# Patient Record
Sex: FEMALE | Race: WHITE | NOT HISPANIC OR LATINO | URBAN - METROPOLITAN AREA
[De-identification: names, ages, dates, MRNs, and addresses within clinical notes are randomized per-mention and may not be internally consistent; named-entity substitution may affect disease eponyms.]

---

## 2017-01-20 ENCOUNTER — OUTPATIENT (OUTPATIENT)
Dept: OUTPATIENT SERVICES | Facility: HOSPITAL | Age: 82
LOS: 1 days | End: 2017-01-20
Payer: MEDICARE

## 2017-01-20 VITALS
SYSTOLIC BLOOD PRESSURE: 138 MMHG | HEART RATE: 99 BPM | HEIGHT: 62 IN | WEIGHT: 205.91 LBS | TEMPERATURE: 98 F | DIASTOLIC BLOOD PRESSURE: 72 MMHG | RESPIRATION RATE: 14 BRPM

## 2017-01-20 DIAGNOSIS — Z01.818 ENCOUNTER FOR OTHER PREPROCEDURAL EXAMINATION: ICD-10-CM

## 2017-01-20 DIAGNOSIS — Z90.710 ACQUIRED ABSENCE OF BOTH CERVIX AND UTERUS: Chronic | ICD-10-CM

## 2017-01-20 DIAGNOSIS — Z98.1 ARTHRODESIS STATUS: Chronic | ICD-10-CM

## 2017-01-20 DIAGNOSIS — Z96.643 PRESENCE OF ARTIFICIAL HIP JOINT, BILATERAL: Chronic | ICD-10-CM

## 2017-01-20 DIAGNOSIS — Z87.81 PERSONAL HISTORY OF (HEALED) TRAUMATIC FRACTURE: Chronic | ICD-10-CM

## 2017-01-20 DIAGNOSIS — Z96.641 PRESENCE OF RIGHT ARTIFICIAL HIP JOINT: Chronic | ICD-10-CM

## 2017-01-20 DIAGNOSIS — M17.0 BILATERAL PRIMARY OSTEOARTHRITIS OF KNEE: ICD-10-CM

## 2017-01-20 DIAGNOSIS — M17.12 UNILATERAL PRIMARY OSTEOARTHRITIS, LEFT KNEE: ICD-10-CM

## 2017-01-20 LAB
ALBUMIN SERPL ELPH-MCNC: 3.7 G/DL — SIGNIFICANT CHANGE UP (ref 3.3–5)
ALP SERPL-CCNC: 82 U/L — SIGNIFICANT CHANGE UP (ref 30–120)
ALT FLD-CCNC: 19 U/L DA — SIGNIFICANT CHANGE UP (ref 10–60)
ANION GAP SERPL CALC-SCNC: 9 MMOL/L — SIGNIFICANT CHANGE UP (ref 5–17)
APTT BLD: 29.4 SEC — SIGNIFICANT CHANGE UP (ref 27.5–37.4)
AST SERPL-CCNC: 10 U/L — SIGNIFICANT CHANGE UP (ref 10–40)
BILIRUB SERPL-MCNC: 0.5 MG/DL — SIGNIFICANT CHANGE UP (ref 0.2–1.2)
BLD GP AB SCN SERPL QL: SIGNIFICANT CHANGE UP
BUN SERPL-MCNC: 26 MG/DL — HIGH (ref 7–23)
CALCIUM SERPL-MCNC: 9.9 MG/DL — SIGNIFICANT CHANGE UP (ref 8.4–10.5)
CHLORIDE SERPL-SCNC: 104 MMOL/L — SIGNIFICANT CHANGE UP (ref 96–108)
CO2 SERPL-SCNC: 27 MMOL/L — SIGNIFICANT CHANGE UP (ref 22–31)
CREAT SERPL-MCNC: 0.95 MG/DL — SIGNIFICANT CHANGE UP (ref 0.5–1.3)
GLUCOSE SERPL-MCNC: 154 MG/DL — HIGH (ref 70–99)
HCT VFR BLD CALC: 41.8 % — SIGNIFICANT CHANGE UP (ref 34.5–45)
HGB BLD-MCNC: 13.1 G/DL — SIGNIFICANT CHANGE UP (ref 11.5–15.5)
INR BLD: 1.14 RATIO — SIGNIFICANT CHANGE UP (ref 0.88–1.16)
MCHC RBC-ENTMCNC: 28.7 PG — SIGNIFICANT CHANGE UP (ref 27–34)
MCHC RBC-ENTMCNC: 31.4 GM/DL — LOW (ref 32–36)
MCV RBC AUTO: 91.5 FL — SIGNIFICANT CHANGE UP (ref 80–100)
PLATELET # BLD AUTO: 246 K/UL — SIGNIFICANT CHANGE UP (ref 150–400)
POTASSIUM SERPL-MCNC: 4 MMOL/L — SIGNIFICANT CHANGE UP (ref 3.5–5.3)
POTASSIUM SERPL-SCNC: 4 MMOL/L — SIGNIFICANT CHANGE UP (ref 3.5–5.3)
PROT SERPL-MCNC: 6.7 G/DL — SIGNIFICANT CHANGE UP (ref 6–8.3)
PROTHROM AB SERPL-ACNC: 12.8 SEC — SIGNIFICANT CHANGE UP (ref 10–13.1)
RBC # BLD: 4.57 M/UL — SIGNIFICANT CHANGE UP (ref 3.8–5.2)
RBC # FLD: 13.3 % — SIGNIFICANT CHANGE UP (ref 10.3–14.5)
SODIUM SERPL-SCNC: 140 MMOL/L — SIGNIFICANT CHANGE UP (ref 135–145)
WBC # BLD: 11.3 K/UL — HIGH (ref 3.8–10.5)
WBC # FLD AUTO: 11.3 K/UL — HIGH (ref 3.8–10.5)

## 2017-01-20 PROCEDURE — 86850 RBC ANTIBODY SCREEN: CPT

## 2017-01-20 PROCEDURE — 93010 ELECTROCARDIOGRAM REPORT: CPT

## 2017-01-20 PROCEDURE — G0463: CPT

## 2017-01-20 PROCEDURE — 85027 COMPLETE CBC AUTOMATED: CPT

## 2017-01-20 PROCEDURE — 36415 COLL VENOUS BLD VENIPUNCTURE: CPT

## 2017-01-20 PROCEDURE — 85730 THROMBOPLASTIN TIME PARTIAL: CPT

## 2017-01-20 PROCEDURE — 86900 BLOOD TYPING SEROLOGIC ABO: CPT

## 2017-01-20 PROCEDURE — 87641 MR-STAPH DNA AMP PROBE: CPT

## 2017-01-20 PROCEDURE — 93005 ELECTROCARDIOGRAM TRACING: CPT

## 2017-01-20 PROCEDURE — 80053 COMPREHEN METABOLIC PANEL: CPT

## 2017-01-20 PROCEDURE — 85610 PROTHROMBIN TIME: CPT

## 2017-01-20 PROCEDURE — 87640 STAPH A DNA AMP PROBE: CPT

## 2017-01-20 PROCEDURE — 86901 BLOOD TYPING SEROLOGIC RH(D): CPT

## 2017-01-20 NOTE — H&P PST ADULT - NEGATIVE NEUROLOGICAL SYMPTOMS
no vertigo/no syncope/no weakness/no difficulty walking/no confusion/no loss of sensation/no tremors/no headache

## 2017-01-20 NOTE — H&P PST ADULT - ABILITY TO HEAR (WITH HEARING AID OR HEARING APPLIANCE IF NORMALLY USED):
bilateral hearing aids/Mildly to Moderately Impaired: difficulty hearing in some environments or speaker may need to increase volume or speak distinctly

## 2017-01-20 NOTE — H&P PST ADULT - NEGATIVE OPHTHALMOLOGIC SYMPTOMS
no discharge R/no pain R/no irritation R/no discharge L/no scleral injection L/no lacrimation R/no blurred vision L/no irritation L/no scleral injection R/no photophobia/no diplopia/no loss of vision R/no loss of vision L/no blurred vision R/no pain L/no lacrimation L

## 2017-01-20 NOTE — H&P PST ADULT - PROBLEM SELECTOR PLAN 1
"left total knee replacement" on 2/8/17.  Diagnostic testing will be forwarded for medical and cardiology clearance.  Pre op instructions were reviewed with patient and daughter in law and signed.  Anesthesia and pharmacy consult today.

## 2017-01-20 NOTE — H&P PST ADULT - NEGATIVE ENMT SYMPTOMS
no nasal discharge/no dry mouth/no post-nasal discharge/no abnormal taste sensation/no nose bleeds/no tinnitus/no vertigo/no dysphagia/no ear pain/no gum bleeding/no recurrent cold sores/no nasal congestion/no throat pain/no nasal obstruction/no sinus symptoms

## 2017-01-20 NOTE — H&P PST ADULT - PSH
History of ankle fusion  left, 2001  History of hip replacement, total, right  2013  History of hysterectomy  1981  History of wrist fracture  right 2006, left 2007

## 2017-01-20 NOTE — H&P PST ADULT - HISTORY OF PRESENT ILLNESS
83 yo female is scheduled for "LEFT total knee replacement stage 1" on 2/8/17.  Patient states that she does not want to do the right knee at the same hospitalization.  Spoke to Amanda in Dr Robles office and [facility] OR booking to clarify that patient will only have LEFT knee total replacement.  Patient accompanied by daughter in law today, who helps with history.   Reports pain in both knees, worst in left, for which she has tried HA injections.   Pain is worst with weight bearing and rates 10/10.  Uses a cane and walker for stability.

## 2017-01-20 NOTE — H&P PST ADULT - FAMILY HISTORY
Child  Still living? Yes, Estimated age: 51-60  Family history of breast cancer, Age at diagnosis: Age Unknown     Sibling  Still living? No  Family history of colon cancer, Age at diagnosis: Age Unknown

## 2017-01-20 NOTE — H&P PST ADULT - PMH
Depression    Glaucoma    Hearing loss  bilateral  History of GI bleed  related to aspirin use 2010  Hypertension    Macular degeneration    Obesity (BMI 30-39.9)    Osteoarthritis of both knees    Urinary incontinence Depression    Glaucoma    Hearing loss  bilateral  Hypertension    Macular degeneration    Obesity (BMI 30-39.9)    Osteoarthritis of both knees    Urinary incontinence

## 2017-01-20 NOTE — H&P PST ADULT - ASSESSMENT
85 yo female is scheduled for LEFT total knee replacement on 2/8/17 and RIGHT total knee replacement on

## 2017-01-20 NOTE — H&P PST ADULT - NSANTHOSAYNRD_GEN_A_CORE
No. ROEL screening performed.  STOP BANG Legend: 0-2 = LOW Risk; 3-4 = INTERMEDIATE Risk; 5-8 = HIGH Risk

## 2017-01-21 LAB
MRSA PCR RESULT.: SIGNIFICANT CHANGE UP
S AUREUS DNA NOSE QL NAA+PROBE: SIGNIFICANT CHANGE UP

## 2017-02-02 RX ORDER — SODIUM CHLORIDE 9 MG/ML
1000 INJECTION, SOLUTION INTRAVENOUS
Qty: 0 | Refills: 0 | Status: DISCONTINUED | OUTPATIENT
Start: 2017-02-08 | End: 2017-02-11

## 2017-02-03 NOTE — PROVIDER CONTACT NOTE (OTHER) - ASSESSMENT
Allergy: “something for her stomach”. Intolerance: ASA (81mg ? abdominal pain, not GI bleed as indicated)  PMH:  Depression (sertraline 50mg Qday); Glaucoma (Latanoprost 0.005% 1 drop in affected eye(s) HS); Hearing loss, bilateral; HTN (valsartan 40mg Qday, verapamil 120mg Qday); Macular degeneration; Urinary incontinence (oxybutynin 5mg Qday) . PST value of interest: WBC 11.3 (elevated).

## 2017-02-03 NOTE — PROVIDER CONTACT NOTE (OTHER) - RECOMMENDATIONS
1.Repeat WBC preop unless repeated by PMD. 2.No OxyContin® preop. 3. Eliquis® 2.5mg BID x 12 days – consult with Dr. Robles as to prophylaxis beyond 12 days due to ASA intolerance.

## 2017-02-07 ENCOUNTER — RESULT REVIEW (OUTPATIENT)
Age: 82
End: 2017-02-07

## 2017-02-07 RX ORDER — APREPITANT 80 MG/1
40 CAPSULE ORAL ONCE
Qty: 0 | Refills: 0 | Status: COMPLETED | OUTPATIENT
Start: 2017-02-08 | End: 2017-02-08

## 2017-02-07 RX ORDER — FAMOTIDINE 10 MG/ML
1 INJECTION INTRAVENOUS
Qty: 0 | Refills: 0 | COMMUNITY
Start: 2017-02-07 | End: 2017-02-08

## 2017-02-07 NOTE — PATIENT PROFILE ADULT. - PMH
Depression    Glaucoma    Hearing loss  bilateral  History of GI bleed  related to aspirin use 2010  Hypertension    Macular degeneration    Obesity (BMI 30-39.9)    Osteoarthritis of both knees    Urinary incontinence

## 2017-02-08 ENCOUNTER — INPATIENT (INPATIENT)
Facility: HOSPITAL | Age: 82
LOS: 2 days | Discharge: HOME | DRG: 470 | End: 2017-02-11
Attending: ORTHOPAEDIC SURGERY | Admitting: ORTHOPAEDIC SURGERY
Payer: MEDICARE

## 2017-02-08 ENCOUNTER — APPOINTMENT (OUTPATIENT)
Dept: ORTHOPEDIC SURGERY | Facility: HOSPITAL | Age: 82
End: 2017-02-08

## 2017-02-08 VITALS
OXYGEN SATURATION: 98 % | TEMPERATURE: 98 F | WEIGHT: 207.9 LBS | SYSTOLIC BLOOD PRESSURE: 134 MMHG | RESPIRATION RATE: 18 BRPM | HEIGHT: 63 IN | HEART RATE: 98 BPM | DIASTOLIC BLOOD PRESSURE: 99 MMHG

## 2017-02-08 DIAGNOSIS — Z90.710 ACQUIRED ABSENCE OF BOTH CERVIX AND UTERUS: Chronic | ICD-10-CM

## 2017-02-08 DIAGNOSIS — M17.12 UNILATERAL PRIMARY OSTEOARTHRITIS, LEFT KNEE: ICD-10-CM

## 2017-02-08 DIAGNOSIS — Z87.81 PERSONAL HISTORY OF (HEALED) TRAUMATIC FRACTURE: Chronic | ICD-10-CM

## 2017-02-08 DIAGNOSIS — Z01.818 ENCOUNTER FOR OTHER PREPROCEDURAL EXAMINATION: ICD-10-CM

## 2017-02-08 DIAGNOSIS — Z96.641 PRESENCE OF RIGHT ARTIFICIAL HIP JOINT: Chronic | ICD-10-CM

## 2017-02-08 DIAGNOSIS — Z98.1 ARTHRODESIS STATUS: Chronic | ICD-10-CM

## 2017-02-08 LAB
ANION GAP SERPL CALC-SCNC: 7 MMOL/L — SIGNIFICANT CHANGE UP (ref 5–17)
BUN SERPL-MCNC: 16 MG/DL — SIGNIFICANT CHANGE UP (ref 7–23)
CALCIUM SERPL-MCNC: 9.2 MG/DL — SIGNIFICANT CHANGE UP (ref 8.4–10.5)
CHLORIDE SERPL-SCNC: 106 MMOL/L — SIGNIFICANT CHANGE UP (ref 96–108)
CO2 SERPL-SCNC: 30 MMOL/L — SIGNIFICANT CHANGE UP (ref 22–31)
CREAT SERPL-MCNC: 0.85 MG/DL — SIGNIFICANT CHANGE UP (ref 0.5–1.3)
GLUCOSE SERPL-MCNC: 136 MG/DL — HIGH (ref 70–99)
HCT VFR BLD CALC: 38 % — SIGNIFICANT CHANGE UP (ref 34.5–45)
HGB BLD-MCNC: 12.4 G/DL — SIGNIFICANT CHANGE UP (ref 11.5–15.5)
POTASSIUM SERPL-MCNC: 5 MMOL/L — SIGNIFICANT CHANGE UP (ref 3.5–5.3)
POTASSIUM SERPL-SCNC: 5 MMOL/L — SIGNIFICANT CHANGE UP (ref 3.5–5.3)
SODIUM SERPL-SCNC: 143 MMOL/L — SIGNIFICANT CHANGE UP (ref 135–145)

## 2017-02-08 PROCEDURE — 88311 DECALCIFY TISSUE: CPT | Mod: 26

## 2017-02-08 PROCEDURE — 88305 TISSUE EXAM BY PATHOLOGIST: CPT | Mod: 26

## 2017-02-08 PROCEDURE — 99223 1ST HOSP IP/OBS HIGH 75: CPT

## 2017-02-08 PROCEDURE — 27447 TOTAL KNEE ARTHROPLASTY: CPT | Mod: LT

## 2017-02-08 PROCEDURE — 73562 X-RAY EXAM OF KNEE 3: CPT | Mod: 26,LT

## 2017-02-08 RX ORDER — CEFAZOLIN SODIUM 1 G
2000 VIAL (EA) INJECTION EVERY 8 HOURS
Qty: 0 | Refills: 0 | Status: COMPLETED | OUTPATIENT
Start: 2017-02-08 | End: 2017-02-09

## 2017-02-08 RX ORDER — TRANEXAMIC ACID 100 MG/ML
1000 INJECTION, SOLUTION INTRAVENOUS ONCE
Qty: 0 | Refills: 0 | Status: COMPLETED | OUTPATIENT
Start: 2017-02-08 | End: 2017-02-08

## 2017-02-08 RX ORDER — VALSARTAN 80 MG/1
40 TABLET ORAL DAILY
Qty: 0 | Refills: 0 | Status: DISCONTINUED | OUTPATIENT
Start: 2017-02-10 | End: 2017-02-11

## 2017-02-08 RX ORDER — CEFAZOLIN SODIUM 1 G
2000 VIAL (EA) INJECTION ONCE
Qty: 0 | Refills: 0 | Status: COMPLETED | OUTPATIENT
Start: 2017-02-08 | End: 2017-02-08

## 2017-02-08 RX ORDER — INFLUENZA VIRUS VACCINE 15; 15; 15; 15 UG/.5ML; UG/.5ML; UG/.5ML; UG/.5ML
0.5 SUSPENSION INTRAMUSCULAR ONCE
Qty: 0 | Refills: 0 | Status: DISCONTINUED | OUTPATIENT
Start: 2017-02-08 | End: 2017-02-11

## 2017-02-08 RX ORDER — ACETAMINOPHEN 500 MG
1000 TABLET ORAL ONCE
Qty: 0 | Refills: 0 | Status: COMPLETED | OUTPATIENT
Start: 2017-02-08 | End: 2017-02-08

## 2017-02-08 RX ORDER — SODIUM CHLORIDE 9 MG/ML
1000 INJECTION, SOLUTION INTRAVENOUS
Qty: 0 | Refills: 0 | Status: DISCONTINUED | OUTPATIENT
Start: 2017-02-08 | End: 2017-02-08

## 2017-02-08 RX ORDER — OXYBUTYNIN CHLORIDE 5 MG
5 TABLET ORAL DAILY
Qty: 0 | Refills: 0 | Status: DISCONTINUED | OUTPATIENT
Start: 2017-02-08 | End: 2017-02-11

## 2017-02-08 RX ORDER — SERTRALINE 25 MG/1
50 TABLET, FILM COATED ORAL DAILY
Qty: 0 | Refills: 0 | Status: DISCONTINUED | OUTPATIENT
Start: 2017-02-08 | End: 2017-02-11

## 2017-02-08 RX ORDER — HYDROMORPHONE HYDROCHLORIDE 2 MG/ML
0.5 INJECTION INTRAMUSCULAR; INTRAVENOUS; SUBCUTANEOUS
Qty: 0 | Refills: 0 | Status: DISCONTINUED | OUTPATIENT
Start: 2017-02-08 | End: 2017-02-11

## 2017-02-08 RX ORDER — MAGNESIUM HYDROXIDE 400 MG/1
30 TABLET, CHEWABLE ORAL DAILY
Qty: 0 | Refills: 0 | Status: DISCONTINUED | OUTPATIENT
Start: 2017-02-08 | End: 2017-02-11

## 2017-02-08 RX ORDER — ACETAMINOPHEN 500 MG
1000 TABLET ORAL EVERY 8 HOURS
Qty: 0 | Refills: 0 | Status: DISCONTINUED | OUTPATIENT
Start: 2017-02-09 | End: 2017-02-11

## 2017-02-08 RX ORDER — METOPROLOL TARTRATE 50 MG
25 TABLET ORAL DAILY
Qty: 0 | Refills: 0 | Status: DISCONTINUED | OUTPATIENT
Start: 2017-02-08 | End: 2017-02-08

## 2017-02-08 RX ORDER — HYDROMORPHONE HYDROCHLORIDE 2 MG/ML
0.5 INJECTION INTRAMUSCULAR; INTRAVENOUS; SUBCUTANEOUS
Qty: 0 | Refills: 0 | Status: DISCONTINUED | OUTPATIENT
Start: 2017-02-08 | End: 2017-02-08

## 2017-02-08 RX ORDER — APIXABAN 2.5 MG/1
2.5 TABLET, FILM COATED ORAL
Qty: 0 | Refills: 0 | Status: DISCONTINUED | OUTPATIENT
Start: 2017-02-09 | End: 2017-02-11

## 2017-02-08 RX ORDER — OXYCODONE HYDROCHLORIDE 5 MG/1
5 TABLET ORAL
Qty: 0 | Refills: 0 | Status: DISCONTINUED | OUTPATIENT
Start: 2017-02-08 | End: 2017-02-11

## 2017-02-08 RX ORDER — SODIUM CHLORIDE 9 MG/ML
1000 INJECTION, SOLUTION INTRAVENOUS
Qty: 0 | Refills: 0 | Status: DISCONTINUED | OUTPATIENT
Start: 2017-02-08 | End: 2017-02-11

## 2017-02-08 RX ORDER — POLYETHYLENE GLYCOL 3350 17 G/17G
17 POWDER, FOR SOLUTION ORAL DAILY
Qty: 0 | Refills: 0 | Status: DISCONTINUED | OUTPATIENT
Start: 2017-02-08 | End: 2017-02-11

## 2017-02-08 RX ORDER — CELECOXIB 200 MG/1
200 CAPSULE ORAL
Qty: 0 | Refills: 0 | Status: DISCONTINUED | OUTPATIENT
Start: 2017-02-08 | End: 2017-02-11

## 2017-02-08 RX ORDER — OXYCODONE HYDROCHLORIDE 5 MG/1
10 TABLET ORAL
Qty: 0 | Refills: 0 | Status: DISCONTINUED | OUTPATIENT
Start: 2017-02-08 | End: 2017-02-11

## 2017-02-08 RX ORDER — METOPROLOL TARTRATE 50 MG
25 TABLET ORAL DAILY
Qty: 0 | Refills: 0 | Status: DISCONTINUED | OUTPATIENT
Start: 2017-02-08 | End: 2017-02-11

## 2017-02-08 RX ORDER — VERAPAMIL HCL 240 MG
120 CAPSULE, EXTENDED RELEASE PELLETS 24 HR ORAL DAILY
Qty: 0 | Refills: 0 | Status: DISCONTINUED | OUTPATIENT
Start: 2017-02-08 | End: 2017-02-08

## 2017-02-08 RX ORDER — ONDANSETRON 8 MG/1
4 TABLET, FILM COATED ORAL EVERY 6 HOURS
Qty: 0 | Refills: 0 | Status: DISCONTINUED | OUTPATIENT
Start: 2017-02-08 | End: 2017-02-11

## 2017-02-08 RX ORDER — LATANOPROST 0.05 MG/ML
1 SOLUTION/ DROPS OPHTHALMIC; TOPICAL AT BEDTIME
Qty: 0 | Refills: 0 | Status: DISCONTINUED | OUTPATIENT
Start: 2017-02-08 | End: 2017-02-11

## 2017-02-08 RX ORDER — ACETAMINOPHEN 500 MG
1000 TABLET ORAL EVERY 6 HOURS
Qty: 0 | Refills: 0 | Status: COMPLETED | OUTPATIENT
Start: 2017-02-08 | End: 2017-02-09

## 2017-02-08 RX ORDER — PANTOPRAZOLE SODIUM 20 MG/1
40 TABLET, DELAYED RELEASE ORAL
Qty: 0 | Refills: 0 | Status: DISCONTINUED | OUTPATIENT
Start: 2017-02-08 | End: 2017-02-11

## 2017-02-08 RX ORDER — VERAPAMIL HCL 240 MG
120 CAPSULE, EXTENDED RELEASE PELLETS 24 HR ORAL DAILY
Qty: 0 | Refills: 0 | Status: DISCONTINUED | OUTPATIENT
Start: 2017-02-08 | End: 2017-02-11

## 2017-02-08 RX ORDER — SENNA PLUS 8.6 MG/1
2 TABLET ORAL AT BEDTIME
Qty: 0 | Refills: 0 | Status: DISCONTINUED | OUTPATIENT
Start: 2017-02-08 | End: 2017-02-11

## 2017-02-08 RX ORDER — DOCUSATE SODIUM 100 MG
100 CAPSULE ORAL THREE TIMES A DAY
Qty: 0 | Refills: 0 | Status: DISCONTINUED | OUTPATIENT
Start: 2017-02-08 | End: 2017-02-11

## 2017-02-08 RX ADMIN — OXYCODONE HYDROCHLORIDE 5 MILLIGRAM(S): 5 TABLET ORAL at 20:44

## 2017-02-08 RX ADMIN — HYDROMORPHONE HYDROCHLORIDE 0.5 MILLIGRAM(S): 2 INJECTION INTRAMUSCULAR; INTRAVENOUS; SUBCUTANEOUS at 12:23

## 2017-02-08 RX ADMIN — Medication 5 MILLIGRAM(S): at 21:24

## 2017-02-08 RX ADMIN — HYDROMORPHONE HYDROCHLORIDE 0.5 MILLIGRAM(S): 2 INJECTION INTRAMUSCULAR; INTRAVENOUS; SUBCUTANEOUS at 13:12

## 2017-02-08 RX ADMIN — Medication 100 MILLIGRAM(S): at 21:24

## 2017-02-08 RX ADMIN — LATANOPROST 1 DROP(S): 0.05 SOLUTION/ DROPS OPHTHALMIC; TOPICAL at 21:25

## 2017-02-08 RX ADMIN — SODIUM CHLORIDE 75 MILLILITER(S): 9 INJECTION, SOLUTION INTRAVENOUS at 08:39

## 2017-02-08 RX ADMIN — CELECOXIB 200 MILLIGRAM(S): 200 CAPSULE ORAL at 18:30

## 2017-02-08 RX ADMIN — Medication 100 MILLIGRAM(S): at 18:17

## 2017-02-08 RX ADMIN — OXYCODONE HYDROCHLORIDE 5 MILLIGRAM(S): 5 TABLET ORAL at 02:09

## 2017-02-08 RX ADMIN — Medication 400 MILLIGRAM(S): at 15:37

## 2017-02-08 RX ADMIN — Medication 1000 MILLIGRAM(S): at 16:00

## 2017-02-08 RX ADMIN — SODIUM CHLORIDE 75 MILLILITER(S): 9 INJECTION, SOLUTION INTRAVENOUS at 11:34

## 2017-02-08 RX ADMIN — HYDROMORPHONE HYDROCHLORIDE 0.5 MILLIGRAM(S): 2 INJECTION INTRAMUSCULAR; INTRAVENOUS; SUBCUTANEOUS at 13:30

## 2017-02-08 RX ADMIN — HYDROMORPHONE HYDROCHLORIDE 0.5 MILLIGRAM(S): 2 INJECTION INTRAMUSCULAR; INTRAVENOUS; SUBCUTANEOUS at 12:40

## 2017-02-08 RX ADMIN — OXYCODONE HYDROCHLORIDE 5 MILLIGRAM(S): 5 TABLET ORAL at 21:14

## 2017-02-08 RX ADMIN — APREPITANT 40 MILLIGRAM(S): 80 CAPSULE ORAL at 08:02

## 2017-02-08 RX ADMIN — Medication 400 MILLIGRAM(S): at 21:24

## 2017-02-08 NOTE — PHYSICAL THERAPY INITIAL EVALUATION ADULT - RANGE OF MOTION EXAMINATION, REHAB EVAL
L knee 0-60 deg/bilateral upper extremity ROM was WFL (within functional limits)/deficits as listed below/Right LE ROM was WFL (within functional limits)

## 2017-02-08 NOTE — PHYSICAL THERAPY INITIAL EVALUATION ADULT - ADDITIONAL COMMENTS
Pt lives alone in a house w/ 5 steps to enter with rail, 1 flight of steps with rail to bedroom.  Pt has a straight cane

## 2017-02-08 NOTE — PHYSICAL THERAPY INITIAL EVALUATION ADULT - GAIT DEVIATIONS NOTED, PT EVAL
L knee buckling/decreased meme/decreased stride length/decreased step length/decreased weight-shifting ability

## 2017-02-08 NOTE — ASU PREOP CHECKLIST - 2.
pt did not take metropolol as directed by PMD pt did not take metropolol as directed by PMD- Dr Coley made aware pt did not take metoprolol as directed by PMD- Dr Coley made aware

## 2017-02-08 NOTE — OCCUPATIONAL THERAPY INITIAL EVALUATION ADULT - IADL RETRAINING, OT EVAL
Patient will increase standing tolerance to 3-5 minutes for grooming at the sink with in 3-5 sessions.

## 2017-02-09 ENCOUNTER — TRANSCRIPTION ENCOUNTER (OUTPATIENT)
Age: 82
End: 2017-02-09

## 2017-02-09 LAB
ANION GAP SERPL CALC-SCNC: 7 MMOL/L — SIGNIFICANT CHANGE UP (ref 5–17)
BUN SERPL-MCNC: 14 MG/DL — SIGNIFICANT CHANGE UP (ref 7–23)
CALCIUM SERPL-MCNC: 9.1 MG/DL — SIGNIFICANT CHANGE UP (ref 8.4–10.5)
CHLORIDE SERPL-SCNC: 104 MMOL/L — SIGNIFICANT CHANGE UP (ref 96–108)
CO2 SERPL-SCNC: 26 MMOL/L — SIGNIFICANT CHANGE UP (ref 22–31)
CREAT SERPL-MCNC: 0.75 MG/DL — SIGNIFICANT CHANGE UP (ref 0.5–1.3)
GLUCOSE SERPL-MCNC: 117 MG/DL — HIGH (ref 70–99)
HCT VFR BLD CALC: 34.2 % — LOW (ref 34.5–45)
HGB BLD-MCNC: 10.4 G/DL — LOW (ref 11.5–15.5)
MCHC RBC-ENTMCNC: 28.4 PG — SIGNIFICANT CHANGE UP (ref 27–34)
MCHC RBC-ENTMCNC: 30.5 GM/DL — LOW (ref 32–36)
MCV RBC AUTO: 93.3 FL — SIGNIFICANT CHANGE UP (ref 80–100)
PLATELET # BLD AUTO: 191 K/UL — SIGNIFICANT CHANGE UP (ref 150–400)
POTASSIUM SERPL-MCNC: 4.2 MMOL/L — SIGNIFICANT CHANGE UP (ref 3.5–5.3)
POTASSIUM SERPL-SCNC: 4.2 MMOL/L — SIGNIFICANT CHANGE UP (ref 3.5–5.3)
RBC # BLD: 3.66 M/UL — LOW (ref 3.8–5.2)
RBC # FLD: 12.8 % — SIGNIFICANT CHANGE UP (ref 10.3–14.5)
SODIUM SERPL-SCNC: 137 MMOL/L — SIGNIFICANT CHANGE UP (ref 135–145)
WBC # BLD: 10.1 K/UL — SIGNIFICANT CHANGE UP (ref 3.8–10.5)
WBC # FLD AUTO: 10.1 K/UL — SIGNIFICANT CHANGE UP (ref 3.8–10.5)

## 2017-02-09 PROCEDURE — 99232 SBSQ HOSP IP/OBS MODERATE 35: CPT

## 2017-02-09 RX ADMIN — OXYCODONE HYDROCHLORIDE 5 MILLIGRAM(S): 5 TABLET ORAL at 13:44

## 2017-02-09 RX ADMIN — Medication 5 MILLIGRAM(S): at 12:37

## 2017-02-09 RX ADMIN — APIXABAN 2.5 MILLIGRAM(S): 2.5 TABLET, FILM COATED ORAL at 22:33

## 2017-02-09 RX ADMIN — Medication 25 MILLIGRAM(S): at 06:11

## 2017-02-09 RX ADMIN — Medication 1000 MILLIGRAM(S): at 02:36

## 2017-02-09 RX ADMIN — Medication 400 MILLIGRAM(S): at 03:56

## 2017-02-09 RX ADMIN — Medication 1000 MILLIGRAM(S): at 09:20

## 2017-02-09 RX ADMIN — PANTOPRAZOLE SODIUM 40 MILLIGRAM(S): 20 TABLET, DELAYED RELEASE ORAL at 06:11

## 2017-02-09 RX ADMIN — OXYCODONE HYDROCHLORIDE 5 MILLIGRAM(S): 5 TABLET ORAL at 14:30

## 2017-02-09 RX ADMIN — LATANOPROST 1 DROP(S): 0.05 SOLUTION/ DROPS OPHTHALMIC; TOPICAL at 22:33

## 2017-02-09 RX ADMIN — Medication 1000 MILLIGRAM(S): at 09:23

## 2017-02-09 RX ADMIN — OXYCODONE HYDROCHLORIDE 5 MILLIGRAM(S): 5 TABLET ORAL at 01:39

## 2017-02-09 RX ADMIN — CELECOXIB 200 MILLIGRAM(S): 200 CAPSULE ORAL at 18:08

## 2017-02-09 RX ADMIN — CELECOXIB 200 MILLIGRAM(S): 200 CAPSULE ORAL at 09:23

## 2017-02-09 RX ADMIN — OXYCODONE HYDROCHLORIDE 5 MILLIGRAM(S): 5 TABLET ORAL at 09:21

## 2017-02-09 RX ADMIN — Medication 1000 MILLIGRAM(S): at 18:08

## 2017-02-09 RX ADMIN — APIXABAN 2.5 MILLIGRAM(S): 2.5 TABLET, FILM COATED ORAL at 09:20

## 2017-02-09 RX ADMIN — Medication 1000 MILLIGRAM(S): at 18:16

## 2017-02-09 RX ADMIN — OXYCODONE HYDROCHLORIDE 5 MILLIGRAM(S): 5 TABLET ORAL at 06:41

## 2017-02-09 RX ADMIN — Medication 100 MILLIGRAM(S): at 22:33

## 2017-02-09 RX ADMIN — Medication 100 MILLIGRAM(S): at 00:46

## 2017-02-09 RX ADMIN — SERTRALINE 50 MILLIGRAM(S): 25 TABLET, FILM COATED ORAL at 12:37

## 2017-02-09 RX ADMIN — OXYCODONE HYDROCHLORIDE 5 MILLIGRAM(S): 5 TABLET ORAL at 18:12

## 2017-02-09 RX ADMIN — OXYCODONE HYDROCHLORIDE 5 MILLIGRAM(S): 5 TABLET ORAL at 19:00

## 2017-02-09 RX ADMIN — Medication 120 MILLIGRAM(S): at 06:10

## 2017-02-09 RX ADMIN — OXYCODONE HYDROCHLORIDE 5 MILLIGRAM(S): 5 TABLET ORAL at 06:11

## 2017-02-09 RX ADMIN — CELECOXIB 200 MILLIGRAM(S): 200 CAPSULE ORAL at 18:17

## 2017-02-09 RX ADMIN — OXYCODONE HYDROCHLORIDE 5 MILLIGRAM(S): 5 TABLET ORAL at 10:15

## 2017-02-09 RX ADMIN — CELECOXIB 200 MILLIGRAM(S): 200 CAPSULE ORAL at 09:20

## 2017-02-09 RX ADMIN — Medication 100 MILLIGRAM(S): at 13:44

## 2017-02-09 RX ADMIN — Medication 100 MILLIGRAM(S): at 06:11

## 2017-02-09 RX ADMIN — Medication 1000 MILLIGRAM(S): at 05:56

## 2017-02-09 NOTE — DIETITIAN INITIAL EVALUATION ADULT. - OTHER INFO
84F s/p L TKR. Pt reports moderate pain and states appetite is slightly decreased at present time. On regular diet, tolerating diet consistency. BMI 36.8, morbidly obese appearance- states she doesn't follow any modified diet at home but is eating more fruits and vegetables, encouraged adequate protein as well. States she lives alone but has a dtr that lives nearby who is available to help if needed. NKFA. Skin intact c surgical incision.

## 2017-02-09 NOTE — DISCHARGE NOTE ADULT - PATIENT PORTAL LINK FT
“You can access the Follow[facility] Patient Portal, offered by [facility], by registering with the following website: [email or web address]”

## 2017-02-09 NOTE — DISCHARGE NOTE ADULT - CARE PLAN
Principal Discharge DX:	Primary osteoarthritis of both knees  Goal:	Improve ambulation, ADLs and quality of life  Instructions for follow-up, activity and diet:	Physical Therapy/Occupational Therapy for ambulation, transfers, stairs, ADL's, Range of Motion Exercises, Isometrics.  Full weight bearing as tolerated with rolling walker  Range of Motion Goals: Flexion 120 degrees; Extension 0 degrees  Keep incision clean and dry.  Suture/prineo dressing removal 14 days after surgery at rehab facility or Surgeon's office  May shower post-op day #5 if no drainage from incision

## 2017-02-09 NOTE — DISCHARGE NOTE ADULT - CARE PROVIDER_API CALL
Sebastian Robles MD,   Sebastian Robles MD  [address],   [address]  [place] [address]  Tel: [phone]  Fax: [phone]  Phone: (   )    -  Fax: (   )    -

## 2017-02-09 NOTE — DISCHARGE NOTE ADULT - MEDICATION SUMMARY - MEDICATIONS TO STOP TAKING
I will STOP taking the medications listed below when I get home from the hospital:    Tylenol 8 Hour Caplet 650 mg oral tablet, extended release  -- 2 tab(s) by mouth every 8 hours, As Needed    Pepcid 20 mg oral tablet  -- 1 tab(s) by mouth twice

## 2017-02-09 NOTE — DISCHARGE NOTE ADULT - OTHER SIGNIFICANT FINDINGS
Physical Therapy/Occupational Therapy for ambulation, transfers, stairs, ADL's, Range of Motion Exercises, Isometrics.  Full weight bearing as tolerated with rolling walker  Range of Motion Goals: Flexion 120 degrees; Extension 0 degrees  Keep incision clean and dry.  Suture/prineo dressing removal 14 days after surgery at rehab facility or Surgeon's office  May shower post-op day #5 if no drainage from incision  Follow up with your primary care physician within 2-3 weeks of discharge home

## 2017-02-09 NOTE — DISCHARGE NOTE ADULT - CARE PROVIDERS DIRECT ADDRESSES
,DirectAddress_Unknown,[email or web address]

## 2017-02-09 NOTE — DISCHARGE NOTE ADULT - MEDICATION SUMMARY - MEDICATIONS TO TAKE
I will START or STAY ON the medications listed below when I get home from the hospital:    eye caps vitamin  --   once a day  -- Indication: For vit    acetaminophen 500 mg oral tablet  -- 2 tab(s) by mouth every 12 hours  -- Indication: For Pain    oxyCODONE 5 mg oral tablet  -- 1 tab(s) by mouth every 4 hours (5 times/day), As Needed -Mild Pain  -- Indication: For Pain    oxyCODONE 10 mg oral tablet  -- 1 tab(s) by mouth every 4 hours (5 times/day), As Needed -Moderate Pain  -- Indication: For Pain    valsartan 40 mg oral tablet  --  by mouth once a day  -- Indication: For HTN    verapamil 120 mg/24 hours oral capsule, extended release  -- 1 cap(s) by mouth once a day  -- Indication: For HTN    apixaban 2.5 mg oral tablet  -- 1 tab(s) by mouth 2 times a day x 33 days   -- Indication: For DVT prophylaxis    sertraline 50 mg oral tablet  -- 1 tab(s) by mouth once a day  -- Indication: For depression    metoprolol succinate 25 mg oral tablet, extended release  -- 1 tab(s) by mouth once a day hold for SBP<120  -- Indication: For HTN    docusate sodium 100 mg oral capsule  -- 1 cap(s) by mouth 3 times a day  -- Indication: For constipation    senna oral tablet  -- 2 tab(s) by mouth once a day (at bedtime), As needed, Constipation  -- Indication: For constipation    latanoprost 0.005% ophthalmic solution  -- 1 drop(s) to each affected eye once a day (in the evening)  -- Indication: For glaucoma    pantoprazole 40 mg oral delayed release tablet  -- 1 tab(s) by mouth once a day (before a meal)  -- Indication: For GERD    oxybutynin 5 mg oral tablet  --  by mouth once a day  -- Indication: For overactive bladder

## 2017-02-09 NOTE — DISCHARGE NOTE ADULT - NS AS ACTIVITY OBS
No Heavy lifting/straining/Walking-Outdoors allowed/Walking-Indoors allowed/Do not drive or operate machinery

## 2017-02-09 NOTE — DISCHARGE NOTE ADULT - PLAN OF CARE
Improve ambulation, ADLs and quality of life Physical Therapy/Occupational Therapy for ambulation, transfers, stairs, ADL's, Range of Motion Exercises, Isometrics.  Full weight bearing as tolerated with rolling walker  Range of Motion Goals: Flexion 120 degrees; Extension 0 degrees  Keep incision clean and dry.  Suture/prineo dressing removal 14 days after surgery at rehab facility or Surgeon's office  May shower post-op day #5 if no drainage from incision

## 2017-02-09 NOTE — DISCHARGE NOTE ADULT - PROVIDER TOKENS
FREE:[LAST:[Sebastian Robles MD],PHONE:[(   )    -],FAX:[(   )    -],ADDRESS:[Sebastian Robles MD  [address] [place] [address],   [facility] [address]  [place] [address]  Tel: [phone]  Fax: [phone]]]

## 2017-02-09 NOTE — DISCHARGE NOTE ADULT - HOSPITAL COURSE
This patient was admitted to [facility] with a history of severe degenerative joint disease of the Left knee.  Patient went to Pre-Surgical Testing at [facility] and was medically cleared to undergo elective procedure.  No operative or james-operative complications arose during patients hospital course.  Patient received antibiotic according to SCIP guidelines for infection prevention.  Eliquis was given for DVT prophylaxis.  Anesthesia, Medical Hospitalist, Physical Therapy and Occupational Therapy were consulted. Patient is stable for discharge with a good prognosis.  Appropriate discharge instructions and medications are provided in this document. This patient was admitted to [facility] with a history of severe degenerative joint disease of the Left knee.  Patient went to Pre-Surgical Testing at [facility] and was medically cleared to undergo elective procedure. HV drain was placed intraop and removed and output became appropriate. No operative or james-operative complications arose during patients hospital course.  Patient received antibiotic according to SCIP guidelines for infection prevention.  Eliquis was given for DVT prophylaxis.  Anesthesia, Medical Hospitalist, Physical Therapy and Occupational Therapy were consulted. Patient is stable for discharge with a good prognosis.  Appropriate discharge instructions and medications are provided in this document.

## 2017-02-10 PROBLEM — F32.9 MAJOR DEPRESSIVE DISORDER, SINGLE EPISODE, UNSPECIFIED: Chronic | Status: ACTIVE | Noted: 2017-01-20

## 2017-02-10 PROBLEM — H91.90 UNSPECIFIED HEARING LOSS, UNSPECIFIED EAR: Chronic | Status: ACTIVE | Noted: 2017-01-20

## 2017-02-10 PROBLEM — H35.30 UNSPECIFIED MACULAR DEGENERATION: Chronic | Status: ACTIVE | Noted: 2017-01-20

## 2017-02-10 PROBLEM — I10 ESSENTIAL (PRIMARY) HYPERTENSION: Chronic | Status: ACTIVE | Noted: 2017-01-20

## 2017-02-10 PROBLEM — M17.0 BILATERAL PRIMARY OSTEOARTHRITIS OF KNEE: Chronic | Status: ACTIVE | Noted: 2017-01-20

## 2017-02-10 PROBLEM — E66.9 OBESITY, UNSPECIFIED: Chronic | Status: ACTIVE | Noted: 2017-01-20

## 2017-02-10 PROBLEM — R32 UNSPECIFIED URINARY INCONTINENCE: Chronic | Status: ACTIVE | Noted: 2017-01-20

## 2017-02-10 PROBLEM — H40.9 UNSPECIFIED GLAUCOMA: Chronic | Status: ACTIVE | Noted: 2017-01-20

## 2017-02-10 LAB
ANION GAP SERPL CALC-SCNC: 8 MMOL/L — SIGNIFICANT CHANGE UP (ref 5–17)
BUN SERPL-MCNC: 16 MG/DL — SIGNIFICANT CHANGE UP (ref 7–23)
CALCIUM SERPL-MCNC: 9.4 MG/DL — SIGNIFICANT CHANGE UP (ref 8.4–10.5)
CHLORIDE SERPL-SCNC: 105 MMOL/L — SIGNIFICANT CHANGE UP (ref 96–108)
CO2 SERPL-SCNC: 25 MMOL/L — SIGNIFICANT CHANGE UP (ref 22–31)
CREAT SERPL-MCNC: 0.77 MG/DL — SIGNIFICANT CHANGE UP (ref 0.5–1.3)
GLUCOSE SERPL-MCNC: 121 MG/DL — HIGH (ref 70–99)
HCT VFR BLD CALC: 31.7 % — LOW (ref 34.5–45)
HGB BLD-MCNC: 10.5 G/DL — LOW (ref 11.5–15.5)
MCHC RBC-ENTMCNC: 30.8 PG — SIGNIFICANT CHANGE UP (ref 27–34)
MCHC RBC-ENTMCNC: 33.2 GM/DL — SIGNIFICANT CHANGE UP (ref 32–36)
MCV RBC AUTO: 92.6 FL — SIGNIFICANT CHANGE UP (ref 80–100)
PLATELET # BLD AUTO: 183 K/UL — SIGNIFICANT CHANGE UP (ref 150–400)
POTASSIUM SERPL-MCNC: 4 MMOL/L — SIGNIFICANT CHANGE UP (ref 3.5–5.3)
POTASSIUM SERPL-SCNC: 4 MMOL/L — SIGNIFICANT CHANGE UP (ref 3.5–5.3)
RBC # BLD: 3.42 M/UL — LOW (ref 3.8–5.2)
RBC # FLD: 12.9 % — SIGNIFICANT CHANGE UP (ref 10.3–14.5)
SODIUM SERPL-SCNC: 138 MMOL/L — SIGNIFICANT CHANGE UP (ref 135–145)
WBC # BLD: 11.2 K/UL — HIGH (ref 3.8–10.5)
WBC # FLD AUTO: 11.2 K/UL — HIGH (ref 3.8–10.5)

## 2017-02-10 PROCEDURE — 99233 SBSQ HOSP IP/OBS HIGH 50: CPT

## 2017-02-10 RX ORDER — METOPROLOL TARTRATE 50 MG
1 TABLET ORAL
Qty: 0 | Refills: 0 | DISCHARGE
Start: 2017-02-10

## 2017-02-10 RX ORDER — ACETAMINOPHEN 500 MG
2 TABLET ORAL
Qty: 0 | Refills: 0 | COMMUNITY

## 2017-02-10 RX ORDER — OXYCODONE HYDROCHLORIDE 5 MG/1
1 TABLET ORAL
Qty: 0 | Refills: 0 | DISCHARGE
Start: 2017-02-10

## 2017-02-10 RX ORDER — DOCUSATE SODIUM 100 MG
1 CAPSULE ORAL
Qty: 0 | Refills: 0 | DISCHARGE
Start: 2017-02-10

## 2017-02-10 RX ORDER — ACETAMINOPHEN 500 MG
2 TABLET ORAL
Qty: 0 | Refills: 0 | DISCHARGE
Start: 2017-02-10

## 2017-02-10 RX ORDER — APIXABAN 2.5 MG/1
1 TABLET, FILM COATED ORAL
Qty: 0 | Refills: 0 | DISCHARGE
Start: 2017-02-10

## 2017-02-10 RX ORDER — SENNA PLUS 8.6 MG/1
2 TABLET ORAL
Qty: 0 | Refills: 0 | DISCHARGE
Start: 2017-02-10

## 2017-02-10 RX ORDER — PANTOPRAZOLE SODIUM 20 MG/1
1 TABLET, DELAYED RELEASE ORAL
Qty: 0 | Refills: 0 | DISCHARGE
Start: 2017-02-10

## 2017-02-10 RX ADMIN — OXYCODONE HYDROCHLORIDE 10 MILLIGRAM(S): 5 TABLET ORAL at 18:04

## 2017-02-10 RX ADMIN — OXYCODONE HYDROCHLORIDE 10 MILLIGRAM(S): 5 TABLET ORAL at 10:24

## 2017-02-10 RX ADMIN — Medication 1000 MILLIGRAM(S): at 09:24

## 2017-02-10 RX ADMIN — Medication 1000 MILLIGRAM(S): at 18:00

## 2017-02-10 RX ADMIN — CELECOXIB 200 MILLIGRAM(S): 200 CAPSULE ORAL at 18:00

## 2017-02-10 RX ADMIN — Medication 120 MILLIGRAM(S): at 05:55

## 2017-02-10 RX ADMIN — APIXABAN 2.5 MILLIGRAM(S): 2.5 TABLET, FILM COATED ORAL at 21:23

## 2017-02-10 RX ADMIN — OXYCODONE HYDROCHLORIDE 10 MILLIGRAM(S): 5 TABLET ORAL at 09:54

## 2017-02-10 RX ADMIN — SERTRALINE 50 MILLIGRAM(S): 25 TABLET, FILM COATED ORAL at 13:23

## 2017-02-10 RX ADMIN — CELECOXIB 200 MILLIGRAM(S): 200 CAPSULE ORAL at 09:25

## 2017-02-10 RX ADMIN — Medication 100 MILLIGRAM(S): at 05:54

## 2017-02-10 RX ADMIN — Medication 100 MILLIGRAM(S): at 13:24

## 2017-02-10 RX ADMIN — APIXABAN 2.5 MILLIGRAM(S): 2.5 TABLET, FILM COATED ORAL at 09:24

## 2017-02-10 RX ADMIN — LATANOPROST 1 DROP(S): 0.05 SOLUTION/ DROPS OPHTHALMIC; TOPICAL at 21:23

## 2017-02-10 RX ADMIN — Medication 100 MILLIGRAM(S): at 21:23

## 2017-02-10 RX ADMIN — Medication 5 MILLIGRAM(S): at 13:23

## 2017-02-10 RX ADMIN — OXYCODONE HYDROCHLORIDE 5 MILLIGRAM(S): 5 TABLET ORAL at 06:25

## 2017-02-10 RX ADMIN — OXYCODONE HYDROCHLORIDE 5 MILLIGRAM(S): 5 TABLET ORAL at 05:55

## 2017-02-10 RX ADMIN — Medication 25 MILLIGRAM(S): at 05:54

## 2017-02-10 RX ADMIN — CELECOXIB 200 MILLIGRAM(S): 200 CAPSULE ORAL at 18:01

## 2017-02-10 RX ADMIN — OXYCODONE HYDROCHLORIDE 10 MILLIGRAM(S): 5 TABLET ORAL at 18:34

## 2017-02-10 RX ADMIN — PANTOPRAZOLE SODIUM 40 MILLIGRAM(S): 20 TABLET, DELAYED RELEASE ORAL at 05:55

## 2017-02-10 RX ADMIN — CELECOXIB 200 MILLIGRAM(S): 200 CAPSULE ORAL at 09:24

## 2017-02-10 RX ADMIN — Medication 1000 MILLIGRAM(S): at 18:01

## 2017-02-11 VITALS
RESPIRATION RATE: 16 BRPM | DIASTOLIC BLOOD PRESSURE: 75 MMHG | SYSTOLIC BLOOD PRESSURE: 114 MMHG | OXYGEN SATURATION: 98 % | HEART RATE: 74 BPM | TEMPERATURE: 98 F

## 2017-02-11 LAB
ANION GAP SERPL CALC-SCNC: 3 MMOL/L — LOW (ref 5–17)
BUN SERPL-MCNC: 18 MG/DL — SIGNIFICANT CHANGE UP (ref 7–23)
CALCIUM SERPL-MCNC: 9.3 MG/DL — SIGNIFICANT CHANGE UP (ref 8.4–10.5)
CHLORIDE SERPL-SCNC: 106 MMOL/L — SIGNIFICANT CHANGE UP (ref 96–108)
CO2 SERPL-SCNC: 29 MMOL/L — SIGNIFICANT CHANGE UP (ref 22–31)
CREAT SERPL-MCNC: 0.74 MG/DL — SIGNIFICANT CHANGE UP (ref 0.5–1.3)
GLUCOSE SERPL-MCNC: 130 MG/DL — HIGH (ref 70–99)
HCT VFR BLD CALC: 30.7 % — LOW (ref 34.5–45)
HGB BLD-MCNC: 9.5 G/DL — LOW (ref 11.5–15.5)
MCHC RBC-ENTMCNC: 29 PG — SIGNIFICANT CHANGE UP (ref 27–34)
MCHC RBC-ENTMCNC: 31.1 GM/DL — LOW (ref 32–36)
MCV RBC AUTO: 93.3 FL — SIGNIFICANT CHANGE UP (ref 80–100)
PLATELET # BLD AUTO: 176 K/UL — SIGNIFICANT CHANGE UP (ref 150–400)
POTASSIUM SERPL-MCNC: 4 MMOL/L — SIGNIFICANT CHANGE UP (ref 3.5–5.3)
POTASSIUM SERPL-SCNC: 4 MMOL/L — SIGNIFICANT CHANGE UP (ref 3.5–5.3)
RBC # BLD: 3.29 M/UL — LOW (ref 3.8–5.2)
RBC # FLD: 12.8 % — SIGNIFICANT CHANGE UP (ref 10.3–14.5)
SODIUM SERPL-SCNC: 138 MMOL/L — SIGNIFICANT CHANGE UP (ref 135–145)
WBC # BLD: 10.4 K/UL — SIGNIFICANT CHANGE UP (ref 3.8–10.5)
WBC # FLD AUTO: 10.4 K/UL — SIGNIFICANT CHANGE UP (ref 3.8–10.5)

## 2017-02-11 PROCEDURE — 73562 X-RAY EXAM OF KNEE 3: CPT

## 2017-02-11 PROCEDURE — 97161 PT EVAL LOW COMPLEX 20 MIN: CPT

## 2017-02-11 PROCEDURE — 85018 HEMOGLOBIN: CPT

## 2017-02-11 PROCEDURE — 88311 DECALCIFY TISSUE: CPT

## 2017-02-11 PROCEDURE — 94664 DEMO&/EVAL PT USE INHALER: CPT

## 2017-02-11 PROCEDURE — 80048 BASIC METABOLIC PNL TOTAL CA: CPT

## 2017-02-11 PROCEDURE — 99233 SBSQ HOSP IP/OBS HIGH 50: CPT

## 2017-02-11 PROCEDURE — 97165 OT EVAL LOW COMPLEX 30 MIN: CPT

## 2017-02-11 PROCEDURE — C1776: CPT

## 2017-02-11 PROCEDURE — 88305 TISSUE EXAM BY PATHOLOGIST: CPT

## 2017-02-11 PROCEDURE — C1713: CPT

## 2017-02-11 PROCEDURE — 85027 COMPLETE CBC AUTOMATED: CPT

## 2017-02-11 RX ADMIN — OXYCODONE HYDROCHLORIDE 10 MILLIGRAM(S): 5 TABLET ORAL at 06:00

## 2017-02-11 RX ADMIN — CELECOXIB 200 MILLIGRAM(S): 200 CAPSULE ORAL at 08:50

## 2017-02-11 RX ADMIN — Medication 25 MILLIGRAM(S): at 05:26

## 2017-02-11 RX ADMIN — OXYCODONE HYDROCHLORIDE 10 MILLIGRAM(S): 5 TABLET ORAL at 05:26

## 2017-02-11 RX ADMIN — Medication 120 MILLIGRAM(S): at 05:26

## 2017-02-11 RX ADMIN — Medication 100 MILLIGRAM(S): at 05:26

## 2017-02-11 RX ADMIN — VALSARTAN 40 MILLIGRAM(S): 80 TABLET ORAL at 05:26

## 2017-02-11 RX ADMIN — PANTOPRAZOLE SODIUM 40 MILLIGRAM(S): 20 TABLET, DELAYED RELEASE ORAL at 05:26

## 2017-02-11 RX ADMIN — Medication 1000 MILLIGRAM(S): at 08:50

## 2017-02-11 RX ADMIN — APIXABAN 2.5 MILLIGRAM(S): 2.5 TABLET, FILM COATED ORAL at 08:49

## 2017-02-13 ENCOUNTER — APPOINTMENT (OUTPATIENT)
Dept: ORTHOPEDIC SURGERY | Facility: HOSPITAL | Age: 82
End: 2017-02-13

## 2017-02-23 ENCOUNTER — APPOINTMENT (OUTPATIENT)
Dept: ORTHOPEDIC SURGERY | Facility: CLINIC | Age: 82
End: 2017-02-23

## 2017-03-03 ENCOUNTER — APPOINTMENT (OUTPATIENT)
Dept: ORTHOPEDIC SURGERY | Facility: CLINIC | Age: 82
End: 2017-03-03

## 2017-03-03 VITALS
HEART RATE: 69 BPM | DIASTOLIC BLOOD PRESSURE: 69 MMHG | SYSTOLIC BLOOD PRESSURE: 124 MMHG | HEIGHT: 64 IN | BODY MASS INDEX: 36.7 KG/M2 | WEIGHT: 215 LBS

## 2017-03-03 RX ORDER — AMOXICILLIN 500 MG/1
500 CAPSULE ORAL
Refills: 0 | Status: ACTIVE | COMMUNITY

## 2017-04-25 ENCOUNTER — APPOINTMENT (OUTPATIENT)
Dept: ORTHOPEDIC SURGERY | Facility: CLINIC | Age: 82
End: 2017-04-25

## 2017-04-25 VITALS
BODY MASS INDEX: 36.7 KG/M2 | HEIGHT: 64 IN | HEART RATE: 81 BPM | DIASTOLIC BLOOD PRESSURE: 75 MMHG | SYSTOLIC BLOOD PRESSURE: 121 MMHG | WEIGHT: 215 LBS

## 2017-04-25 DIAGNOSIS — Z96.652 PRESENCE OF LEFT ARTIFICIAL KNEE JOINT: ICD-10-CM

## 2017-04-25 RX ORDER — OXYBUTYNIN CHLORIDE 5 MG/1
5 TABLET, EXTENDED RELEASE ORAL
Qty: 30 | Refills: 0 | Status: DISCONTINUED | COMMUNITY
Start: 2017-02-23

## 2019-07-09 NOTE — PATIENT PROFILE ADULT. - NS PRO PT RIGHT SUPPORT PERSON
Normal vision: sees adequately in most situations; can see medication labels, newsprint
same name as above

## 2019-10-11 ENCOUNTER — INPATIENT (INPATIENT)
Facility: HOSPITAL | Age: 84
LOS: 9 days | Discharge: HOME | DRG: 91 | End: 2019-10-21
Attending: INTERNAL MEDICINE | Admitting: INTERNAL MEDICINE
Payer: MEDICARE

## 2019-10-11 VITALS
HEIGHT: 64 IN | TEMPERATURE: 98 F | WEIGHT: 199.96 LBS | HEART RATE: 85 BPM | SYSTOLIC BLOOD PRESSURE: 136 MMHG | RESPIRATION RATE: 18 BRPM | DIASTOLIC BLOOD PRESSURE: 81 MMHG | OXYGEN SATURATION: 95 %

## 2019-10-11 DIAGNOSIS — Z98.1 ARTHRODESIS STATUS: Chronic | ICD-10-CM

## 2019-10-11 DIAGNOSIS — Z90.710 ACQUIRED ABSENCE OF BOTH CERVIX AND UTERUS: Chronic | ICD-10-CM

## 2019-10-11 DIAGNOSIS — Z96.641 PRESENCE OF RIGHT ARTIFICIAL HIP JOINT: Chronic | ICD-10-CM

## 2019-10-11 DIAGNOSIS — Z87.81 PERSONAL HISTORY OF (HEALED) TRAUMATIC FRACTURE: Chronic | ICD-10-CM

## 2019-10-11 PROCEDURE — 93010 ELECTROCARDIOGRAM REPORT: CPT

## 2019-10-11 PROCEDURE — 99285 EMERGENCY DEPT VISIT HI MDM: CPT | Mod: 25

## 2019-10-11 PROCEDURE — 70450 CT HEAD/BRAIN W/O DYE: CPT | Mod: 26

## 2019-10-11 NOTE — ED PROVIDER NOTE - OBJECTIVE STATEMENT
86 yo female on xarelto p/w mechanical fall @ home, denies LOC, hit the top of her head; denies other injury.  states history of recent L knee replacement and walks with a walker with difficulty; could not get herself up and activated EMS via medic alert necklace.  did not want to come to hospital after being helped up.  denies recent illness.  no current pain complaint.

## 2019-10-11 NOTE — ED PROVIDER NOTE - INTERPRETATION
EKG reviewed for rate, rhythm, axis, intervals and segments, including QRS morphology, P wave appearance T wave appearance, VT interval, and QT interval.  I find the EKG to be unremarkable in all of these regards except as follows: a.fib, R axis

## 2019-10-11 NOTE — ED ADULT NURSE NOTE - NSIMPLEMENTINTERV_GEN_ALL_ED
Implemented All Fall with Harm Risk Interventions:  [place] to call system. Call bell, personal items and telephone within reach. Instruct patient to call for assistance. Room bathroom lighting operational. Non-slip footwear when patient is off stretcher. Physically safe environment: no spills, clutter or unnecessary equipment. Stretcher in lowest position, wheels locked, appropriate side rails in place. Provide visual cue, wrist band, yellow gown, etc. Monitor gait and stability. Monitor for mental status changes and reorient to person, place, and time. Review medications for side effects contributing to fall risk. Reinforce activity limits and safety measures with patient and family. Provide visual clues: red socks.

## 2019-10-11 NOTE — ED PROVIDER NOTE - PHYSICAL EXAMINATION
GEN: calm, cooperative  ENT: mucous membranes moist  HEAD: NCAT no external bruising, hematoma, bleeding, abrasions  CV: S1 S2   RESP: no respiratory distress  ABD: no abdominal distension  MSK: moves all extremities, pelvis stable/no TTP  NEURO: awake, alert  PSYCH: affect normal

## 2019-10-11 NOTE — ED ADULT NURSE NOTE - OBJECTIVE STATEMENT
87y Female A&OX3, came to ED c/o fall and inj to head.  PMH of depression, glaucoma, macular degeneration, HTN, PSH of L knee replacement.  Pt states she is on Xarelto, Pt was going to the bathroom when she fell on her bottom and hit her head on the floor, no LOC, dizziness, nausea.  Pt states she had a similar incident last week as well.  Pt presents with hematoma on R occipital region, full facial symmetry, lung sounds clear with adequate chest rise, S1 and S2 heart sounds present, +2 pulses in all extremities with full ROM with equal strength.  Denies chest pain, sob, ha, n/v/d, abdominal pain, f/c, urinary symptoms, hematuria. Upon examination, no JVD or trach deviation, cap refill <2 seconds, ABD soft, non distended and non tender, ABD sounds present, pelvis intact, Pt reports normal bowel movements.  Pt normally walks with a cane.

## 2019-10-11 NOTE — ED PROVIDER NOTE - PMH
Depression    Glaucoma    Hearing loss  bilateral  Hypertension    Macular degeneration    Obesity (BMI 30-39.9)    Osteoarthritis of both knees    Urinary incontinence

## 2019-10-11 NOTE — ED PROVIDER NOTE - PROGRESS NOTE DETAILS
d/w daughter in law -- multiple falls recently and does not feel patient is safe at home.  will check basic labs, EKG, admit for PT eval and further management.

## 2019-10-12 DIAGNOSIS — I48.91 UNSPECIFIED ATRIAL FIBRILLATION: ICD-10-CM

## 2019-10-12 DIAGNOSIS — M17.0 BILATERAL PRIMARY OSTEOARTHRITIS OF KNEE: ICD-10-CM

## 2019-10-12 DIAGNOSIS — R29.6 REPEATED FALLS: ICD-10-CM

## 2019-10-12 DIAGNOSIS — Z29.9 ENCOUNTER FOR PROPHYLACTIC MEASURES, UNSPECIFIED: ICD-10-CM

## 2019-10-12 DIAGNOSIS — R32 UNSPECIFIED URINARY INCONTINENCE: ICD-10-CM

## 2019-10-12 DIAGNOSIS — F32.9 MAJOR DEPRESSIVE DISORDER, SINGLE EPISODE, UNSPECIFIED: ICD-10-CM

## 2019-10-12 DIAGNOSIS — I50.9 HEART FAILURE, UNSPECIFIED: ICD-10-CM

## 2019-10-12 DIAGNOSIS — I10 ESSENTIAL (PRIMARY) HYPERTENSION: ICD-10-CM

## 2019-10-12 DIAGNOSIS — H40.9 UNSPECIFIED GLAUCOMA: ICD-10-CM

## 2019-10-12 LAB
ALBUMIN SERPL ELPH-MCNC: 4 G/DL — SIGNIFICANT CHANGE UP (ref 3.3–5)
ALP SERPL-CCNC: 96 U/L — SIGNIFICANT CHANGE UP (ref 40–120)
ALT FLD-CCNC: 15 U/L — SIGNIFICANT CHANGE UP (ref 10–45)
ANION GAP SERPL CALC-SCNC: 13 MMOL/L — SIGNIFICANT CHANGE UP (ref 5–17)
APPEARANCE UR: CLEAR — SIGNIFICANT CHANGE UP
AST SERPL-CCNC: 18 U/L — SIGNIFICANT CHANGE UP (ref 10–40)
BACTERIA # UR AUTO: NEGATIVE — SIGNIFICANT CHANGE UP
BASOPHILS # BLD AUTO: 0.06 K/UL — SIGNIFICANT CHANGE UP (ref 0–0.2)
BASOPHILS NFR BLD AUTO: 0.4 % — SIGNIFICANT CHANGE UP (ref 0–2)
BILIRUB SERPL-MCNC: 0.6 MG/DL — SIGNIFICANT CHANGE UP (ref 0.2–1.2)
BILIRUB UR-MCNC: NEGATIVE — SIGNIFICANT CHANGE UP
BUN SERPL-MCNC: 19 MG/DL — SIGNIFICANT CHANGE UP (ref 7–23)
CALCIUM SERPL-MCNC: 9.5 MG/DL — SIGNIFICANT CHANGE UP (ref 8.4–10.5)
CHLORIDE SERPL-SCNC: 105 MMOL/L — SIGNIFICANT CHANGE UP (ref 96–108)
CO2 SERPL-SCNC: 24 MMOL/L — SIGNIFICANT CHANGE UP (ref 22–31)
COLOR SPEC: SIGNIFICANT CHANGE UP
CREAT SERPL-MCNC: 0.81 MG/DL — SIGNIFICANT CHANGE UP (ref 0.5–1.3)
DIFF PNL FLD: NEGATIVE — SIGNIFICANT CHANGE UP
EOSINOPHIL # BLD AUTO: 0.2 K/UL — SIGNIFICANT CHANGE UP (ref 0–0.5)
EOSINOPHIL NFR BLD AUTO: 1.3 % — SIGNIFICANT CHANGE UP (ref 0–6)
EPI CELLS # UR: 1 /HPF — SIGNIFICANT CHANGE UP
GLUCOSE SERPL-MCNC: 154 MG/DL — HIGH (ref 70–99)
GLUCOSE UR QL: NEGATIVE — SIGNIFICANT CHANGE UP
HCT VFR BLD CALC: 45.1 % — HIGH (ref 34.5–45)
HGB BLD-MCNC: 14.2 G/DL — SIGNIFICANT CHANGE UP (ref 11.5–15.5)
HYALINE CASTS # UR AUTO: 2 /LPF — SIGNIFICANT CHANGE UP (ref 0–2)
IMM GRANULOCYTES NFR BLD AUTO: 1.2 % — SIGNIFICANT CHANGE UP (ref 0–1.5)
KETONES UR-MCNC: NEGATIVE — SIGNIFICANT CHANGE UP
LEUKOCYTE ESTERASE UR-ACNC: NEGATIVE — SIGNIFICANT CHANGE UP
LYMPHOCYTES # BLD AUTO: 1.67 K/UL — SIGNIFICANT CHANGE UP (ref 1–3.3)
LYMPHOCYTES # BLD AUTO: 10.7 % — LOW (ref 13–44)
MCHC RBC-ENTMCNC: 28.6 PG — SIGNIFICANT CHANGE UP (ref 27–34)
MCHC RBC-ENTMCNC: 31.5 GM/DL — LOW (ref 32–36)
MCV RBC AUTO: 90.9 FL — SIGNIFICANT CHANGE UP (ref 80–100)
MONOCYTES # BLD AUTO: 0.81 K/UL — SIGNIFICANT CHANGE UP (ref 0–0.9)
MONOCYTES NFR BLD AUTO: 5.2 % — SIGNIFICANT CHANGE UP (ref 2–14)
NEUTROPHILS # BLD AUTO: 12.73 K/UL — HIGH (ref 1.8–7.4)
NEUTROPHILS NFR BLD AUTO: 81.2 % — HIGH (ref 43–77)
NITRITE UR-MCNC: NEGATIVE — SIGNIFICANT CHANGE UP
NRBC # BLD: 0 /100 WBCS — SIGNIFICANT CHANGE UP (ref 0–0)
PH UR: 6 — SIGNIFICANT CHANGE UP (ref 5–8)
PLATELET # BLD AUTO: 243 K/UL — SIGNIFICANT CHANGE UP (ref 150–400)
POTASSIUM SERPL-MCNC: 3.8 MMOL/L — SIGNIFICANT CHANGE UP (ref 3.5–5.3)
POTASSIUM SERPL-SCNC: 3.8 MMOL/L — SIGNIFICANT CHANGE UP (ref 3.5–5.3)
PROT SERPL-MCNC: 6.5 G/DL — SIGNIFICANT CHANGE UP (ref 6–8.3)
PROT UR-MCNC: NEGATIVE — SIGNIFICANT CHANGE UP
RBC # BLD: 4.96 M/UL — SIGNIFICANT CHANGE UP (ref 3.8–5.2)
RBC # FLD: 14.3 % — SIGNIFICANT CHANGE UP (ref 10.3–14.5)
RBC CASTS # UR COMP ASSIST: 8 /HPF — HIGH (ref 0–4)
SODIUM SERPL-SCNC: 142 MMOL/L — SIGNIFICANT CHANGE UP (ref 135–145)
SP GR SPEC: 1.01 — SIGNIFICANT CHANGE UP (ref 1.01–1.02)
UROBILINOGEN FLD QL: NEGATIVE — SIGNIFICANT CHANGE UP
WBC # BLD: 15.65 K/UL — HIGH (ref 3.8–10.5)
WBC # FLD AUTO: 15.65 K/UL — HIGH (ref 3.8–10.5)
WBC UR QL: 0 /HPF — SIGNIFICANT CHANGE UP (ref 0–5)

## 2019-10-12 PROCEDURE — 99223 1ST HOSP IP/OBS HIGH 75: CPT | Mod: AI

## 2019-10-12 RX ORDER — SERTRALINE 25 MG/1
50 TABLET, FILM COATED ORAL DAILY
Refills: 0 | Status: DISCONTINUED | OUTPATIENT
Start: 2019-10-12 | End: 2019-10-21

## 2019-10-12 RX ORDER — VERAPAMIL HCL 240 MG
120 CAPSULE, EXTENDED RELEASE PELLETS 24 HR ORAL DAILY
Refills: 0 | Status: DISCONTINUED | OUTPATIENT
Start: 2019-10-12 | End: 2019-10-12

## 2019-10-12 RX ORDER — SENNA PLUS 8.6 MG/1
2 TABLET ORAL AT BEDTIME
Refills: 0 | Status: DISCONTINUED | OUTPATIENT
Start: 2019-10-12 | End: 2019-10-21

## 2019-10-12 RX ORDER — DOCUSATE SODIUM 100 MG
100 CAPSULE ORAL THREE TIMES A DAY
Refills: 0 | Status: DISCONTINUED | OUTPATIENT
Start: 2019-10-12 | End: 2019-10-21

## 2019-10-12 RX ORDER — RIVAROXABAN 15 MG-20MG
20 KIT ORAL
Refills: 0 | Status: DISCONTINUED | OUTPATIENT
Start: 2019-10-12 | End: 2019-10-21

## 2019-10-12 RX ORDER — LATANOPROST 0.05 MG/ML
1 SOLUTION/ DROPS OPHTHALMIC; TOPICAL AT BEDTIME
Refills: 0 | Status: DISCONTINUED | OUTPATIENT
Start: 2019-10-12 | End: 2019-10-21

## 2019-10-12 RX ORDER — METOPROLOL TARTRATE 50 MG
100 TABLET ORAL DAILY
Refills: 0 | Status: DISCONTINUED | OUTPATIENT
Start: 2019-10-12 | End: 2019-10-21

## 2019-10-12 RX ORDER — INFLUENZA VIRUS VACCINE 15; 15; 15; 15 UG/.5ML; UG/.5ML; UG/.5ML; UG/.5ML
0.5 SUSPENSION INTRAMUSCULAR ONCE
Refills: 0 | Status: DISCONTINUED | OUTPATIENT
Start: 2019-10-12 | End: 2019-10-21

## 2019-10-12 RX ORDER — FUROSEMIDE 40 MG
20 TABLET ORAL DAILY
Refills: 0 | Status: DISCONTINUED | OUTPATIENT
Start: 2019-10-12 | End: 2019-10-12

## 2019-10-12 RX ORDER — DIGOXIN 250 MCG
0.12 TABLET ORAL DAILY
Refills: 0 | Status: DISCONTINUED | OUTPATIENT
Start: 2019-10-12 | End: 2019-10-21

## 2019-10-12 RX ORDER — ACETAMINOPHEN 500 MG
650 TABLET ORAL EVERY 6 HOURS
Refills: 0 | Status: DISCONTINUED | OUTPATIENT
Start: 2019-10-12 | End: 2019-10-21

## 2019-10-12 RX ORDER — FUROSEMIDE 40 MG
40 TABLET ORAL
Refills: 0 | Status: DISCONTINUED | OUTPATIENT
Start: 2019-10-12 | End: 2019-10-18

## 2019-10-12 RX ADMIN — Medication 650 MILLIGRAM(S): at 17:36

## 2019-10-12 RX ADMIN — Medication 1 TABLET(S): at 11:08

## 2019-10-12 RX ADMIN — LATANOPROST 1 DROP(S): 0.05 SOLUTION/ DROPS OPHTHALMIC; TOPICAL at 23:00

## 2019-10-12 RX ADMIN — SERTRALINE 50 MILLIGRAM(S): 25 TABLET, FILM COATED ORAL at 11:08

## 2019-10-12 RX ADMIN — RIVAROXABAN 20 MILLIGRAM(S): KIT at 17:06

## 2019-10-12 RX ADMIN — Medication 20 MILLIGRAM(S): at 10:45

## 2019-10-12 RX ADMIN — Medication 100 MILLIGRAM(S): at 10:45

## 2019-10-12 RX ADMIN — Medication 120 MILLIGRAM(S): at 14:57

## 2019-10-12 RX ADMIN — Medication 40 MILLIGRAM(S): at 22:12

## 2019-10-12 RX ADMIN — Medication 0.12 MILLIGRAM(S): at 22:12

## 2019-10-12 RX ADMIN — Medication 650 MILLIGRAM(S): at 17:06

## 2019-10-12 RX ADMIN — Medication 100 MILLIGRAM(S): at 13:38

## 2019-10-12 NOTE — H&P ADULT - ASSESSMENT
86 y/o f w/ PMH of A fib on xarelto, OA, HTN, glaucoma, macular degeneration, depression p/w frequent falls

## 2019-10-12 NOTE — H&P ADULT - NSHPREVIEWOFSYSTEMS_GEN_ALL_CORE
MUSCULOSKELETAL: + unsteady, + frequent falls    CONSTITUTIONAL: No weakness, fevers or chills  EYES/ENT: No visual changes, no throat pain   RESPIRATORY: No cough, wheezing, hemoptysis; No shortness of breath  CARDIOVASCULAR: No chest pain or palpitations  GASTROINTESTINAL: No abdominal, nausea, vomiting, or hematemesis; No diarrhea or constipation. No melena or hematochezia.  GENITOURINARY: No dysuria, frequency or hematuria  NEUROLOGICAL: No dizziness, numbness, or weakness  SKIN: No itching, burning, rashes, or lesions   ALLERGY: No seasonal allergies, no rhinorrhea

## 2019-10-12 NOTE — H&P ADULT - PROBLEM SELECTOR PLAN 2
Care discussed with Dr. Lawson, he advised that pt should remain on xarelto for now; but he will evaluate patient in the hospital and decide whether to continue AC or not in the setting of risks fo bleeding associated with frequent falls  - cont verapamil as well

## 2019-10-12 NOTE — H&P ADULT - HISTORY OF PRESENT ILLNESS
86 y/o f w/ PMH of 86 y/o f w/ PMH of A fib on xarelto, OA, HTN, glaucoma, macular degeneration, depression p/w frequent falls.  Pt had a fall yesterday from unsteadiness on her feet, and difficulty with ambulation even with a walker.  She fell due to mechanical instability, and couldn't get up.  She pressed her medic alert button, and was found by EMS, and brought to the hospital for further evaluation.  Pt has had two falls int he last last week, and two other falls in the past month, for a total of four falls in the last few weeks.  All the falls per family appear ot be mechanical.  Pt's daughter is concerned that pt is unsafe ot stay at home, and wants arrangements made either for transition to rehab, or for home health aide and home care.  Pt doesn't believe she had any LOC.  She did hit her head with the fall.  She didn't experience lightheadedness or dizziness, and also did not have any chest pain or shortness of breath,      In the ED, a CT head was negative for any ICH, or acute infarct.

## 2019-10-12 NOTE — H&P ADULT - NSICDXFAMILYHX_GEN_ALL_CORE_FT
FAMILY HISTORY:  Sibling  Still living? No  Family history of colon cancer, Age at diagnosis: Age Unknown    Child  Still living? Yes, Estimated age: 51-60  Family history of breast cancer, Age at diagnosis: Age Unknown

## 2019-10-12 NOTE — H&P ADULT - PROBLEM SELECTOR PLAN 1
Pt has had 4 falls in the last month; most recently yesterday; all falls appear to be mechanical as per family  - PT eval for posisble role of rehab  - social work eval as family wants to have transition home coordinate with possible arrangements for home acre, home health aide, visiting RN, and want ot discuss with social work all possible options  - Dr. Lawson will evaluate risks versus benefits of AC with xarelto in the setting of frequent falls Pt has had 4 falls in the last month; most recently yesterday; all falls appear to be mechanical as per family  - PT eval for posisble role of rehab  - social work eval as family wants to have transition home coordinate with possible arrangements for home acre, home health aide, visiting RN, and want ot discuss with social work all possible options  - Dr. Lawson will evaluate risks versus benefits of AC with xarelto in the setting of frequent falls  - leukocytosis more likely acute phase reactant in setting of recent fall; but will monitor closely and trend if rising, would pursue infectious workup, monitor for fevers

## 2019-10-12 NOTE — CONSULT NOTE ADULT - PROBLEM SELECTOR RECOMMENDATION 9
Rule out syncope. She states to feel dizzy prior to "fall" episodes"  Transfer to Tele   Check orthostatics   DC Verapamil for now   Check thyroid panel , Vit b12 and folate

## 2019-10-12 NOTE — PATIENT PROFILE ADULT - LAST BOWEL MOVEMENT DATE
He has had a total of 2 tablets. He is refusing to take them.    Yoselin Stanford LPN on 9/21/2018 at 9:36 AM     11-Oct-2019

## 2019-10-12 NOTE — H&P ADULT - NSICDXPASTMEDICALHX_GEN_ALL_CORE_FT
PAST MEDICAL HISTORY:  Depression     Glaucoma     Hearing loss bilateral    Hypertension     Macular degeneration     Obesity (BMI 30-39.9)     Osteoarthritis of both knees     Urinary incontinence

## 2019-10-12 NOTE — ED ADULT NURSE REASSESSMENT NOTE - NS ED NURSE REASSESS COMMENT FT1
Pt admitted to medicine, Pt aware of disposition, Pt calm and cooperative, awaiting bed placement upstairs.  VSS

## 2019-10-12 NOTE — CONSULT NOTE ADULT - SUBJECTIVE AND OBJECTIVE BOX
CHIEF COMPLAINT:  Syncope, Afib     HISTORY OF PRESENT ILLNESS:  88 y/o f w/ PMH of A fib on xarelto, OA, HTN, glaucoma, macular degeneration, depression p/w frequent falls.  Pt had a fall yesterday from unsteadiness on her feet, and difficulty with ambulation even with a walker.  She fell due to mechanical instability, and couldn't get up.  She pressed her medic alert button, and was found by EMS, and brought to the hospital for further evaluation.  Pt has had two falls int he last last week, and two other falls in the past month, for a total of four falls in the last few weeks.  All the falls per family appear ot be mechanical.  Pt's daughter is concerned that pt is unsafe ot stay at home, and wants arrangements made either for transition to rehab, or for home health aide and home care.  Pt doesn't believe she had any LOC.  She did hit her head with the fall.  She didn't experience lightheadedness or dizziness, and also did not have any chest pain or shortness of breath,      In the ED, a CT head was negative for any ICH, or acute infarct.        PAST MEDICAL & SURGICAL HISTORY:  Depression  Obesity (BMI 30-39.9)  Hearing loss: bilateral  Macular degeneration  Glaucoma  Urinary incontinence  Hypertension  Osteoarthritis of both knees  History of hip replacement, total, right: 2013  History of wrist fracture: right 2006, left 2007  History of ankle fusion: left, 2001  History of hysterectomy: 1981          MEDICATIONS:  digoxin     Tablet 0.125 milliGRAM(s) Oral daily  furosemide    Tablet 20 milliGRAM(s) Oral daily  metoprolol succinate  milliGRAM(s) Oral daily  rivaroxaban 20 milliGRAM(s) Oral with dinner  verapamil 120 milliGRAM(s) Oral daily        acetaminophen   Tablet .. 650 milliGRAM(s) Oral every 6 hours PRN  sertraline 50 milliGRAM(s) Oral daily    docusate sodium 100 milliGRAM(s) Oral three times a day  senna 2 Tablet(s) Oral at bedtime PRN      influenza   Vaccine 0.5 milliLiter(s) IntraMuscular once  latanoprost 0.005% Ophthalmic Solution 1 Drop(s) Both EYES at bedtime  multivitamin 1 Tablet(s) Oral daily      FAMILY HISTORY:  Family history of colon cancer (Sibling)  Family history of breast cancer (Child)      SOCIAL HISTORY:    [ ] Non-smoker  [ ] Smoker  [ ] Alcohol    Allergies    No Known Allergies    Intolerances    aspirin (Other)  	    REVIEW OF SYSTEMS:  CONSTITUTIONAL: No fever, weight loss, +fatigue  EYES: No eye pain, visual disturbances, or discharge  ENMT:  No difficulty hearing, tinnitus, vertigo; No sinus or throat pain  NECK: No pain or stiffness  RESPIRATORY: No cough, wheezing, chills or hemoptysis; No Shortness of Breath  CARDIOVASCULAR: No chest pain, palpitations, passing out, dizziness, or leg swelling  GASTROINTESTINAL: No abdominal or epigastric pain. No nausea, vomiting, or hematemesis; No diarrhea or constipation. No melena or hematochezia.  GENITOURINARY: No dysuria, frequency, hematuria, or incontinence  NEUROLOGICAL: No headaches, memory loss, loss of strength, numbness, or tremors  SKIN: No itching, burning, rashes, or lesions   LYMPH Nodes: No enlarged glands  ENDOCRINE: No heat or cold intolerance; No hair loss  MUSCULOSKELETAL: + joint pain or swelling; No muscle, back, + extremity pain  PSYCHIATRIC: No depression, anxiety, mood swings, or difficulty sleeping  HEME/LYMPH: No easy bruising, or bleeding gums  ALLERY AND IMMUNOLOGIC: No hives or eczema	    [ ] All others negative	  [ ] Unable to obtain    PHYSICAL EXAM:  T(C): 36.4 (10-12-19 @ 19:42), Max: 37.2 (10-12-19 @ 03:20)  HR: 78 (10-12-19 @ 19:42) (78 - 85)  BP: 107/69 (10-12-19 @ 19:42) (107/69 - 147/89)  RR: 18 (10-12-19 @ 19:42) (18 - 18)  SpO2: 95% (10-12-19 @ 19:42) (95% - 99%)  Wt(kg): --  I&O's Summary    12 Oct 2019 07:01  -  12 Oct 2019 20:26  --------------------------------------------------------  IN: 250 mL / OUT: 201 mL / NET: 49 mL        Appearance: NAD	  HEENT:   Normal oral mucosa, PERRL, EOMI	  Lymphatic: No lymphadenopathy  Cardiovascular: Irregular S1 S2, No JVD, No murmurs, No edema  Respiratory: Lungs clear to auscultation	  Psychiatry: A & O x 3, Mood & affect appropriate  Gastrointestinal:  Soft, Non-tender, + BS	  Skin: No rashes, No ecchymoses, No cyanosis	  Neurologic: Non-focal  Extremities: massive bilateral edema, chronic venous stasis skin erythema   Vascular: Peripheral pulses palpable 2+ bilaterally    TELEMETRY: 	    ECG:  	Afib, non specific stt changes   RADIOLOGY:        EXAM:  CT BRAIN                            PROCEDURE DATE:  10/11/2019            INTERPRETATION:  HISTORY: Head injury status post fall.  headache    COMPARISON: None available.    TECHNIQUE: Axial noncontrast CT images from the skull base to the vertex   were obtained and submitted for interpretation. Coronal and sagittal   reformats were created.    FINDINGS:  Soft tissue hematoma noted along the right parietal bone.    There is no acute intracranial mass-effect, hemorrhage, midline shift, or   abnormal extra-axial fluid collection.     There is a partially calcified extra-axial lesion measuring 2.4 x 1.7 cm   adjacent to the right cerebellar hemisphere is likely represents an   infratentorial calcified meningioma.    Cerebral volume loss is present with secondary proportional prominence of   the sulci and ventricles.    Moderate periventricular white matter hypodensities likely representing   microvascular ischemic changes. Basal cisterns are patent.     Paranasal sinuses and mastoid air cells are clear. No calvarial fracture.  Mild right posterior parietal scalp hematoma.    IMPRESSION:     No acute intracranial bleeding, mass effect, or shift.  Mild right posterior parietal scalp hematoma.  Likely calcified meningioma in the right posterior fossa may be further   characterized with contrast-enhanced MRI.                SJ AMAYA M.D., RADIOLOGY RESIDENT  This document has been electronically signed.  CLIFFORD JAVIER M.D, ATTENDING RADIOLOGIST  This document has been electronically signed. Oct 12 2019 12:36AM                < end of copied text >  OTHER: 	  	  LABS:	 	    CARDIAC MARKERS:                                  14.2   15.65 )-----------( 243      ( 12 Oct 2019 00:56 )             45.1     10-12    142  |  105  |  19  ----------------------------<  154<H>  3.8   |  24  |  0.81    Ca    9.5      12 Oct 2019 00:56    TPro  6.5  /  Alb  4.0  /  TBili  0.6  /  DBili  x   /  AST  18  /  ALT  15  /  AlkPhos  96  10-12    proBNP:   Lipid Profile:   HgA1c:   TSH:

## 2019-10-12 NOTE — H&P ADULT - PROBLEM SELECTOR PLAN 7
pt had been on oxybutynin, but per daughter is now taking another rmedication, which she will bring in later today once she find the name of the medication and the bottle at home  - pt advised to bring to nurse, and NP will place medication order in pt had been on oxybutynin, but per daughter is now taking another medication, which she will bring in later today once she find the name of the medication and the bottle at home  - pt advised to bring to nurse, and NP will place medication order in

## 2019-10-12 NOTE — H&P ADULT - NSHPPHYSICALEXAM_GEN_ALL_CORE
VITAL SIGNS:    Vital Signs Last 24 Hrs  T(C): 36.7 (12 Oct 2019 05:46), Max: 37.2 (12 Oct 2019 03:20)  T(F): 98 (12 Oct 2019 05:46), Max: 99 (12 Oct 2019 03:20)  HR: 85 (12 Oct 2019 05:46) (80 - 85)  BP: 134/85 (12 Oct 2019 05:46) (134/85 - 147/89)  BP(mean): --  RR: 18 (12 Oct 2019 05:46) (18 - 18)  SpO2: 96% (12 Oct 2019 05:46) (95% - 97%)    PHYSICAL EXAM:     GENERAL: no acute distress  HEENT: NC/AT, EOMI, neck supple, MMM  RESPIRATORY: LCTAB/L, no rhonchi, rales, or wheezing  CARDIOVASCULAR: RRR, no murmurs, gallops, rubs  ABDOMINAL: soft, non-tender, non-distended, positive bowel sounds   EXTREMITIES: no clubbing, cyanosis, or edema  NEUROLOGICAL: alert and oriented x 3, non-focal  SKIN: no rashes or lesions   MUSCULOSKELETAL: no gross joint deformity

## 2019-10-12 NOTE — H&P ADULT - NSICDXPASTSURGICALHX_GEN_ALL_CORE_FT
PAST SURGICAL HISTORY:  History of ankle fusion left, 2001    History of hip replacement, total, right 2013    History of hysterectomy 1981    History of wrist fracture right 2006, left 2007

## 2019-10-13 LAB
ANION GAP SERPL CALC-SCNC: 14 MMOL/L — SIGNIFICANT CHANGE UP (ref 5–17)
BASOPHILS # BLD AUTO: 0.04 K/UL — SIGNIFICANT CHANGE UP (ref 0–0.2)
BASOPHILS NFR BLD AUTO: 0.3 % — SIGNIFICANT CHANGE UP (ref 0–2)
BUN SERPL-MCNC: 13 MG/DL — SIGNIFICANT CHANGE UP (ref 7–23)
CALCIUM SERPL-MCNC: 9.5 MG/DL — SIGNIFICANT CHANGE UP (ref 8.4–10.5)
CHLORIDE SERPL-SCNC: 101 MMOL/L — SIGNIFICANT CHANGE UP (ref 96–108)
CO2 SERPL-SCNC: 27 MMOL/L — SIGNIFICANT CHANGE UP (ref 22–31)
CREAT SERPL-MCNC: 0.77 MG/DL — SIGNIFICANT CHANGE UP (ref 0.5–1.3)
EOSINOPHIL # BLD AUTO: 0.24 K/UL — SIGNIFICANT CHANGE UP (ref 0–0.5)
EOSINOPHIL NFR BLD AUTO: 1.9 % — SIGNIFICANT CHANGE UP (ref 0–6)
FOLATE SERPL-MCNC: 10.1 NG/ML — SIGNIFICANT CHANGE UP
GLUCOSE SERPL-MCNC: 174 MG/DL — HIGH (ref 70–99)
HCT VFR BLD CALC: 41.8 % — SIGNIFICANT CHANGE UP (ref 34.5–45)
HGB BLD-MCNC: 13.1 G/DL — SIGNIFICANT CHANGE UP (ref 11.5–15.5)
IMM GRANULOCYTES NFR BLD AUTO: 0.8 % — SIGNIFICANT CHANGE UP (ref 0–1.5)
LYMPHOCYTES # BLD AUTO: 1.99 K/UL — SIGNIFICANT CHANGE UP (ref 1–3.3)
LYMPHOCYTES # BLD AUTO: 16 % — SIGNIFICANT CHANGE UP (ref 13–44)
MAGNESIUM SERPL-MCNC: 1.8 MG/DL — SIGNIFICANT CHANGE UP (ref 1.6–2.6)
MCHC RBC-ENTMCNC: 28.3 PG — SIGNIFICANT CHANGE UP (ref 27–34)
MCHC RBC-ENTMCNC: 31.3 GM/DL — LOW (ref 32–36)
MCV RBC AUTO: 90.3 FL — SIGNIFICANT CHANGE UP (ref 80–100)
MONOCYTES # BLD AUTO: 0.86 K/UL — SIGNIFICANT CHANGE UP (ref 0–0.9)
MONOCYTES NFR BLD AUTO: 6.9 % — SIGNIFICANT CHANGE UP (ref 2–14)
NEUTROPHILS # BLD AUTO: 9.22 K/UL — HIGH (ref 1.8–7.4)
NEUTROPHILS NFR BLD AUTO: 74.1 % — SIGNIFICANT CHANGE UP (ref 43–77)
PHOSPHATE SERPL-MCNC: 2.5 MG/DL — SIGNIFICANT CHANGE UP (ref 2.5–4.5)
PLATELET # BLD AUTO: 218 K/UL — SIGNIFICANT CHANGE UP (ref 150–400)
POTASSIUM SERPL-MCNC: 3.4 MMOL/L — LOW (ref 3.5–5.3)
POTASSIUM SERPL-SCNC: 3.4 MMOL/L — LOW (ref 3.5–5.3)
RBC # BLD: 4.63 M/UL — SIGNIFICANT CHANGE UP (ref 3.8–5.2)
RBC # FLD: 14.4 % — SIGNIFICANT CHANGE UP (ref 10.3–14.5)
SODIUM SERPL-SCNC: 142 MMOL/L — SIGNIFICANT CHANGE UP (ref 135–145)
TSH SERPL-MCNC: 1.17 UIU/ML — SIGNIFICANT CHANGE UP (ref 0.27–4.2)
VIT B12 SERPL-MCNC: 323 PG/ML — SIGNIFICANT CHANGE UP (ref 232–1245)
WBC # BLD: 12.45 K/UL — HIGH (ref 3.8–10.5)
WBC # FLD AUTO: 12.45 K/UL — HIGH (ref 3.8–10.5)

## 2019-10-13 RX ORDER — MAGNESIUM SULFATE 500 MG/ML
1 VIAL (ML) INJECTION ONCE
Refills: 0 | Status: COMPLETED | OUTPATIENT
Start: 2019-10-13 | End: 2019-10-13

## 2019-10-13 RX ORDER — POTASSIUM CHLORIDE 20 MEQ
20 PACKET (EA) ORAL ONCE
Refills: 0 | Status: COMPLETED | OUTPATIENT
Start: 2019-10-13 | End: 2019-10-13

## 2019-10-13 RX ORDER — POTASSIUM CHLORIDE 20 MEQ
40 PACKET (EA) ORAL ONCE
Refills: 0 | Status: COMPLETED | OUTPATIENT
Start: 2019-10-13 | End: 2019-10-13

## 2019-10-13 RX ADMIN — Medication 40 MILLIEQUIVALENT(S): at 15:00

## 2019-10-13 RX ADMIN — Medication 20 MILLIEQUIVALENT(S): at 15:54

## 2019-10-13 RX ADMIN — Medication 0.12 MILLIGRAM(S): at 06:32

## 2019-10-13 RX ADMIN — RIVAROXABAN 20 MILLIGRAM(S): KIT at 18:35

## 2019-10-13 RX ADMIN — Medication 100 MILLIGRAM(S): at 21:49

## 2019-10-13 RX ADMIN — Medication 1 TABLET(S): at 08:07

## 2019-10-13 RX ADMIN — Medication 40 MILLIGRAM(S): at 18:35

## 2019-10-13 RX ADMIN — Medication 100 MILLIGRAM(S): at 06:32

## 2019-10-13 RX ADMIN — LATANOPROST 1 DROP(S): 0.05 SOLUTION/ DROPS OPHTHALMIC; TOPICAL at 21:50

## 2019-10-13 RX ADMIN — Medication 40 MILLIGRAM(S): at 06:33

## 2019-10-13 RX ADMIN — SERTRALINE 50 MILLIGRAM(S): 25 TABLET, FILM COATED ORAL at 08:07

## 2019-10-13 RX ADMIN — Medication 100 MILLIGRAM(S): at 00:14

## 2019-10-13 RX ADMIN — Medication 100 GRAM(S): at 14:59

## 2019-10-13 NOTE — PHYSICAL THERAPY INITIAL EVALUATION ADULT - ADDITIONAL COMMENTS
As per pt, pt lives in a private house alone and 5 steps to enter w/ U[facility]. PTA pt was independent w/ all mobility and RW and ADLs.

## 2019-10-13 NOTE — PHYSICAL THERAPY INITIAL EVALUATION ADULT - PRECAUTIONS/LIMITATIONS, REHAB EVAL
continued from above: CTH (- for intracranial bleed)Mild right posterior parietal scalp hematoma./fall precautions/cardiac precautions

## 2019-10-13 NOTE — PHYSICAL THERAPY INITIAL EVALUATION ADULT - PERTINENT HX OF CURRENT PROBLEM, REHAB EVAL
Pt is a 88 y/o f w/ PMH of A fib on xarelto, OA, HTN, glaucoma, macular degeneration, depression p/w frequent falls.  Pt had a fall yesterday from unsteadiness on her feet, and difficulty with ambulation even with a walker.  Pt has had a total of four falls in the last few weeks.  All the falls per family appear ot be mechanical. Pt doesn't believe she had any LOC.  She did hit her head with the fall.  As per Cardiology note: Pt states to feel dizzy prior to "fall" episodes".

## 2019-10-14 LAB
ANION GAP SERPL CALC-SCNC: 12 MMOL/L — SIGNIFICANT CHANGE UP (ref 5–17)
BUN SERPL-MCNC: 16 MG/DL — SIGNIFICANT CHANGE UP (ref 7–23)
CALCIUM SERPL-MCNC: 10.1 MG/DL — SIGNIFICANT CHANGE UP (ref 8.4–10.5)
CHLORIDE SERPL-SCNC: 97 MMOL/L — SIGNIFICANT CHANGE UP (ref 96–108)
CO2 SERPL-SCNC: 30 MMOL/L — SIGNIFICANT CHANGE UP (ref 22–31)
CREAT SERPL-MCNC: 0.86 MG/DL — SIGNIFICANT CHANGE UP (ref 0.5–1.3)
GLUCOSE SERPL-MCNC: 165 MG/DL — HIGH (ref 70–99)
MAGNESIUM SERPL-MCNC: 2 MG/DL — SIGNIFICANT CHANGE UP (ref 1.6–2.6)
POTASSIUM SERPL-MCNC: 4 MMOL/L — SIGNIFICANT CHANGE UP (ref 3.5–5.3)
POTASSIUM SERPL-SCNC: 4 MMOL/L — SIGNIFICANT CHANGE UP (ref 3.5–5.3)
SODIUM SERPL-SCNC: 139 MMOL/L — SIGNIFICANT CHANGE UP (ref 135–145)

## 2019-10-14 RX ORDER — AMIODARONE HYDROCHLORIDE 400 MG/1
400 TABLET ORAL THREE TIMES A DAY
Refills: 0 | Status: COMPLETED | OUTPATIENT
Start: 2019-10-14 | End: 2019-10-18

## 2019-10-14 RX ORDER — AMIODARONE HYDROCHLORIDE 400 MG/1
150 TABLET ORAL ONCE
Refills: 0 | Status: COMPLETED | OUTPATIENT
Start: 2019-10-14 | End: 2019-10-14

## 2019-10-14 RX ADMIN — Medication 100 MILLIGRAM(S): at 05:58

## 2019-10-14 RX ADMIN — LATANOPROST 1 DROP(S): 0.05 SOLUTION/ DROPS OPHTHALMIC; TOPICAL at 21:22

## 2019-10-14 RX ADMIN — Medication 100 MILLIGRAM(S): at 05:59

## 2019-10-14 RX ADMIN — AMIODARONE HYDROCHLORIDE 600 MILLIGRAM(S): 400 TABLET ORAL at 21:21

## 2019-10-14 RX ADMIN — AMIODARONE HYDROCHLORIDE 400 MILLIGRAM(S): 400 TABLET ORAL at 19:37

## 2019-10-14 RX ADMIN — SENNA PLUS 2 TABLET(S): 8.6 TABLET ORAL at 05:58

## 2019-10-14 RX ADMIN — Medication 0.12 MILLIGRAM(S): at 05:58

## 2019-10-14 RX ADMIN — SERTRALINE 50 MILLIGRAM(S): 25 TABLET, FILM COATED ORAL at 12:01

## 2019-10-14 RX ADMIN — Medication 40 MILLIGRAM(S): at 05:59

## 2019-10-14 RX ADMIN — Medication 1 TABLET(S): at 12:01

## 2019-10-14 RX ADMIN — Medication 100 MILLIGRAM(S): at 21:22

## 2019-10-14 RX ADMIN — RIVAROXABAN 20 MILLIGRAM(S): KIT at 17:16

## 2019-10-14 RX ADMIN — Medication 40 MILLIGRAM(S): at 17:16

## 2019-10-14 RX ADMIN — Medication 100 MILLIGRAM(S): at 13:10

## 2019-10-14 NOTE — PROVIDER CONTACT NOTE (OTHER) - ACTION/TREATMENT ORDERED:
EKG done.  Amiodarone 150mg ivpb stat ordered.   Amiodarone 400mg PO TID [id number] also ordered.  Continue to monitor.

## 2019-10-14 NOTE — PROVIDER CONTACT NOTE (OTHER) - ASSESSMENT
Pt aox3-4, responsive. No signs/sx of distress observed/reported. BP stable, afebrile, asymptomatic.

## 2019-10-14 NOTE — CHART NOTE - NSCHARTNOTEFT_GEN_A_CORE
Notified by RN, pt with HR sustaining above 130. Pt seen at bedside, denies CP, palpitations, SOB, dizziness, or lightheadedness. Pt admitted for frequent falls, has a history of afib on Xarelto. EKG with afib w/rvr. D/w Dr. Lawson, give amio 150 mg IV and 400 mg PO TID. Continue telemetry monitoring. Will endorse to night team    Vital Signs Last 24 Hrs  T(C): 36.6 (14 Oct 2019 18:38), Max: 36.9 (13 Oct 2019 20:21)  T(F): 97.9 (14 Oct 2019 18:38), Max: 98.4 (13 Oct 2019 20:21)  HR: 108 (14 Oct 2019 19:42) (83 - 120)  BP: 135/79 (14 Oct 2019 19:42) (113/74 - 135/79)  RR: 18 (14 Oct 2019 18:38) (18 - 18)  SpO2: 93% (14 Oct 2019 18:38) (92% - 93%)    Becki Johnson NP  [id number]

## 2019-10-15 LAB
ANION GAP SERPL CALC-SCNC: 13 MMOL/L — SIGNIFICANT CHANGE UP (ref 5–17)
BUN SERPL-MCNC: 20 MG/DL — SIGNIFICANT CHANGE UP (ref 7–23)
CALCIUM SERPL-MCNC: 9.6 MG/DL — SIGNIFICANT CHANGE UP (ref 8.4–10.5)
CHLORIDE SERPL-SCNC: 98 MMOL/L — SIGNIFICANT CHANGE UP (ref 96–108)
CO2 SERPL-SCNC: 28 MMOL/L — SIGNIFICANT CHANGE UP (ref 22–31)
CREAT SERPL-MCNC: 0.86 MG/DL — SIGNIFICANT CHANGE UP (ref 0.5–1.3)
GLUCOSE SERPL-MCNC: 167 MG/DL — HIGH (ref 70–99)
HCT VFR BLD CALC: 43.5 % — SIGNIFICANT CHANGE UP (ref 34.5–45)
HGB BLD-MCNC: 13.6 G/DL — SIGNIFICANT CHANGE UP (ref 11.5–15.5)
MCHC RBC-ENTMCNC: 28.1 PG — SIGNIFICANT CHANGE UP (ref 27–34)
MCHC RBC-ENTMCNC: 31.3 GM/DL — LOW (ref 32–36)
MCV RBC AUTO: 89.9 FL — SIGNIFICANT CHANGE UP (ref 80–100)
PLATELET # BLD AUTO: 252 K/UL — SIGNIFICANT CHANGE UP (ref 150–400)
POTASSIUM SERPL-MCNC: 3.5 MMOL/L — SIGNIFICANT CHANGE UP (ref 3.5–5.3)
POTASSIUM SERPL-SCNC: 3.5 MMOL/L — SIGNIFICANT CHANGE UP (ref 3.5–5.3)
RBC # BLD: 4.84 M/UL — SIGNIFICANT CHANGE UP (ref 3.8–5.2)
RBC # FLD: 14.4 % — SIGNIFICANT CHANGE UP (ref 10.3–14.5)
SODIUM SERPL-SCNC: 139 MMOL/L — SIGNIFICANT CHANGE UP (ref 135–145)
WBC # BLD: 14.42 K/UL — HIGH (ref 3.8–10.5)
WBC # FLD AUTO: 14.42 K/UL — HIGH (ref 3.8–10.5)

## 2019-10-15 PROCEDURE — 93306 TTE W/DOPPLER COMPLETE: CPT | Mod: 26

## 2019-10-15 PROCEDURE — 93010 ELECTROCARDIOGRAM REPORT: CPT

## 2019-10-15 RX ADMIN — Medication 40 MILLIGRAM(S): at 05:13

## 2019-10-15 RX ADMIN — SERTRALINE 50 MILLIGRAM(S): 25 TABLET, FILM COATED ORAL at 11:36

## 2019-10-15 RX ADMIN — AMIODARONE HYDROCHLORIDE 400 MILLIGRAM(S): 400 TABLET ORAL at 05:13

## 2019-10-15 RX ADMIN — Medication 100 MILLIGRAM(S): at 06:34

## 2019-10-15 RX ADMIN — AMIODARONE HYDROCHLORIDE 400 MILLIGRAM(S): 400 TABLET ORAL at 21:25

## 2019-10-15 RX ADMIN — AMIODARONE HYDROCHLORIDE 400 MILLIGRAM(S): 400 TABLET ORAL at 13:18

## 2019-10-15 RX ADMIN — RIVAROXABAN 20 MILLIGRAM(S): KIT at 17:09

## 2019-10-15 RX ADMIN — Medication 40 MILLIGRAM(S): at 17:48

## 2019-10-15 RX ADMIN — LATANOPROST 1 DROP(S): 0.05 SOLUTION/ DROPS OPHTHALMIC; TOPICAL at 21:25

## 2019-10-15 RX ADMIN — Medication 1 TABLET(S): at 11:36

## 2019-10-15 RX ADMIN — Medication 0.12 MILLIGRAM(S): at 05:14

## 2019-10-15 RX ADMIN — Medication 100 MILLIGRAM(S): at 21:25

## 2019-10-15 RX ADMIN — Medication 100 MILLIGRAM(S): at 05:14

## 2019-10-16 LAB
ANION GAP SERPL CALC-SCNC: 14 MMOL/L — SIGNIFICANT CHANGE UP (ref 5–17)
BUN SERPL-MCNC: 25 MG/DL — HIGH (ref 7–23)
CALCIUM SERPL-MCNC: 9.3 MG/DL — SIGNIFICANT CHANGE UP (ref 8.4–10.5)
CHLORIDE SERPL-SCNC: 97 MMOL/L — SIGNIFICANT CHANGE UP (ref 96–108)
CO2 SERPL-SCNC: 29 MMOL/L — SIGNIFICANT CHANGE UP (ref 22–31)
CREAT SERPL-MCNC: 1 MG/DL — SIGNIFICANT CHANGE UP (ref 0.5–1.3)
GLUCOSE SERPL-MCNC: 169 MG/DL — HIGH (ref 70–99)
MAGNESIUM SERPL-MCNC: 1.8 MG/DL — SIGNIFICANT CHANGE UP (ref 1.6–2.6)
PHOSPHATE SERPL-MCNC: 2.8 MG/DL — SIGNIFICANT CHANGE UP (ref 2.5–4.5)
POTASSIUM SERPL-MCNC: 3.6 MMOL/L — SIGNIFICANT CHANGE UP (ref 3.5–5.3)
POTASSIUM SERPL-SCNC: 3.6 MMOL/L — SIGNIFICANT CHANGE UP (ref 3.5–5.3)
SODIUM SERPL-SCNC: 140 MMOL/L — SIGNIFICANT CHANGE UP (ref 135–145)

## 2019-10-16 RX ADMIN — Medication 100 MILLIGRAM(S): at 05:16

## 2019-10-16 RX ADMIN — Medication 0.12 MILLIGRAM(S): at 05:16

## 2019-10-16 RX ADMIN — AMIODARONE HYDROCHLORIDE 400 MILLIGRAM(S): 400 TABLET ORAL at 05:15

## 2019-10-16 RX ADMIN — LATANOPROST 1 DROP(S): 0.05 SOLUTION/ DROPS OPHTHALMIC; TOPICAL at 22:35

## 2019-10-16 RX ADMIN — AMIODARONE HYDROCHLORIDE 400 MILLIGRAM(S): 400 TABLET ORAL at 13:14

## 2019-10-16 RX ADMIN — RIVAROXABAN 20 MILLIGRAM(S): KIT at 17:58

## 2019-10-16 RX ADMIN — Medication 1 TABLET(S): at 12:15

## 2019-10-16 RX ADMIN — SERTRALINE 50 MILLIGRAM(S): 25 TABLET, FILM COATED ORAL at 12:15

## 2019-10-16 RX ADMIN — Medication 100 MILLIGRAM(S): at 22:35

## 2019-10-16 RX ADMIN — Medication 40 MILLIGRAM(S): at 05:16

## 2019-10-16 RX ADMIN — Medication 40 MILLIGRAM(S): at 17:59

## 2019-10-16 RX ADMIN — AMIODARONE HYDROCHLORIDE 400 MILLIGRAM(S): 400 TABLET ORAL at 22:35

## 2019-10-17 LAB
ANION GAP SERPL CALC-SCNC: 14 MMOL/L — SIGNIFICANT CHANGE UP (ref 5–17)
BUN SERPL-MCNC: 29 MG/DL — HIGH (ref 7–23)
CALCIUM SERPL-MCNC: 9.3 MG/DL — SIGNIFICANT CHANGE UP (ref 8.4–10.5)
CHLORIDE SERPL-SCNC: 93 MMOL/L — LOW (ref 96–108)
CO2 SERPL-SCNC: 27 MMOL/L — SIGNIFICANT CHANGE UP (ref 22–31)
CREAT SERPL-MCNC: 1.04 MG/DL — SIGNIFICANT CHANGE UP (ref 0.5–1.3)
GLUCOSE SERPL-MCNC: 179 MG/DL — HIGH (ref 70–99)
HCT VFR BLD CALC: 43.2 % — SIGNIFICANT CHANGE UP (ref 34.5–45)
HGB BLD-MCNC: 13.7 G/DL — SIGNIFICANT CHANGE UP (ref 11.5–15.5)
MAGNESIUM SERPL-MCNC: 1.8 MG/DL — SIGNIFICANT CHANGE UP (ref 1.6–2.6)
MCHC RBC-ENTMCNC: 28.6 PG — SIGNIFICANT CHANGE UP (ref 27–34)
MCHC RBC-ENTMCNC: 31.7 GM/DL — LOW (ref 32–36)
MCV RBC AUTO: 90.2 FL — SIGNIFICANT CHANGE UP (ref 80–100)
PLATELET # BLD AUTO: 255 K/UL — SIGNIFICANT CHANGE UP (ref 150–400)
POTASSIUM SERPL-MCNC: 3.4 MMOL/L — LOW (ref 3.5–5.3)
POTASSIUM SERPL-SCNC: 3.4 MMOL/L — LOW (ref 3.5–5.3)
RBC # BLD: 4.79 M/UL — SIGNIFICANT CHANGE UP (ref 3.8–5.2)
RBC # FLD: 14.1 % — SIGNIFICANT CHANGE UP (ref 10.3–14.5)
SODIUM SERPL-SCNC: 134 MMOL/L — LOW (ref 135–145)
WBC # BLD: 15.8 K/UL — HIGH (ref 3.8–10.5)
WBC # FLD AUTO: 15.8 K/UL — HIGH (ref 3.8–10.5)

## 2019-10-17 PROCEDURE — 93010 ELECTROCARDIOGRAM REPORT: CPT

## 2019-10-17 RX ORDER — POTASSIUM CHLORIDE 20 MEQ
40 PACKET (EA) ORAL ONCE
Refills: 0 | Status: COMPLETED | OUTPATIENT
Start: 2019-10-17 | End: 2019-10-17

## 2019-10-17 RX ADMIN — Medication 100 MILLIGRAM(S): at 05:36

## 2019-10-17 RX ADMIN — Medication 40 MILLIGRAM(S): at 18:19

## 2019-10-17 RX ADMIN — AMIODARONE HYDROCHLORIDE 400 MILLIGRAM(S): 400 TABLET ORAL at 13:10

## 2019-10-17 RX ADMIN — Medication 40 MILLIEQUIVALENT(S): at 18:18

## 2019-10-17 RX ADMIN — AMIODARONE HYDROCHLORIDE 400 MILLIGRAM(S): 400 TABLET ORAL at 20:40

## 2019-10-17 RX ADMIN — SERTRALINE 50 MILLIGRAM(S): 25 TABLET, FILM COATED ORAL at 11:16

## 2019-10-17 RX ADMIN — Medication 40 MILLIGRAM(S): at 05:36

## 2019-10-17 RX ADMIN — AMIODARONE HYDROCHLORIDE 400 MILLIGRAM(S): 400 TABLET ORAL at 05:36

## 2019-10-17 RX ADMIN — Medication 0.12 MILLIGRAM(S): at 05:36

## 2019-10-17 RX ADMIN — Medication 1 TABLET(S): at 11:16

## 2019-10-17 RX ADMIN — LATANOPROST 1 DROP(S): 0.05 SOLUTION/ DROPS OPHTHALMIC; TOPICAL at 19:50

## 2019-10-17 RX ADMIN — RIVAROXABAN 20 MILLIGRAM(S): KIT at 18:18

## 2019-10-18 LAB
ANION GAP SERPL CALC-SCNC: 14 MMOL/L — SIGNIFICANT CHANGE UP (ref 5–17)
BUN SERPL-MCNC: 32 MG/DL — HIGH (ref 7–23)
CALCIUM SERPL-MCNC: 9.4 MG/DL — SIGNIFICANT CHANGE UP (ref 8.4–10.5)
CHLORIDE SERPL-SCNC: 96 MMOL/L — SIGNIFICANT CHANGE UP (ref 96–108)
CO2 SERPL-SCNC: 29 MMOL/L — SIGNIFICANT CHANGE UP (ref 22–31)
CREAT SERPL-MCNC: 1.09 MG/DL — SIGNIFICANT CHANGE UP (ref 0.5–1.3)
GLUCOSE SERPL-MCNC: 184 MG/DL — HIGH (ref 70–99)
HCT VFR BLD CALC: 44.7 % — SIGNIFICANT CHANGE UP (ref 34.5–45)
HGB BLD-MCNC: 14 G/DL — SIGNIFICANT CHANGE UP (ref 11.5–15.5)
MCHC RBC-ENTMCNC: 28.4 PG — SIGNIFICANT CHANGE UP (ref 27–34)
MCHC RBC-ENTMCNC: 31.3 GM/DL — LOW (ref 32–36)
MCV RBC AUTO: 90.7 FL — SIGNIFICANT CHANGE UP (ref 80–100)
PLATELET # BLD AUTO: 267 K/UL — SIGNIFICANT CHANGE UP (ref 150–400)
POTASSIUM SERPL-MCNC: 3.8 MMOL/L — SIGNIFICANT CHANGE UP (ref 3.5–5.3)
POTASSIUM SERPL-SCNC: 3.8 MMOL/L — SIGNIFICANT CHANGE UP (ref 3.5–5.3)
RBC # BLD: 4.93 M/UL — SIGNIFICANT CHANGE UP (ref 3.8–5.2)
RBC # FLD: 14 % — SIGNIFICANT CHANGE UP (ref 10.3–14.5)
SODIUM SERPL-SCNC: 139 MMOL/L — SIGNIFICANT CHANGE UP (ref 135–145)
WBC # BLD: 15.47 K/UL — HIGH (ref 3.8–10.5)
WBC # FLD AUTO: 15.47 K/UL — HIGH (ref 3.8–10.5)

## 2019-10-18 PROCEDURE — 93010 ELECTROCARDIOGRAM REPORT: CPT

## 2019-10-18 RX ORDER — FUROSEMIDE 40 MG
80 TABLET ORAL DAILY
Refills: 0 | Status: DISCONTINUED | OUTPATIENT
Start: 2019-10-18 | End: 2019-10-21

## 2019-10-18 RX ADMIN — RIVAROXABAN 20 MILLIGRAM(S): KIT at 18:09

## 2019-10-18 RX ADMIN — Medication 40 MILLIGRAM(S): at 06:34

## 2019-10-18 RX ADMIN — Medication 100 MILLIGRAM(S): at 20:51

## 2019-10-18 RX ADMIN — Medication 0.12 MILLIGRAM(S): at 06:34

## 2019-10-18 RX ADMIN — SERTRALINE 50 MILLIGRAM(S): 25 TABLET, FILM COATED ORAL at 11:14

## 2019-10-18 RX ADMIN — AMIODARONE HYDROCHLORIDE 400 MILLIGRAM(S): 400 TABLET ORAL at 14:22

## 2019-10-18 RX ADMIN — AMIODARONE HYDROCHLORIDE 400 MILLIGRAM(S): 400 TABLET ORAL at 06:34

## 2019-10-18 RX ADMIN — Medication 1 TABLET(S): at 11:14

## 2019-10-18 RX ADMIN — Medication 100 MILLIGRAM(S): at 06:34

## 2019-10-18 RX ADMIN — LATANOPROST 1 DROP(S): 0.05 SOLUTION/ DROPS OPHTHALMIC; TOPICAL at 20:52

## 2019-10-18 NOTE — OCCUPATIONAL THERAPY INITIAL EVALUATION ADULT - LIVES WITH, PROFILE
pt lives alone in a house with 5STE, 1st floor setup, +shower stall with seat, +comfort height toilet, Assist for bathing and IADLs prior to admission, ambulated with RW./alone

## 2019-10-18 NOTE — OCCUPATIONAL THERAPY INITIAL EVALUATION ADULT - DIAGNOSIS, OT EVAL
Pt presents with decreased balance, generalized weakness, decreased endurance, low vision, and hx of mulitple falls impacting her ability to safely complete functional activities.

## 2019-10-18 NOTE — OCCUPATIONAL THERAPY INITIAL EVALUATION ADULT - PERTINENT HX OF CURRENT PROBLEM, REHAB EVAL
86yo F s/p fall,pressed her medic alert button, and was found by EMS. Pt has had two falls int he last last week, and two other falls in the past month, for a total of four falls in the last few weeks. Pt doesn't believe she had any LOC.  She did hit her head with the fall. CT head negative for any ICH, or acute infarct. 86yo F s/p fall, pressed her medic alert button, and was found by EMS. Pt has had two falls int he last last week, and two other falls in the past month, for a total of four falls in the last few weeks. Pt doesn't believe she had any LOC.  She did hit her head with the fall. CT head negative for any ICH, or acute infarct.

## 2019-10-19 LAB
ANION GAP SERPL CALC-SCNC: 16 MMOL/L — SIGNIFICANT CHANGE UP (ref 5–17)
BUN SERPL-MCNC: 36 MG/DL — HIGH (ref 7–23)
CALCIUM SERPL-MCNC: 9.4 MG/DL — SIGNIFICANT CHANGE UP (ref 8.4–10.5)
CHLORIDE SERPL-SCNC: 97 MMOL/L — SIGNIFICANT CHANGE UP (ref 96–108)
CO2 SERPL-SCNC: 29 MMOL/L — SIGNIFICANT CHANGE UP (ref 22–31)
CREAT SERPL-MCNC: 1.1 MG/DL — SIGNIFICANT CHANGE UP (ref 0.5–1.3)
GLUCOSE SERPL-MCNC: 166 MG/DL — HIGH (ref 70–99)
HCT VFR BLD CALC: 45 % — SIGNIFICANT CHANGE UP (ref 34.5–45)
HGB BLD-MCNC: 14.1 G/DL — SIGNIFICANT CHANGE UP (ref 11.5–15.5)
MCHC RBC-ENTMCNC: 28.9 PG — SIGNIFICANT CHANGE UP (ref 27–34)
MCHC RBC-ENTMCNC: 31.3 GM/DL — LOW (ref 32–36)
MCV RBC AUTO: 92.2 FL — SIGNIFICANT CHANGE UP (ref 80–100)
PLATELET # BLD AUTO: 259 K/UL — SIGNIFICANT CHANGE UP (ref 150–400)
POTASSIUM SERPL-MCNC: 3.8 MMOL/L — SIGNIFICANT CHANGE UP (ref 3.5–5.3)
POTASSIUM SERPL-SCNC: 3.8 MMOL/L — SIGNIFICANT CHANGE UP (ref 3.5–5.3)
RBC # BLD: 4.88 M/UL — SIGNIFICANT CHANGE UP (ref 3.8–5.2)
RBC # FLD: 14.1 % — SIGNIFICANT CHANGE UP (ref 10.3–14.5)
SODIUM SERPL-SCNC: 142 MMOL/L — SIGNIFICANT CHANGE UP (ref 135–145)
WBC # BLD: 14.44 K/UL — HIGH (ref 3.8–10.5)
WBC # FLD AUTO: 14.44 K/UL — HIGH (ref 3.8–10.5)

## 2019-10-19 RX ADMIN — SERTRALINE 50 MILLIGRAM(S): 25 TABLET, FILM COATED ORAL at 12:07

## 2019-10-19 RX ADMIN — Medication 1 TABLET(S): at 12:07

## 2019-10-19 RX ADMIN — Medication 0.12 MILLIGRAM(S): at 05:50

## 2019-10-19 RX ADMIN — LATANOPROST 1 DROP(S): 0.05 SOLUTION/ DROPS OPHTHALMIC; TOPICAL at 21:34

## 2019-10-19 RX ADMIN — Medication 80 MILLIGRAM(S): at 05:50

## 2019-10-19 RX ADMIN — Medication 100 MILLIGRAM(S): at 05:50

## 2019-10-19 RX ADMIN — RIVAROXABAN 20 MILLIGRAM(S): KIT at 18:05

## 2019-10-19 RX ADMIN — Medication 100 MILLIGRAM(S): at 21:34

## 2019-10-20 RX ADMIN — Medication 80 MILLIGRAM(S): at 05:15

## 2019-10-20 RX ADMIN — Medication 100 MILLIGRAM(S): at 05:16

## 2019-10-20 RX ADMIN — SERTRALINE 50 MILLIGRAM(S): 25 TABLET, FILM COATED ORAL at 11:26

## 2019-10-20 RX ADMIN — Medication 0.12 MILLIGRAM(S): at 05:15

## 2019-10-20 RX ADMIN — LATANOPROST 1 DROP(S): 0.05 SOLUTION/ DROPS OPHTHALMIC; TOPICAL at 21:51

## 2019-10-20 RX ADMIN — Medication 1 TABLET(S): at 11:26

## 2019-10-20 RX ADMIN — Medication 100 MILLIGRAM(S): at 21:51

## 2019-10-20 RX ADMIN — RIVAROXABAN 20 MILLIGRAM(S): KIT at 17:13

## 2019-10-20 NOTE — PROGRESS NOTE ADULT - PROBLEM SELECTOR PLAN 2
Change to  PO lasix 80 daily   Acute diastolic heart failure   replete K > 4 and Mg > 2
Cont with IV lasix   TTE   replete K > 4 and Mg > 2
PO lasix 80 daily   Acute diastolic heart failure   replete K > 4 and Mg > 2
PO lasix 80 daily   Acute diastolic heart failure   replete K > 4 and Mg > 2

## 2019-10-20 NOTE — PROGRESS NOTE ADULT - PROBLEM SELECTOR PROBLEM 1
Frequent falls

## 2019-10-20 NOTE — PROGRESS NOTE ADULT - ASSESSMENT
86 yo female with Afib admitted with increased frequency of falls vs. syncope
88 yo female with Afib admitted with increased frequency of falls vs. syncope
86 yo female with Afib admitted with increased frequency of falls vs. syncope

## 2019-10-20 NOTE — PROGRESS NOTE ADULT - PROBLEM SELECTOR PLAN 3
Cont with Amiodarone for now. check EKG Qtc!!  replete K > 4 and Mg > 2   Cont with meds   Fall precautions
Cont with Amiodarone for now. check EKG Qtc  replete K > 4 and Mg > 2   Cont with meds   Fall precautions
Cont with Amiodarone for now. check EKG Qtc  replete K > 4 and Mg > 2   Cont with meds   Fall precautions
Cont with Amiodarone for now. check EKG Qtc!!  replete K > 4 and Mg > 2   Cont with meds   Fall precautions
Stable Qtc  Change to amiodarone 200 mg daily   replete K > 4 and Mg > 2   Cont with meds   Fall precautions
Stable Qtc  Cont with Lopressor   completed Amio loading   replete K > 4 and Mg > 2   Cont with meds   Fall precautions
rate controlled   Cont with meds   Fall precautions

## 2019-10-20 NOTE — PROGRESS NOTE ADULT - PROBLEM SELECTOR PLAN 1
Rule out syncope. She states to feel dizzy prior to "fall" episodes"  No sterling events on Tele thus far  Stable thyroid panel , Vit b12 and folate.
Rule out syncope. She states to feel dizzy prior to "fall" episodes"  Monitor Tele   Check orthostatics   Stable thyroid panel , Vit b12 and folate.
Rule out syncope. She states to feel dizzy prior to "fall" episodes"  No sterling events on Tele   Stable thyroid panel , Vit b12 and folate.
Rule out syncope. She states to feel dizzy prior to "fall" episodes"  No sterling events on Tele   Stable thyroid panel , Vit b12 and folate.  BRETT LANG in Am
Rule out syncope. She states to feel dizzy prior to "fall" episodes"  No sterling events on Tele thus far  Check orthostatics   Stable thyroid panel , Vit b12 and folate.

## 2019-10-20 NOTE — PROGRESS NOTE ADULT - REASON FOR ADMISSION
frequent falls

## 2019-10-20 NOTE — PROGRESS NOTE ADULT - SUBJECTIVE AND OBJECTIVE BOX
Subjective: Patient seen and examined. No new events except as noted.     REVIEW OF SYSTEMS:    CONSTITUTIONAL: + weakness, fevers or chills  EYES/ENT: No visual changes;  No vertigo or throat pain   NECK: No pain or stiffness  RESPIRATORY: No cough, wheezing, hemoptysis; No shortness of breath  CARDIOVASCULAR: No chest pain or palpitations  GASTROINTESTINAL: No abdominal or epigastric pain. No nausea, vomiting, or hematemesis; No diarrhea or constipation. No melena or hematochezia.  GENITOURINARY: No dysuria, frequency or hematuria  NEUROLOGICAL: No numbness or weakness  SKIN: No itching, burning, rashes, or lesions   All other review of systems is negative unless indicated above.    MEDICATIONS:  MEDICATIONS  (STANDING):  amiodarone    Tablet 400 milliGRAM(s) Oral three times a day  digoxin     Tablet 0.125 milliGRAM(s) Oral daily  docusate sodium 100 milliGRAM(s) Oral three times a day  furosemide   Injectable 40 milliGRAM(s) IV Push two times a day  influenza   Vaccine 0.5 milliLiter(s) IntraMuscular once  latanoprost 0.005% Ophthalmic Solution 1 Drop(s) Both EYES at bedtime  metoprolol succinate  milliGRAM(s) Oral daily  multivitamin 1 Tablet(s) Oral daily  rivaroxaban 20 milliGRAM(s) Oral with dinner  sertraline 50 milliGRAM(s) Oral daily      PHYSICAL EXAM:  T(C): 36.9 (10-17-19 @ 11:14), Max: 37.3 (10-16-19 @ 20:02)  HR: 77 (10-17-19 @ 11:14) (73 - 91)  BP: 121/80 (10-17-19 @ 11:14) (104/64 - 121/80)  RR: 18 (10-17-19 @ 11:14) (18 - 18)  SpO2: 100% (10-17-19 @ 11:14) (93% - 100%)  Wt(kg): --  I&O's Summary    16 Oct 2019 07:01  -  17 Oct 2019 07:00  --------------------------------------------------------  IN: 480 mL / OUT: 200 mL / NET: 280 mL    17 Oct 2019 07:01  -  17 Oct 2019 12:06  --------------------------------------------------------  IN: 240 mL / OUT: 0 mL / NET: 240 mL          Appearance: Normal	  HEENT:   Normal oral mucosa, PERRL, EOMI	  Lymphatic: No lymphadenopathy , no edema  Cardiovascular: Irregular  S1 S2, No JVD, No murmurs , Peripheral pulses palpable 2+ bilaterally  Respiratory: Lungs clear to auscultation, normal effort 	  Gastrointestinal:  Soft, Non-tender, + BS	  Skin: No rashes, No ecchymoses, No cyanosis, warm to touch  Musculoskeletal: Normal range of motion, normal strength  Psychiatry:  Mood & affect appropriate  Ext: No edema      LABS:    CARDIAC MARKERS:                                13.7   15.80 )-----------( 255      ( 17 Oct 2019 07:43 )             43.2     10-17    134<L>  |  93<L>  |  29<H>  ----------------------------<  179<H>  3.4<L>   |  27  |  1.04    Ca    9.3      17 Oct 2019 06:25  Phos  2.8     10-16  Mg     1.8     10-17      proBNP:   Lipid Profile:   HgA1c:   TSH:             TELEMETRY: AF	    ECG:  	  RADIOLOGY:   DIAGNOSTIC TESTING:  [ ] Echocardiogram:  [ ]  Catheterization:  [ ] Stress Test:    OTHER:
Subjective: Patient seen and examined. No new events except as noted.   feels better     REVIEW OF SYSTEMS:    CONSTITUTIONAL:+ weakness, fevers or chills  EYES/ENT: No visual changes;  No vertigo or throat pain   NECK: No pain or stiffness  RESPIRATORY: No cough, wheezing, hemoptysis; No shortness of breath  CARDIOVASCULAR: No chest pain or palpitations  GASTROINTESTINAL: No abdominal or epigastric pain. No nausea, vomiting, or hematemesis; No diarrhea or constipation. No melena or hematochezia.  GENITOURINARY: No dysuria, frequency or hematuria  NEUROLOGICAL: No numbness or weakness  SKIN: No itching, burning, rashes, or lesions   All other review of systems is negative unless indicated above.    MEDICATIONS:  MEDICATIONS  (STANDING):  amiodarone    Tablet 400 milliGRAM(s) Oral three times a day  digoxin     Tablet 0.125 milliGRAM(s) Oral daily  docusate sodium 100 milliGRAM(s) Oral three times a day  furosemide   Injectable 40 milliGRAM(s) IV Push two times a day  influenza   Vaccine 0.5 milliLiter(s) IntraMuscular once  latanoprost 0.005% Ophthalmic Solution 1 Drop(s) Both EYES at bedtime  metoprolol succinate  milliGRAM(s) Oral daily  multivitamin 1 Tablet(s) Oral daily  rivaroxaban 20 milliGRAM(s) Oral with dinner  sertraline 50 milliGRAM(s) Oral daily      PHYSICAL EXAM:  T(C): 36.8 (10-15-19 @ 20:42), Max: 36.9 (10-15-19 @ 04:22)  HR: 75 (10-15-19 @ 20:42) (75 - 94)  BP: 132/81 (10-15-19 @ 20:42) (119/75 - 132/81)  RR: 18 (10-15-19 @ 20:42) (18 - 18)  SpO2: 96% (10-15-19 @ 20:42) (93% - 96%)  Wt(kg): --  I&O's Summary    14 Oct 2019 07:01  -  15 Oct 2019 07:00  --------------------------------------------------------  IN: 480 mL / OUT: 0 mL / NET: 480 mL    15 Oct 2019 07:01  -  15 Oct 2019 21:52  --------------------------------------------------------  IN: 0 mL / OUT: 500 mL / NET: -500 mL          Appearance: Normal	  HEENT:   Normal oral mucosa, PERRL, EOMI	  Lymphatic: No lymphadenopathy   Cardiovascular: irregular  S1 S2, No JVD, No murmurs , Peripheral pulses palpable 2+ bilaterally  Respiratory: Lungs clear to auscultation, normal effort 	  Gastrointestinal:  Soft, Non-tender, + BS	  Skin: No rashes, No ecchymoses, No cyanosis, warm to touch  Musculoskeletal: Normal range of motion, normal strength  Psychiatry:  Mood & affect appropriate  Ext: Much improved  edema      LABS:    CARDIAC MARKERS:                                13.6   14.42 )-----------( 252      ( 15 Oct 2019 08:45 )             43.5     10-15    139  |  98  |  20  ----------------------------<  167<H>  3.5   |  28  |  0.86    Ca    9.6      15 Oct 2019 07:03  Mg     2.0     10-14      proBNP:   Lipid Profile:   HgA1c:   TSH:             TELEMETRY: 	 AF    ECG:  	  RADIOLOGY:   DIAGNOSTIC TESTING:  [ ] Echocardiogram:  [ ]  Catheterization:  [ ] Stress Test:    OTHER:
Subjective: Patient seen and examined. No new events except as noted.   feels ok     REVIEW OF SYSTEMS:    CONSTITUTIONAL: + weakness, fevers or chills  EYES/ENT: No visual changes;  No vertigo or throat pain   NECK: No pain or stiffness  RESPIRATORY: No cough, wheezing, hemoptysis; No shortness of breath  CARDIOVASCULAR: No chest pain or palpitations  GASTROINTESTINAL: No abdominal or epigastric pain. No nausea, vomiting, or hematemesis; No diarrhea or constipation. No melena or hematochezia.  GENITOURINARY: No dysuria, frequency or hematuria  NEUROLOGICAL: No numbness or weakness  SKIN: No itching, burning, rashes, or lesions   All other review of systems is negative unless indicated above.    MEDICATIONS:  MEDICATIONS  (STANDING):  amiodarone    Tablet 400 milliGRAM(s) Oral three times a day  digoxin     Tablet 0.125 milliGRAM(s) Oral daily  docusate sodium 100 milliGRAM(s) Oral three times a day  furosemide   Injectable 40 milliGRAM(s) IV Push two times a day  influenza   Vaccine 0.5 milliLiter(s) IntraMuscular once  latanoprost 0.005% Ophthalmic Solution 1 Drop(s) Both EYES at bedtime  metoprolol succinate  milliGRAM(s) Oral daily  multivitamin 1 Tablet(s) Oral daily  rivaroxaban 20 milliGRAM(s) Oral with dinner  sertraline 50 milliGRAM(s) Oral daily      PHYSICAL EXAM:  T(C): 36.7 (10-16-19 @ 04:27), Max: 36.8 (10-15-19 @ 11:47)  HR: 78 (10-16-19 @ 04:27) (75 - 79)  BP: 129/77 (10-16-19 @ 04:27) (119/75 - 132/81)  RR: 17 (10-16-19 @ 04:27) (17 - 18)  SpO2: 94% (10-16-19 @ 04:27) (93% - 96%)  Wt(kg): --  I&O's Summary    15 Oct 2019 07:01  -  16 Oct 2019 07:00  --------------------------------------------------------  IN: 0 mL / OUT: 500 mL / NET: -500 mL    16 Oct 2019 07:01  -  16 Oct 2019 11:30  --------------------------------------------------------  IN: 240 mL / OUT: 200 mL / NET: 40 mL          Appearance: NAD  HEENT:   Normal oral mucosa, PERRL, EOMI	  Lymphatic: No lymphadenopathy   Cardiovascular: irregular  S1 S2, No JVD, No murmurs , Peripheral pulses palpable 2+ bilaterally  Respiratory: Lungs clear to auscultation, normal effort 	  Gastrointestinal:  Soft, Non-tender, + BS	  Skin: No rashes, No ecchymoses, No cyanosis, warm to touch  Musculoskeletal: Normal range of motion, normal strength  Psychiatry:  Mood & affect appropriate  Ext: Much improved  edema        LABS:    CARDIAC MARKERS:                                13.6   14.42 )-----------( 252      ( 15 Oct 2019 08:45 )             43.5     10-16    140  |  97  |  25<H>  ----------------------------<  169<H>  3.6   |  29  |  1.00    Ca    9.3      16 Oct 2019 06:09  Phos  2.8     10-16  Mg     1.8     10-16      proBNP:   Lipid Profile:   HgA1c:   TSH:             TELEMETRY: AF	    ECG:  	  RADIOLOGY:   DIAGNOSTIC TESTING:  [ ] Echocardiogram:  [ ]  Catheterization:  [ ] Stress Test:    OTHER:
Subjective: Patient seen and examined. No new events except as noted.   resting comfortably     REVIEW OF SYSTEMS:    CONSTITUTIONAL: + weakness, fevers or chills  EYES/ENT: No visual changes;  No vertigo or throat pain   NECK: No pain or stiffness  RESPIRATORY: No cough, wheezing, hemoptysis; No shortness of breath  CARDIOVASCULAR: No chest pain or palpitations  GASTROINTESTINAL: No abdominal or epigastric pain. No nausea, vomiting, or hematemesis; No diarrhea or constipation. No melena or hematochezia.  GENITOURINARY: No dysuria, frequency or hematuria  NEUROLOGICAL: No numbness or weakness  SKIN: No itching, burning, rashes, or lesions   All other review of systems is negative unless indicated above.    MEDICATIONS:  MEDICATIONS  (STANDING):  digoxin     Tablet 0.125 milliGRAM(s) Oral daily  docusate sodium 100 milliGRAM(s) Oral three times a day  furosemide    Tablet 80 milliGRAM(s) Oral daily  influenza   Vaccine 0.5 milliLiter(s) IntraMuscular once  latanoprost 0.005% Ophthalmic Solution 1 Drop(s) Both EYES at bedtime  metoprolol succinate  milliGRAM(s) Oral daily  multivitamin 1 Tablet(s) Oral daily  rivaroxaban 20 milliGRAM(s) Oral with dinner  sertraline 50 milliGRAM(s) Oral daily      PHYSICAL EXAM:  T(C): 36.9 (10-20-19 @ 04:31), Max: 36.9 (10-20-19 @ 04:31)  HR: 85 (10-20-19 @ 04:31) (85 - 96)  BP: 109/69 (10-20-19 @ 04:31) (109/69 - 115/71)  RR: 20 (10-20-19 @ 04:31) (18 - 20)  SpO2: 92% (10-20-19 @ 04:31) (92% - 95%)  Wt(kg): --  I&O's Summary    19 Oct 2019 07:01  -  20 Oct 2019 07:00  --------------------------------------------------------  IN: 1190 mL / OUT: 0 mL / NET: 1190 mL    20 Oct 2019 07:01  -  20 Oct 2019 12:43  --------------------------------------------------------  IN: 325 mL / OUT: 0 mL / NET: 325 mL            Appearance: NAD	  HEENT:   Dry  oral mucosa, PERRL, EOMI	  Lymphatic: No lymphadenopathy , no edema  Cardiovascular: Irregular  S1 S2, No JVD, No murmurs , Peripheral pulses palpable 2+ bilaterally  Respiratory: Lungs clear to auscultation, normal effort 	  Gastrointestinal:  Soft, Non-tender, + BS	  Skin: No rashes, No ecchymoses, No cyanosis, warm to touch  Musculoskeletal: Normal range of motion, normal strength  Psychiatry:  lethargic   Ext: No edema      LABS:    CARDIAC MARKERS:                                14.1   14.44 )-----------( 259      ( 19 Oct 2019 10:31 )             45.0     10-19    142  |  97  |  36<H>  ----------------------------<  166<H>  3.8   |  29  |  1.10    Ca    9.4      19 Oct 2019 06:29      proBNP:   Lipid Profile:   HgA1c:   TSH:             TELEMETRY: 	    ECG:  	  RADIOLOGY:   DIAGNOSTIC TESTING:  [ ] Echocardiogram:  [ ]  Catheterization:  [ ] Stress Test:    OTHER:
Subjective: Patient seen and examined. No new events except as noted.   resting comfortably   no cp or sob       REVIEW OF SYSTEMS:    CONSTITUTIONAL: + weakness, fevers or chills  EYES/ENT: No visual changes;  No vertigo or throat pain   NECK: No pain or stiffness  RESPIRATORY: No cough, wheezing, hemoptysis; No shortness of breath  CARDIOVASCULAR: No chest pain or palpitations  GASTROINTESTINAL: No abdominal or epigastric pain. No nausea, vomiting, or hematemesis; No diarrhea or constipation. No melena or hematochezia.  GENITOURINARY: No dysuria, frequency or hematuria  NEUROLOGICAL: No numbness or weakness  SKIN: No itching, burning, rashes, or lesions   All other review of systems is negative unless indicated above.    MEDICATIONS:  MEDICATIONS  (STANDING):  digoxin     Tablet 0.125 milliGRAM(s) Oral daily  docusate sodium 100 milliGRAM(s) Oral three times a day  furosemide    Tablet 80 milliGRAM(s) Oral daily  influenza   Vaccine 0.5 milliLiter(s) IntraMuscular once  latanoprost 0.005% Ophthalmic Solution 1 Drop(s) Both EYES at bedtime  metoprolol succinate  milliGRAM(s) Oral daily  multivitamin 1 Tablet(s) Oral daily  rivaroxaban 20 milliGRAM(s) Oral with dinner  sertraline 50 milliGRAM(s) Oral daily      PHYSICAL EXAM:  T(C): 36.4 (10-19-19 @ 19:49), Max: 36.6 (10-19-19 @ 04:17)  HR: 96 (10-19-19 @ 19:49) (74 - 96)  BP: 115/71 (10-19-19 @ 19:49) (108/73 - 126/75)  RR: 18 (10-19-19 @ 19:49) (18 - 18)  SpO2: 95% (10-19-19 @ 19:49) (93% - 95%)  Wt(kg): --  I&O's Summary    18 Oct 2019 07:01  -  19 Oct 2019 07:00  --------------------------------------------------------  IN: 960 mL / OUT: 0 mL / NET: 960 mL    19 Oct 2019 07:01  -  19 Oct 2019 20:58  --------------------------------------------------------  IN: 800 mL / OUT: 0 mL / NET: 800 mL          Appearance: NAD	  HEENT:   Dry  oral mucosa, PERRL, EOMI	  Lymphatic: No lymphadenopathy , no edema  Cardiovascular: Irregular  S1 S2, No JVD, No murmurs , Peripheral pulses palpable 2+ bilaterally  Respiratory: Lungs clear to auscultation, normal effort 	  Gastrointestinal:  Soft, Non-tender, + BS	  Skin: No rashes, No ecchymoses, No cyanosis, warm to touch  Musculoskeletal: Normal range of motion, normal strength  Psychiatry:  lethargic   Ext: No edema      LABS:    CARDIAC MARKERS:                                14.1   14.44 )-----------( 259      ( 19 Oct 2019 10:31 )             45.0     10-19    142  |  97  |  36<H>  ----------------------------<  166<H>  3.8   |  29  |  1.10    Ca    9.4      19 Oct 2019 06:29      proBNP:   Lipid Profile:   HgA1c:   TSH:             TELEMETRY: 	    ECG:  	  RADIOLOGY:   DIAGNOSTIC TESTING:  [ ] Echocardiogram:  [ ]  Catheterization:  [ ] Stress Test:    OTHER:
Subjective: Patient seen and examined. No new events except as noted.   transferred to tele unit   feels ok     REVIEW OF SYSTEMS:    CONSTITUTIONAL: + weakness, fevers or chills  EYES/ENT: No visual changes;  No vertigo or throat pain   NECK: No pain or stiffness  RESPIRATORY: No cough, wheezing, hemoptysis; No shortness of breath  CARDIOVASCULAR: No chest pain or palpitations  GASTROINTESTINAL: No abdominal or epigastric pain. No nausea, vomiting, or hematemesis; No diarrhea or constipation. No melena or hematochezia.  GENITOURINARY: No dysuria, frequency or hematuria  NEUROLOGICAL: No numbness or weakness  SKIN: No itching, burning, rashes, or lesions   All other review of systems is negative unless indicated above.    MEDICATIONS:  MEDICATIONS  (STANDING):  digoxin     Tablet 0.125 milliGRAM(s) Oral daily  docusate sodium 100 milliGRAM(s) Oral three times a day  furosemide   Injectable 40 milliGRAM(s) IV Push two times a day  influenza   Vaccine 0.5 milliLiter(s) IntraMuscular once  latanoprost 0.005% Ophthalmic Solution 1 Drop(s) Both EYES at bedtime  metoprolol succinate  milliGRAM(s) Oral daily  multivitamin 1 Tablet(s) Oral daily  rivaroxaban 20 milliGRAM(s) Oral with dinner  sertraline 50 milliGRAM(s) Oral daily      PHYSICAL EXAM:  T(C): 36.8 (10-13-19 @ 04:53), Max: 36.9 (10-12-19 @ 21:50)  HR: 92 (10-13-19 @ 04:53) (78 - 92)  BP: 153/92 (10-13-19 @ 04:53) (107/69 - 153/92)  RR: 18 (10-13-19 @ 04:53) (18 - 18)  SpO2: 93% (10-13-19 @ 04:53) (93% - 99%)  Wt(kg): --  I&O's Summary    12 Oct 2019 07:01  -  13 Oct 2019 07:00  --------------------------------------------------------  IN: 250 mL / OUT: 1201 mL / NET: -951 mL    13 Oct 2019 07:01  -  13 Oct 2019 10:01  --------------------------------------------------------  IN: 240 mL / OUT: 0 mL / NET: 240 mL          Appearance: NAD	  HEENT:   Normal oral mucosa, PERRL, EOMI	  Lymphatic: No lymphadenopathy  Cardiovascular: Irregular S1 S2, No JVD, No murmurs, No edema  Respiratory: Lungs clear to auscultation	  Psychiatry: A & O x 3, Mood & affect appropriate  Gastrointestinal:  Soft, Non-tender, + BS	  Skin: No rashes, No ecchymoses, No cyanosis	  Neurologic: Non-focal  Extremities: massive bilateral edema, chronic venous stasis skin erythema   Vascular: Peripheral pulses palpable 2+ bilaterally  LABS:    CARDIAC MARKERS:                                14.2   15.65 )-----------( 243      ( 12 Oct 2019 00:56 )             45.1     10-13    142  |  101  |  13  ----------------------------<  174<H>  3.4<L>   |  27  |  0.77    Ca    9.5      13 Oct 2019 06:46  Phos  2.5     10-13  Mg     1.8     10-13    TPro  6.5  /  Alb  4.0  /  TBili  0.6  /  DBili  x   /  AST  18  /  ALT  15  /  AlkPhos  96  10-12    proBNP:   Lipid Profile:   HgA1c:   TSH: Thyroid Stimulating Hormone, Serum: 1.17 uIU/mL (10-12 @ 23:24)                TELEMETRY: 	Afib     ECG:  	  RADIOLOGY:   DIAGNOSTIC TESTING:  [ ] Echocardiogram:  [ ]  Catheterization:  [ ] Stress Test:    OTHER:
Subjective: Patient seen and examined. No new events except as noted.   resting comfortably     REVIEW OF SYSTEMS:    CONSTITUTIONAL: + weakness, fevers or chills  EYES/ENT: No visual changes;  No vertigo or throat pain   NECK: No pain or stiffness  RESPIRATORY: No cough, wheezing, hemoptysis; No shortness of breath  CARDIOVASCULAR: No chest pain or palpitations  GASTROINTESTINAL: No abdominal or epigastric pain. No nausea, vomiting, or hematemesis; No diarrhea or constipation. No melena or hematochezia.  GENITOURINARY: No dysuria, frequency or hematuria  NEUROLOGICAL: No numbness or weakness  SKIN: No itching, burning, rashes, or lesions   All other review of systems is negative unless indicated above.    MEDICATIONS:  MEDICATIONS  (STANDING):  amiodarone    Tablet 400 milliGRAM(s) Oral three times a day  digoxin     Tablet 0.125 milliGRAM(s) Oral daily  docusate sodium 100 milliGRAM(s) Oral three times a day  furosemide    Tablet 80 milliGRAM(s) Oral daily  influenza   Vaccine 0.5 milliLiter(s) IntraMuscular once  latanoprost 0.005% Ophthalmic Solution 1 Drop(s) Both EYES at bedtime  metoprolol succinate  milliGRAM(s) Oral daily  multivitamin 1 Tablet(s) Oral daily  rivaroxaban 20 milliGRAM(s) Oral with dinner  sertraline 50 milliGRAM(s) Oral daily      PHYSICAL EXAM:  T(C): 36.5 (10-18-19 @ 04:27), Max: 36.9 (10-17-19 @ 11:14)  HR: 80 (10-18-19 @ 08:50) (76 - 84)  BP: 111/66 (10-18-19 @ 08:50) (111/66 - 142/84)  RR: 18 (10-18-19 @ 04:27) (18 - 18)  SpO2: 94% (10-18-19 @ 04:27) (94% - 100%)  Wt(kg): --  I&O's Summary    17 Oct 2019 07:01  -  18 Oct 2019 07:00  --------------------------------------------------------  IN: 720 mL / OUT: 0 mL / NET: 720 mL          Appearance: NAD	  HEENT:   Normal oral mucosa, PERRL, EOMI	  Lymphatic: No lymphadenopathy , no edema  Cardiovascular: irregular  S1 S2, No JVD, No murmurs , Peripheral pulses palpable 2+ bilaterally  Respiratory: Lungs clear to auscultation, normal effort 	  Gastrointestinal:  Soft, Non-tender, + BS	  Skin: No rashes, No ecchymoses, No cyanosis, warm to touch  Musculoskeletal: Normal range of motion, normal strength  Psychiatry:  Mood & affect appropriate  Ext: No edema      LABS:    CARDIAC MARKERS:                                14.0   15.47 )-----------( 267      ( 18 Oct 2019 07:55 )             44.7     10-18    139  |  96  |  32<H>  ----------------------------<  184<H>  3.8   |  29  |  1.09    Ca    9.4      18 Oct 2019 05:59  Mg     1.8     10-17      proBNP:   Lipid Profile:   HgA1c:   TSH:             TELEMETRY: 	    ECG:  	  RADIOLOGY:   DIAGNOSTIC TESTING:  [ ] Echocardiogram:  < from: Transthoracic Echocardiogram (10.15.19 @ 19:23) >    Patient name: BENITA AMARO  YOB: 1932   Age: 87 (F)   MR#: [id number]  Study Date: 10/15/2019  Location: [address] [place], [place] [id number]: [facility]  Study quality: Technically good  Referring Physician: Neil Lawson MD  Blood Pressure: 132/81 mmHg  Height: 163 cm  Weight: 91 kg  BSA: 2 m2  Heart Rate: 70 mmHg  ------------------------------------------------------------------------  PROCEDURE: Transthoracic echocardiogram with 2-D, M-Mode  and complete spectral and color flow Doppler.  INDICATION: Heart failure, unspecified (I50.9)  ------------------------------------------------------------------------  Dimensions:    Normal Values:  LA:     4.2    2.0 - 4.0 cm  Ao:     3.2    2.0 - 3.8 cm  SEPTUM: 1.2    0.6 - 1.2 cm  PWT:    1.2    0.6 - 1.1 cm  LVIDd:  3.9    3.0 - 5.6 cm  LVIDs:  2.4    1.8 - 4.0 cm  Derived variables:  LVMI: 83 g/m2  RWT: 0.62  Fractional short: 38 %  EF (Visual Estimate): 55-60 %  Doppler Peak Velocity (m/sec): AoV=1.1  ------------------------------------------------------------------------  Observations:  Mitral Valve: Mitral annular calcification and calcified  mitral leaflets with normal diastolic opening. Mild mitral  regurgitation.  Aortic Valve/Aorta: Aortic valve not well visualized;  appears calcified. Peak transaortic valve gradient equals 5  mm Hg. Mild aortic regurgitation.  Peak left ventricular  outflow tract gradient equals 4 mm Hg.  Aortic Root: 3.2 cm.  Left Atrium: Severely dilated left atrium.  LA volume index  = 58 cc/m2.  Left Ventricle: Endocardium not well visualized; grossly  normal left ventricular systolic function. Increased  relative wall thickness with normal left ventricular mass  index, consistent with concentric left ventricular  remodeling. Moderate diastolic dysfunction (Stage II).  Right Heart: Mild right atrial enlargement. The right  ventricle is not well visualized; grossly normal right  ventricular systolic function. Normal tricuspid valve.  Minimal tricuspid regurgitation. Pulmonicvalve not well  visualized. Minimal pulmonic regurgitation.  Pericardium/Pleura: Normal pericardium with no pericardial  effusion.  Hemodynamic: Estimated right atrial pressure is 8 mm Hg.  Estimated right ventricular systolic pressure equals 33 mm  Hg, assuming right atrial pressure equals 8 mm Hg,  consistent with normal pulmonary pressures.  ------------------------------------------------------------------------  Conclusions:  1. Mitral annular calcification and calcified mitral  leaflets with normal diastolic opening. Mild mitral  regurgitation.  2. Severely dilated left atrium.  LA volume index = 58  cc/m2.  3. Increased relative wall thickness with normal left  ventricular mass index, consistent with concentric left  ventricular remodeling.  4. Endocardium not well visualized; grossly normal left  ventricular systolic function.  5. Moderate diastolic dysfunction (Stage II).  6. The right ventricle is not well visualized; grossly  normal right ventricular systolic function.  7. Estimated right ventricular systolic pressure equals 33  mm Hg, assuming right atrial pressure equals 8 mm Hg,  consistent with normal pulmonary pressures.  *** No previous Echo exam.  ------------------------------------------------------------------------  Confirmed on  10/15/2019 - 23:06:18 by Stepan Roldan M.D.  ------------------------------------------------------------------------    < end of copied text >    [ ]  Catheterization:  [ ] Stress Test:    OTHER:

## 2019-10-21 ENCOUNTER — TRANSCRIPTION ENCOUNTER (OUTPATIENT)
Age: 84
End: 2019-10-21

## 2019-10-21 VITALS
RESPIRATION RATE: 18 BRPM | WEIGHT: 184.09 LBS | DIASTOLIC BLOOD PRESSURE: 67 MMHG | OXYGEN SATURATION: 93 % | SYSTOLIC BLOOD PRESSURE: 102 MMHG | TEMPERATURE: 98 F | HEART RATE: 80 BPM

## 2019-10-21 LAB
HCT VFR BLD CALC: 47 % — HIGH (ref 34.5–45)
HGB BLD-MCNC: 14.7 G/DL — SIGNIFICANT CHANGE UP (ref 11.5–15.5)
MCHC RBC-ENTMCNC: 28.4 PG — SIGNIFICANT CHANGE UP (ref 27–34)
MCHC RBC-ENTMCNC: 31.3 GM/DL — LOW (ref 32–36)
MCV RBC AUTO: 90.9 FL — SIGNIFICANT CHANGE UP (ref 80–100)
PLATELET # BLD AUTO: 271 K/UL — SIGNIFICANT CHANGE UP (ref 150–400)
RBC # BLD: 5.17 M/UL — SIGNIFICANT CHANGE UP (ref 3.8–5.2)
RBC # FLD: 13.8 % — SIGNIFICANT CHANGE UP (ref 10.3–14.5)
WBC # BLD: 13.06 K/UL — HIGH (ref 3.8–10.5)
WBC # FLD AUTO: 13.06 K/UL — HIGH (ref 3.8–10.5)

## 2019-10-21 RX ORDER — FUROSEMIDE 40 MG
1 TABLET ORAL
Qty: 0 | Refills: 0 | DISCHARGE

## 2019-10-21 RX ORDER — METOPROLOL TARTRATE 50 MG
1 TABLET ORAL
Qty: 0 | Refills: 0 | DISCHARGE
Start: 2019-10-21

## 2019-10-21 RX ORDER — METOPROLOL TARTRATE 50 MG
1 TABLET ORAL
Qty: 0 | Refills: 0 | DISCHARGE

## 2019-10-21 RX ORDER — VERAPAMIL HCL 240 MG
1 CAPSULE, EXTENDED RELEASE PELLETS 24 HR ORAL
Qty: 0 | Refills: 0 | DISCHARGE

## 2019-10-21 RX ORDER — OXYBUTYNIN CHLORIDE 5 MG
0 TABLET ORAL
Qty: 0 | Refills: 0 | DISCHARGE

## 2019-10-21 RX ORDER — VALSARTAN 80 MG/1
0 TABLET ORAL
Qty: 0 | Refills: 0 | DISCHARGE

## 2019-10-21 RX ORDER — FUROSEMIDE 40 MG
1 TABLET ORAL
Qty: 0 | Refills: 0 | DISCHARGE
Start: 2019-10-21

## 2019-10-21 RX ADMIN — Medication 80 MILLIGRAM(S): at 05:22

## 2019-10-21 RX ADMIN — Medication 100 MILLIGRAM(S): at 05:22

## 2019-10-21 RX ADMIN — Medication 0.12 MILLIGRAM(S): at 05:22

## 2019-10-21 RX ADMIN — Medication 100 MILLIGRAM(S): at 11:32

## 2019-10-21 RX ADMIN — SERTRALINE 50 MILLIGRAM(S): 25 TABLET, FILM COATED ORAL at 11:32

## 2019-10-21 RX ADMIN — Medication 1 TABLET(S): at 11:32

## 2019-10-21 NOTE — DISCHARGE NOTE NURSING/CASE MANAGEMENT/SOCIAL WORK - PATIENT PORTAL LINK FT
You can access the FollowMyHealth Patient Portal offered by [facility] by registering at the following website: [email or web address]. By joining [employer]’s FollowMyHealth portal, you will also be able to view your health information using other applications (apps) compatible with our system.

## 2019-10-21 NOTE — DISCHARGE NOTE PROVIDER - HOSPITAL COURSE
86 yo female with Afib admitted with increased frequency of falls vs. syncope     Problem: Frequent falls.  Plan: Rule out syncope. She states to feel dizzy prior to "fall" episodes"    No sterling events on Tele     Stable thyroid panel , Vit b12 and folate.     Problem: Heart failure.      Plan: PO lasix 80 daily     Acute diastolic heart failure     Problem: Afib.      Stable Qtc    DCP with med rec discussed with Dr Lawson     PT cleared for DC to [facility]     Follow up with PCP outpatient

## 2019-10-21 NOTE — DISCHARGE NOTE PROVIDER - NSDCCPCAREPLAN_GEN_ALL_CORE_FT
PRINCIPAL DISCHARGE DIAGNOSIS  Diagnosis: Frequent falls  Assessment and Plan of Treatment: Mechanical fall no trauma noted   CT head negative   Physical Therapy [facility]   take meds as directed      SECONDARY DISCHARGE DIAGNOSES  Diagnosis: Afib  Assessment and Plan of Treatment: Atrial fibrillation is the most common heart rhythm problem & has the risk of stroke & heart attack  It helps if you control your blood pressure, not drink more than 1-2 alcohol drinks per day, cut down on caffeine, getting treatment for over active thyroid gland, & getting exercise  Call your doctor if you feel your heart racing or beating unusually, chest tightness or pain, lightheaded, faint, shortness of breath especially with exercise  It is important to take your heart medication as prescribed  You may be on anticoagulation which is very important to take as directed

## 2019-10-21 NOTE — DISCHARGE NOTE PROVIDER - CARE PROVIDER_API CALL
Neil Lawson (DO)  Cardiology; Internal Medicine  [address] [place] [address], [address]  [place] [address]  Phone: [phone]  Fax: [phone]  Follow Up Time:

## 2019-11-12 PROCEDURE — 97530 THERAPEUTIC ACTIVITIES: CPT

## 2019-11-12 PROCEDURE — 97166 OT EVAL MOD COMPLEX 45 MIN: CPT

## 2019-11-12 PROCEDURE — 93306 TTE W/DOPPLER COMPLETE: CPT

## 2019-11-12 PROCEDURE — 80053 COMPREHEN METABOLIC PANEL: CPT

## 2019-11-12 PROCEDURE — 83735 ASSAY OF MAGNESIUM: CPT

## 2019-11-12 PROCEDURE — 82962 GLUCOSE BLOOD TEST: CPT

## 2019-11-12 PROCEDURE — 80048 BASIC METABOLIC PNL TOTAL CA: CPT

## 2019-11-12 PROCEDURE — 85027 COMPLETE CBC AUTOMATED: CPT

## 2019-11-12 PROCEDURE — 97116 GAIT TRAINING THERAPY: CPT

## 2019-11-12 PROCEDURE — 81001 URINALYSIS AUTO W/SCOPE: CPT

## 2019-11-12 PROCEDURE — 97110 THERAPEUTIC EXERCISES: CPT

## 2019-11-12 PROCEDURE — 84484 ASSAY OF TROPONIN QUANT: CPT

## 2019-11-12 PROCEDURE — 70450 CT HEAD/BRAIN W/O DYE: CPT

## 2019-11-12 PROCEDURE — 82746 ASSAY OF FOLIC ACID SERUM: CPT

## 2019-11-12 PROCEDURE — 84443 ASSAY THYROID STIM HORMONE: CPT

## 2019-11-12 PROCEDURE — 82607 VITAMIN B-12: CPT

## 2019-11-12 PROCEDURE — 99285 EMERGENCY DEPT VISIT HI MDM: CPT

## 2019-11-12 PROCEDURE — 84100 ASSAY OF PHOSPHORUS: CPT

## 2019-11-12 PROCEDURE — 97161 PT EVAL LOW COMPLEX 20 MIN: CPT

## 2019-11-12 PROCEDURE — 93005 ELECTROCARDIOGRAM TRACING: CPT

## 2020-11-27 NOTE — PHYSICAL THERAPY INITIAL EVALUATION ADULT - HEALTH SCREEN CRITERIA
"-Patient arrived from the ER around 0100. Very weak, dyspneic, tachypnea, tachcardic.   -Pale with rigors.   -Pressure injury noted on right foot 2nd toe- meplix applied.   -blood sugar check 345.  -On 2 L NC.   -incontinent of urine.      BP (!) 143/82   Pulse 125   Temp 97.5  F (36.4  C) (Oral)   Resp 25   Ht 1.727 m (5' 8\")   Wt 81.8 kg (180 lb 4.8 oz)   SpO2 93%   BMI 27.41 kg/m          Continue to monitor and cares as ordered.       Skyla Arredondo RN on 11/27/2020 at 5:12 AM    " yes

## 2021-02-09 ENCOUNTER — EMERGENCY (EMERGENCY)
Facility: HOSPITAL | Age: 86
LOS: 1 days | Discharge: HOME | End: 2021-02-09
Attending: EMERGENCY MEDICINE
Payer: MEDICARE

## 2021-02-09 VITALS
HEIGHT: 64 IN | DIASTOLIC BLOOD PRESSURE: 63 MMHG | OXYGEN SATURATION: 97 % | HEART RATE: 76 BPM | RESPIRATION RATE: 20 BRPM | TEMPERATURE: 98 F | SYSTOLIC BLOOD PRESSURE: 129 MMHG

## 2021-02-09 VITALS
SYSTOLIC BLOOD PRESSURE: 120 MMHG | DIASTOLIC BLOOD PRESSURE: 62 MMHG | OXYGEN SATURATION: 98 % | HEART RATE: 72 BPM | TEMPERATURE: 98 F | RESPIRATION RATE: 20 BRPM

## 2021-02-09 DIAGNOSIS — Z87.81 PERSONAL HISTORY OF (HEALED) TRAUMATIC FRACTURE: Chronic | ICD-10-CM

## 2021-02-09 DIAGNOSIS — Z90.710 ACQUIRED ABSENCE OF BOTH CERVIX AND UTERUS: Chronic | ICD-10-CM

## 2021-02-09 DIAGNOSIS — Z98.1 ARTHRODESIS STATUS: Chronic | ICD-10-CM

## 2021-02-09 DIAGNOSIS — Z96.641 PRESENCE OF RIGHT ARTIFICIAL HIP JOINT: Chronic | ICD-10-CM

## 2021-02-09 LAB
ALBUMIN SERPL ELPH-MCNC: 4.3 G/DL — SIGNIFICANT CHANGE UP (ref 3.3–5)
ALP SERPL-CCNC: 115 U/L — SIGNIFICANT CHANGE UP (ref 40–120)
ALT FLD-CCNC: 17 U/L — SIGNIFICANT CHANGE UP (ref 10–45)
ANION GAP SERPL CALC-SCNC: 12 MMOL/L — SIGNIFICANT CHANGE UP (ref 5–17)
AST SERPL-CCNC: 20 U/L — SIGNIFICANT CHANGE UP (ref 10–40)
BILIRUB SERPL-MCNC: 0.8 MG/DL — SIGNIFICANT CHANGE UP (ref 0.2–1.2)
BUN SERPL-MCNC: 39 MG/DL — HIGH (ref 7–23)
CALCIUM SERPL-MCNC: 10.6 MG/DL — HIGH (ref 8.4–10.5)
CHLORIDE SERPL-SCNC: 90 MMOL/L — LOW (ref 96–108)
CO2 SERPL-SCNC: 33 MMOL/L — HIGH (ref 22–31)
CREAT SERPL-MCNC: 0.97 MG/DL — SIGNIFICANT CHANGE UP (ref 0.5–1.3)
DIGOXIN SERPL-MCNC: 1 NG/ML — SIGNIFICANT CHANGE UP (ref 0.8–2)
GLUCOSE SERPL-MCNC: 176 MG/DL — HIGH (ref 70–99)
HCT VFR BLD CALC: 44.2 % — SIGNIFICANT CHANGE UP (ref 34.5–45)
HGB BLD-MCNC: 14.1 G/DL — SIGNIFICANT CHANGE UP (ref 11.5–15.5)
MCHC RBC-ENTMCNC: 28.3 PG — SIGNIFICANT CHANGE UP (ref 27–34)
MCHC RBC-ENTMCNC: 31.9 GM/DL — LOW (ref 32–36)
MCV RBC AUTO: 88.8 FL — SIGNIFICANT CHANGE UP (ref 80–100)
NRBC # BLD: 0 /100 WBCS — SIGNIFICANT CHANGE UP (ref 0–0)
PLATELET # BLD AUTO: 229 K/UL — SIGNIFICANT CHANGE UP (ref 150–400)
POTASSIUM SERPL-MCNC: 3.4 MMOL/L — LOW (ref 3.5–5.3)
POTASSIUM SERPL-SCNC: 3.4 MMOL/L — LOW (ref 3.5–5.3)
PROT SERPL-MCNC: 7.3 G/DL — SIGNIFICANT CHANGE UP (ref 6–8.3)
RBC # BLD: 4.98 M/UL — SIGNIFICANT CHANGE UP (ref 3.8–5.2)
RBC # FLD: 13.5 % — SIGNIFICANT CHANGE UP (ref 10.3–14.5)
SODIUM SERPL-SCNC: 135 MMOL/L — SIGNIFICANT CHANGE UP (ref 135–145)
WBC # BLD: 12.63 K/UL — HIGH (ref 3.8–10.5)
WBC # FLD AUTO: 12.63 K/UL — HIGH (ref 3.8–10.5)

## 2021-02-09 PROCEDURE — 80162 ASSAY OF DIGOXIN TOTAL: CPT

## 2021-02-09 PROCEDURE — 70450 CT HEAD/BRAIN W/O DYE: CPT | Mod: 26,MA

## 2021-02-09 PROCEDURE — 72125 CT NECK SPINE W/O DYE: CPT

## 2021-02-09 PROCEDURE — 82962 GLUCOSE BLOOD TEST: CPT

## 2021-02-09 PROCEDURE — 80053 COMPREHEN METABOLIC PANEL: CPT

## 2021-02-09 PROCEDURE — 72125 CT NECK SPINE W/O DYE: CPT | Mod: 26,MA

## 2021-02-09 PROCEDURE — 85027 COMPLETE CBC AUTOMATED: CPT

## 2021-02-09 PROCEDURE — 99284 EMERGENCY DEPT VISIT MOD MDM: CPT | Mod: GC

## 2021-02-09 PROCEDURE — 99284 EMERGENCY DEPT VISIT MOD MDM: CPT | Mod: 25

## 2021-02-09 PROCEDURE — 70450 CT HEAD/BRAIN W/O DYE: CPT

## 2021-02-09 RX ORDER — POTASSIUM CHLORIDE 20 MEQ
40 PACKET (EA) ORAL ONCE
Refills: 0 | Status: COMPLETED | OUTPATIENT
Start: 2021-02-09 | End: 2021-02-09

## 2021-02-09 NOTE — ED ADULT NURSE NOTE - OBJECTIVE STATEMENT
88y Female came by EMS from PCP office c/o weakness. PMH of Afib on Xarelto, R cranial meningioma. Pt states her legs has been swelling for past week, increased difficulty ambulating. Pt went to PCP today, where after the appointment, Pt had episode of weakness where her legs gave out and she slowly sat on the floor, no trauma to head, LOC, dizziness, lightheadedness. Pt presents with weakness resolved, +2 pedal edema with +2 pulses in all extremities. Denies chest pain, sob, ha, n/v/d, abdominal pain, f/c, urinary symptoms, hematuria. Upon examination, full facial symmetry, no JVD or trach deviation, lung sounds clear and adequate chest rise, S1 and S2 heart sounds present, +2 pulses in all extremities with full ROM and equal strength, cap refill <2 seconds, ABD soft, non distended and non tender, pelvis intact.

## 2021-02-09 NOTE — ED PROVIDER NOTE - CLINICAL SUMMARY MEDICAL DECISION MAKING FREE TEXT BOX
bryant  87 yo f afib on ac chf on dig was at pcp for le edema and after leaving office was to weak to get into daughters suv - no loc - ? head traum aaox3 gcs 15 on xeralta no cspine ttp cleared clinically neuro intact 3+ le symmetric - edema bl lungs clear - no cp no sob -  bc of ac will ct head - check lytes dig level and screening ekg if w/u unremarkable dc home

## 2021-02-09 NOTE — ED ADULT TRIAGE NOTE - CHIEF COMPLAINT QUOTE
"episode of weakness while getting into the car where she was unable to bear weight"  no fall or loc

## 2021-02-09 NOTE — ED PROVIDER NOTE - NSFOLLOWUPINSTRUCTIONS_ED_ALL_ED_FT
You were seen for weakness.     Seek immediate medical assistance for any new or worsening symptoms.    Please take 600 mg of Ibuprofen (aka Motrin, Advil) and/or 650 mg Acetaminophen (aka Tylenol) every 6 hours, as needed, for mild-moderate pain.    Please do not take these medications if you do not have pain or if you have any history of bleeding disorders, kidney or liver disease.   Do not use ibuprofen if you are on blood thinners (anti-coagulation).    A copy of all imaging and blood work is provided for you.     Follow up with your cardiologist.

## 2021-02-09 NOTE — ED PROVIDER NOTE - PHYSICAL EXAMINATION
Physical Exam:    I have reviewed the triage vital signs.    Gen: NAD, AOx3, non-toxic appearing, able to ambulate without assistance  Head and Neck: NCAT, Neck supple without meningismus   HEENT: EOMI, PEERLA, normal conjunctiva, tongue midline, oral mucosa moist  Lung: CTAB, no respiratory distress, no wheezes/rhonchi/rales B/L, speaking in full sentences  CV: RRR, no murmurs, rubs or gallops  Abd: soft, NT, ND, no guarding, no rigidity, no rebound tenderness, no CVA tenderness, no masses.   MSK: no gross deformities, ROM normal in UE/LE, no back tenderness  Neuro: CNs II-XII grossly intact. No focal sensory or motor deficits  Skin: Warm, well perfused, no rash, bilateral leg swelling.   Psych: Appropriate mood and affect

## 2021-02-09 NOTE — ED PROVIDER NOTE - PATIENT PORTAL LINK FT
You can access the FollowMyHealth Patient Portal offered by [facility] by registering at the following website: [email or web address]. By joining [employer]’s FollowMyHealth portal, you will also be able to view your health information using other applications (apps) compatible with our system.

## 2021-02-09 NOTE — ED PROVIDER NOTE - OBJECTIVE STATEMENT
88F pmh afib, frequent falls, with short home health aide care, presents to ED after PCP visit for worsening leg swelling where her legs gave out while standing. No trauma, no falls as patient was reported caught before she hit the ground. No LOC. No cp, sob or diaphoresis. No nvd. No seizures activity.

## 2021-02-09 NOTE — ED PROVIDER NOTE - PROGRESS NOTE DETAILS
Daughter at bedside, states that patient needs 24/7 home health care as patient is unable to care for herself. PT seen and reassessed.  Patient symptomatically improved.   AAOX3, NAD, VSS.  Discussed test results w/ patient. Patient verbalized understanding of hospital course and outpatient plans, has decisional making capacity.  Will follow-up with Primary care doctor in the next 5-7 days; patient will call for an appointment. Will return to the ED if there is any worsening of symptoms or development of new symptoms.  Patient able to ambulate w/o difficulty, is tolerating PO intake Daughter at bedside, states that patient needs 24/7 home health care as patient is unable to care for herself. Update - Daughter found family member to take care of patietn tonight.

## 2021-05-12 ENCOUNTER — INPATIENT (INPATIENT)
Facility: HOSPITAL | Age: 86
LOS: 6 days | Discharge: NURSING, REHAB OR LONG-TERM CARE | DRG: 312 | End: 2021-05-19
Attending: INTERNAL MEDICINE | Admitting: INTERNAL MEDICINE
Payer: MEDICARE

## 2021-05-12 VITALS
HEART RATE: 81 BPM | SYSTOLIC BLOOD PRESSURE: 130 MMHG | WEIGHT: 160.06 LBS | HEIGHT: 64 IN | OXYGEN SATURATION: 95 % | DIASTOLIC BLOOD PRESSURE: 74 MMHG | RESPIRATION RATE: 17 BRPM | TEMPERATURE: 98 F

## 2021-05-12 DIAGNOSIS — Z98.1 ARTHRODESIS STATUS: Chronic | ICD-10-CM

## 2021-05-12 DIAGNOSIS — Z96.641 PRESENCE OF RIGHT ARTIFICIAL HIP JOINT: Chronic | ICD-10-CM

## 2021-05-12 DIAGNOSIS — Z90.710 ACQUIRED ABSENCE OF BOTH CERVIX AND UTERUS: Chronic | ICD-10-CM

## 2021-05-12 DIAGNOSIS — Z87.81 PERSONAL HISTORY OF (HEALED) TRAUMATIC FRACTURE: Chronic | ICD-10-CM

## 2021-05-12 DIAGNOSIS — R55 SYNCOPE AND COLLAPSE: ICD-10-CM

## 2021-05-12 LAB
ALBUMIN SERPL ELPH-MCNC: 4.3 G/DL — SIGNIFICANT CHANGE UP (ref 3.3–5)
ALP SERPL-CCNC: 100 U/L — SIGNIFICANT CHANGE UP (ref 40–120)
ALT FLD-CCNC: 17 U/L — SIGNIFICANT CHANGE UP (ref 10–45)
ANION GAP SERPL CALC-SCNC: 16 MMOL/L — SIGNIFICANT CHANGE UP (ref 5–17)
APPEARANCE UR: CLEAR — SIGNIFICANT CHANGE UP
APTT BLD: 30.4 SEC — SIGNIFICANT CHANGE UP (ref 27.5–35.5)
AST SERPL-CCNC: 31 U/L — SIGNIFICANT CHANGE UP (ref 10–40)
BACTERIA # UR AUTO: NEGATIVE — SIGNIFICANT CHANGE UP
BASE EXCESS BLDV CALC-SCNC: 7 MMOL/L — HIGH (ref -2–2)
BASOPHILS # BLD AUTO: 0.09 K/UL — SIGNIFICANT CHANGE UP (ref 0–0.2)
BASOPHILS NFR BLD AUTO: 0.5 % — SIGNIFICANT CHANGE UP (ref 0–2)
BILIRUB SERPL-MCNC: 1 MG/DL — SIGNIFICANT CHANGE UP (ref 0.2–1.2)
BILIRUB UR-MCNC: NEGATIVE — SIGNIFICANT CHANGE UP
BUN SERPL-MCNC: 30 MG/DL — HIGH (ref 7–23)
CA-I SERPL-SCNC: 1.14 MMOL/L — SIGNIFICANT CHANGE UP (ref 1.12–1.3)
CALCIUM SERPL-MCNC: 10.3 MG/DL — SIGNIFICANT CHANGE UP (ref 8.4–10.5)
CHLORIDE BLDV-SCNC: 104 MMOL/L — SIGNIFICANT CHANGE UP (ref 96–108)
CHLORIDE SERPL-SCNC: 96 MMOL/L — SIGNIFICANT CHANGE UP (ref 96–108)
CO2 BLDV-SCNC: 35 MMOL/L — HIGH (ref 22–30)
CO2 SERPL-SCNC: 26 MMOL/L — SIGNIFICANT CHANGE UP (ref 22–31)
COLOR SPEC: YELLOW — SIGNIFICANT CHANGE UP
CREAT SERPL-MCNC: 1.11 MG/DL — SIGNIFICANT CHANGE UP (ref 0.5–1.3)
DIFF PNL FLD: NEGATIVE — SIGNIFICANT CHANGE UP
EOSINOPHIL # BLD AUTO: 0.13 K/UL — SIGNIFICANT CHANGE UP (ref 0–0.5)
EOSINOPHIL NFR BLD AUTO: 0.7 % — SIGNIFICANT CHANGE UP (ref 0–6)
EPI CELLS # UR: 1 /HPF — SIGNIFICANT CHANGE UP
GAS PNL BLDV: 133 MMOL/L — LOW (ref 135–145)
GAS PNL BLDV: SIGNIFICANT CHANGE UP
GAS PNL BLDV: SIGNIFICANT CHANGE UP
GLUCOSE BLDV-MCNC: 207 MG/DL — HIGH (ref 70–99)
GLUCOSE SERPL-MCNC: 199 MG/DL — HIGH (ref 70–99)
GLUCOSE UR QL: NEGATIVE — SIGNIFICANT CHANGE UP
HCO3 BLDV-SCNC: 33 MMOL/L — HIGH (ref 21–29)
HCT VFR BLD CALC: 42 % — SIGNIFICANT CHANGE UP (ref 34.5–45)
HCT VFR BLD CALC: 46.7 % — HIGH (ref 34.5–45)
HCT VFR BLDA CALC: 43 % — SIGNIFICANT CHANGE UP (ref 39–50)
HGB BLD CALC-MCNC: 14.1 G/DL — SIGNIFICANT CHANGE UP (ref 11.5–15.5)
HGB BLD-MCNC: 13.2 G/DL — SIGNIFICANT CHANGE UP (ref 11.5–15.5)
HGB BLD-MCNC: 14.8 G/DL — SIGNIFICANT CHANGE UP (ref 11.5–15.5)
HYALINE CASTS # UR AUTO: 0 /LPF — SIGNIFICANT CHANGE UP (ref 0–2)
IMM GRANULOCYTES NFR BLD AUTO: 0.6 % — SIGNIFICANT CHANGE UP (ref 0–1.5)
INR BLD: 1.33 RATIO — HIGH (ref 0.88–1.16)
KETONES UR-MCNC: NEGATIVE — SIGNIFICANT CHANGE UP
LACTATE BLDV-MCNC: 2.6 MMOL/L — HIGH (ref 0.7–2)
LEUKOCYTE ESTERASE UR-ACNC: NEGATIVE — SIGNIFICANT CHANGE UP
LYMPHOCYTES # BLD AUTO: 14.5 % — SIGNIFICANT CHANGE UP (ref 13–44)
LYMPHOCYTES # BLD AUTO: 2.57 K/UL — SIGNIFICANT CHANGE UP (ref 1–3.3)
MAGNESIUM SERPL-MCNC: 2.3 MG/DL — SIGNIFICANT CHANGE UP (ref 1.6–2.6)
MCHC RBC-ENTMCNC: 27.5 PG — SIGNIFICANT CHANGE UP (ref 27–34)
MCHC RBC-ENTMCNC: 27.8 PG — SIGNIFICANT CHANGE UP (ref 27–34)
MCHC RBC-ENTMCNC: 31.4 GM/DL — LOW (ref 32–36)
MCHC RBC-ENTMCNC: 31.7 GM/DL — LOW (ref 32–36)
MCV RBC AUTO: 87.5 FL — SIGNIFICANT CHANGE UP (ref 80–100)
MCV RBC AUTO: 87.8 FL — SIGNIFICANT CHANGE UP (ref 80–100)
MONOCYTES # BLD AUTO: 1.23 K/UL — HIGH (ref 0–0.9)
MONOCYTES NFR BLD AUTO: 6.9 % — SIGNIFICANT CHANGE UP (ref 2–14)
NEUTROPHILS # BLD AUTO: 13.63 K/UL — HIGH (ref 1.8–7.4)
NEUTROPHILS NFR BLD AUTO: 76.8 % — SIGNIFICANT CHANGE UP (ref 43–77)
NITRITE UR-MCNC: NEGATIVE — SIGNIFICANT CHANGE UP
NRBC # BLD: 0 /100 WBCS — SIGNIFICANT CHANGE UP (ref 0–0)
NRBC # BLD: 0 /100 WBCS — SIGNIFICANT CHANGE UP (ref 0–0)
PCO2 BLDV: 54 MMHG — HIGH (ref 35–50)
PH BLDV: 7.4 — SIGNIFICANT CHANGE UP (ref 7.35–7.45)
PH UR: 7 — SIGNIFICANT CHANGE UP (ref 5–8)
PLATELET # BLD AUTO: 233 K/UL — SIGNIFICANT CHANGE UP (ref 150–400)
PLATELET # BLD AUTO: 257 K/UL — SIGNIFICANT CHANGE UP (ref 150–400)
PO2 BLDV: 38 MMHG — SIGNIFICANT CHANGE UP (ref 25–45)
POTASSIUM BLDV-SCNC: 3.8 MMOL/L — SIGNIFICANT CHANGE UP (ref 3.5–5.3)
POTASSIUM SERPL-MCNC: 4.8 MMOL/L — SIGNIFICANT CHANGE UP (ref 3.5–5.3)
POTASSIUM SERPL-SCNC: 4.8 MMOL/L — SIGNIFICANT CHANGE UP (ref 3.5–5.3)
PROT SERPL-MCNC: 7.2 G/DL — SIGNIFICANT CHANGE UP (ref 6–8.3)
PROT UR-MCNC: ABNORMAL
PROTHROM AB SERPL-ACNC: 15.8 SEC — HIGH (ref 10.6–13.6)
RBC # BLD: 4.8 M/UL — SIGNIFICANT CHANGE UP (ref 3.8–5.2)
RBC # BLD: 5.32 M/UL — HIGH (ref 3.8–5.2)
RBC # FLD: 13.6 % — SIGNIFICANT CHANGE UP (ref 10.3–14.5)
RBC # FLD: 13.9 % — SIGNIFICANT CHANGE UP (ref 10.3–14.5)
RBC CASTS # UR COMP ASSIST: 4 /HPF — SIGNIFICANT CHANGE UP (ref 0–4)
SAO2 % BLDV: 67 % — SIGNIFICANT CHANGE UP (ref 67–88)
SARS-COV-2 RNA SPEC QL NAA+PROBE: SIGNIFICANT CHANGE UP
SODIUM SERPL-SCNC: 138 MMOL/L — SIGNIFICANT CHANGE UP (ref 135–145)
SP GR SPEC: 1.02 — SIGNIFICANT CHANGE UP (ref 1.01–1.02)
TROPONIN T, HIGH SENSITIVITY RESULT: 18 NG/L — SIGNIFICANT CHANGE UP (ref 0–51)
UROBILINOGEN FLD QL: NEGATIVE — SIGNIFICANT CHANGE UP
WBC # BLD: 14.85 K/UL — HIGH (ref 3.8–10.5)
WBC # BLD: 17.76 K/UL — HIGH (ref 3.8–10.5)
WBC # FLD AUTO: 14.85 K/UL — HIGH (ref 3.8–10.5)
WBC # FLD AUTO: 17.76 K/UL — HIGH (ref 3.8–10.5)
WBC UR QL: 0 /HPF — SIGNIFICANT CHANGE UP (ref 0–5)

## 2021-05-12 PROCEDURE — 70450 CT HEAD/BRAIN W/O DYE: CPT | Mod: 26,MG

## 2021-05-12 PROCEDURE — 93010 ELECTROCARDIOGRAM REPORT: CPT

## 2021-05-12 PROCEDURE — 71045 X-RAY EXAM CHEST 1 VIEW: CPT | Mod: 26

## 2021-05-12 PROCEDURE — G1004: CPT

## 2021-05-12 PROCEDURE — 99285 EMERGENCY DEPT VISIT HI MDM: CPT | Mod: CS

## 2021-05-12 PROCEDURE — 71250 CT THORAX DX C-: CPT | Mod: 26,MG

## 2021-05-12 PROCEDURE — 72170 X-RAY EXAM OF PELVIS: CPT | Mod: 26

## 2021-05-12 PROCEDURE — 72125 CT NECK SPINE W/O DYE: CPT | Mod: 26,MG

## 2021-05-12 RX ORDER — METOPROLOL TARTRATE 50 MG
12.5 TABLET ORAL
Refills: 0 | Status: DISCONTINUED | OUTPATIENT
Start: 2021-05-12 | End: 2021-05-13

## 2021-05-12 RX ORDER — APIXABAN 2.5 MG/1
2.5 TABLET, FILM COATED ORAL
Refills: 0 | Status: DISCONTINUED | OUTPATIENT
Start: 2021-05-13 | End: 2021-05-13

## 2021-05-12 NOTE — H&P ADULT - NSICDXPASTMEDICALHX_GEN_ALL_CORE_FT
PAST MEDICAL HISTORY:  Chronic atrial fibrillation     Chronic CHF     Dementia     Dementia, senile with depression     HTN (hypertension)

## 2021-05-12 NOTE — H&P ADULT - HISTORY OF PRESENT ILLNESS
88 yo female hx diabetes, afib on eliquis, heart failure presents to the ED s/p unwitnessed fall at home. Pt lives alone, per EMS, family called because home health aide that comes to house found patient face down on floor upon arrival. Pt unclear on details around fall, but does states her health aide found her on floor but she doesn't remember what caused her to fall. Collateral obtained from daughter over the phone, reports pt has hx of dementia and has had slow decline over the last 2 months, lives alone and ambulates w/ walker. Additionally reports has had multiple falls recently where "legs are giving out". Home health aide came today and found her reportedly awake facedown on the flood, unclear how long pt was on floor for, last seen at 7PM yesterday by daughter-in-law. Pt states she "hasn't felt well recently" Denies cp, sob, n/v/d, abd pain, headache, neck pain, numbness/tingling.

## 2021-05-12 NOTE — ED ADULT TRIAGE NOTE - DOMESTIC TRAVEL HIGH RISK QUESTION
Patient called today, c/o coughing for a month and not be able to sleep, pt taking Mucinex med is helping a little but what she would like to get a refill for phenergan with codeine, this med was prescribe a couple years ago by Dr Luke Yost  Patient  spoke with Dr Hampton Adjutant yesterday, pt was advise to call today to make an appointment  She did not want to come today she wants an appointment tomorrow since she has another appointment at 4 pm close to [facility], I did offer and appointment to see Dr Caden Mcgovern tomorrow at 3 pm and she said no, she wants to see you or see Dr Luke Yost   She Would like to get a call back and see if she can talk to you  No

## 2021-05-12 NOTE — ED ADULT NURSE NOTE - OBJECTIVE STATEMENT
Pt presents to ED via EMS for fall at home, AXOX3, per EMS report pt was found down at home by home health aid this morning, pt remembers falling but is unsure why, SUGEY, on xarelto for afib, pt denies pain, no visible trauma noted on exam, pt denies nausea vomiting or diarrhea, abdomen soft, nontender, nondistended, no fevers or chills, breathing unlabored, symmetrical, no shortness of breath, no chest pain.

## 2021-05-12 NOTE — H&P ADULT - ASSESSMENT
89 yo female hx diabetes, afib on eliquis, heart failure presents to the ED s/p unwitnessed fall at home. Pt lives alone, per EMS, family called because home health aide that comes to house found patient face down on floor upon arrival. Pt unclear on details around fall, but does states her health aide found her on floor but she doesn't remember what caused her to fall. Collateral obtained from daughter over the phone, reports pt has hx of dementia and has had slow decline over the last 2 months, lives alone and ambulates w/ walker. Additionally reports has had multiple falls recently where "legs are giving out". Home health aide came today and found her reportedly awake facedown on the flood, unclear how long pt was on floor for, last seen at 7PM yesterday by daughter-in-law. Pt states she "hasn't felt well recently" Denies cp, sob, n/v/d, abd pain, headache, neck pain, numbness/tingling.    PLAN: ptn appears stable, will need to R/O an acute process. card consult called. did ptn have a true syncopal event? admit to tele. check TTE, carotids, orthostatics, get social work consult. PT eval  dvt ppx not necessary, ptn is on chronic AC

## 2021-05-12 NOTE — ED PROVIDER NOTE - PROGRESS NOTE DETAILS
Collateral obtained from pt's daughter Nadiya ([phone]), states pt lives alone, ambulates w/ walker, has hx of dementia but has showed a slow decline over the last 2 months. Pt's daughter in law left her at 7PM last night, then her home health aide came today at 9AM and found her face down, awake, confused, unwitnessed fall. Unsure about LOC or how long she was down for. Physician is Dr. Lawson. Will alert him she's here. - Morgan Escalera PA-C Spoke w/ Dr. Lawson, he's aware of pt and workup thus far, will admit to his service for ?syncope workup and recent decline. He is aware CT chest pending, will await results prior to admission. - Morgan Escalera PA-C CT chest shows enlarged thyroid. Will admit to Dr. Lawson. - Morgan Escalera PA-C

## 2021-05-12 NOTE — H&P ADULT - NSHPPHYSICALEXAM_GEN_ALL_CORE
T(F): 98 (05-12-21 @ 19:52), Max: 99.4 (05-12-21 @ 10:46)  HR: 87 (05-12-21 @ 19:52) (72 - 87)  BP: 132/75 (05-12-21 @ 19:52) (106/66 - 146/67)  RR: 18 (05-12-21 @ 19:52) (17 - 18)  SpO2: 93% (05-12-21 @ 19:52) (93% - 99%)    PHYSICAL EXAM:  GENERAL: NAD, well-developed  HEAD:  Atraumatic, Normocephalic  EYES: EOMI, PERRLA, conjunctiva and sclera clear  NECK: Supple, No JVD  CHEST/LUNG: Clear to auscultation bilaterally; No wheeze  HEART: Regular rate and rhythm; No murmurs, rubs, or gallops  ABDOMEN: Soft, Nontender, Nondistended; Bowel sounds present  EXTREMITIES:  2+ Peripheral Pulses, No clubbing, cyanosis, or edema  PSYCH: AAOx3  NEUROLOGY: non-focal  SKIN: No rashes or lesions

## 2021-05-12 NOTE — ED PROVIDER NOTE - EXTREMITY EXAM
No signs of deformity or trauma to extremities. No bony joint or muscular ttp all extremities. No bony hip joint ttp. Full AROM all muscle groups with 5/5 strength. Sensation intact./no deformity, pain or tenderness, no restriction of movement

## 2021-05-12 NOTE — ED PROVIDER NOTE - ATTENDING CONTRIBUTION TO CARE
Attending MD Tao:   I personally have seen and examined this patient.  Physician assistant note reviewed and agree on plan of care and except where noted.  See below for details.     Seen in [facility] [address]  PMD Dr. RAD Lawson    89F with PMH/PSH including DM, AFib on Eliquis, HF presents to the ED brought in by EMS for unwitnessed fall at home.  Patient is AAOx3 but does not remember the events surrounding today's fall.  Reports that she has chronic knee and ankle pain and has had previously falls.  Reports that she had surgery on L knee and reports feels her "legs give out".  Denies headache, change in vision, loss of vision, double vision.  Denies abdominal pain, vomiting, diarrhea.  Denies dysuria, hematuria.  Denies chest pain, shortness of breath.  As per family, HHA left patient last night at around 7pm and found her today laying prone on the floor.  Unknown time down.  Report slow decline over last two months, report patient lives alone, ambulates with walker.  Patient reports she is very upset about the recent loss of Link.  A ten (10) point review of systems was negative other than as stated in the HPI or elsewhere in the chart.     Exam:   General: NAD, AAOx3 (knows name, place, year)  HENT: head NCAT, airway patent with moist mucous membranes  Eyes: PERRL  Lungs: lungs CTAB with good inspiratory effort, no wheezing, no rhonchi, no rales  Cardiac: +S1S2, irregular, no m/r/g  GI: abdomen soft with +BS, NT, ND  : no CVAT  MSK: FROM at neck, no tenderness to midline palpation, no stepoffs along length of spine, no calf tenderness, swelling, erythema or warmth, well healed vertical surgical scar on L knee  Neuro: moving all extremities spontaneously, sensory grossly intact, no gross neuro deficits  Psych: normal mood and affect, tearful when speaking about Link    A/P: 89F with fall, ?mechanical vs syncope, unknown amount of time down, will obtain labs, trop, EKG, cardiac monitor to eval for cardiac arrhythmia or ACS, will obtain CK for rhabdo, will obtain CT head for ICH or sequelae of trauma, will obtain CXR and UA for possible underlying infectious etiology, will admit

## 2021-05-12 NOTE — ED ADULT TRIAGE NOTE - CHIEF COMPLAINT QUOTE
pt biba from home with c/o fall this morning on xarelto.  per ems, family wants her also evaluated for ams.

## 2021-05-12 NOTE — ED PROVIDER NOTE - OBJECTIVE STATEMENT
88 yo female presents to the ED s/p unwitnessed fall at home. Pt lives alone, per EMS, family called because home health aide that comes to house found patient face down on floor upon arrival. Pt unclear on details around fall, states 88 yo female presents to the ED s/p unwitnessed fall at home. Pt lives alone, per EMS, family called because home health aide that comes to house found patient face down on floor upon arrival. Pt unclear on details around fall, but does states her health aide found her on floor but she doesn't remember what caused her to fall. Collateral obtained from daughter over the phone, reports pt has hx of dementia and has had slow decline over the last 2 months, lives alone and ambulates w/ walker. Additionally reports has had multiple falls recently where "legs are giving out". Home health aide came today and found her reportedly awake facedown on the flood, unclear how long pt was on floor for, last seen at 7PM yesterday by daughter-in-law. Pt states she "hasn't felt well recently" Denies cp, sob, n/v/d, abd pain, headache, neck pain, numbness/tingling.  PMD: Dr. Neil Lawson. 88 yo female hx diabetes, afib on xarelto, heart failure presents to the ED s/p unwitnessed fall at home. Pt lives alone, per EMS, family called because home health aide that comes to house found patient face down on floor upon arrival. Pt unclear on details around fall, but does states her health aide found her on floor but she doesn't remember what caused her to fall. Collateral obtained from daughter over the phone, reports pt has hx of dementia and has had slow decline over the last 2 months, lives alone and ambulates w/ walker. Additionally reports has had multiple falls recently where "legs are giving out". Home health aide came today and found her reportedly awake facedown on the flood, unclear how long pt was on floor for, last seen at 7PM yesterday by daughter-in-law. Pt states she "hasn't felt well recently" Denies cp, sob, n/v/d, abd pain, headache, neck pain, numbness/tingling.  PMD: Dr. Neil Lawson. 89 yo female hx diabetes, afib on eliquis, heart failure presents to the ED s/p unwitnessed fall at home. Pt lives alone, per EMS, family called because home health aide that comes to house found patient face down on floor upon arrival. Pt unclear on details around fall, but does states her health aide found her on floor but she doesn't remember what caused her to fall. Collateral obtained from daughter over the phone, reports pt has hx of dementia and has had slow decline over the last 2 months, lives alone and ambulates w/ walker. Additionally reports has had multiple falls recently where "legs are giving out". Home health aide came today and found her reportedly awake facedown on the flood, unclear how long pt was on floor for, last seen at 7PM yesterday by daughter-in-law. Pt states she "hasn't felt well recently" Denies cp, sob, n/v/d, abd pain, headache, neck pain, numbness/tingling.  PMD: Dr. Neil Lawson.

## 2021-05-13 DIAGNOSIS — F03.90 UNSPECIFIED DEMENTIA WITHOUT BEHAVIORAL DISTURBANCE: ICD-10-CM

## 2021-05-13 DIAGNOSIS — I48.91 UNSPECIFIED ATRIAL FIBRILLATION: ICD-10-CM

## 2021-05-13 DIAGNOSIS — R55 SYNCOPE AND COLLAPSE: ICD-10-CM

## 2021-05-13 DIAGNOSIS — I50.9 HEART FAILURE, UNSPECIFIED: ICD-10-CM

## 2021-05-13 DIAGNOSIS — R94.6 ABNORMAL RESULTS OF THYROID FUNCTION STUDIES: ICD-10-CM

## 2021-05-13 LAB
ANION GAP SERPL CALC-SCNC: 15 MMOL/L — SIGNIFICANT CHANGE UP (ref 5–17)
BUN SERPL-MCNC: 24 MG/DL — HIGH (ref 7–23)
CALCIUM SERPL-MCNC: 9.9 MG/DL — SIGNIFICANT CHANGE UP (ref 8.4–10.5)
CHLORIDE SERPL-SCNC: 101 MMOL/L — SIGNIFICANT CHANGE UP (ref 96–108)
CO2 SERPL-SCNC: 25 MMOL/L — SIGNIFICANT CHANGE UP (ref 22–31)
COVID-19 SPIKE DOMAIN AB INTERP: POSITIVE
COVID-19 SPIKE DOMAIN ANTIBODY RESULT: >250 U/ML — HIGH
CREAT SERPL-MCNC: 0.96 MG/DL — SIGNIFICANT CHANGE UP (ref 0.5–1.3)
CULTURE RESULTS: NO GROWTH — SIGNIFICANT CHANGE UP
GLUCOSE SERPL-MCNC: 106 MG/DL — HIGH (ref 70–99)
HCT VFR BLD CALC: 43.8 % — SIGNIFICANT CHANGE UP (ref 34.5–45)
HGB BLD-MCNC: 13.8 G/DL — SIGNIFICANT CHANGE UP (ref 11.5–15.5)
MCHC RBC-ENTMCNC: 27.6 PG — SIGNIFICANT CHANGE UP (ref 27–34)
MCHC RBC-ENTMCNC: 31.5 GM/DL — LOW (ref 32–36)
MCV RBC AUTO: 87.6 FL — SIGNIFICANT CHANGE UP (ref 80–100)
NRBC # BLD: 0 /100 WBCS — SIGNIFICANT CHANGE UP (ref 0–0)
PLATELET # BLD AUTO: 218 K/UL — SIGNIFICANT CHANGE UP (ref 150–400)
POTASSIUM SERPL-MCNC: 3.8 MMOL/L — SIGNIFICANT CHANGE UP (ref 3.5–5.3)
POTASSIUM SERPL-SCNC: 3.8 MMOL/L — SIGNIFICANT CHANGE UP (ref 3.5–5.3)
RBC # BLD: 5 M/UL — SIGNIFICANT CHANGE UP (ref 3.8–5.2)
RBC # FLD: 13.8 % — SIGNIFICANT CHANGE UP (ref 10.3–14.5)
SARS-COV-2 IGG+IGM SERPL QL IA: >250 U/ML — HIGH
SARS-COV-2 IGG+IGM SERPL QL IA: POSITIVE
SODIUM SERPL-SCNC: 141 MMOL/L — SIGNIFICANT CHANGE UP (ref 135–145)
SPECIMEN SOURCE: SIGNIFICANT CHANGE UP
T4 AB SER-ACNC: 6.4 UG/DL — SIGNIFICANT CHANGE UP (ref 4.6–12)
TSH SERPL-MCNC: 0.31 UIU/ML — SIGNIFICANT CHANGE UP (ref 0.27–4.2)
WBC # BLD: 10.39 K/UL — SIGNIFICANT CHANGE UP (ref 3.8–10.5)
WBC # FLD AUTO: 10.39 K/UL — SIGNIFICANT CHANGE UP (ref 3.8–10.5)

## 2021-05-13 PROCEDURE — 99222 1ST HOSP IP/OBS MODERATE 55: CPT

## 2021-05-13 RX ORDER — METOPROLOL TARTRATE 50 MG
100 TABLET ORAL DAILY
Refills: 0 | Status: DISCONTINUED | OUTPATIENT
Start: 2021-05-13 | End: 2021-05-19

## 2021-05-13 RX ORDER — HALOPERIDOL DECANOATE 100 MG/ML
1 INJECTION INTRAMUSCULAR EVERY 6 HOURS
Refills: 0 | Status: DISCONTINUED | OUTPATIENT
Start: 2021-05-13 | End: 2021-05-19

## 2021-05-13 RX ORDER — POTASSIUM CHLORIDE 20 MEQ
20 PACKET (EA) ORAL ONCE
Refills: 0 | Status: COMPLETED | OUTPATIENT
Start: 2021-05-13 | End: 2021-05-13

## 2021-05-13 RX ORDER — FUROSEMIDE 40 MG
40 TABLET ORAL DAILY
Refills: 0 | Status: DISCONTINUED | OUTPATIENT
Start: 2021-05-13 | End: 2021-05-17

## 2021-05-13 RX ORDER — PANTOPRAZOLE SODIUM 20 MG/1
40 TABLET, DELAYED RELEASE ORAL
Refills: 0 | Status: DISCONTINUED | OUTPATIENT
Start: 2021-05-13 | End: 2021-05-19

## 2021-05-13 RX ORDER — SERTRALINE 25 MG/1
50 TABLET, FILM COATED ORAL DAILY
Refills: 0 | Status: DISCONTINUED | OUTPATIENT
Start: 2021-05-13 | End: 2021-05-19

## 2021-05-13 RX ORDER — RIVAROXABAN 15 MG-20MG
20 KIT ORAL
Refills: 0 | Status: DISCONTINUED | OUTPATIENT
Start: 2021-05-14 | End: 2021-05-19

## 2021-05-13 RX ORDER — DIGOXIN 250 MCG
125 TABLET ORAL DAILY
Refills: 0 | Status: DISCONTINUED | OUTPATIENT
Start: 2021-05-13 | End: 2021-05-19

## 2021-05-13 RX ORDER — QUETIAPINE FUMARATE 200 MG/1
12.5 TABLET, FILM COATED ORAL AT BEDTIME
Refills: 0 | Status: DISCONTINUED | OUTPATIENT
Start: 2021-05-13 | End: 2021-05-14

## 2021-05-13 RX ORDER — HALOPERIDOL DECANOATE 100 MG/ML
1 INJECTION INTRAMUSCULAR ONCE
Refills: 0 | Status: COMPLETED | OUTPATIENT
Start: 2021-05-13 | End: 2021-05-13

## 2021-05-13 RX ADMIN — Medication 40 MILLIGRAM(S): at 12:20

## 2021-05-13 RX ADMIN — HALOPERIDOL DECANOATE 1 MILLIGRAM(S): 100 INJECTION INTRAMUSCULAR at 18:03

## 2021-05-13 RX ADMIN — PANTOPRAZOLE SODIUM 40 MILLIGRAM(S): 20 TABLET, DELAYED RELEASE ORAL at 12:20

## 2021-05-13 RX ADMIN — HALOPERIDOL DECANOATE 1 MILLIGRAM(S): 100 INJECTION INTRAMUSCULAR at 20:23

## 2021-05-13 RX ADMIN — Medication 125 MICROGRAM(S): at 12:20

## 2021-05-13 RX ADMIN — Medication 100 MILLIGRAM(S): at 12:20

## 2021-05-13 NOTE — CHART NOTE - NSCHARTNOTEFT_GEN_A_CORE
Notified by RN, Pt extremely agitated and combative with staff. Pt refusing to have vitals taken or to take any medications. Pt seen and evaluated at bedside. Pt non-cooperative with exam stating she wants to be left alone and to stop touching her. Haldol 1mg IVP given with minimal effect. Pt's daughter in law at bedside who is a RN here, attempted to calm Pt down. Pt now sitting in reclining chair, but still becomes very agitated when attempted to give meds/do vitals. Per Dina, Pt has been expressing this behavior for the last 2 months at home and is completely uncooperative. Daughter in law requesting Dr. Doe, neurologist, to come see the Pt in the AM as he is familiar with her. Will place on enhanced supervision for now as she is calm in reclining chair. Will consider constant observation if agitation worsens and Pt at harm to self/staff. Psych f/u in AM. Will endorse to AM team, attending to follow.    Becki Moreau PA-C  Department of Medicine

## 2021-05-13 NOTE — BH CONSULTATION LIAISON ASSESSMENT NOTE - RISK ASSESSMENT
pt overall low risk for suicide attempt, no si/hi, no hx suicide attempts, risk factors include agitation

## 2021-05-13 NOTE — BH CONSULTATION LIAISON ASSESSMENT NOTE - NSBHCHARTREVIEWLAB_PSY_A_CORE FT
13.8   10.39 )-----------( 218      ( 13 May 2021 06:30 )             43.8     05-13    141  |  101  |  24<H>  ----------------------------<  106<H>  3.8   |  25  |  0.96    Ca    9.9      13 May 2021 06:30  Mg     2.3     05-12    TPro  7.2  /  Alb  4.3  /  TBili  1.0  /  DBili  x   /  AST  31  /  ALT  17  /  AlkPhos  100  05-12

## 2021-05-13 NOTE — CHART NOTE - NSCHARTNOTEFT_GEN_A_CORE
Placed endocrine consult. they stated there is nothing to do inpatient for a thyroid nodule with normal TSH and T3. If US is suggestive of thyroid CA then re-consult for biopsy. Otherwise patient should follow up outpatient.

## 2021-05-13 NOTE — PHYSICAL THERAPY INITIAL EVALUATION ADULT - ADDITIONAL COMMENTS
Patient lives alone in  house  7 LUIZA. HHA- 9 hrsx 7 days.  Patient ambulated with rolling walker independent. h/o freq fall.  pt. owns rw, bed side commode, hospital bed.

## 2021-05-13 NOTE — PHYSICAL THERAPY INITIAL EVALUATION ADULT - LEVEL OF INDEPENDENCE: GAIT, REHAB EVAL
CM was informed that pt has been downgraded from INPATIENT to OBSERVATION. CM informed pt of code 40. Pt aware that her nurse will review d/c plans. Pt will have transport home at d/c. CM will inform NN of pt being here at [facility] under observation status.     Amanda Birch, MSW CM  [phone] pt. refused. Will f/u as appro minimum assist (75% patients effort)

## 2021-05-13 NOTE — BH CONSULTATION LIAISON ASSESSMENT NOTE - SUMMARY
88 yo female domiciled alone, has home aide 9 hrs/day, hx dementia for years, not in current psych tx, takes seroquel, zoloft at home prescribed by PMD Dr. Lawson, no prior psych admissions, hx diabetes, afib on eliquis, heart failure presents to the ED s/p unwitnessed fall at home. Pt lives alone, per EMS, family called because home health aide that comes to house found patient face down on floor upon arrival. Pt unclear on details around fall, but does states her health aide found her on floor but she doesn't remember what caused her to fall. psych consulted for med mgt, agitation.   as per staff, pt has been refusing meds, tests, uncooperative, no physical aggression. no prns given. Pt poor historian, confused, not answering questions. pt currently calm, eyes closed.

## 2021-05-13 NOTE — CHART NOTE - NSCHARTNOTEFT_GEN_A_CORE
CT chest noted:  Left lobe of the thyroid gland is markedly enlarged in size and is noted to extend into the mediastinum. Resultant mild deviation of the trachea towards the right is noted.  Endocrine consult requested for enlarged thyroid gland. TFT are wnl.  Can obtain thyroid sono as inpatient, however management of enlarged thyroid can be done as outpt.   Would obtain collateral history from family ( prior evaluated, biopsied, does the patient have endocrinologist?)   Remainder of workup would be done as outpt     Any further questions, pls recall Endocrine     Patient can follow up as outpt   [facility]   [address] [place] [address] [address]  [place] [address]  [phone]      Alma Delia Jernigan MD  Endocrine Fellow   Pager  on weekdays 9am- 5pm   Please call  after hours and on weekends

## 2021-05-13 NOTE — BH CONSULTATION LIAISON ASSESSMENT NOTE - CURRENT MEDICATION
MEDICATIONS  (STANDING):  digoxin     Tablet 125 MICROGram(s) Oral daily  furosemide   Injectable 40 milliGRAM(s) IV Push daily  metoprolol succinate  milliGRAM(s) Oral daily  pantoprazole    Tablet 40 milliGRAM(s) Oral before breakfast  potassium chloride   Powder 20 milliEquivalent(s) Oral once  QUEtiapine 12.5 milliGRAM(s) Oral at bedtime  sertraline 50 milliGRAM(s) Oral daily    MEDICATIONS  (PRN):

## 2021-05-13 NOTE — BH CONSULTATION LIAISON ASSESSMENT NOTE - NSBHCHARTREVIEWVS_PSY_A_CORE FT
Vital Signs Last 24 Hrs  T(C): 36.6 (13 May 2021 11:58), Max: 36.7 (12 May 2021 18:15)  T(F): 97.9 (13 May 2021 11:58), Max: 98 (12 May 2021 18:15)  HR: 89 (13 May 2021 11:58) (72 - 90)  BP: 122/76 (13 May 2021 11:58) (103/67 - 132/75)  BP(mean): 79 (12 May 2021 14:25) (79 - 79)  RR: 20 (13 May 2021 11:58) (17 - 20)  SpO2: 93% (13 May 2021 11:58) (93% - 96%)

## 2021-05-13 NOTE — PHYSICAL THERAPY INITIAL EVALUATION ADULT - PERTINENT HX OF CURRENT PROBLEM, REHAB EVAL
89 yo female well known to me from office with hx diabetes, afib on eliquis, heart failure presents to the ED s/p unwitnessed fall at home. per chart, daughter over the phone, reports pt has hx of dementia and has had slow decline over the last 2 months, lives alone and ambulates w/ walker. Additionally reports has had multiple falls recently where "legs are giving out".  X ray pelvis- partially visualized intact R TH prosthesis. no acute fx/dislocation. CTH- no acute fx/dislocation.

## 2021-05-13 NOTE — PHYSICAL THERAPY INITIAL EVALUATION ADULT - PATIENT/FAMILY AGREES WITH PLAN
Subacute Rehab; Pt with limitations in self-care & home management, will benefit from Sub acute rehab prior to D/C home to increase strength, balance and endurance to improve functional mobility to level necessary for safe return home./yes

## 2021-05-13 NOTE — BH CONSULTATION LIAISON ASSESSMENT NOTE - HPI (INCLUDE ILLNESS QUALITY, SEVERITY, DURATION, TIMING, CONTEXT, MODIFYING FACTORS, ASSOCIATED SIGNS AND SYMPTOMS)
89 yo female domiciled alone, has home aide 9 hrs/day, hx dementia for years, not in current psych tx, takes seroquel, zoloft at home prescribed by PMD Dr. Lawsno, no prior psych admissions, hx diabetes, afib on eliquis, heart failure presents to the ED s/p unwitnessed fall at home. Pt lives alone, per EMS, family called because home health aide that comes to house found patient face down on floor upon arrival. Pt unclear on details around fall, but does states her health aide found her on floor but she doesn't remember what caused her to fall. psych consulted for med mgt, agitation.   as per staff, pt has been refusing meds, tests, uncooperative, no physical aggression. no prns given. Pt poor historian, confused, not answering questions. pt currently calm, eyes closed.   collateral obtained from daughter, states pt has dementia for many years, disoriented to time and place at times. Pt will sometimes become paranoid, and argumentative, especially with her home aides. Pt will sometimes think aides are trying to harm her. no si/hi expressed, pt has fair sleep and appetite. no depression, anxiety. pt has not been physically aggressive at home.

## 2021-05-14 LAB
ANION GAP SERPL CALC-SCNC: 13 MMOL/L — SIGNIFICANT CHANGE UP (ref 5–17)
BUN SERPL-MCNC: 30 MG/DL — HIGH (ref 7–23)
CALCIUM SERPL-MCNC: 9.8 MG/DL — SIGNIFICANT CHANGE UP (ref 8.4–10.5)
CHLORIDE SERPL-SCNC: 99 MMOL/L — SIGNIFICANT CHANGE UP (ref 96–108)
CO2 SERPL-SCNC: 26 MMOL/L — SIGNIFICANT CHANGE UP (ref 22–31)
CREAT SERPL-MCNC: 1.06 MG/DL — SIGNIFICANT CHANGE UP (ref 0.5–1.3)
GLUCOSE SERPL-MCNC: 156 MG/DL — HIGH (ref 70–99)
MAGNESIUM SERPL-MCNC: 2.4 MG/DL — SIGNIFICANT CHANGE UP (ref 1.6–2.6)
POTASSIUM SERPL-MCNC: 3.7 MMOL/L — SIGNIFICANT CHANGE UP (ref 3.5–5.3)
POTASSIUM SERPL-SCNC: 3.7 MMOL/L — SIGNIFICANT CHANGE UP (ref 3.5–5.3)
SODIUM SERPL-SCNC: 138 MMOL/L — SIGNIFICANT CHANGE UP (ref 135–145)

## 2021-05-14 PROCEDURE — 93306 TTE W/DOPPLER COMPLETE: CPT | Mod: 26

## 2021-05-14 PROCEDURE — 93010 ELECTROCARDIOGRAM REPORT: CPT

## 2021-05-14 RX ORDER — DIVALPROEX SODIUM 500 MG/1
125 TABLET, DELAYED RELEASE ORAL EVERY 6 HOURS
Refills: 0 | Status: DISCONTINUED | OUTPATIENT
Start: 2021-05-14 | End: 2021-05-19

## 2021-05-14 RX ORDER — QUETIAPINE FUMARATE 200 MG/1
25 TABLET, FILM COATED ORAL
Refills: 0 | Status: DISCONTINUED | OUTPATIENT
Start: 2021-05-14 | End: 2021-05-19

## 2021-05-14 RX ADMIN — QUETIAPINE FUMARATE 25 MILLIGRAM(S): 200 TABLET, FILM COATED ORAL at 20:46

## 2021-05-14 RX ADMIN — SERTRALINE 50 MILLIGRAM(S): 25 TABLET, FILM COATED ORAL at 20:46

## 2021-05-14 RX ADMIN — Medication 100 MILLIGRAM(S): at 09:48

## 2021-05-14 RX ADMIN — PANTOPRAZOLE SODIUM 40 MILLIGRAM(S): 20 TABLET, DELAYED RELEASE ORAL at 09:48

## 2021-05-14 RX ADMIN — RIVAROXABAN 20 MILLIGRAM(S): KIT at 09:49

## 2021-05-14 RX ADMIN — Medication 125 MICROGRAM(S): at 09:48

## 2021-05-14 RX ADMIN — Medication 40 MILLIGRAM(S): at 06:38

## 2021-05-14 NOTE — CONSULT NOTE ADULT - SUBJECTIVE AND OBJECTIVE BOX
HPI:  Patient is a 89 yo female hx diabetes, afib on eliquis, heart failure admitted s/p unwitnessed fall at home. Neurology consulted for evaluation of dementia in the setting of fall. Daughter Nasra (Nurse on [facility]) reports that patient has episodes of agitation and delusional thinking. Episodes occur predominantly at night with patient yelling and cursing, very unusual from her normal personality. Occasionally these episodes occur during the day. Patient has been using Seroquel 12.5 mg at night which helped initially but is less effective now. Patient knows she is here for a fall but does not remember the circumstances.     Further collateral per chart review as follows:  Pt lives alone, per EMS, family called because home health aide that comes to house found patient face down on floor upon arrival. Pt unclear on details around fall, but does states her health aide found her on floor but she doesn't remember what caused her to fall. Collateral obtained from daughter over the phone, reports pt has hx of dementia and has had slow decline over the last 2 months, lives alone and ambulates w/ walker. Additionally reports has had multiple falls recently where "legs are giving out". Home health aide came today and found her reportedly awake facedown on the flood, unclear how long pt was on floor for, last seen at 7PM yesterday by daughter-in-law. Pt states she "hasn't felt well recently" Denies cp, sob, n/v/d, abd pain, headache, neck pain, numbness/tingling.       ROS: A 10-system ROS was performed and is negative except for those items noted above and/or in the HPI.    PAST MEDICAL & SURGICAL HISTORY:  Chronic atrial fibrillation    Dementia    Chronic CHF    HTN (hypertension)    Dementia, senile with depression      FAMILY HISTORY:      SOCIAL HISTORY: SOCIAL HISTORY:     Marital Status: (  )   (  ) Single  (  )   (  )      Occupation:      Lives: (  ) alone  (  ) with children   (  ) with spouse  (  ) with parents  (  ) other     Illicit Drug Use: (  ) never used  (  ) other _____     Tobacco Use:  (  ) never smoked  (  ) former smoker  (  ) current smoker  (  ) pack year  (  ) last cigarette date     Alcohol Use:      Sexual History:        MEDICATIONS  Home Medications:  digoxin 125 mcg (0.125 mg) oral tablet: 1 tab(s) orally once a day (12 May 2021 16:28)  furosemide 80 mg oral tablet: 1 tab(s) orally once a day (12 May 2021 16:28)  Januvia 100 mg oral tablet: 1 tab(s) orally once a day (12 May 2021 16:28)  metoprolol succinate 100 mg oral tablet, extended release: 1 tab(s) orally once a day (12 May 2021 16:28)  pantoprazole 40 mg oral delayed release tablet: 1 tab(s) orally once a day (12 May 2021 16:28)  potassium chloride 20 mEq oral tablet, extended release: 1 tab(s) orally once a day (12 May 2021 16:28)  SEROquel 25 mg oral tablet: 0.5 tab(s) orally once a day (at bedtime) (12 May 2021 16:28)  sertraline 50 mg oral tablet: 1 tab(s) orally once a day (12 May 2021 16:28)  Xarelto 20 mg oral tablet: 1 tab(s) orally once a day (12 May 2021 16:28)      MEDICATIONS  (STANDING):  digoxin     Tablet 125 MICROGram(s) Oral daily  furosemide   Injectable 40 milliGRAM(s) IV Push daily  metoprolol succinate  milliGRAM(s) Oral daily  pantoprazole    Tablet 40 milliGRAM(s) Oral before breakfast  QUEtiapine 12.5 milliGRAM(s) Oral at bedtime  rivaroxaban 20 milliGRAM(s) Oral with dinner  sertraline 50 milliGRAM(s) Oral daily    MEDICATIONS  (PRN):  haloperidol    Injectable 1 milliGRAM(s) IV Push every 6 hours PRN severe agitation      ALLERGIES/INTOLERANCES:  Allergies  No Known Allergies    Intolerances      OBJECTIVE:  VITALS   Vital Signs Last 24 Hrs  T(C): 36.7 (14 May 2021 04:33), Max: 36.7 (14 May 2021 04:33)  T(F): 98 (14 May 2021 04:33), Max: 98 (14 May 2021 04:33)  HR: 79 (14 May 2021 12:35) (74 - 92)  BP: 129/83 (14 May 2021 12:35) (107/76 - 135/81)  BP(mean): --  RR: 18 (14 May 2021 12:35) (18 - 18)  SpO2: 94% (14 May 2021 12:35) (92% - 95%)    PHYSICAL EXAM:  Neurological Exam:  Mental Status: Orientated to self, date and place.  Attention intact.  No dysarthria, aphasia or neglect.  Knowledge intact.      Cranial Nerves: PERRL, EOMI, VFF, no nystagmus or diplopia.  CN V1-3 intact to light touch.  No facial asymmetry.  Grossly decreased hearing.  Tongue midline.    Motor: All extremities at least anti gravity and symmetric.   Pronator drift: none                 Dysmetria: None to finger-nose-finger  Tremor: No resting, postural or action tremor.  No myoclonus  Sensation: intact to light touch  Deep Tendon Reflexes: 1+ bilateral biceps, triceps, brachioradialis, mute knee and 1+ ankle  Toes flexor bilaterally  Gait: Deferred     LABORATORY:  CBC                       13.8   10.39 )-----------( 218      ( 13 May 2021 06:30 )             43.8     Chem 05-14    138  |  99  |  30<H>  ----------------------------<  156<H>  3.7   |  26  |  1.06    Ca    9.8      14 May 2021 07:15  Mg     2.4     05-14      LFTs   Coagulopathy   Lipid Panel   A1c   Cardiac enzymes     U/A   CSF  Immunological  Other    STUDIES & IMAGING:  Studies (EKG, EEG, EMG, etc):     Radiology (XR, CT, MR, U/S, TTE/CHADD):
CHIEF COMPLAINT:    HISTORY OF PRESENT ILLNESS:  89 yo female well known to me from office with hx diabetes, afib on eliquis, heart failure presents to the ED s/p unwitnessed fall at home. Pt lives alone, per EMS, family called because home health aide that comes to house found patient face down on floor upon arrival. Pt unclear on details around fall, but does states her health aide found her on floor but she doesn't remember what caused her to fall. Collateral obtained from daughter over the phone, reports pt has hx of dementia and has had slow decline over the last 2 months, lives alone and ambulates w/ walker. Additionally reports has had multiple falls recently where "legs are giving out". Home health aide came today and found her reportedly awake facedown on the flood, unclear how long pt was on floor for, last seen at 7PM yesterday by daughter-in-law. Pt states she "hasn't felt well recently" Denies cp, sob, n/v/d, abd pain, headache, neck pain, numbness/tingling.      PAST MEDICAL & SURGICAL HISTORY:          MEDICATIONS:  apixaban 2.5 milliGRAM(s) Oral two times a day  metoprolol tartrate 12.5 milliGRAM(s) Oral two times a day                  FAMILY HISTORY:      SOCIAL HISTORY:    [ ] Non-smoker  [ ] Smoker  [ ] Alcohol    Allergies    No Known Allergies    Intolerances    	    REVIEW OF SYSTEMS:  CONSTITUTIONAL: No fever, weight loss, + fatigue  EYES: No eye pain, visual disturbances, or discharge  ENMT:  No difficulty hearing, tinnitus, vertigo; No sinus or throat pain  NECK: No pain or stiffness  RESPIRATORY: No cough, wheezing, chills or hemoptysis; No Shortness of Breath  CARDIOVASCULAR: No chest pain, palpitations, passing out, dizziness, or leg swelling  GASTROINTESTINAL: No abdominal or epigastric pain. No nausea, vomiting, or hematemesis; No diarrhea or constipation. No melena or hematochezia.  GENITOURINARY: No dysuria, frequency, hematuria, or incontinence  NEUROLOGICAL: No headaches, memory loss, loss of strength, numbness, or tremors  SKIN: No itching, burning, rashes, or lesions   LYMPH Nodes: No enlarged glands  ENDOCRINE: No heat or cold intolerance; No hair loss  MUSCULOSKELETAL: + joint pain or swelling; No muscle, back, or extremity pain  PSYCHIATRIC: No depression, anxiety, mood swings, or difficulty sleeping  HEME/LYMPH: No easy bruising, or bleeding gums  ALLERY AND IMMUNOLOGIC: No hives or eczema	    [ ] All others negative	  [ ] Unable to obtain    PHYSICAL EXAM:  T(C): 36.5 (05-13-21 @ 04:42), Max: 37.4 (05-12-21 @ 10:46)  HR: 82 (05-13-21 @ 06:40) (72 - 87)  BP: 113/76 (05-13-21 @ 06:40) (103/67 - 146/67)  RR: 18 (05-13-21 @ 04:42) (17 - 18)  SpO2: 93% (05-13-21 @ 04:42) (93% - 99%)  Wt(kg): --  I&O's Summary      Appearance: NAD	  HEENT:   Dry  oral mucosa, PERRL, EOMI	  Lymphatic: No lymphadenopathy  Cardiovascular: Irregular  S1 S2, No JVD, No murmurs, No edema  Respiratory: Lungs clear to auscultation	  Psychiatry: A & O x 3, Mood & affect appropriate  Gastrointestinal:  Soft, Non-tender, + BS	  Skin: No rashes, No ecchymoses, No cyanosis	  Neurologic: Non-focal  Extremities: Normal range of motion, No clubbing, cyanosis or edema  Vascular: Peripheral pulses palpable 2+ bilaterally    TELEMETRY: 	AF     ECG:  	  RADIOLOGY:  < from: CT Chest No Cont (05.12.21 @ 12:26) >  EXAM:  CT CHEST                            PROCEDURE DATE:  05/12/2021            INTERPRETATION:  Clinical information: Thyroid mass.    CT scan of the chest was obtained without administration of intravenous contrast.    Left lobe of the thyroid gland is markedly enlarged in size and is noted to extend into the mediastinum. Resultant mild deviation of the trachea towards the right is noted.    No hilar and/or mediastinal adenopathy is noted.    Heart is enlarged in size. Calcification the coronary arteries is noted. No pericardial effusion is noted.    Esophagus is mildly dilated.    No endobronchial lesions are noted. Minimal patchy groundglass opacities in the posterior aspect of the right lower lobe represent dependent atelectasis. No pleural effusions are noted.    Below the diaphragm, visualized portions of the abdomen demonstrate 4.6 cm cyst in the liver. Additional low-attenuation lesions within the liver and the left kidney are indeterminate based on this exam.    Degenerativechanges of the spine are noted.    Impression: Enlarged left lobe of the thyroid gland extending into the mediastinum as described above.                LANCE ROBERTSON MD; Attending Radiologist  This document has been electronically signed. May 12 2021  1:35PM    < end of copied text >  < from: CT Head No Cont (05.12.21 @ 11:15) >    EXAM:  CT CERVICAL SPINE                          EXAM:  CT BRAIN                            PROCEDURE DATE:  05/12/2021            INTERPRETATION:  CLINICAL STATEMENT: Trauma..    TECHNIQUE: CT of the head and cervical spine were performed withoutIV contrast. 3-D/MIP images obtained    COMPARISON: None.    FINDINGS:  Head:  There is mild diffuse parenchymal volume loss. There are areas of low attenuation in the periventricular white matter likely related to mild chronic microvascular ischemicchanges.    2.6 x 1.8 cm extra-axial partially calcified mass at the right aspect of the posterior fossa most compatible with calcified meningioma. Mild mass effect on the adjacent right lateral cerebellum    There is no acute intracranial hemorrhageor midline shift. There is no acute territorial infarct. There is no hydrocephalus. Nonspecific mild prominence bilateral optic nerve sheath. Partial empty sella    Nonspecific small nodularity noted along the left optic nerve sheath    The cranium is intact. The visualized paranasal sinuses are well-aerated.    Cervical spine:  Vertebral body heights maintained. Grade 1 anterolisthesis of C4 on C5, C7 on T1, T2 on T3, T3 on T4.    Nonspecific small lucent lesion noted in the right aspect of the clivus to small to characterize.    There is no prevertebral soft tissue swelling. The odontoid is intact.    Evaluation of the spinal canal is limited on a CT exam.  Multilevel degenerative changes noted resulting in multilevel spinal canal stenosis and neural foraminal narrowing.    Prominent thyroid with multiple nodules. There is a large nodule posterior to the left lobe of the thyroid gland likely thyroid in origin with mass effect and deviation of trachea and proximal esophagus to the right,partially visualized extending to the superior mediastinum. CT chest could be obtained for complete evaluation    Biapical pleural thickening    IMPRESSION:  No acute intracranial hemorrhage    Right posterior fossa meningioma.    Incidental multiplesmall nodularity along the left optic nerve sheath. Possibly related to small meningiomas. Outpatient MRI of the orbits with contrast can be obtained for further evaluation    No acute fracture cervical spine. If pain persists, follow-up MRI exam recommended    Additional findings as described above      < end of copied text >    OTHER: 	  	  LABS:	 	    CARDIAC MARKERS:                                  13.8   10.39 )-----------( 218      ( 13 May 2021 06:30 )             43.8     05-13    141  |  101  |  24<H>  ----------------------------<  106<H>  3.8   |  25  |  0.96    Ca    9.9      13 May 2021 06:30  Mg     2.3     05-12    TPro  7.2  /  Alb  4.3  /  TBili  1.0  /  DBili  x   /  AST  31  /  ALT  17  /  AlkPhos  100  05-12    proBNP: Serum Pro-Brain Natriuretic Peptide: 2982 pg/mL (05-12 @ 10:42)    Lipid Profile:   HgA1c:   TSH:

## 2021-05-14 NOTE — CONSULT NOTE ADULT - ASSESSMENT
Patient is a 88 yo female hx diabetes, afib on eliquis, heart failure admitted s/p unwitnessed fall at home. Neurology consulted for evaluation of dementia in the setting of fall. Patient with likely underlying dementia, not formally diagnosed but patient has appointment with Dr. Merida outpatient. Episode of confusion/delirium at night. Initially good response to Seroquel. CTH shows Right posterior fossa meningioma. Neuro exam non-focal.     Impression   Episodes of delirium likely related to underlying dementia     Recommendations  - MRI brain without contrast  - Seroquel 12.5mg BID and increased to 25mg BID in a few days if patient tolerates   - Outpatient follow up with Dr. Merida [address] [facility] [phone]  - Rest of care per primary team    Case discussed with Dr. Merida, to be discussed with and patient to be seen by Dr. Monge   
88 yo female hx diabetes, afib on eliquis, heart failure presents to the ED s/p unwitnessed fall at home. Pt lives alone, per EMS, family called because home health aide that comes to house found patient face down on floor upon arrival. Pt unclear on details around fall, but does states her health aide found her on floor but she doesn't remember what caused her to fall. Collateral obtained from daughter over the phone, reports pt has hx of dementia and has had slow decline over the last 2 months, lives alone and ambulates w/ walker. Additionally reports has had multiple falls recently where "legs are giving out". Home health aide came today and found her reportedly awake facedown on the flood, unclear how long pt was on floor for, last seen at 7PM yesterday by daughter-in-law. Pt states she "hasn't felt well recently" Denies cp, sob, n/v/d, abd pain, headache, neck pain, numbness/tingling.

## 2021-05-14 NOTE — PROVIDER CONTACT NOTE (OTHER) - ASSESSMENT
A&O x1.  Patient agitated, restless, combative, and uncooperative.  Patient yelling in hallway and unable to be redirected.  Not due for next PRN haldol dose

## 2021-05-15 ENCOUNTER — TRANSCRIPTION ENCOUNTER (OUTPATIENT)
Age: 86
End: 2021-05-15

## 2021-05-15 LAB
ANION GAP SERPL CALC-SCNC: 15 MMOL/L — SIGNIFICANT CHANGE UP (ref 5–17)
BUN SERPL-MCNC: 39 MG/DL — HIGH (ref 7–23)
CALCIUM SERPL-MCNC: 9.8 MG/DL — SIGNIFICANT CHANGE UP (ref 8.4–10.5)
CHLORIDE SERPL-SCNC: 98 MMOL/L — SIGNIFICANT CHANGE UP (ref 96–108)
CO2 SERPL-SCNC: 25 MMOL/L — SIGNIFICANT CHANGE UP (ref 22–31)
CREAT SERPL-MCNC: 1.22 MG/DL — SIGNIFICANT CHANGE UP (ref 0.5–1.3)
GLUCOSE SERPL-MCNC: 151 MG/DL — HIGH (ref 70–99)
HCT VFR BLD CALC: 43.5 % — SIGNIFICANT CHANGE UP (ref 34.5–45)
HGB BLD-MCNC: 13.8 G/DL — SIGNIFICANT CHANGE UP (ref 11.5–15.5)
MCHC RBC-ENTMCNC: 27.9 PG — SIGNIFICANT CHANGE UP (ref 27–34)
MCHC RBC-ENTMCNC: 31.7 GM/DL — LOW (ref 32–36)
MCV RBC AUTO: 87.9 FL — SIGNIFICANT CHANGE UP (ref 80–100)
NRBC # BLD: 0 /100 WBCS — SIGNIFICANT CHANGE UP (ref 0–0)
PLATELET # BLD AUTO: 218 K/UL — SIGNIFICANT CHANGE UP (ref 150–400)
POTASSIUM SERPL-MCNC: 3.3 MMOL/L — LOW (ref 3.5–5.3)
POTASSIUM SERPL-SCNC: 3.3 MMOL/L — LOW (ref 3.5–5.3)
RBC # BLD: 4.95 M/UL — SIGNIFICANT CHANGE UP (ref 3.8–5.2)
RBC # FLD: 13.7 % — SIGNIFICANT CHANGE UP (ref 10.3–14.5)
SODIUM SERPL-SCNC: 138 MMOL/L — SIGNIFICANT CHANGE UP (ref 135–145)
WBC # BLD: 12.11 K/UL — HIGH (ref 3.8–10.5)
WBC # FLD AUTO: 12.11 K/UL — HIGH (ref 3.8–10.5)

## 2021-05-15 PROCEDURE — 70551 MRI BRAIN STEM W/O DYE: CPT | Mod: 26

## 2021-05-15 PROCEDURE — 99221 1ST HOSP IP/OBS SF/LOW 40: CPT

## 2021-05-15 RX ORDER — POTASSIUM CHLORIDE 20 MEQ
40 PACKET (EA) ORAL ONCE
Refills: 0 | Status: COMPLETED | OUTPATIENT
Start: 2021-05-15 | End: 2021-05-15

## 2021-05-15 RX ADMIN — Medication 40 MILLIGRAM(S): at 06:40

## 2021-05-15 RX ADMIN — Medication 125 MICROGRAM(S): at 12:52

## 2021-05-15 RX ADMIN — SERTRALINE 50 MILLIGRAM(S): 25 TABLET, FILM COATED ORAL at 20:30

## 2021-05-15 RX ADMIN — QUETIAPINE FUMARATE 25 MILLIGRAM(S): 200 TABLET, FILM COATED ORAL at 17:56

## 2021-05-15 RX ADMIN — RIVAROXABAN 20 MILLIGRAM(S): KIT at 17:55

## 2021-05-15 RX ADMIN — Medication 40 MILLIEQUIVALENT(S): at 18:03

## 2021-05-15 RX ADMIN — PANTOPRAZOLE SODIUM 40 MILLIGRAM(S): 20 TABLET, DELAYED RELEASE ORAL at 12:50

## 2021-05-15 RX ADMIN — Medication 100 MILLIGRAM(S): at 12:50

## 2021-05-15 NOTE — DISCHARGE NOTE PROVIDER - NSDCCPCAREPLAN_GEN_ALL_CORE_FT
PRINCIPAL DISCHARGE DIAGNOSIS  Diagnosis: Syncope and collapse  Assessment and Plan of Treatment: you had Neurology and cardiology work up that was essentially benign      SECONDARY DISCHARGE DIAGNOSES  Diagnosis: Abnormal thyroid exam  Assessment and Plan of Treatment: You were noted to have enlarged thyroid on CT  Follow up with PMD for Thyroid ultrasound    Diagnosis: Afib  Assessment and Plan of Treatment: continue metoprolol and xarelto    Diagnosis: Dementia without behavioral disturbance, unspecified dementia type  Assessment and Plan of Treatment: continue seroquel  Follow up with Neurologist for full dementia work up    Diagnosis: Diastolic CHF, acute on chronic  Assessment and Plan of Treatment: Weigh yourself daily.  If you gain 3lbs in 3 days, or 5lbs in a week call your Health Care Provider.  Do not eat or drink foods containing more than 2000mg of salt (sodium) in your diet every day.  Call your Health Care Provider if you have any swelling or increased swelling in your feet, ankles, and/or stomach.  Take all of your medication as directed.  If you become dizzy call your Health Care Provider.

## 2021-05-15 NOTE — DISCHARGE NOTE PROVIDER - CARE PROVIDERS DIRECT ADDRESSES
[email or web address]

## 2021-05-15 NOTE — DISCHARGE NOTE PROVIDER - CARE PROVIDER_API CALL
Nader Merida)  Neurology  [address], [address]  [place] [address]  Phone: [phone]  Fax: [phone]  Follow Up Time: 2 weeks    Neil Lawson ()  Cardiology; Internal Medicine  [address], [address]  [place] [address]  Phone: [phone]  Fax: [phone]  Follow Up Time: 2 weeks

## 2021-05-15 NOTE — DISCHARGE NOTE PROVIDER - NSDCFUADDINST_GEN_ALL_CORE_FT
Follow up with PMD after rehab for enlarged thyroid  Follow up with Neurology for dementia work up and monitoring of meningioma Follow up with PMD after rehab for enlarged thyroid  Follow up with Neurology for dementia work up and monitoring of meningioma    **** COMPLETED PfiZER VACCINE *****

## 2021-05-15 NOTE — DISCHARGE NOTE PROVIDER - PROVIDER TOKENS
PROVIDER:[TOKEN:[[id number]],FOLLOWUP:[2 weeks]],PROVIDER:[TOKEN:[[id number]],FOLLOWUP:[2 weeks]]

## 2021-05-15 NOTE — DISCHARGE NOTE PROVIDER - HOSPITAL COURSE
88 yo female hx diabetes, afib on eliquis, heart failure presents to the ED s/p unwitnessed fall at home. Pt lives alone, per EMS, family called because home health aide that comes to house found patient face down on floor upon arrival. Pt unclear on details around fall, but does states her health aide found her on floor but she doesn't remember what caused her to fall. Collateral obtained from daughter over the phone, reports pt has hx of dementia and has had slow decline over the last 2 months, lives alone and ambulates w/ walker. Additionally reports has had multiple falls recently where "legs are giving out". Home health aide came today and found her reportedly awake facedown on the flood, unclear how long pt was on floor for, last seen at 7PM yesterday by daughter-in-law. Pt states she "hasn't felt well recently" Denies cp, sob, n/v/d, abd pain, headache, neck pain, numbness/tingling. Neurology consulted for evaluation of dementia in the setting of fall. Patient with likely underlying dementia, not formally diagnosed but patient has appointment with Dr. Merida outpatient. Episode of confusion/delirium at night. Initially good response to Seroquel. CTH shows Right posterior fossa meningioma. Neuro exam non-focal. started on  Seroquel 12.5mg BID and increased to 25mg BID in a few days.  Pt to follow up  with Dr. Merida [address] [facility] [phone]. no events on Tele, benign TTE and carotids doppler. Not orthostatic. thyromegaly seen on Ct chest with  benign TFTs. Needs outpt follow up. acute on chronic CHF, now resolved,  s/p IV LASIX as per cardiology. PT recommends PRECIOUS. Discharged to [facility].

## 2021-05-15 NOTE — DISCHARGE NOTE PROVIDER - NSDCMRMEDTOKEN_GEN_ALL_CORE_FT
digoxin 125 mcg (0.125 mg) oral tablet: 1 tab(s) orally once a day  furosemide 80 mg oral tablet: 1 tab(s) orally once a day  Januvia 100 mg oral tablet: 1 tab(s) orally once a day  metoprolol succinate 100 mg oral tablet, extended release: 1 tab(s) orally once a day  pantoprazole 40 mg oral delayed release tablet: 1 tab(s) orally once a day  potassium chloride 20 mEq oral tablet, extended release: 1 tab(s) orally once a day  SEROquel 25 mg oral tablet: 0.5 tab(s) orally once a day (at bedtime)  sertraline 50 mg oral tablet: 1 tab(s) orally once a day  Xarelto 20 mg oral tablet: 1 tab(s) orally once a day   digoxin 125 mcg (0.125 mg) oral tablet: 1 tab(s) orally once a day  furosemide 80 mg oral tablet: 1 tab(s) orally once a day  Januvia 100 mg oral tablet: 1 tab(s) orally once a day  metoprolol succinate 100 mg oral tablet, extended release: 1 tab(s) orally once a day  pantoprazole 40 mg oral delayed release tablet: 1 tab(s) orally once a day  potassium chloride 20 mEq oral tablet, extended release: 1 tab(s) orally once a day  QUEtiapine 25 mg oral tablet: 1 tab(s) orally 2 times a day  sertraline 50 mg oral tablet: 1 tab(s) orally once a day  Xarelto 20 mg oral tablet: 1 tab(s) orally once a day   digoxin 125 mcg (0.125 mg) oral tablet: 1 tab(s) orally once a day  Januvia 100 mg oral tablet: 1 tab(s) orally once a day  Lasix 40 mg oral tablet: 1 tab(s) orally once a day  metoprolol succinate 100 mg oral tablet, extended release: 1 tab(s) orally once a day  pantoprazole 40 mg oral delayed release tablet: 1 tab(s) orally once a day  potassium chloride 20 mEq oral tablet, extended release: 1 tab(s) orally once a day  QUEtiapine 25 mg oral tablet: 1 tab(s) orally 2 times a day  sertraline 50 mg oral tablet: 1 tab(s) orally once a day  Xarelto 20 mg oral tablet: 1 tab(s) orally once a day

## 2021-05-16 LAB
ANION GAP SERPL CALC-SCNC: 17 MMOL/L — SIGNIFICANT CHANGE UP (ref 5–17)
BUN SERPL-MCNC: 46 MG/DL — HIGH (ref 7–23)
CALCIUM SERPL-MCNC: 9.9 MG/DL — SIGNIFICANT CHANGE UP (ref 8.4–10.5)
CHLORIDE SERPL-SCNC: 101 MMOL/L — SIGNIFICANT CHANGE UP (ref 96–108)
CO2 SERPL-SCNC: 24 MMOL/L — SIGNIFICANT CHANGE UP (ref 22–31)
CREAT SERPL-MCNC: 1.18 MG/DL — SIGNIFICANT CHANGE UP (ref 0.5–1.3)
GLUCOSE SERPL-MCNC: 172 MG/DL — HIGH (ref 70–99)
HCT VFR BLD CALC: 45.6 % — HIGH (ref 34.5–45)
HGB BLD-MCNC: 14.1 G/DL — SIGNIFICANT CHANGE UP (ref 11.5–15.5)
MCHC RBC-ENTMCNC: 27.6 PG — SIGNIFICANT CHANGE UP (ref 27–34)
MCHC RBC-ENTMCNC: 30.9 GM/DL — LOW (ref 32–36)
MCV RBC AUTO: 89.2 FL — SIGNIFICANT CHANGE UP (ref 80–100)
NRBC # BLD: 0 /100 WBCS — SIGNIFICANT CHANGE UP (ref 0–0)
PLATELET # BLD AUTO: 216 K/UL — SIGNIFICANT CHANGE UP (ref 150–400)
POTASSIUM SERPL-MCNC: 3.9 MMOL/L — SIGNIFICANT CHANGE UP (ref 3.5–5.3)
POTASSIUM SERPL-SCNC: 3.9 MMOL/L — SIGNIFICANT CHANGE UP (ref 3.5–5.3)
RBC # BLD: 5.11 M/UL — SIGNIFICANT CHANGE UP (ref 3.8–5.2)
RBC # FLD: 14 % — SIGNIFICANT CHANGE UP (ref 10.3–14.5)
SODIUM SERPL-SCNC: 142 MMOL/L — SIGNIFICANT CHANGE UP (ref 135–145)
WBC # BLD: 10.37 K/UL — SIGNIFICANT CHANGE UP (ref 3.8–10.5)
WBC # FLD AUTO: 10.37 K/UL — SIGNIFICANT CHANGE UP (ref 3.8–10.5)

## 2021-05-16 RX ADMIN — Medication 40 MILLIGRAM(S): at 05:45

## 2021-05-16 RX ADMIN — RIVAROXABAN 20 MILLIGRAM(S): KIT at 18:15

## 2021-05-16 RX ADMIN — Medication 100 MILLIGRAM(S): at 13:17

## 2021-05-16 RX ADMIN — Medication 125 MICROGRAM(S): at 13:17

## 2021-05-16 RX ADMIN — PANTOPRAZOLE SODIUM 40 MILLIGRAM(S): 20 TABLET, DELAYED RELEASE ORAL at 13:17

## 2021-05-16 RX ADMIN — SERTRALINE 50 MILLIGRAM(S): 25 TABLET, FILM COATED ORAL at 19:25

## 2021-05-16 RX ADMIN — QUETIAPINE FUMARATE 25 MILLIGRAM(S): 200 TABLET, FILM COATED ORAL at 18:15

## 2021-05-16 NOTE — PROVIDER CONTACT NOTE (OTHER) - ASSESSMENT
Patient fast asleep, easily arousable, denies chest pain,dizziness and SOB.Vitals BP-121/78, heart rate 63/mt,O 2 sat-94% at room air.

## 2021-05-17 ENCOUNTER — TRANSCRIPTION ENCOUNTER (OUTPATIENT)
Age: 86
End: 2021-05-17

## 2021-05-17 LAB
CULTURE RESULTS: SIGNIFICANT CHANGE UP
CULTURE RESULTS: SIGNIFICANT CHANGE UP
SPECIMEN SOURCE: SIGNIFICANT CHANGE UP
SPECIMEN SOURCE: SIGNIFICANT CHANGE UP

## 2021-05-17 RX ADMIN — Medication 100 MILLIGRAM(S): at 05:14

## 2021-05-17 RX ADMIN — HALOPERIDOL DECANOATE 1 MILLIGRAM(S): 100 INJECTION INTRAMUSCULAR at 20:59

## 2021-05-17 RX ADMIN — RIVAROXABAN 20 MILLIGRAM(S): KIT at 17:38

## 2021-05-17 RX ADMIN — QUETIAPINE FUMARATE 25 MILLIGRAM(S): 200 TABLET, FILM COATED ORAL at 17:37

## 2021-05-17 RX ADMIN — HALOPERIDOL DECANOATE 1 MILLIGRAM(S): 100 INJECTION INTRAMUSCULAR at 02:40

## 2021-05-17 RX ADMIN — PANTOPRAZOLE SODIUM 40 MILLIGRAM(S): 20 TABLET, DELAYED RELEASE ORAL at 07:00

## 2021-05-17 RX ADMIN — SERTRALINE 50 MILLIGRAM(S): 25 TABLET, FILM COATED ORAL at 22:23

## 2021-05-17 RX ADMIN — Medication 40 MILLIGRAM(S): at 05:14

## 2021-05-17 RX ADMIN — Medication 125 MICROGRAM(S): at 05:14

## 2021-05-17 NOTE — PROVIDER CONTACT NOTE (OTHER) - ACTION/TREATMENT ORDERED:
PA notified and aware.  1 mg haldol IVP x1 dose ordered.  Continue to monitor patient and maintain safety
Place R2 pads at bedside,Will continue to monitor.
NP notified and aware. Give PRN haldol for agitation, continue enhanced supervision.

## 2021-05-17 NOTE — PROVIDER CONTACT NOTE (OTHER) - SITUATION
Heart rate down to low as 47 while  asleep asymptomatic.
Patient agitated and combative
PROVIDER:[TOKEN:[[id number]],FOLLOWUP:[1-3 Days]]
Pt. confused, pushing at staff and stating she "wants to leave".

## 2021-05-17 NOTE — PROVIDER CONTACT NOTE (OTHER) - ASSESSMENT
Pt. A&Ox3, confused, pushing at staff and trying to get up stating "they will take me home" and "I'm leaving". Pt. VSS Pt. A&Ox3, confused, pushing at staff and trying to get up stating "they will take me home" and "I'm leaving". Pt. VSS temp 97.6, HR 82, /79, RR 18, Pulse ox 97% on room air.

## 2021-05-17 NOTE — PROVIDER CONTACT NOTE (OTHER) - REASON
Pt. confused, pushing at staff and stating she "wants to leave".
Heart rate down to low as 47 while  asleep asymptomatic.
Patient agitated and combative

## 2021-05-17 NOTE — PROVIDER CONTACT NOTE (OTHER) - BACKGROUND
Patient admitted with AMS, s/p fall
Patient admitted for s/p fall at home.  PMH of DM, AFib, CHF, dementia
Pt. came in s/p fall at home, admitted with syncope and collapse. PMH dementia, HTN, chronic afib.

## 2021-05-17 NOTE — DISCHARGE NOTE NURSING/CASE MANAGEMENT/SOCIAL WORK - PATIENT PORTAL LINK FT
You can access the FollowMyHealth Patient Portal offered by [facility] by registering at the following website: [email or web address]. By joining [facility]’s FollowMyHealth portal, you will also be able to view your health information using other applications (apps) compatible with our system.

## 2021-05-18 LAB
ANION GAP SERPL CALC-SCNC: 16 MMOL/L — SIGNIFICANT CHANGE UP (ref 5–17)
BUN SERPL-MCNC: 48 MG/DL — HIGH (ref 7–23)
CALCIUM SERPL-MCNC: 9.9 MG/DL — SIGNIFICANT CHANGE UP (ref 8.4–10.5)
CHLORIDE SERPL-SCNC: 99 MMOL/L — SIGNIFICANT CHANGE UP (ref 96–108)
CO2 SERPL-SCNC: 23 MMOL/L — SIGNIFICANT CHANGE UP (ref 22–31)
CREAT SERPL-MCNC: 1.08 MG/DL — SIGNIFICANT CHANGE UP (ref 0.5–1.3)
GLUCOSE SERPL-MCNC: 142 MG/DL — HIGH (ref 70–99)
HCT VFR BLD CALC: 44.4 % — SIGNIFICANT CHANGE UP (ref 34.5–45)
HGB BLD-MCNC: 13.9 G/DL — SIGNIFICANT CHANGE UP (ref 11.5–15.5)
MCHC RBC-ENTMCNC: 27.9 PG — SIGNIFICANT CHANGE UP (ref 27–34)
MCHC RBC-ENTMCNC: 31.3 GM/DL — LOW (ref 32–36)
MCV RBC AUTO: 89 FL — SIGNIFICANT CHANGE UP (ref 80–100)
NRBC # BLD: 0 /100 WBCS — SIGNIFICANT CHANGE UP (ref 0–0)
PLATELET # BLD AUTO: 210 K/UL — SIGNIFICANT CHANGE UP (ref 150–400)
POTASSIUM SERPL-MCNC: 3.4 MMOL/L — LOW (ref 3.5–5.3)
POTASSIUM SERPL-SCNC: 3.4 MMOL/L — LOW (ref 3.5–5.3)
RBC # BLD: 4.99 M/UL — SIGNIFICANT CHANGE UP (ref 3.8–5.2)
RBC # FLD: 14.1 % — SIGNIFICANT CHANGE UP (ref 10.3–14.5)
SODIUM SERPL-SCNC: 138 MMOL/L — SIGNIFICANT CHANGE UP (ref 135–145)
WBC # BLD: 11.67 K/UL — HIGH (ref 3.8–10.5)
WBC # FLD AUTO: 11.67 K/UL — HIGH (ref 3.8–10.5)

## 2021-05-18 PROCEDURE — 93880 EXTRACRANIAL BILAT STUDY: CPT | Mod: 26

## 2021-05-18 RX ORDER — POTASSIUM CHLORIDE 20 MEQ
20 PACKET (EA) ORAL
Refills: 0 | Status: COMPLETED | OUTPATIENT
Start: 2021-05-18 | End: 2021-05-18

## 2021-05-18 RX ADMIN — RIVAROXABAN 20 MILLIGRAM(S): KIT at 17:07

## 2021-05-18 RX ADMIN — Medication 20 MILLIEQUIVALENT(S): at 23:29

## 2021-05-18 RX ADMIN — Medication 20 MILLIEQUIVALENT(S): at 17:07

## 2021-05-18 RX ADMIN — Medication 100 MILLIGRAM(S): at 09:00

## 2021-05-18 RX ADMIN — QUETIAPINE FUMARATE 25 MILLIGRAM(S): 200 TABLET, FILM COATED ORAL at 17:07

## 2021-05-18 RX ADMIN — SERTRALINE 50 MILLIGRAM(S): 25 TABLET, FILM COATED ORAL at 21:45

## 2021-05-18 RX ADMIN — Medication 125 MICROGRAM(S): at 09:00

## 2021-05-18 RX ADMIN — Medication 20 MILLIEQUIVALENT(S): at 21:45

## 2021-05-19 VITALS
HEART RATE: 68 BPM | SYSTOLIC BLOOD PRESSURE: 103 MMHG | DIASTOLIC BLOOD PRESSURE: 71 MMHG | OXYGEN SATURATION: 95 % | TEMPERATURE: 98 F | RESPIRATION RATE: 18 BRPM

## 2021-05-19 LAB
ANION GAP SERPL CALC-SCNC: 16 MMOL/L — SIGNIFICANT CHANGE UP (ref 5–17)
BUN SERPL-MCNC: 40 MG/DL — HIGH (ref 7–23)
CALCIUM SERPL-MCNC: 10.1 MG/DL — SIGNIFICANT CHANGE UP (ref 8.4–10.5)
CHLORIDE SERPL-SCNC: 104 MMOL/L — SIGNIFICANT CHANGE UP (ref 96–108)
CO2 SERPL-SCNC: 21 MMOL/L — LOW (ref 22–31)
CREAT SERPL-MCNC: 0.86 MG/DL — SIGNIFICANT CHANGE UP (ref 0.5–1.3)
GLUCOSE SERPL-MCNC: 133 MG/DL — HIGH (ref 70–99)
HCT VFR BLD CALC: 43.5 % — SIGNIFICANT CHANGE UP (ref 34.5–45)
HGB BLD-MCNC: 13.7 G/DL — SIGNIFICANT CHANGE UP (ref 11.5–15.5)
MAGNESIUM SERPL-MCNC: 2.3 MG/DL — SIGNIFICANT CHANGE UP (ref 1.6–2.6)
MCHC RBC-ENTMCNC: 27.8 PG — SIGNIFICANT CHANGE UP (ref 27–34)
MCHC RBC-ENTMCNC: 31.5 GM/DL — LOW (ref 32–36)
MCV RBC AUTO: 88.2 FL — SIGNIFICANT CHANGE UP (ref 80–100)
NRBC # BLD: 0 /100 WBCS — SIGNIFICANT CHANGE UP (ref 0–0)
PLATELET # BLD AUTO: 208 K/UL — SIGNIFICANT CHANGE UP (ref 150–400)
POTASSIUM SERPL-MCNC: 4.6 MMOL/L — SIGNIFICANT CHANGE UP (ref 3.5–5.3)
POTASSIUM SERPL-SCNC: 4.6 MMOL/L — SIGNIFICANT CHANGE UP (ref 3.5–5.3)
RBC # BLD: 4.93 M/UL — SIGNIFICANT CHANGE UP (ref 3.8–5.2)
RBC # FLD: 14 % — SIGNIFICANT CHANGE UP (ref 10.3–14.5)
SARS-COV-2 RNA SPEC QL NAA+PROBE: SIGNIFICANT CHANGE UP
SODIUM SERPL-SCNC: 141 MMOL/L — SIGNIFICANT CHANGE UP (ref 135–145)
WBC # BLD: 10.85 K/UL — HIGH (ref 3.8–10.5)
WBC # FLD AUTO: 10.85 K/UL — HIGH (ref 3.8–10.5)

## 2021-05-19 RX ORDER — QUETIAPINE FUMARATE 200 MG/1
1 TABLET, FILM COATED ORAL
Qty: 0 | Refills: 0 | DISCHARGE
Start: 2021-05-19

## 2021-05-19 RX ADMIN — Medication 100 MILLIGRAM(S): at 05:44

## 2021-05-19 RX ADMIN — PANTOPRAZOLE SODIUM 40 MILLIGRAM(S): 20 TABLET, DELAYED RELEASE ORAL at 05:44

## 2021-05-19 RX ADMIN — QUETIAPINE FUMARATE 25 MILLIGRAM(S): 200 TABLET, FILM COATED ORAL at 09:33

## 2021-05-19 RX ADMIN — Medication 125 MICROGRAM(S): at 05:44

## 2021-05-19 NOTE — PROGRESS NOTE ADULT - PROBLEM SELECTOR PLAN 1
Monitor on tele  check orthostatics when possible.  psych consulted  Neuro consolute
Monitor on tele  check orthostatics when possible.  psych consult
Monitor on tele  check orthostatics when possible.  psych consulted  Neuro consolute

## 2021-05-19 NOTE — DIETITIAN INITIAL EVALUATION ADULT. - OTHER INFO
Intake : >75%   Denies nausea/vomit :?  Denies difficulty chewing /swallow :?  Denies diarrhea/constipation:?  Last BM : 5/18  NKFA   Ht: 162.6cm  Ht taken from dosing  Dosing ht: 162.6cm  Dosing wt: 72.6kg  BMI: 28.2  BMI calculated using wt from flow sheet  BMI calculated using ht from dosing  Education Provided : N/A-pt disoriented  pressure injury: none

## 2021-05-19 NOTE — PROGRESS NOTE ADULT - PROBLEM SELECTOR PROBLEM 3
Abnormal thyroid exam

## 2021-05-19 NOTE — DIETITIAN INITIAL EVALUATION ADULT. - PERTINENT MEDS FT
MEDICATIONS  (STANDING):  digoxin     Tablet 125 MICROGram(s) Oral daily  metoprolol succinate  milliGRAM(s) Oral daily  pantoprazole    Tablet 40 milliGRAM(s) Oral before breakfast  QUEtiapine 25 milliGRAM(s) Oral <User Schedule>  rivaroxaban 20 milliGRAM(s) Oral with dinner  sertraline 50 milliGRAM(s) Oral daily    MEDICATIONS  (PRN):  diVALproex Sprinkle 125 milliGRAM(s) Oral every 6 hours PRN agitation  haloperidol    Injectable 1 milliGRAM(s) IV Push every 6 hours PRN severe agitation

## 2021-05-19 NOTE — PROGRESS NOTE ADULT - PROBLEM SELECTOR PLAN 3
Endocrine consult.
Endocrine consult.
Endocrine consult.  Thyroid sono
Endocrine consult.
Endocrine consult.  Thyroid sono

## 2021-05-19 NOTE — PROGRESS NOTE ADULT - PROVIDER SPECIALTY LIST ADULT
Internal Medicine
Cardiology
Internal Medicine
Cardiology

## 2021-05-19 NOTE — PROGRESS NOTE ADULT - NSICDXPILOT_GEN_ALL_CORE
[place]

## 2021-05-19 NOTE — PROGRESS NOTE ADULT - ASSESSMENT
88 yo female hx diabetes, afib on eliquis, heart failure presents to the ED s/p unwitnessed fall at home. Pt lives alone, per EMS, family called because home health aide that comes to house found patient face down on floor upon arrival. Pt unclear on details around fall, but does states her health aide found her on floor but she doesn't remember what caused her to fall. Collateral obtained from daughter over the phone, reports pt has hx of dementia and has had slow decline over the last 2 months, lives alone and ambulates w/ walker. Additionally reports has had multiple falls recently where "legs are giving out". Home health aide came today and found her reportedly awake facedown on the flood, unclear how long pt was on floor for, last seen at 7PM yesterday by daughter-in-law. Pt states she "hasn't felt well recently" Denies cp, sob, n/v/d, abd pain, headache, neck pain, numbness/tingling.     Problem/Plan - 1:  ·  Problem: Syncope and collapse.  Plan: Monitor on tele  orthostatics 5/12 neg  psych consulted  Neuro c/s noted : MRI with no acute infarct , + Posterior fossa meningioma.  neuro f/u,  NSx consult ?  Echo with grossly nl LV sys fx , no sign valvular disease      Problem/Plan - 2:  ·  Problem: Afib.  Plan: rate controlled   cont with Metoprolol as ordered.   Xarelto  fall precautions.      Problem/Plan - 3:  ·  Problem: Abnormal thyroid exam.  Plan: Endocrine f/u, awaiting  thyroid sono,   med following   .      Problem/Plan - 4:  ·  Problem: Diastolic Heart failure.  Plan: DC lasix -- euvolemic   stable.   Echo with grossly nl LV sys fx    
89 yo female hx diabetes, afib on eliquis, heart failure presents to the ED s/p unwitnessed fall at home. Pt lives alone, per EMS, family called because home health aide that comes to house found patient face down on floor upon arrival. Pt unclear on details around fall, but does states her health aide found her on floor but she doesn't remember what caused her to fall. Collateral obtained from daughter over the phone, reports pt has hx of dementia and has had slow decline over the last 2 months, lives alone and ambulates w/ walker. Additionally reports has had multiple falls recently where "legs are giving out". Home health aide came today and found her reportedly awake facedown on the flood, unclear how long pt was on floor for, last seen at 7PM yesterday by daughter-in-law. Pt states she "hasn't felt well recently" Denies cp, sob, n/v/d, abd pain, headache, neck pain, numbness/tingling.    PLAN: ptn appears stable,   seen by Neuro, on seroquel to 25 mg bid, and  Depakote 125 mg q6H prn agitation( hasnt needed any prn agitation meds)  MRI head shows posterior fossa meningioma. consider NSx consult  no events on Tele, benign TTE and carotids doppler  thyromegaly on Ct chest, awaiting thyroid sono, benign TFTs  not orthostatic  acute on chronic CHF, now resolved,  s/p IV LASIX as per cardiology  PT eval  dvt ppx not necessary, ptn is on chronic AC
88 yo female hx diabetes, afib on eliquis, heart failure presents to the ED s/p unwitnessed fall at home. Pt lives alone, per EMS, family called because home health aide that comes to house found patient face down on floor upon arrival. Pt unclear on details around fall, but does states her health aide found her on floor but she doesn't remember what caused her to fall. Collateral obtained from daughter over the phone, reports pt has hx of dementia and has had slow decline over the last 2 months, lives alone and ambulates w/ walker. Additionally reports has had multiple falls recently where "legs are giving out". Home health aide came today and found her reportedly awake facedown on the flood, unclear how long pt was on floor for, last seen at 7PM yesterday by daughter-in-law. Pt states she "hasn't felt well recently" Denies cp, sob, n/v/d, abd pain, headache, neck pain, numbness/tingling.    PLAN: ptn appears stable,   seen by Neuro, on seroquel to 25 mg bid, and  Depakote 125 mg q6H prn agitation( hasnt needed any prn agitation meds)  MRI head shows posterior fossa meningioma. consider NSx consult  no events on Tele, awaiting TTE and carotids doppler  thyromegaly on Ct chest, get thyroid sono, check TFTs  not orthostatic  acute on chronic CHF, on IV LASIX as per cardiology  PT eval  dvt ppx not necessary, ptn is on chronic AC
88 yo female hx diabetes, afib on eliquis, heart failure presents to the ED s/p unwitnessed fall at home. Pt lives alone, per EMS, family called because home health aide that comes to house found patient face down on floor upon arrival. Pt unclear on details around fall, but does states her health aide found her on floor but she doesn't remember what caused her to fall. Collateral obtained from daughter over the phone, reports pt has hx of dementia and has had slow decline over the last 2 months, lives alone and ambulates w/ walker. Additionally reports has had multiple falls recently where "legs are giving out". Home health aide came today and found her reportedly awake facedown on the flood, unclear how long pt was on floor for, last seen at 7PM yesterday by daughter-in-law. Pt states she "hasn't felt well recently" Denies cp, sob, n/v/d, abd pain, headache, neck pain, numbness/tingling.    PLAN: ptn appears stable,   seen by Neuro, on seroquel to 25 mg bid, and  Depakote 125 mg q6H prn agitation( hasnt needed any prn agitation meds)  MRI head shows posterior fossa meningioma. consider NSx consult  no events on Tele, benign TTE and carotids doppler  thyromegaly on Ct chest, awaiting thyroid sono, benign TFTs  not orthostatic  acute on chronic CHF, now resolved,  s/p IV LASIX as per cardiology  PT eval  dvt ppx not necessary, ptn is on chronic AC  awaiting [facility] tomorrow, post rehab will need 24 hr care set up at home
88 yo female hx diabetes, afib on eliquis, heart failure presents to the ED s/p unwitnessed fall at home. Pt lives alone, per EMS, family called because home health aide that comes to house found patient face down on floor upon arrival. Pt unclear on details around fall, but does states her health aide found her on floor but she doesn't remember what caused her to fall. Collateral obtained from daughter over the phone, reports pt has hx of dementia and has had slow decline over the last 2 months, lives alone and ambulates w/ walker. Additionally reports has had multiple falls recently where "legs are giving out". Home health aide came today and found her reportedly awake facedown on the flood, unclear how long pt was on floor for, last seen at 7PM yesterday by daughter-in-law. Pt states she "hasn't felt well recently" Denies cp, sob, n/v/d, abd pain, headache, neck pain, numbness/tingling.    PLAN: ptn appears stable,  no events on Tele, awaiting TTE and carotids doppler  thyromegaly on Ct chest, get thyroid sono, check TFTs  not orthostatic  acute on chronic CHF, on IV LASIX as per cardiology  PT eval  dvt ppx not necessary, ptn is on chronic AC
89 yo female hx diabetes, afib on eliquis, heart failure presents to the ED s/p unwitnessed fall at home. Pt lives alone, per EMS, family called because home health aide that comes to house found patient face down on floor upon arrival. Pt unclear on details around fall, but does states her health aide found her on floor but she doesn't remember what caused her to fall. Collateral obtained from daughter over the phone, reports pt has hx of dementia and has had slow decline over the last 2 months, lives alone and ambulates w/ walker. Additionally reports has had multiple falls recently where "legs are giving out". Home health aide came today and found her reportedly awake facedown on the flood, unclear how long pt was on floor for, last seen at 7PM yesterday by daughter-in-law. Pt states she "hasn't felt well recently" Denies cp, sob, n/v/d, abd pain, headache, neck pain, numbness/tingling.    PLAN: ptn appears stable,   seen by Neuro, on seroquel to 25 mg bid, and  Depakote 125 mg q6H prn agitation( hasnt needed any prn agitation meds)  MRI head shows posterior fossa meningioma. consider NSx consult  no events on Tele, awaiting TTE and carotids doppler  thyromegaly on Ct chest, get thyroid sono, check TFTs  not orthostatic  acute on chronic CHF, on IV LASIX as per cardiology  PT eval  dvt ppx not necessary, ptn is on chronic AC
89 yo female hx diabetes, afib on eliquis, heart failure presents to the ED s/p unwitnessed fall at home. Pt lives alone, per EMS, family called because home health aide that comes to house found patient face down on floor upon arrival. Pt unclear on details around fall, but does states her health aide found her on floor but she doesn't remember what caused her to fall. Collateral obtained from daughter over the phone, reports pt has hx of dementia and has had slow decline over the last 2 months, lives alone and ambulates w/ walker. Additionally reports has had multiple falls recently where "legs are giving out". Home health aide came today and found her reportedly awake facedown on the flood, unclear how long pt was on floor for, last seen at 7PM yesterday by daughter-in-law. Pt states she "hasn't felt well recently" Denies cp, sob, n/v/d, abd pain, headache, neck pain, numbness/tingling.    PLAN: ptn appears stable, but agitated overnight  seen by Neuro, recommend to raise seroquel to 25 mg bid, will also add Depakote 125 mg q6H prn agitation  no events on Tele, awaiting TTE and carotids doppler  thyromegaly on Ct chest, get thyroid sono, check TFTs  not orthostatic  acute on chronic CHF, on IV LASIX as per cardiology  PT eval  dvt ppx not necessary, ptn is on chronic AC
88 yo female hx diabetes, afib on eliquis, heart failure presents to the ED s/p unwitnessed fall at home. Pt lives alone, per EMS, family called because home health aide that comes to house found patient face down on floor upon arrival. Pt unclear on details around fall, but does states her health aide found her on floor but she doesn't remember what caused her to fall. Collateral obtained from daughter over the phone, reports pt has hx of dementia and has had slow decline over the last 2 months, lives alone and ambulates w/ walker. Additionally reports has had multiple falls recently where "legs are giving out". Home health aide came today and found her reportedly awake facedown on the flood, unclear how long pt was on floor for, last seen at 7PM yesterday by daughter-in-law. Pt states she "hasn't felt well recently" Denies cp, sob, n/v/d, abd pain, headache, neck pain, numbness/tingling.    PLAN: ptn appears stable,   seen by Neuro, on seroquel to 25 mg bid, and  Depakote 125 mg q6H prn agitation( hasnt needed any prn agitation meds)  MRI head shows posterior fossa meningioma. consider NSx consult  no events on Tele, benign TTE and carotids doppler  thyromegaly on Ct chest, awaiting thyroid sono, benign TFTs  not orthostatic  acute on chronic CHF, now resolved,  s/p IV LASIX as per cardiology  PT eval  dvt ppx not necessary, ptn is on chronic AC  awaiting DC to [facility] today, post rehab will need 24 hr care set up at home
89 yo female hx diabetes, afib on eliquis, heart failure presents to the ED s/p unwitnessed fall at home. Pt lives alone, per EMS, family called because home health aide that comes to house found patient face down on floor upon arrival. Pt unclear on details around fall, but does states her health aide found her on floor but she doesn't remember what caused her to fall. Collateral obtained from daughter over the phone, reports pt has hx of dementia and has had slow decline over the last 2 months, lives alone and ambulates w/ walker. Additionally reports has had multiple falls recently where "legs are giving out". Home health aide came today and found her reportedly awake facedown on the flood, unclear how long pt was on floor for, last seen at 7PM yesterday by daughter-in-law. Pt states she "hasn't felt well recently" Denies cp, sob, n/v/d, abd pain, headache, neck pain, numbness/tingling.
88 yo female hx diabetes, afib on eliquis, heart failure presents to the ED s/p unwitnessed fall at home. Pt lives alone, per EMS, family called because home health aide that comes to house found patient face down on floor upon arrival. Pt unclear on details around fall, but does states her health aide found her on floor but she doesn't remember what caused her to fall. Collateral obtained from daughter over the phone, reports pt has hx of dementia and has had slow decline over the last 2 months, lives alone and ambulates w/ walker. Additionally reports has had multiple falls recently where "legs are giving out". Home health aide came today and found her reportedly awake facedown on the flood, unclear how long pt was on floor for, last seen at 7PM yesterday by daughter-in-law. Pt states she "hasn't felt well recently" Denies cp, sob, n/v/d, abd pain, headache, neck pain, numbness/tingling.

## 2021-05-19 NOTE — PROGRESS NOTE ADULT - PROBLEM SELECTOR PLAN 2
rate controlled   cont with Metoprolol as ordered.   Xarelto  fall precautions

## 2021-05-19 NOTE — PROGRESS NOTE ADULT - REASON FOR ADMISSION
syncope

## 2021-05-19 NOTE — DIETITIAN INITIAL EVALUATION ADULT. - OTHER CALCULATIONS
needs based on current flow sheet weight. calorie needs based on lower range due to BMI in overweight range.

## 2021-05-19 NOTE — PROGRESS NOTE ADULT - TIME BILLING
Advanced care planning was discussed with patient and family.  Advanced care planning forms were reviewed and discussed as appropriate.  Differential diagnosis and plan of care discussed with patient after the evaluation.   Pain assessed and judicious use of narcotics when appropriate was discussed.  Importance of Fall prevention discussed.  Counseling on Smoking and Alcohol cessation was offered when appropriate.  Counseling on Diet, exercise, and medication compliance was done.
agree with above  cv stable  cont bb/AC
Advanced care planning was discussed with patient and family.  Advanced care planning forms were reviewed and discussed as appropriate.  Differential diagnosis and plan of care discussed with patient after the evaluation.   Pain assessed and judicious use of narcotics when appropriate was discussed.  Importance of Fall prevention discussed.  Counseling on Smoking and Alcohol cessation was offered when appropriate.  Counseling on Diet, exercise, and medication compliance was done.

## 2021-05-19 NOTE — DIETITIAN INITIAL EVALUATION ADULT. - PERTINENT LABORATORY DATA
05-19 @ 06:07: Na 141, BUN 40<H>, Cr 0.86, <H>, K+ 4.6, Phos --, Mg 2.3, Alk Phos --, ALT/SGPT --, AST/SGOT --, HbA1c --

## 2021-05-19 NOTE — PROGRESS NOTE ADULT - SUBJECTIVE AND OBJECTIVE BOX
Patient is a 89y old  Female who presents with a chief complaint of syncope (13 May 2021 08:37)      SUBJECTIVE / OVERNIGHT EVENTS: no new c/o    MEDICATIONS  (STANDING):  digoxin     Tablet 125 MICROGram(s) Oral daily  furosemide   Injectable 40 milliGRAM(s) IV Push daily  metoprolol succinate  milliGRAM(s) Oral daily  pantoprazole    Tablet 40 milliGRAM(s) Oral before breakfast  potassium chloride   Powder 20 milliEquivalent(s) Oral once  QUEtiapine 12.5 milliGRAM(s) Oral at bedtime  sertraline 50 milliGRAM(s) Oral daily    MEDICATIONS  (PRN):  haloperidol    Injectable 1 milliGRAM(s) IV Push every 6 hours PRN severe agitation      Vital Signs Last 24 Hrs  T(F): 97.9 (21 @ 11:58), Max: 97.9 (21 @ 11:58)  HR: 89 (21 @ 11:58) (82 - 90)  BP: 122/76 (21 @ 11:58) (103/67 - 123/70)  RR: 20 (21 @ 11:58) (18 - 20)  SpO2: 93% (21 @ 11:58) (93% - 95%)  Telemetry:   CAPILLARY BLOOD GLUCOSE      POCT Blood Glucose.: 166 mg/dL (13 May 2021 13:04)  POCT Blood Glucose.: 125 mg/dL (12 May 2021 21:57)    I&O's Summary    13 May 2021 07:01  -  13 May 2021 19:52  --------------------------------------------------------  IN: 410 mL / OUT: 750 mL / NET: -340 mL        PHYSICAL EXAM:  GENERAL: NAD, well-developed  HEAD:  Atraumatic, Normocephalic  EYES: EOMI, PERRLA, conjunctiva and sclera clear  NECK: Supple, No JVD  CHEST/LUNG: Clear to auscultation bilaterally; No wheeze  HEART: Regular rate and rhythm; No murmurs, rubs, or gallops  ABDOMEN: Soft, Nontender, Nondistended; Bowel sounds present  EXTREMITIES:  2+ Peripheral Pulses, No clubbing, cyanosis, or edema  PSYCH: AAOx3  NEUROLOGY: non-focal  SKIN: No rashes or lesions    LABS:                        13.8   10.39 )-----------( 218      ( 13 May 2021 06:30 )             43.8         141  |  101  |  24<H>  ----------------------------<  106<H>  3.8   |  25  |  0.96    Ca    9.9      13 May 2021 06:30  Mg     2.3         TPro  7.2  /  Alb  4.3  /  TBili  1.0  /  DBili  x   /  AST  31  /  ALT  17  /  AlkPhos  100  05-12    PT/INR - ( 12 May 2021 10:42 )   PT: 15.8 sec;   INR: 1.33 ratio         PTT - ( 12 May 2021 10:42 )  PTT:30.4 sec  CARDIAC MARKERS ( 12 May 2021 10:42 )  x     / x     / 87 U/L / x     / x          Urinalysis Basic - ( 12 May 2021 11:42 )    Color: Yellow / Appearance: Clear / S.017 / pH: x  Gluc: x / Ketone: Negative  / Bili: Negative / Urobili: Negative   Blood: x / Protein: Trace / Nitrite: Negative   Leuk Esterase: Negative / RBC: 4 /hpf / WBC 0 /HPF   Sq Epi: x / Non Sq Epi: 1 /hpf / Bacteria: Negative        RADIOLOGY & ADDITIONAL TESTS:    Imaging Personally Reviewed:    Consultant(s) Notes Reviewed:      Care Discussed with Consultants/Other Providers:  
Patient is a 89y old  Female who presents with a chief complaint of syncope (15 May 2021 21:53)      SUBJECTIVE / OVERNIGHT EVENTS: ptn is calm, not verbal    MEDICATIONS  (STANDING):  digoxin     Tablet 125 MICROGram(s) Oral daily  furosemide   Injectable 40 milliGRAM(s) IV Push daily  metoprolol succinate  milliGRAM(s) Oral daily  pantoprazole    Tablet 40 milliGRAM(s) Oral before breakfast  QUEtiapine 25 milliGRAM(s) Oral <User Schedule>  rivaroxaban 20 milliGRAM(s) Oral with dinner  sertraline 50 milliGRAM(s) Oral daily    MEDICATIONS  (PRN):  diVALproex Sprinkle 125 milliGRAM(s) Oral every 6 hours PRN agitation  haloperidol    Injectable 1 milliGRAM(s) IV Push every 6 hours PRN severe agitation      Vital Signs Last 24 Hrs  T(F): 97.3 (05-15-21 @ 20:33), Max: 97.8 (05-15-21 @ 05:40)  HR: 80 (05-15-21 @ 20:33) (66 - 82)  BP: 107/66 (05-15-21 @ 20:33) (107/66 - 129/85)  RR: 18 (05-15-21 @ 20:33) (18 - 18)  SpO2: 94% (05-15-21 @ 20:33) (93% - 94%)  Telemetry:   CAPILLARY BLOOD GLUCOSE        I&O's Summary    14 May 2021 07:01  -  15 May 2021 07:00  --------------------------------------------------------  IN: 720 mL / OUT: 0 mL / NET: 720 mL    15 May 2021 07:01  -  15 May 2021 22:43  --------------------------------------------------------  IN: 900 mL / OUT: 0 mL / NET: 900 mL        PHYSICAL EXAM:  GENERAL: NAD, well-developed  HEAD:  Atraumatic, Normocephalic  EYES: EOMI, PERRLA, conjunctiva and sclera clear  NECK: Supple, No JVD  CHEST/LUNG: Clear to auscultation bilaterally; No wheeze  HEART: Regular rate and rhythm; No murmurs, rubs, or gallops  ABDOMEN: Soft, Nontender, Nondistended; Bowel sounds present  EXTREMITIES:  2+ Peripheral Pulses, No clubbing, cyanosis, or edema  PSYCH: AAOx3  NEUROLOGY: non-focal  SKIN: No rashes or lesions    LABS:                        13.8   12.11 )-----------( 218      ( 15 May 2021 06:36 )             43.5     05-15    138  |  98  |  39<H>  ----------------------------<  151<H>  3.3<L>   |  25  |  1.22    Ca    9.8      15 May 2021 06:36  Mg     2.4     05-14                RADIOLOGY & ADDITIONAL TESTS:    Imaging Personally Reviewed:    Consultant(s) Notes Reviewed:      Care Discussed with Consultants/Other Providers:  
Patient is a 89y old  Female who presents with a chief complaint of syncope (16 May 2021 08:25)      SUBJECTIVE / OVERNIGHT EVENTS: no new developments    MEDICATIONS  (STANDING):  digoxin     Tablet 125 MICROGram(s) Oral daily  furosemide   Injectable 40 milliGRAM(s) IV Push daily  metoprolol succinate  milliGRAM(s) Oral daily  pantoprazole    Tablet 40 milliGRAM(s) Oral before breakfast  QUEtiapine 25 milliGRAM(s) Oral <User Schedule>  rivaroxaban 20 milliGRAM(s) Oral with dinner  sertraline 50 milliGRAM(s) Oral daily    MEDICATIONS  (PRN):  diVALproex Sprinkle 125 milliGRAM(s) Oral every 6 hours PRN agitation  haloperidol    Injectable 1 milliGRAM(s) IV Push every 6 hours PRN severe agitation      Vital Signs Last 24 Hrs  T(F): 97.8 (05-16-21 @ 14:38), Max: 97.8 (05-16-21 @ 14:38)  HR: 85 (05-16-21 @ 14:38) (74 - 85)  BP: 106/70 (05-16-21 @ 14:38) (106/70 - 135/79)  RR: 18 (05-16-21 @ 14:38) (18 - 18)  SpO2: 96% (05-16-21 @ 14:38) (94% - 96%)  Telemetry:   CAPILLARY BLOOD GLUCOSE        I&O's Summary    15 May 2021 07:01  -  16 May 2021 07:00  --------------------------------------------------------  IN: 900 mL / OUT: 0 mL / NET: 900 mL    16 May 2021 07:01  -  16 May 2021 18:27  --------------------------------------------------------  IN: 0 mL / OUT: 700 mL / NET: -700 mL        PHYSICAL EXAM:  GENERAL: NAD, well-developed  HEAD:  Atraumatic, Normocephalic  EYES: EOMI, PERRLA, conjunctiva and sclera clear  NECK: Supple, No JVD  CHEST/LUNG: Clear to auscultation bilaterally; No wheeze  HEART: Regular rate and rhythm; No murmurs, rubs, or gallops  ABDOMEN: Soft, Nontender, Nondistended; Bowel sounds present  EXTREMITIES:  2+ Peripheral Pulses, No clubbing, cyanosis, or edema  PSYCH: AAOx3  NEUROLOGY: non-focal  SKIN: No rashes or lesions    LABS:                        14.1   10.37 )-----------( 216      ( 16 May 2021 06:44 )             45.6     05-16    142  |  101  |  46<H>  ----------------------------<  172<H>  3.9   |  24  |  1.18    Ca    9.9      16 May 2021 06:43                RADIOLOGY & ADDITIONAL TESTS:    Imaging Personally Reviewed:    Consultant(s) Notes Reviewed:      Care Discussed with Consultants/Other Providers:  
Patient is a 89y old  Female who presents with a chief complaint of syncope (19 May 2021 09:59)      SUBJECTIVE / OVERNIGHT EVENTS: ptn is confused, calm    MEDICATIONS  (STANDING):  digoxin     Tablet 125 MICROGram(s) Oral daily  metoprolol succinate  milliGRAM(s) Oral daily  pantoprazole    Tablet 40 milliGRAM(s) Oral before breakfast  QUEtiapine 25 milliGRAM(s) Oral <User Schedule>  rivaroxaban 20 milliGRAM(s) Oral with dinner  sertraline 50 milliGRAM(s) Oral daily    MEDICATIONS  (PRN):  diVALproex Sprinkle 125 milliGRAM(s) Oral every 6 hours PRN agitation  haloperidol    Injectable 1 milliGRAM(s) IV Push every 6 hours PRN severe agitation      Vital Signs Last 24 Hrs  T(F): 97.5 (05-19-21 @ 12:48), Max: 98.2 (05-19-21 @ 04:52)  HR: 68 (05-19-21 @ 12:48) (68 - 74)  BP: 103/71 (05-19-21 @ 12:48) (103/71 - 116/75)  RR: 18 (05-19-21 @ 12:48) (18 - 18)  SpO2: 95% (05-19-21 @ 12:48) (94% - 95%)  Telemetry:   CAPILLARY BLOOD GLUCOSE        I&O's Summary    18 May 2021 07:01  -  19 May 2021 07:00  --------------------------------------------------------  IN: 480 mL / OUT: 200 mL / NET: 280 mL    19 May 2021 07:01  -  19 May 2021 19:42  --------------------------------------------------------  IN: 360 mL / OUT: 0 mL / NET: 360 mL        PHYSICAL EXAM:  GENERAL: NAD, well-developed  HEAD:  Atraumatic, Normocephalic  EYES: EOMI, PERRLA, conjunctiva and sclera clear  NECK: Supple, No JVD  CHEST/LUNG: Clear to auscultation bilaterally; No wheeze  HEART: Regular rate and rhythm; No murmurs, rubs, or gallops  ABDOMEN: Soft, Nontender, Nondistended; Bowel sounds present  EXTREMITIES:  2+ Peripheral Pulses, No clubbing, cyanosis, or edema  PSYCH: AAOx3  NEUROLOGY: non-focal  SKIN: No rashes or lesions    LABS:                        13.7   10.85 )-----------( 208      ( 19 May 2021 06:11 )             43.5     05-19    141  |  104  |  40<H>  ----------------------------<  133<H>  4.6   |  21<L>  |  0.86    Ca    10.1      19 May 2021 06:07  Mg     2.3     05-19                RADIOLOGY & ADDITIONAL TESTS:    Imaging Personally Reviewed:    Consultant(s) Notes Reviewed:      Care Discussed with Consultants/Other Providers:  
Patient is a 89y old  Female who presents with a chief complaint of syncope (14 May 2021 16:13)      SUBJECTIVE / OVERNIGHT EVENTS: agitated overnight, seen by neuro, seroquel dose to be raised to bid    MEDICATIONS  (STANDING):  digoxin     Tablet 125 MICROGram(s) Oral daily  furosemide   Injectable 40 milliGRAM(s) IV Push daily  metoprolol succinate  milliGRAM(s) Oral daily  pantoprazole    Tablet 40 milliGRAM(s) Oral before breakfast  QUEtiapine 12.5 milliGRAM(s) Oral at bedtime  rivaroxaban 20 milliGRAM(s) Oral with dinner  sertraline 50 milliGRAM(s) Oral daily    MEDICATIONS  (PRN):  haloperidol    Injectable 1 milliGRAM(s) IV Push every 6 hours PRN severe agitation      Vital Signs Last 24 Hrs  T(F): 98 (05-14-21 @ 04:33), Max: 98 (05-14-21 @ 04:33)  HR: 79 (05-14-21 @ 12:35) (74 - 92)  BP: 129/83 (05-14-21 @ 12:35) (107/76 - 135/81)  RR: 18 (05-14-21 @ 12:35) (18 - 18)  SpO2: 94% (05-14-21 @ 12:35) (92% - 95%)  Telemetry:   CAPILLARY BLOOD GLUCOSE      POCT Blood Glucose.: 180 mg/dL (14 May 2021 17:28)  POCT Blood Glucose.: 173 mg/dL (14 May 2021 12:56)  POCT Blood Glucose.: 157 mg/dL (14 May 2021 09:13)  POCT Blood Glucose.: 191 mg/dL (13 May 2021 21:35)    I&O's Summary    13 May 2021 07:01  -  14 May 2021 07:00  --------------------------------------------------------  IN: 650 mL / OUT: 750 mL / NET: -100 mL    14 May 2021 07:01  -  14 May 2021 19:58  --------------------------------------------------------  IN: 240 mL / OUT: 0 mL / NET: 240 mL        PHYSICAL EXAM:  GENERAL: NAD, well-developed  HEAD:  Atraumatic, Normocephalic  EYES: EOMI, PERRLA, conjunctiva and sclera clear  NECK: Supple, No JVD  CHEST/LUNG: Clear to auscultation bilaterally; No wheeze  HEART: Regular rate and rhythm; No murmurs, rubs, or gallops  ABDOMEN: Soft, Nontender, Nondistended; Bowel sounds present  EXTREMITIES:  2+ Peripheral Pulses, No clubbing, cyanosis, or edema  PSYCH: AAOx3  NEUROLOGY: non-focal  SKIN: No rashes or lesions    LABS:                        13.8   10.39 )-----------( 218      ( 13 May 2021 06:30 )             43.8     05-14    138  |  99  |  30<H>  ----------------------------<  156<H>  3.7   |  26  |  1.06    Ca    9.8      14 May 2021 07:15  Mg     2.4     05-14                RADIOLOGY & ADDITIONAL TESTS:    Imaging Personally Reviewed:    Consultant(s) Notes Reviewed:      Care Discussed with Consultants/Other Providers:  
Patient is a 89y old  Female who presents with a chief complaint of syncope (17 May 2021 10:41)      SUBJECTIVE / OVERNIGHT EVENTS: ptn is calm    MEDICATIONS  (STANDING):  digoxin     Tablet 125 MICROGram(s) Oral daily  metoprolol succinate  milliGRAM(s) Oral daily  pantoprazole    Tablet 40 milliGRAM(s) Oral before breakfast  QUEtiapine 25 milliGRAM(s) Oral <User Schedule>  rivaroxaban 20 milliGRAM(s) Oral with dinner  sertraline 50 milliGRAM(s) Oral daily    MEDICATIONS  (PRN):  diVALproex Sprinkle 125 milliGRAM(s) Oral every 6 hours PRN agitation  haloperidol    Injectable 1 milliGRAM(s) IV Push every 6 hours PRN severe agitation      Vital Signs Last 24 Hrs  T(F): 97.4 (05-17-21 @ 12:07), Max: 97.6 (05-16-21 @ 20:07)  HR: 65 (05-17-21 @ 12:07) (65 - 85)  BP: 100/67 (05-17-21 @ 12:07) (100/67 - 120/80)  RR: 18 (05-17-21 @ 12:07) (18 - 18)  SpO2: 95% (05-17-21 @ 12:07) (94% - 98%)  Telemetry:   CAPILLARY BLOOD GLUCOSE        I&O's Summary    16 May 2021 07:01  -  17 May 2021 07:00  --------------------------------------------------------  IN: 290 mL / OUT: 700 mL / NET: -410 mL    17 May 2021 07:01  -  17 May 2021 19:52  --------------------------------------------------------  IN: 480 mL / OUT: 1025 mL / NET: -545 mL        PHYSICAL EXAM:  GENERAL: NAD, well-developed  HEAD:  Atraumatic, Normocephalic  EYES: EOMI, PERRLA, conjunctiva and sclera clear  NECK: Supple, No JVD  CHEST/LUNG: Clear to auscultation bilaterally; No wheeze  HEART: Regular rate and rhythm; No murmurs, rubs, or gallops  ABDOMEN: Soft, Nontender, Nondistended; Bowel sounds present  EXTREMITIES:  2+ Peripheral Pulses, No clubbing, cyanosis, or edema  PSYCH: AAOx3  NEUROLOGY: non-focal  SKIN: No rashes or lesions    LABS:                        14.1   10.37 )-----------( 216      ( 16 May 2021 06:44 )             45.6     05-16    142  |  101  |  46<H>  ----------------------------<  172<H>  3.9   |  24  |  1.18    Ca    9.9      16 May 2021 06:43                RADIOLOGY & ADDITIONAL TESTS:    Imaging Personally Reviewed:    Consultant(s) Notes Reviewed:      Care Discussed with Consultants/Other Providers:  
Patient is a 89y old  Female who presents with a chief complaint of syncope (17 May 2021 19:52)      SUBJECTIVE / OVERNIGHT EVENTS: awaiting DC to [facility] tomorrow, post rehab will need 24 hr care set up at home    MEDICATIONS  (STANDING):  digoxin     Tablet 125 MICROGram(s) Oral daily  metoprolol succinate  milliGRAM(s) Oral daily  pantoprazole    Tablet 40 milliGRAM(s) Oral before breakfast  potassium chloride    Tablet ER 20 milliEquivalent(s) Oral every 2 hours  QUEtiapine 25 milliGRAM(s) Oral <User Schedule>  rivaroxaban 20 milliGRAM(s) Oral with dinner  sertraline 50 milliGRAM(s) Oral daily    MEDICATIONS  (PRN):  diVALproex Sprinkle 125 milliGRAM(s) Oral every 6 hours PRN agitation  haloperidol    Injectable 1 milliGRAM(s) IV Push every 6 hours PRN severe agitation      Vital Signs Last 24 Hrs  T(F): 97.4 (05-18-21 @ 20:00), Max: 97.6 (05-18-21 @ 04:28)  HR: 74 (05-18-21 @ 20:00) (59 - 85)  BP: 115/74 (05-18-21 @ 20:00) (104/60 - 126/79)  RR: 18 (05-18-21 @ 20:00) (16 - 18)  SpO2: 95% (05-18-21 @ 20:00) (95% - 97%)  Telemetry:   CAPILLARY BLOOD GLUCOSE        I&O's Summary    17 May 2021 07:01  -  18 May 2021 07:00  --------------------------------------------------------  IN: 600 mL / OUT: 1225 mL / NET: -625 mL    18 May 2021 07:01  -  18 May 2021 20:18  --------------------------------------------------------  IN: 360 mL / OUT: 0 mL / NET: 360 mL        PHYSICAL EXAM:  GENERAL: NAD, well-developed  HEAD:  Atraumatic, Normocephalic  EYES: EOMI, PERRLA, conjunctiva and sclera clear  NECK: Supple, No JVD  CHEST/LUNG: Clear to auscultation bilaterally; No wheeze  HEART: Regular rate and rhythm; No murmurs, rubs, or gallops  ABDOMEN: Soft, Nontender, Nondistended; Bowel sounds present  EXTREMITIES:  2+ Peripheral Pulses, No clubbing, cyanosis, or edema  PSYCH: AAOx3  NEUROLOGY: non-focal  SKIN: No rashes or lesions    LABS:                        13.9   11.67 )-----------( 210      ( 18 May 2021 07:13 )             44.4     05-18    138  |  99  |  48<H>  ----------------------------<  142<H>  3.4<L>   |  23  |  1.08    Ca    9.9      18 May 2021 07:11                RADIOLOGY & ADDITIONAL TESTS:    Imaging Personally Reviewed:    Consultant(s) Notes Reviewed:      Care Discussed with Consultants/Other Providers:  
CARDIOLOGY FOLLOW UP - Dr. Puentes-- Coverage for Dr. Lawson   DATE OF SERVICE: 5/17/21     CC no cp or sob        REVIEW OF SYSTEMS:  CONSTITUTIONAL: No fever, weight loss, or fatigue  RESPIRATORY: No cough, wheezing, chills or hemoptysis; No Shortness of Breath  CARDIOVASCULAR: No chest pain, palpitations, passing out, dizziness, or leg swelling  GASTROINTESTINAL: No abdominal or epigastric pain. No nausea, vomiting, or hematemesis; No diarrhea or constipation. No melena or hematochezia.  VASCULAR: No edema     PHYSICAL EXAM:  T(C): 36.4 (05-17-21 @ 04:50), Max: 36.6 (05-16-21 @ 14:38)  HR: 71 (05-17-21 @ 04:50) (71 - 85)  BP: 120/80 (05-17-21 @ 04:50) (105/72 - 120/80)  RR: 18 (05-17-21 @ 04:50) (18 - 18)  SpO2: 97% (05-17-21 @ 04:50) (94% - 97%)  Wt(kg): --  I&O's Summary    16 May 2021 07:01  -  17 May 2021 07:00  --------------------------------------------------------  IN: 290 mL / OUT: 700 mL / NET: -410 mL    17 May 2021 07:01  -  17 May 2021 10:41  --------------------------------------------------------  IN: 120 mL / OUT: 0 mL / NET: 120 mL        Appearance: Normal	  Cardiovascular: Normal S1 S2, irregular   Respiratory: Lungs clear to auscultation	  Gastrointestinal:  Soft, Non-tender, + BS	  Extremities: Normal range of motion, No clubbing, cyanosis or edema      Home Medications:  digoxin 125 mcg (0.125 mg) oral tablet: 1 tab(s) orally once a day (12 May 2021 16:28)  furosemide 80 mg oral tablet: 1 tab(s) orally once a day (12 May 2021 16:28)  Januvia 100 mg oral tablet: 1 tab(s) orally once a day (12 May 2021 16:28)  metoprolol succinate 100 mg oral tablet, extended release: 1 tab(s) orally once a day (12 May 2021 16:28)  pantoprazole 40 mg oral delayed release tablet: 1 tab(s) orally once a day (12 May 2021 16:28)  potassium chloride 20 mEq oral tablet, extended release: 1 tab(s) orally once a day (12 May 2021 16:28)  SEROquel 25 mg oral tablet: 0.5 tab(s) orally once a day (at bedtime) (12 May 2021 16:28)  sertraline 50 mg oral tablet: 1 tab(s) orally once a day (12 May 2021 16:28)  Xarelto 20 mg oral tablet: 1 tab(s) orally once a day (12 May 2021 16:28)      MEDICATIONS  (STANDING):  digoxin     Tablet 125 MICROGram(s) Oral daily  furosemide   Injectable 40 milliGRAM(s) IV Push daily  metoprolol succinate  milliGRAM(s) Oral daily  pantoprazole    Tablet 40 milliGRAM(s) Oral before breakfast  QUEtiapine 25 milliGRAM(s) Oral <User Schedule>  rivaroxaban 20 milliGRAM(s) Oral with dinner  sertraline 50 milliGRAM(s) Oral daily      TELEMETRY: afib hr 60	    ECG:  	  RADIOLOGY:   DIAGNOSTIC TESTING:  [ ] Echocardiogram:  < from: Transthoracic Echocardiogram (05.14.21 @ 09:31) >  EF (Visual Estimate): 65 %  ------------------------------------------------------------------------  Observations:  Mitral Valve: Mitral annular calcification, otherwise  normal mitral valve. Mild mitral regurgitation.  Aortic Valve/Aorta: Aortic valve not well visualized;  appears calcified.  Aortic Root: 3.4 cm.  LVOT diameter: 1.8 cm.  Left Atrium: Mildly dilated left atrium.  LA volume index =  41 cc/m2.  Left Ventricle: Endocardium not well visualized; grossly  normal left ventricular systolic function. Increased  relative wall thickness with normal left ventricular mass  index, consistent with concentric left ventricular  remodeling.  Right Heart: Mild right atrial enlargement. The right  ventricle is not well visualized; grossly normal right  ventricular systolic function. Normal tricuspid valve.  Pulmonic valve not well visualized.  Pericardium/Pleura: Normal pericardium with no pericardial  effusion.  Hemodynamic: Estimated right atrial pressure is 8 mm Hg.  Estimated right ventricular systolic pressure equals 29 mm  Hg, assuming right atrial pressure equals 8 mm Hg,  consistent with normal pulmonary pressures.  ------------------------------------------------------------------------  Conclusions:  1. Mildly dilated left atrium.  LA volume index = 41 cc/m2.  2. Increased relative wall thickness with normal left  ventricular mass index, consistent with concentric left  ventricular remodeling.  3. Endocardium not well visualized; grossly normal left  ventricular systolic function.  4. Mild right atrial enlargement.  5. The right ventricle is not well visualized; grossly  normal right ventricular systolic function.  *** No previous Echo exam.  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          < end of copied text >    LABS:	 	    Troponin T, High Sensitivity Result: 18 ng/L [0 - 51] (05-12 @ 13:59)  Creatine Kinase, Serum: 87 U/L [25 - 170] (05-12 @ 10:42)  Troponin T, High Sensitivity Result: 18 ng/L [0 - 51] (05-12 @ 10:42)                          14.1   10.37 )-----------( 216      ( 16 May 2021 06:44 )             45.6     05-16    142  |  101  |  46<H>  ----------------------------<  172<H>  3.9   |  24  |  1.18    Ca    9.9      16 May 2021 06:43              
Subjective: Patient seen and examined. No new events except as noted.     REVIEW OF SYSTEMS:  Unable to obtain      MEDICATIONS:  MEDICATIONS  (STANDING):  digoxin     Tablet 125 MICROGram(s) Oral daily  metoprolol succinate  milliGRAM(s) Oral daily  pantoprazole    Tablet 40 milliGRAM(s) Oral before breakfast  potassium chloride    Tablet ER 20 milliEquivalent(s) Oral every 2 hours  QUEtiapine 25 milliGRAM(s) Oral <User Schedule>  rivaroxaban 20 milliGRAM(s) Oral with dinner  sertraline 50 milliGRAM(s) Oral daily      PHYSICAL EXAM:  T(C): 36.3 (05-18-21 @ 20:00), Max: 36.4 (05-18-21 @ 04:28)  HR: 74 (05-18-21 @ 20:00) (59 - 85)  BP: 115/74 (05-18-21 @ 20:00) (104/60 - 125/64)  RR: 18 (05-18-21 @ 20:00) (16 - 18)  SpO2: 95% (05-18-21 @ 20:00) (95% - 97%)  Wt(kg): --  I&O's Summary    17 May 2021 07:01  -  18 May 2021 07:00  --------------------------------------------------------  IN: 600 mL / OUT: 1225 mL / NET: -625 mL    18 May 2021 07:01  -  18 May 2021 21:58  --------------------------------------------------------  IN: 360 mL / OUT: 0 mL / NET: 360 mL            Appearance: NAD	  HEENT:   Dry  oral mucosa, PERRL, EOMI	  Lymphatic: No lymphadenopathy  Cardiovascular: Irregular  S1 S2, No JVD, No murmurs, No edema  Respiratory: Lungs clear to auscultation	  Psychiatry: A & O x 1 confused   Gastrointestinal:  Soft, Non-tender, + BS	  Skin: No rashes, No ecchymoses, No cyanosis	  Neurologic: Non-focal  Extremities: Normal range of motion, No clubbing, cyanosis or edema  Vascular: Peripheral pulses palpable 2+ bilaterally        LABS:    CARDIAC MARKERS:                                13.9   11.67 )-----------( 210      ( 18 May 2021 07:13 )             44.4     05-18    138  |  99  |  48<H>  ----------------------------<  142<H>  3.4<L>   |  23  |  1.08    Ca    9.9      18 May 2021 07:11      proBNP:   Lipid Profile:   HgA1c:   TSH:             TELEMETRY: 	    ECG:  	  RADIOLOGY:   DIAGNOSTIC TESTING:  [ ] Echocardiogram:  [ ]  Catheterization:  [ ] Stress Test:    OTHER: 	          
Subjective: Patient seen and examined. No new events except as noted.   agitated and non compliant overnight   now sleepy     REVIEW OF SYSTEMS:  Unable to obtain       MEDICATIONS:  MEDICATIONS  (STANDING):  digoxin     Tablet 125 MICROGram(s) Oral daily  furosemide   Injectable 40 milliGRAM(s) IV Push daily  metoprolol succinate  milliGRAM(s) Oral daily  pantoprazole    Tablet 40 milliGRAM(s) Oral before breakfast  QUEtiapine 12.5 milliGRAM(s) Oral at bedtime  rivaroxaban 20 milliGRAM(s) Oral with dinner  sertraline 50 milliGRAM(s) Oral daily      PHYSICAL EXAM:  T(C): 36.7 (05-14-21 @ 04:33), Max: 36.7 (05-14-21 @ 04:33)  HR: 79 (05-14-21 @ 12:35) (74 - 92)  BP: 129/83 (05-14-21 @ 12:35) (107/76 - 135/81)  RR: 18 (05-14-21 @ 12:35) (18 - 18)  SpO2: 94% (05-14-21 @ 12:35) (92% - 95%)  Wt(kg): --  I&O's Summary    13 May 2021 07:01  -  14 May 2021 07:00  --------------------------------------------------------  IN: 650 mL / OUT: 750 mL / NET: -100 mL          Appearance: NAD	  HEENT:   Dry  oral mucosa, PERRL, EOMI	  Lymphatic: No lymphadenopathy  Cardiovascular: Irregular  S1 S2, No JVD, No murmurs, No edema  Respiratory: Lungs clear to auscultation	  Psychiatry: A & O x 1 confused   Gastrointestinal:  Soft, Non-tender, + BS	  Skin: No rashes, No ecchymoses, No cyanosis	  Neurologic: Non-focal  Extremities: Normal range of motion, No clubbing, cyanosis or edema  Vascular: Peripheral pulses palpable 2+ bilaterally        LABS:    CARDIAC MARKERS:  CARDIAC MARKERS ( 12 May 2021 10:42 )  x     / x     / 87 U/L / x     / x                                    13.8   10.39 )-----------( 218      ( 13 May 2021 06:30 )             43.8     05-14    138  |  99  |  30<H>  ----------------------------<  156<H>  3.7   |  26  |  1.06    Ca    9.8      14 May 2021 07:15  Mg     2.4     05-14      proBNP:   Lipid Profile:   HgA1c:   TSH:     0          TELEMETRY: 	    ECG:  	  RADIOLOGY:   DIAGNOSTIC TESTING:  [ ] Echocardiogram:  [ ]  Catheterization:  [ ] Stress Test:    OTHER: 	          
Subjective: Patient seen and examined. No new events except as noted.     REVIEW OF SYSTEMS:  Unable to obtain        MEDICATIONS:  MEDICATIONS  (STANDING):  digoxin     Tablet 125 MICROGram(s) Oral daily  furosemide   Injectable 40 milliGRAM(s) IV Push daily  metoprolol succinate  milliGRAM(s) Oral daily  pantoprazole    Tablet 40 milliGRAM(s) Oral before breakfast  QUEtiapine 25 milliGRAM(s) Oral <User Schedule>  rivaroxaban 20 milliGRAM(s) Oral with dinner  sertraline 50 milliGRAM(s) Oral daily      PHYSICAL EXAM:  T(C): 36.3 (05-15-21 @ 20:33), Max: 36.4 (05-15-21 @ 11:39)  HR: 74 (05-16-21 @ 04:23) (74 - 82)  BP: 135/79 (05-16-21 @ 04:23) (107/66 - 135/79)  RR: 18 (05-16-21 @ 04:23) (18 - 18)  SpO2: 96% (05-16-21 @ 04:23) (93% - 96%)  Wt(kg): --  I&O's Summary    15 May 2021 07:01  -  16 May 2021 07:00  --------------------------------------------------------  IN: 900 mL / OUT: 0 mL / NET: 900 mL        Appearance: NAD	  HEENT:   Dry  oral mucosa, PERRL, EOMI	  Lymphatic: No lymphadenopathy  Cardiovascular: Irregular  S1 S2, No JVD, No murmurs, No edema  Respiratory: Lungs clear to auscultation	  Psychiatry: A & O x 1 confused   Gastrointestinal:  Soft, Non-tender, + BS	  Skin: No rashes, No ecchymoses, No cyanosis	  Neurologic: Non-focal  Extremities: Normal range of motion, No clubbing, cyanosis or edema  Vascular: Peripheral pulses palpable 2+ bilaterally        LABS:    CARDIAC MARKERS:                                14.1   10.37 )-----------( 216      ( 16 May 2021 06:44 )             45.6     05-16    142  |  101  |  46<H>  ----------------------------<  172<H>  3.9   |  24  |  1.18    Ca    9.9      16 May 2021 06:43      proBNP:   Lipid Profile:   HgA1c:   TSH:             TELEMETRY: 	    ECG:  	  RADIOLOGY:   DIAGNOSTIC TESTING:  [ ] Echocardiogram:  [ ]  Catheterization:  [ ] Stress Test:    OTHER: 	          
Subjective: Patient seen and examined. No new events except as noted.     REVIEW OF SYSTEMS:  Unable to obtain       MEDICATIONS:  MEDICATIONS  (STANDING):  digoxin     Tablet 125 MICROGram(s) Oral daily  metoprolol succinate  milliGRAM(s) Oral daily  pantoprazole    Tablet 40 milliGRAM(s) Oral before breakfast  QUEtiapine 25 milliGRAM(s) Oral <User Schedule>  rivaroxaban 20 milliGRAM(s) Oral with dinner  sertraline 50 milliGRAM(s) Oral daily      PHYSICAL EXAM:  T(C): 36.6 (05-19-21 @ 09:02), Max: 36.8 (05-19-21 @ 04:52)  HR: 71 (05-19-21 @ 09:02) (68 - 85)  BP: 108/74 (05-19-21 @ 09:02) (104/60 - 116/75)  RR: 18 (05-19-21 @ 09:02) (16 - 18)  SpO2: 94% (05-19-21 @ 09:02) (94% - 97%)  Wt(kg): --  I&O's Summary    18 May 2021 07:01  -  19 May 2021 07:00  --------------------------------------------------------  IN: 480 mL / OUT: 200 mL / NET: 280 mL        Appearance: NAD	  HEENT:   Dry  oral mucosa, PERRL, EOMI	  Lymphatic: No lymphadenopathy  Cardiovascular: Irregular  S1 S2, No JVD, No murmurs, No edema  Respiratory: Lungs clear to auscultation	  Psychiatry: A & O x 1 confused   Gastrointestinal:  Soft, Non-tender, + BS	  Skin: No rashes, No ecchymoses, No cyanosis	  Neurologic: Non-focal  Extremities: Normal range of motion, No clubbing, cyanosis or edema  Vascular: Peripheral pulses palpable 2+ bilaterally    LABS:    CARDIAC MARKERS:                                13.7   10.85 )-----------( 208      ( 19 May 2021 06:11 )             43.5     05-19    141  |  104  |  40<H>  ----------------------------<  133<H>  4.6   |  21<L>  |  0.86    Ca    10.1      19 May 2021 06:07  Mg     2.3     05-19      proBNP:   Lipid Profile:   HgA1c:   TSH:             TELEMETRY: 	    ECG:  	  RADIOLOGY:   DIAGNOSTIC TESTING:  [ ] Echocardiogram:  [ ]  Catheterization:  [ ] Stress Test:    OTHER: 	          
Subjective: Patient seen and examined. No new events except as noted.     REVIEW OF SYSTEMS:  Unable to obtain       MEDICATIONS:  MEDICATIONS  (STANDING):  digoxin     Tablet 125 MICROGram(s) Oral daily  furosemide   Injectable 40 milliGRAM(s) IV Push daily  metoprolol succinate  milliGRAM(s) Oral daily  pantoprazole    Tablet 40 milliGRAM(s) Oral before breakfast  QUEtiapine 25 milliGRAM(s) Oral <User Schedule>  rivaroxaban 20 milliGRAM(s) Oral with dinner  sertraline 50 milliGRAM(s) Oral daily      PHYSICAL EXAM:  T(C): 36.3 (05-15-21 @ 20:33), Max: 36.6 (05-15-21 @ 05:40)  HR: 80 (05-15-21 @ 20:33) (66 - 82)  BP: 107/66 (05-15-21 @ 20:33) (107/66 - 129/85)  RR: 18 (05-15-21 @ 20:33) (18 - 18)  SpO2: 94% (05-15-21 @ 20:33) (93% - 94%)  Wt(kg): --  I&O's Summary    14 May 2021 07:01  -  15 May 2021 07:00  --------------------------------------------------------  IN: 720 mL / OUT: 0 mL / NET: 720 mL    15 May 2021 07:01  -  15 May 2021 21:54  --------------------------------------------------------  IN: 900 mL / OUT: 0 mL / NET: 900 mL            Appearance: NAD	  HEENT:   Dry  oral mucosa, PERRL, EOMI	  Lymphatic: No lymphadenopathy  Cardiovascular: Irregular  S1 S2, No JVD, No murmurs, No edema  Respiratory: Lungs clear to auscultation	  Psychiatry: A & O x 1 confused   Gastrointestinal:  Soft, Non-tender, + BS	  Skin: No rashes, No ecchymoses, No cyanosis	  Neurologic: Non-focal  Extremities: Normal range of motion, No clubbing, cyanosis or edema  Vascular: Peripheral pulses palpable 2+ bilaterally    LABS:    CARDIAC MARKERS:                                13.8   12.11 )-----------( 218      ( 15 May 2021 06:36 )             43.5     05-15    138  |  98  |  39<H>  ----------------------------<  151<H>  3.3<L>   |  25  |  1.22    Ca    9.8      15 May 2021 06:36  Mg     2.4     05-14      proBNP:   Lipid Profile:   HgA1c:   TSH:             TELEMETRY: 	    ECG:  	  RADIOLOGY:  < from: MR Head No Cont (05.15.21 @ 12:16) >  EXAM:  MR BRAIN                            PROCEDURE DATE:  05/15/2021            INTERPRETATION:  CLINICAL INDICATIONS: r/o cva, fall    COMPARISON: CT head dated 5/12/2021    TECHNIQUE: MRI brain: Multiplanar, multisequence MR imaging of the brain are obtained without the administration of intravenous Gadavist contrast.    FINDINGS:  There is no abnormal restricted diffusion to suggest acute infarction. Scattered periventricular and subcortical white matter T2 /FLAIR hyperintensities are seenwithout mass effect, nonspecific, likely representing mild chronic microvascular changes.    Right posterior fossa extra-axial lesion adjacent to the mastoid air cells on image 8 of series 7 measures 2.3 x 1.6 cm likely representing a meningioma. Consider postcontrast images as clinically warranted.      Normal T2 flow-voids are seen within  the intracranial vasculature. The lateral ventricles and cortical sulci are age-appropriate in size and configuration. There is no other mass, mass effect, or extra-axial fluid collection. There is no susceptibility artifact to suggest hemorrhage. Midline structures are normal.  The visualized paranasal sinuses, mastoid air cells and orbits are unremarkable.      IMPRESSION: No acute infarction. Posteriorfossa meningioma.              < end of copied text >    DIAGNOSTIC TESTING:  [ ] Echocardiogram:  [ ]  Catheterization:  [ ] Stress Test:    OTHER:

## 2021-06-28 ENCOUNTER — APPOINTMENT (OUTPATIENT)
Dept: NEUROLOGY | Facility: CLINIC | Age: 86
End: 2021-06-28

## 2021-06-28 ENCOUNTER — APPOINTMENT (OUTPATIENT)
Dept: NEUROLOGY | Facility: CLINIC | Age: 86
End: 2021-06-28
Payer: MEDICARE

## 2021-06-28 DIAGNOSIS — R45.1 RESTLESSNESS AND AGITATION: ICD-10-CM

## 2021-06-28 PROCEDURE — 99205 OFFICE O/P NEW HI 60 MIN: CPT | Mod: 95

## 2021-06-28 RX ORDER — METOLAZONE 5 MG/1
5 TABLET ORAL
Qty: 30 | Refills: 0 | Status: ACTIVE | COMMUNITY
Start: 2021-02-05

## 2021-06-28 RX ORDER — FUROSEMIDE 80 MG/1
80 TABLET ORAL
Qty: 90 | Refills: 0 | Status: ACTIVE | COMMUNITY
Start: 2021-01-12

## 2021-06-28 RX ORDER — METOPROLOL SUCCINATE 25 MG/1
25 TABLET, EXTENDED RELEASE ORAL
Qty: 30 | Refills: 0 | Status: DISCONTINUED | COMMUNITY
Start: 2017-02-23 | End: 2021-06-28

## 2021-06-28 RX ORDER — POTASSIUM CHLORIDE 1.5 G/1
20 POWDER, FOR SOLUTION ORAL
Qty: 30 | Refills: 0 | Status: DISCONTINUED | COMMUNITY
Start: 2021-01-14 | End: 2021-06-28

## 2021-06-28 RX ORDER — SITAGLIPTIN 100 MG/1
100 TABLET, FILM COATED ORAL
Qty: 30 | Refills: 0 | Status: ACTIVE | COMMUNITY
Start: 2021-03-30

## 2021-06-28 RX ORDER — SPIRONOLACTONE 50 MG/1
50 TABLET ORAL
Qty: 30 | Refills: 0 | Status: DISCONTINUED | COMMUNITY
Start: 2021-02-10 | End: 2021-06-28

## 2021-06-28 RX ORDER — NITROFURANTOIN (MONOHYDRATE/MACROCRYSTALS) 25; 75 MG/1; MG/1
100 CAPSULE ORAL
Qty: 14 | Refills: 0 | Status: ACTIVE | COMMUNITY
Start: 2021-06-24

## 2021-06-28 RX ORDER — POTASSIUM CHLORIDE 1500 MG/1
20 TABLET, EXTENDED RELEASE ORAL
Qty: 30 | Refills: 0 | Status: ACTIVE | COMMUNITY
Start: 2020-10-15

## 2021-06-28 RX ORDER — LINAGLIPTIN 5 MG/1
5 TABLET, FILM COATED ORAL
Qty: 30 | Refills: 0 | Status: DISCONTINUED | COMMUNITY
Start: 2020-12-04 | End: 2021-06-28

## 2021-06-28 RX ORDER — PANTOPRAZOLE 40 MG/1
40 TABLET, DELAYED RELEASE ORAL
Qty: 90 | Refills: 0 | Status: ACTIVE | COMMUNITY
Start: 2021-04-06

## 2021-06-28 RX ORDER — MEMANTINE HYDROCHLORIDE 10 MG/1
10 TABLET, FILM COATED ORAL
Qty: 90 | Refills: 0 | Status: DISCONTINUED | COMMUNITY
Start: 2021-06-24 | End: 2021-06-28

## 2021-06-28 RX ORDER — APIXABAN 5 MG/1
TABLET, FILM COATED ORAL
Refills: 0 | Status: DISCONTINUED | COMMUNITY
End: 2021-06-28

## 2021-06-28 RX ORDER — METOPROLOL SUCCINATE 100 MG/1
100 TABLET, EXTENDED RELEASE ORAL
Qty: 90 | Refills: 0 | Status: ACTIVE | COMMUNITY
Start: 2021-03-02

## 2021-06-28 RX ORDER — OXYCODONE HYDROCHLORIDE 5 MG/1
CAPSULE ORAL
Refills: 0 | Status: DISCONTINUED | COMMUNITY
End: 2021-06-28

## 2021-06-28 RX ORDER — RIVAROXABAN 20 MG/1
20 TABLET, FILM COATED ORAL
Qty: 30 | Refills: 0 | Status: ACTIVE | COMMUNITY
Start: 2021-01-14

## 2021-06-28 RX ORDER — METFORMIN HYDROCHLORIDE 500 MG/1
500 TABLET, COATED ORAL
Qty: 60 | Refills: 0 | Status: DISCONTINUED | COMMUNITY
Start: 2020-10-26 | End: 2021-06-28

## 2021-06-28 RX ORDER — DIGOXIN 125 UG/1
125 TABLET ORAL
Qty: 90 | Refills: 0 | Status: ACTIVE | COMMUNITY
Start: 2021-01-14

## 2021-06-28 NOTE — REASON FOR VISIT
[Initial Evaluation] : an initial evaluation [Other: _____] : [unfilled] [FreeTextEntry1] : Cognitive changes

## 2021-06-28 NOTE — DATA REVIEWED
[de-identified] : EXAM: CT CERVICAL SPINE\par \par EXAM: CT BRAIN\par \par \par PROCEDURE DATE: 02/09/2021\par \par \par \par \par INTERPRETATION: CT OF THE HEAD AND CERVICAL SPINE WITHOUT CONTRAST\par \par CLINICAL INDICATION: Status post fall.\par \par TECHNIQUE: Volumetric CT acquisition was performed through the brain and reviewed using brain and bone window technique. Sagittal and coronal reconstructed images were obtained. Dose optimization techniques were utilized including kVp/mA modulation along with iterative reconstructions.\par Volumetric axial noncontrast images of the cervical spine were reconstructed in the axial, coronal and sagittal planes. Dose optimization techniques were utilized including kVp/mA modulation along with iterative reconstructions.\par \par Radiation dose estimate: The DLP was 897 mGy-cm\par \par COMPARISON: CT brain, 10/11/2019.\par \par FINDINGS:\par CT brain:\par \par The ventricular and sulcal size and configuration is age appropriate. There is no acute loss of gray-white differentiation.\par \par There is a peripherally calcified mass in the posterior canal fossa on the right adjacent to the posterior aspect of the right inferior mastoid air bowl and adjacent to the tentorium cerebelli, stable in size since prior examination measuring 2.4 x 1.4 x 2.1 cm in TRV, AP and craniocaudal dimensions, presumably represent a calcified meningioma. There is no midline shift or herniation. There is no tonsillar herniation. There is no abnormal extra axial fluid collection.\par \par There is no skull fracture. The visualized globes are symmetric bilaterally. There is mild mucosal thickening of the maxillary sinuses bilaterally. The tympanomastoid air cells are clear.\par \par \par CT cervical spine:\par \par There is straightening of the usual cervical lordosis without fracture or significant malalignment. The vertebral body heights are maintained. Intervertebral disc spaces demonstrate multilevel loss of height. There are sclerotic endplate changes at C3-C4, C4-C5 and C5-6.\par \par Craniocervical junction and C1-C2: Anatomic in alignment.\par \par The pre and paravertebral soft tissues are within limits of normal.\par \par There is left paracentral osteophyte at C3-C4, contributing to central canal narrowing. There is multilevel uncovertebral spurring and facet arthropathy contributing to mild to moderate bilateral neural foraminal narrowing.\par \par \par IMPRESSION:\par \par There is no evidence of skull fracture, intracranial hemorrhage or lobar infarction.\par \par There is a stable infratentorial peripherally calcified mass, presumably a meningioma in the right posterior cranial fossa.\par \par There is no cervical spine fracture.\par \par \par \par \par \par \par \par \par \par \par REINALDO SWANN M.D., ATTENDING RADIOLOGIST\par This document has been electronically signed. Feb 9 2021 4:42PM

## 2021-06-28 NOTE — HISTORY OF PRESENT ILLNESS
[Home] : at home, [unfilled] , at the time of the visit. [Medical Office: ([facility])___] : at the medical office located in  [FreeTextEntry1] : COVID VACCINE FULL.\par \par HPI: 88yo RH WW with HTN, DM, HKLD, Afib on A/C, here for cognitive decline and agitation.\par TBE visit scheduled at the last minute with 30 min delay due to patient not able to be prepared for NPA in person.\par Hearing impairment.\par \par PMH:\par Recently, her son passed away. \par In April she was admitted to [facility] for confusion and agitation.\par She had delirium there.\par She was started on Seroquel 25mg for night time agitation. Now the medication is wearing off and she tends to be quite agitated in the evening and not compliant with her care.\par She tends to be verbally and physically aggressive. \par \par -Memory: poor STM, delusions\par -Speech: ok, now tends to curse a lot\par -Orientation: poor\par -Praxis: can use simple objects in the home\par -Decision making/Executive fx/Multitasking: poor\par \par -Sleep: she has dreams and wakes up frequently, goes into kitchen to eat\par \par -Appetite: good, tends to eat erratically\par \par -Motor symptoms: slow and cautioned gait, uses walker, better since hospital stay and PT\par \par -B/B: knows to go to bathroom, some urgency\par \par -Psychiatric symptoms: agitation, paranoid delusions and hallucinations of people in the home moving around her\par \par -Functional status:\par ADL: needs assistance for all tasks, can eat by herself\par IADL: poor\par CDR: 1.0\par \par -Professional status: seamstress, \par \par PCP and other physicians:\par -PCP: Renny\par -Cardio: Renny\par \par Workup done: \par -CThead: diffuse non-specific atrophy, FT mostly, no major vascular pathology.

## 2021-06-28 NOTE — ASSESSMENT
[FreeTextEntry1] : Assessment:\par 89yo RH WW with recent progressive cognitive decline, agitation, [place].\par TEB visit not at all productive, except for clinical history, patient not cooperative and reports not hearing me.\par Will have to see her for RPA shortly.\par \par Diagnostic Impression:\par -cognitive decline/dementia NOS\par -deconditioning\par -agitation/hallucinations\par \par Plan:\par Rule out reversible and vascular causes:\par -B vitamins, TFT, RPR\par -basic inflammatory labs\par -Lyme serology\par -increase Seroquel to 5mg BID then TID \par -consider ACHEI\par -need to see her soon in office for RPA to assess full picture.\par \par A thorough discussion was entertained with the patient/caregiver regarding the use of psychoactive medications, their possible benefits and AE profile, including the risk of cardiovascular complications, including but not limited to applicable black box warning and teratogenicity, where appropriate.\par We discussed the benefits of being active, physically and mentally, and the need to to establish a routine in this respect.\par Driving abilities and firearms possession and use were discussed, in relation to progression of the cognitive decline, and the need to assess them periodically.\par Patient/caregiver advised to bring previous records to this office.\par All questions were answered at the time of the visit. We are certainly available for further discussion as needed.\par Patient/caregiver fully understands and agree with the plan.\par

## 2021-06-28 NOTE — PHYSICAL EXAM
[General Appearance - Alert] : alert [General Appearance - In No Acute Distress] : in no acute distress [Impaired Insight] : insight and judgment were intact [Affect] : the affect was normal [Person] : oriented to person [Remote Intact] : remote memory intact [Visual Intact] : visual attention was ~T not ~L decreased [Past History] : adequate knowledge of personal past history [Cranial Nerves Optic (II)] : visual acuity intact bilaterally,  visual fields full to confrontation, pupils equal round and reactive to light [Cranial Nerves Oculomotor (III)] : extraocular motion intact [Cranial Nerves Facial (VII)] : face symmetrical [Cranial Nerves Accessory (XI - Cranial And Spinal)] : head turning and shoulder shrug symmetric [Cranial Nerves Hypoglossal (XII)] : there was no tongue deviation with protrusion [Motor Strength] : muscle strength was normal in all four extremities [Motor Handedness Right-Handed] : the patient is right hand dominant [Sensation Tactile Decrease] : light touch was intact [Extraocular Movements] : extraocular movements were intact [Outer Ear] : the ears and nose were normal in appearance [Neck Appearance] : the appearance of the neck was normal [Edema] : there was no peripheral edema [Skin Color & Pigmentation] : normal skin color and pigmentation [] : no rash [FreeTextEntry1] : Exam severely limited by TEB setting, pt's hearing difficulties and poor compliance.  [Place] : disoriented to place [Time] : disoriented to time [Span Intact] : the attention span was decreased [Short Term Intact] : short term memory impaired [Concentration Intact] : a decrease in concentrating ability was observed [Naming Objects] : difficulty naming common objects [Repeating Phrases] : difficulty repeating a phrase [Writing A Sentence] : difficulty writing a sentence [Fluency] : fluency not intact [Comprehension] : comprehension not intact [Current Events] : inadequate knowledge of current events [Reading] : difficulty reading [Vocabulary] : inadequate range of vocabulary [Motor Strength Lower Extremities Bilaterally] : strength was normal in both lower extremities [Motor Strength Upper Extremities Bilaterally] : strength was normal in both upper extremities [FreeTextEntry4] : Exam very limited, pt does not comply with exam, says she did not hear anything, but that is unclear. [FreeTextEntry8] : Pt walks with walker, leans on it, mild shuffle

## 2021-06-29 ENCOUNTER — APPOINTMENT (OUTPATIENT)
Dept: NEUROLOGY | Facility: CLINIC | Age: 86
End: 2021-06-29
Payer: MEDICARE

## 2021-06-29 VITALS — DIASTOLIC BLOOD PRESSURE: 66 MMHG | SYSTOLIC BLOOD PRESSURE: 104 MMHG | HEART RATE: 72 BPM | HEIGHT: 64 IN

## 2021-06-29 DIAGNOSIS — R41.89 OTHER SYMPTOMS AND SIGNS INVOLVING COGNITIVE FUNCTIONS AND AWARENESS: ICD-10-CM

## 2021-06-29 PROCEDURE — 99214 OFFICE O/P EST MOD 30 MIN: CPT

## 2021-06-29 NOTE — PHYSICAL EXAM
[General Appearance - Alert] : alert [General Appearance - In No Acute Distress] : in no acute distress [Affect] : the affect was normal [Impaired Insight] : insight and judgment were intact [Person] : oriented to person [Remote Intact] : remote memory intact [Concentration Intact] : normal concentrating ability [Visual Intact] : visual attention was ~T not ~L decreased [Naming Objects] : no difficulty naming common objects [Repeating Phrases] : no difficulty repeating a phrase [Writing A Sentence] : no difficulty writing a sentence [Comprehension] : comprehension intact [Past History] : adequate knowledge of personal past history [Vocabulary] : adequate range of vocabulary [Total Score ___ / 30] : the patient achieved a score of [unfilled] /30 [Date / Time ___ / 5] : date / time [unfilled] / 5 [Place ___ / 5] : place [unfilled] / 5 [Registration ___ / 3] : registration [unfilled] / 3 [Serial Sevens ___/5] : serial sevens [unfilled] / 5 [Naming 2 Objects ___ / 2] : naming two objects [unfilled] / 2 [Repeating a Sentence ___ / 1] : repeating a sentence [unfilled] / 1 [Writing a Sentence ___ / 1] : write sentence [unfilled] / 1 [3-stage Verbal Command ___ / 3] : three-stage verbal command [unfilled] / 3 [Written Command ___ / 1] : written command [unfilled] / 1 [Copy a Design ___ / 1] : copy a design [unfilled] / 1 [Recall ___ / 3] : recall [unfilled] / 3 [Cranial Nerves Oculomotor (III)] : extraocular motion intact [Cranial Nerves Optic (II)] : visual acuity intact bilaterally,  visual fields full to confrontation, pupils equal round and reactive to light [Cranial Nerves Trigeminal (V)] : facial sensation intact symmetrically [Cranial Nerves Facial (VII)] : face symmetrical [Cranial Nerves Vestibulocochlear (VIII)] : hearing was intact bilaterally [Cranial Nerves Glossopharyngeal (IX)] : tongue and palate midline [Cranial Nerves Accessory (XI - Cranial And Spinal)] : head turning and shoulder shrug symmetric [Cranial Nerves Hypoglossal (XII)] : there was no tongue deviation with protrusion [Motor Tone] : muscle tone was normal in all four extremities [Motor Strength] : muscle strength was normal in all four extremities [No Muscle Atrophy] : normal bulk in all four extremities [Motor Handedness Right-Handed] : the patient is right hand dominant [Sensation Tactile Decrease] : light touch was intact [Balance] : balance was intact [Limited Balance] : the patient's balance was impaired [1+] : Ankle jerk left 1+ [Sclera] : the sclera and conjunctiva were normal [PERRL With Normal Accommodation] : pupils were equal in size, round, reactive to light, with normal accommodation [Extraocular Movements] : extraocular movements were intact [No APD] : no afferent pupillary defect [No HALIMA] : no internuclear ophthalmoplegia [Neck Appearance] : the appearance of the neck was normal [Outer Ear] : the ears and nose were normal in appearance [Heart Rate And Rhythm] : heart rate was normal and rhythm regular [Heart Sounds] : normal S1 and S2 [Murmurs] : no murmurs [Heart Sounds Gallop] : no gallops [Heart Sounds Pericardial Friction Rub] : no pericardial rub [Arterial Pulses Carotid] : carotid pulses were normal with no bruits [Full Pulse] : the pedal pulses are present [Edema] : there was no peripheral edema [Abdomen Soft] : soft [Bowel Sounds] : normal bowel sounds [Abdomen Tenderness] : non-tender [Abdomen Mass (___ Cm)] : no abdominal mass palpated [No CVA Tenderness] : no ~M costovertebral angle tenderness [No Spinal Tenderness] : no spinal tenderness [Musculoskeletal - Swelling] : no joint swelling seen [Skin Color & Pigmentation] : normal skin color and pigmentation [] : no rash [Place] : disoriented to place [Time] : disoriented to time [Short Term Intact] : short term memory impaired [Span Intact] : the attention span was decreased [Fluency] : fluency not intact [Reading] : difficulty reading [Current Events] : inadequate knowledge of current events [Motor Strength Upper Extremities Bilaterally] : strength was normal in both upper extremities [Motor Strength Lower Extremities Bilaterally] : strength was normal in both lower extremities [Romberg's Sign] : Romberg's sign was negtive [Tremor] : no tremor present [Past-pointing] : there was no past-pointing [Dysdiadochokinesia Bilaterally] : not present [Coordination - Dysmetria Impaired Finger-to-Nose Bilateral] : not present [Coordination - Dysmetria Impaired Heel-to-Shin Bilateral] : not present [Plantar Reflex Right Only] : normal on the right [Plantar Reflex Left Only] : normal on the left [FreeTextEntry8] : Pt walks with walker, leans on it, mild shuffle; very cautioned, can stand alone. Tends to cross her legs when walking, more so the L on he R foot. [FreeTextEntry4] : Mental Status Exam\par [place] but better communication today.\par Presidents: 2/5\par Alternating Pattern: started well, cannot continue\par Spiral: tries to copy exact shape\par Clock: 2/3, cannot put hands\par Repetition: ok\par R/L discrimination on self and examiner: ok\par Cross-line commands: ok\par Praxis:\par -Motor: ok\par -Dynamic/Luria: forgets pattern\par -Ideomotor/Imitation: ok\par -Ideational/writing/closing-in: ok\par -Dressing: ok.\par

## 2021-06-29 NOTE — HISTORY OF PRESENT ILLNESS
[FreeTextEntry1] : COVID VACCINE FULL.\par \par HPI-INTERVAL HX [id number]:\par pt came in today since she was very agitated yesterday and it was not possible to assess her via TEB.\par Today much better, calmer, hears better (got wax removed).\par She got 12.5mg of Seroquel this am. She seems more compliant today and has taken a shower.\par \par \par HPI: 89yo RH WW with HTN, DM, HKLD, Afib on A/C, here for cognitive decline and agitation.\par TBE visit scheduled at the last minute with 30 min delay due to patient not able to be prepared for NPA in person.\par Hearing impairment.\par \par PMH:\par Recently, her son passed away. \par In April she was admitted to [facility] for confusion and agitation.\par She had delirium there.\par She was started on Seroquel 25mg for night time agitation. Now the medication is wearing off and she tends to be quite agitated in the evening and not compliant with her care.\par She tends to be verbally and physically aggressive. \par \par -Memory: poor STM, delusions\par -Speech: ok, now tends to curse a lot\par -Orientation: poor\par -Praxis: can use simple objects in the home\par -Decision making/Executive fx/Multitasking: poor\par \par -Sleep: she has dreams and wakes up frequently, goes into kitchen to eat\par \par -Appetite: good, tends to eat erratically\par \par -Motor symptoms: slow and cautioned gait, uses walker, better since hospital stay and PT\par \par -B/B: knows to go to bathroom, some urgency\par \par -Psychiatric symptoms: agitation, paranoid delusions and hallucinations of people in the home moving around her\par \par -Functional status:\par ADL: needs assistance for all tasks, can eat by herself\par IADL: poor\par CDR: 1.0\par \par -Professional status: seamstress, \par \par PCP and other physicians:\par -PCP: Renny\par -Cardio: Renny\par \par Workup done: \par -CThead: diffuse non-specific atrophy, FT mostly, no major vascular pathology.

## 2021-06-29 NOTE — DATA REVIEWED
[de-identified] : EXAM: CT CERVICAL SPINE\par \par EXAM: CT BRAIN\par \par \par PROCEDURE DATE: 02/09/2021\par \par \par \par \par INTERPRETATION: CT OF THE HEAD AND CERVICAL SPINE WITHOUT CONTRAST\par \par CLINICAL INDICATION: Status post fall.\par \par TECHNIQUE: Volumetric CT acquisition was performed through the brain and reviewed using brain and bone window technique. Sagittal and coronal reconstructed images were obtained. Dose optimization techniques were utilized including kVp/mA modulation along with iterative reconstructions.\par Volumetric axial noncontrast images of the cervical spine were reconstructed in the axial, coronal and sagittal planes. Dose optimization techniques were utilized including kVp/mA modulation along with iterative reconstructions.\par \par Radiation dose estimate: The DLP was 897 mGy-cm\par \par COMPARISON: CT brain, 10/11/2019.\par \par FINDINGS:\par CT brain:\par \par The ventricular and sulcal size and configuration is age appropriate. There is no acute loss of gray-white differentiation.\par \par There is a peripherally calcified mass in the posterior canal fossa on the right adjacent to the posterior aspect of the right inferior mastoid air bowl and adjacent to the tentorium cerebelli, stable in size since prior examination measuring 2.4 x 1.4 x 2.1 cm in TRV, AP and craniocaudal dimensions, presumably represent a calcified meningioma. There is no midline shift or herniation. There is no tonsillar herniation. There is no abnormal extra axial fluid collection.\par \par There is no skull fracture. The visualized globes are symmetric bilaterally. There is mild mucosal thickening of the maxillary sinuses bilaterally. The tympanomastoid air cells are clear.\par \par \par CT cervical spine:\par \par There is straightening of the usual cervical lordosis without fracture or significant malalignment. The vertebral body heights are maintained. Intervertebral disc spaces demonstrate multilevel loss of height. There are sclerotic endplate changes at C3-C4, C4-C5 and C5-6.\par \par Craniocervical junction and C1-C2: Anatomic in alignment.\par \par The pre and paravertebral soft tissues are within limits of normal.\par \par There is left paracentral osteophyte at C3-C4, contributing to central canal narrowing. There is multilevel uncovertebral spurring and facet arthropathy contributing to mild to moderate bilateral neural foraminal narrowing.\par \par \par IMPRESSION:\par \par There is no evidence of skull fracture, intracranial hemorrhage or lobar infarction.\par \par There is a stable infratentorial peripherally calcified mass, presumably a meningioma in the right posterior cranial fossa.\par \par There is no cervical spine fracture.\par \par \par \par \par \par \par \par \par \par \par REINALDO SWANN M.D., ATTENDING RADIOLOGIST\par This document has been electronically signed. Feb 9 2021 4:42PM

## 2021-06-29 NOTE — ASSESSMENT
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 \par \par A thorough discussion was entertained with the patient/caregiver regarding the use of psychoactive medications, their possible benefits and AE profile, including the risk of cardiovascular complications, including but not limited to applicable black box warning and teratogenicity, where appropriate.\par We discussed the benefits of being active, physically and mentally, and the need to to establish a routine in this respect.\par Driving abilities and firearms possession and use were discussed, in relation to progression of the cognitive decline, and the need to assess them periodically.\par Patient/caregiver advised to bring previous records to this office.\par All questions were answered at the time of the visit. We are certainly available for further discussion as needed.\par Patient/caregiver fully understands and agree with the plan.\par

## 2021-07-09 DIAGNOSIS — R79.89 OTHER SPECIFIED ABNORMAL FINDINGS OF BLOOD CHEMISTRY: ICD-10-CM

## 2021-07-09 LAB
FOLATE SERPL-MCNC: >20 NG/ML
HCYS SERPL-MCNC: 16.2 UMOL/L
T3FREE SERPL-MCNC: 2.85 PG/ML
T4 FREE SERPL-MCNC: 0.9 NG/DL
TSH SERPL-ACNC: 0.49 UIU/ML
VIT B12 SERPL-MCNC: 589 PG/ML

## 2021-07-10 LAB
B BURGDOR AB SER-IMP: NEGATIVE
B BURGDOR IGG+IGM SER QL: 0.12 INDEX
T PALLIDUM AB SER QL IA: NEGATIVE

## 2021-07-14 ENCOUNTER — NON-APPOINTMENT (OUTPATIENT)
Age: 86
End: 2021-07-14

## 2021-07-17 LAB
METHYLMALONATE SERPL-SCNC: 332 NMOL/L
VIT B1 SERPL-MCNC: 245.5 NMOL/L

## 2021-08-03 RX ORDER — RISPERIDONE 0.25 MG/1
0.25 TABLET, FILM COATED ORAL TWICE DAILY
Qty: 120 | Refills: 0 | Status: ACTIVE | COMMUNITY
Start: 2021-08-03 | End: 1900-01-01

## 2021-08-03 RX ORDER — RISPERIDONE 1 MG/ML
1 SOLUTION ORAL TWICE DAILY
Qty: 30 | Refills: 0 | Status: DISCONTINUED | COMMUNITY
Start: 2021-07-30 | End: 2021-08-03

## 2021-08-05 RX ORDER — QUETIAPINE FUMARATE 50 MG/1
50 TABLET ORAL AT BEDTIME
Qty: 30 | Refills: 3 | Status: ACTIVE | COMMUNITY
Start: 2021-08-05 | End: 1900-01-01

## 2021-08-25 RX ORDER — SERTRALINE HYDROCHLORIDE 50 MG/1
50 TABLET, FILM COATED ORAL
Qty: 90 | Refills: 0 | Status: DISCONTINUED | COMMUNITY
Start: 2021-03-16 | End: 2021-08-25

## 2021-09-02 NOTE — PATIENT PROFILE ADULT. - ANESTHESIA, PREVIOUS REACTION, PROFILE
I attempted to return patient's call and had to leave another message. I left updated available dates that are available. none

## 2021-10-03 ENCOUNTER — INPATIENT (INPATIENT)
Facility: HOSPITAL | Age: 86
LOS: 9 days | Discharge: HOME HEALTH | DRG: 378 | End: 2021-10-13
Attending: INTERNAL MEDICINE | Admitting: INTERNAL MEDICINE
Payer: MEDICARE

## 2021-10-03 VITALS
OXYGEN SATURATION: 98 % | WEIGHT: 139.99 LBS | RESPIRATION RATE: 16 BRPM | SYSTOLIC BLOOD PRESSURE: 88 MMHG | HEART RATE: 80 BPM | TEMPERATURE: 98 F | HEIGHT: 64 IN | DIASTOLIC BLOOD PRESSURE: 43 MMHG

## 2021-10-03 DIAGNOSIS — Z96.641 PRESENCE OF RIGHT ARTIFICIAL HIP JOINT: Chronic | ICD-10-CM

## 2021-10-03 DIAGNOSIS — Z98.1 ARTHRODESIS STATUS: Chronic | ICD-10-CM

## 2021-10-03 DIAGNOSIS — Z90.710 ACQUIRED ABSENCE OF BOTH CERVIX AND UTERUS: Chronic | ICD-10-CM

## 2021-10-03 DIAGNOSIS — Z87.81 PERSONAL HISTORY OF (HEALED) TRAUMATIC FRACTURE: Chronic | ICD-10-CM

## 2021-10-03 LAB
ALBUMIN SERPL ELPH-MCNC: 4 G/DL — SIGNIFICANT CHANGE UP (ref 3.3–5)
ALP SERPL-CCNC: 84 U/L — SIGNIFICANT CHANGE UP (ref 40–120)
ALT FLD-CCNC: 10 U/L — SIGNIFICANT CHANGE UP (ref 10–45)
ANION GAP SERPL CALC-SCNC: 18 MMOL/L — HIGH (ref 5–17)
ANISOCYTOSIS BLD QL: SLIGHT — SIGNIFICANT CHANGE UP
APTT BLD: 31.2 SEC — SIGNIFICANT CHANGE UP (ref 27.5–35.5)
AST SERPL-CCNC: 13 U/L — SIGNIFICANT CHANGE UP (ref 10–40)
BASE EXCESS BLDV CALC-SCNC: -1 MMOL/L — SIGNIFICANT CHANGE UP (ref -2–2)
BASOPHILS # BLD AUTO: 0.12 K/UL — SIGNIFICANT CHANGE UP (ref 0–0.2)
BASOPHILS NFR BLD AUTO: 0.9 % — SIGNIFICANT CHANGE UP (ref 0–2)
BILIRUB SERPL-MCNC: 0.3 MG/DL — SIGNIFICANT CHANGE UP (ref 0.2–1.2)
BLD GP AB SCN SERPL QL: NEGATIVE — SIGNIFICANT CHANGE UP
BUN SERPL-MCNC: 36 MG/DL — HIGH (ref 7–23)
CA-I SERPL-SCNC: 1.18 MMOL/L — SIGNIFICANT CHANGE UP (ref 1.15–1.33)
CALCIUM SERPL-MCNC: 9.1 MG/DL — SIGNIFICANT CHANGE UP (ref 8.4–10.5)
CHLORIDE BLDV-SCNC: 100 MMOL/L — SIGNIFICANT CHANGE UP (ref 96–108)
CHLORIDE SERPL-SCNC: 98 MMOL/L — SIGNIFICANT CHANGE UP (ref 96–108)
CO2 BLDV-SCNC: 26 MMOL/L — SIGNIFICANT CHANGE UP (ref 22–26)
CO2 SERPL-SCNC: 21 MMOL/L — LOW (ref 22–31)
CREAT SERPL-MCNC: 1.2 MG/DL — SIGNIFICANT CHANGE UP (ref 0.5–1.3)
DACRYOCYTES BLD QL SMEAR: SLIGHT — SIGNIFICANT CHANGE UP
ELLIPTOCYTES BLD QL SMEAR: SLIGHT — SIGNIFICANT CHANGE UP
EOSINOPHIL # BLD AUTO: 0.59 K/UL — HIGH (ref 0–0.5)
EOSINOPHIL NFR BLD AUTO: 4.4 % — SIGNIFICANT CHANGE UP (ref 0–6)
GAS PNL BLDV: 135 MMOL/L — LOW (ref 136–145)
GAS PNL BLDV: SIGNIFICANT CHANGE UP
GLUCOSE BLDV-MCNC: 243 MG/DL — HIGH (ref 70–99)
GLUCOSE SERPL-MCNC: 247 MG/DL — HIGH (ref 70–99)
HCO3 BLDV-SCNC: 24 MMOL/L — SIGNIFICANT CHANGE UP (ref 22–29)
HCT VFR BLD CALC: 17.9 % — CRITICAL LOW (ref 34.5–45)
HCT VFR BLDA CALC: 17 % — CRITICAL LOW (ref 34.5–46.5)
HGB BLD CALC-MCNC: 5.6 G/DL — CRITICAL LOW (ref 11.7–16.1)
HGB BLD-MCNC: 5.4 G/DL — CRITICAL LOW (ref 11.5–15.5)
HYPOCHROMIA BLD QL: SLIGHT — SIGNIFICANT CHANGE UP
INR BLD: 1.94 RATIO — HIGH (ref 0.88–1.16)
LACTATE BLDV-MCNC: 4.6 MMOL/L — CRITICAL HIGH (ref 0.7–2)
LYMPHOCYTES # BLD AUTO: 1.97 K/UL — SIGNIFICANT CHANGE UP (ref 1–3.3)
LYMPHOCYTES # BLD AUTO: 14.8 % — SIGNIFICANT CHANGE UP (ref 13–44)
MACROCYTES BLD QL: SLIGHT — SIGNIFICANT CHANGE UP
MANUAL SMEAR VERIFICATION: SIGNIFICANT CHANGE UP
MCHC RBC-ENTMCNC: 25.5 PG — LOW (ref 27–34)
MCHC RBC-ENTMCNC: 30.2 GM/DL — LOW (ref 32–36)
MCV RBC AUTO: 84.4 FL — SIGNIFICANT CHANGE UP (ref 80–100)
MICROCYTES BLD QL: SLIGHT — SIGNIFICANT CHANGE UP
MONOCYTES # BLD AUTO: 0.69 K/UL — SIGNIFICANT CHANGE UP (ref 0–0.9)
MONOCYTES NFR BLD AUTO: 5.2 % — SIGNIFICANT CHANGE UP (ref 2–14)
NEUTROPHILS # BLD AUTO: 9.72 K/UL — HIGH (ref 1.8–7.4)
NEUTROPHILS NFR BLD AUTO: 73 % — SIGNIFICANT CHANGE UP (ref 43–77)
OB PNL STL: POSITIVE
OVALOCYTES BLD QL SMEAR: SLIGHT — SIGNIFICANT CHANGE UP
PCO2 BLDV: 43 MMHG — HIGH (ref 39–42)
PH BLDV: 7.36 — SIGNIFICANT CHANGE UP (ref 7.32–7.43)
PLAT MORPH BLD: NORMAL — SIGNIFICANT CHANGE UP
PLATELET # BLD AUTO: 273 K/UL — SIGNIFICANT CHANGE UP (ref 150–400)
PO2 BLDV: 23 MMHG — LOW (ref 25–45)
POIKILOCYTOSIS BLD QL AUTO: SLIGHT — SIGNIFICANT CHANGE UP
POLYCHROMASIA BLD QL SMEAR: SLIGHT — SIGNIFICANT CHANGE UP
POTASSIUM BLDV-SCNC: 4.5 MMOL/L — SIGNIFICANT CHANGE UP (ref 3.5–5.1)
POTASSIUM SERPL-MCNC: 4.6 MMOL/L — SIGNIFICANT CHANGE UP (ref 3.5–5.3)
POTASSIUM SERPL-SCNC: 4.6 MMOL/L — SIGNIFICANT CHANGE UP (ref 3.5–5.3)
PROT SERPL-MCNC: 6 G/DL — SIGNIFICANT CHANGE UP (ref 6–8.3)
PROTHROM AB SERPL-ACNC: 22.6 SEC — HIGH (ref 10.6–13.6)
RBC # BLD: 2.12 M/UL — LOW (ref 3.8–5.2)
RBC # FLD: 14.1 % — SIGNIFICANT CHANGE UP (ref 10.3–14.5)
RBC BLD AUTO: ABNORMAL
RH IG SCN BLD-IMP: POSITIVE — SIGNIFICANT CHANGE UP
SAO2 % BLDV: 32.8 % — LOW (ref 67–88)
SODIUM SERPL-SCNC: 137 MMOL/L — SIGNIFICANT CHANGE UP (ref 135–145)
TARGETS BLD QL SMEAR: SLIGHT — SIGNIFICANT CHANGE UP
VARIANT LYMPHS # BLD: 1.7 % — SIGNIFICANT CHANGE UP (ref 0–6)
WBC # BLD: 13.32 K/UL — HIGH (ref 3.8–10.5)
WBC # FLD AUTO: 13.32 K/UL — HIGH (ref 3.8–10.5)

## 2021-10-03 PROCEDURE — 71045 X-RAY EXAM CHEST 1 VIEW: CPT | Mod: 26

## 2021-10-03 PROCEDURE — 99291 CRITICAL CARE FIRST HOUR: CPT | Mod: GC

## 2021-10-03 PROCEDURE — 93010 ELECTROCARDIOGRAM REPORT: CPT

## 2021-10-03 RX ORDER — DIPHENHYDRAMINE HCL 50 MG
25 CAPSULE ORAL ONCE
Refills: 0 | Status: COMPLETED | OUTPATIENT
Start: 2021-10-03 | End: 2021-10-04

## 2021-10-03 NOTE — ED ADULT NURSE NOTE - OBJECTIVE STATEMENT
89y female with hx of afib on xarelto, dementia bibems from home with blood in stools. Pt is alert and oriented x 4 and speaking coherently. as per EMS pt has had at least 2 loose stools with blood. Pt denies dizziness, cp, sob, n/v/d, fevers, chills, abd pain. pt reports having bloody stools in the past. 89y female with hx of afib on xarelto, dementia bibems from home with blood in stools. Pt is alert and oriented x 4 and speaking coherently. as per EMS pt has had at least 2 loose stools with blood. Pt denies cp, n/v/d, fevers, chills, abd pain. Pt c/o shortness of breath and dizziness. pt reports having bloody stools in the past. 2 large bore Ivs places, pt on CM, EKG completed. pt hypotensive en route to EMS. Pt normotensive in [facility]. Pt appears pale. MD dowell at bedside. will reassess.

## 2021-10-03 NOTE — ED PROVIDER NOTE - PROGRESS NOTE DETAILS
Bernardo, PGY3: EMS noted low blood pressure but BP stable in ED, no urgent need for reversal at this time as not actively bleeding and normotensive Resident Dawson: preliminary eval concerning for significant anemia in the setting of GI hemorrhage. No further active bloody movement in ED. S/P 2 units PRBC's. Case endorsed to Hospitalist (Renny Trejo MD) on Tele. Will CTM CBC s/p transfusion.

## 2021-10-03 NOTE — ED PROVIDER NOTE - CLINICAL SUMMARY MEDICAL DECISION MAKING FREE TEXT BOX
89yr F hx of DM afib on apixaban CHF p/w LGIB and signs of anemia including tachypnea and SCHNEIDER and CP. exam notable for frail lady and palor, clear lungs no murmur has peripheral edema.   concern for symptomatic anemia. no hypotensive episodes here. will send labs, consent for transfusion  labs notable for hb 5.6, will do 2 u and repeat. if pt shows sign of hypotension or hypoperfusion will reverse. 89yr F hx of DM afib on apixaban CHF p/w LGIB and signs of anemia including tachypnea and SCHNEIDER and CP. exam notable for frail lady and palor, clear lungs no murmur has peripheral edema.  concern for symptomatic anemia. no hypotensive episodes here. will send labs, consent for transfusion labs notable for hb 5.6, will do 2 u and repeat. if pt shows sign of hypotension or hypoperfusion will reverse.

## 2021-10-03 NOTE — ED ADULT NURSE REASSESSMENT NOTE - NS ED NURSE REASSESS COMMENT FT1
PRBC given. Consent in chart. Risks and benefits explained to patient. Patient verbalized understanding of risks and benefits. Patient aware of possible side effects. Vital signs stable. Second RN at bedside for confirmation.

## 2021-10-03 NOTE — ED ADULT NURSE REASSESSMENT NOTE - NS ED NURSE REASSESS COMMENT FT1
Patient's daughter at bedside, blood transfusing, tolerating well, no redness, drainage or swelling at site. Spoke with patient's daughter in law with patient's permission. Provided with additional blanket. Bed locked and in lowest position for safety.

## 2021-10-03 NOTE — ED ADULT NURSE REASSESSMENT NOTE - NS ED NURSE REASSESS COMMENT FT1
Patient A&Ox4, breathing spontaneous and unlabored, patient's vitals stable. Name was changed to the proper spelling by registration. P15's and types were drawn afterwards and sent to blood bank as per MD orders. Patient educated she is unable to eat and drink at this time. Aware she is pending a blood transfusion. Afib on cardiac monitor. Bed locked and in lowest position for safety.

## 2021-10-03 NOTE — ED ADULT NURSE REASSESSMENT NOTE - NS ED NURSE REASSESS COMMENT FT1
Patient reporting itching in bilateral arms and face, MD Osman aware. Patient's daughter at bedside. Patient has no trouble breathing, vitals stable. Bed locked and in lowest position for safety.

## 2021-10-03 NOTE — ED PROVIDER NOTE - OBJECTIVE STATEMENT
89y/o F w/ h/o T2DM, Afib (on Eliquis), CHF 88y/o F w/ h/o T2DM, Afib (on Eliquis), CHF w no reported hx of GIB p/w bright red blood per rectum yesterday and fatigue, sob and cp today. reports bright blood stopped yesterday. no abd pain. no fever chills, no travels sick contacts.  unsure last dose of apixaban was. 88y/o F w/ h/o T2DM, Afib (on Eliquis), CHF w no reported hx of GIB p/w bright red blood per rectum yesterday and fatigue, sob and cp today. reports bright blood stopped yesterday. no abd pain. no fever chills, no travels sick contacts. unsure last dose of apixaban was. 88y/o F w/ h/o T2DM, Afib (on Eliquis and metoprolol), CHF w no reported hx of GIB p/w bright red blood per rectum yesterday and fatigue, sob and cp today. reports bright blood stopped yesterday. no abd pain. no fever chills, no travels sick contacts. unsure last dose of apixaban was.    [facility] Bernarda ([phone])

## 2021-10-03 NOTE — ED ADULT TRIAGE NOTE - BANDS:
Date of Service: 02/14/2017    DIAGNOSES:  1.  IgG kappa multiple myeloma, Durie-Bloxom stage III.  2.  Reactive airway disease.  3.  Status post inguinal hernia repair.  4.  Status post autologous stem cell transplant.    INTERIM HISTORY:  The patient is seen today for continuing management of his multiple myeloma post-transplant.  His biggest problem continues to be neuropathy and he has had persistent issues with his feet because of it.  His appetite, however, has been good.  His weight has been stable.  He has been physically pretty active.    REVIEW OF SYSTEMS:  A 12-point review of systems was performed and is reflected in the electronic medical record.    PHYSICAL EXAMINATION:  GENERAL:  He is a pleasant white male who is in no acute distress.  VITAL SIGNS:  Blood pressure 162/91, pulse is 73 and regular, respirations are normal.  HEENT:  Unremarkable.  NECK:  Supple and without palpable adenopathy.  RESPIRATORY:  Lungs are clear.  HEART: Sounds are normal.  ABDOMEN:  Soft and nontender.  There is no liver or spleen enlargement.  The bowel sounds are active.  EXTREMITIES:  Negative.    LABORATORY DATA:  The patient's platelet count to be recovering nicely.  He is now up to 126,000 and his hemoglobin and white count are stable.  The remainder of his laboratory studies are unremarkable.      A chest x-ray is personally reviewed today and shows no evidence of active infiltrates or new disease.    IMPRESSION:  Multiple myeloma, day 77 post-transplant.  The patient continues to do well and is recovering nicely from his transplant.  He remains active and working full-time. He will return to see me in 2 weeks with an interim CBC in a week.  I did discuss with him at some length the neuropathy issues that he is having and I recommended B complex vitamin on the off chance that it might help him. He will return to see me in 2 weeks.    Greater than 50% of this visit was spent in counseling and coordination of care  regarding this patient's medical condition.      Dictated By: Danie Tatum MD  Signing Provider: Danie Tatum MD    RT/serene ([id number])  DD: 02/14/2017 12:22:53 TD: 02/15/2017 10:40:07    Copy Sent To:    Fall Risk;

## 2021-10-03 NOTE — ED ADULT NURSE NOTE - CAS EDN DISCHARGE ASSESSMENT
stable vitals, NSR on tele, no complaints at this time/Alert and oriented to person, place and time/Patient baseline mental status

## 2021-10-03 NOTE — ED PROVIDER NOTE - PHYSICAL EXAMINATION
ill appearing frail woman with hypotension in triage  neuro grossly intact  aaox3  moderate distress due to dyspnea  clear lungs  S1-2  soft non tender abd  rectal notable for fissures no active bleeding  + pitting edema symmteric ill appearing frail woman with hypotension in triage  neuro grossly intact  aaox3  moderate distress due to dyspnea  clear lungs  S1-2  soft non tender abd  rectal notable for external hemorrhoids but no active bleeding chaperoned by RN Julian  + pitting edema symmteric

## 2021-10-04 DIAGNOSIS — I50.9 HEART FAILURE, UNSPECIFIED: ICD-10-CM

## 2021-10-04 DIAGNOSIS — K92.2 GASTROINTESTINAL HEMORRHAGE, UNSPECIFIED: ICD-10-CM

## 2021-10-04 DIAGNOSIS — F03.90 UNSPECIFIED DEMENTIA WITHOUT BEHAVIORAL DISTURBANCE: ICD-10-CM

## 2021-10-04 DIAGNOSIS — I48.20 CHRONIC ATRIAL FIBRILLATION, UNSPECIFIED: ICD-10-CM

## 2021-10-04 DIAGNOSIS — F05 DELIRIUM DUE TO KNOWN PHYSIOLOGICAL CONDITION: ICD-10-CM

## 2021-10-04 DIAGNOSIS — R71.0 PRECIPITOUS DROP IN HEMATOCRIT: ICD-10-CM

## 2021-10-04 LAB
BASE EXCESS BLDV CALC-SCNC: 2.4 MMOL/L — HIGH (ref -2–2)
BASOPHILS # BLD AUTO: 0.05 K/UL — SIGNIFICANT CHANGE UP (ref 0–0.2)
BASOPHILS NFR BLD AUTO: 0.3 % — SIGNIFICANT CHANGE UP (ref 0–2)
CA-I SERPL-SCNC: 1.18 MMOL/L — SIGNIFICANT CHANGE UP (ref 1.15–1.33)
CHLORIDE BLDV-SCNC: 102 MMOL/L — SIGNIFICANT CHANGE UP (ref 96–108)
CO2 BLDV-SCNC: 29 MMOL/L — HIGH (ref 22–26)
EOSINOPHIL # BLD AUTO: 0.34 K/UL — SIGNIFICANT CHANGE UP (ref 0–0.5)
EOSINOPHIL NFR BLD AUTO: 2.1 % — SIGNIFICANT CHANGE UP (ref 0–6)
GAS PNL BLDV: 136 MMOL/L — SIGNIFICANT CHANGE UP (ref 136–145)
GAS PNL BLDV: SIGNIFICANT CHANGE UP
GAS PNL BLDV: SIGNIFICANT CHANGE UP
GLUCOSE BLDV-MCNC: 157 MG/DL — HIGH (ref 70–99)
HCO3 BLDV-SCNC: 28 MMOL/L — SIGNIFICANT CHANGE UP (ref 22–29)
HCT VFR BLD CALC: 20.8 % — CRITICAL LOW (ref 34.5–45)
HCT VFR BLD CALC: 23.4 % — LOW (ref 34.5–45)
HCT VFR BLD CALC: 25.7 % — LOW (ref 34.5–45)
HCT VFR BLDA CALC: 21 % — CRITICAL LOW (ref 34.5–46.5)
HGB BLD CALC-MCNC: 7 G/DL — CRITICAL LOW (ref 11.7–16.1)
HGB BLD-MCNC: 6.4 G/DL — CRITICAL LOW (ref 11.5–15.5)
HGB BLD-MCNC: 7.3 G/DL — LOW (ref 11.5–15.5)
HGB BLD-MCNC: 8.4 G/DL — LOW (ref 11.5–15.5)
IMM GRANULOCYTES NFR BLD AUTO: 0.6 % — SIGNIFICANT CHANGE UP (ref 0–1.5)
LACTATE BLDV-MCNC: 2.2 MMOL/L — HIGH (ref 0.7–2)
LYMPHOCYTES # BLD AUTO: 27.7 % — SIGNIFICANT CHANGE UP (ref 13–44)
LYMPHOCYTES # BLD AUTO: 4.4 K/UL — HIGH (ref 1–3.3)
MCHC RBC-ENTMCNC: 26.2 PG — LOW (ref 27–34)
MCHC RBC-ENTMCNC: 26.4 PG — LOW (ref 27–34)
MCHC RBC-ENTMCNC: 26.8 PG — LOW (ref 27–34)
MCHC RBC-ENTMCNC: 30.8 GM/DL — LOW (ref 32–36)
MCHC RBC-ENTMCNC: 31.2 GM/DL — LOW (ref 32–36)
MCHC RBC-ENTMCNC: 32.7 GM/DL — SIGNIFICANT CHANGE UP (ref 32–36)
MCV RBC AUTO: 82.1 FL — SIGNIFICANT CHANGE UP (ref 80–100)
MCV RBC AUTO: 83.9 FL — SIGNIFICANT CHANGE UP (ref 80–100)
MCV RBC AUTO: 86 FL — SIGNIFICANT CHANGE UP (ref 80–100)
MONOCYTES # BLD AUTO: 1.58 K/UL — HIGH (ref 0–0.9)
MONOCYTES NFR BLD AUTO: 9.9 % — SIGNIFICANT CHANGE UP (ref 2–14)
NEUTROPHILS # BLD AUTO: 9.41 K/UL — HIGH (ref 1.8–7.4)
NEUTROPHILS NFR BLD AUTO: 59.4 % — SIGNIFICANT CHANGE UP (ref 43–77)
NRBC # BLD: 0 /100 WBCS — SIGNIFICANT CHANGE UP (ref 0–0)
PCO2 BLDV: 47 MMHG — HIGH (ref 39–42)
PH BLDV: 7.38 — SIGNIFICANT CHANGE UP (ref 7.32–7.43)
PLATELET # BLD AUTO: 241 K/UL — SIGNIFICANT CHANGE UP (ref 150–400)
PLATELET # BLD AUTO: 264 K/UL — SIGNIFICANT CHANGE UP (ref 150–400)
PLATELET # BLD AUTO: 276 K/UL — SIGNIFICANT CHANGE UP (ref 150–400)
PO2 BLDV: 34 MMHG — SIGNIFICANT CHANGE UP (ref 25–45)
POTASSIUM BLDV-SCNC: 4.7 MMOL/L — SIGNIFICANT CHANGE UP (ref 3.5–5.1)
RBC # BLD: 2.42 M/UL — LOW (ref 3.8–5.2)
RBC # BLD: 2.79 M/UL — LOW (ref 3.8–5.2)
RBC # BLD: 3.13 M/UL — LOW (ref 3.8–5.2)
RBC # FLD: 14.3 % — SIGNIFICANT CHANGE UP (ref 10.3–14.5)
RBC # FLD: 14.4 % — SIGNIFICANT CHANGE UP (ref 10.3–14.5)
RBC # FLD: 15 % — HIGH (ref 10.3–14.5)
SAO2 % BLDV: 60.3 % — LOW (ref 67–88)
SARS-COV-2 RNA SPEC QL NAA+PROBE: SIGNIFICANT CHANGE UP
WBC # BLD: 13.63 K/UL — HIGH (ref 3.8–10.5)
WBC # BLD: 13.71 K/UL — HIGH (ref 3.8–10.5)
WBC # BLD: 15.88 K/UL — HIGH (ref 3.8–10.5)
WBC # FLD AUTO: 13.63 K/UL — HIGH (ref 3.8–10.5)
WBC # FLD AUTO: 13.71 K/UL — HIGH (ref 3.8–10.5)
WBC # FLD AUTO: 15.88 K/UL — HIGH (ref 3.8–10.5)

## 2021-10-04 PROCEDURE — 74177 CT ABD & PELVIS W/CONTRAST: CPT | Mod: 26

## 2021-10-04 PROCEDURE — 99223 1ST HOSP IP/OBS HIGH 75: CPT

## 2021-10-04 RX ORDER — HALOPERIDOL DECANOATE 100 MG/ML
1 INJECTION INTRAMUSCULAR EVERY 4 HOURS
Refills: 0 | Status: DISCONTINUED | OUTPATIENT
Start: 2021-10-04 | End: 2021-10-13

## 2021-10-04 RX ORDER — INFLUENZA VIRUS VACCINE 15; 15; 15; 15 UG/.5ML; UG/.5ML; UG/.5ML; UG/.5ML
0.5 SUSPENSION INTRAMUSCULAR ONCE
Refills: 0 | Status: DISCONTINUED | OUTPATIENT
Start: 2021-10-04 | End: 2021-10-13

## 2021-10-04 RX ORDER — MIRTAZAPINE 45 MG/1
15 TABLET, ORALLY DISINTEGRATING ORAL AT BEDTIME
Refills: 0 | Status: DISCONTINUED | OUTPATIENT
Start: 2021-10-04 | End: 2021-10-13

## 2021-10-04 RX ORDER — HALOPERIDOL DECANOATE 100 MG/ML
1 INJECTION INTRAMUSCULAR ONCE
Refills: 0 | Status: COMPLETED | OUTPATIENT
Start: 2021-10-04 | End: 2021-10-04

## 2021-10-04 RX ORDER — QUETIAPINE FUMARATE 200 MG/1
50 TABLET, FILM COATED ORAL
Refills: 0 | Status: DISCONTINUED | OUTPATIENT
Start: 2021-10-04 | End: 2021-10-13

## 2021-10-04 RX ORDER — QUETIAPINE FUMARATE 200 MG/1
25 TABLET, FILM COATED ORAL
Refills: 0 | Status: DISCONTINUED | OUTPATIENT
Start: 2021-10-04 | End: 2021-10-13

## 2021-10-04 RX ORDER — QUETIAPINE FUMARATE 200 MG/1
25 TABLET, FILM COATED ORAL
Refills: 0 | Status: DISCONTINUED | OUTPATIENT
Start: 2021-10-04 | End: 2021-10-04

## 2021-10-04 RX ADMIN — Medication 25 MILLIGRAM(S): at 00:11

## 2021-10-04 RX ADMIN — QUETIAPINE FUMARATE 25 MILLIGRAM(S): 200 TABLET, FILM COATED ORAL at 16:48

## 2021-10-04 RX ADMIN — HALOPERIDOL DECANOATE 1 MILLIGRAM(S): 100 INJECTION INTRAMUSCULAR at 13:29

## 2021-10-04 RX ADMIN — MIRTAZAPINE 15 MILLIGRAM(S): 45 TABLET, ORALLY DISINTEGRATING ORAL at 21:41

## 2021-10-04 RX ADMIN — QUETIAPINE FUMARATE 50 MILLIGRAM(S): 200 TABLET, FILM COATED ORAL at 21:42

## 2021-10-04 NOTE — H&P ADULT - NSHPPHYSICALEXAM_GEN_ALL_CORE
PHYSICAL EXAM:  T(C): 37 (10-04-21 @ 08:02), Max: 37.1 (10-03-21 @ 21:30)  HR: 77 (10-04-21 @ 08:02) (68 - 90)  BP: 130/66 (10-04-21 @ 08:02) (88/43 - 134/68)  RR: 18 (10-04-21 @ 08:02) (16 - 23)  SpO2: 100% (10-04-21 @ 08:02) (96% - 100%)  Wt(kg): --  I&O's Summary      Appearance: NAD	  HEENT:   Dry  oral mucosa, PERRL, EOMI	  Lymphatic: No lymphadenopathy  Cardiovascular: Irregular S1 S2, No JVD,+ murmurs, No edema  Respiratory: Lungs clear to auscultation	  Psychiatry: A & O x 2 dementia   Gastrointestinal:  Soft, Non-tender, + BS	  Skin: No rashes, No ecchymoses, No cyanosis	  Neurologic: Non-focal  Extremities: Normal range of motion, No clubbing, cyanosis or edema  Vascular: Peripheral pulses palpable 2+ bilaterally

## 2021-10-04 NOTE — BEHAVIORAL HEALTH ASSESSMENT NOTE - NS ED BHA AXIS I PRIMARY CODE FT
F05 Patient, Edwin Reed calling for medication refill. Medication(s) set up as pending orders from medication list.    Caller has been advised that their call does not guarantee an immediate refill. This refill will be reviewed within 72 hours by a qualified provider who will determine whether he or she can refill the medication.    Patient has contacted the pharmacy?  Yes    Patient advised clinic protocol is to contact his or her pharmacy first.    Additional information:     Patient is completely out of medications    Patient’s preferred pharmacy has been noted and populated.       [facility] [address] [address] [address]  Phone: [phone] Fax: [phone]

## 2021-10-04 NOTE — BEHAVIORAL HEALTH ASSESSMENT NOTE - HPI (INCLUDE ILLNESS QUALITY, SEVERITY, DURATION, TIMING, CONTEXT, MODIFYING FACTORS, ASSOCIATED SIGNS AND SYMPTOMS)
89y/o  female, has an adult daughter, lives alone, with PPHx of dementia, depression, on Zoloft and Seroquel prescribed by her PCP, with PMHx T2DM, Afib (on Eliquis and metoprolol), diastolic CHF w no reported hx of GIB p/w bright red blood per rectum yesterday and fatigue, sob and cp today, found to have Hgb 5.4, admitted for acute GI bleed.  Psychiatry consulted to assess for AMS, agitation.    Patient seen and evaluated awake and alert, oriented to self, states she is "supposedly in a hospital", but can not recall the name of the hospital or location.  Unable to state the current year.  She reports she lives "around here", states lives alone at home and that she is .  She reports having a daughter who she reports is coming to the hospital shortly.  She is unable to provide details as to why she is currently in the hospital, unable to recall recent events.  Patient agitated, trying to get up out of stretcher, needing to be redirected by staff.  Per staff, patient's received Haldol x 1 dose. 88y/o  female, has an adult daughter, lives alone, with PPHx of dementia, depression, on Zoloft and Seroquel prescribed by her PCP, with PMHx T2DM, Afib (on Eliquis and metoprolol), diastolic CHF w no reported hx of GIB p/w bright red blood per rectum yesterday and fatigue, sob and cp today, found to have Hgb 5.4, admitted for acute GI bleed.  Psychiatry consulted to assess for AMS, agitation.    Patient seen and evaluated awake and alert, oriented to self, states she is "supposedly in a hospital", but can not recall the name of the hospital or location.  Unable to state the current year.  She reports she lives "around here", states lives alone at home and that she is .  She reports having a daughter who she reports is coming to the hospital shortly.  She is unable to provide details as to why she is currently in the hospital, unable to recall recent events.  Patient agitated, trying to get up out of stretcher, needing to be redirected by staff.  Per staff, patient's received Haldol x 1 dose.    Spoke with patient's daughter, Sandra Wilkes, who reports patient lives by herself with a 24hour HHA, states has h/o dementia, sees neurologist Dr. Ledbetter, states has been managed on Seroquel 25mg BID and 50mg qHS, Remeron (unsure of dosage) qHS.  States in the past had been on Zoloft for 20+ years but transitioned to Remeron 3months ago.  She reports patient last Thursday became acutely agitated, aggressive, cursing at her HHA, states noticing her memory has been worsening in the last year and a half.  She reports patient has never seen a psychiatrist in the past, has no h/o SA, no concerns for suicide.  She reports patient lost her son a few months ago, has been depressed.  She report patient in the past patient has h/o hallucinations, seeing children in the house, seeing water coming out of the wall, insects flying all over prior to her starting on Seroquel, states hallucinations haven't been an acute issue recently.

## 2021-10-04 NOTE — ED ADULT NURSE REASSESSMENT NOTE - NS ED NURSE REASSESS COMMENT FT1
Report taken from RN Bimonte, pt assessed, lung sounds clear bilaterally, slight abdominal tenderness, bowel sounds present, nonpitting lower extremity edema noted, skin is dry and intact, pt is A&Ox3 with confusion at times, no complaints at this time, vitals stable

## 2021-10-04 NOTE — BEHAVIORAL HEALTH ASSESSMENT NOTE - NSBHCONSULTMEDS_PSY_A_CORE FT
-Seroquel 25mg BID (at 0800 & 1400), and Seroquel 50mg q1900  -c/w home dose Remeron (once dosage is confirmed with daughter, Nadiya)

## 2021-10-04 NOTE — BEHAVIORAL HEALTH ASSESSMENT NOTE - NSBHCHARTREVIEWVS_PSY_A_CORE FT
Vital Signs Last 24 Hrs  T(C): 37.2 (04 Oct 2021 10:52), Max: 37.2 (04 Oct 2021 10:52)  T(F): 99 (04 Oct 2021 10:52), Max: 99 (04 Oct 2021 10:52)  HR: 74 (04 Oct 2021 10:52) (68 - 90)  BP: 115/61 (04 Oct 2021 10:52) (88/43 - 134/68)  BP(mean): 73 (04 Oct 2021 01:02) (68 - 73)  RR: 18 (04 Oct 2021 10:52) (16 - 23)  SpO2: 96% (04 Oct 2021 10:52) (96% - 100%)

## 2021-10-04 NOTE — H&P ADULT - HISTORY OF PRESENT ILLNESS
88 y/o F well known to me from office w/ h/o T2DM, Afib (on Eliquis and metoprolol), diastolic CHF w no reported hx of GIB p/w bright red blood per rectum yesterday and fatigue, sob and cp today. reports bright blood stopped yesterday. no abd pain. no fever chills, no travels sick contacts. unsure last dose of xarelto was.  Hgb 5.4

## 2021-10-04 NOTE — BEHAVIORAL HEALTH ASSESSMENT NOTE - NSBHCHARTREVIEWLAB_PSY_A_CORE FT
7.3    13.71 )-----------( 264      ( 04 Oct 2021 05:42 )             23.4     10-03    137  |  98  |  36<H>  ----------------------------<  247<H>  4.6   |  21<L>  |  1.20    Ca    9.1      03 Oct 2021 19:10    TPro  6.0  /  Alb  4.0  /  TBili  0.3  /  DBili  x   /  AST  13  /  ALT  10  /  AlkPhos  84  10-03

## 2021-10-04 NOTE — BEHAVIORAL HEALTH ASSESSMENT NOTE - CASE SUMMARY
Pt is an 88 y/o WWF with hx of dementia, here for GI bleed, found to be agitated and confused. Pt is AOA x 1, poor historian, collateral obtained from dtr who provided history. pt denies si/hi, compliant with home dose of seroquel 25 mg bid and 50 mg qhs and remeron unknown dosage. pt being admitted to medicine for GI bleed. Agree with plan to continue pt's outpt psychiatric medications. no si/hi, no indication for inpt psychiatric admission.

## 2021-10-04 NOTE — H&P ADULT - NSHPLABSRESULTS_GEN_ALL_CORE
RADIOLOGY: x< from: Xray Chest 1 View- PORTABLE-Urgent (10.03.21 @ 19:22) >      EXAM:  XR CHEST PORTABLE URGENT 1V                        PROCEDURE DATE:  10/03/2021    INTERPRETATION:  EXAMINATION: XR CHEST URGENT    CLINICAL INDICATION: Chest Pain    TECHNIQUE: Single frontal, portable view of the chest was obtained.    COMPARISON: CT chest 5/12/2021..    FINDINGS:  Cardiomegaly.   The lungs are clear.  There is no pneumothorax or pleural effusion.    IMPRESSION:  Clear lungs.    --- End of Report ---      < end of copied text >      OTHER: 	  	  LABS:	 	    CARDIAC MARKERS:                                  7.3    13.71 )-----------( 264      ( 04 Oct 2021 05:42 )             23.4     10-03    137  |  98  |  36<H>  ----------------------------<  247<H>  4.6   |  21<L>  |  1.20    Ca    9.1      03 Oct 2021 19:10    TPro  6.0  /  Alb  4.0  /  TBili  0.3  /  DBili  x   /  AST  13  /  ALT  10  /  AlkPhos  84  10-03    proBNP:   Lipid Profile:   HgA1c:   TSH:

## 2021-10-04 NOTE — BEHAVIORAL HEALTH ASSESSMENT NOTE - SUMMARY
88y/o  female, has an adult daughter, lives alone, with PPHx of dementia, depression, on Zoloft and Seroquel prescribed by her PCP, with PMHx T2DM, Afib (on Eliquis and metoprolol), diastolic CHF w no reported hx of GIB p/w bright red blood per rectum yesterday and fatigue, sob and cp today, found to have Hgb 5.4, admitted for acute GI bleed.  Psychiatry consulted to assess for AMS, agitation. 88y/o  female, has an adult daughter, lives alone, with PPHx of dementia, depression, on Zoloft and Seroquel prescribed by her PCP, with PMHx T2DM, Afib (on Eliquis and metoprolol), diastolic CHF w no reported hx of GIB p/w bright red blood per rectum yesterday and fatigue, sob and cp today, found to have Hgb 5.4, admitted for acute GI bleed.  Psychiatry consulted to assess for AMS, agitation.  Seen and evaluated, awake and alert, poor historian, doesn't know where she is or what the current year is.  Agitated, trying to get up out of bed, per staff was given x 1 dose Haldol for acute agitation.  Per daughter, patient has h/o dementia, at baseline disoriented to place, time, states at times confuses family members.  She reports patient became acutely more agitated on Thursday, yelling aggressive towards the HHA.  She confirms patient is on Seroquel BID and qHS as well as Remeron, which are managed by her neurologist.  She denies patient has been actively suicidal, denies h/o SA.  Consider restarting patient's home dose of Seroquel as detailed below, recommend continuing home dose Remeron once confirmed with daughter Nadiya.  Can use Haldol 1mg PRN q4 for acute agitation (if qtc <500ms on ekg).

## 2021-10-04 NOTE — H&P ADULT - NSHPREVIEWOFSYSTEMS_GEN_ALL_CORE
REVIEW OF SYSTEMS:  CONSTITUTIONAL: No fever, weight loss, or fatigue  EYES: No eye pain, visual disturbances, or discharge  ENMT:  No difficulty hearing, tinnitus, vertigo; No sinus or throat pain  NECK: No pain or stiffness  RESPIRATORY: No cough, wheezing, chills or hemoptysis; No Shortness of Breath  CARDIOVASCULAR: No chest pain, palpitations, passing out, dizziness, or leg swelling  GASTROINTESTINAL: No abdominal or epigastric pain. No nausea, vomiting, or hematemesis; No diarrhea or constipation.+ melena+ hematochezia.  GENITOURINARY: No dysuria, frequency, hematuria, or incontinence  NEUROLOGICAL: No headaches, memory loss, loss of strength, numbness, or tremors  SKIN: No itching, burning, rashes, or lesions   LYMPH Nodes: No enlarged glands  ENDOCRINE: No heat or cold intolerance; No hair loss  MUSCULOSKELETAL: No joint pain or swelling; No muscle, back, or extremity pain  PSYCHIATRIC: No depression, anxiety, mood swings, or difficulty sleeping  HEME/LYMPH: No easy bruising, or bleeding gums  ALLERY AND IMMUNOLOGIC: No hives or eczema	    [ ] All others negative	  [ ] Unable to obtain

## 2021-10-04 NOTE — BEHAVIORAL HEALTH ASSESSMENT NOTE - RISK ASSESSMENT
Low Acute Suicide Risk Risk factors: acute/chronic medical issues, acute/chronic cognitive impairments, noncompliant with treatment, not receiving treatment, agitation    Protective factors: no h/o SA/SIB, no h/o psych admissions, no active substance abuse, good physical health, with adult children, domiciled, social supports, positive therapeutic relationship    Overall, pt is a low risk of harm to self/others and does not require psychiatric admission for safety and stabilization.

## 2021-10-04 NOTE — CONSULT NOTE ADULT - ASSESSMENT
89 year old female with lower GI bleeding    1. Lower GI bleed, per pt daughter ?history of colon mass for which pt refused intervention. Pt daughter wants to find out more collateral and consider options of supportive care vs colonoscopy. WIll obtain CT for now to assess for gross colon lesions    2. Afib on a/c holding for now    3. Diastolic CHF      Advanced care planning forms were discussed. Code status including forceful chest compressions, defibrillation and intubation were discussed. The risks benefits and alternatives to pertinent gastrointestinal procedures and interventions were discussed in detail and all questions were answered. Duration: 15 Minutes.      Adilson Archuleta M.D.   Gastroenterology and Hepatology  [address] [place], [place]  Office: [phone]  Cell: [phone]

## 2021-10-04 NOTE — CONSULT NOTE ADULT - SUBJECTIVE AND OBJECTIVE BOX
Chief Complaint:  Patient is a 89y old  Female who presents with a chief complaint of Hematochezia   GI Bleed (04 Oct 2021 08:53)      Date of service: 10-04-21 @ 22:46    HPI:    The patient is an 89 F w hx of DM, afib on Eliquis who presented w hematochezia for 2 days.    The patient denies dysphagia, nausea and vomiting, abdominal pain, diarrhea, unintentional weight loss, change in bowel habits or NSAID use.    Per her daugher, she had a prior colonoscopy and she is unsure what was found but surgery was recommended and the patient refused due to her age.    Allergies:  No Known Allergies      Home Medications:    Hospital Medications:  haloperidol     Tablet 1 milliGRAM(s) Oral every 4 hours PRN  influenza   Vaccine 0.5 milliLiter(s) IntraMuscular once  mirtazapine 15 milliGRAM(s) Oral at bedtime  QUEtiapine 25 milliGRAM(s) Oral <User Schedule>  QUEtiapine 50 milliGRAM(s) Oral <User Schedule>      PMHX/PSHX:  Chronic atrial fibrillation    Dementia    Chronic CHF    HTN (hypertension)    Dementia, senile with depression        Family history:      Social History:   Denies ethanol use.  Denies illicit drug use.    ROS:     General:  No wt loss, fevers, chills, night sweats, fatigue,   Eyes:  Good vision, no reported pain  ENT:  No sore throat, pain, runny nose, dysphagia  CV:  No pain, palpitations, hypo/hypertension  Resp:  No dyspnea, cough, tachypnea, wheezing  GI:  See HPI  :  No pain, bleeding, incontinence, nocturia  Muscle:  No pain, weakness  Neuro:  No weakness, tingling, memory problems  Psych:  No fatigue, insomnia, mood problems, depression  Endocrine:  No polyuria, polydipsia, cold/heat intolerance  Heme:  No petechiae, ecchymosis, easy bruisability  Integumentary:  No rash, edema      PHYSICAL EXAM:     GENERAL:  Appears stated age, well-groomed, well-nourished, no distress  HEENT:  NC/AT,  conjunctivae anicteric, clear and pink,   NECK: supple, trachea midline  CHEST:  Full & symmetric excursion, no increased effort, breath sounds clear  HEART:  Regular rhythm, no JVD  ABDOMEN:  Soft, non-tender, non-distended, normoactive bowel sounds,  no masses , no hepatosplenomegaly  EXTREMITIES:  no cyanosis,clubbing or edema  SKIN:  No rash, erythema, or, ecchymoses, no jaundice  NEURO:  Alert, non-focal, no asterixis  PSYCH: Appropriate affect, oriented to place and time  RECTAL: red blood (scant)      Vital Signs:  Vital Signs Last 24 Hrs  T(C): 36.4 (04 Oct 2021 20:13), Max: 37.2 (04 Oct 2021 10:52)  T(F): 97.5 (04 Oct 2021 20:13), Max: 99 (04 Oct 2021 10:52)  HR: 89 (04 Oct 2021 20:13) (68 - 90)  BP: 126/73 (04 Oct 2021 20:13) (107/50 - 134/68)  BP(mean): 73 (04 Oct 2021 01:02) (68 - 73)  RR: 18 (04 Oct 2021 20:13) (18 - 23)  SpO2: 96% (04 Oct 2021 20:13) (96% - 100%)  Daily     Daily     LABS: Labs personally reviewed by me:                        8.4    13.63 )-----------( 241      ( 04 Oct 2021 16:17 )             25.7     10-03    137  |  98  |  36<H>  ----------------------------<  247<H>  4.6   |  21<L>  |  1.20    Ca    9.1      03 Oct 2021 19:10    TPro  6.0  /  Alb  4.0  /  TBili  0.3  /  DBili  x   /  AST  13  /  ALT  10  /  AlkPhos  84  10-03    LIVER FUNCTIONS - ( 03 Oct 2021 19:10 )  Alb: 4.0 g/dL / Pro: 6.0 g/dL / ALK PHOS: 84 U/L / ALT: 10 U/L / AST: 13 U/L / GGT: x           PT/INR - ( 03 Oct 2021 19:10 )   PT: 22.6 sec;   INR: 1.94 ratio         PTT - ( 03 Oct 2021 19:10 )  PTT:31.2 sec        Imaging personally reviewed by me:

## 2021-10-05 LAB
ANION GAP SERPL CALC-SCNC: 16 MMOL/L — SIGNIFICANT CHANGE UP (ref 5–17)
BUN SERPL-MCNC: 17 MG/DL — SIGNIFICANT CHANGE UP (ref 7–23)
CALCIUM SERPL-MCNC: 9.1 MG/DL — SIGNIFICANT CHANGE UP (ref 8.4–10.5)
CHLORIDE SERPL-SCNC: 105 MMOL/L — SIGNIFICANT CHANGE UP (ref 96–108)
CO2 SERPL-SCNC: 21 MMOL/L — LOW (ref 22–31)
COVID-19 SPIKE DOMAIN AB INTERP: POSITIVE
COVID-19 SPIKE DOMAIN ANTIBODY RESULT: 211 U/ML — HIGH
CREAT SERPL-MCNC: 0.84 MG/DL — SIGNIFICANT CHANGE UP (ref 0.5–1.3)
GLUCOSE SERPL-MCNC: 132 MG/DL — HIGH (ref 70–99)
HCT VFR BLD CALC: 26.7 % — LOW (ref 34.5–45)
HGB BLD-MCNC: 8.5 G/DL — LOW (ref 11.5–15.5)
MCHC RBC-ENTMCNC: 26.4 PG — LOW (ref 27–34)
MCHC RBC-ENTMCNC: 31.8 GM/DL — LOW (ref 32–36)
MCV RBC AUTO: 82.9 FL — SIGNIFICANT CHANGE UP (ref 80–100)
NRBC # BLD: 0 /100 WBCS — SIGNIFICANT CHANGE UP (ref 0–0)
PLATELET # BLD AUTO: 292 K/UL — SIGNIFICANT CHANGE UP (ref 150–400)
POTASSIUM SERPL-MCNC: 4 MMOL/L — SIGNIFICANT CHANGE UP (ref 3.5–5.3)
POTASSIUM SERPL-SCNC: 4 MMOL/L — SIGNIFICANT CHANGE UP (ref 3.5–5.3)
RBC # BLD: 3.22 M/UL — LOW (ref 3.8–5.2)
RBC # FLD: 15.8 % — HIGH (ref 10.3–14.5)
SARS-COV-2 IGG+IGM SERPL QL IA: 211 U/ML — HIGH
SARS-COV-2 IGG+IGM SERPL QL IA: POSITIVE
SODIUM SERPL-SCNC: 142 MMOL/L — SIGNIFICANT CHANGE UP (ref 135–145)
WBC # BLD: 16.21 K/UL — HIGH (ref 3.8–10.5)
WBC # FLD AUTO: 16.21 K/UL — HIGH (ref 3.8–10.5)

## 2021-10-05 PROCEDURE — 93010 ELECTROCARDIOGRAM REPORT: CPT

## 2021-10-05 RX ORDER — PANTOPRAZOLE SODIUM 20 MG/1
40 TABLET, DELAYED RELEASE ORAL
Refills: 0 | Status: DISCONTINUED | OUTPATIENT
Start: 2021-10-05 | End: 2021-10-12

## 2021-10-05 RX ADMIN — HALOPERIDOL DECANOATE 1 MILLIGRAM(S): 100 INJECTION INTRAMUSCULAR at 18:57

## 2021-10-05 RX ADMIN — QUETIAPINE FUMARATE 50 MILLIGRAM(S): 200 TABLET, FILM COATED ORAL at 18:07

## 2021-10-05 RX ADMIN — QUETIAPINE FUMARATE 25 MILLIGRAM(S): 200 TABLET, FILM COATED ORAL at 08:02

## 2021-10-05 RX ADMIN — QUETIAPINE FUMARATE 25 MILLIGRAM(S): 200 TABLET, FILM COATED ORAL at 13:14

## 2021-10-05 RX ADMIN — MIRTAZAPINE 15 MILLIGRAM(S): 45 TABLET, ORALLY DISINTEGRATING ORAL at 20:43

## 2021-10-05 RX ADMIN — HALOPERIDOL DECANOATE 1 MILLIGRAM(S): 100 INJECTION INTRAMUSCULAR at 22:33

## 2021-10-05 RX ADMIN — PANTOPRAZOLE SODIUM 40 MILLIGRAM(S): 20 TABLET, DELAYED RELEASE ORAL at 18:07

## 2021-10-05 RX ADMIN — HALOPERIDOL DECANOATE 1 MILLIGRAM(S): 100 INJECTION INTRAMUSCULAR at 08:02

## 2021-10-05 NOTE — PROVIDER CONTACT NOTE (OTHER) - ASSESSMENT
H&H stable, VSS see FS, pt restless in Bed, no s/s of bleeding noted reports feeling anxious after family left (approximately 5min ago), received seroquel dose approx 1 hour ago
patient A&O x1, denies cp/sob, Afib in 110's on tele monitor.   146/78, , POX 97% on RA patient Asymptomatic.

## 2021-10-05 NOTE — PROGRESS NOTE ADULT - ASSESSMENT
89 y/o F well known to me from office w/ h/o T2DM, Afib (on Eliquis and metoprolol), diastolic CHF w no reported hx of GIB p/w bright red blood per rectum yesterday and fatigue, sob and cp today. reports bright blood stopped yesterday. no abd pain. no fever chills, no travels sick contacts. unsure last dose of xarelto was.  Hgb 5.4

## 2021-10-05 NOTE — PHYSICAL THERAPY INITIAL EVALUATION ADULT - PERTINENT HX OF CURRENT PROBLEM, REHAB EVAL
89 y/o F h/o T2DM, Afib (on Eliquis and metoprolol), diastolic CHF w no reported hx of GIB p/w bright red blood per rectum yesterday and fatigue, sob and cp today. reports bright blood stopped yesterday. Hgb 5.4. Admitted for lower GI bleed. Hgb now stable.

## 2021-10-05 NOTE — PROGRESS NOTE ADULT - SUBJECTIVE AND OBJECTIVE BOX
Subjective: Patient seen and examined. No new events except as noted.   agitated yesterday       REVIEW OF SYSTEMS:    CONSTITUTIONAL:+ weakness, fevers or chills  EYES/ENT: No visual changes;  No vertigo or throat pain   NECK: No pain or stiffness  RESPIRATORY: No cough, wheezing, hemoptysis; No shortness of breath  CARDIOVASCULAR: No chest pain or palpitations  GASTROINTESTINAL: No abdominal or epigastric pain. No nausea, vomiting, or hematemesis; No diarrhea or constipation. No melena or hematochezia.  GENITOURINARY: No dysuria, frequency or hematuria  NEUROLOGICAL: No numbness or weakness  SKIN: No itching, burning, rashes, or lesions   All other review of systems is negative unless indicated above.    MEDICATIONS:  MEDICATIONS  (STANDING):  influenza   Vaccine 0.5 milliLiter(s) IntraMuscular once  mirtazapine 15 milliGRAM(s) Oral at bedtime  pantoprazole  Injectable 40 milliGRAM(s) IV Push two times a day  QUEtiapine 25 milliGRAM(s) Oral <User Schedule>  QUEtiapine 50 milliGRAM(s) Oral <User Schedule>      PHYSICAL EXAM:  T(C): 36.4 (10-05-21 @ 07:29), Max: 37.2 (10-04-21 @ 10:52)  HR: 106 (10-05-21 @ 07:29) (74 - 106)  BP: 146/78 (10-05-21 @ 07:29) (115/61 - 146/78)  RR: 18 (10-05-21 @ 07:29) (18 - 18)  SpO2: 97% (10-05-21 @ 07:29) (95% - 97%)  Wt(kg): --  I&O's Summary      Appearance: NAD	  HEENT:   Dry  oral mucosa, PERRL, EOMI	  Lymphatic: No lymphadenopathy  Cardiovascular: Irregular S1 S2, No JVD,+ murmurs, No edema  Respiratory: Lungs clear to auscultation	  Psychiatry: A & O x 2 dementia   Gastrointestinal:  Soft, Non-tender, + BS	  Skin: No rashes, No ecchymoses, No cyanosis	  Neurologic: Non-focal  Extremities: Normal range of motion, No clubbing, cyanosis or edema  Vascular: Peripheral pulses palpable 2+ bilaterally    LABS:    CARDIAC MARKERS:                                8.4    13.63 )-----------( 241      ( 04 Oct 2021 16:17 )             25.7     10-03    137  |  98  |  36<H>  ----------------------------<  247<H>  4.6   |  21<L>  |  1.20    Ca    9.1      03 Oct 2021 19:10    TPro  6.0  /  Alb  4.0  /  TBili  0.3  /  DBili  x   /  AST  13  /  ALT  10  /  AlkPhos  84  10-03    proBNP:   Lipid Profile:   HgA1c:   TSH:             TELEMETRY: 	  AF  ECG:  	  RADIOLOGY: < from: CT Abdomen and Pelvis w/ IV Cont (10.04.21 @ 20:59) >    ******PRELIMINARY REPORT******    ******PRELIMINARY REPORT******          EXAM:  CT ABDOMEN AND PELVIS IC                            PROCEDURE DATE:  10/04/2021      ******PRELIMINARY REPORT******    ******PRELIMINARY REPORT******              INTERPRETATION:  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 No evidence of overt active GI bleed. Follow-up final report in the a.m.            ******PRELIMINARY REPORT******    ******PRELIMINARY REPORT******          JASIEL ALLEN MD; Resident Radiology    < end of copied text >    DIAGNOSTIC TESTING:  [ ] Echocardiogram:  [ ]  Catheterization:  [ ] Stress Test:    OTHER:

## 2021-10-05 NOTE — PROGRESS NOTE ADULT - SUBJECTIVE AND OBJECTIVE BOX
Chief Complaint:  Patient is a 89y old  Female who presents with a chief complaint of Hematochezia   GI Bleed (05 Oct 2021 10:34)      Date of service 10-05-21 @ 19:35      Interval Events:   no GI bleeding  Hospital Medications:  haloperidol     Tablet 1 milliGRAM(s) Oral every 4 hours PRN  influenza   Vaccine 0.5 milliLiter(s) IntraMuscular once  mirtazapine 15 milliGRAM(s) Oral at bedtime  pantoprazole  Injectable 40 milliGRAM(s) IV Push two times a day  QUEtiapine 25 milliGRAM(s) Oral <User Schedule>  QUEtiapine 50 milliGRAM(s) Oral <User Schedule>        Review of Systems:  General:  No wt loss, fevers, chills, night sweats, fatigue,   Eyes:  Good vision, no reported pain  ENT:  No sore throat, pain, runny nose, dysphagia  CV:  No pain, palpitations, hypo/hypertension  Resp:  No dyspnea, cough, tachypnea, wheezing  GI:  See HPI  :  No pain, bleeding, incontinence, nocturia  Muscle:  No pain, weakness  Neuro:  No weakness, tingling, memory problems  Psych:  No fatigue, insomnia, mood problems, depression  Endocrine:  No polyuria, polydipsia, cold/heat intolerance  Heme:  No petechiae, ecchymosis, easy bruisability  Integumentary:  No rash, edema    PHYSICAL EXAM:   Vital Signs:  Vital Signs Last 24 Hrs  T(C): 36.2 (05 Oct 2021 18:57), Max: 36.7 (04 Oct 2021 23:24)  T(F): 97.2 (05 Oct 2021 18:57), Max: 98 (04 Oct 2021 23:24)  HR: 135 (05 Oct 2021 18:59) (89 - 155)  BP: 146/76 (05 Oct 2021 18:57) (120/61 - 146/78)  BP(mean): --  RR: 18 (05 Oct 2021 18:57) (18 - 20)  SpO2: 97% (05 Oct 2021 18:57) (95% - 97%)  Daily     Daily Weight in k.5 (05 Oct 2021 04:08)      PHYSICAL EXAM:     GENERAL:  Appears stated age, well-groomed, well-nourished, no distress  HEENT:  NC/AT,  conjunctivae anicteric, clear and pink,   NECK: supple, trachea midline  CHEST:  Full & symmetric excursion, no increased effort, breath sounds clear  HEART:  Regular rhythm, no JVD  ABDOMEN:  Soft, non-tender, non-distended, normoactive bowel sounds,  no masses , no hepatosplenomegaly  EXTREMITIES:  no cyanosis,clubbing or edema  SKIN:  No rash, erythema, or, ecchymoses, no jaundice  NEURO:  Alert, non-focal, no asterixis  PSYCH: Appropriate affect, oriented to place and time  RECTAL: Deferred      LABS Personally reviewed by me:                        8.5    16. )-----------( 292      ( 05 Oct 2021 11:45 )             26.7     Mean Cell Volume: 82.9 fl (10-05-21 @ 11:45)    10-05    142  |  105  |  17  ----------------------------<  132<H>  4.0   |  21<L>  |  0.84    Ca    9.1      05 Oct 2021 11:45                                    8.5    16.21 )-----------( 292      ( 05 Oct 2021 11:45 )             26.7                         8.4    13.63 )-----------( 241      ( 04 Oct 2021 16:17 )             25.7                         7.3    13.71 )-----------( 264      ( 04 Oct 2021 05:42 )             23.4                         6.4    15.88 )-----------( 276      ( 04 Oct 2021 01:42 )             20.8                         5.4    13.32 )-----------( 273      ( 03 Oct 2021 19:10 )             17.9       Imaging personally reviewed by me:

## 2021-10-05 NOTE — CHART NOTE - NSCHARTNOTEFT_GEN_A_CORE
BENITA AMARO    Notified by RN patient with heart rate  170's while out of bed ambulating for few second  resolved after patient went bed to bed.   Denies CP, SOB, Palpitations, lightheadedness, nausea and vomiting  Vital Signs Last 24 Hrs  T(C): 36.3 (05 Oct 2021 13:50), Max: 36.7 (04 Oct 2021 23:24)  T(F): 97.4 (05 Oct 2021 13:50), Max: 98 (04 Oct 2021 23:24)  HR: 99 (05 Oct 2021 13:50) (89 - 106)  BP: 126/78 (05 Oct 2021 13:50) (120/61 - 146/78)  BP(mean): --  RR: 20 (05 Oct 2021 13:50) (18 - 20)  SpO2: 97% (05 Oct 2021 13:50) (95% - 97%)                        8.5    16.21 )-----------( 292      ( 05 Oct 2021 11:45 )             26.7   10-05    142  |  105  |  17  ----------------------------<  132<H>  4.0   |  21<L>  |  0.84    Ca    9.1      05 Oct 2021 11:45    TPro  6.0  /  Alb  4.0  /  TBili  0.3  /  DBili  x   /  AST  13  /  ALT  10  /  AlkPhos  84  10-03    HPI:   88 y/o F well known to me from office w/ h/o T2DM, Afib (on Eliquis and metoprolol), diastolic CHF w no reported hx of GIB p/w bright red blood per rectum yesterday and fatigue, sob and cp today. reports bright blood stopped yesterday. no abd pain. no fever chills, no travels sick contacts. unsure last dose of xarelto was.  Hgb 5.4   (04 Oct 2021 08:53)        Interventions taken   continue with current medications   Cardiology appreciated                           Ena Keenan NP-C

## 2021-10-05 NOTE — PROGRESS NOTE ADULT - ASSESSMENT
89 year old female with lower GI bleeding    1. Lower GI bleed, pt family refusing colonoscopy. hgb stable.Can rechallenge w lovenox tomorrow.      2. Afib on a/c holding for now    3. Diastolic CHF

## 2021-10-05 NOTE — PHYSICAL THERAPY INITIAL EVALUATION ADULT - ADDITIONAL COMMENTS
Per previous admission in May 2021 pt lived alone with HHA (9 hrs x 7 days) with family working to [id number] assistance. Independent amb w/ RW. Assistance for all ADLs.    Unable to obtain updated social history from pt 2/2 mental status. Call placed to daughter, Nadiya Saab, with no answer.

## 2021-10-06 LAB
ANION GAP SERPL CALC-SCNC: 14 MMOL/L — SIGNIFICANT CHANGE UP (ref 5–17)
BUN SERPL-MCNC: 14 MG/DL — SIGNIFICANT CHANGE UP (ref 7–23)
CALCIUM SERPL-MCNC: 8.8 MG/DL — SIGNIFICANT CHANGE UP (ref 8.4–10.5)
CHLORIDE SERPL-SCNC: 106 MMOL/L — SIGNIFICANT CHANGE UP (ref 96–108)
CO2 SERPL-SCNC: 21 MMOL/L — LOW (ref 22–31)
CREAT SERPL-MCNC: 0.93 MG/DL — SIGNIFICANT CHANGE UP (ref 0.5–1.3)
GLUCOSE SERPL-MCNC: 115 MG/DL — HIGH (ref 70–99)
HCT VFR BLD CALC: 25.6 % — LOW (ref 34.5–45)
HCT VFR BLD CALC: 28.3 % — LOW (ref 34.5–45)
HCT VFR BLD CALC: 28.4 % — LOW (ref 34.5–45)
HGB BLD-MCNC: 7.8 G/DL — LOW (ref 11.5–15.5)
HGB BLD-MCNC: 8.5 G/DL — LOW (ref 11.5–15.5)
HGB BLD-MCNC: 8.9 G/DL — LOW (ref 11.5–15.5)
MCHC RBC-ENTMCNC: 25.6 PG — LOW (ref 27–34)
MCHC RBC-ENTMCNC: 26.4 PG — LOW (ref 27–34)
MCHC RBC-ENTMCNC: 26.5 PG — LOW (ref 27–34)
MCHC RBC-ENTMCNC: 29.9 GM/DL — LOW (ref 32–36)
MCHC RBC-ENTMCNC: 30.5 GM/DL — LOW (ref 32–36)
MCHC RBC-ENTMCNC: 31.4 GM/DL — LOW (ref 32–36)
MCV RBC AUTO: 84.2 FL — SIGNIFICANT CHANGE UP (ref 80–100)
MCV RBC AUTO: 85.5 FL — SIGNIFICANT CHANGE UP (ref 80–100)
MCV RBC AUTO: 86.8 FL — SIGNIFICANT CHANGE UP (ref 80–100)
NRBC # BLD: 0 /100 WBCS — SIGNIFICANT CHANGE UP (ref 0–0)
PLATELET # BLD AUTO: 247 K/UL — SIGNIFICANT CHANGE UP (ref 150–400)
PLATELET # BLD AUTO: 279 K/UL — SIGNIFICANT CHANGE UP (ref 150–400)
PLATELET # BLD AUTO: 318 K/UL — SIGNIFICANT CHANGE UP (ref 150–400)
POTASSIUM SERPL-MCNC: 3.4 MMOL/L — LOW (ref 3.5–5.3)
POTASSIUM SERPL-SCNC: 3.4 MMOL/L — LOW (ref 3.5–5.3)
RBC # BLD: 2.95 M/UL — LOW (ref 3.8–5.2)
RBC # BLD: 3.32 M/UL — LOW (ref 3.8–5.2)
RBC # BLD: 3.36 M/UL — LOW (ref 3.8–5.2)
RBC # FLD: 15.9 % — HIGH (ref 10.3–14.5)
RBC # FLD: 16.2 % — HIGH (ref 10.3–14.5)
RBC # FLD: 16.3 % — HIGH (ref 10.3–14.5)
SODIUM SERPL-SCNC: 141 MMOL/L — SIGNIFICANT CHANGE UP (ref 135–145)
WBC # BLD: 11.26 K/UL — HIGH (ref 3.8–10.5)
WBC # BLD: 14.84 K/UL — HIGH (ref 3.8–10.5)
WBC # BLD: 16.71 K/UL — HIGH (ref 3.8–10.5)
WBC # FLD AUTO: 11.26 K/UL — HIGH (ref 3.8–10.5)
WBC # FLD AUTO: 14.84 K/UL — HIGH (ref 3.8–10.5)
WBC # FLD AUTO: 16.71 K/UL — HIGH (ref 3.8–10.5)

## 2021-10-06 PROCEDURE — 93010 ELECTROCARDIOGRAM REPORT: CPT

## 2021-10-06 RX ORDER — METOPROLOL TARTRATE 50 MG
100 TABLET ORAL DAILY
Refills: 0 | Status: DISCONTINUED | OUTPATIENT
Start: 2021-10-06 | End: 2021-10-13

## 2021-10-06 RX ORDER — FUROSEMIDE 40 MG
40 TABLET ORAL DAILY
Refills: 0 | Status: DISCONTINUED | OUTPATIENT
Start: 2021-10-06 | End: 2021-10-13

## 2021-10-06 RX ORDER — POTASSIUM CHLORIDE 20 MEQ
20 PACKET (EA) ORAL
Refills: 0 | Status: COMPLETED | OUTPATIENT
Start: 2021-10-06 | End: 2021-10-06

## 2021-10-06 RX ADMIN — MIRTAZAPINE 15 MILLIGRAM(S): 45 TABLET, ORALLY DISINTEGRATING ORAL at 21:49

## 2021-10-06 RX ADMIN — PANTOPRAZOLE SODIUM 40 MILLIGRAM(S): 20 TABLET, DELAYED RELEASE ORAL at 17:02

## 2021-10-06 RX ADMIN — Medication 20 MILLIEQUIVALENT(S): at 14:13

## 2021-10-06 RX ADMIN — QUETIAPINE FUMARATE 25 MILLIGRAM(S): 200 TABLET, FILM COATED ORAL at 14:15

## 2021-10-06 RX ADMIN — HALOPERIDOL DECANOATE 1 MILLIGRAM(S): 100 INJECTION INTRAMUSCULAR at 06:45

## 2021-10-06 RX ADMIN — PANTOPRAZOLE SODIUM 40 MILLIGRAM(S): 20 TABLET, DELAYED RELEASE ORAL at 06:39

## 2021-10-06 RX ADMIN — QUETIAPINE FUMARATE 25 MILLIGRAM(S): 200 TABLET, FILM COATED ORAL at 08:10

## 2021-10-06 RX ADMIN — QUETIAPINE FUMARATE 50 MILLIGRAM(S): 200 TABLET, FILM COATED ORAL at 20:40

## 2021-10-06 RX ADMIN — Medication 20 MILLIEQUIVALENT(S): at 12:47

## 2021-10-06 NOTE — CONSULT NOTE ADULT - ASSESSMENT
89 yo female h/o DM, afib on eliquis, CHF, here with gi bleed    gi bleed with acute blood loss anemia  s/p prbc transfusion  gi consulted  refusing egd/colon  AC on hold  ppi  serial cbc    afib  AC on hold   resume metoprolol for rate control  tele monitor    CHF  chronic diastolic  stable  resume home lasix     PAS  PT        Advanced care planning was discussed with patient and family.  Advanced care planning forms were reviewed and discussed as appropriate.  Differential diagnosis and plan of care discussed with patient after the evaluation.   Pain assessed and judicious use of narcotics when appropriate was discussed.  Importance of Fall prevention discussed.  Counseling on Smoking and Alcohol cessation was offered when appropriate.  Counseling on Diet, exercise, and medication compliance was done.     Approx 60 minutes spent.

## 2021-10-06 NOTE — DIETITIAN INITIAL EVALUATION ADULT. - PERTINENT LABORATORY DATA
10-06 @ 07:09: Na 141, BUN 14, Cr 0.93, <H>, K+ 3.4<L>, Phos --, Mg --, Alk Phos --, ALT/SGPT --, AST/SGOT --, HbA1c --

## 2021-10-06 NOTE — PROGRESS NOTE ADULT - ASSESSMENT
89 year old female with lower GI bleeding    1. Lower GI bleed, pt family refusing colonoscopy. hgb declined today, will repeat and reassess in the am, hold a/c for now.      2. Afib on a/c holding for now    3. Diastolic CHF

## 2021-10-06 NOTE — DIETITIAN INITIAL EVALUATION ADULT. - PERSON TAUGHT/METHOD
daughter instructed/patient instructed/verbal instruction/written material/teach back - (Patient repeats in own words)

## 2021-10-06 NOTE — DIETITIAN INITIAL EVALUATION ADULT. - OTHER INFO
spoke with daughter at bedside and pt  Denies nausea/vomit :  Denies difficulty chewing /swallow :  Denies diarrhea/constipation:  Last BM : today  NKFA  Ht: 64"  Ht taken from daughter  Dosing ht: 162.6cm  Dosing wt: 63.5kg  Ht used for BMI 64"  Wt used for .3pounds-current flow sheet weight  Education Provided : Heart Healthy Consistent Carbohydrate  Nutrition Therapy   pressure injury:  left buttock stage 1, right buttock stage 2

## 2021-10-06 NOTE — DIETITIAN INITIAL EVALUATION ADULT. - PERTINENT MEDS FT
MEDICATIONS  (STANDING):  furosemide    Tablet 40 milliGRAM(s) Oral daily  influenza   Vaccine 0.5 milliLiter(s) IntraMuscular once  metoprolol succinate  milliGRAM(s) Oral daily  mirtazapine 15 milliGRAM(s) Oral at bedtime  pantoprazole  Injectable 40 milliGRAM(s) IV Push two times a day  potassium chloride    Tablet ER 20 milliEquivalent(s) Oral every 2 hours  QUEtiapine 25 milliGRAM(s) Oral <User Schedule>  QUEtiapine 50 milliGRAM(s) Oral <User Schedule>    MEDICATIONS  (PRN):  haloperidol     Tablet 1 milliGRAM(s) Oral every 4 hours PRN Agitation

## 2021-10-06 NOTE — PROGRESS NOTE ADULT - SUBJECTIVE AND OBJECTIVE BOX
Subjective: Patient seen and examined. No new events except as noted.   eric oernight   family decided against colonoscopy   REVIEW OF SYSTEMS:    CONSTITUTIONAL: +weakness, fevers or chills  EYES/ENT: No visual changes;  No vertigo or throat pain   NECK: No pain or stiffness  RESPIRATORY: No cough, wheezing, hemoptysis; No shortness of breath  CARDIOVASCULAR: No chest pain or palpitations  GASTROINTESTINAL: No abdominal or epigastric pain. No nausea, vomiting, or hematemesis; No diarrhea or constipation. No melena or hematochezia.  GENITOURINARY: No dysuria, frequency or hematuria  NEUROLOGICAL: No numbness or weakness  SKIN: No itching, burning, rashes, or lesions   All other review of systems is negative unless indicated above.    MEDICATIONS:  MEDICATIONS  (STANDING):  furosemide    Tablet 40 milliGRAM(s) Oral daily  influenza   Vaccine 0.5 milliLiter(s) IntraMuscular once  metoprolol succinate  milliGRAM(s) Oral daily  mirtazapine 15 milliGRAM(s) Oral at bedtime  pantoprazole  Injectable 40 milliGRAM(s) IV Push two times a day  QUEtiapine 25 milliGRAM(s) Oral <User Schedule>  QUEtiapine 50 milliGRAM(s) Oral <User Schedule>      PHYSICAL EXAM:  T(C): 36.7 (10-06-21 @ 14:27), Max: 36.7 (10-06-21 @ 04:36)  HR: 109 (10-06-21 @ 14:27) (97 - 109)  BP: 119/75 (10-06-21 @ 14:27) (112/55 - 123/77)  RR: 19 (10-06-21 @ 14:27) (18 - 19)  SpO2: 97% (10-06-21 @ 14:27) (95% - 97%)  Wt(kg): --  I&O's Summary    05 Oct 2021 07:01  -  06 Oct 2021 07:00  --------------------------------------------------------  IN: 120 mL / OUT: 100 mL / NET: 20 mL    06 Oct 2021 07:01  -  06 Oct 2021 19:37  --------------------------------------------------------  IN: 240 mL / OUT: 0 mL / NET: 240 mL          Appearance: NAD	  HEENT:   Dry  oral mucosa, PERRL, EOMI	  Lymphatic: No lymphadenopathy  Cardiovascular: Irregular S1 S2, No JVD,+ murmurs, No edema  Respiratory: Lungs clear to auscultation	  Psychiatry: A & O x 2 dementia   Gastrointestinal:  Soft, Non-tender, + BS	  Skin: No rashes, No ecchymoses, No cyanosis	  Neurologic: Non-focal  Extremities: Normal range of motion, No clubbing, cyanosis or edema  Vascular: Peripheral pulses palpable 2+ bilaterally      LABS:    CARDIAC MARKERS:                                8.5    14.84 )-----------( 318      ( 06 Oct 2021 18:47 )             28.4     10-06    141  |  106  |  14  ----------------------------<  115<H>  3.4<L>   |  21<L>  |  0.93    Ca    8.8      06 Oct 2021 07:09      proBNP:   Lipid Profile:   HgA1c:   TSH:             TELEMETRY: 	 AF 130s   ECG:  	  RADIOLOGY:   DIAGNOSTIC TESTING:  [ ] Echocardiogram:  [ ]  Catheterization:  [ ] Stress Test:    OTHER:

## 2021-10-07 LAB
HCT VFR BLD CALC: 26.2 % — LOW (ref 34.5–45)
HGB BLD-MCNC: 8 G/DL — LOW (ref 11.5–15.5)
MCHC RBC-ENTMCNC: 26.2 PG — LOW (ref 27–34)
MCHC RBC-ENTMCNC: 30.5 GM/DL — LOW (ref 32–36)
MCV RBC AUTO: 85.9 FL — SIGNIFICANT CHANGE UP (ref 80–100)
NRBC # BLD: 0 /100 WBCS — SIGNIFICANT CHANGE UP (ref 0–0)
PLATELET # BLD AUTO: 276 K/UL — SIGNIFICANT CHANGE UP (ref 150–400)
RBC # BLD: 3.05 M/UL — LOW (ref 3.8–5.2)
RBC # FLD: 16.3 % — HIGH (ref 10.3–14.5)
WBC # BLD: 11.46 K/UL — HIGH (ref 3.8–10.5)
WBC # FLD AUTO: 11.46 K/UL — HIGH (ref 3.8–10.5)

## 2021-10-07 RX ORDER — RIVAROXABAN 15 MG-20MG
15 KIT ORAL DAILY
Refills: 0 | Status: DISCONTINUED | OUTPATIENT
Start: 2021-10-07 | End: 2021-10-09

## 2021-10-07 RX ORDER — POLYETHYLENE GLYCOL 3350 17 G/17G
17 POWDER, FOR SOLUTION ORAL DAILY
Refills: 0 | Status: DISCONTINUED | OUTPATIENT
Start: 2021-10-07 | End: 2021-10-13

## 2021-10-07 RX ORDER — SENNA PLUS 8.6 MG/1
2 TABLET ORAL AT BEDTIME
Refills: 0 | Status: DISCONTINUED | OUTPATIENT
Start: 2021-10-07 | End: 2021-10-13

## 2021-10-07 RX ORDER — DIGOXIN 250 MCG
125 TABLET ORAL DAILY
Refills: 0 | Status: DISCONTINUED | OUTPATIENT
Start: 2021-10-07 | End: 2021-10-13

## 2021-10-07 RX ORDER — RIVAROXABAN 15 MG-20MG
20 KIT ORAL DAILY
Refills: 0 | Status: DISCONTINUED | OUTPATIENT
Start: 2021-10-07 | End: 2021-10-07

## 2021-10-07 RX ADMIN — Medication 125 MICROGRAM(S): at 14:36

## 2021-10-07 RX ADMIN — RIVAROXABAN 15 MILLIGRAM(S): KIT at 18:20

## 2021-10-07 RX ADMIN — POLYETHYLENE GLYCOL 3350 17 GRAM(S): 17 POWDER, FOR SOLUTION ORAL at 16:32

## 2021-10-07 RX ADMIN — MIRTAZAPINE 15 MILLIGRAM(S): 45 TABLET, ORALLY DISINTEGRATING ORAL at 21:03

## 2021-10-07 RX ADMIN — Medication 100 MILLIGRAM(S): at 06:04

## 2021-10-07 RX ADMIN — SENNA PLUS 2 TABLET(S): 8.6 TABLET ORAL at 21:03

## 2021-10-07 RX ADMIN — PANTOPRAZOLE SODIUM 40 MILLIGRAM(S): 20 TABLET, DELAYED RELEASE ORAL at 06:06

## 2021-10-07 RX ADMIN — QUETIAPINE FUMARATE 25 MILLIGRAM(S): 200 TABLET, FILM COATED ORAL at 10:01

## 2021-10-07 RX ADMIN — Medication 40 MILLIGRAM(S): at 06:04

## 2021-10-07 RX ADMIN — QUETIAPINE FUMARATE 25 MILLIGRAM(S): 200 TABLET, FILM COATED ORAL at 14:35

## 2021-10-07 RX ADMIN — QUETIAPINE FUMARATE 50 MILLIGRAM(S): 200 TABLET, FILM COATED ORAL at 19:24

## 2021-10-07 RX ADMIN — HALOPERIDOL DECANOATE 1 MILLIGRAM(S): 100 INJECTION INTRAMUSCULAR at 17:21

## 2021-10-07 RX ADMIN — PANTOPRAZOLE SODIUM 40 MILLIGRAM(S): 20 TABLET, DELAYED RELEASE ORAL at 17:54

## 2021-10-07 NOTE — CONSULT NOTE ADULT - SUBJECTIVE AND OBJECTIVE BOX
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 She has a history of dementia and is on Eliquis for chronic atrial fibrillation. She was admitted on  with bright red blood per rectum. Apparently, the patient had a recent colonoscopy and surgery for a discovered lesion was recommended which the patient refused due to her age. She has required 3 units of PRBCs. She has no anorexia or weight loss. No nausea, vomiting or abdominal pain. No constipation, diarrhea or change in the caliber of her stools. The patient has remained hemodynamically stable with a stable H/H following transfusions. The patient and her family has refused colonoscopy. Anticoagulation has been restarted. The patient has had mild shortness of breath on room air. She has no cough, sputum production, chest congestion or wheeze. No fevers, chills or sweats. No chest pain/pressure or palpitations. Asked to evaluate    PMHX:  Dementia  Chronic atrial fibrillation  Chronic CHF  HTN (hypertension)    PSHX:  No significant past surgical history      FAMILY HISTORY:      SOCIAL HISTORY:    Pulmonary Medications:       Antimicrobials:      Cardiology:  digoxin     Tablet 125 MICROGram(s) Oral daily  furosemide    Tablet 40 milliGRAM(s) Oral daily  metoprolol succinate  milliGRAM(s) Oral daily      Other:  influenza   Vaccine 0.5 milliLiter(s) IntraMuscular once  mirtazapine 15 milliGRAM(s) Oral at bedtime  pantoprazole  Injectable 40 milliGRAM(s) IV Push two times a day  polyethylene glycol 3350 17 Gram(s) Oral daily  QUEtiapine 25 milliGRAM(s) Oral <User Schedule>  QUEtiapine 50 milliGRAM(s) Oral <User Schedule>  rivaroxaban 15 milliGRAM(s) Oral daily  senna 2 Tablet(s) Oral at bedtime      Prn:  MEDICATIONS  (PRN):  haloperidol     Tablet 1 milliGRAM(s) Oral every 4 hours PRN Agitation      Allergies    No Known Allergies    HOME MEDICATIONS: see  H & P    REVIEW OF SYSTEMS:  Constitutional: As per HPI  HEENT: Within normal limits  CV: As per HPI  Resp: As per HPI  GI: Within normal limits   : Within normal limits  Musculoskeletal: Within normal limits  Skin: Within normal limits  Neurological: Within normal limits  Psychiatric: Within normal limits  Endocrine: Within normal limits  Hematologic/Lymphatic: Within normal limits  Allergic/Immunologic: Within normal limits    [x] All other systems negative    OBJECTIVE:    I&O's Detail    06 Oct 2021 07:01  -  07 Oct 2021 07:00  --------------------------------------------------------  IN:    Oral Fluid: 240 mL  Total IN: 240 mL    OUT:  Total OUT: 0 mL    Total NET: 240 mL      07 Oct 2021 07:01  -  07 Oct 2021 16:41  --------------------------------------------------------  IN:    Oral Fluid: 360 mL  Total IN: 360 mL    OUT:    Voided (mL): 400 mL  Total OUT: 400 mL    Total NET: -40 mL    Daily Weight in k.9 (07 Oct 2021 04:18)  POCT Blood Glucose.: 208 mg/dL (06 Oct 2021 21:28)  POCT Blood Glucose.: 121 mg/dL (06 Oct 2021 17:24)      PHYSICAL EXAM:  ICU Vital Signs Last 24 Hrs  T(C): 36.9 (07 Oct 2021 12:03), Max: 36.9 (07 Oct 2021 12:03)  T(F): 98.4 (07 Oct 2021 12:03), Max: 98.4 (07 Oct 2021 12:03)  HR: 76 (07 Oct 2021 12:03) (76 - 109)  BP: 123/69 (07 Oct 2021 12:03) (107/69 - 123/69)  BP(mean): --  ABP: --  ABP(mean): --  RR: 19 (07 Oct 2021 12:03) (18 - 19)  SpO2: 97% (07 Oct 2021 12:03) (96% - 97%)    General: Awake. Alert. Cooperative. No distress. Appears stated age 	  HEENT:   Atraumatic. Normocephalic. Anicteric. Normal oral mucosa. PERRL. EOMI.  Neck: Supple. Trachea midline. Thyroid without enlargement/tenderness/nodules. No carotid bruit. No JVD.	  Cardiovascular: Regular rate and rhythm. S1 S2 normal. No murmurs, rubs or gallops.  Respiratory: Respirations unlabored. Clear to auscultation and percussion bilaterally. No curvature.  Abdomen: Soft. Non-tender. Non-distended. No organomegaly. No masses. Normal bowel sounds.  Extremities: Warm to touch. No clubbing or cyanosis. No pedal edema.  Pulses: 2+ peripheral pulses all extremities.	  Skin: Normal skin color. No rashes or lesions. No ecchymoses. No cyanosis. Warm to touch.  Lymph Nodes: Cervical, supraclavicular and axillary nodes normal  Neurological: Motor and sensory examination equal and normal. A and O x 3  Psychiatry: Appropriate mood and affect.      LABS:                          8.0    11.46 )-----------( 276      ( 07 Oct 2021 11:41 )             26.2     CBC    WBC  11.46 <==, 14.84 <==, 11.26 <==, 16.71 <==, 16.21 <==, 13.63 <==, 13.71 <==    Hemoglobin  8.0 <<==, 8.5 <<==, 7.8 <<==, 8.9 <<==, 8.5 <<==, 8.4 <<==, 7.3 <<==    Hematocrit  26.2 <==, 28.4 <==, 25.6 <==, 28.3 <==, 26.7 <==, 25.7 <==, 23.4 <==    Platelets  276 <==, 318 <==, 247 <==, 279 <==, 292 <==, 241 <==, 264 <==      141  |  106  |  14  ----------------------------<  115<H>    10-06  3.4<L>   |  21<L>  |  0.93    LYTES    sodium  141 <==, 142 <==, 137 <==    potassium   3.4 <==, 4.0 <==, 4.6 <==    chloride  106 <==, 105 <==, 98 <==    carbon dioxide  21 <==, 21 <==, 21 <==    =============================================================================================  RENAL FUNCTION:    Creatinine:   0.93  <<==, 0.84  <<==, 1.20  <<==    BUN:   14 <==, 17 <==, 36 <==    ============================================================================================    calcium   8.8 <==, 9.1 <==, 9.1 <==    ============================================================================================  LFTs    AST:   13 <==     ALT:  10  <==     AP:  84  <=    Bili:  0.3  <=    PT/INR - ( 03 Oct 2021 19:10 )   PT: 22.6 sec;   INR: 1.94 ratio      < from: Transthoracic Echocardiogram (21 @ 09:31) >    Patient name: GUS AMARO  YOB: 1932   Age: 89 (F)   MR#: [id number]  Study Date: 2021  Location: [address] [place], [place] [id number]: Amy Mendez RDCS  Study quality: difficult due to patient being  Referring Physician: Neil Lawson MD  Blood Pressure: 103/67 mmHg  Height: 163 cm  Weight: 73 kg  BSA: 1.8 m2  ------------------------------------------------------------------------  PROCEDURE: Transthoracic echocardiogram with 2-D, M-Mode  and complete spectral and color flow Doppler.  INDICATION: Abnormal electrocardiogram (ECG) (EKG) (R94.31)  ------------------------------------------------------------------------  Dimensions:    Normal Values:  LA:     4.4    2.0 - 4.0 cm  Ao:     3.4    2.0 - 3.8 cm  SEPTUM:        0.6 - 1.2 cm  PWT:           0.6 - 1.1 cm  LVIDd:         3.0 - 5.6 cm  LVIDs:         1.8 - 4.0 cm  EF (Visual Estimate): 65 %  ------------------------------------------------------------------------  Observations:  Mitral Valve: Mitral annular calcification, otherwise  normal mitral valve. Mild mitral regurgitation.  Aortic Valve/Aorta: Aortic valve not well visualized;  appears calcified.  Aortic Root: 3.4 cm.  LVOT diameter: 1.8 cm.  Left Atrium: Mildly dilated left atrium.  LA volume index =  41 cc/m2.  Left Ventricle: Endocardium not well visualized; grossly  normal left ventricular systolic function. Increased  relative wall thickness with normal left ventricular mass  index, consistent with concentric left ventricular  remodeling.  Right Heart: Mild right atrial enlargement. The right  ventricle is not well visualized; grossly normal right  ventricular systolic function. Normal tricuspid valve.  Pulmonic valve not well visualized.  Pericardium/Pleura: Normal pericardium with no pericardial  effusion.  Hemodynamic: Estimated right atrial pressure is 8 mm Hg.  Estimated right ventricular systolic pressure equals 29 mm  Hg, assuming right atrial pressure equals 8 mm Hg,  consistent with normal pulmonary pressures.  ------------------------------------------------------------------------  Conclusions:  1. Mildly dilated left atrium.  LA volume index = 41 cc/m2.  2. Increased relative wall thickness with normal left  ventricular mass index, consistent with concentric left  ventricular remodeling.  3. Endocardium not well visualized; grossly normal left  ventricular systolic function.  4. Mild right atrial enlargement.  5. The right ventricle is not well visualized; grossly  normal right ventricular systolic function.  *** No previous Echo exam.  ------------------------------------------------------------------------  Confirmed on  2021 - 20:25:27 by EDEL Garcia  ------------------------------------------------------------------------    < end of copied text >  ---------------------------------------------------------------------------------------------------------------  MICROBIOLOGY:     COVID-19 PCR (10.03.21 @ 20:19)   COVID-19 PCR: [facility]:      RADIOLOGY:  [x ] Chest radiographs reviewed and interpreted by me    < from: Xray Chest 1 View- PORTABLE-Urgent (10.03.21 @ 19:22) >    EXAM:  XR CHEST PORTABLE URGENT 1V                          PROCEDURE DATE:  10/03/2021      INTERPRETATION:  EXAMINATION: XR CHEST URGENT    CLINICAL INDICATION: Chest Pain    TECHNIQUE: Single frontal, portable view of the chest was obtained.    COMPARISON: CT chest 2021..    FINDINGS:  Cardiomegaly.   The lungs are clear.  There is no pneumothorax or pleural effusion.    IMPRESSION:  Clear lungs.    --- End of Report ---    SIDNEY VIDALES MD; Resident Radiologist  This document has been electronically signed.  EDMUND CARRILLO MD; Attending Radiologist  This document has been electronically signed. Oct  3 2021  8:25PM    < end of copied text >  ---------------------------------------------------------------------------------------------------------------  < from: CT Abdomen and Pelvis w/ IV Cont (10.04.21 @ 20:59) >    EXAM:  CT ABDOMEN AND PELVIS IC                            PROCEDURE DATE:  10/04/2021            INTERPRETATION:  CLINICAL INFORMATION: Bright red blood per rectum, fatigue, shortness of breath, chest pain. Evaluate for mass or active bleed.    COMPARISON: None.    CONTRAST/COMPLICATIONS:  IV Contrast: Omnipaque 350  111 cc administered 39 cc discarded  Oral Contrast: None  Complications: None reported at time of study completion    PROCEDURE:  CT of the Abdomen and Pelvis was performed.  Precontrast, Arterial and Delayed phases were performed.  Sagittal and coronal reformats were performed.    FINDINGS:  LOWER CHEST: Cardiomegaly. Aortic valve and mitral annular calcification.    LIVER: Multiple hepatic cysts and additional subcentimeter hypodensities that are too small to characterize.  BILE DUCTS: Normal caliber.  GALLBLADDER: Within normal limits.  SPLEEN: Normal size. A 7 mm focus of hypoattenuation (series 4 image 21) is too small to characterize.  PANCREAS: Within normal limits.  ADRENALS: Within normal limits.  KIDNEYS/URETERS: Bilateral renal cysts and additional subcentimeter hypodensities too small to characterize. There is an exophytic 1.5 cm hyperattenuating lesion in the upper pole of the right kidney (series 2 image 18) without definite enhancement and precontrast density of 77 HU,, likely representing a hemorrhagic cyst. Indeterminate exophytic right lower pole lesion measuring 2.1 cm (series 2 image 33) with questionable mild enhancement versus pseudoenhancement.    BLADDER: Evaluation is limited by streak artifact. Layering hyperdensity likely represents contrast.  REPRODUCTIVE ORGANS: Evaluation is limited by streak artifact. Hysterectomy.    BOWEL: No bowel obstruction. Evaluation of the distal sigmoid colon and rectum is limited secondary to streak artifact; otherwise no evidence of GI bleed. Focal apparent soft tissue prominence along the right aspect of the low rectum (series 4 image 95), which may be artifactual due to underdistention and partialobscuration by streak artifact. Colonic diverticula.  PERITONEUM: No ascites.  VESSELS: Atherosclerotic changes.  RETROPERITONEUM/LYMPH NODES: No lymphadenopathy.  ABDOMINAL WALL: Tiny fat-containing umbilical hernia.  BONES: Right hip total arthroplasty with associated streak artifact obscuring adjacent tissue. Right iliopsoas atrophy. Degenerative changes.    IMPRESSION:  Evaluation of the distal sigmoid colon and rectum is limited secondary to streak artifact related to hip prosthesis; otherwise no evidence of active gastrointestinal bleed.    Questionable soft tissue prominence along the low rectum, which may be artifactual due to underdistention and streak artifact. Consider correlation with endoscopy.    Right renal lesions favored to represent hemorrhagic cysts. Questionable enhancement of a right lower pole lesion versus pseudoenhancement. Consider further evaluation with nonemergent renal ultrasound or contrast enhanced abdominal MRI as clinically indicated.    --- End of Report ---    ARLENE LOVING MD; Resident Radiology  This document has been electronically signed.  AILYN KELLEY MD; Attending Radiologist  This document has been electronically signed. 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 She has a history of mild dementia and is on Eliquis for chronic atrial fibrillation. She was admitted on  with bright red blood per rectum. Apparently, the patient had a prior colonoscopy and surgery for a lesion was recommended which the patient refused due to her age. She has required 3 units of PRBCs. She has no anorexia or weight loss. No nausea, vomiting or abdominal pain. No constipation, diarrhea or change in the caliber of her stools. The patient has remained hemodynamically stable with a stable H/H following 3 transfusions. The patient and her family have refused colonoscopy. Anticoagulation has been restarted. The patient had had mild shortness of breath on room air when admitted. She has no cough, sputum production, chest congestion or wheeze. No fevers, chills or sweats. No chest pain/pressure or palpitations. Asked to evaluate    PMHX:  Dementia  Chronic atrial fibrillation  Chronic CHF  HTN (hypertension)    PSHX:  Hysterectomy  Right total knee replacement  Right total hip replacement      FAMILY HISTORY:  son -  at age 53 from lung cancer      SOCIAL HISTORY:  former smoker;  (  of lung cancer 20 years ago); owned a [employer]    Pulmonary Medications:       Antimicrobials:      Cardiology:  digoxin     Tablet 125 MICROGram(s) Oral daily  furosemide    Tablet 40 milliGRAM(s) Oral daily  metoprolol succinate  milliGRAM(s) Oral daily      Other:  influenza   Vaccine 0.5 milliLiter(s) IntraMuscular once  mirtazapine 15 milliGRAM(s) Oral at bedtime  pantoprazole  Injectable 40 milliGRAM(s) IV Push two times a day  polyethylene glycol 3350 17 Gram(s) Oral daily  QUEtiapine 25 milliGRAM(s) Oral <User Schedule>  QUEtiapine 50 milliGRAM(s) Oral <User Schedule>  rivaroxaban 15 milliGRAM(s) Oral daily  senna 2 Tablet(s) Oral at bedtime      Prn:  MEDICATIONS  (PRN):  haloperidol     Tablet 1 milliGRAM(s) Oral every 4 hours PRN Agitation      Allergies    No Known Allergies    HOME MEDICATIONS: see  H & P    REVIEW OF SYSTEMS:  Constitutional: As per HPI  HEENT: Within normal limits  CV: As per HPI  Resp: As per HPI  GI: As per HPI  : Within normal limits  Musculoskeletal: Within normal limits  Skin: Within normal limits  Neurological: dementia - mild  Psychiatric: Within normal limits  Endocrine: Within normal limits  Hematologic/Lymphatic: Within normal limits  Allergic/Immunologic: Within normal limits    [x] All other systems negative    OBJECTIVE:    I&O's Detail    06 Oct 2021 07:01  -  07 Oct 2021 07:00  --------------------------------------------------------  IN:    Oral Fluid: 240 mL  Total IN: 240 mL    OUT:  Total OUT: 0 mL    Total NET: 240 mL      07 Oct 2021 07:01  -  07 Oct 2021 16:41  --------------------------------------------------------  IN:    Oral Fluid: 360 mL  Total IN: 360 mL    OUT:    Voided (mL): 400 mL  Total OUT: 400 mL    Total NET: -40 mL    Daily Weight in k.9 (07 Oct 2021 04:18)  POCT Blood Glucose.: 208 mg/dL (06 Oct 2021 21:28)  POCT Blood Glucose.: 121 mg/dL (06 Oct 2021 17:24)      PHYSICAL EXAM:  ICU Vital Signs Last 24 Hrs  T(C): 36.9 (07 Oct 2021 12:03), Max: 36.9 (07 Oct 2021 12:03)  T(F): 98.4 (07 Oct 2021 12:03), Max: 98.4 (07 Oct 2021 12:03)  HR: 76 (07 Oct 2021 12:03) (76 - 109)  BP: 123/69 (07 Oct 2021 12:03) (107/69 - 123/69)  BP(mean): --  ABP: --  ABP(mean): --  RR: 19 (07 Oct 2021 12:03) (18 - 19)  SpO2: 97% (07 Oct 2021 12:03) (96% - 97%) on room air    General: Awake. Alert. Cooperative. No distress. Appears stated age. Mildly obese.  HEENT: Atraumatic. Normocephalic. Anicteric. Normal oral mucosa. PERRL. EOMI.  Neck: Supple. Trachea midline. Thyroid without enlargement/tenderness/nodules. No carotid bruit. No JVD.	  Cardiovascular: Irregularly irregular rate and rhythm. S1 S2 normal. II/VI systolic murmur  Respiratory: Respirations unlabored. Clear to auscultation and percussion bilaterally. Kyphosis.  Abdomen: Soft. Non-tender. Non-distended. No organomegaly. No masses. Normal bowel sounds.  Extremities: Warm to touch. No clubbing or cyanosis. No pedal edema.  Pulses: 2+ peripheral pulses all extremities.	  Skin: Normal skin color. No rashes or lesions. No ecchymoses. No cyanosis. Warm to touch.  Lymph Nodes: Cervical, supraclavicular and axillary nodes normal  Neurological: Motor and sensory examination equal and normal. A and O x 2 - 3  Psychiatry: Appropriate mood and affect.      LABS:                          8.0    11.46 )-----------( 276      ( 07 Oct 2021 11:41 )             26.2     CBC    WBC  11.46 <==, 14.84 <==, 11.26 <==, 16.71 <==, 16.21 <==, 13.63 <==, 13.71 <==    Hemoglobin  8.0 <<==, 8.5 <<==, 7.8 <<==, 8.9 <<==, 8.5 <<==, 8.4 <<==, 7.3 <<==    Hematocrit  26.2 <==, 28.4 <==, 25.6 <==, 28.3 <==, 26.7 <==, 25.7 <==, 23.4 <==    Platelets  276 <==, 318 <==, 247 <==, 279 <==, 292 <==, 241 <==, 264 <==      141  |  106  |  14  ----------------------------<  115<H>    10-06  3.4<L>   |  21<L>  |  0.93    LYTES    sodium  141 <==, 142 <==, 137 <==    potassium   3.4 <==, 4.0 <==, 4.6 <==    chloride  106 <==, 105 <==, 98 <==    carbon dioxide  21 <==, 21 <==, 21 <==    =============================================================================================  RENAL FUNCTION:    Creatinine:   0.93  <<==, 0.84  <<==, 1.20  <<==    BUN:   14 <==, 17 <==, 36 <==    ============================================================================================    calcium   8.8 <==, 9.1 <==, 9.1 <==    ============================================================================================  LFTs    AST:   13 <==     ALT:  10  <==     AP:  84  <=    Bili:  0.3  <=    PT/INR - ( 03 Oct 2021 19:10 )   PT: 22.6 sec;   INR: 1.94 ratio      < from: Transthoracic Echocardiogram (21 @ 09:31) >    Patient name: GUS AMARO  YOB: 1932   Age: 89 (F)   MR#: [id number]  Study Date: 2021  Location: [id number]: Amy Mendez RDCS  Study quality: difficult due to patient being  Referring Physician: Neil Lawson MD  Blood Pressure: 103/67 mmHg  Height: 163 cm  Weight: 73 kg  BSA: 1.8 m2  ------------------------------------------------------------------------  PROCEDURE: Transthoracic echocardiogram with 2-D, M-Mode  and complete spectral and color flow Doppler.  INDICATION: Abnormal electrocardiogram (ECG) (EKG) (R94.31)  ------------------------------------------------------------------------  Dimensions:    Normal Values:  LA:     4.4    2.0 - 4.0 cm  Ao:     3.4    2.0 - 3.8 cm  SEPTUM:        0.6 - 1.2 cm  PWT:           0.6 - 1.1 cm  LVIDd:         3.0 - 5.6 cm  LVIDs:         1.8 - 4.0 cm  EF (Visual Estimate): 65 %  ------------------------------------------------------------------------  Observations:  Mitral Valve: Mitral annular calcification, otherwise  normal mitral valve. Mild mitral regurgitation.  Aortic Valve/Aorta: Aortic valve not well visualized;  appears calcified.  Aortic Root: 3.4 cm.  LVOT diameter: 1.8 cm.  Left Atrium: Mildly dilated left atrium.  LA volume index =  41 cc/m2.  Left Ventricle: Endocardium not well visualized; grossly  normal left ventricular systolic function. Increased  relative wall thickness with normal left ventricular mass  index, consistent with concentric left ventricular  remodeling.  Right Heart: Mild right atrial enlargement. The right  ventricle is not well visualized; grossly normal right  ventricular systolic function. Normal tricuspid valve.  Pulmonic valve not well visualized.  Pericardium/Pleura: Normal pericardium with no pericardial  effusion.  Hemodynamic: Estimated right atrial pressure is 8 mm Hg.  Estimated right ventricular systolic pressure equals 29 mm  Hg, assuming right atrial pressure equals 8 mm Hg,  consistent with normal pulmonary pressures.  ------------------------------------------------------------------------  Conclusions:  1. Mildly dilated left atrium.  LA volume index = 41 cc/m2.  2. Increased relative wall thickness with normal left  ventricular mass index, consistent with concentric left  ventricular remodeling.  3. Endocardium not well visualized; grossly normal left  ventricular systolic function.  4. Mild right atrial enlargement.  5. The right ventricle is not well visualized; grossly  normal right ventricular systolic function.  *** No previous Echo exam.  ------------------------------------------------------------------------  Confirmed on  2021 - 20:25:27 by EDEL Garcia  ------------------------------------------------------------------------    < end of copied text >  ---------------------------------------------------------------------------------------------------------------  MICROBIOLOGY:     COVID-19 PCR (10.03.21 @ 20:19)   COVID-19 PCR: [facility]:      RADIOLOGY:  [x ] Chest radiographs reviewed and interpreted by me    < from: Xray Chest 1 View- PORTABLE-Urgent (10.03.21 @ 19:22) >    EXAM:  XR CHEST PORTABLE URGENT 1V                          PROCEDURE DATE:  10/03/2021      INTERPRETATION:  EXAMINATION: XR CHEST URGENT    CLINICAL INDICATION: Chest Pain    TECHNIQUE: Single frontal, portable view of the chest was obtained.    COMPARISON: CT chest 2021..    FINDINGS:  Cardiomegaly.   The lungs are clear.  There is no pneumothorax or pleural effusion.    IMPRESSION:  Clear lungs.    --- End of Report ---    SIDNEY VIDALES MD; Resident Radiologist  This document has been electronically signed.  EDMUND CARRILLO MD; Attending Radiologist  This document has been electronically signed. Oct  3 2021  8:25PM    < end of copied text >  ---------------------------------------------------------------------------------------------------------------  < from: CT Abdomen and Pelvis w/ IV Cont (10.04.21 @ 20:59) >    EXAM:  CT ABDOMEN AND PELVIS IC                          PROCEDURE DATE:  10/04/2021      INTERPRETATION:  CLINICAL INFORMATION: Bright red blood per rectum, fatigue, shortness of breath, chest pain. Evaluate for mass or active bleed.    COMPARISON: None.    CONTRAST/COMPLICATIONS:  IV Contrast: Omnipaque 350  111 cc administered 39 cc discarded  Oral Contrast: None  Complications: None reported at time of study completion    PROCEDURE:  CT of the Abdomen and Pelvis was performed.  Precontrast, Arterial and Delayed phases were performed.  Sagittal and coronal reformats were performed.    FINDINGS:  LOWER CHEST: Cardiomegaly. Aortic valve and mitral annular calcification.    LIVER: Multiple hepatic cysts and additional subcentimeter hypodensities that are too small to characterize.  BILE DUCTS: Normal caliber.  GALLBLADDER: Within normal limits.  SPLEEN: Normal size. A 7 mm focus of hypoattenuation (series 4 image 21) is too small to characterize.  PANCREAS: Within normal limits.  ADRENALS: Within normal limits.  KIDNEYS/URETERS: Bilateral renal cysts and additional subcentimeter hypodensities too small to characterize. There is an exophytic 1.5 cm hyperattenuating lesion in the upper pole of the right kidney (series 2 image 18) without definite enhancement and precontrast density of 77 HU,, likely representing a hemorrhagic cyst. Indeterminate exophytic right lower pole lesion measuring 2.1 cm (series 2 image 33) with questionable mild enhancement versus pseudoenhancement.    BLADDER: Evaluation is limited by streak artifact. Layering hyperdensity likely represents contrast.  REPRODUCTIVE ORGANS: Evaluation is limited by streak artifact. Hysterectomy.    BOWEL: No bowel obstruction. Evaluation of the distal sigmoid colon and rectum is limited secondary to streak artifact; otherwise no evidence of GI bleed. Focal apparent soft tissue prominence along the right aspect of the low rectum (series 4 image 95), which may be artifactual due to underdistention and partialobscuration by streak artifact. Colonic diverticula.  PERITONEUM: No ascites.  VESSELS: Atherosclerotic changes.  RETROPERITONEUM/LYMPH NODES: No lymphadenopathy.  ABDOMINAL WALL: Tiny fat-containing umbilical hernia.  BONES: Right hip total arthroplasty with associated streak artifact obscuring adjacent tissue. Right iliopsoas atrophy. Degenerative changes.    IMPRESSION:  Evaluation of the distal sigmoid colon and rectum is limited secondary to streak artifact related to hip prosthesis; otherwise no evidence of active gastrointestinal bleed.    Questionable soft tissue prominence along the low rectum, which may be artifactual due to underdistention and streak artifact. Consider correlation with endoscopy.    Right renal lesions favored to represent hemorrhagic cysts. Questionable enhancement of a right lower pole lesion versus pseudoenhancement. Consider further evaluation with nonemergent renal ultrasound or contrast enhanced abdominal MRI as clinically indicated.    --- End of Report ---    ARLENE LOVING MD; Resident Radiology  This document has been electronically signed.  AILYN KELLEY MD; Attending Radiologist  This document has been electronically signed. 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 She has a history of mild dementia and is on Eliquis for chronic atrial fibrillation. She was admitted on  with bright red blood per rectum. Apparently, the patient had a prior colonoscopy and surgery for a visualized lesion was recommended which the patient refused due to her age. She has required 3 units of PRBCs. She has no anorexia or weight loss. No nausea, vomiting or abdominal pain. No constipation, diarrhea or change in the caliber of her stools. The patient has remained hemodynamically stable with a stable H/H following the 3 transfusions. The patient and her family have refused colonoscopy. Anticoagulation has been cautiously restarted. The patient had mild shortness of breath on room air when admitted. She has no cough, sputum production, chest congestion or wheeze. No fevers, chills or sweats. No chest pain/pressure or palpitations. Asked to evaluate    PMHX:  Dementia  Chronic atrial fibrillation  Chronic CHF  HTN (hypertension)    PSHX:  Hysterectomy  Right total knee replacement  Right total hip replacement      FAMILY HISTORY:  son -  at age 53 from lung cancer      SOCIAL HISTORY:  former smoker;  (  of lung cancer 20 years ago); owned a [employer]    Pulmonary Medications:       Antimicrobials:      Cardiology:  digoxin     Tablet 125 MICROGram(s) Oral daily  furosemide    Tablet 40 milliGRAM(s) Oral daily  metoprolol succinate  milliGRAM(s) Oral daily      Other:  influenza   Vaccine 0.5 milliLiter(s) IntraMuscular once  mirtazapine 15 milliGRAM(s) Oral at bedtime  pantoprazole  Injectable 40 milliGRAM(s) IV Push two times a day  polyethylene glycol 3350 17 Gram(s) Oral daily  QUEtiapine 25 milliGRAM(s) Oral <User Schedule>  QUEtiapine 50 milliGRAM(s) Oral <User Schedule>  rivaroxaban 15 milliGRAM(s) Oral daily  senna 2 Tablet(s) Oral at bedtime      Prn:  MEDICATIONS  (PRN):  haloperidol     Tablet 1 milliGRAM(s) Oral every 4 hours PRN Agitation      Allergies    No Known Allergies    HOME MEDICATIONS: see  H & P    REVIEW OF SYSTEMS:  Constitutional: As per HPI  HEENT: Within normal limits  CV: As per HPI  Resp: As per HPI  GI: As per HPI  : Within normal limits  Musculoskeletal: Within normal limits  Skin: Within normal limits  Neurological: dementia - mild  Psychiatric: Within normal limits  Endocrine: Within normal limits  Hematologic/Lymphatic: Within normal limits  Allergic/Immunologic: Within normal limits    [x] All other systems negative    OBJECTIVE:    I&O's Detail    06 Oct 2021 07:01  -  07 Oct 2021 07:00  --------------------------------------------------------  IN:    Oral Fluid: 240 mL  Total IN: 240 mL    OUT:  Total OUT: 0 mL    Total NET: 240 mL      07 Oct 2021 07:01  -  07 Oct 2021 16:41  --------------------------------------------------------  IN:    Oral Fluid: 360 mL  Total IN: 360 mL    OUT:    Voided (mL): 400 mL  Total OUT: 400 mL    Total NET: -40 mL    Daily Weight in k.9 (07 Oct 2021 04:18)    POCT Blood Glucose.: 208 mg/dL (06 Oct 2021 21:28)  POCT Blood Glucose.: 121 mg/dL (06 Oct 2021 17:24)      PHYSICAL EXAM:  ICU Vital Signs Last 24 Hrs  T(C): 36.9 (07 Oct 2021 12:03), Max: 36.9 (07 Oct 2021 12:03)  T(F): 98.4 (07 Oct 2021 12:03), Max: 98.4 (07 Oct 2021 12:03)  HR: 76 (07 Oct 2021 12:03) (76 - 109)  BP: 123/69 (07 Oct 2021 12:03) (107/69 - 123/69)  BP(mean): --  ABP: --  ABP(mean): --  RR: 19 (07 Oct 2021 12:03) (18 - 19)  SpO2: 97% (07 Oct 2021 12:03) (96% - 97%) on room air    General: Awake. Alert. Cooperative. No distress. Appears stated age. Mildly obese.  HEENT: Atraumatic. Normocephalic. Anicteric. Normal oral mucosa. Edentulous. PERRL. EOMI.  Neck: Supple. Trachea midline. Thyroid without enlargement/tenderness/nodules. No carotid bruit. No JVD.	  Cardiovascular: Irregularly irregular rate and rhythm. S1 S2 normal. II/VI systolic murmur  Respiratory: Respirations unlabored. Clear to auscultation and percussion bilaterally. Kyphosis.  Abdomen: Soft. Non-tender. Non-distended. No organomegaly. No masses. Normal bowel sounds.  Extremities: Warm to touch. No clubbing or cyanosis. No pedal edema.  Pulses: 2+ peripheral pulses all extremities.	  Skin: Normal skin color. No rashes or lesions. No ecchymoses. No cyanosis. Warm to touch.  Lymph Nodes: Cervical, supraclavicular and axillary nodes normal  Neurological: Motor and sensory examination equal and normal. A and O x 2 - 3  Psychiatry: Appropriate mood and affect.      LABS:                          8.0    11.46 )-----------( 276      ( 07 Oct 2021 11:41 )             26.2     CBC    WBC  11.46 <==, 14.84 <==, 11.26 <==, 16.71 <==, 16.21 <==, 13.63 <==, 13.71 <==    Hemoglobin  8.0 <<==, 8.5 <<==, 7.8 <<==, 8.9 <<==, 8.5 <<==, 8.4 <<==, 7.3 <<==    Hematocrit  26.2 <==, 28.4 <==, 25.6 <==, 28.3 <==, 26.7 <==, 25.7 <==, 23.4 <==    Platelets  276 <==, 318 <==, 247 <==, 279 <==, 292 <==, 241 <==, 264 <==      141  |  106  |  14  ----------------------------<  115<H>    10-06  3.4<L>   |  21<L>  |  0.93    LYTES    sodium  141 <==, 142 <==, 137 <==    potassium   3.4 <==, 4.0 <==, 4.6 <==    chloride  106 <==, 105 <==, 98 <==    carbon dioxide  21 <==, 21 <==, 21 <==    =============================================================================================  RENAL FUNCTION:    Creatinine:   0.93  <<==, 0.84  <<==, 1.20  <<==    BUN:   14 <==, 17 <==, 36 <==    ============================================================================================    calcium   8.8 <==, 9.1 <==, 9.1 <==    ============================================================================================  LFTs    AST:   13 <==     ALT:  10  <==     AP:  84  <=    Bili:  0.3  <=    PT/INR - ( 03 Oct 2021 19:10 )   PT: 22.6 sec;   INR: 1.94 ratio      < from: Transthoracic Echocardiogram (21 @ 09:31) >    Patient name: GUS AMARO  YOB: 1932   Age: 89 (F)   MR#: [id number]  Study Date: 2021  [id number] Amy Mendez RDCS  Study quality: difficult due to patient being  Referring Physician: Neil Lawson MD  Blood Pressure: 103/67 mmHg  Height: 163 cm  Weight: 73 kg  BSA: 1.8 m2  ------------------------------------------------------------------------  PROCEDURE: Transthoracic echocardiogram with 2-D, M-Mode  and complete spectral and color flow Doppler.  INDICATION: Abnormal electrocardiogram (ECG) (EKG) (R94.31)  ------------------------------------------------------------------------  Dimensions:    Normal Values:  LA:     4.4    2.0 - 4.0 cm  Ao:     3.4    2.0 - 3.8 cm  SEPTUM:        0.6 - 1.2 cm  PWT:           0.6 - 1.1 cm  LVIDd:         3.0 - 5.6 cm  LVIDs:         1.8 - 4.0 cm  EF (Visual Estimate): 65 %  ------------------------------------------------------------------------  Observations:  Mitral Valve: Mitral annular calcification, otherwise  normal mitral valve. Mild mitral regurgitation.  Aortic Valve/Aorta: Aortic valve not well visualized;  appears calcified.  Aortic Root: 3.4 cm.  LVOT diameter: 1.8 cm.  Left Atrium: Mildly dilated left atrium.  LA volume index =  41 cc/m2.  Left Ventricle: Endocardium not well visualized; grossly  normal left ventricular systolic function. Increased  relative wall thickness with normal left ventricular mass  index, consistent with concentric left ventricular  remodeling.  Right Heart: Mild right atrial enlargement. The right  ventricle is not well visualized; grossly normal right  ventricular systolic function. Normal tricuspid valve.  Pulmonic valve not well visualized.  Pericardium/Pleura: Normal pericardium with no pericardial  effusion.  Hemodynamic: Estimated right atrial pressure is 8 mm Hg.  Estimated right ventricular systolic pressure equals 29 mm  Hg, assuming right atrial pressure equals 8 mm Hg,  consistent with normal pulmonary pressures.  ------------------------------------------------------------------------  Conclusions:  1. Mildly dilated left atrium.  LA volume index = 41 cc/m2.  2. Increased relative wall thickness with normal left  ventricular mass index, consistent with concentric left  ventricular remodeling.  3. Endocardium not well visualized; grossly normal left  ventricular systolic function.  4. Mild right atrial enlargement.  5. The right ventricle is not well visualized; grossly  normal right ventricular systolic function.  *** No previous Echo exam.  ------------------------------------------------------------------------  Confirmed on  2021 - 20:25:27 by EDEL Garcia  ------------------------------------------------------------------------    < end of copied text >  ---------------------------------------------------------------------------------------------------------------  MICROBIOLOGY:     COVID-19 PCR (10.03.21 @ 20:19)   COVID-19 PCR: NotCount includes the [facility]:      RADIOLOGY:  [x ] Chest radiographs reviewed and interpreted by me    < from: Xray Chest 1 View- PORTABLE-Urgent (10.03.21 @ 19:22) >    EXAM:  XR CHEST PORTABLE URGENT 1V                          PROCEDURE DATE:  10/03/2021      INTERPRETATION:  EXAMINATION: XR CHEST URGENT    CLINICAL INDICATION: Chest Pain    TECHNIQUE: Single frontal, portable view of the chest was obtained.    COMPARISON: CT chest 2021..    FINDINGS:  Cardiomegaly.  The lungs are clear.  There is no pneumothorax or pleural effusion.    IMPRESSION:  Clear lungs.    --- End of Report ---    SIDNEY VIDALES MD; Resident Radiologist  This document has been electronically signed.  EDMUND CARRILLO MD; Attending Radiologist  This document has been electronically signed. Oct  3 2021  8:25PM    < end of copied text >  ---------------------------------------------------------------------------------------------------------------  < from: CT Abdomen and Pelvis w/ IV Cont (10.04.21 @ 20:59) >    EXAM:  CT ABDOMEN AND PELVIS IC                          PROCEDURE DATE:  10/04/2021      INTERPRETATION:  CLINICAL INFORMATION: Bright red blood per rectum, fatigue, shortness of breath, chest pain. Evaluate for mass or active bleed.    COMPARISON: None.    CONTRAST/COMPLICATIONS:  IV Contrast: Omnipaque 350  111 cc administered 39 cc discarded  Oral Contrast: None  Complications: None reported at time of study completion    PROCEDURE:  CT of the Abdomen and Pelvis was performed.  Precontrast, Arterial and Delayed phases were performed.  Sagittal and coronal reformats were performed.    FINDINGS:  LOWER CHEST: Cardiomegaly. Aortic valve and mitral annular calcification.    LIVER: Multiple hepatic cysts and additional subcentimeter hypodensities that are too small to characterize.  BILE DUCTS: Normal caliber.  GALLBLADDER: Within normal limits.  SPLEEN: Normal size. A 7 mm focus of hypoattenuation (series 4 image 21) is too small to characterize.  PANCREAS: Within normal limits.  ADRENALS: Within normal limits.  KIDNEYS/URETERS: Bilateral renal cysts and additional subcentimeter hypodensities too small to characterize. There is an exophytic 1.5 cm hyperattenuating lesion in the upper pole of the right kidney (series 2 image 18) without definite enhancement and precontrast density of 77 HU,, likely representing a hemorrhagic cyst. Indeterminate exophytic right lower pole lesion measuring 2.1 cm (series 2 image 33) with questionable mild enhancement versus pseudoenhancement.    BLADDER: Evaluation is limited by streak artifact. Layering hyperdensity likely represents contrast.  REPRODUCTIVE ORGANS: Evaluation is limited by streak artifact. Hysterectomy.    BOWEL: No bowel obstruction. Evaluation of the distal sigmoid colon and rectum is limited secondary to streak artifact; otherwise no evidence of GI bleed. Focal apparent soft tissue prominence along the right aspect of the low rectum (series 4 image 95), which may be artifactual due to underdistention and partial obscuration by streak artifact. Colonic diverticula.  PERITONEUM: No ascites.  VESSELS: Atherosclerotic changes.  RETROPERITONEUM/LYMPH NODES: No lymphadenopathy.  ABDOMINAL WALL: Tiny fat-containing umbilical hernia.  BONES: Right hip total arthroplasty with associated streak artifact obscuring adjacent tissue. Right iliopsoas atrophy. Degenerative changes.    IMPRESSION:  Evaluation of the distal sigmoid colon and rectum is limited secondary to streak artifact related to hip prosthesis; otherwise no evidence of active gastrointestinal bleed.    Questionable soft tissue prominence along the low rectum, which may be artifactual due to underdistention and streak artifact. Consider correlation with endoscopy.    Right renal lesions favored to represent hemorrhagic cysts. Questionable enhancement of a right lower pole lesion versus pseudoenhancement. Consider further evaluation with nonemergent renal ultrasound or contrast enhanced abdominal MRI as clinically indicated.    --- End of Report ---    ARLENE LOVING MD; Resident Radiology  This document has been electronically signed.  AILYN KELLEY MD; Attending Radiologist  This document has been electronically signed. Oct  5 2021 11:01AM    < end of copied text >  ---------------------------------------------------------------------------------------------------------------

## 2021-10-07 NOTE — PROGRESS NOTE ADULT - SUBJECTIVE AND OBJECTIVE BOX
Subjective: Patient seen and examined. No new events except as noted.     REVIEW OF SYSTEMS:    CONSTITUTIONAL: +weakness, fevers or chills  EYES/ENT: No visual changes;  No vertigo or throat pain   NECK: No pain or stiffness  RESPIRATORY: No cough, wheezing, hemoptysis; No shortness of breath  CARDIOVASCULAR: No chest pain or palpitations  GASTROINTESTINAL: No abdominal or epigastric pain. No nausea, vomiting, or hematemesis; No diarrhea or constipation. No melena or hematochezia.  GENITOURINARY: No dysuria, frequency or hematuria  NEUROLOGICAL: No numbness or weakness  SKIN: No itching, burning, rashes, or lesions   All other review of systems is negative unless indicated above.    MEDICATIONS:  MEDICATIONS  (STANDING):  furosemide    Tablet 40 milliGRAM(s) Oral daily  influenza   Vaccine 0.5 milliLiter(s) IntraMuscular once  metoprolol succinate  milliGRAM(s) Oral daily  mirtazapine 15 milliGRAM(s) Oral at bedtime  pantoprazole  Injectable 40 milliGRAM(s) IV Push two times a day  QUEtiapine 25 milliGRAM(s) Oral <User Schedule>  QUEtiapine 50 milliGRAM(s) Oral <User Schedule>      PHYSICAL EXAM:  T(C): 36.8 (10-07-21 @ 04:18), Max: 36.8 (10-07-21 @ 04:18)  HR: 100 (10-07-21 @ 04:18) (100 - 109)  BP: 107/69 (10-07-21 @ 04:18) (107/69 - 119/75)  RR: 18 (10-07-21 @ 04:18) (18 - 19)  SpO2: 96% (10-07-21 @ 04:18) (96% - 97%)  Wt(kg): --  I&O's Summary    06 Oct 2021 07:01  -  07 Oct 2021 07:00  --------------------------------------------------------  IN: 240 mL / OUT: 0 mL / NET: 240 mL    07 Oct 2021 07:01  -  07 Oct 2021 10:26  --------------------------------------------------------  IN: 0 mL / OUT: 400 mL / NET: -400 mL              Appearance: NAD	  HEENT:   Dry  oral mucosa, PERRL, EOMI	  Lymphatic: No lymphadenopathy  Cardiovascular: Irregular S1 S2, No JVD,+ murmurs, No edema  Respiratory: Lungs clear to auscultation	  Psychiatry: A & O x 2 dementia   Gastrointestinal:  Soft, Non-tender, + BS	  Skin: No rashes, No ecchymoses, No cyanosis	  Neurologic: Non-focal  Extremities: Normal range of motion, No clubbing, cyanosis or edema  Vascular: Peripheral pulses palpable 2+ bilaterally            LABS:    CARDIAC MARKERS:                                8.5    14.84 )-----------( 318      ( 06 Oct 2021 18:47 )             28.4     10-06    141  |  106  |  14  ----------------------------<  115<H>  3.4<L>   |  21<L>  |  0.93    Ca    8.8      06 Oct 2021 07:09      proBNP:   Lipid Profile:   HgA1c:   TSH:             TELEMETRY: 	  AF  ECG:  	  RADIOLOGY:   DIAGNOSTIC TESTING:  [ ] Echocardiogram:  [ ]  Catheterization:  [ ] Stress Test:    OTHER:

## 2021-10-07 NOTE — CONSULT NOTE ADULT - ASSESSMENT
ASSESSMENT:    lower GI bleed with a possible soft tissue prominence in the low rectum - the patient was advised to have surgery for a mass visualized on a prior colonoscopy which she refused due to her advanced age - remains hemodynamically stable with a stable H/H following transfusion of 3 units of PRBCs - the patient and family have refused colonoscopy - anticoagulation has been restarted    dyspnea - multifactorial -> resolved  1) decreased oxygen carrying capacity due to profound anemia  2) restrictive lung disease due to obesity limiting diaphragmatic excursion and kyphosis limiting chest wall excursion  3) unlikely to have a pulmonary embolism on full dose A/C  4) no evidence of pulmonary edema or pleural effusions despite underlying cardiac disease following IV fluids and PRBCs    PLAN/RECOMMENDATIONS:    stable oxygenation on room air  observe off systemic steroids, antibiotics and pulmonary medications  cardiology follow-up noted  cardiac meds: xarelto/digoxin/toprol XL/lasix  follow hemodynamics and H/H on A/C  remeron/seroquel  bowel regimen  GI prophylaxis - protonix    Thank you for the courtesy of this referral. Plan of care discussed with the patient and her daughter at bedside     Nikunj Whitaker MD, [facility]  [phone]  Pulmonary Medicine           ASSESSMENT:    lower GI bleed with a possible soft tissue prominence in the low rectum - the patient was advised to have surgery for a mass visualized on a prior colonoscopy which she refused due to her advanced age - remains hemodynamically stable with a stable H/H following transfusion of 3 units of PRBCs - the patient and family have refused colonoscopy - anticoagulation has been cautiously restarted    dyspnea - multifactorial -> resolved  1) decreased oxygen carrying capacity due to profound anemia  2) restrictive lung disease due to obesity limiting diaphragmatic excursion and kyphosis limiting chest wall excursion  3) unlikely to have a pulmonary embolism on full dose A/C  4) no evidence of pulmonary edema or pleural effusions despite underlying cardiac disease following IV fluids and PRBCs    PLAN/RECOMMENDATIONS:    stable oxygenation on room air  observe off systemic steroids, antibiotics and pulmonary medications  cardiology follow-up noted  cardiac meds: xarelto/digoxin/toprol XL/lasix  follow hemodynamics and H/H on A/C  remeron/seroquel  bowel regimen  GI prophylaxis - protonix    Thank you for the courtesy of this referral. Plan of care discussed with the patient and her daughter at bedside     Nikunj Whitaker MD, [facility]  [phone]  Pulmonary Medicine

## 2021-10-07 NOTE — PROGRESS NOTE ADULT - ASSESSMENT
89 yo female h/o DM, afib on eliquis, CHF, here with gi bleed    gi bleed with acute blood loss anemia  s/p prbc transfusion  gi consulted  refusing egd/colon  ppi  serial cbc has been stable. resume eliquis and monitor     afib  resume AC with eliquis    metoprolol for rate control  tele monitor    CHF  chronic diastolic  stable  resume home lasix     PAS  PT        Advanced care planning was discussed with patient and family.  Advanced care planning forms were reviewed and discussed as appropriate.  Differential diagnosis and plan of care discussed with patient after the evaluation.   Pain assessed and judicious use of narcotics when appropriate was discussed.  Importance of Fall prevention discussed.  Counseling on Smoking and Alcohol cessation was offered when appropriate.  Counseling on Diet, exercise, and medication compliance was done.     Approx 60 minutes spent. 89 yo female h/o DM, afib on eliquis, CHF, here with gi bleed    gi bleed with acute blood loss anemia  s/p prbc transfusion  gi consulted  refusing egd/colon  ppi  serial cbc has been stable. resume xarelto and monitor     afib  resume AC with xarelto    metoprolol for rate control  tele monitor    CHF  chronic diastolic  stable  resume home lasix     PAS  PT        Advanced care planning was discussed with patient and family.  Advanced care planning forms were reviewed and discussed as appropriate.  Differential diagnosis and plan of care discussed with patient after the evaluation.   Pain assessed and judicious use of narcotics when appropriate was discussed.  Importance of Fall prevention discussed.  Counseling on Smoking and Alcohol cessation was offered when appropriate.  Counseling on Diet, exercise, and medication compliance was done.     Approx 60 minutes spent.

## 2021-10-07 NOTE — PROGRESS NOTE ADULT - ASSESSMENT
89 year old female with lower GI bleeding    1. Lower GI bleed, pt family refusing colonoscopy. hgb now stable, resumed a/c,  monitor clinically    2. Afib on a/c holding for now    3. Diastolic CHF

## 2021-10-07 NOTE — PROGRESS NOTE ADULT - SUBJECTIVE AND OBJECTIVE BOX
BENITA AMARO  89y Female  MRN:[id number]    Patient is a 89y old  Female who presents with a chief complaint of Hematochezia   GI Bleed (05 Oct 2021 19:35)    HPI:   88 y/o F   w/ h/o T2DM, Afib (on Eliquis and metoprolol), diastolic CHF w no reported hx of GIB p/w bright red blood per rectum yesterday and fatigue, sob and cp today. reports bright blood stopped yesterday. no abd pain. no fever chills, no travels sick contacts. unsure last dose of xarelto was.  Hgb 5.4   (04 Oct 2021 08:53)      Patient seen and evaluated at bedside. No acute events overnight except as noted.    Interval HPI: no acute events o/n    PAST MEDICAL & SURGICAL HISTORY:  Chronic atrial fibrillation    Dementia    Chronic CHF    HTN (hypertension)    Dementia, senile with depression        REVIEW OF SYSTEMS:  as per hpi     SOC:  denies toxic habits    Fam Hx:  non cont    VITALS:   Vital Signs Last 24 Hrs  T(C): 36.8 (07 Oct 2021 04:18), Max: 36.8 (07 Oct 2021 04:18)  T(F): 98.3 (07 Oct 2021 04:18), Max: 98.3 (07 Oct 2021 04:18)  HR: 100 (07 Oct 2021 04:18) (100 - 109)  BP: 107/69 (07 Oct 2021 04:18) (107/69 - 119/75)  BP(mean): --  RR: 18 (07 Oct 2021 04:18) (18 - 19)  SpO2: 96% (07 Oct 2021 04:18) (96% - 97%)      PHYSICAL EXAM:  GENERAL: NAD, well-developed  HEAD:  Atraumatic, Normocephalic  EYES: EOMI, PERRLA, conjunctiva and sclera clear  NECK: Supple, No JVD  CHEST/LUNG: Clear to auscultation bilaterally; No wheeze  HEART: S1, S2; No murmurs, rubs, or gallops  ABDOMEN: Soft, Nontender, Nondistended; Bowel sounds present  EXTREMITIES:  2+ Peripheral Pulses, No clubbing, cyanosis, or edema  PSYCH: Normal affect  NEUROLOGY: AAOX3; non-focal  SKIN: No rashes or lesions    Consultant(s) Notes Reviewed:  [x ] YES  [ ] NO  Care Discussed with Consultants/Other Providers [ x] YES  [ ] NO    MEDS:   MEDICATIONS  (STANDING):  digoxin     Tablet 125 MICROGram(s) Oral daily  furosemide    Tablet 40 milliGRAM(s) Oral daily  influenza   Vaccine 0.5 milliLiter(s) IntraMuscular once  metoprolol succinate  milliGRAM(s) Oral daily  mirtazapine 15 milliGRAM(s) Oral at bedtime  pantoprazole  Injectable 40 milliGRAM(s) IV Push two times a day  QUEtiapine 25 milliGRAM(s) Oral <User Schedule>  QUEtiapine 50 milliGRAM(s) Oral <User Schedule>    MEDICATIONS  (PRN):  haloperidol     Tablet 1 milliGRAM(s) Oral every 4 hours PRN Agitation      ALLERGIES:  No Known Allergies      LABS:                                8.0    11.46 )-----------( 276      ( 07 Oct 2021 11:41 )             26.2   10-06    141  |  106  |  14  ----------------------------<  115<H>  3.4<L>   |  21<L>  |  0.93    Ca    8.8      06 Oct 2021 07:09               < from: CT Abdomen and Pelvis w/ IV Cont (10.04.21 @ 20:59) >  IMPRESSION:  Evaluation of the distal sigmoid colon and rectum is limited secondary to streak artifact related to hip prosthesis; otherwise no evidence of active gastrointestinal bleed.    Questionable soft tissue prominence along the low rectum, which may be artifactual due to underdistention and streak artifact. Consider correlation with endoscopy.    Right renal lesions favored to represent hemorrhagic cysts. Questionable enhancement of a right lower pole lesion versus pseudoenhancement. Consider further evaluation with nonemergent renal ultrasound or contrast enhanced abdominal MRI as clinically indicated.    < end of copied text >   BENITA AMARO  89y Female  MRN:[id number]    Patient is a 89y old  Female who presents with a chief complaint of Hematochezia   GI Bleed (05 Oct 2021 19:35)    HPI:   89 y/o F   w/ h/o T2DM, Afib (on xarelto and metoprolol), diastolic CHF w no reported hx of GIB p/w bright red blood per rectum yesterday and fatigue, sob and cp today. reports bright blood stopped yesterday. no abd pain. no fever chills, no travels sick contacts. unsure last dose of xarelto was.  Hgb 5.4   (04 Oct 2021 08:53)      Patient seen and evaluated at bedside. No acute events overnight except as noted.    Interval HPI: no acute events o/n    PAST MEDICAL & SURGICAL HISTORY:  Chronic atrial fibrillation    Dementia    Chronic CHF    HTN (hypertension)    Dementia, senile with depression        REVIEW OF SYSTEMS:  as per hpi     SOC:  denies toxic habits    Fam Hx:  non cont    VITALS:   Vital Signs Last 24 Hrs  T(C): 36.8 (07 Oct 2021 04:18), Max: 36.8 (07 Oct 2021 04:18)  T(F): 98.3 (07 Oct 2021 04:18), Max: 98.3 (07 Oct 2021 04:18)  HR: 100 (07 Oct 2021 04:18) (100 - 109)  BP: 107/69 (07 Oct 2021 04:18) (107/69 - 119/75)  BP(mean): --  RR: 18 (07 Oct 2021 04:18) (18 - 19)  SpO2: 96% (07 Oct 2021 04:18) (96% - 97%)      PHYSICAL EXAM:  GENERAL: NAD, well-developed  HEAD:  Atraumatic, Normocephalic  EYES: EOMI, PERRLA, conjunctiva and sclera clear  NECK: Supple, No JVD  CHEST/LUNG: Clear to auscultation bilaterally; No wheeze  HEART: S1, S2; No murmurs, rubs, or gallops  ABDOMEN: Soft, Nontender, Nondistended; Bowel sounds present  EXTREMITIES:  2+ Peripheral Pulses, No clubbing, cyanosis, or edema  PSYCH: Normal affect  NEUROLOGY: AAOX3; non-focal  SKIN: No rashes or lesions    Consultant(s) Notes Reviewed:  [x ] YES  [ ] NO  Care Discussed with Consultants/Other Providers [ x] YES  [ ] NO    MEDS:   MEDICATIONS  (STANDING):  digoxin     Tablet 125 MICROGram(s) Oral daily  furosemide    Tablet 40 milliGRAM(s) Oral daily  influenza   Vaccine 0.5 milliLiter(s) IntraMuscular once  metoprolol succinate  milliGRAM(s) Oral daily  mirtazapine 15 milliGRAM(s) Oral at bedtime  pantoprazole  Injectable 40 milliGRAM(s) IV Push two times a day  QUEtiapine 25 milliGRAM(s) Oral <User Schedule>  QUEtiapine 50 milliGRAM(s) Oral <User Schedule>    MEDICATIONS  (PRN):  haloperidol     Tablet 1 milliGRAM(s) Oral every 4 hours PRN Agitation      ALLERGIES:  No Known Allergies      LABS:                                8.0    11.46 )-----------( 276      ( 07 Oct 2021 11:41 )             26.2   10-06    141  |  106  |  14  ----------------------------<  115<H>  3.4<L>   |  21<L>  |  0.93    Ca    8.8      06 Oct 2021 07:09               < from: CT Abdomen and Pelvis w/ IV Cont (10.04.21 @ 20:59) >  IMPRESSION:  Evaluation of the distal sigmoid colon and rectum is limited secondary to streak artifact related to hip prosthesis; otherwise no evidence of active gastrointestinal bleed.    Questionable soft tissue prominence along the low rectum, which may be artifactual due to underdistention and streak artifact. Consider correlation with endoscopy.    Right renal lesions favored to represent hemorrhagic cysts. Questionable enhancement of a right lower pole lesion versus pseudoenhancement. Consider further evaluation with nonemergent renal ultrasound or contrast enhanced abdominal MRI as clinically indicated.    < end of copied text >

## 2021-10-07 NOTE — PROGRESS NOTE ADULT - SUBJECTIVE AND OBJECTIVE BOX
Chief Complaint:  Patient is a 89y old  Female who presents with a chief complaint of Hematochezia   GI Bleed (07 Oct 2021 12:29)      Date of service 10-07-21 @ 15:53      Interval Events:   no acute events    Hospital Medications:  digoxin     Tablet 125 MICROGram(s) Oral daily  furosemide    Tablet 40 milliGRAM(s) Oral daily  haloperidol     Tablet 1 milliGRAM(s) Oral every 4 hours PRN  influenza   Vaccine 0.5 milliLiter(s) IntraMuscular once  metoprolol succinate  milliGRAM(s) Oral daily  mirtazapine 15 milliGRAM(s) Oral at bedtime  pantoprazole  Injectable 40 milliGRAM(s) IV Push two times a day  polyethylene glycol 3350 17 Gram(s) Oral daily  QUEtiapine 25 milliGRAM(s) Oral <User Schedule>  QUEtiapine 50 milliGRAM(s) Oral <User Schedule>  rivaroxaban 15 milliGRAM(s) Oral daily  senna 2 Tablet(s) Oral at bedtime        Review of Systems:  General:  No wt loss, fevers, chills, night sweats, fatigue,   Eyes:  Good vision, no reported pain  ENT:  No sore throat, pain, runny nose, dysphagia  CV:  No pain, palpitations, hypo/hypertension  Resp:  No dyspnea, cough, tachypnea, wheezing  GI:  See HPI  :  No pain, bleeding, incontinence, nocturia  Muscle:  No pain, weakness  Neuro:  No weakness, tingling, memory problems  Psych:  No fatigue, insomnia, mood problems, depression  Endocrine:  No polyuria, polydipsia, cold/heat intolerance  Heme:  No petechiae, ecchymosis, easy bruisability  Integumentary:  No rash, edema    PHYSICAL EXAM:   Vital Signs:  Vital Signs Last 24 Hrs  T(C): 36.9 (07 Oct 2021 12:03), Max: 36.9 (07 Oct 2021 12:03)  T(F): 98.4 (07 Oct 2021 12:03), Max: 98.4 (07 Oct 2021 12:03)  HR: 76 (07 Oct 2021 12:03) (76 - 109)  BP: 123/69 (07 Oct 2021 12:03) (107/69 - 123/69)  BP(mean): --  RR: 19 (07 Oct 2021 12:03) (18 - 19)  SpO2: 97% (07 Oct 2021 12:03) (96% - 97%)  Daily     Daily Weight in k.9 (07 Oct 2021 04:18)      PHYSICAL EXAM:     GENERAL:  Appears stated age, well-groomed, well-nourished, no distress  HEENT:  NC/AT,  conjunctivae anicteric, clear and pink,   NECK: supple, trachea midline  CHEST:  Full & symmetric excursion, no increased effort, breath sounds clear  HEART:  Regular rhythm, no JVD  ABDOMEN:  Soft, non-tender, non-distended, normoactive bowel sounds,  no masses , no hepatosplenomegaly  EXTREMITIES:  no cyanosis,clubbing or edema  SKIN:  No rash, erythema, or, ecchymoses, no jaundice  NEURO:  Alert, non-focal, no asterixis  PSYCH: Appropriate affect, oriented to place and time  RECTAL: Deferred      LABS Personally reviewed by me:                        8.0    11.46 )-----------( 276      ( 07 Oct 2021 11:41 )             26.2     Mean Cell Volume: 85.9 fl (10-07-21 @ 11:41)    10    141  |  106  |  14  ----------------------------<  115<H>  3.4<L>   |  21<L>  |  0.93    Ca    8.8      06 Oct 2021 07:09                                    8.0    11.46 )-----------( 276      ( 07 Oct 2021 11:41 )             26.2                         8.5    14.84 )-----------( 318      ( 06 Oct 2021 18:47 )             28.4                         7.8    11.26 )-----------( 247      ( 06 Oct 2021 07:13 )             25.6                         8.9    16.71 )-----------( 279      ( 05 Oct 2021 23:34 )             28.3                         8.5    16.21 )-----------( 292      ( 05 Oct 2021 11:45 )             26.7       Imaging personally reviewed by me:

## 2021-10-08 LAB
ANION GAP SERPL CALC-SCNC: 17 MMOL/L — SIGNIFICANT CHANGE UP (ref 5–17)
BUN SERPL-MCNC: 26 MG/DL — HIGH (ref 7–23)
CALCIUM SERPL-MCNC: 9.2 MG/DL — SIGNIFICANT CHANGE UP (ref 8.4–10.5)
CHLORIDE SERPL-SCNC: 104 MMOL/L — SIGNIFICANT CHANGE UP (ref 96–108)
CO2 SERPL-SCNC: 21 MMOL/L — LOW (ref 22–31)
CREAT SERPL-MCNC: 1.16 MG/DL — SIGNIFICANT CHANGE UP (ref 0.5–1.3)
GLUCOSE SERPL-MCNC: 150 MG/DL — HIGH (ref 70–99)
HCT VFR BLD CALC: 27 % — LOW (ref 34.5–45)
HCT VFR BLD CALC: 28.9 % — LOW (ref 34.5–45)
HGB BLD-MCNC: 8 G/DL — LOW (ref 11.5–15.5)
HGB BLD-MCNC: 8.5 G/DL — LOW (ref 11.5–15.5)
MCHC RBC-ENTMCNC: 25.5 PG — LOW (ref 27–34)
MCHC RBC-ENTMCNC: 25.8 PG — LOW (ref 27–34)
MCHC RBC-ENTMCNC: 29.4 GM/DL — LOW (ref 32–36)
MCHC RBC-ENTMCNC: 29.6 GM/DL — LOW (ref 32–36)
MCV RBC AUTO: 86.8 FL — SIGNIFICANT CHANGE UP (ref 80–100)
MCV RBC AUTO: 87.1 FL — SIGNIFICANT CHANGE UP (ref 80–100)
NRBC # BLD: 0 /100 WBCS — SIGNIFICANT CHANGE UP (ref 0–0)
NRBC # BLD: 0 /100 WBCS — SIGNIFICANT CHANGE UP (ref 0–0)
PLATELET # BLD AUTO: 268 K/UL — SIGNIFICANT CHANGE UP (ref 150–400)
PLATELET # BLD AUTO: 325 K/UL — SIGNIFICANT CHANGE UP (ref 150–400)
POTASSIUM SERPL-MCNC: 3.7 MMOL/L — SIGNIFICANT CHANGE UP (ref 3.5–5.3)
POTASSIUM SERPL-SCNC: 3.7 MMOL/L — SIGNIFICANT CHANGE UP (ref 3.5–5.3)
RBC # BLD: 3.1 M/UL — LOW (ref 3.8–5.2)
RBC # BLD: 3.33 M/UL — LOW (ref 3.8–5.2)
RBC # FLD: 16.2 % — HIGH (ref 10.3–14.5)
RBC # FLD: 16.7 % — HIGH (ref 10.3–14.5)
SODIUM SERPL-SCNC: 142 MMOL/L — SIGNIFICANT CHANGE UP (ref 135–145)
WBC # BLD: 12.34 K/UL — HIGH (ref 3.8–10.5)
WBC # BLD: 13.78 K/UL — HIGH (ref 3.8–10.5)
WBC # FLD AUTO: 12.34 K/UL — HIGH (ref 3.8–10.5)
WBC # FLD AUTO: 13.78 K/UL — HIGH (ref 3.8–10.5)

## 2021-10-08 RX ADMIN — PANTOPRAZOLE SODIUM 40 MILLIGRAM(S): 20 TABLET, DELAYED RELEASE ORAL at 05:53

## 2021-10-08 RX ADMIN — QUETIAPINE FUMARATE 50 MILLIGRAM(S): 200 TABLET, FILM COATED ORAL at 19:22

## 2021-10-08 RX ADMIN — SENNA PLUS 2 TABLET(S): 8.6 TABLET ORAL at 22:25

## 2021-10-08 RX ADMIN — QUETIAPINE FUMARATE 25 MILLIGRAM(S): 200 TABLET, FILM COATED ORAL at 09:55

## 2021-10-08 RX ADMIN — MIRTAZAPINE 15 MILLIGRAM(S): 45 TABLET, ORALLY DISINTEGRATING ORAL at 22:25

## 2021-10-08 RX ADMIN — POLYETHYLENE GLYCOL 3350 17 GRAM(S): 17 POWDER, FOR SOLUTION ORAL at 14:51

## 2021-10-08 RX ADMIN — PANTOPRAZOLE SODIUM 40 MILLIGRAM(S): 20 TABLET, DELAYED RELEASE ORAL at 18:17

## 2021-10-08 RX ADMIN — Medication 40 MILLIGRAM(S): at 05:53

## 2021-10-08 RX ADMIN — Medication 100 MILLIGRAM(S): at 05:53

## 2021-10-08 RX ADMIN — Medication 125 MICROGRAM(S): at 05:53

## 2021-10-08 RX ADMIN — RIVAROXABAN 15 MILLIGRAM(S): KIT at 18:17

## 2021-10-08 RX ADMIN — QUETIAPINE FUMARATE 25 MILLIGRAM(S): 200 TABLET, FILM COATED ORAL at 15:16

## 2021-10-08 RX ADMIN — HALOPERIDOL DECANOATE 1 MILLIGRAM(S): 100 INJECTION INTRAMUSCULAR at 19:22

## 2021-10-08 NOTE — PROGRESS NOTE ADULT - ASSESSMENT
89 year old female with lower GI bleeding    1. Lower GI bleed, pt family refusing colonoscopy. hgb now stable, resumed a/c,  monitor clinically  CT with questionable soft tissue prominence along the low rectum, which may be artifactual due to underdistention and streak artifact. will discuss flex sigmoidoscopy with family for further evaluation. Recommended to r/o malignancy.     2. Afib on a/c holding for now    3. Diastolic CHF    The plan of care was discussed with the physician assistant and modifications were made to the notation where appropriate.   Differential diagnosis and plan of care discussed with patient after the evaluation  35 minutes spent on total encounter of which more than fifty percent of the encounter was spent counseling and/or coordinating care by the attending physician.    [facility]  Gastroenterology and Hepatology  [address] [place], [place]  Office: [phone]  Cell: [phone]

## 2021-10-08 NOTE — PROGRESS NOTE ADULT - SUBJECTIVE AND OBJECTIVE BOX
INTERVAL HPI/OVERNIGHT EVENTS:    pt seen and examined  no rectal bleeding  tolerating diet well    MEDICATIONS  (STANDING):  digoxin     Tablet 125 MICROGram(s) Oral daily  furosemide    Tablet 40 milliGRAM(s) Oral daily  influenza   Vaccine 0.5 milliLiter(s) IntraMuscular once  metoprolol succinate  milliGRAM(s) Oral daily  mirtazapine 15 milliGRAM(s) Oral at bedtime  pantoprazole  Injectable 40 milliGRAM(s) IV Push two times a day  polyethylene glycol 3350 17 Gram(s) Oral daily  QUEtiapine 25 milliGRAM(s) Oral <User Schedule>  QUEtiapine 50 milliGRAM(s) Oral <User Schedule>  rivaroxaban 15 milliGRAM(s) Oral daily  senna 2 Tablet(s) Oral at bedtime    MEDICATIONS  (PRN):  haloperidol     Tablet 1 milliGRAM(s) Oral every 4 hours PRN Agitation      Allergies    No Known Allergies    Intolerances        Review of Systems:    General:  No wt loss, fevers, chills, night sweats,fatigue,   Eyes:  Good vision, no reported pain  ENT:  No sore throat, pain, runny nose, dysphagia  CV:  No pain, palpitations, hypo/hypertension  Resp:  No dyspnea, cough, tachypnea, wheezing  GI:  see HPI  :  No pain, bleeding, incontinence, nocturia  Muscle:  No pain, weakness  Neuro:  No weakness, tingling, memory problems  Psych:  No fatigue, insomnia, mood problems, depression  Endocrine:  No polyuria, polydypsia, cold/heat intolerance  Heme:  No petechiae, ecchymosis, easy bruisability  Skin:  No rash, tattoos, scars, edema      Vital Signs Last 24 Hrs  T(C): 36.4 (08 Oct 2021 12:55), Max: 36.6 (07 Oct 2021 20:39)  T(F): 97.5 (08 Oct 2021 12:55), Max: 97.9 (07 Oct 2021 20:39)  HR: 86 (08 Oct 2021 12:55) (86 - 92)  BP: 121/75 (08 Oct 2021 12:55) (108/67 - 130/87)  BP(mean): --  RR: 18 (08 Oct 2021 12:55) (18 - 18)  SpO2: 99% (08 Oct 2021 12:55) (95% - 99%)    PHYSICAL EXAM:    GENERAL:  Appears stated age, well-groomed, well-nourished, no distress  HEENT:  NC/AT,  conjunctivae anicteric, clear and pink,   NECK: supple, trachea midline  CHEST:  Full & symmetric excursion, no increased effort, breath sounds clear  HEART:  Regular rhythm, no JVD  ABDOMEN:  Soft, non-tender, non-distended, normoactive bowel sounds,  no masses , no hepatosplenomegaly  EXTREMITIES:  no cyanosis,clubbing or edema  SKIN:  No rash, erythema, or, ecchymoses, no jaundice  NEURO:  Alert, non-focal, no asterixis  PSYCH: Appropriate affect, oriented to place and time  RECTAL: Deferred    LABS:                        8.0    12.34 )-----------( 268      ( 08 Oct 2021 07:13 )             27.0     10-08    142  |  104  |  26<H>  ----------------------------<  150<H>  3.7   |  21<L>  |  1.16    Ca    9.2      08 Oct 2021 07:13            RADIOLOGY & ADDITIONAL TESTS:    ct< from: CT Abdomen and Pelvis w/ IV Cont (10.04.21 @ 20:59) >    EXAM:  CT ABDOMEN AND PELVIS IC                            PROCEDURE DATE:  10/04/2021            INTERPRETATION:  CLINICAL INFORMATION: Bright red blood per rectum, fatigue, shortness of breath, chest pain. Evaluate for mass or active bleed.    COMPARISON: None.    CONTRAST/COMPLICATIONS:  IV Contrast: Omnipaque 350  111 cc administered 39 cc discarded  Oral Contrast: None  Complications: None reported at time of study completion    PROCEDURE:  CT of the Abdomen and Pelvis was performed.  Precontrast, Arterial and Delayed phases were performed.  Sagittal and coronal reformats were performed.    FINDINGS:  LOWER CHEST: Cardiomegaly. Aortic valve and mitral annular calcification.    LIVER: Multiple hepatic cysts and additional subcentimeter hypodensities that are too small to characterize.  BILE DUCTS: Normal caliber.  GALLBLADDER: Within normal limits.  SPLEEN: Normal size. A 7 mm focus of hypoattenuation (series 4 image 21) is too small to characterize.  PANCREAS: Within normal limits.  ADRENALS: Within normal limits.  KIDNEYS/URETERS: Bilateral renal cysts and additional subcentimeter hypodensities too small to characterize. There is an exophytic 1.5 cm hyperattenuating lesion in the upper pole of the right kidney (series 2 image 18) without definite enhancement and precontrast density of 77 HU,, likely representing a hemorrhagic cyst. Indeterminate exophytic right lower pole lesion measuring 2.1 cm (series 2 image 33) with questionable mild enhancement versus pseudoenhancement.    BLADDER: Evaluation is limited by streak artifact. Layering hyperdensity likely represents contrast.  REPRODUCTIVE ORGANS: Evaluation is limited by streak artifact. Hysterectomy.    BOWEL: No bowel obstruction. Evaluation of the distal sigmoid colon and rectum is limited secondary to streak artifact; otherwise no evidence of GI bleed. Focal apparent soft tissue prominence along the right aspect of the low rectum (series 4 image 95), which may be artifactual due to underdistention and partialobscuration by streak artifact. Colonic diverticula.  PERITONEUM: No ascites.  VESSELS: Atherosclerotic changes.  RETROPERITONEUM/LYMPH NODES: No lymphadenopathy.  ABDOMINAL WALL: Tiny fat-containing umbilical hernia.  BONES: Right hip total arthroplasty with associated streak artifact obscuring adjacent tissue. Right iliopsoas atrophy. Degenerative changes.    IMPRESSION:  Evaluation of the distal sigmoid colon and rectum is limited secondary to streak artifact related to hip prosthesis; otherwise no evidence of active gastrointestinal bleed.    Questionable soft tissue prominence along the low rectum, which may be artifactual due to underdistention and streak artifact. Consider correlation with endoscopy.    Right renal lesions favored to represent hemorrhagic cysts. Questionable enhancement of a right lower pole lesion versus pseudoenhancement. Consider further evaluation with nonemergent renal ultrasound or contrast enhanced abdominal MRI as clinically indicated.        --- End of Report ---    < end of copied text >

## 2021-10-08 NOTE — PROGRESS NOTE ADULT - SUBJECTIVE AND OBJECTIVE BOX
[facility] - [facility] - PROGRESS NOTE    CHIEF COMPLAINT: dyspnea; anemia; obesity; kyphosis; hematochezia; lower GI bleed    INTERVAL HISTORY: no recurrent hematochezia since restarting full dose A/C; no anorexia or weight loss; no nausea, vomiting or abdominal pain; no constipation, diarrhea or change in the caliber of her stools; hemodynamically stable with a stable H/H following 3 transfusions; tired after a poor night's sleep; no cough, sputum production, hemoptysis, chest congestion or wheeze; no fevers, chills or sweats; no chest pain/pressure or palpitations;    REVIEW OF SYSTEMS:  Constitutional: As per interval history  HEENT: Within normal limits  CV: As per interval history  Resp: As per interval history  GI: hematochezia -> resolved  : Within normal limits  Musculoskeletal: Within normal limits  Skin: Within normal limits  Neurological: dementia -> mild  Psychiatric: Within normal limits  Endocrine: Within normal limits  Hematologic/Lymphatic: Within normal limits  Allergic/Immunologic: Within normal limits    MEDICATIONS:     Pulmonary "    Anti-microbials:    Cardiovascular:  digoxin     Tablet 125 MICROGram(s) Oral daily  furosemide    Tablet 40 milliGRAM(s) Oral daily  metoprolol succinate  milliGRAM(s) Oral daily    Other:  influenza   Vaccine 0.5 milliLiter(s) IntraMuscular once  mirtazapine 15 milliGRAM(s) Oral at bedtime  pantoprazole  Injectable 40 milliGRAM(s) IV Push two times a day  polyethylene glycol 3350 17 Gram(s) Oral daily  QUEtiapine 25 milliGRAM(s) Oral <User Schedule>  QUEtiapine 50 milliGRAM(s) Oral <User Schedule>  rivaroxaban 15 milliGRAM(s) Oral daily  senna 2 Tablet(s) Oral at bedtime    MEDICATIONS  (PRN):  haloperidol     Tablet 1 milliGRAM(s) Oral every 4 hours PRN Agitation        OBJECTIVE:    I&O's Detail    06 Oct 2021 07:01  -  07 Oct 2021 07:00  --------------------------------------------------------  IN:    Oral Fluid: 240 mL  Total IN: 240 mL    OUT:  Total OUT: 0 mL    Total NET: 240 mL      07 Oct 2021 07:01  -  08 Oct 2021 06:55  --------------------------------------------------------  IN:    Oral Fluid: 360 mL  Total IN: 360 mL    OUT:    Voided (mL): 400 mL  Total OUT: 400 mL    Total NET: -40 mL     Daily Weight in k.1 (08 Oct 2021 04:54)    PHYSICAL EXAM:       ICU Vital Signs Last 24 Hrs  T(C): 36.4 (08 Oct 2021 04:34), Max: 36.9 (07 Oct 2021 12:03)  T(F): 97.6 (08 Oct 2021 04:34), Max: 98.4 (07 Oct 2021 12:03)  HR: 88 (08 Oct 2021 04:34) (76 - 92)  BP: 130/87 (08 Oct 2021 04:34) (108/67 - 130/87)  BP(mean): --  ABP: --  ABP(mean): --  RR: 18 (08 Oct 2021 04:34) (18 - 19)  SpO2: 97% (08 Oct 2021 04:34) (95% - 97%) on room air     General: Awake. Alert. Cooperative. No distress. Appears stated age. Mildly obese. Sitting on the side of the bed.  HEENT: Atraumatic. Normocephalic. Anicteric. Normal oral mucosa. Edentulous. PERRL. EOMI.  Neck: Supple. Trachea midline. Thyroid without enlargement/tenderness/nodules. No carotid bruit. No JVD.	  Cardiovascular: Irregularly irregular rate and rhythm. S1 S2 normal. II/VI systolic murmur  Respiratory: Respirations unlabored. Clear to auscultation and percussion bilaterally. Kyphosis.  Abdomen: Soft. Non-tender. Non-distended. No organomegaly. No masses. Normal bowel sounds.  Extremities: Warm to touch. No clubbing or cyanosis. No pedal edema.  Pulses: 2+ peripheral pulses all extremities.	  Skin: Normal skin color. No rashes or lesions. No ecchymoses. No cyanosis. Warm to touch.  Lymph Nodes: Cervical, supraclavicular and axillary nodes normal  Neurological: Motor and sensory examination equal and normal. A and O x 2 - 3  Psychiatry: Appropriate mood and affect.    LABS:                          8.0    11.46 )-----------( 276      ( 07 Oct 2021 11:41 )             26.2     CBC    WBC  11.46 <==, 14.84 <==, 11.26 <==, 16.71 <==, 16.21 <==, 13.63 <==, 13.71 <==    Hemoglobin  8.0 <<==, 8.5 <<==, 7.8 <<==, 8.9 <<==, 8.5 <<==, 8.4 <<==, 7.3 <<==    Hematocrit  26.2 <==, 28.4 <==, 25.6 <==, 28.3 <==, 26.7 <==, 25.7 <==, 23.4 <==    Platelets  276 <==, 318 <==, 247 <==, 279 <==, 292 <==, 241 <==, 264 <==      141  |  106  |  14  ----------------------------<  115<H>    10-06  3.4<L>   |  21<L>  |  0.93      LYTES    sodium  141 <==, 142 <==, 137 <==    potassium   3.4 <==, 4.0 <==, 4.6 <==    chloride  106 <==, 105 <==, 98 <==    carbon dioxide  21 <==, 21 <==, 21 <==    =============================================================================================  RENAL FUNCTION:    Creatinine:   0.93  <<==, 0.84  <<==, 1.20  <<==    BUN:   14 <==, 17 <==, 36 <==    ============================================================================================    calcium   8.8 <==, 9.1 <==, 9.1 <==    ============================================================================================  LFTs    AST:   13 <==     ALT:  10  <==     AP:  84  <=    Bili:  0.3  <=    PT/INR - ( 03 Oct 2021 19:10 )   PT: 22.6 sec;   INR: 1.94 ratio      < from: Transthoracic Echocardiogram (21 @ 09:31) >    Patient name: GUS AMARO  YOB: 1932   Age: 89 (F)   MR#: [id number]  Study Date: 2021  Location: [address] [place], [place] [id number]: Amy Mendez RDCS  Study quality: difficult due to patient being  Referring Physician: Neil Lawson MD  Blood Pressure: 103/67 mmHg  Height: 163 cm  Weight: 73 kg  BSA: 1.8 m2  ------------------------------------------------------------------------  PROCEDURE: Transthoracic echocardiogram with 2-D, M-Mode  and complete spectral and color flow Doppler.  INDICATION: Abnormal electrocardiogram (ECG) (EKG) (R94.31)  ------------------------------------------------------------------------  Dimensions:    Normal Values:  LA:     4.4    2.0 - 4.0 cm  Ao:     3.4    2.0 - 3.8 cm  SEPTUM:        0.6 - 1.2 cm  PWT:           0.6 - 1.1 cm  LVIDd:         3.0 - 5.6 cm  LVIDs:         1.8 - 4.0 cm  EF (Visual Estimate): 65 %  ------------------------------------------------------------------------  Observations:  Mitral Valve: Mitral annular calcification, otherwise  normal mitral valve. Mild mitral regurgitation.  Aortic Valve/Aorta: Aortic valve not well visualized;  appears calcified.  Aortic Root: 3.4 cm.  LVOT diameter: 1.8 cm.  Left Atrium: Mildly dilated left atrium.  LA volume index =  41 cc/m2.  Left Ventricle: Endocardium not well visualized; grossly  normal left ventricular systolic function. Increased  relative wall thickness with normal left ventricular mass  index, consistent with concentric left ventricular  remodeling.  Right Heart: Mild right atrial enlargement. The right  ventricle is not well visualized; grossly normal right  ventricular systolic function. Normal tricuspid valve.  Pulmonic valve not well visualized.  Pericardium/Pleura: Normal pericardium with no pericardial  effusion.  Hemodynamic: Estimated right atrial pressure is 8 mm Hg.  Estimated right ventricular systolic pressure equals 29 mm  Hg, assuming right atrial pressure equals 8 mm Hg,  consistent with normal pulmonary pressures.  ------------------------------------------------------------------------  Conclusions:  1. Mildly dilated left atrium.  LA volume index = 41 cc/m2.  2. Increased relative wall thickness with normal left  ventricular mass index, consistent with concentric left  ventricular remodeling.  3. Endocardium not well visualized; grossly normal left  ventricular systolic function.  4. Mild right atrial enlargement.  5. The right ventricle is not well visualized; grossly  normal right ventricular systolic function.  *** No previous Echo exam.  ------------------------------------------------------------------------  Confirmed on  2021 - 20:25:27 by EDEL Garcia  ------------------------------------------------------------------------    < end of copied text >  ---------------------------------------------------------------------------------------------------------------  MICROBIOLOGY:     COVID-19 PCR (10.03.21 @ 20:19)   COVID-19 PCR: NotDete:      RADIOLOGY:  [x ] Chest radiographs reviewed and interpreted by me    < from: Xray Chest 1 View- PORTABLE-Urgent (10.03.21 @ 19:22) >    EXAM:  XR CHEST PORTABLE URGENT 1V                          PROCEDURE DATE:  10/03/2021      INTERPRETATION:  EXAMINATION: XR CHEST URGENT    CLINICAL INDICATION: Chest Pain    TECHNIQUE: Single frontal, portable view of the chest was obtained.    COMPARISON: CT chest 2021..    FINDINGS:  Cardiomegaly.  The lungs are clear.  There is no pneumothorax or pleural effusion.    IMPRESSION:  Clear lungs.    --- End of Report ---    SIDNEY VIDALES MD; Resident Radiologist  This document has been electronically signed.  EDMUND CARRILLO MD; Attending Radiologist  This document has been electronically signed. Oct  3 2021  8:25PM    < end of copied text >  ---------------------------------------------------------------------------------------------------------------  < from: CT Abdomen and Pelvis w/ IV Cont (10.04.21 @ 20:59) >    EXAM:  CT ABDOMEN AND PELVIS IC                          PROCEDURE DATE:  10/04/2021      INTERPRETATION:  CLINICAL INFORMATION: Bright red blood per rectum, fatigue, shortness of breath, chest pain. Evaluate for mass or active bleed.    COMPARISON: None.    CONTRAST/COMPLICATIONS:  IV Contrast: Omnipaque 350  111 cc administered 39 cc discarded  Oral Contrast: None  Complications: None reported at time of study completion    PROCEDURE:  CT of the Abdomen and Pelvis was performed.  Precontrast, Arterial and Delayed phases were performed.  Sagittal and coronal reformats were performed.    FINDINGS:  LOWER CHEST: Cardiomegaly. Aortic valve and mitral annular calcification.    LIVER: Multiple hepatic cysts and additional subcentimeter hypodensities that are too small to characterize.  BILE DUCTS: Normal caliber.  GALLBLADDER: Within normal limits.  SPLEEN: Normal size. A 7 mm focus of hypoattenuation (series 4 image 21) is too small to characterize.  PANCREAS: Within normal limits.  ADRENALS: Within normal limits.  KIDNEYS/URETERS: Bilateral renal cysts and additional subcentimeter hypodensities too small to characterize. There is an exophytic 1.5 cm hyperattenuating lesion in the upper pole of the right kidney (series 2 image 18) without definite enhancement and precontrast density of 77 HU,, likely representing a hemorrhagic cyst. Indeterminate exophytic right lower pole lesion measuring 2.1 cm (series 2 image 33) with questionable mild enhancement versus pseudoenhancement.    BLADDER: Evaluation is limited by streak artifact. Layering hyperdensity likely represents contrast.  REPRODUCTIVE ORGANS: Evaluation is limited by streak artifact. Hysterectomy.    BOWEL: No bowel obstruction. Evaluation of the distal sigmoid colon and rectum is limited secondary to streak artifact; otherwise no evidence of GI bleed. Focal apparent soft tissue prominence along the right aspect of the low rectum (series 4 image 95), which may be artifactual due to underdistention and partial obscuration by streak artifact. Colonic diverticula.  PERITONEUM: No ascites.  VESSELS: Atherosclerotic changes.  RETROPERITONEUM/LYMPH NODES: No lymphadenopathy.  ABDOMINAL WALL: Tiny fat-containing umbilical hernia.  BONES: Right hip total arthroplasty with associated streak artifact obscuring adjacent tissue. Right iliopsoas atrophy. Degenerative changes.    IMPRESSION:  Evaluation of the distal sigmoid colon and rectum is limited secondary to streak artifact related to hip prosthesis; otherwise no evidence of active gastrointestinal bleed.    Questionable soft tissue prominence along the low rectum, which may be artifactual due to underdistention and streak artifact. Consider correlation with endoscopy.    Right renal lesions favored to represent hemorrhagic cysts. Questionable enhancement of a right lower pole lesion versus pseudoenhancement. Consider further evaluation with nonemergent renal ultrasound or contrast enhanced abdominal MRI as clinically indicated.    --- End of Report ---    ARLENE LOVING MD; Resident Radiology  This document has been electronically signed.  AILYN KELLEY MD; Attending Radiologist  This document has been electronically signed. Oct  5 2021 11:01AM    < end of copied text >  ---------------------------------------------------------------------------------------------------------------

## 2021-10-08 NOTE — PROGRESS NOTE ADULT - SUBJECTIVE AND OBJECTIVE BOX
BENITA AMARO  89y Female  MRN:[id number]    Patient is a 89y old  Female who presents with a chief complaint of Hematochezia   GI Bleed (05 Oct 2021 19:35)    HPI:   88 y/o F   w/ h/o T2DM, Afib (on xarelto and metoprolol), diastolic CHF w no reported hx of GIB p/w bright red blood per rectum yesterday and fatigue, sob and cp today. reports bright blood stopped yesterday. no abd pain. no fever chills, no travels sick contacts. unsure last dose of xarelto was.  Hgb 5.4   (04 Oct 2021 08:53)      Patient seen and evaluated at bedside. No acute events overnight except as noted.    Interval HPI: no acute events o/n    PAST MEDICAL & SURGICAL HISTORY:  Chronic atrial fibrillation    Dementia    Chronic CHF    HTN (hypertension)    Dementia, senile with depression        REVIEW OF SYSTEMS:  as per hpi     SOC:  denies toxic habits    Fam Hx:  non cont    VITALS:   Vital Signs Last 24 Hrs  T(C): 36.4 (08 Oct 2021 04:34), Max: 36.9 (07 Oct 2021 12:03)  T(F): 97.6 (08 Oct 2021 04:34), Max: 98.4 (07 Oct 2021 12:03)  HR: 88 (08 Oct 2021 04:34) (76 - 92)  BP: 130/87 (08 Oct 2021 04:34) (108/67 - 130/87)  BP(mean): --  RR: 18 (08 Oct 2021 04:34) (18 - 19)  SpO2: 97% (08 Oct 2021 04:34) (95% - 97%)    PHYSICAL EXAM:  GENERAL: NAD, well-developed  HEAD:  Atraumatic, Normocephalic  EYES: EOMI, PERRLA, conjunctiva and sclera clear  NECK: Supple, No JVD  CHEST/LUNG: Clear to auscultation bilaterally; No wheeze  HEART: S1, S2; No murmurs, rubs, or gallops  ABDOMEN: Soft, Nontender, Nondistended; Bowel sounds present  EXTREMITIES:  2+ Peripheral Pulses, No clubbing, cyanosis, or edema  PSYCH: Normal affect  NEUROLOGY: AAOX3; non-focal  SKIN: No rashes or lesions    Consultant(s) Notes Reviewed:  [x ] YES  [ ] NO  Care Discussed with Consultants/Other Providers [ x] YES  [ ] NO    MEDS:   MEDICATIONS  (STANDING):  digoxin     Tablet 125 MICROGram(s) Oral daily  furosemide    Tablet 40 milliGRAM(s) Oral daily  influenza   Vaccine 0.5 milliLiter(s) IntraMuscular once  metoprolol succinate  milliGRAM(s) Oral daily  mirtazapine 15 milliGRAM(s) Oral at bedtime  pantoprazole  Injectable 40 milliGRAM(s) IV Push two times a day  polyethylene glycol 3350 17 Gram(s) Oral daily  QUEtiapine 25 milliGRAM(s) Oral <User Schedule>  QUEtiapine 50 milliGRAM(s) Oral <User Schedule>  rivaroxaban 15 milliGRAM(s) Oral daily  senna 2 Tablet(s) Oral at bedtime    MEDICATIONS  (PRN):  haloperidol     Tablet 1 milliGRAM(s) Oral every 4 hours PRN Agitation      ALLERGIES:  No Known Allergies      LABS:                                                 8.0    12.34 )-----------( 268      ( 08 Oct 2021 07:13 )             27.0   10-08    142  |  104  |  26<H>  ----------------------------<  150<H>  3.7   |  21<L>  |  1.16    Ca    9.2      08 Oct 2021 07:13                 < from: CT Abdomen and Pelvis w/ IV Cont (10.04.21 @ 20:59) >  IMPRESSION:  Evaluation of the distal sigmoid colon and rectum is limited secondary to streak artifact related to hip prosthesis; otherwise no evidence of active gastrointestinal bleed.    Questionable soft tissue prominence along the low rectum, which may be artifactual due to underdistention and streak artifact. Consider correlation with endoscopy.    Right renal lesions favored to represent hemorrhagic cysts. Questionable enhancement of a right lower pole lesion versus pseudoenhancement. Consider further evaluation with nonemergent renal ultrasound or contrast enhanced abdominal MRI as clinically indicated.    < end of copied text >

## 2021-10-08 NOTE — PROGRESS NOTE ADULT - ASSESSMENT
89 yo female h/o DM, afib on eliquis, CHF, here with gi bleed    gi bleed with acute blood loss anemia  s/p prbc transfusion  gi consulted  refusing egd/colon  ppi  serial cbc has been stable. resumed xarelto and cont to monitor cbc    afib  resume AC with xarelto    metoprolol for rate control  tele monitor    CHF  chronic diastolic  stable  resume home lasix     PAS  PT        Advanced care planning was discussed with patient and family.  Advanced care planning forms were reviewed and discussed as appropriate.  Differential diagnosis and plan of care discussed with patient after the evaluation.   Pain assessed and judicious use of narcotics when appropriate was discussed.  Importance of Fall prevention discussed.  Counseling on Smoking and Alcohol cessation was offered when appropriate.  Counseling on Diet, exercise, and medication compliance was done.     Approx 60 minutes spent.

## 2021-10-09 LAB
HCT VFR BLD CALC: 27.2 % — LOW (ref 34.5–45)
HGB BLD-MCNC: 8.1 G/DL — LOW (ref 11.5–15.5)
MCHC RBC-ENTMCNC: 25.6 PG — LOW (ref 27–34)
MCHC RBC-ENTMCNC: 29.8 GM/DL — LOW (ref 32–36)
MCV RBC AUTO: 85.8 FL — SIGNIFICANT CHANGE UP (ref 80–100)
NRBC # BLD: 0 /100 WBCS — SIGNIFICANT CHANGE UP (ref 0–0)
PLATELET # BLD AUTO: 290 K/UL — SIGNIFICANT CHANGE UP (ref 150–400)
RBC # BLD: 3.17 M/UL — LOW (ref 3.8–5.2)
RBC # FLD: 16 % — HIGH (ref 10.3–14.5)
WBC # BLD: 9.65 K/UL — SIGNIFICANT CHANGE UP (ref 3.8–10.5)
WBC # FLD AUTO: 9.65 K/UL — SIGNIFICANT CHANGE UP (ref 3.8–10.5)

## 2021-10-09 RX ADMIN — PANTOPRAZOLE SODIUM 40 MILLIGRAM(S): 20 TABLET, DELAYED RELEASE ORAL at 05:33

## 2021-10-09 RX ADMIN — Medication 40 MILLIGRAM(S): at 05:33

## 2021-10-09 RX ADMIN — SENNA PLUS 2 TABLET(S): 8.6 TABLET ORAL at 21:20

## 2021-10-09 RX ADMIN — MIRTAZAPINE 15 MILLIGRAM(S): 45 TABLET, ORALLY DISINTEGRATING ORAL at 21:21

## 2021-10-09 RX ADMIN — RIVAROXABAN 15 MILLIGRAM(S): KIT at 11:36

## 2021-10-09 RX ADMIN — QUETIAPINE FUMARATE 25 MILLIGRAM(S): 200 TABLET, FILM COATED ORAL at 09:40

## 2021-10-09 RX ADMIN — QUETIAPINE FUMARATE 25 MILLIGRAM(S): 200 TABLET, FILM COATED ORAL at 13:23

## 2021-10-09 RX ADMIN — Medication 100 MILLIGRAM(S): at 05:33

## 2021-10-09 RX ADMIN — Medication 125 MICROGRAM(S): at 05:33

## 2021-10-09 RX ADMIN — QUETIAPINE FUMARATE 50 MILLIGRAM(S): 200 TABLET, FILM COATED ORAL at 21:20

## 2021-10-09 RX ADMIN — PANTOPRAZOLE SODIUM 40 MILLIGRAM(S): 20 TABLET, DELAYED RELEASE ORAL at 17:31

## 2021-10-09 RX ADMIN — POLYETHYLENE GLYCOL 3350 17 GRAM(S): 17 POWDER, FOR SOLUTION ORAL at 11:36

## 2021-10-09 NOTE — PROGRESS NOTE ADULT - SUBJECTIVE AND OBJECTIVE BOX
Subjective: Patient seen and examined. No new events except as noted.     REVIEW OF SYSTEMS:    CONSTITUTIONAL: No weakness, fevers or chills  EYES/ENT: No visual changes;  No vertigo or throat pain   NECK: No pain or stiffness  RESPIRATORY: No cough, wheezing, hemoptysis; No shortness of breath  CARDIOVASCULAR: No chest pain or palpitations  GASTROINTESTINAL: No abdominal or epigastric pain. No nausea, vomiting, or hematemesis; No diarrhea or constipation. No melena or hematochezia.  GENITOURINARY: No dysuria, frequency or hematuria  NEUROLOGICAL: No numbness or weakness  SKIN: No itching, burning, rashes, or lesions   All other review of systems is negative unless indicated above.    MEDICATIONS:  MEDICATIONS  (STANDING):  digoxin     Tablet 125 MICROGram(s) Oral daily  furosemide    Tablet 40 milliGRAM(s) Oral daily  influenza   Vaccine 0.5 milliLiter(s) IntraMuscular once  metoprolol succinate  milliGRAM(s) Oral daily  mirtazapine 15 milliGRAM(s) Oral at bedtime  pantoprazole  Injectable 40 milliGRAM(s) IV Push two times a day  polyethylene glycol 3350 17 Gram(s) Oral daily  QUEtiapine 25 milliGRAM(s) Oral <User Schedule>  QUEtiapine 50 milliGRAM(s) Oral <User Schedule>  senna 2 Tablet(s) Oral at bedtime      PHYSICAL EXAM:  T(C): 36.3 (10-09-21 @ 20:33), Max: 36.6 (10-09-21 @ 12:46)  HR: 95 (10-09-21 @ 20:33) (86 - 95)  BP: 102/62 (10-09-21 @ 20:33) (96/66 - 108/63)  RR: 17 (10-09-21 @ 20:33) (17 - 18)  SpO2: 96% (10-09-21 @ 20:33) (96% - 98%)  Wt(kg): --  I&O's Summary    08 Oct 2021 07:01  -  09 Oct 2021 07:00  --------------------------------------------------------  IN: 240 mL / OUT: 200 mL / NET: 40 mL    09 Oct 2021 07:01  -  09 Oct 2021 21:05  --------------------------------------------------------  IN: 360 mL / OUT: 0 mL / NET: 360 mL            Appearance: NAD	  HEENT:   Dry  oral mucosa, PERRL, EOMI	  Lymphatic: No lymphadenopathy  Cardiovascular: Irregular S1 S2, No JVD,+ murmurs, No edema  Respiratory: Lungs clear to auscultation	  Psychiatry: A & O x 2 dementia   Gastrointestinal:  Soft, Non-tender, + BS	  Skin: No rashes, No ecchymoses, No cyanosis	  Neurologic: Non-focal  Extremities: Normal range of motion, No clubbing, cyanosis or edema  Vascular: Peripheral pulses palpable 2+ bilaterally      LABS:    CARDIAC MARKERS:                                8.1    9.65  )-----------( 290      ( 09 Oct 2021 11:31 )             27.2     10-08    142  |  104  |  26<H>  ----------------------------<  150<H>  3.7   |  21<L>  |  1.16    Ca    9.2      08 Oct 2021 07:13          TELEMETRY: 	  AF  ECG:  	  RADIOLOGY:   DIAGNOSTIC TESTING:  [ ] Echocardiogram:  [ ]  Catheterization:  [ ] Stress Test:    OTHER:

## 2021-10-09 NOTE — PROGRESS NOTE ADULT - SUBJECTIVE AND OBJECTIVE BOX
BENITA AMARO  89y Female  MRN:[id number]    Patient is a 89y old  Female who presents with a chief complaint of Hematochezia   GI Bleed (05 Oct 2021 19:35)    HPI:   89 y/o F   w/ h/o T2DM, Afib (on xarelto and metoprolol), diastolic CHF w no reported hx of GIB p/w bright red blood per rectum yesterday and fatigue, sob and cp today. reports bright blood stopped yesterday. no abd pain. no fever chills, no travels sick contacts. unsure last dose of xarelto was.  Hgb 5.4   (04 Oct 2021 08:53)      Patient seen and evaluated at bedside. No acute events overnight except as noted.    Interval HPI: no acute events o/n    PAST MEDICAL & SURGICAL HISTORY:  Chronic atrial fibrillation    Dementia    Chronic CHF    HTN (hypertension)    Dementia, senile with depression        REVIEW OF SYSTEMS:  as per hpi     SOC:  denies toxic habits    Fam Hx:  non cont    VITALS:   Vital Signs Last 24 Hrs  T(C): 36.3 (09 Oct 2021 20:33), Max: 36.6 (09 Oct 2021 12:46)  T(F): 97.4 (09 Oct 2021 20:33), Max: 97.8 (09 Oct 2021 12:46)  HR: 95 (09 Oct 2021 20:33) (86 - 95)  BP: 102/62 (09 Oct 2021 20:33) (96/66 - 108/63)  BP(mean): --  RR: 17 (09 Oct 2021 20:33) (17 - 18)  SpO2: 96% (09 Oct 2021 20:33) (96% - 98%)    PHYSICAL EXAM:  GENERAL: NAD, well-developed  HEAD:  Atraumatic, Normocephalic  EYES: EOMI, PERRLA, conjunctiva and sclera clear  NECK: Supple, No JVD  CHEST/LUNG: Clear to auscultation bilaterally; No wheeze  HEART: S1, S2; No murmurs, rubs, or gallops  ABDOMEN: Soft, Nontender, Nondistended; Bowel sounds present  EXTREMITIES:  2+ Peripheral Pulses, No clubbing, cyanosis, or edema  PSYCH: Normal affect  NEUROLOGY: AAOX3; non-focal  SKIN: No rashes or lesions    Consultant(s) Notes Reviewed:  [x ] YES  [ ] NO  Care Discussed with Consultants/Other Providers [ x] YES  [ ] NO    MEDS:   MEDICATIONS  (STANDING):  digoxin     Tablet 125 MICROGram(s) Oral daily  furosemide    Tablet 40 milliGRAM(s) Oral daily  influenza   Vaccine 0.5 milliLiter(s) IntraMuscular once  metoprolol succinate  milliGRAM(s) Oral daily  mirtazapine 15 milliGRAM(s) Oral at bedtime  pantoprazole  Injectable 40 milliGRAM(s) IV Push two times a day  polyethylene glycol 3350 17 Gram(s) Oral daily  QUEtiapine 25 milliGRAM(s) Oral <User Schedule>  QUEtiapine 50 milliGRAM(s) Oral <User Schedule>  senna 2 Tablet(s) Oral at bedtime    MEDICATIONS  (PRN):  haloperidol     Tablet 1 milliGRAM(s) Oral every 4 hours PRN Agitation      ALLERGIES:  No Known Allergies      LABS:                                                              8.1    9.65  )-----------( 290      ( 09 Oct 2021 11:31 )             27.2   10-08    142  |  104  |  26<H>  ----------------------------<  150<H>  3.7   |  21<L>  |  1.16    Ca    9.2      08 Oct 2021 07:13            < from: CT Abdomen and Pelvis w/ IV Cont (10.04.21 @ 20:59) >  IMPRESSION:  Evaluation of the distal sigmoid colon and rectum is limited secondary to streak artifact related to hip prosthesis; otherwise no evidence of active gastrointestinal bleed.    Questionable soft tissue prominence along the low rectum, which may be artifactual due to underdistention and streak artifact. Consider correlation with endoscopy.    Right renal lesions favored to represent hemorrhagic cysts. Questionable enhancement of a right lower pole lesion versus pseudoenhancement. Consider further evaluation with nonemergent renal ultrasound or contrast enhanced abdominal MRI as clinically indicated.    < end of copied text >

## 2021-10-09 NOTE — PROGRESS NOTE ADULT - ASSESSMENT
88 yo female h/o DM, afib on eliquis, CHF, here with gi bleed    gi bleed with acute blood loss anemia  s/p prbc transfusion  gi consulted  CT noted  now agreeable to flex sig  ppi       afib  holding AC   metoprolol for rate control  tele monitor    CHF  chronic diastolic  stable  resume home lasix     PAS  PT        Advanced care planning was discussed with patient and family.  Advanced care planning forms were reviewed and discussed as appropriate.  Differential diagnosis and plan of care discussed with patient after the evaluation.   Pain assessed and judicious use of narcotics when appropriate was discussed.  Importance of Fall prevention discussed.  Counseling on Smoking and Alcohol cessation was offered when appropriate.  Counseling on Diet, exercise, and medication compliance was done.     Approx 60 minutes spent.

## 2021-10-10 LAB
ANION GAP SERPL CALC-SCNC: 16 MMOL/L — SIGNIFICANT CHANGE UP (ref 5–17)
BLD GP AB SCN SERPL QL: NEGATIVE — SIGNIFICANT CHANGE UP
BUN SERPL-MCNC: 26 MG/DL — HIGH (ref 7–23)
CALCIUM SERPL-MCNC: 9.1 MG/DL — SIGNIFICANT CHANGE UP (ref 8.4–10.5)
CHLORIDE SERPL-SCNC: 104 MMOL/L — SIGNIFICANT CHANGE UP (ref 96–108)
CO2 SERPL-SCNC: 21 MMOL/L — LOW (ref 22–31)
CREAT SERPL-MCNC: 0.89 MG/DL — SIGNIFICANT CHANGE UP (ref 0.5–1.3)
GLUCOSE SERPL-MCNC: 132 MG/DL — HIGH (ref 70–99)
HCT VFR BLD CALC: 27.8 % — LOW (ref 34.5–45)
HGB BLD-MCNC: 8.2 G/DL — LOW (ref 11.5–15.5)
MCHC RBC-ENTMCNC: 25.3 PG — LOW (ref 27–34)
MCHC RBC-ENTMCNC: 29.5 GM/DL — LOW (ref 32–36)
MCV RBC AUTO: 85.8 FL — SIGNIFICANT CHANGE UP (ref 80–100)
NRBC # BLD: 0 /100 WBCS — SIGNIFICANT CHANGE UP (ref 0–0)
PLATELET # BLD AUTO: 280 K/UL — SIGNIFICANT CHANGE UP (ref 150–400)
POTASSIUM SERPL-MCNC: 3.4 MMOL/L — LOW (ref 3.5–5.3)
POTASSIUM SERPL-SCNC: 3.4 MMOL/L — LOW (ref 3.5–5.3)
RBC # BLD: 3.24 M/UL — LOW (ref 3.8–5.2)
RBC # FLD: 15.9 % — HIGH (ref 10.3–14.5)
RH IG SCN BLD-IMP: POSITIVE — SIGNIFICANT CHANGE UP
SARS-COV-2 RNA SPEC QL NAA+PROBE: SIGNIFICANT CHANGE UP
SODIUM SERPL-SCNC: 141 MMOL/L — SIGNIFICANT CHANGE UP (ref 135–145)
WBC # BLD: 11.97 K/UL — HIGH (ref 3.8–10.5)
WBC # FLD AUTO: 11.97 K/UL — HIGH (ref 3.8–10.5)

## 2021-10-10 RX ORDER — POTASSIUM CHLORIDE 20 MEQ
40 PACKET (EA) ORAL EVERY 4 HOURS
Refills: 0 | Status: COMPLETED | OUTPATIENT
Start: 2021-10-10 | End: 2021-10-10

## 2021-10-10 RX ADMIN — PANTOPRAZOLE SODIUM 40 MILLIGRAM(S): 20 TABLET, DELAYED RELEASE ORAL at 17:04

## 2021-10-10 RX ADMIN — PANTOPRAZOLE SODIUM 40 MILLIGRAM(S): 20 TABLET, DELAYED RELEASE ORAL at 05:16

## 2021-10-10 RX ADMIN — QUETIAPINE FUMARATE 25 MILLIGRAM(S): 200 TABLET, FILM COATED ORAL at 09:28

## 2021-10-10 RX ADMIN — HALOPERIDOL DECANOATE 1 MILLIGRAM(S): 100 INJECTION INTRAMUSCULAR at 20:26

## 2021-10-10 RX ADMIN — MIRTAZAPINE 15 MILLIGRAM(S): 45 TABLET, ORALLY DISINTEGRATING ORAL at 21:36

## 2021-10-10 RX ADMIN — Medication 100 MILLIGRAM(S): at 05:14

## 2021-10-10 RX ADMIN — Medication 40 MILLIEQUIVALENT(S): at 17:05

## 2021-10-10 RX ADMIN — QUETIAPINE FUMARATE 25 MILLIGRAM(S): 200 TABLET, FILM COATED ORAL at 13:09

## 2021-10-10 RX ADMIN — Medication 125 MICROGRAM(S): at 05:14

## 2021-10-10 RX ADMIN — SENNA PLUS 2 TABLET(S): 8.6 TABLET ORAL at 21:37

## 2021-10-10 RX ADMIN — Medication 40 MILLIEQUIVALENT(S): at 21:36

## 2021-10-10 RX ADMIN — Medication 40 MILLIGRAM(S): at 05:14

## 2021-10-10 RX ADMIN — QUETIAPINE FUMARATE 50 MILLIGRAM(S): 200 TABLET, FILM COATED ORAL at 21:37

## 2021-10-10 NOTE — PROGRESS NOTE ADULT - SUBJECTIVE AND OBJECTIVE BOX
BENITA AMARO  89y Female  MRN:[id number]    Patient is a 89y old  Female who presents with a chief complaint of Hematochezia   GI Bleed (05 Oct 2021 19:35)    HPI:   89 y/o F   w/ h/o T2DM, Afib (on xarelto and metoprolol), diastolic CHF w no reported hx of GIB p/w bright red blood per rectum yesterday and fatigue, sob and cp today. reports bright blood stopped yesterday. no abd pain. no fever chills, no travels sick contacts. unsure last dose of xarelto was.  Hgb 5.4   (04 Oct 2021 08:53)      Patient seen and evaluated at bedside. No acute events overnight except as noted.    Interval HPI: no acute events o/n    PAST MEDICAL & SURGICAL HISTORY:  Chronic atrial fibrillation    Dementia    Chronic CHF    HTN (hypertension)    Dementia, senile with depression        REVIEW OF SYSTEMS:  as per hpi     SOC:  denies toxic habits    Fam Hx:  non cont    VITALS:   Vital Signs Last 24 Hrs  T(C): 36.7 (10 Oct 2021 11:56), Max: 36.7 (10 Oct 2021 05:15)  T(F): 98 (10 Oct 2021 11:56), Max: 98.1 (10 Oct 2021 05:15)  HR: 82 (10 Oct 2021 11:56) (77 - 95)  BP: 92/57 (10 Oct 2021 11:56) (92/57 - 115/69)  BP(mean): --  RR: 16 (10 Oct 2021 11:56) (16 - 18)  SpO2: 98% (10 Oct 2021 11:56) (96% - 99%)    PHYSICAL EXAM:  GENERAL: NAD, well-developed  HEAD:  Atraumatic, Normocephalic  EYES: EOMI, PERRLA, conjunctiva and sclera clear  NECK: Supple, No JVD  CHEST/LUNG: Clear to auscultation bilaterally; No wheeze  HEART: S1, S2; No murmurs, rubs, or gallops  ABDOMEN: Soft, Nontender, Nondistended; Bowel sounds present  EXTREMITIES:  2+ Peripheral Pulses, No clubbing, cyanosis, or edema  PSYCH: Normal affect  NEUROLOGY: AAOX3; non-focal  SKIN: No rashes or lesions    Consultant(s) Notes Reviewed:  [x ] YES  [ ] NO  Care Discussed with Consultants/Other Providers [ x] YES  [ ] NO    MEDS:   MEDICATIONS  (STANDING):  digoxin     Tablet 125 MICROGram(s) Oral daily  furosemide    Tablet 40 milliGRAM(s) Oral daily  influenza   Vaccine 0.5 milliLiter(s) IntraMuscular once  metoprolol succinate  milliGRAM(s) Oral daily  mirtazapine 15 milliGRAM(s) Oral at bedtime  pantoprazole  Injectable 40 milliGRAM(s) IV Push two times a day  polyethylene glycol 3350 17 Gram(s) Oral daily  potassium chloride    Tablet ER 40 milliEquivalent(s) Oral every 4 hours  QUEtiapine 25 milliGRAM(s) Oral <User Schedule>  QUEtiapine 50 milliGRAM(s) Oral <User Schedule>  senna 2 Tablet(s) Oral at bedtime    MEDICATIONS  (PRN):  haloperidol     Tablet 1 milliGRAM(s) Oral every 4 hours PRN Agitation        ALLERGIES:  No Known Allergies      LABS:                                                  8.2    11.97 )-----------( 280      ( 10 Oct 2021 07:28 )             27.8   10-10    141  |  104  |  26<H>  ----------------------------<  132<H>  3.4<L>   |  21<L>  |  0.89    Ca    9.1      10 Oct 2021 07:28            < from: CT Abdomen and Pelvis w/ IV Cont (10.04.21 @ 20:59) >  IMPRESSION:  Evaluation of the distal sigmoid colon and rectum is limited secondary to streak artifact related to hip prosthesis; otherwise no evidence of active gastrointestinal bleed.    Questionable soft tissue prominence along the low rectum, which may be artifactual due to underdistention and streak artifact. Consider correlation with endoscopy.    Right renal lesions favored to represent hemorrhagic cysts. Questionable enhancement of a right lower pole lesion versus pseudoenhancement. Consider further evaluation with nonemergent renal ultrasound or contrast enhanced abdominal MRI as clinically indicated.    < end of copied text >

## 2021-10-10 NOTE — PROGRESS NOTE ADULT - ASSESSMENT
88 yo female h/o DM, afib on eliquis, CHF, here with gi bleed    gi bleed with acute blood loss anemia  s/p prbc transfusion  gi consulted  CT noted  now agreeable to flex sig -- scheduled tomorrow   ppi       afib  holding AC   metoprolol for rate control  tele monitor    CHF  chronic diastolic  stable  resume home lasix     PAS  PT        Advanced care planning was discussed with patient and family.  Advanced care planning forms were reviewed and discussed as appropriate.  Differential diagnosis and plan of care discussed with patient after the evaluation.   Pain assessed and judicious use of narcotics when appropriate was discussed.  Importance of Fall prevention discussed.  Counseling on Smoking and Alcohol cessation was offered when appropriate.  Counseling on Diet, exercise, and medication compliance was done.     Approx 60 minutes spent.

## 2021-10-10 NOTE — PROGRESS NOTE ADULT - ASSESSMENT
89 year old female with lower GI bleeding    1. Lower GI bleed, pt family refusing colonoscopy. hgb now stable, resumed a/c,  monitor clinically  flex sig tomorrow    2. Afib on a/c holding for now    3. Diastolic CHF    The plan of care was discussed with the physician assistant and modifications were made to the notation where appropriate.   Differential diagnosis and plan of care discussed with patient after the evaluation  35 minutes spent on total encounter of which more than fifty percent of the encounter was spent counseling and/or coordinating care by the attending physician.    [facility]  Gastroenterology and Hepatology  [address] [place], [place]  Office: [phone]  Cell: [phone]

## 2021-10-10 NOTE — PROGRESS NOTE ADULT - SUBJECTIVE AND OBJECTIVE BOX
Subjective: Patient seen and examined. No new events except as noted.     REVIEW OF SYSTEMS:    CONSTITUTIONAL: + weakness, fevers or chills  EYES/ENT: No visual changes;  No vertigo or throat pain   NECK: No pain or stiffness  RESPIRATORY: No cough, wheezing, hemoptysis; No shortness of breath  CARDIOVASCULAR: No chest pain or palpitations  GASTROINTESTINAL: No abdominal or epigastric pain. No nausea, vomiting, or hematemesis; No diarrhea or constipation. No melena or hematochezia.  GENITOURINARY: No dysuria, frequency or hematuria  NEUROLOGICAL: No numbness or weakness  SKIN: No itching, burning, rashes, or lesions   All other review of systems is negative unless indicated above.    MEDICATIONS:  MEDICATIONS  (STANDING):  digoxin     Tablet 125 MICROGram(s) Oral daily  furosemide    Tablet 40 milliGRAM(s) Oral daily  influenza   Vaccine 0.5 milliLiter(s) IntraMuscular once  metoprolol succinate  milliGRAM(s) Oral daily  mirtazapine 15 milliGRAM(s) Oral at bedtime  pantoprazole  Injectable 40 milliGRAM(s) IV Push two times a day  polyethylene glycol 3350 17 Gram(s) Oral daily  QUEtiapine 25 milliGRAM(s) Oral <User Schedule>  QUEtiapine 50 milliGRAM(s) Oral <User Schedule>  senna 2 Tablet(s) Oral at bedtime      PHYSICAL EXAM:  T(C): 36.7 (10-10-21 @ 05:15), Max: 36.7 (10-10-21 @ 05:15)  HR: 77 (10-10-21 @ 05:15) (77 - 95)  BP: 115/69 (10-10-21 @ 05:15) (96/66 - 115/69)  RR: 18 (10-10-21 @ 05:15) (17 - 18)  SpO2: 99% (10-10-21 @ 05:15) (96% - 99%)  Wt(kg): --  I&O's Summary    09 Oct 2021 07:01  -  10 Oct 2021 07:00  --------------------------------------------------------  IN: 360 mL / OUT: 0 mL / NET: 360 mL          Appearance: Normal	  HEENT:   Normal oral mucosa, PERRL, EOMI	  Lymphatic: No lymphadenopathy , no edema  Cardiovascular: Irregular  S1 S2, No JVD, No murmurs , Peripheral pulses palpable 2+ bilaterally  Respiratory: Lungs clear to auscultation, normal effort 	  Gastrointestinal:  Soft, Non-tender, + BS	  Skin: No rashes, No ecchymoses, No cyanosis, warm to touch  Musculoskeletal: Normal range of motion, normal strength  Psychiatry:  Mood & affect appropriate  Ext: No edema      LABS:    CARDIAC MARKERS:                                8.2    11.97 )-----------( 280      ( 10 Oct 2021 07:28 )             27.8     10-10    141  |  104  |  26<H>  ----------------------------<  132<H>  3.4<L>   |  21<L>  |  0.89    Ca    9.1      10 Oct 2021 07:28      proBNP:   Lipid Profile:   HgA1c:   TSH:             TELEMETRY: 	  AF  ECG:  	  RADIOLOGY:   DIAGNOSTIC TESTING:  [ ] Echocardiogram:  [ ]  Catheterization:  [ ] Stress Test:    OTHER:

## 2021-10-11 LAB
ANION GAP SERPL CALC-SCNC: 15 MMOL/L — SIGNIFICANT CHANGE UP (ref 5–17)
BUN SERPL-MCNC: 26 MG/DL — HIGH (ref 7–23)
CALCIUM SERPL-MCNC: 9.4 MG/DL — SIGNIFICANT CHANGE UP (ref 8.4–10.5)
CHLORIDE SERPL-SCNC: 103 MMOL/L — SIGNIFICANT CHANGE UP (ref 96–108)
CO2 SERPL-SCNC: 23 MMOL/L — SIGNIFICANT CHANGE UP (ref 22–31)
CREAT SERPL-MCNC: 0.89 MG/DL — SIGNIFICANT CHANGE UP (ref 0.5–1.3)
GLUCOSE SERPL-MCNC: 164 MG/DL — HIGH (ref 70–99)
HCT VFR BLD CALC: 28.3 % — LOW (ref 34.5–45)
HGB BLD-MCNC: 8.3 G/DL — LOW (ref 11.5–15.5)
MCHC RBC-ENTMCNC: 25 PG — LOW (ref 27–34)
MCHC RBC-ENTMCNC: 29.3 GM/DL — LOW (ref 32–36)
MCV RBC AUTO: 85.2 FL — SIGNIFICANT CHANGE UP (ref 80–100)
NRBC # BLD: 0 /100 WBCS — SIGNIFICANT CHANGE UP (ref 0–0)
PLATELET # BLD AUTO: 307 K/UL — SIGNIFICANT CHANGE UP (ref 150–400)
POTASSIUM SERPL-MCNC: 3.9 MMOL/L — SIGNIFICANT CHANGE UP (ref 3.5–5.3)
POTASSIUM SERPL-SCNC: 3.9 MMOL/L — SIGNIFICANT CHANGE UP (ref 3.5–5.3)
RBC # BLD: 3.32 M/UL — LOW (ref 3.8–5.2)
RBC # FLD: 15.7 % — HIGH (ref 10.3–14.5)
SODIUM SERPL-SCNC: 141 MMOL/L — SIGNIFICANT CHANGE UP (ref 135–145)
WBC # BLD: 11.35 K/UL — HIGH (ref 3.8–10.5)
WBC # FLD AUTO: 11.35 K/UL — HIGH (ref 3.8–10.5)

## 2021-10-11 RX ORDER — PSYLLIUM SEED (WITH DEXTROSE)
1 POWDER (GRAM) ORAL DAILY
Refills: 0 | Status: DISCONTINUED | OUTPATIENT
Start: 2021-10-11 | End: 2021-10-13

## 2021-10-11 RX ORDER — RIVAROXABAN 15 MG-20MG
15 KIT ORAL DAILY
Refills: 0 | Status: DISCONTINUED | OUTPATIENT
Start: 2021-10-11 | End: 2021-10-13

## 2021-10-11 RX ADMIN — RIVAROXABAN 15 MILLIGRAM(S): KIT at 18:30

## 2021-10-11 RX ADMIN — Medication 1 PACKET(S): at 18:35

## 2021-10-11 RX ADMIN — QUETIAPINE FUMARATE 25 MILLIGRAM(S): 200 TABLET, FILM COATED ORAL at 13:21

## 2021-10-11 RX ADMIN — Medication 125 MICROGRAM(S): at 05:25

## 2021-10-11 RX ADMIN — SENNA PLUS 2 TABLET(S): 8.6 TABLET ORAL at 21:34

## 2021-10-11 RX ADMIN — Medication 100 MILLIGRAM(S): at 05:25

## 2021-10-11 RX ADMIN — Medication 40 MILLIGRAM(S): at 05:25

## 2021-10-11 RX ADMIN — PANTOPRAZOLE SODIUM 40 MILLIGRAM(S): 20 TABLET, DELAYED RELEASE ORAL at 18:30

## 2021-10-11 RX ADMIN — QUETIAPINE FUMARATE 25 MILLIGRAM(S): 200 TABLET, FILM COATED ORAL at 09:40

## 2021-10-11 RX ADMIN — HALOPERIDOL DECANOATE 1 MILLIGRAM(S): 100 INJECTION INTRAMUSCULAR at 03:34

## 2021-10-11 RX ADMIN — MIRTAZAPINE 15 MILLIGRAM(S): 45 TABLET, ORALLY DISINTEGRATING ORAL at 21:34

## 2021-10-11 RX ADMIN — HALOPERIDOL DECANOATE 1 MILLIGRAM(S): 100 INJECTION INTRAMUSCULAR at 21:36

## 2021-10-11 RX ADMIN — PANTOPRAZOLE SODIUM 40 MILLIGRAM(S): 20 TABLET, DELAYED RELEASE ORAL at 05:25

## 2021-10-11 RX ADMIN — POLYETHYLENE GLYCOL 3350 17 GRAM(S): 17 POWDER, FOR SOLUTION ORAL at 21:37

## 2021-10-11 RX ADMIN — QUETIAPINE FUMARATE 50 MILLIGRAM(S): 200 TABLET, FILM COATED ORAL at 21:34

## 2021-10-11 NOTE — PROGRESS NOTE ADULT - SUBJECTIVE AND OBJECTIVE BOX
Subjective: Patient seen and examined. No new events except as noted.     REVIEW OF SYSTEMS:    CONSTITUTIONAL: No weakness, fevers or chills  EYES/ENT: No visual changes;  No vertigo or throat pain   NECK: No pain or stiffness  RESPIRATORY: No cough, wheezing, hemoptysis; No shortness of breath  CARDIOVASCULAR: No chest pain or palpitations  GASTROINTESTINAL: No abdominal or epigastric pain. No nausea, vomiting, or hematemesis; No diarrhea or constipation. No melena or hematochezia.  GENITOURINARY: No dysuria, frequency or hematuria  NEUROLOGICAL: No numbness or weakness  SKIN: No itching, burning, rashes, or lesions   All other review of systems is negative unless indicated above.    MEDICATIONS:  MEDICATIONS  (STANDING):  digoxin     Tablet 125 MICROGram(s) Oral daily  furosemide    Tablet 40 milliGRAM(s) Oral daily  influenza   Vaccine 0.5 milliLiter(s) IntraMuscular once  metoprolol succinate  milliGRAM(s) Oral daily  mirtazapine 15 milliGRAM(s) Oral at bedtime  pantoprazole  Injectable 40 milliGRAM(s) IV Push two times a day  polyethylene glycol 3350 17 Gram(s) Oral daily  QUEtiapine 25 milliGRAM(s) Oral <User Schedule>  QUEtiapine 50 milliGRAM(s) Oral <User Schedule>  senna 2 Tablet(s) Oral at bedtime      PHYSICAL EXAM:  T(C): 36.4 (10-11-21 @ 11:52), Max: 36.4 (10-11-21 @ 11:52)  HR: 84 (10-11-21 @ 11:52) (81 - 88)  BP: 122/78 (10-11-21 @ 11:52) (101/50 - 122/78)  RR: 18 (10-11-21 @ 11:52) (17 - 18)  SpO2: 99% (10-11-21 @ 11:52) (96% - 99%)  Wt(kg): --  I&O's Summary    10 Oct 2021 07:01  -  11 Oct 2021 07:00  --------------------------------------------------------  IN: 480 mL / OUT: 200 mL / NET: 280 mL    11 Oct 2021 07:01  -  11 Oct 2021 12:31  --------------------------------------------------------  IN: 0 mL / OUT: 600 mL / NET: -600 mL          Appearance: NAD	  HEENT: Dry oral mucosa, PERRL, EOMI	  Lymphatic: No lymphadenopathy , no edema  Cardiovascular: Irregular S1 S2, No JVD, No murmurs , Peripheral pulses palpable 2+ bilaterally  Respiratory: Lungs clear to auscultation, normal effort 	  Gastrointestinal:  Soft, Non-tender, + BS	  Skin: No rashes, No ecchymoses, No cyanosis, warm to touch  Musculoskeletal: Normal range of motion, normal strength  Psychiatry:  Mood & affect appropriate  Ext: No edema      LABS:    CARDIAC MARKERS:                                8.3    11.35 )-----------( 307      ( 11 Oct 2021 10:19 )             28.3     10-11    141  |  103  |  26<H>  ----------------------------<  164<H>  3.9   |  23  |  0.89    Ca    9.4      11 Oct 2021 10:25      proBNP:   Lipid Profile:   HgA1c:   TSH:             TELEMETRY: 	    ECG:  	  RADIOLOGY:   DIAGNOSTIC TESTING:  [ ] Echocardiogram:  [ ]  Catheterization:  [ ] Stress Test:    OTHER:

## 2021-10-11 NOTE — PROGRESS NOTE ADULT - ASSESSMENT
88 yo female h/o DM, afib on eliquis, CHF, here with gi bleed    gi bleed with acute blood loss anemia  s/p prbc transfusion  gi consulted  CT noted  now agreeable to flex sig -- scheduled for today  ppi     afib  holding AC   metoprolol for rate control  tele monitor    CHF  chronic diastolic  stable  resume home lasix     PAS  PT        Advanced care planning was discussed with patient and family.  Advanced care planning forms were reviewed and discussed as appropriate.  Differential diagnosis and plan of care discussed with patient after the evaluation.   Pain assessed and judicious use of narcotics when appropriate was discussed.  Importance of Fall prevention discussed.  Counseling on Smoking and Alcohol cessation was offered when appropriate.  Counseling on Diet, exercise, and medication compliance was done.     Approx 60 minutes spent.

## 2021-10-11 NOTE — PROGRESS NOTE ADULT - SUBJECTIVE AND OBJECTIVE BOX
BENITA AMARO  89y Female  MRN:[id number]    Patient is a 89y old  Female who presents with a chief complaint of Hematochezia   GI Bleed (05 Oct 2021 19:35)    HPI:   89 y/o F   w/ h/o T2DM, Afib (on xarelto and metoprolol), diastolic CHF w no reported hx of GIB p/w bright red blood per rectum yesterday and fatigue, sob and cp today. reports bright blood stopped yesterday. no abd pain. no fever chills, no travels sick contacts. unsure last dose of xarelto was.  Hgb 5.4   (04 Oct 2021 08:53)      Patient seen and evaluated at bedside. No acute events overnight except as noted.    Interval HPI: no acute events o/n    PAST MEDICAL & SURGICAL HISTORY:  Chronic atrial fibrillation    Dementia    Chronic CHF    HTN (hypertension)    Dementia, senile with depression        REVIEW OF SYSTEMS:  as per hpi     SOC:  denies toxic habits    Fam Hx:  non cont    VITALS:  Vital Signs Last 24 Hrs  T(C): 36.3 (11 Oct 2021 04:31), Max: 36.7 (10 Oct 2021 11:56)  T(F): 97.4 (11 Oct 2021 04:31), Max: 98 (10 Oct 2021 11:56)  HR: 81 (11 Oct 2021 04:31) (81 - 88)  BP: 101/50 (11 Oct 2021 04:31) (92/57 - 109/67)  BP(mean): --  RR: 18 (11 Oct 2021 04:31) (16 - 18)  SpO2: 98% (11 Oct 2021 04:31) (96% - 98%)    PHYSICAL EXAM:  GENERAL: NAD, well-developed  HEAD:  Atraumatic, Normocephalic  EYES: EOMI, PERRLA, conjunctiva and sclera clear  NECK: Supple, No JVD  CHEST/LUNG: Clear to auscultation bilaterally; No wheeze  HEART: S1, S2; No murmurs, rubs, or gallops  ABDOMEN: Soft, Nontender, Nondistended; Bowel sounds present  EXTREMITIES:  2+ Peripheral Pulses, No clubbing, cyanosis, or edema  PSYCH: Normal affect  NEUROLOGY: AAOX3; non-focal  SKIN: No rashes or lesions    Consultant(s) Notes Reviewed:  [x ] YES  [ ] NO  Care Discussed with Consultants/Other Providers [ x] YES  [ ] NO    MEDS:   MEDICATIONS  (STANDING):  digoxin     Tablet 125 MICROGram(s) Oral daily  furosemide    Tablet 40 milliGRAM(s) Oral daily  influenza   Vaccine 0.5 milliLiter(s) IntraMuscular once  metoprolol succinate  milliGRAM(s) Oral daily  mirtazapine 15 milliGRAM(s) Oral at bedtime  pantoprazole  Injectable 40 milliGRAM(s) IV Push two times a day  polyethylene glycol 3350 17 Gram(s) Oral daily  QUEtiapine 25 milliGRAM(s) Oral <User Schedule>  QUEtiapine 50 milliGRAM(s) Oral <User Schedule>  senna 2 Tablet(s) Oral at bedtime    MEDICATIONS  (PRN):  haloperidol     Tablet 1 milliGRAM(s) Oral every 4 hours PRN Agitation        ALLERGIES:  No Known Allergies      LABS:                                                      8.3    11.35 )-----------( 307      ( 11 Oct 2021 10:19 )             28.3   10-11    141  |  103  |  26<H>  ----------------------------<  164<H>  3.9   |  23  |  0.89    Ca    9.4      11 Oct 2021 10:25              < from: CT Abdomen and Pelvis w/ IV Cont (10.04.21 @ 20:59) >  IMPRESSION:  Evaluation of the distal sigmoid colon and rectum is limited secondary to streak artifact related to hip prosthesis; otherwise no evidence of active gastrointestinal bleed.    Questionable soft tissue prominence along the low rectum, which may be artifactual due to underdistention and streak artifact. Consider correlation with endoscopy.    Right renal lesions favored to represent hemorrhagic cysts. Questionable enhancement of a right lower pole lesion versus pseudoenhancement. Consider further evaluation with nonemergent renal ultrasound or contrast enhanced abdominal MRI as clinically indicated.    < end of copied text >

## 2021-10-11 NOTE — PRE-ANESTHESIA EVALUATION ADULT - NSANTHPMHFT_GEN_ALL_CORE
88 y/o F w/ h/o T2DM, Afib (on Eliquis and metoprolol), diastolic CHF w no reported hx of GIB p/w bright red blood per rectum yesterday and fatigue, sob and cp today.  denies N/V  TTE report reviewed 88 y/o F w/ h/o T2DM, Afib (on Eliquis and metoprolol), diastolic CHF w no reported hx of GIB p/w bright red blood per rectum yesterday and fatigue, sob and cp today.  denies N/V  TTE report reviewed    PSH ABDULAZIZ, R THR, L TKR, L ankle, wrist sx

## 2021-10-11 NOTE — PROGRESS NOTE ADULT - ASSESSMENT
ASSESSMENT:    lower GI bleed with a possible soft tissue prominence in the low rectum on CT scan - the patient was advised to have surgery for a mass visualized on a prior colonoscopy which she refused due to her advanced age - remains hemodynamically stable with a stable H/H following transfusion of 3 units of PRBCs - the patient and family have now agreed to a flexible sigmoidoscopy although the patient is refusing to take the enema in preparation for the procedure - anticoagulation has again been held    dyspnea - multifactorial -> resolved  1) decreased oxygen carrying capacity due to profound anemia  2) restrictive lung disease due to obesity limiting diaphragmatic excursion and kyphosis limiting chest wall excursion  3) unlikely to have a pulmonary embolism on full dose A/C  4) no evidence of pulmonary edema or pleural effusions despite underlying cardiac disease following IV fluids and PRBCs    PLAN/RECOMMENDATIONS:    stable oxygenation on room air  observe off systemic steroids, antibiotics and pulmonary medications  incentive spirometry  cardiology follow-up noted  cardiac meds: digoxin/toprol XL/lasix  follow hemodynamics and H/H  remeron/seroquel/haldol as needed  bowel regimen  GI follow-up    GI prophylaxis - protonix    flexible colonoscopy after patient bowel preparation  SAUL stockings    Will follow with you. Plan of care discussed with the patient and her daughter at bedside.     Nikunj Whitaker MD, [facility]  [phone]  Pulmonary Medicine

## 2021-10-11 NOTE — CHART NOTE - NSCHARTNOTEFT_GEN_A_CORE
No objection to resuming Xarelto per GI.  Will resume Xarelto - d/w Dr. Lawson.    Erma Barnes, [facility]  [phone]

## 2021-10-11 NOTE — PROGRESS NOTE ADULT - SUBJECTIVE AND OBJECTIVE BOX
[facility] - PROGRESS NOTE    CHIEF COMPLAINT: dyspnea; anemia; obesity; kyphosis; hematochezia; lower GI bleed    INTERVAL HISTORY: patient apparently now amenable to having a flexible sigmoidoscopy but refusing the enema in preparation for the test; off Xarelto since 10/9; no recurrent hematochezia; no anorexia or weight loss; no nausea, vomiting or abdominal pain; no constipation, diarrhea or change in the caliber of her stools; hemodynamically stable with a stable H/H following 3 transfusions; no cough, sputum production, hemoptysis, chest congestion or wheeze; no fevers, chills or sweats; no chest pain/pressure or palpitations; leg swelling    REVIEW OF SYSTEMS:  Constitutional: As per interval history  HEENT: Within normal limits  CV: As per interval history  Resp: As per interval history  GI: hematochezia -> resolved  : Within normal limits  Musculoskeletal: Within normal limits  Skin: Within normal limits  Neurological: dementia -> mild  Psychiatric: Within normal limits  Endocrine: Within normal limits  Hematologic/Lymphatic: Within normal limits  Allergic/Immunologic: Within normal limits    MEDICATIONS:     Pulmonary "    Anti-microbials:    Cardiovascular:  digoxin     Tablet 125 MICROGram(s) Oral daily  furosemide    Tablet 40 milliGRAM(s) Oral daily  metoprolol succinate  milliGRAM(s) Oral daily    Other:  influenza   Vaccine 0.5 milliLiter(s) IntraMuscular once  mirtazapine 15 milliGRAM(s) Oral at bedtime  pantoprazole  Injectable 40 milliGRAM(s) IV Push two times a day  polyethylene glycol 3350 17 Gram(s) Oral daily  QUEtiapine 25 milliGRAM(s) Oral <User Schedule>  QUEtiapine 50 milliGRAM(s) Oral <User Schedule>  senna 2 Tablet(s) Oral at bedtime    MEDICATIONS  (PRN):  haloperidol     Tablet 1 milliGRAM(s) Oral every 4 hours PRN Agitation        OBJECTIVE:    I&O's Detail    10 Oct 2021 07:01  -  11 Oct 2021 07:00  --------------------------------------------------------  IN:    Oral Fluid: 480 mL  Total IN: 480 mL    OUT:    Voided (mL): 200 mL  Total OUT: 200 mL    Total NET: 280 mL      11 Oct 2021 07:01  -  11 Oct 2021 11:15  --------------------------------------------------------  IN:  Total IN: 0 mL    OUT:    Oral Fluid: 0 mL    Voided (mL): 300 mL  Total OUT: 300 mL    Total NET: -300 mL    Daily Weight in k.1 (11 Oct 2021 04:31)    PHYSICAL EXAM:       ICU Vital Signs Last 24 Hrs  T(C): 36.3 (11 Oct 2021 04:31), Max: 36.7 (10 Oct 2021 11:56)  T(F): 97.4 (11 Oct 2021 04:31), Max: 98 (10 Oct 2021 11:56)  HR: 81 (11 Oct 2021 04:31) (81 - 88)  BP: 101/50 (11 Oct 2021 04:31) (92/57 - 109/67)  BP(mean): --  ABP: --  ABP(mean): --  RR: 18 (11 Oct 2021 04:31) (16 - 18)  SpO2: 98% (11 Oct 2021 04:31) (96% - 98%) on room air     General: Awake. Alert. Cooperative. No distress. Appears stated age. Mildly obese. Sitting on the side of the bed.  HEENT: Atraumatic. Normocephalic. Anicteric. Normal oral mucosa. Edentulous. PERRL. EOMI.  Neck: Supple. Trachea midline. Thyroid without enlargement/tenderness/nodules. No carotid bruit. No JVD.	  Cardiovascular: Irregularly irregular rate and rhythm. S1 S2 normal. II/VI systolic murmur  Respiratory: Respirations unlabored. Clear to auscultation and percussion bilaterally. Kyphosis.  Abdomen: Soft. Non-tender. Non-distended. No organomegaly. No masses. Normal bowel sounds.  Extremities: Warm to touch. No clubbing or cyanosis. Moderate lower extremity edema up to the knee.  Pulses: 2+ peripheral pulses all extremities.	  Skin: Normal skin color. No rashes or lesions. No ecchymoses. No cyanosis. Warm to touch.  Lymph Nodes: Cervical, supraclavicular and axillary nodes normal  Neurological: Motor and sensory examination equal and normal. A and O x 2 - 3  Psychiatry: Appropriate mood and affect.    LABS:                          8.3    11.35 )-----------( 307      ( 11 Oct 2021 10:19 )             28.3     CBC    WBC  11.35 <==, 11.97 <==, 9.65 <==, 13.78 <==, 12.34 <==, 11.46 <==, 14.84 <==    Hemoglobin  8.3 <<==, 8.2 <<==, 8.1 <<==, 8.5 <<==, 8.0 <<==, 8.0 <<==, 8.5 <<==    Hematocrit  28.3 <==, 27.8 <==, 27.2 <==, 28.9 <==, 27.0 <==, 26.2 <==, 28.4 <==    Platelets  307 <==, 280 <==, 290 <==, 325 <==, 268 <==, 276 <==, 318 <==      141  |  103  |  26<H>  ----------------------------<  164<H>    10-11  3.9   |  23  |  0.89      LYTES    sodium  141 <==, 141 <==, 142 <==, 141 <==, 142 <==    potassium   3.9 <==, 3.4 <==, 3.7 <==, 3.4 <==, 4.0 <==    chloride  103 <==, 104 <==, 104 <==, 106 <==, 105 <==    carbon dioxide  23 <==, 21 <==, 21 <==, 21 <==, 21 <==    =============================================================================================  RENAL FUNCTION:    Creatinine:   0.89  <<==, 0.89  <<==, 1.16  <<==, 0.93  <<==, 0.84  <<==    BUN:   26 <==, 26 <==, 26 <==, 14 <==, 17 <==    ============================================================================================    calcium   9.4 <==, 9.1 <==, 9.2 <==, 8.8 <==, 9.1 <==    ============================================================================================  LFTs    AST:   13 <==     ALT:  10  <==     AP:  84  <=    Bili:  0.3  <=    PT/INR - ( 03 Oct 2021 19:10 )   PT: 22.6 sec;   INR: 1.94 ratio      < from: Transthoracic Echocardiogram (21 @ 09:31) >    Patient name: GUS AMARO  YOB: 1932   Age: 89 (F)   MR#: [id number]  Study Date: 2021  [id number] Amy Mendez RDCS  Study quality: difficult due to patient being  Referring Physician: Neil Lawson MD  Blood Pressure: 103/67 mmHg  Height: 163 cm  Weight: 73 kg  BSA: 1.8 m2  ------------------------------------------------------------------------  PROCEDURE: Transthoracic echocardiogram with 2-D, M-Mode  and complete spectral and color flow Doppler.  INDICATION: Abnormal electrocardiogram (ECG) (EKG) (R94.31)  ------------------------------------------------------------------------  Dimensions:    Normal Values:  LA:     4.4    2.0 - 4.0 cm  Ao:     3.4    2.0 - 3.8 cm  SEPTUM:        0.6 - 1.2 cm  PWT:           0.6 - 1.1 cm  LVIDd:         3.0 - 5.6 cm  LVIDs:         1.8 - 4.0 cm  EF (Visual Estimate): 65 %  ------------------------------------------------------------------------  Observations:  Mitral Valve: Mitral annular calcification, otherwise  normal mitral valve. Mild mitral regurgitation.  Aortic Valve/Aorta: Aortic valve not well visualized;  appears calcified.  Aortic Root: 3.4 cm.  LVOT diameter: 1.8 cm.  Left Atrium: Mildly dilated left atrium.  LA volume index =  41 cc/m2.  Left Ventricle: Endocardium not well visualized; grossly  normal left ventricular systolic function. Increased  relative wall thickness with normal left ventricular mass  index, consistent with concentric left ventricular  remodeling.  Right Heart: Mild right atrial enlargement. The right  ventricle is not well visualized; grossly normal right  ventricular systolic function. Normal tricuspid valve.  Pulmonic valve not well visualized.  Pericardium/Pleura: Normal pericardium with no pericardial  effusion.  Hemodynamic: Estimated right atrial pressure is 8 mm Hg.  Estimated right ventricular systolic pressure equals 29 mm  Hg, assuming right atrial pressure equals 8 mm Hg,  consistent with normal pulmonary pressures.  ------------------------------------------------------------------------  Conclusions:  1. Mildly dilated left atrium.  LA volume index = 41 cc/m2.  2. Increased relative wall thickness with normal left  ventricular mass index, consistent with concentric left  ventricular remodeling.  3. Endocardium not well visualized; grossly normal left  ventricular systolic function.  4. Mild right atrial enlargement.  5. The right ventricle is not well visualized; grossly  normal right ventricular systolic function.  *** No previous Echo exam.  ------------------------------------------------------------------------  Confirmed on  2021 - 20:25:27 by EDEL Garcia  ------------------------------------------------------------------------    < end of copied text >  ---------------------------------------------------------------------------------------------------------------  MICROBIOLOGY:     COVID-19 PCR (10.03.21 @ 20:19)   COVID-19 PCR: NotDete:      RADIOLOGY:  [x ] Chest radiographs reviewed and interpreted by me    < from: Xray Chest 1 View- PORTABLE-Urgent (10.03.21 @ 19:22) >    EXAM:  XR CHEST PORTABLE URGENT 1V                          PROCEDURE DATE:  10/03/2021      INTERPRETATION:  EXAMINATION: XR CHEST URGENT    CLINICAL INDICATION: Chest Pain    TECHNIQUE: Single frontal, portable view of the chest was obtained.    COMPARISON: CT chest 2021..    FINDINGS:  Cardiomegaly.  The lungs are clear.  There is no pneumothorax or pleural effusion.    IMPRESSION:  Clear lungs.    --- End of Report ---    SIDNEY VIDALES MD; Resident Radiologist  This document has been electronically signed.  EDMUND CARRILLO MD; Attending Radiologist  This document has been electronically signed. Oct  3 2021  8:25PM    < end of copied text >  ---------------------------------------------------------------------------------------------------------------  < from: CT Abdomen and Pelvis w/ IV Cont (10.04.21 @ 20:59) >    EXAM:  CT ABDOMEN AND PELVIS IC                          PROCEDURE DATE:  10/04/2021      INTERPRETATION:  CLINICAL INFORMATION: Bright red blood per rectum, fatigue, shortness of breath, chest pain. Evaluate for mass or active bleed.    COMPARISON: None.    CONTRAST/COMPLICATIONS:  IV Contrast: Omnipaque 350  111 cc administered 39 cc discarded  Oral Contrast: None  Complications: None reported at time of study completion    PROCEDURE:  CT of the Abdomen and Pelvis was performed.  Precontrast, Arterial and Delayed phases were performed.  Sagittal and coronal reformats were performed.    FINDINGS:  LOWER CHEST: Cardiomegaly. Aortic valve and mitral annular calcification.    LIVER: Multiple hepatic cysts and additional subcentimeter hypodensities that are too small to characterize.  BILE DUCTS: Normal caliber.  GALLBLADDER: Within normal limits.  SPLEEN: Normal size. A 7 mm focus of hypoattenuation (series 4 image 21) is too small to characterize.  PANCREAS: Within normal limits.  ADRENALS: Within normal limits.  KIDNEYS/URETERS: Bilateral renal cysts and additional subcentimeter hypodensities too small to characterize. There is an exophytic 1.5 cm hyperattenuating lesion in the upper pole of the right kidney (series 2 image 18) without definite enhancement and precontrast density of 77 HU,, likely representing a hemorrhagic cyst. Indeterminate exophytic right lower pole lesion measuring 2.1 cm (series 2 image 33) with questionable mild enhancement versus pseudoenhancement.    BLADDER: Evaluation is limited by streak artifact. Layering hyperdensity likely represents contrast.  REPRODUCTIVE ORGANS: Evaluation is limited by streak artifact. Hysterectomy.    BOWEL: No bowel obstruction. Evaluation of the distal sigmoid colon and rectum is limited secondary to streak artifact; otherwise no evidence of GI bleed. Focal apparent soft tissue prominence along the right aspect of the low rectum (series 4 image 95), which may be artifactual due to underdistention and partial obscuration by streak artifact. Colonic diverticula.  PERITONEUM: No ascites.  VESSELS: Atherosclerotic changes.  RETROPERITONEUM/LYMPH NODES: No lymphadenopathy.  ABDOMINAL WALL: Tiny fat-containing umbilical hernia.  BONES: Right hip total arthroplasty with associated streak artifact obscuring adjacent tissue. Right iliopsoas atrophy. Degenerative changes.    IMPRESSION:  Evaluation of the distal sigmoid colon and rectum is limited secondary to streak artifact related to hip prosthesis; otherwise no evidence of active gastrointestinal bleed.    Questionable soft tissue prominence along the low rectum, which may be artifactual due to underdistention and streak artifact. Consider correlation with endoscopy.    Right renal lesions favored to represent hemorrhagic cysts. Questionable enhancement of a right lower pole lesion versus pseudoenhancement. Consider further evaluation with nonemergent renal ultrasound or contrast enhanced abdominal MRI as clinically indicated.    --- End of Report ---    ARLENE LOVING MD; Resident Radiology  This document has been electronically signed.  AILYN KELLEY MD; Attending Radiologist  This document has been electronically signed. Oct  5 2021 11:01AM    < end of copied text >  ---------------------------------------------------------------------------------------------------------------

## 2021-10-11 NOTE — PRE PROCEDURE NOTE - PRE PROCEDURE EVALUATION
Attending Physician:                            Procedure:flex sig    Indication for Procedure: rectal bleed  ________________________________________________________  PAST MEDICAL & SURGICAL HISTORY:  Chronic atrial fibrillation    Dementia    Chronic CHF    HTN (hypertension)    Dementia, senile with depression      ALLERGIES:  No Known Allergies    HOME MEDICATIONS:  digoxin 125 mcg (0.125 mg) oral tablet: 1 tab(s) orally once a day  Januvia 100 mg oral tablet: 1 tab(s) orally once a day  Lasix 40 mg oral tablet: 1 tab(s) orally once a day  metoprolol succinate 100 mg oral tablet, extended release: 1 tab(s) orally once a day  pantoprazole 40 mg oral delayed release tablet: 1 tab(s) orally once a day  potassium chloride 20 mEq oral tablet, extended release: 1 tab(s) orally once a day  QUEtiapine 25 mg oral tablet: 1 tab(s) orally 2 times a day  sertraline 50 mg oral tablet: 1 tab(s) orally once a day  Xarelto 20 mg oral tablet: 1 tab(s) orally once a day    AICD/PPM: [ ] yes   [ ] no    PERTINENT LAB DATA:                        8.3    11.35 )-----------( 307      ( 11 Oct 2021 10:19 )             28.3     10-11    141  |  103  |  26<H>  ----------------------------<  164<H>  3.9   |  23  |  0.89    Ca    9.4      11 Oct 2021 10:25                  PHYSICAL EXAMINATION:    Height (cm): 162.6  Weight (kg): 63.5  BMI (kg/m2): 24  BSA (m2): 1.68T(C): 36.4  HR: 73  BP: 111/57  RR: 21  SpO2: 99%    Constitutional: NAD  HEENT: PERRLA, EOMI,    Neck:  No JVD  Respiratory: CTAB/L  Cardiovascular: S1 and S2  Gastrointestinal: BS+, soft, NT/ND  Extremities: No peripheral edema  Neurological: A/O x 3, no focal deficits  Psychiatric: Normal mood, normal affect  Skin: No rashes    ASA Class: I [ ]  II [ ]  III [x ]  IV [ ]    COMMENTS:    The patient is a suitable candidate for the planned procedure unless box checked [ ]  No, explain:

## 2021-10-11 NOTE — PROGRESS NOTE ADULT - ASSESSMENT
89 year old female with lower GI bleeding    1. Lower GI bleed, pt family refusing colonoscopy. hgb now stable, resumed a/c,  monitor clinically  flex sig today    2. Afib on a/c holding for now    3. Diastolic CHF    The plan of care was discussed with the physician assistant and modifications were made to the notation where appropriate.   Differential diagnosis and plan of care discussed with patient after the evaluation  35 minutes spent on total encounter of which more than fifty percent of the encounter was spent counseling and/or coordinating care by the attending physician.    [facility]  Gastroenterology and Hepatology  [address] [place], [place]  Office: [phone]  Cell: [phone]

## 2021-10-12 ENCOUNTER — TRANSCRIPTION ENCOUNTER (OUTPATIENT)
Age: 86
End: 2021-10-12

## 2021-10-12 LAB
HCT VFR BLD CALC: 25.1 % — LOW (ref 34.5–45)
HCT VFR BLD CALC: 26.7 % — LOW (ref 34.5–45)
HGB BLD-MCNC: 7.5 G/DL — LOW (ref 11.5–15.5)
HGB BLD-MCNC: 8 G/DL — LOW (ref 11.5–15.5)
MCHC RBC-ENTMCNC: 25.4 PG — LOW (ref 27–34)
MCHC RBC-ENTMCNC: 25.5 PG — LOW (ref 27–34)
MCHC RBC-ENTMCNC: 29.9 GM/DL — LOW (ref 32–36)
MCHC RBC-ENTMCNC: 30 GM/DL — LOW (ref 32–36)
MCV RBC AUTO: 84.8 FL — SIGNIFICANT CHANGE UP (ref 80–100)
MCV RBC AUTO: 85.4 FL — SIGNIFICANT CHANGE UP (ref 80–100)
NRBC # BLD: 0 /100 WBCS — SIGNIFICANT CHANGE UP (ref 0–0)
NRBC # BLD: 0 /100 WBCS — SIGNIFICANT CHANGE UP (ref 0–0)
PLATELET # BLD AUTO: 235 K/UL — SIGNIFICANT CHANGE UP (ref 150–400)
PLATELET # BLD AUTO: 257 K/UL — SIGNIFICANT CHANGE UP (ref 150–400)
RBC # BLD: 2.94 M/UL — LOW (ref 3.8–5.2)
RBC # BLD: 3.15 M/UL — LOW (ref 3.8–5.2)
RBC # FLD: 15.7 % — HIGH (ref 10.3–14.5)
RBC # FLD: 15.8 % — HIGH (ref 10.3–14.5)
WBC # BLD: 8.44 K/UL — SIGNIFICANT CHANGE UP (ref 3.8–10.5)
WBC # BLD: 9.49 K/UL — SIGNIFICANT CHANGE UP (ref 3.8–10.5)
WBC # FLD AUTO: 8.44 K/UL — SIGNIFICANT CHANGE UP (ref 3.8–10.5)
WBC # FLD AUTO: 9.49 K/UL — SIGNIFICANT CHANGE UP (ref 3.8–10.5)

## 2021-10-12 RX ORDER — MIRTAZAPINE 45 MG/1
1 TABLET, ORALLY DISINTEGRATING ORAL
Qty: 30 | Refills: 0
Start: 2021-10-12 | End: 2021-11-10

## 2021-10-12 RX ORDER — SENNA PLUS 8.6 MG/1
2 TABLET ORAL
Qty: 0 | Refills: 0 | DISCHARGE
Start: 2021-10-12

## 2021-10-12 RX ORDER — MIRTAZAPINE 45 MG/1
1 TABLET, ORALLY DISINTEGRATING ORAL
Qty: 0 | Refills: 0 | DISCHARGE
Start: 2021-10-12

## 2021-10-12 RX ORDER — ASCORBIC ACID 60 MG
1 TABLET,CHEWABLE ORAL
Qty: 0 | Refills: 0 | DISCHARGE
Start: 2021-10-12

## 2021-10-12 RX ORDER — QUETIAPINE FUMARATE 200 MG/1
1 TABLET, FILM COATED ORAL
Qty: 0 | Refills: 0 | DISCHARGE
Start: 2021-10-12

## 2021-10-12 RX ORDER — PANTOPRAZOLE SODIUM 20 MG/1
40 TABLET, DELAYED RELEASE ORAL EVERY 12 HOURS
Refills: 0 | Status: DISCONTINUED | OUTPATIENT
Start: 2021-10-12 | End: 2021-10-13

## 2021-10-12 RX ORDER — RIVAROXABAN 15 MG-20MG
1 KIT ORAL
Qty: 30 | Refills: 0
Start: 2021-10-12 | End: 2021-11-10

## 2021-10-12 RX ORDER — PSYLLIUM SEED (WITH DEXTROSE)
1 POWDER (GRAM) ORAL
Qty: 0 | Refills: 0 | DISCHARGE
Start: 2021-10-12

## 2021-10-12 RX ORDER — ASCORBIC ACID 60 MG
500 TABLET,CHEWABLE ORAL DAILY
Refills: 0 | Status: DISCONTINUED | OUTPATIENT
Start: 2021-10-12 | End: 2021-10-13

## 2021-10-12 RX ORDER — POLYETHYLENE GLYCOL 3350 17 G/17G
17 POWDER, FOR SOLUTION ORAL
Qty: 0 | Refills: 0 | DISCHARGE
Start: 2021-10-12

## 2021-10-12 RX ADMIN — Medication 100 MILLIGRAM(S): at 05:06

## 2021-10-12 RX ADMIN — PANTOPRAZOLE SODIUM 40 MILLIGRAM(S): 20 TABLET, DELAYED RELEASE ORAL at 05:06

## 2021-10-12 RX ADMIN — QUETIAPINE FUMARATE 25 MILLIGRAM(S): 200 TABLET, FILM COATED ORAL at 09:19

## 2021-10-12 RX ADMIN — Medication 40 MILLIGRAM(S): at 05:06

## 2021-10-12 RX ADMIN — QUETIAPINE FUMARATE 25 MILLIGRAM(S): 200 TABLET, FILM COATED ORAL at 13:38

## 2021-10-12 RX ADMIN — PANTOPRAZOLE SODIUM 40 MILLIGRAM(S): 20 TABLET, DELAYED RELEASE ORAL at 17:15

## 2021-10-12 RX ADMIN — Medication 500 MILLIGRAM(S): at 17:15

## 2021-10-12 RX ADMIN — SENNA PLUS 2 TABLET(S): 8.6 TABLET ORAL at 20:33

## 2021-10-12 RX ADMIN — RIVAROXABAN 15 MILLIGRAM(S): KIT at 17:15

## 2021-10-12 RX ADMIN — QUETIAPINE FUMARATE 50 MILLIGRAM(S): 200 TABLET, FILM COATED ORAL at 20:33

## 2021-10-12 RX ADMIN — Medication 1 TABLET(S): at 17:15

## 2021-10-12 RX ADMIN — Medication 125 MICROGRAM(S): at 05:06

## 2021-10-12 RX ADMIN — MIRTAZAPINE 15 MILLIGRAM(S): 45 TABLET, ORALLY DISINTEGRATING ORAL at 20:33

## 2021-10-12 RX ADMIN — POLYETHYLENE GLYCOL 3350 17 GRAM(S): 17 POWDER, FOR SOLUTION ORAL at 13:37

## 2021-10-12 RX ADMIN — Medication 1 PACKET(S): at 13:40

## 2021-10-12 NOTE — PROGRESS NOTE ADULT - PROBLEM SELECTOR PLAN 3
Cont with home lasix dose

## 2021-10-12 NOTE — DISCHARGE NOTE PROVIDER - CARE PROVIDER_API CALL
Adilson Archuleta)  Internal Medicine  [address] [place] [address]  Phone: [phone]  Fax: [phone]  Follow Up Time:    Adilson Archuleta)  Internal Medicine  [address] [place] [address]  Phone: [phone]  Fax: [phone]  Follow Up Time:     Neil Lawson ()  Cardiology; Internal Medicine  [address] [place] [address], [address]  [place] [address]  Phone: [phone]  Fax: [phone]  Follow Up Time:

## 2021-10-12 NOTE — DISCHARGE NOTE PROVIDER - NSDCCPCAREPLAN_GEN_ALL_CORE_FT
PRINCIPAL DISCHARGE DIAGNOSIS  Diagnosis: GI bleed  Assessment and Plan of Treatment: There are 2 common types of GI Bleed, Upper GI Bleed and Lower GI Bleed.  Upper GI Bleed affects the esophagus, stomach, and first part of the small intestine. Lower GI Bleed affects the colon and rectum.  Upper GI Bleed signs and symptoms to notify your Health Care Provider are vomiting blood, or coffee ground vomitus, and bowel movements that look like black tar.  Lower GI Bleed signs and symptoms to notify your health care provider are bright red bloody bowel movements.   Take your medications as prescribed by your Gastroenterologist.  Avoid NSAIDs unless your Health Care Provider tells you that it is ok (Aspirin, Ibuprofen, Advil, Motrin, Aleve).      SECONDARY DISCHARGE DIAGNOSES  Diagnosis: Chronic CHF  Assessment and Plan of Treatment: Take all medications as prescribed.  Weigh yourself daily.  If you gain 3lbs in 3 days, or 5lbs in a week call your Health Care Provider.  Eat a low sodium diet.  Call your Health Care Provider if you have any swelling or increased swelling in your feet, ankles, and/or stomach.  If you experience dizziness, chest pain, or shortness of breath, seek immediate medical attention.    Diagnosis: Chronic atrial fibrillation  Assessment and Plan of Treatment: Atrial fibrillation is a common heart rhythm problem which increases the risk of stroke and heat attack.  It helps if you control your blood pressure, avoid alcohol, cut down on caffeine, get treatment for your thyroid if it is overactive, and perform moderate exercise in consultation with your Primary Care Provider.  Call your doctor if you experience chest tightness/pain, lightheadedness, loss of consciousness, shortness of breath (especially with exercise), feel your heart racing or beating unusually, frequent or abnormal bleeding.  It is important to take all your heart medications as prescribed.     PRINCIPAL DISCHARGE DIAGNOSIS  Diagnosis: GI bleed  Assessment and Plan of Treatment: There are 2 common types of GI Bleed, Upper GI Bleed and Lower GI Bleed.  Upper GI Bleed affects the esophagus, stomach, and first part of the small intestine. Lower GI Bleed affects the colon and rectum.  Upper GI Bleed signs and symptoms to notify your Health Care Provider are vomiting blood, or coffee ground vomitus, and bowel movements that look like black tar.  Lower GI Bleed signs and symptoms to notify your health care provider are bright red bloody bowel movements.   Take your medications as prescribed by your Gastroenterologist.  Avoid NSAIDs unless your Health Care Provider tells you that it is ok (Aspirin, Ibuprofen, Advil, Motrin, Aleve).      SECONDARY DISCHARGE DIAGNOSES  Diagnosis: Chronic CHF  Assessment and Plan of Treatment: Take all medications as prescribed.  Weigh yourself daily.  If you gain 3lbs in 3 days, or 5lbs in a week call your Health Care Provider.  Eat a low sodium diet.  Call your Health Care Provider if you have any swelling or increased swelling in your feet, ankles, and/or stomach.  If you experience dizziness, chest pain, or shortness of breath, seek immediate medical attention.    Diagnosis: Chronic atrial fibrillation  Assessment and Plan of Treatment: Atrial fibrillation is a common heart rhythm problem which increases the risk of stroke and heat attack.  It helps if you control your blood pressure, avoid alcohol, cut down on caffeine, get treatment for your thyroid if it is overactive, and perform moderate exercise in consultation with your Primary Care Provider.  Call your doctor if you experience chest tightness/pain, lightheadedness, loss of consciousness, shortness of breath (especially with exercise), feel your heart racing or beating unusually, frequent or abnormal bleeding.  It is important to take all your heart medications as prescribed.    Diagnosis: Diabetes mellitus  Assessment and Plan of Treatment: Make sure you get your HgA1c checked every three months.  Check your blood glucose at least two times a day.  Keep a log of your blood glucose results and always take it with you to your doctor appointments.  Keep a list of your current medications including over the counter medications and bring this medication list with you to all your doctor appointments.  If you have not seen your ophthalmologist this year, call for appointment.  Check your feet daily for redness, sores, or openings.  Do not self treat.  If there is no improvement in two days, call your primary care physician for an appointment.

## 2021-10-12 NOTE — PROGRESS NOTE ADULT - SUBJECTIVE AND OBJECTIVE BOX
BENITA AMARO  89y Female  MRN:[id number]    Patient is a 89y old  Female who presents with a chief complaint of Hematochezia   GI Bleed (05 Oct 2021 19:35)    HPI:   88 y/o F   w/ h/o T2DM, Afib (on xarelto and metoprolol), diastolic CHF w no reported hx of GIB p/w bright red blood per rectum yesterday and fatigue, sob and cp today. reports bright blood stopped yesterday. no abd pain. no fever chills, no travels sick contacts. unsure last dose of xarelto was.  Hgb 5.4   (04 Oct 2021 08:53)      Patient seen and evaluated at bedside. No acute events overnight except as noted.    Interval HPI: no acute events o/n    PAST MEDICAL & SURGICAL HISTORY:  Chronic atrial fibrillation    Dementia    Chronic CHF    HTN (hypertension)    Dementia, senile with depression        REVIEW OF SYSTEMS:  as per hpi     SOC:  denies toxic habits    Fam Hx:  non cont    VITALS:   Vital Signs Last 24 Hrs  T(C): 36.5 (12 Oct 2021 11:03), Max: 36.8 (11 Oct 2021 17:23)  T(F): 97.7 (12 Oct 2021 11:03), Max: 98.2 (11 Oct 2021 17:23)  HR: 82 (12 Oct 2021 11:03) (73 - 87)  BP: 127/72 (12 Oct 2021 11:03) (101/72 - 127/72)  BP(mean): --  RR: 18 (12 Oct 2021 11:03) (18 - 26)  SpO2: 98% (12 Oct 2021 11:03) (95% - 99%)      PHYSICAL EXAM:  GENERAL: NAD, well-developed  HEAD:  Atraumatic, Normocephalic  EYES: EOMI, PERRLA, conjunctiva and sclera clear  NECK: Supple, No JVD  CHEST/LUNG: Clear to auscultation bilaterally; No wheeze  HEART: S1, S2; No murmurs, rubs, or gallops  ABDOMEN: Soft, Nontender, Nondistended; Bowel sounds present  EXTREMITIES:  2+ Peripheral Pulses, No clubbing, cyanosis, or edema  PSYCH: Normal affect  NEUROLOGY: AAOX3; non-focal  SKIN: No rashes or lesions    Consultant(s) Notes Reviewed:  [x ] YES  [ ] NO  Care Discussed with Consultants/Other Providers [ x] YES  [ ] NO    MEDS:   MEDICATIONS  (STANDING):  digoxin     Tablet 125 MICROGram(s) Oral daily  furosemide    Tablet 40 milliGRAM(s) Oral daily  influenza   Vaccine 0.5 milliLiter(s) IntraMuscular once  metoprolol succinate  milliGRAM(s) Oral daily  mirtazapine 15 milliGRAM(s) Oral at bedtime  pantoprazole  Injectable 40 milliGRAM(s) IV Push two times a day  polyethylene glycol 3350 17 Gram(s) Oral daily  psyllium Powder 1 Packet(s) Oral daily  QUEtiapine 25 milliGRAM(s) Oral <User Schedule>  QUEtiapine 50 milliGRAM(s) Oral <User Schedule>  rivaroxaban 15 milliGRAM(s) Oral daily  senna 2 Tablet(s) Oral at bedtime    MEDICATIONS  (PRN):  haloperidol     Tablet 1 milliGRAM(s) Oral every 4 hours PRN Agitation        ALLERGIES:  No Known Allergies      LABS:                                                    8.0    8.44  )-----------( 257      ( 12 Oct 2021 10:59 )             26.7    10-11    141  |  103  |  26<H>  ----------------------------<  164<H>  3.9   |  23  |  0.89    Ca    9.4      11 Oct 2021 10:25        < from: Flexible Sigmoidoscopy (10.11.21 @ 07:10) >  Impression:          - No masses detected                       - Exam consistent with resolved diverticular bleed  Recommendation:     - Use fiber, for example Citrucel, Fibercon, Konsyl or Metamucil.                       - No objection to resuming Xarelto and hospital discharge                       - Normal diet    < end of copied text >          < from: CT Abdomen and Pelvis w/ IV Cont (10.04.21 @ 20:59) >  IMPRESSION:  Evaluation of the distal sigmoid colon and rectum is limited secondary to streak artifact related to hip prosthesis; otherwise no evidence of active gastrointestinal bleed.    Questionable soft tissue prominence along the low rectum, which may be artifactual due to underdistention and streak artifact. Consider correlation with endoscopy.    Right renal lesions favored to represent hemorrhagic cysts. Questionable enhancement of a right lower pole lesion versus pseudoenhancement. Consider further evaluation with nonemergent renal ultrasound or contrast enhanced abdominal MRI as clinically indicated.    < end of copied text >

## 2021-10-12 NOTE — PROGRESS NOTE ADULT - SUBJECTIVE AND OBJECTIVE BOX
[facility] - PROGRESS NOTE    CHIEF COMPLAINT: dyspnea; anemia; obesity; kyphosis; hematochezia; lower GI bleed    INTERVAL HISTORY: s/p flexible sigmoidoscopy -> multiple small mouthed diverticula in the sigmoid colon with adherent blood clots - no active bleeding; no recurrent hematochezia; no anorexia or weight loss; no nausea, vomiting or abdominal pain; no constipation, diarrhea or change in the caliber of her stools; hemodynamically stable with a stable H/H following 3 transfusions; no cough, sputum production, hemoptysis, chest congestion or wheeze; no fevers, chills or sweats; no chest pain/pressure or palpitations; leg swelling -> SAUL stockings not placed    REVIEW OF SYSTEMS:  Constitutional: As per interval history  HEENT: Within normal limits  CV: As per interval history  Resp: As per interval history  GI: hematochezia -> resolved  : Within normal limits  Musculoskeletal: Within normal limits  Skin: Within normal limits  Neurological: dementia -> mild  Psychiatric: Within normal limits  Endocrine: Within normal limits  Hematologic/Lymphatic: Within normal limits  Allergic/Immunologic: Within normal limits      MEDICATIONS:     Pulmonary "    Anti-microbials:    Cardiovascular:  digoxin     Tablet 125 MICROGram(s) Oral daily  furosemide    Tablet 40 milliGRAM(s) Oral daily  metoprolol succinate  milliGRAM(s) Oral daily    Other:  influenza   Vaccine 0.5 milliLiter(s) IntraMuscular once  mirtazapine 15 milliGRAM(s) Oral at bedtime  pantoprazole  Injectable 40 milliGRAM(s) IV Push two times a day  polyethylene glycol 3350 17 Gram(s) Oral daily  psyllium Powder 1 Packet(s) Oral daily  QUEtiapine 25 milliGRAM(s) Oral <User Schedule>  QUEtiapine 50 milliGRAM(s) Oral <User Schedule>  rivaroxaban 15 milliGRAM(s) Oral daily  senna 2 Tablet(s) Oral at bedtime    MEDICATIONS  (PRN):  haloperidol     Tablet 1 milliGRAM(s) Oral every 4 hours PRN Agitation    OBJECTIVE:    I&O's Detail    11 Oct 2021 07:01  -  12 Oct 2021 07:00  --------------------------------------------------------  IN:    Oral Fluid: 180 mL  Total IN: 180 mL    OUT:    Voided (mL): 850 mL  Total OUT: 850 mL    Total NET: -670 mL    Daily Height in cm: 162.56 (11 Oct 2021 15:21)    Daily Weight in k.5 (12 Oct 2021 04:55)    PHYSICAL EXAM:       ICU Vital Signs Last 24 Hrs  T(C): 36.6 (12 Oct 2021 04:55), Max: 36.8 (11 Oct 2021 17:23)  T(F): 97.8 (12 Oct 2021 04:55), Max: 98.2 (11 Oct 2021 17:23)  HR: 87 (12 Oct 2021 04:55) (73 - 87)  BP: 116/75 (12 Oct 2021 04:55) (101/72 - 125/63)  BP(mean): --  ABP: --  ABP(mean): --  RR: 18 (12 Oct 2021 04:55) (18 - 26)  SpO2: 95% (12 Oct 2021 04:55) (95% - 99%) on room air     General: Awake. Alert. Cooperative. No distress. Appears stated age. Mildly obese. Sitting on the side of the bed.  HEENT: Atraumatic. Normocephalic. Anicteric. Normal oral mucosa. Edentulous. PERRL. EOMI.  Neck: Supple. Trachea midline. Thyroid without enlargement/tenderness/nodules. No carotid bruit. No JVD.	  Cardiovascular: Irregularly irregular rate and rhythm. S1 S2 normal. II/VI systolic murmur  Respiratory: Respirations unlabored. Clear to auscultation and percussion bilaterally. Kyphosis.  Abdomen: Soft. Non-tender. Non-distended. No organomegaly. No masses. Normal bowel sounds.  Extremities: Warm to touch. No clubbing or cyanosis. Moderate lower extremity edema up to the knee.  Pulses: 2+ peripheral pulses all extremities.	  Skin: Normal skin color. No rashes or lesions. No ecchymoses. No cyanosis. Warm to touch.  Lymph Nodes: Cervical, supraclavicular and axillary nodes normal  Neurological: Motor and sensory examination equal and normal. A and O x 2 - 3  Psychiatry: Appropriate mood and affect.    LABS:                          7.5    9.49  )-----------( 235      ( 12 Oct 2021 07:17 )             25.1     CBC    WBC  9.49 <==, 11.35 <==, 11.97 <==, 9.65 <==, 13.78 <==, 12.34 <==, 11.46 <==    Hemoglobin  7.5 <<==, 8.3 <<==, 8.2 <<==, 8.1 <<==, 8.5 <<==, 8.0 <<==, 8.0 <<==    Hematocrit  25.1 <==, 28.3 <==, 27.8 <==, 27.2 <==, 28.9 <==, 27.0 <==, 26.2 <==    Platelets  235 <==, 307 <==, 280 <==, 290 <==, 325 <==, 268 <==, 276 <==      141  |  103  |  26<H>  ----------------------------<  164<H>    10-11  3.9   |  23  |  0.89      LYTES    sodium  141 <==, 141 <==, 142 <==, 141 <==, 142 <==    potassium   3.9 <==, 3.4 <==, 3.7 <==, 3.4 <==, 4.0 <==    chloride  103 <==, 104 <==, 104 <==, 106 <==, 105 <==    carbon dioxide  23 <==, 21 <==, 21 <==, 21 <==, 21 <==    =============================================================================================  RENAL FUNCTION:    Creatinine:   0.89  <<==, 0.89  <<==, 1.16  <<==, 0.93  <<==, 0.84  <<==    BUN:   26 <==, 26 <==, 26 <==, 14 <==, 17 <==    ============================================================================================    calcium   9.4 <==, 9.1 <==, 9.2 <==, 8.8 <==, 9.1 <==    ============================================================================================  LFTs    AST:   13 <==     ALT:  10  <==     AP:  84  <=    Bili:  0.3  <=    PT/INR - ( 03 Oct 2021 19:10 )   PT: 22.6 sec;   INR: 1.94 ratio      < from: Transthoracic Echocardiogram (21 @ 09:31) >    Patient name: GUS AMARO  YOB: 1932   Age: 89 (F)   MR#: [id number]  Study Date: 2021  Location: [facility]-[id number]: Amy Mendez KHANG  Study quality: difficult due to patient being  Referring Physician: Neil Lawson MD  Blood Pressure: 103/67 mmHg  Height: 163 cm  Weight: 73 kg  BSA: 1.8 m2  ------------------------------------------------------------------------  PROCEDURE: Transthoracic echocardiogram with 2-D, M-Mode  and complete spectral and color flow Doppler.  INDICATION: Abnormal electrocardiogram (ECG) (EKG) (R94.31)  ------------------------------------------------------------------------  Dimensions:    Normal Values:  LA:     4.4    2.0 - 4.0 cm  Ao:     3.4    2.0 - 3.8 cm  SEPTUM:        0.6 - 1.2 cm  PWT:           0.6 - 1.1 cm  LVIDd:         3.0 - 5.6 cm  LVIDs:         1.8 - 4.0 cm  EF (Visual Estimate): 65 %  ------------------------------------------------------------------------  Observations:  Mitral Valve: Mitral annular calcification, otherwise  normal mitral valve. Mild mitral regurgitation.  Aortic Valve/Aorta: Aortic valve not well visualized;  appears calcified.  Aortic Root: 3.4 cm.  LVOT diameter: 1.8 cm.  Left Atrium: Mildly dilated left atrium.  LA volume index =  41 cc/m2.  Left Ventricle: Endocardium not well visualized; grossly  normal left ventricular systolic function. Increased  relative wall thickness with normal left ventricular mass  index, consistent with concentric left ventricular  remodeling.  Right Heart: Mild right atrial enlargement. The right  ventricle is not well visualized; grossly normal right  ventricular systolic function. Normal tricuspid valve.  Pulmonic valve not well visualized.  Pericardium/Pleura: Normal pericardium with no pericardial  effusion.  Hemodynamic: Estimated right atrial pressure is 8 mm Hg.  Estimated right ventricular systolic pressure equals 29 mm  Hg, assuming right atrial pressure equals 8 mm Hg,  consistent with normal pulmonary pressures.  ------------------------------------------------------------------------  Conclusions:  1. Mildly dilated left atrium.  LA volume index = 41 cc/m2.  2. Increased relative wall thickness with normal left  ventricular mass index, consistent with concentric left  ventricular remodeling.  3. Endocardium not well visualized; grossly normal left  ventricular systolic function.  4. Mild right atrial enlargement.  5. The right ventricle is not well visualized; grossly  normal right ventricular systolic function.  *** No previous Echo exam.  ------------------------------------------------------------------------  Confirmed on  2021 - 20:25:27 by EDEL Garcia  ------------------------------------------------------------------------    < end of copied text >  ---------------------------------------------------------------------------------------------------------------  MICROBIOLOGY:     COVID-19 PCR (10.03.21 @ 20:19)   COVID-19 PCR: [place]:      RADIOLOGY:  [x ] Chest radiographs reviewed and interpreted by me    < from: Xray Chest 1 View- PORTABLE-Urgent (10.03.21 @ 19:22) >    EXAM:  XR CHEST PORTABLE URGENT 1V                          PROCEDURE DATE:  10/03/2021      INTERPRETATION:  EXAMINATION: XR CHEST URGENT    CLINICAL INDICATION: Chest Pain    TECHNIQUE: Single frontal, portable view of the chest was obtained.    COMPARISON: CT chest 2021..    FINDINGS:  Cardiomegaly.  The lungs are clear.  There is no pneumothorax or pleural effusion.    IMPRESSION:  Clear lungs.    --- End of Report ---    SIDNEY VIDALES MD; Resident Radiologist  This document has been electronically signed.  EDMUND CARRILLO MD; Attending Radiologist  This document has been electronically signed. Oct  3 2021  8:25PM    < end of copied text >  ---------------------------------------------------------------------------------------------------------------  < from: CT Abdomen and Pelvis w/ IV Cont (10.04.21 @ 20:59) >    EXAM:  CT ABDOMEN AND PELVIS IC                          PROCEDURE DATE:  10/04/2021      INTERPRETATION:  CLINICAL INFORMATION: Bright red blood per rectum, fatigue, shortness of breath, chest pain. Evaluate for mass or active bleed.    COMPARISON: None.    CONTRAST/COMPLICATIONS:  IV Contrast: Omnipaque 350  111 cc administered 39 cc discarded  Oral Contrast: None  Complications: None reported at time of study completion    PROCEDURE:  CT of the Abdomen and Pelvis was performed.  Precontrast, Arterial and Delayed phases were performed.  Sagittal and coronal reformats were performed.    FINDINGS:  LOWER CHEST: Cardiomegaly. Aortic valve and mitral annular calcification.    LIVER: Multiple hepatic cysts and additional subcentimeter hypodensities that are too small to characterize.  BILE DUCTS: Normal caliber.  GALLBLADDER: Within normal limits.  SPLEEN: Normal size. A 7 mm focus of hypoattenuation (series 4 image 21) is too small to characterize.  PANCREAS: Within normal limits.  ADRENALS: Within normal limits.  KIDNEYS/URETERS: Bilateral renal cysts and additional subcentimeter hypodensities too small to characterize. There is an exophytic 1.5 cm hyperattenuating lesion in the upper pole of the right kidney (series 2 image 18) without definite enhancement and precontrast density of 77 HU,, likely representing a hemorrhagic cyst. Indeterminate exophytic right lower pole lesion measuring 2.1 cm (series 2 image 33) with questionable mild enhancement versus pseudoenhancement.    BLADDER: Evaluation is limited by streak artifact. Layering hyperdensity likely represents contrast.  REPRODUCTIVE ORGANS: Evaluation is limited by streak artifact. Hysterectomy.    BOWEL: No bowel obstruction. Evaluation of the distal sigmoid colon and rectum is limited secondary to streak artifact; otherwise no evidence of GI bleed. Focal apparent soft tissue prominence along the right aspect of the low rectum (series 4 image 95), which may be artifactual due to underdistention and partial obscuration by streak artifact. Colonic diverticula.  PERITONEUM: No ascites.  VESSELS: Atherosclerotic changes.  RETROPERITONEUM/LYMPH NODES: No lymphadenopathy.  ABDOMINAL WALL: Tiny fat-containing umbilical hernia.  BONES: Right hip total arthroplasty with associated streak artifact obscuring adjacent tissue. Right iliopsoas atrophy. Degenerative changes.    IMPRESSION:  Evaluation of the distal sigmoid colon and rectum is limited secondary to streak artifact related to hip prosthesis; otherwise no evidence of active gastrointestinal bleed.    Questionable soft tissue prominence along the low rectum, which may be artifactual due to underdistention and streak artifact. Consider correlation with endoscopy.    Right renal lesions favored to represent hemorrhagic cysts. Questionable enhancement of a right lower pole lesion versus pseudoenhancement. Consider further evaluation with nonemergent renal ultrasound or contrast enhanced abdominal MRI as clinically indicated.    --- End of Report ---    ARLENE LOVING MD; Resident Radiology  This document has been electronically signed.  AILYN KELLEY MD; Attending Radiologist  This document has been electronically signed. Oct  5 2021 11:01AM    < end of copied text >  ---------------------------------------------------------------------------------------------------------------

## 2021-10-12 NOTE — PROGRESS NOTE ADULT - SUBJECTIVE AND OBJECTIVE BOX
Subjective: Patient seen and examined. No new events except as noted.     REVIEW OF SYSTEMS:    CONSTITUTIONAL: + weakness, fevers or chills  EYES/ENT: No visual changes;  No vertigo or throat pain   NECK: No pain or stiffness  RESPIRATORY: No cough, wheezing, hemoptysis; No shortness of breath  CARDIOVASCULAR: No chest pain or palpitations  GASTROINTESTINAL: No abdominal or epigastric pain. No nausea, vomiting, or hematemesis; No diarrhea or constipation. No melena or hematochezia.  GENITOURINARY: No dysuria, frequency or hematuria  NEUROLOGICAL: No numbness or weakness  SKIN: No itching, burning, rashes, or lesions   All other review of systems is negative unless indicated above.    MEDICATIONS:  MEDICATIONS  (STANDING):  digoxin     Tablet 125 MICROGram(s) Oral daily  furosemide    Tablet 40 milliGRAM(s) Oral daily  influenza   Vaccine 0.5 milliLiter(s) IntraMuscular once  metoprolol succinate  milliGRAM(s) Oral daily  mirtazapine 15 milliGRAM(s) Oral at bedtime  pantoprazole  Injectable 40 milliGRAM(s) IV Push two times a day  polyethylene glycol 3350 17 Gram(s) Oral daily  psyllium Powder 1 Packet(s) Oral daily  QUEtiapine 25 milliGRAM(s) Oral <User Schedule>  QUEtiapine 50 milliGRAM(s) Oral <User Schedule>  rivaroxaban 15 milliGRAM(s) Oral daily  senna 2 Tablet(s) Oral at bedtime      PHYSICAL EXAM:  T(C): 36.5 (10-12-21 @ 11:03), Max: 36.8 (10-11-21 @ 17:23)  HR: 82 (10-12-21 @ 11:03) (73 - 87)  BP: 127/72 (10-12-21 @ 11:03) (101/72 - 127/72)  RR: 18 (10-12-21 @ 11:03) (18 - 26)  SpO2: 98% (10-12-21 @ 11:03) (95% - 99%)  Wt(kg): --  I&O's Summary    11 Oct 2021 07:01  -  12 Oct 2021 07:00  --------------------------------------------------------  IN: 180 mL / OUT: 850 mL / NET: -670 mL      Height (cm): 162.6 (10-11 @ 15:21)  Weight (kg): 63.5 (10-11 @ 15:21)  BMI (kg/m2): 24 (10-11 @ 15:21)  BSA (m2): 1.68 (10-11 @ 15:21)    Appearance: Normal	  HEENT:   Normal oral mucosa, PERRL, EOMI	  Lymphatic: No lymphadenopathy , no edema  Cardiovascular: Normal S1 S2, No JVD, No murmurs , Peripheral pulses palpable 2+ bilaterally  Respiratory: Lungs clear to auscultation, normal effort 	  Gastrointestinal:  Soft, Non-tender, + BS	  Skin: No rashes, No ecchymoses, No cyanosis, warm to touch  Musculoskeletal: Normal range of motion, normal strength  Psychiatry:  Mood & affect appropriate  Ext: No edema      LABS:    CARDIAC MARKERS:                                8.0    8.44  )-----------( 257      ( 12 Oct 2021 10:59 )             26.7     10-11    141  |  103  |  26<H>  ----------------------------<  164<H>  3.9   |  23  |  0.89    Ca    9.4      11 Oct 2021 10:25      TELEMETRY: 	  AF  ECG:  	  RADIOLOGY:   DIAGNOSTIC TESTING:  [ ] Echocardiogram:  [ ]  Catheterization:  [ ] Stress Test:    OTHER: 	    < from: Flexible Sigmoidoscopy (10.11.21 @ 07:10) >    [facility]  ____________________________________________________________________________________________________  Patient Name: Neetu Saab                    MRN: [id number]  Account Number: [id number]                     YOB: 1932  Room: Endoscopy Room 1                           Gender: Female  Attending MD: Adilson Archuleta ,                 Procedure Date No Time: 10/11/2021  ____________________________________________________________________________________________________     Procedure:           Flexible Sigmoidoscopy  Indications:         Hematochezia, CT suggestive of rectal mass  Providers:           Adilson Archuleta  Medicines:           None  Complications:       No immediate complications.  ____________________________________________________________________________________________________  Procedure:           Pre-Anesthesia Assessment:                       - Monitored anesthesia care under the supervision of an anesthesiologist was                        determined to be medically necessary for this procedure based on review of                        the patient's medical history, medications, and prior anesthesia history.                       After obtaining informed consent, the endoscope was passed under direct                        vision. Throughout the procedure, the patient's blood pressure, pulse, and                        oxygen saturations were monitored continuously. The was introduced through          the anus and advanced to the descending colon. The flexible sigmoidoscopy was                        accomplished without difficulty. The patient tolerated the procedure well.                        The quality of the bowel preparation was adequate.                                                                                                        Findings:       Multiple small-mouthed diverticula were found in the sigmoid colon with adherent old blood.       The exam was otherwise without abnormality.                                                                                                        Impression:          - No masses detected                       - Exam consistent with resolved diverticular bleed  Recommendation:     - Use fiber, for example Citrucel, Fibercon, Konsyl or Metamucil.                       - No objection to resuming Xarelto and hospital discharge                       - Normal diet                                         Attending Participation:       I personally performed the entire procedure.                                                                                                          __________________  Adilson Archuleta,   10/11/2021 4:14:56 PM  Number of Addenda: 0    Note Initiated On: 10/11/2021 7:10 AM    < end of copied text >

## 2021-10-12 NOTE — DISCHARGE NOTE PROVIDER - NSDCFUADDAPPT_GEN_ALL_CORE_FT
Follow up with your PMD, Cardiologist, and Gastroenterologist as an outpatient.   Follow up with Gastroenterologist within 2 weeks of discharge.

## 2021-10-12 NOTE — PROGRESS NOTE ADULT - ASSESSMENT
89 yo female h/o DM, afib on eliquis, CHF, here with gi bleed    gi bleed with acute blood loss anemia  s/p prbc transfusion  gi consulted  CT noted  s/p flex sig. results noted  likely resolved diverticular bleed     afib  AC with eliquis   metoprolol for rate control  tele monitor    CHF  chronic diastolic  stable  resume home lasix     PAS  PT    dc planning    Advanced care planning was discussed with patient and family.  Advanced care planning forms were reviewed and discussed as appropriate.  Differential diagnosis and plan of care discussed with patient after the evaluation.   Pain assessed and judicious use of narcotics when appropriate was discussed.  Importance of Fall prevention discussed.  Counseling on Smoking and Alcohol cessation was offered when appropriate.  Counseling on Diet, exercise, and medication compliance was done.     Approx 60 minutes spent. 89 yo female h/o DM, afib on AC, CHF, here with gi bleed    gi bleed with acute blood loss anemia  s/p prbc transfusion  gi consulted  CT noted  s/p flex sig. results noted  likely resolved diverticular bleed     afib  AC with xarelto as per cards   metoprolol for rate control  tele monitor    CHF  chronic diastolic  stable  resume home lasix     PAS  PT    dc planning    Advanced care planning was discussed with patient and family.  Advanced care planning forms were reviewed and discussed as appropriate.  Differential diagnosis and plan of care discussed with patient after the evaluation.   Pain assessed and judicious use of narcotics when appropriate was discussed.  Importance of Fall prevention discussed.  Counseling on Smoking and Alcohol cessation was offered when appropriate.  Counseling on Diet, exercise, and medication compliance was done.     Approx 60 minutes spent.

## 2021-10-12 NOTE — PROGRESS NOTE ADULT - PROBLEM SELECTOR PLAN 2
HR better   Cont with metoprolol  digoxin 0.125 mg daily
Cont with metoprolol  Add digoxin 0.125 mg daily
HR better   Cont with metoprolol  digoxin 0.125 mg daily
Cont with metoprolol
HR better   Cont with metoprolol  digoxin 0.125 mg daily
rate controlled

## 2021-10-12 NOTE — PROGRESS NOTE ADULT - PROBLEM SELECTOR PLAN 1
s/p PRBC transfusion   repeat to Hgb > 8   GI consulted. Conservative management   Hold Xarelto for now. If Hgb remains stable, restart tomorrow
s/p PRBC transfusion   repeat to Hgb > 8   GI consulted. Plan for sigmoidoscopy monday. Acceptable cardiac risk to proceed  Hgb remains stable, xarelto on hold
s/p PRBC transfusion   repeat to Hgb > 8   GI consulted. Plan for sigmoidoscopy today. Acceptable cardiac risk to proceed  Hgb remains stable, xarelto on hold
s/p PRBC transfusion   repeat to Hgb > 8   GI consulted. To discuss CT findings with family   Hgb remains stable, started xarelto
s/p PRBC transfusion   repeat to Hgb > 8   s/p sigmoidoscopy , no masses  Hgb remains stable, restarted xarelto   DC palnning
s/p PRBC transfusion   repeat to Hgb > 8   GI consulted. Plan for sigmoidoscopy monday. Acceptable cardiac risk to proceed  Hgb remains stable, xarelto on hold
s/p PRBC transfusion   repeat to Hgb > 8   GI consulted. Conservative management   Hgb remains stable, start eliquis
s/p PRBC transfusion   repeat to Hgb > 8   GI consulted   Hold Xarelto for now

## 2021-10-12 NOTE — DISCHARGE NOTE PROVIDER - HOSPITAL COURSE
89 year-old female with PMH of T2DM, Afib (on Eliquis and metoprolol) and diastolic CHF, presented with bright red blood per rectum a/w fatigue, CP, and SOB.  Initial hemoglobin was 5.4, responded appropriately s/p 3 units PRBC.  CT A/P was limited dure to artifact, however, no evidence of active gastrointestinal bleed and questionable soft tissue prominence along the low rectum possibly due to underdistention vs. streak artifcact.  Patient was evaluated by Gastroenterology, with patient and family refusing colonoscopy.  Patient underwent a flexible sigmoidoscopy which revealed no masses; findings, were consistent with resolved diverticular bleed.  GI recommended fiber supplementation and cleared patient to resume AC.  Xarelto was resumed on 10/11 for chronic atrial fibrillation.  Hemoglobin on 10/12 was.  CT A/P also notable for right renal lesions, possibly hemorrhagic cysts, and questionable enhancement of a right lower pole lesion versus pseudoenhancement.  Patient has been cleared by Gastroenterology and attending for discharge, will be returning home with homecare.  Patient to follow up with Gastroenterology within 2 weeks, and Primary Care within 1 week.

## 2021-10-12 NOTE — DISCHARGE NOTE PROVIDER - NSDCMRMEDTOKEN_GEN_ALL_CORE_FT
ascorbic acid 500 mg oral tablet: 1 tab(s) orally once a day  digoxin 125 mcg (0.125 mg) oral tablet: 1 tab(s) orally once a day  Januvia 100 mg oral tablet: 1 tab(s) orally once a day  Lasix 40 mg oral tablet: 1 tab(s) orally once a day  metoprolol succinate 100 mg oral tablet, extended release: 1 tab(s) orally once a day  Multiple Vitamins oral tablet: 1 tab(s) orally once a day  pantoprazole 40 mg oral delayed release tablet: 1 tab(s) orally once a day  polyethylene glycol 3350 oral powder for reconstitution: 17 gram(s) orally once a day  potassium chloride 20 mEq oral tablet, extended release: 1 tab(s) orally once a day  psyllium 3.4 g/7 g oral powder for reconstitution: 1 dose(s) orally once a day  QUEtiapine 25 mg oral tablet: 1 tab(s) orally 2 times a day  senna oral tablet: 2 tab(s) orally once a day (at bedtime)   ascorbic acid 500 mg oral tablet: 1 tab(s) orally once a day  digoxin 125 mcg (0.125 mg) oral tablet: 1 tab(s) orally once a day  Januvia 100 mg oral tablet: 1 tab(s) orally once a day  Lasix 40 mg oral tablet: 1 tab(s) orally once a day  metoprolol succinate 100 mg oral tablet, extended release: 1 tab(s) orally once a day  mirtazapine 15 mg oral tablet: 1 tab(s) orally once a day (at bedtime)  Multiple Vitamins oral tablet: 1 tab(s) orally once a day  pantoprazole 40 mg oral delayed release tablet: 1 tab(s) orally 2 times a day  polyethylene glycol 3350 oral powder for reconstitution: 17 gram(s) orally once a day  potassium chloride 20 mEq oral tablet, extended release: 1 tab(s) orally once a day  psyllium 3.4 g/7 g oral powder for reconstitution: 1 dose(s) orally once a day  QUEtiapine 25 mg oral tablet: 1 tab(s) orally 2 times a day at 8am and 2pm  QUEtiapine 50 mg oral tablet: 1 tab(s) orally once a day at 7pm  rivaroxaban 15 mg oral tablet: 1 tab(s) orally once a day  senna oral tablet: 2 tab(s) orally once a day (at bedtime)

## 2021-10-12 NOTE — DISCHARGE NOTE PROVIDER - NSDCFUADDINST_GEN_ALL_CORE_FT
Follow up with Dr. Contreras within 1 week.  Follow up with Gastroenterologist Dr. Adilson Archuleta within 2 weeks. Follow up with Dr. Contreras within 1 week.  Follow up with Gastroenterologist Dr. Adilson Archuleta within 2 weeks.  Follow up with Dr. Lawson within 2 weeks.

## 2021-10-12 NOTE — DISCHARGE NOTE PROVIDER - PROVIDER TOKENS
PROVIDER:[TOKEN:[[id number]]] PROVIDER:[TOKEN:[[id number]]],PROVIDER:[TOKEN:[[id number]]]

## 2021-10-12 NOTE — PROGRESS NOTE ADULT - ASSESSMENT
ASSESSMENT:    lower GI bleed due to diverticulosis - remains hemodynamically stable with a stable H/H following transfusion of 3 units of PRBCs     dyspnea - multifactorial -> resolved  1) decreased oxygen carrying capacity due to profound anemia  2) restrictive lung disease due to obesity limiting diaphragmatic excursion and kyphosis limiting chest wall excursion  3) unlikely to have a pulmonary embolism on full dose A/C  4) no evidence of pulmonary edema or pleural effusions despite underlying cardiac disease following IV fluids and PRBCs    PLAN/RECOMMENDATIONS:    stable oxygenation on room air  observe off systemic steroids, antibiotics and pulmonary medications  incentive spirometry  cardiology follow-up noted  cardiac meds: digoxin/toprol XL/lasix - restarted on Xarelto for atrial fibrillation  follow hemodynamics and H/H  remeron/seroquel/haldol as needed  bowel regimen  GI follow-up noted  SAUL stockings    Will follow with you. Plan of care discussed with the patient and her daughter at bedside.     Nikunj Whitaker MD, [facility]  [phone]  Pulmonary Medicine

## 2021-10-12 NOTE — PROGRESS NOTE ADULT - PROBLEM SELECTOR PLAN 4
aspiration precautions   cont with meds

## 2021-10-12 NOTE — PROGRESS NOTE ADULT - TIME BILLING
Advanced care planning was discussed with patient and family.  Advanced care planning forms were reviewed and discussed as appropriate.  Differential diagnosis and plan of care discussed with patient after the evaluation.   Pain assessed and judicious use of narcotics when appropriate was discussed.  Importance of Fall prevention discussed.  Counseling on Smoking and Alcohol cessation was offered when appropriate.  Counseling on Diet, exercise, and medication compliance was done.

## 2021-10-12 NOTE — CHART NOTE - NSCHARTNOTEFT_GEN_A_CORE
Nutrition Follow Up Note  Patient seen for: follow up visit.    Chart reviewed, events noted.  Pt NPO after midnight on 10/10/21 for endoscopy procedure. PO diet resumed on 10/11/21.  Source: [x] Patient       [x] EMR        [] RN        [] Family at bedside       [] Other:    -If unable to interview patient: [] Trach/Vent/BiPAP  [] Disoriented/confused/inappropriate to interview    Diet Order: Consistent Carbohydrate DASH diet    - Is current order appropriate/adequate? [] Yes  [x]  No: pt blood pressure normal to low, recommend d/c DASH diet and add low sodium.    - PO intake :   [] >75%  Adequate    [x] 50-75%  Fair       [] <50%  Poor    - Nutrition-related concerns: Pt poor historian, slightly confused at time of visit. Reports not eating yesterday because "was sick", NPO status per chart due to procedure. Pt reports persistent poor appetite, eats 25-75% of trays, most of lunch tray consumed at time of visit. Pt taking and tolerating Rasheed supplement. Noted dislike of food, pt preferences taken and noted on CBORD. Pt reports no nausea or vomiting, but "has difficulties" with diarrhea/constipation, unable to clarify, currently on bowel regimen. Pt wears dentures but denies difficulty chewing.    GI:  Last BM 10/11.   Bowel Regimen? [X] Yes   [] No  Senna, Miralax, 1x tap water enema    Weights:   Daily Weight in k.5 (10-12), Weight in k.1 (10-11), Weight in k.9 (10-10), Weight in k.1 (10-09), Weight in k.1 (10-08), Weight in k.9 (10-07), Weight in k.1 (10-06)    Nutritionally Pertinent Medications: rivaroxaban, digoxin, furosemide, metoprolol, pantoprazole, mirtazapine, quetipine    Labs: 10/12/21 BUN 26mg/dL <H>, BG 164mg/dL <H>     Skin per nursing documentation:   Edema: 1+ b/l edema present in feet/ankles    Estimated Needs:   [x] no change since previous assessment  [] recalculated:     Previous Nutrition Diagnosis:   Nutrition Diagnosis is: [x] ongoing  [] resolved [] not applicable     New Nutrition Diagnosis: Inadequate oral intake related to decreased ability to consume sufficient energy do to lack of appetite as evidenced by pt report and chart note of 25-50% of intake for  >1 week.   [] Not applicable    Nutrition Care Plan:  [] In Progress  [x] Achieved  [] Not applicable    Nutrition Interventions:  Recommend liberalized diet to Consistent Carbohydrate Low Sodium. Obtained patient dietary preferences. Encouraged continued Rasheed supplementation for wound healing.      Education Provided:       [] Yes:  [x] No: Attempted to reinforce/ teach back heart healthy consistent carbohydrate diet, but patient was not receptive due to altered mental status.       Recommendations:         [] Continue current diet order            [] Add oral nutrition supplement:     [x] Discontinue current diet order. Recommend: Liberalize diet to Consistent Carbohydrate Low Sodium, continue Rasheed supplement 2x daily     [x] Add micronutrient supplementation: Add multivitamin, vitamin c daily per previous dietitian request for wound healing     [] Continue current micronutrient supplementation:      [] Other:     Monitoring and Evaluation:   Continue to monitor nutritional intake, tolerance to diet prescription, weights, labs, skin integrity.      RD remains available upon request and will follow up per protocol Nutrition Follow Up Note  Patient seen for: follow up visit.    Chart reviewed, events noted.  Pt NPO after midnight on 10/10/21 for endoscopy procedure. PO diet resumed on 10/11/21.  Source: [x] Patient       [x] EMR        [] RN        [] Family at bedside       [] Other:    -If unable to interview patient: [] Trach/Vent/BiPAP  [] Disoriented/confused/inappropriate to interview    Diet Order: Consistent Carbohydrate DASH diet    - Is current order appropriate/adequate? [] Yes  [x]  No: pt blood pressure normal to low, recommend d/c DASH diet and add low sodium to encourage PO intake.    - PO intake :   [] >75%  Adequate    [x] 50-75%  Fair       [] <50%  Poor    - Nutrition-related concerns: Pt poor historian, slightly confused at time of visit. Reports not eating yesterday because "was sick", NPO status per chart due to procedure. Pt reports persistent poor appetite, eats 25-75% of trays as per flow sheet, most of lunch tray consumed at time of visit. Pt taking and tolerating Rasheed supplement. Noted dislike of food, pt preferences taken and noted on CBORD. Pt reports no nausea or vomiting, but "has difficulties" with diarrhea/constipation, unable to clarify, currently on bowel regimen. Pt wears dentures but denies difficulty chewing.    GI:  Last BM 10/11.   Bowel Regimen? [X] Yes   [] No  Senna, Miralax, 1x tap water enema    Weights:   Daily Weight in k.5 (10-12), Weight in k.1 (10-11), Weight in k.9 (10-10), Weight in k.1 (10-09), Weight in k.1 (10-08), Weight in k.9 (10-07), Weight in k.1 (10-06)    Nutritionally Pertinent Medications: rivaroxaban, digoxin, furosemide, metoprolol, pantoprazole, mirtazapine, quetipine    Labs: 10/12/21 BUN 26mg/dL <H>, BG 164mg/dL <H>     Skin per nursing documentation:   Edema: 1+ b/l edema present in feet/ankles    Estimated Needs:   [x] no change since previous assessment  [] recalculated:     Previous Nutrition Diagnosis: Food and nutrition relate knowledge deficit related to lack of previous education as evidenced by pt verbalizing no knowledge of diet.  Nutrition Diagnosis is: [x] ongoing  [] resolved [] not applicable     New Nutrition Diagnosis: Inadequate oral intake related to decreased ability to consume sufficient energy do to lack of appetite as evidenced by pt report and chart note of 25-50% of intake for  >1 week.   [] Not applicable    Nutrition Care Plan:  [] In Progress  [x] Achieved  [] Not applicable    Nutrition Interventions:  Recommend liberalized diet to Consistent Carbohydrate Low Sodium. Obtained patient dietary preferences. Encouraged continued Rasheed 2x daily supplementation for pressure injury healing.      Education Provided:       [] Yes:  [x] No: Attempted to reinforce/ teach back heart healthy consistent carbohydrate diet, but patient was not receptive due to altered mental status.       Recommendations:         [] Continue current diet order            [] Add oral nutrition supplement:     [x] Discontinue current diet order. Recommend: Liberalize diet to Consistent Carbohydrate Low Sodium, continue Rasheed supplement 2x daily     [x] Add micronutrient supplementation: Add multivitamin, vitamin c daily per previous dietitian request for pressure injury healing     [] Continue current micronutrient supplementation:      [] Other:     Monitoring and Evaluation:   Continue to monitor nutritional intake, tolerance to diet prescription, weights, labs, skin integrity.      RD remains available upon request and will follow up per protocol

## 2021-10-13 ENCOUNTER — TRANSCRIPTION ENCOUNTER (OUTPATIENT)
Age: 86
End: 2021-10-13

## 2021-10-13 VITALS
DIASTOLIC BLOOD PRESSURE: 46 MMHG | RESPIRATION RATE: 18 BRPM | TEMPERATURE: 98 F | SYSTOLIC BLOOD PRESSURE: 98 MMHG | HEART RATE: 77 BPM | OXYGEN SATURATION: 97 %

## 2021-10-13 RX ADMIN — Medication 1 TABLET(S): at 13:07

## 2021-10-13 RX ADMIN — Medication 100 MILLIGRAM(S): at 05:25

## 2021-10-13 RX ADMIN — Medication 125 MICROGRAM(S): at 05:25

## 2021-10-13 RX ADMIN — RIVAROXABAN 15 MILLIGRAM(S): KIT at 13:07

## 2021-10-13 RX ADMIN — Medication 1 PACKET(S): at 13:06

## 2021-10-13 RX ADMIN — HALOPERIDOL DECANOATE 1 MILLIGRAM(S): 100 INJECTION INTRAMUSCULAR at 00:17

## 2021-10-13 RX ADMIN — Medication 500 MILLIGRAM(S): at 13:07

## 2021-10-13 RX ADMIN — POLYETHYLENE GLYCOL 3350 17 GRAM(S): 17 POWDER, FOR SOLUTION ORAL at 13:07

## 2021-10-13 RX ADMIN — QUETIAPINE FUMARATE 25 MILLIGRAM(S): 200 TABLET, FILM COATED ORAL at 08:16

## 2021-10-13 RX ADMIN — QUETIAPINE FUMARATE 25 MILLIGRAM(S): 200 TABLET, FILM COATED ORAL at 13:09

## 2021-10-13 RX ADMIN — PANTOPRAZOLE SODIUM 40 MILLIGRAM(S): 20 TABLET, DELAYED RELEASE ORAL at 05:25

## 2021-10-13 RX ADMIN — Medication 40 MILLIGRAM(S): at 05:26

## 2021-10-13 NOTE — PROGRESS NOTE ADULT - ASSESSMENT
89 year old female with lower GI bleeding    1. Lower GI bleed, flex sig suggestive of resolved diverticular bleed    2. Afib on a/c holding for now    3. Diastolic CHF

## 2021-10-13 NOTE — PROGRESS NOTE ADULT - SUBJECTIVE AND OBJECTIVE BOX
BENITA AMARO  89y Female  MRN:[id number]    Patient is a 89y old  Female who presents with a chief complaint of Hematochezia   GI Bleed (05 Oct 2021 19:35)    HPI:   88 y/o F   w/ h/o T2DM, Afib (on xarelto and metoprolol), diastolic CHF w no reported hx of GIB p/w bright red blood per rectum yesterday and fatigue, sob and cp today. reports bright blood stopped yesterday. no abd pain. no fever chills, no travels sick contacts. unsure last dose of xarelto was.  Hgb 5.4   (04 Oct 2021 08:53)      Patient seen and evaluated at bedside. No acute events overnight except as noted.    Interval HPI: no acute events o/n    PAST MEDICAL & SURGICAL HISTORY:  Chronic atrial fibrillation    Dementia    Chronic CHF    HTN (hypertension)    Dementia, senile with depression        REVIEW OF SYSTEMS:  as per hpi     SOC:  denies toxic habits    Fam Hx:  non cont    VITALS:   Vital Signs Last 24 Hrs  T(C): 36.4 (13 Oct 2021 12:13), Max: 36.7 (13 Oct 2021 04:59)  T(F): 97.6 (13 Oct 2021 12:13), Max: 98 (13 Oct 2021 04:59)  HR: 77 (13 Oct 2021 12:13) (77 - 86)  BP: 98/46 (13 Oct 2021 12:13) (98/46 - 115/72)  BP(mean): --  RR: 18 (13 Oct 2021 12:13) (18 - 18)  SpO2: 97% (13 Oct 2021 12:13) (96% - 97%)      PHYSICAL EXAM:  GENERAL: NAD, well-developed  HEAD:  Atraumatic, Normocephalic  EYES: EOMI, PERRLA, conjunctiva and sclera clear  NECK: Supple, No JVD  CHEST/LUNG: Clear to auscultation bilaterally; No wheeze  HEART: S1, S2; No murmurs, rubs, or gallops  ABDOMEN: Soft, Nontender, Nondistended; Bowel sounds present  EXTREMITIES:  2+ Peripheral Pulses, No clubbing, cyanosis, or edema  PSYCH: Normal affect  NEUROLOGY: AAOX3; non-focal  SKIN: No rashes or lesions    Consultant(s) Notes Reviewed:  [x ] YES  [ ] NO  Care Discussed with Consultants/Other Providers [ x] YES  [ ] NO    MEDS:   MEDICATIONS  (STANDING):  ascorbic acid 500 milliGRAM(s) Oral daily  digoxin     Tablet 125 MICROGram(s) Oral daily  furosemide    Tablet 40 milliGRAM(s) Oral daily  influenza   Vaccine 0.5 milliLiter(s) IntraMuscular once  metoprolol succinate  milliGRAM(s) Oral daily  mirtazapine 15 milliGRAM(s) Oral at bedtime  multivitamin 1 Tablet(s) Oral daily  pantoprazole    Tablet 40 milliGRAM(s) Oral every 12 hours  polyethylene glycol 3350 17 Gram(s) Oral daily  psyllium Powder 1 Packet(s) Oral daily  QUEtiapine 50 milliGRAM(s) Oral <User Schedule>  QUEtiapine 25 milliGRAM(s) Oral <User Schedule>  rivaroxaban 15 milliGRAM(s) Oral daily  senna 2 Tablet(s) Oral at bedtime    MEDICATIONS  (PRN):  haloperidol     Tablet 1 milliGRAM(s) Oral every 4 hours PRN Agitation      ALLERGIES:  No Known Allergies      LABS:                                                   8.0    8.44  )-----------( 257      ( 12 Oct 2021 10:59 )             26.7        < from: Flexible Sigmoidoscopy (10.11.21 @ 07:10) >  Impression:          - No masses detected                       - Exam consistent with resolved diverticular bleed  Recommendation:     - Use fiber, for example Citrucel, Fibercon, Konsyl or Metamucil.                       - No objection to resuming Xarelto and hospital discharge                       - Normal diet    < end of copied text >          < from: CT Abdomen and Pelvis w/ IV Cont (10.04.21 @ 20:59) >  IMPRESSION:  Evaluation of the distal sigmoid colon and rectum is limited secondary to streak artifact related to hip prosthesis; otherwise no evidence of active gastrointestinal bleed.    Questionable soft tissue prominence along the low rectum, which may be artifactual due to underdistention and streak artifact. Consider correlation with endoscopy.    Right renal lesions favored to represent hemorrhagic cysts. Questionable enhancement of a right lower pole lesion versus pseudoenhancement. Consider further evaluation with nonemergent renal ultrasound or contrast enhanced abdominal MRI as clinically indicated.    < end of copied text >

## 2021-10-13 NOTE — PROGRESS NOTE ADULT - PROVIDER SPECIALTY LIST ADULT
Gastroenterology
Internal Medicine
Gastroenterology
Pulmonology
Gastroenterology
Internal Medicine
Cardiology
Internal Medicine
Cardiology

## 2021-10-13 NOTE — PROGRESS NOTE ADULT - ASSESSMENT
ASSESSMENT:    lower GI bleed due to diverticulosis - remains hemodynamically stable with a stable H/H following transfusion of 3 units of PRBCs     dyspnea - multifactorial -> resolved  1) decreased oxygen carrying capacity due to profound anemia  2) restrictive lung disease due to obesity limiting diaphragmatic excursion and kyphosis limiting chest wall excursion  3) unlikely to have a pulmonary embolism on full dose A/C  4) no evidence of pulmonary edema or pleural effusions despite underlying cardiac disease following IV fluids and PRBCs    PLAN/RECOMMENDATIONS:    stable oxygenation on room air  observe off systemic steroids, antibiotics and pulmonary medications  incentive spirometry  cardiology follow-up noted  cardiac meds: digoxin/toprol XL/lasix - on Xarelto for atrial fibrillation  following hemodynamics and H/H  remeron/seroquel/haldol as needed  bowel regimen  GI follow-up noted  SAUL stockings    Will follow with you. Plan of care discussed with the patient and her daughter at bedside. No pulmonary objection to discharge. She will follow-up in the office as needed.    Nikunj Whitaker MD, [facility]  [phone]  Pulmonary Medicine

## 2021-10-13 NOTE — PROGRESS NOTE ADULT - ASSESSMENT
89 yo female h/o DM, afib on AC, CHF, here with gi bleed    gi bleed with acute blood loss anemia  s/p prbc transfusion  gi consulted  CT noted  s/p flex sig. results noted  likely resolved diverticular bleed     afib  AC with xarelto as per cards   metoprolol for rate control  tele monitor    CHF  chronic diastolic  stable  resume home lasix     PAS  PT    dc planning    Advanced care planning was discussed with patient and family.  Advanced care planning forms were reviewed and discussed as appropriate.  Differential diagnosis and plan of care discussed with patient after the evaluation.   Pain assessed and judicious use of narcotics when appropriate was discussed.  Importance of Fall prevention discussed.  Counseling on Smoking and Alcohol cessation was offered when appropriate.  Counseling on Diet, exercise, and medication compliance was done.     Approx 60 minutes spent.

## 2021-10-13 NOTE — PROGRESS NOTE ADULT - SUBJECTIVE AND OBJECTIVE BOX
[facility] - [facility] - PROGRESS NOTE    CHIEF COMPLAINT: dyspnea; anemia; obesity; kyphosis; hematochezia; lower GI bleed    INTERVAL HISTORY: transferred to an observation due to overnight confusion and agitation; s/p flexible sigmoidoscopy -> multiple small mouthed diverticula in the sigmoid colon with adherent blood clots - no active bleeding; no recurrent hematochezia on Xarelto; no anorexia or weight loss; no nausea, vomiting or abdominal pain; no constipation, diarrhea or change in the caliber of her stools; hemodynamically stable with a stable H/H following 3 transfusions; no cough, sputum production, hemoptysis, chest congestion or wheeze; no fevers, chills or sweats; no chest pain/pressure or palpitations; leg swelling -> SAUL stockings not placed    REVIEW OF SYSTEMS:  Constitutional: As per interval history  HEENT: Within normal limits  CV: As per interval history  Resp: As per interval history  GI: hematochezia -> resolved  : Within normal limits  Musculoskeletal: Within normal limits  Skin: Within normal limits  Neurological: dementia -> mild  Psychiatric: Within normal limits  Endocrine: Within normal limits  Hematologic/Lymphatic: Within normal limits  Allergic/Immunologic: Within normal limits    MEDICATIONS:     Pulmonary "    Anti-microbials:    Cardiovascular:  digoxin     Tablet 125 MICROGram(s) Oral daily  furosemide    Tablet 40 milliGRAM(s) Oral daily  metoprolol succinate  milliGRAM(s) Oral daily    Other:  ascorbic acid 500 milliGRAM(s) Oral daily  influenza   Vaccine 0.5 milliLiter(s) IntraMuscular once  mirtazapine 15 milliGRAM(s) Oral at bedtime  multivitamin 1 Tablet(s) Oral daily  pantoprazole    Tablet 40 milliGRAM(s) Oral every 12 hours  polyethylene glycol 3350 17 Gram(s) Oral daily  psyllium Powder 1 Packet(s) Oral daily  QUEtiapine 25 milliGRAM(s) Oral <User Schedule>  QUEtiapine 50 milliGRAM(s) Oral <User Schedule>  rivaroxaban 15 milliGRAM(s) Oral daily  senna 2 Tablet(s) Oral at bedtime    MEDICATIONS  (PRN):  haloperidol     Tablet 1 milliGRAM(s) Oral every 4 hours PRN Agitation    OBJECTIVE:    I&O's Detail    12 Oct 2021 07:01  -  13 Oct 2021 07:00  --------------------------------------------------------  IN:    Oral Fluid: 780 mL  Total IN: 780 mL    OUT:    Voided (mL): 1000 mL  Total OUT: 1000 mL    Total NET: -220 mL       Daily Weight in k.5 (13 Oct 2021 04:59)    PHYSICAL EXAM:       ICU Vital Signs Last 24 Hrs  T(C): 36.7 (13 Oct 2021 04:59), Max: 36.7 (12 Oct 2021 20:14)  T(F): 98 (13 Oct 2021 04:59), Max: 98.1 (12 Oct 2021 20:14)  HR: 86 (13 Oct 2021 04:59) (81 - 86)  BP: 115/72 (13 Oct 2021 04:59) (108/67 - 127/72)  BP(mean): --  ABP: --  ABP(mean): --  RR: 18 (13 Oct 2021 04:59) (18 - 18)  SpO2: 96% (13 Oct 2021 04:59) (96% - 98%) on room air     General: Awake. Alert. Confused. Cooperative. No distress. Appears stated age. Mildly obese. Sitting in a chair.  HEENT: Atraumatic. Normocephalic. Anicteric. Normal oral mucosa. Edentulous. PERRL. EOMI.  Neck: Supple. Trachea midline. Thyroid without enlargement/tenderness/nodules. No carotid bruit. No JVD.	  Cardiovascular: Irregularly irregular rate and rhythm. S1 S2 normal. II/VI systolic murmur  Respiratory: Respirations unlabored. Clear to auscultation and percussion bilaterally. Kyphosis.  Abdomen: Soft. Non-tender. Non-distended. No organomegaly. No masses. Normal bowel sounds.  Extremities: Warm to touch. No clubbing or cyanosis. Moderate lower extremity edema up to the knee.  Pulses: 2+ peripheral pulses all extremities.	  Skin: Normal skin color. No rashes or lesions. No ecchymoses. No cyanosis. Warm to touch.  Lymph Nodes: Cervical, supraclavicular and axillary nodes normal  Neurological: Motor and sensory examination equal and normal. A and O x 2   Psychiatry: Appropriate mood and affect.    LABS:                          8.0    8.44  )-----------( 257      ( 12 Oct 2021 10:59 )             26.7     CBC    WBC  8.44 <==, 9.49 <==, 11.35 <==, 11.97 <==, 9.65 <==, 13.78 <==, 12.34 <==    Hemoglobin  8.0 <<==, 7.5 <<==, 8.3 <<==, 8.2 <<==, 8.1 <<==, 8.5 <<==, 8.0 <<==    Hematocrit  26.7 <==, 25.1 <==, 28.3 <==, 27.8 <==, 27.2 <==, 28.9 <==, 27.0 <==    Platelets  257 <==, 235 <==, 307 <==, 280 <==, 290 <==, 325 <==, 268 <==      141  |  103  |  26<H>  ----------------------------<  164<H>    10-11  3.9   |  23  |  0.89      LYTES    sodium  141 <==, 141 <==, 142 <==    potassium   3.9 <==, 3.4 <==, 3.7 <==    chloride  103 <==, 104 <==, 104 <==    carbon dioxide  23 <==, 21 <==, 21 <==    =============================================================================================  RENAL FUNCTION:    Creatinine:   0.89  <<==, 0.89  <<==, 1.16  <<==    BUN:   26 <==, 26 <==, 26 <==    ============================================================================================    calcium   9.4 <==, 9.1 <==, 9.2 <==    ============================================================================================  LFTs    AST:   13 <==     ALT:  10  <==     AP:  84  <=    Bili:  0.3  <=    PT/INR - ( 03 Oct 2021 19:10 )   PT: 22.6 sec;   INR: 1.94 ratio      < from: Transthoracic Echocardiogram (21 @ 09:31) >    Patient name: GUS AMARO  YOB: 1932   Age: 89 (F)   MR#: [id number]  Study Date: 2021  [id number] Amy Mendez [facility]  Study quality: difficult due to patient being  Referring Physician: Neil Lawson MD  Blood Pressure: 103/67 mmHg  Height: 163 cm  Weight: 73 kg  BSA: 1.8 m2  ------------------------------------------------------------------------  PROCEDURE: Transthoracic echocardiogram with 2-D, M-Mode  and complete spectral and color flow Doppler.  INDICATION: Abnormal electrocardiogram (ECG) (EKG) (R94.31)  ------------------------------------------------------------------------  Dimensions:    Normal Values:  LA:     4.4    2.0 - 4.0 cm  Ao:     3.4    2.0 - 3.8 cm  SEPTUM:        0.6 - 1.2 cm  PWT:           0.6 - 1.1 cm  LVIDd:         3.0 - 5.6 cm  LVIDs:         1.8 - 4.0 cm  EF (Visual Estimate): 65 %  ------------------------------------------------------------------------  Observations:  Mitral Valve: Mitral annular calcification, otherwise  normal mitral valve. Mild mitral regurgitation.  Aortic Valve/Aorta: Aortic valve not well visualized;  appears calcified.  Aortic Root: 3.4 cm.  LVOT diameter: 1.8 cm.  Left Atrium: Mildly dilated left atrium.  LA volume index =  41 cc/m2.  Left Ventricle: Endocardium not well visualized; grossly  normal left ventricular systolic function. Increased  relative wall thickness with normal left ventricular mass  index, consistent with concentric left ventricular  remodeling.  Right Heart: Mild right atrial enlargement. The right  ventricle is not well visualized; grossly normal right  ventricular systolic function. Normal tricuspid valve.  Pulmonic valve not well visualized.  Pericardium/Pleura: Normal pericardium with no pericardial  effusion.  Hemodynamic: Estimated right atrial pressure is 8 mm Hg.  Estimated right ventricular systolic pressure equals 29 mm  Hg, assuming right atrial pressure equals 8 mm Hg,  consistent with normal pulmonary pressures.  ------------------------------------------------------------------------  Conclusions:  1. Mildly dilated left atrium.  LA volume index = 41 cc/m2.  2. Increased relative wall thickness with normal left  ventricular mass index, consistent with concentric left  ventricular remodeling.  3. Endocardium not well visualized; grossly normal left  ventricular systolic function.  4. Mild right atrial enlargement.  5. The right ventricle is not well visualized; grossly  normal right ventricular systolic function.  *** No previous Echo exam.  ------------------------------------------------------------------------  Confirmed on  2021 - 20:25:27 by Saray Singh M.D.FASE  ------------------------------------------------------------------------    < end of copied text >  ---------------------------------------------------------------------------------------------------------------  MICROBIOLOGY:     COVID-19 PCR (10.03.21 @ 20:19)   COVID-19 PCR: [facility]:      RADIOLOGY:  [x ] Chest radiographs reviewed and interpreted by me    < from: Xray Chest 1 View- PORTABLE-Urgent (10.03.21 @ 19:22) >    EXAM:  XR CHEST PORTABLE URGENT 1V                          PROCEDURE DATE:  10/03/2021      INTERPRETATION:  EXAMINATION: XR CHEST URGENT    CLINICAL INDICATION: Chest Pain    TECHNIQUE: Single frontal, portable view of the chest was obtained.    COMPARISON: CT chest 2021..    FINDINGS:  Cardiomegaly.  The lungs are clear.  There is no pneumothorax or pleural effusion.    IMPRESSION:  Clear lungs.    --- End of Report ---    SIDNEY VIDALES MD; Resident Radiologist  This document has been electronically signed.  EDMUND CARRILLO MD; Attending Radiologist  This document has been electronically signed. Oct  3 2021  8:25PM    < end of copied text >  ---------------------------------------------------------------------------------------------------------------  < from: CT Abdomen and Pelvis w/ IV Cont (10.04.21 @ 20:59) >    EXAM:  CT ABDOMEN AND PELVIS IC                          PROCEDURE DATE:  10/04/2021      INTERPRETATION:  CLINICAL INFORMATION: Bright red blood per rectum, fatigue, shortness of breath, chest pain. Evaluate for mass or active bleed.    COMPARISON: None.    CONTRAST/COMPLICATIONS:  IV Contrast: Omnipaque 350  111 cc administered 39 cc discarded  Oral Contrast: None  Complications: None reported at time of study completion    PROCEDURE:  CT of the Abdomen and Pelvis was performed.  Precontrast, Arterial and Delayed phases were performed.  Sagittal and coronal reformats were performed.    FINDINGS:  LOWER CHEST: Cardiomegaly. Aortic valve and mitral annular calcification.    LIVER: Multiple hepatic cysts and additional subcentimeter hypodensities that are too small to characterize.  BILE DUCTS: Normal caliber.  GALLBLADDER: Within normal limits.  SPLEEN: Normal size. A 7 mm focus of hypoattenuation (series 4 image 21) is too small to characterize.  PANCREAS: Within normal limits.  ADRENALS: Within normal limits.  KIDNEYS/URETERS: Bilateral renal cysts and additional subcentimeter hypodensities too small to characterize. There is an exophytic 1.5 cm hyperattenuating lesion in the upper pole of the right kidney (series 2 image 18) without definite enhancement and precontrast density of 77 HU,, likely representing a hemorrhagic cyst. Indeterminate exophytic right lower pole lesion measuring 2.1 cm (series 2 image 33) with questionable mild enhancement versus pseudoenhancement.    BLADDER: Evaluation is limited by streak artifact. Layering hyperdensity likely represents contrast.  REPRODUCTIVE ORGANS: Evaluation is limited by streak artifact. Hysterectomy.    BOWEL: No bowel obstruction. Evaluation of the distal sigmoid colon and rectum is limited secondary to streak artifact; otherwise no evidence of GI bleed. Focal apparent soft tissue prominence along the right aspect of the low rectum (series 4 image 95), which may be artifactual due to underdistention and partial obscuration by streak artifact. Colonic diverticula.  PERITONEUM: No ascites.  VESSELS: Atherosclerotic changes.  RETROPERITONEUM/LYMPH NODES: No lymphadenopathy.  ABDOMINAL WALL: Tiny fat-containing umbilical hernia.  BONES: Right hip total arthroplasty with associated streak artifact obscuring adjacent tissue. Right iliopsoas atrophy. Degenerative changes.    IMPRESSION:  Evaluation of the distal sigmoid colon and rectum is limited secondary to streak artifact related to hip prosthesis; otherwise no evidence of active gastrointestinal bleed.    Questionable soft tissue prominence along the low rectum, which may be artifactual due to underdistention and streak artifact. Consider correlation with endoscopy.    Right renal lesions favored to represent hemorrhagic cysts. Questionable enhancement of a right lower pole lesion versus pseudoenhancement. Consider further evaluation with nonemergent renal ultrasound or contrast enhanced abdominal MRI as clinically indicated.    --- End of Report ---    ARLENE LOVING MD; Resident Radiology  This document has been electronically signed.  AILYN KELLEY MD; Attending Radiologist  This document has been electronically signed. Oct  5 2021 11:01AM    < end of copied text >  ---------------------------------------------------------------------------------------------------------------

## 2021-10-13 NOTE — DISCHARGE NOTE NURSING/CASE MANAGEMENT/SOCIAL WORK - PATIENT PORTAL LINK FT
You can access the FollowMyHealth Patient Portal offered by [facility] by registering at the following website: [email or web address]. By joining [email or web address] FollowMyHealth portal, you will also be able to view your health information using other applications (apps) compatible with our system.

## 2021-10-13 NOTE — PROGRESS NOTE ADULT - REASON FOR ADMISSION
Hematochezia   GI Bleed

## 2021-10-14 ENCOUNTER — RX RENEWAL (OUTPATIENT)
Age: 86
End: 2021-10-14

## 2021-10-14 RX ORDER — FOLIC ACID 1 MG/1
1 TABLET ORAL DAILY
Qty: 30 | Refills: 2 | Status: ACTIVE | COMMUNITY
Start: 2021-07-09 | End: 1900-01-01

## 2021-10-20 ENCOUNTER — NON-APPOINTMENT (OUTPATIENT)
Age: 86
End: 2021-10-20

## 2021-10-20 DIAGNOSIS — R45.1 RESTLESSNESS AND AGITATION: ICD-10-CM

## 2021-10-20 DIAGNOSIS — R46.89 OTHER SYMPTOMS AND SIGNS INVOLVING APPEARANCE AND BEHAVIOR: ICD-10-CM

## 2021-10-20 DIAGNOSIS — G30.9 ALZHEIMER'S DISEASE, UNSPECIFIED: ICD-10-CM

## 2021-10-20 DIAGNOSIS — R44.3 HALLUCINATIONS, UNSPECIFIED: ICD-10-CM

## 2021-10-20 DIAGNOSIS — F02.80 ALZHEIMER'S DISEASE, UNSPECIFIED: ICD-10-CM

## 2021-10-20 DIAGNOSIS — R26.89 OTHER ABNORMALITIES OF GAIT AND MOBILITY: ICD-10-CM

## 2021-10-20 DIAGNOSIS — F01.50 ALZHEIMER'S DISEASE, UNSPECIFIED: ICD-10-CM

## 2021-10-20 DIAGNOSIS — G47.09 OTHER INSOMNIA: ICD-10-CM

## 2021-10-22 RX ORDER — MIRTAZAPINE 30 MG/1
30 TABLET, FILM COATED ORAL
Qty: 30 | Refills: 2 | Status: ACTIVE | COMMUNITY
Start: 2021-08-25 | End: 1900-01-01

## 2021-10-26 ENCOUNTER — APPOINTMENT (OUTPATIENT)
Dept: NEUROLOGY | Facility: CLINIC | Age: 86
End: 2021-10-26

## 2021-10-27 ENCOUNTER — EMERGENCY (EMERGENCY)
Facility: HOSPITAL | Age: 86
LOS: 1 days | Discharge: HOME | End: 2021-10-27
Payer: MEDICARE

## 2021-10-27 ENCOUNTER — INPATIENT (INPATIENT)
Facility: HOSPITAL | Age: 86
LOS: 2 days | Discharge: HOME | DRG: 811 | End: 2021-10-30
Attending: INTERNAL MEDICINE | Admitting: INTERNAL MEDICINE
Payer: MEDICARE

## 2021-10-27 VITALS
RESPIRATION RATE: 22 BRPM | HEIGHT: 64 IN | WEIGHT: 158.73 LBS | OXYGEN SATURATION: 95 % | SYSTOLIC BLOOD PRESSURE: 109 MMHG | DIASTOLIC BLOOD PRESSURE: 61 MMHG | TEMPERATURE: 99 F | HEART RATE: 86 BPM

## 2021-10-27 VITALS
WEIGHT: 160.06 LBS | SYSTOLIC BLOOD PRESSURE: 109 MMHG | RESPIRATION RATE: 22 BRPM | OXYGEN SATURATION: 95 % | HEART RATE: 86 BPM | DIASTOLIC BLOOD PRESSURE: 61 MMHG | HEIGHT: 64 IN | TEMPERATURE: 99 F

## 2021-10-27 DIAGNOSIS — Z90.710 ACQUIRED ABSENCE OF BOTH CERVIX AND UTERUS: Chronic | ICD-10-CM

## 2021-10-27 DIAGNOSIS — Z87.81 PERSONAL HISTORY OF (HEALED) TRAUMATIC FRACTURE: Chronic | ICD-10-CM

## 2021-10-27 DIAGNOSIS — Z98.1 ARTHRODESIS STATUS: Chronic | ICD-10-CM

## 2021-10-27 DIAGNOSIS — Z96.641 PRESENCE OF RIGHT ARTIFICIAL HIP JOINT: Chronic | ICD-10-CM

## 2021-10-27 LAB
ALBUMIN SERPL ELPH-MCNC: 3.9 G/DL — SIGNIFICANT CHANGE UP (ref 3.3–5)
ALP SERPL-CCNC: 103 U/L — SIGNIFICANT CHANGE UP (ref 40–120)
ALT FLD-CCNC: 14 U/L — SIGNIFICANT CHANGE UP (ref 10–45)
ANION GAP SERPL CALC-SCNC: 15 MMOL/L — SIGNIFICANT CHANGE UP (ref 5–17)
AST SERPL-CCNC: 14 U/L — SIGNIFICANT CHANGE UP (ref 10–40)
BASOPHILS # BLD AUTO: 0.07 K/UL — SIGNIFICANT CHANGE UP (ref 0–0.2)
BASOPHILS NFR BLD AUTO: 0.5 % — SIGNIFICANT CHANGE UP (ref 0–2)
BILIRUB SERPL-MCNC: 0.3 MG/DL — SIGNIFICANT CHANGE UP (ref 0.2–1.2)
BLD GP AB SCN SERPL QL: NEGATIVE — SIGNIFICANT CHANGE UP
BUN SERPL-MCNC: 38 MG/DL — HIGH (ref 7–23)
CALCIUM SERPL-MCNC: 9.3 MG/DL — SIGNIFICANT CHANGE UP (ref 8.4–10.5)
CHLORIDE SERPL-SCNC: 100 MMOL/L — SIGNIFICANT CHANGE UP (ref 96–108)
CK SERPL-CCNC: 16 U/L — LOW (ref 25–170)
CO2 SERPL-SCNC: 23 MMOL/L — SIGNIFICANT CHANGE UP (ref 22–31)
CREAT SERPL-MCNC: 1.25 MG/DL — SIGNIFICANT CHANGE UP (ref 0.5–1.3)
EOSINOPHIL # BLD AUTO: 0.8 K/UL — HIGH (ref 0–0.5)
EOSINOPHIL NFR BLD AUTO: 5.4 % — SIGNIFICANT CHANGE UP (ref 0–6)
GLUCOSE SERPL-MCNC: 179 MG/DL — HIGH (ref 70–99)
HCT VFR BLD CALC: 21.7 % — LOW (ref 34.5–45)
HGB BLD-MCNC: 6.4 G/DL — CRITICAL LOW (ref 11.5–15.5)
IMM GRANULOCYTES NFR BLD AUTO: 0.6 % — SIGNIFICANT CHANGE UP (ref 0–1.5)
LYMPHOCYTES # BLD AUTO: 16.9 % — SIGNIFICANT CHANGE UP (ref 13–44)
LYMPHOCYTES # BLD AUTO: 2.5 K/UL — SIGNIFICANT CHANGE UP (ref 1–3.3)
MAGNESIUM SERPL-MCNC: 2.2 MG/DL — SIGNIFICANT CHANGE UP (ref 1.6–2.6)
MCHC RBC-ENTMCNC: 22.7 PG — LOW (ref 27–34)
MCHC RBC-ENTMCNC: 29.5 GM/DL — LOW (ref 32–36)
MCV RBC AUTO: 77 FL — LOW (ref 80–100)
MONOCYTES # BLD AUTO: 1.4 K/UL — HIGH (ref 0–0.9)
MONOCYTES NFR BLD AUTO: 9.5 % — SIGNIFICANT CHANGE UP (ref 2–14)
NEUTROPHILS # BLD AUTO: 9.95 K/UL — HIGH (ref 1.8–7.4)
NEUTROPHILS NFR BLD AUTO: 67.1 % — SIGNIFICANT CHANGE UP (ref 43–77)
NRBC # BLD: 0 /100 WBCS — SIGNIFICANT CHANGE UP (ref 0–0)
NT-PROBNP SERPL-SCNC: 3252 PG/ML — HIGH (ref 0–300)
PHOSPHATE SERPL-MCNC: 3.5 MG/DL — SIGNIFICANT CHANGE UP (ref 2.5–4.5)
PLATELET # BLD AUTO: 428 K/UL — HIGH (ref 150–400)
POTASSIUM SERPL-MCNC: 4.4 MMOL/L — SIGNIFICANT CHANGE UP (ref 3.5–5.3)
POTASSIUM SERPL-SCNC: 4.4 MMOL/L — SIGNIFICANT CHANGE UP (ref 3.5–5.3)
PROT SERPL-MCNC: 6.3 G/DL — SIGNIFICANT CHANGE UP (ref 6–8.3)
RBC # BLD: 2.82 M/UL — LOW (ref 3.8–5.2)
RBC # FLD: 17.6 % — HIGH (ref 10.3–14.5)
RH IG SCN BLD-IMP: POSITIVE — SIGNIFICANT CHANGE UP
SODIUM SERPL-SCNC: 138 MMOL/L — SIGNIFICANT CHANGE UP (ref 135–145)
TROPONIN T, HIGH SENSITIVITY RESULT: 24 NG/L — SIGNIFICANT CHANGE UP (ref 0–51)
WBC # BLD: 14.81 K/UL — HIGH (ref 3.8–10.5)
WBC # FLD AUTO: 14.81 K/UL — HIGH (ref 3.8–10.5)

## 2021-10-27 PROCEDURE — 71045 X-RAY EXAM CHEST 1 VIEW: CPT | Mod: 26

## 2021-10-27 PROCEDURE — 93010 ELECTROCARDIOGRAM REPORT: CPT

## 2021-10-27 PROCEDURE — L9991: CPT

## 2021-10-27 PROCEDURE — 99291 CRITICAL CARE FIRST HOUR: CPT | Mod: GC

## 2021-10-27 RX ORDER — FUROSEMIDE 40 MG
40 TABLET ORAL ONCE
Refills: 0 | Status: DISCONTINUED | OUTPATIENT
Start: 2021-10-27 | End: 2021-10-27

## 2021-10-27 RX ORDER — PANTOPRAZOLE SODIUM 20 MG/1
80 TABLET, DELAYED RELEASE ORAL ONCE
Refills: 0 | Status: COMPLETED | OUTPATIENT
Start: 2021-10-27 | End: 2021-10-27

## 2021-10-27 RX ORDER — FUROSEMIDE 40 MG
40 TABLET ORAL ONCE
Refills: 0 | Status: COMPLETED | OUTPATIENT
Start: 2021-10-27 | End: 2021-10-27

## 2021-10-27 RX ADMIN — PANTOPRAZOLE SODIUM 80 MILLIGRAM(S): 20 TABLET, DELAYED RELEASE ORAL at 23:32

## 2021-10-27 RX ADMIN — Medication 40 MILLIGRAM(S): at 23:32

## 2021-10-27 NOTE — ED PROVIDER NOTE - NSICDXPASTMEDICALHX_GEN_ALL_CORE_FT
PAST MEDICAL HISTORY:  Atrial fibrillation     Dementia     Diabetes mellitus     Diastolic heart failure

## 2021-10-27 NOTE — ED ADULT TRIAGE NOTE - MODE OF ARRIVAL
Ambulance 34yo  female  @ 36.6 wks SLIUP here co SROM of clear fluid at 12 noon with ctx's Q5 min apart. Pt reports she is anti-E positive Ab's.    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

## 2021-10-27 NOTE — ED PROVIDER NOTE - OBJECTIVE STATEMENT
Patient is an 89 year old female with PMHx T2DM (A1c 7.9), Afib (on Xarelto and metoprolol), diastolic heart failure, dementia, diverticulosis presenting with weakness and SOB found to have low hemoglobin. Patient is a poor historian and does not know why she is here. She was recently admitted from 10/4 - 10/13 for BRBPR and Hgb 5.4 s/p 3 U. Patient's family refused a colonoscopy. However, a flex sigmoidoscopy showed diverticuli in the sigmoid colon. She denies abdominal pain, diarrhea, constipation, chest pain, urinary symptoms. Patient is an 89 year old female with PMHx T2DM (A1c 7.9), Afib (on Xarelto and metoprolol), diastolic heart failure, dementia, diverticulosis presenting with weakness and SOB found to have low hemoglobin. Patient is a poor historian and does not know why she is here. She was recently admitted from 10/4 - 10/13 for BRBPR and Hgb 5.4 s/p 3 U. Patient's family refused a colonoscopy. However, a flex sigmoidoscopy showed diverticuli in the sigmoid colon. She denies abdominal pain, diarrhea, constipation, chest pain, urinary symptoms.  Per family pt is DNR/DNI.   MRN [id number]

## 2021-10-27 NOTE — ED PROVIDER NOTE - ATTENDING CONTRIBUTION TO CARE
89F hx DM, Afib on xarelto, dCHF, dementia, diverticulosis, recent admit from 10/4 through 10/13 (MRN [id number]) for GI bleed requiring 3 U PRBC, attributed to diverticular bleed after flex sig. AC was held and then subsequently re-started. Pt had OP labs today with Hgb 6. 1 and sent in for transfusion and admission. Dtr at bedside and DIL by phone state persistent LE edema and mild tachypnea despite adherence to Lasix 40mg daily. Deny and BRBPR, hematuria, VB. Pt is well appearing NAD, mild tachypnea ~25, RRR, lungs CTA bl, abd soft ntnd, ext wwp, pitting edema to bl le up to knees, No pallor, rectal w brown stool. Advised pt and family will repeat labs, transfuse PRN w diuresis given tachypnea and LE edema hs of CHF. Send fecal occult blood as low H/H is likley due to GI bleed. Hold AC. TBA.

## 2021-10-27 NOTE — ED PROVIDER NOTE - PHYSICAL EXAMINATION
VITALS:   T(C): 37.1 (10-27-21 @ 21:20), Max: 37.1 (10-27-21 @ 21:20)  HR: 86 (10-27-21 @ 21:20) (86 - 86)  BP: 109/61 (10-27-21 @ 21:20) (109/61 - 109/61)  RR: 22 (10-27-21 @ 21:20) (22 - 22)  SpO2: 95% (10-27-21 @ 21:20) (95% - 95%)    GENERAL: NAD, lying in bed comfortably  HEAD:  Atraumatic, Normocephalic  EYES: EOMI, PERRLA, conjunctiva and sclera clear  ENT: Moist mucous membranes  NECK: Supple, No JVD  CHEST/LUNG: Clear to auscultation bilaterally; No rales, rhonchi, wheezing, or rubs. Unlabored respirations  HEART: Regular rate and rhythm; No murmurs, rubs, or gallops  ABDOMEN: BSx4; Soft, nontender, nondistended  EXTREMITIES:  2+ Peripheral Pulses, brisk capillary refill. (+) 2+ LE edema  NERVOUS SYSTEM:  A&Ox3, no focal deficits   SKIN: No rashes or lesions  PSYCH: normal behavior, normal affect VITALS:   T(C): 37.1 (10-27-21 @ 21:20), Max: 37.1 (10-27-21 @ 21:20)  HR: 86 (10-27-21 @ 21:20) (86 - 86)  BP: 109/61 (10-27-21 @ 21:20) (109/61 - 109/61)  RR: 22 (10-27-21 @ 21:20) (22 - 22)  SpO2: 95% (10-27-21 @ 21:20) (95% - 95%)    GENERAL: NAD, lying in bed comfortably  HEAD:  Atraumatic, Normocephalic  EYES: EOMI, PERRLA, conjunctiva and sclera clear  ENT: Moist mucous membranes  NECK: Supple, No JVD  CHEST/LUNG: Clear to auscultation bilaterally; No rales, rhonchi, wheezing, or rubs. Unlabored respirations  HEART: (+) Irregularly irregular rate and rhythm; No murmurs, rubs, or gallops  ABDOMEN: BSx4; Soft, nontender, nondistended  EXTREMITIES:  2+ Peripheral Pulses, brisk capillary refill. (+) 2+ LE edema  NERVOUS SYSTEM:  A&Ox3, no focal deficits   SKIN: No rashes or lesions  PSYCH: normal behavior, normal affect

## 2021-10-27 NOTE — ED ADULT TRIAGE NOTE - BP NONINVASIVE DIASTOLIC (MM HG)
I called Ms. Stewart to ask her about her medications. i could not leave a message.  If she calls back can we ask her the questions bellow please   61

## 2021-10-27 NOTE — ED PROVIDER NOTE - CLINICAL SUMMARY MEDICAL DECISION MAKING FREE TEXT BOX
Patient is an 89 year old female with PMHx T2DM (A1c 7.9), Afib (on Xarelto and metoprolol), diastolic heart failure, dementia, diverticulosis presenting with weakness and SOB found to have low hemoglobin likely 2/2 diverticulitis. Will get CBC, T&S and transfuse if Hgb < 7. Will get COVID swab and likely admit to medicine.

## 2021-10-27 NOTE — ED ADULT TRIAGE NOTE - BMI (KG/M2)
[FreeTextEntry1] : The patient's dysphagia was probably due to silent gastroesophageal reflux resulting in some esophageal dysmotility. We'll continue to take omeprazole 40 mg p.o. q.a.m. and should undergo a B12 and magnesium level as both these substances may be malabsorptive in patients on long-term proton pump inhibitors. As long as he continues to be stable he should followup with me in one year. 27.5

## 2021-10-28 VITALS
TEMPERATURE: 98 F | DIASTOLIC BLOOD PRESSURE: 55 MMHG | SYSTOLIC BLOOD PRESSURE: 113 MMHG | OXYGEN SATURATION: 98 % | HEART RATE: 81 BPM | RESPIRATION RATE: 20 BRPM

## 2021-10-28 DIAGNOSIS — K92.2 GASTROINTESTINAL HEMORRHAGE, UNSPECIFIED: ICD-10-CM

## 2021-10-28 DIAGNOSIS — F03.90 UNSPECIFIED DEMENTIA WITHOUT BEHAVIORAL DISTURBANCE: ICD-10-CM

## 2021-10-28 DIAGNOSIS — I48.20 CHRONIC ATRIAL FIBRILLATION, UNSPECIFIED: ICD-10-CM

## 2021-10-28 DIAGNOSIS — Z29.9 ENCOUNTER FOR PROPHYLACTIC MEASURES, UNSPECIFIED: ICD-10-CM

## 2021-10-28 DIAGNOSIS — I50.32 CHRONIC DIASTOLIC (CONGESTIVE) HEART FAILURE: ICD-10-CM

## 2021-10-28 DIAGNOSIS — D62 ACUTE POSTHEMORRHAGIC ANEMIA: ICD-10-CM

## 2021-10-28 LAB
ALBUMIN SERPL ELPH-MCNC: 4.3 G/DL — SIGNIFICANT CHANGE UP (ref 3.3–5)
ALP SERPL-CCNC: 102 U/L — SIGNIFICANT CHANGE UP (ref 40–120)
ALT FLD-CCNC: 15 U/L — SIGNIFICANT CHANGE UP (ref 10–45)
ANION GAP SERPL CALC-SCNC: 16 MMOL/L — SIGNIFICANT CHANGE UP (ref 5–17)
APPEARANCE UR: CLEAR — SIGNIFICANT CHANGE UP
APTT BLD: 28.3 SEC — SIGNIFICANT CHANGE UP (ref 27.5–35.5)
AST SERPL-CCNC: 17 U/L — SIGNIFICANT CHANGE UP (ref 10–40)
BACTERIA # UR AUTO: NEGATIVE — SIGNIFICANT CHANGE UP
BILIRUB SERPL-MCNC: 1.1 MG/DL — SIGNIFICANT CHANGE UP (ref 0.2–1.2)
BILIRUB UR-MCNC: NEGATIVE — SIGNIFICANT CHANGE UP
BUN SERPL-MCNC: 33 MG/DL — HIGH (ref 7–23)
CALCIUM SERPL-MCNC: 9.2 MG/DL — SIGNIFICANT CHANGE UP (ref 8.4–10.5)
CHLORIDE SERPL-SCNC: 100 MMOL/L — SIGNIFICANT CHANGE UP (ref 96–108)
CK MB CFR SERPL CALC: 1.4 NG/ML — SIGNIFICANT CHANGE UP (ref 0–3.8)
CO2 SERPL-SCNC: 26 MMOL/L — SIGNIFICANT CHANGE UP (ref 22–31)
COLOR SPEC: SIGNIFICANT CHANGE UP
CREAT SERPL-MCNC: 1.1 MG/DL — SIGNIFICANT CHANGE UP (ref 0.5–1.3)
DIFF PNL FLD: NEGATIVE — SIGNIFICANT CHANGE UP
EPI CELLS # UR: 2 /HPF — SIGNIFICANT CHANGE UP
GLUCOSE SERPL-MCNC: 150 MG/DL — HIGH (ref 70–99)
GLUCOSE UR QL: NEGATIVE — SIGNIFICANT CHANGE UP
HCT VFR BLD CALC: 26.6 % — LOW (ref 34.5–45)
HGB BLD-MCNC: 8.3 G/DL — LOW (ref 11.5–15.5)
HYALINE CASTS # UR AUTO: 0 /LPF — SIGNIFICANT CHANGE UP (ref 0–2)
INR BLD: 1.4 RATIO — HIGH (ref 0.88–1.16)
KETONES UR-MCNC: NEGATIVE — SIGNIFICANT CHANGE UP
LEUKOCYTE ESTERASE UR-ACNC: NEGATIVE — SIGNIFICANT CHANGE UP
MCHC RBC-ENTMCNC: 24.1 PG — LOW (ref 27–34)
MCHC RBC-ENTMCNC: 31.2 GM/DL — LOW (ref 32–36)
MCV RBC AUTO: 77.3 FL — LOW (ref 80–100)
NITRITE UR-MCNC: NEGATIVE — SIGNIFICANT CHANGE UP
NRBC # BLD: 0 /100 WBCS — SIGNIFICANT CHANGE UP (ref 0–0)
OB PNL STL: POSITIVE
PH UR: 6.5 — SIGNIFICANT CHANGE UP (ref 5–8)
PLATELET # BLD AUTO: 389 K/UL — SIGNIFICANT CHANGE UP (ref 150–400)
POTASSIUM SERPL-MCNC: 3.5 MMOL/L — SIGNIFICANT CHANGE UP (ref 3.5–5.3)
POTASSIUM SERPL-SCNC: 3.5 MMOL/L — SIGNIFICANT CHANGE UP (ref 3.5–5.3)
PROT SERPL-MCNC: 6.5 G/DL — SIGNIFICANT CHANGE UP (ref 6–8.3)
PROT UR-MCNC: NEGATIVE — SIGNIFICANT CHANGE UP
PROTHROM AB SERPL-ACNC: 16.5 SEC — HIGH (ref 10.6–13.6)
RBC # BLD: 3.44 M/UL — LOW (ref 3.8–5.2)
RBC # FLD: 17 % — HIGH (ref 10.3–14.5)
RBC CASTS # UR COMP ASSIST: 1 /HPF — SIGNIFICANT CHANGE UP (ref 0–4)
SARS-COV-2 RNA SPEC QL NAA+PROBE: SIGNIFICANT CHANGE UP
SODIUM SERPL-SCNC: 142 MMOL/L — SIGNIFICANT CHANGE UP (ref 135–145)
SP GR SPEC: 1.01 — SIGNIFICANT CHANGE UP (ref 1.01–1.02)
UROBILINOGEN FLD QL: NEGATIVE — SIGNIFICANT CHANGE UP
WBC # BLD: 12.18 K/UL — HIGH (ref 3.8–10.5)
WBC # FLD AUTO: 12.18 K/UL — HIGH (ref 3.8–10.5)
WBC UR QL: 1 /HPF — SIGNIFICANT CHANGE UP (ref 0–5)

## 2021-10-28 PROCEDURE — 99223 1ST HOSP IP/OBS HIGH 75: CPT

## 2021-10-28 RX ORDER — SODIUM CHLORIDE 9 MG/ML
1000 INJECTION, SOLUTION INTRAVENOUS
Refills: 0 | Status: DISCONTINUED | OUTPATIENT
Start: 2021-10-28 | End: 2021-10-30

## 2021-10-28 RX ORDER — DEXTROSE 50 % IN WATER 50 %
12.5 SYRINGE (ML) INTRAVENOUS ONCE
Refills: 0 | Status: DISCONTINUED | OUTPATIENT
Start: 2021-10-28 | End: 2021-10-30

## 2021-10-28 RX ORDER — HALOPERIDOL DECANOATE 100 MG/ML
1 INJECTION INTRAMUSCULAR ONCE
Refills: 0 | Status: COMPLETED | OUTPATIENT
Start: 2021-10-28 | End: 2021-10-28

## 2021-10-28 RX ORDER — INSULIN LISPRO 100/ML
VIAL (ML) SUBCUTANEOUS EVERY 6 HOURS
Refills: 0 | Status: DISCONTINUED | OUTPATIENT
Start: 2021-10-28 | End: 2021-10-28

## 2021-10-28 RX ORDER — QUETIAPINE FUMARATE 200 MG/1
25 TABLET, FILM COATED ORAL
Refills: 0 | Status: DISCONTINUED | OUTPATIENT
Start: 2021-10-28 | End: 2021-10-30

## 2021-10-28 RX ORDER — DEXTROSE 50 % IN WATER 50 %
25 SYRINGE (ML) INTRAVENOUS ONCE
Refills: 0 | Status: DISCONTINUED | OUTPATIENT
Start: 2021-10-28 | End: 2021-10-30

## 2021-10-28 RX ORDER — LANOLIN ALCOHOL/MO/W.PET/CERES
3 CREAM (GRAM) TOPICAL ONCE
Refills: 0 | Status: COMPLETED | OUTPATIENT
Start: 2021-10-28 | End: 2021-10-28

## 2021-10-28 RX ORDER — PANTOPRAZOLE SODIUM 20 MG/1
40 TABLET, DELAYED RELEASE ORAL
Refills: 0 | Status: DISCONTINUED | OUTPATIENT
Start: 2021-10-28 | End: 2021-10-30

## 2021-10-28 RX ORDER — FUROSEMIDE 40 MG
40 TABLET ORAL ONCE
Refills: 0 | Status: COMPLETED | OUTPATIENT
Start: 2021-10-28 | End: 2021-10-28

## 2021-10-28 RX ORDER — GLUCAGON INJECTION, SOLUTION 0.5 MG/.1ML
1 INJECTION, SOLUTION SUBCUTANEOUS ONCE
Refills: 0 | Status: DISCONTINUED | OUTPATIENT
Start: 2021-10-28 | End: 2021-10-30

## 2021-10-28 RX ORDER — MIRTAZAPINE 45 MG/1
15 TABLET, ORALLY DISINTEGRATING ORAL AT BEDTIME
Refills: 0 | Status: DISCONTINUED | OUTPATIENT
Start: 2021-10-28 | End: 2021-10-30

## 2021-10-28 RX ORDER — FUROSEMIDE 40 MG
40 TABLET ORAL DAILY
Refills: 0 | Status: DISCONTINUED | OUTPATIENT
Start: 2021-10-28 | End: 2021-10-30

## 2021-10-28 RX ORDER — QUETIAPINE FUMARATE 200 MG/1
50 TABLET, FILM COATED ORAL
Refills: 0 | Status: DISCONTINUED | OUTPATIENT
Start: 2021-10-28 | End: 2021-10-28

## 2021-10-28 RX ORDER — FUROSEMIDE 40 MG
40 TABLET ORAL ONCE
Refills: 0 | Status: DISCONTINUED | OUTPATIENT
Start: 2021-10-28 | End: 2021-10-28

## 2021-10-28 RX ORDER — DEXTROSE 50 % IN WATER 50 %
15 SYRINGE (ML) INTRAVENOUS ONCE
Refills: 0 | Status: DISCONTINUED | OUTPATIENT
Start: 2021-10-28 | End: 2021-10-30

## 2021-10-28 RX ORDER — DIGOXIN 250 MCG
125 TABLET ORAL DAILY
Refills: 0 | Status: DISCONTINUED | OUTPATIENT
Start: 2021-10-28 | End: 2021-10-30

## 2021-10-28 RX ORDER — INSULIN LISPRO 100/ML
VIAL (ML) SUBCUTANEOUS
Refills: 0 | Status: DISCONTINUED | OUTPATIENT
Start: 2021-10-28 | End: 2021-10-30

## 2021-10-28 RX ORDER — METOPROLOL TARTRATE 50 MG
100 TABLET ORAL DAILY
Refills: 0 | Status: DISCONTINUED | OUTPATIENT
Start: 2021-10-28 | End: 2021-10-30

## 2021-10-28 RX ORDER — LANOLIN ALCOHOL/MO/W.PET/CERES
3 CREAM (GRAM) TOPICAL AT BEDTIME
Refills: 0 | Status: DISCONTINUED | OUTPATIENT
Start: 2021-10-28 | End: 2021-10-28

## 2021-10-28 RX ORDER — ASCORBIC ACID 60 MG
500 TABLET,CHEWABLE ORAL DAILY
Refills: 0 | Status: DISCONTINUED | OUTPATIENT
Start: 2021-10-28 | End: 2021-10-30

## 2021-10-28 RX ADMIN — Medication 3 MILLIGRAM(S): at 02:19

## 2021-10-28 RX ADMIN — Medication 40 MILLIGRAM(S): at 12:18

## 2021-10-28 RX ADMIN — Medication 500 MILLIGRAM(S): at 12:18

## 2021-10-28 RX ADMIN — QUETIAPINE FUMARATE 25 MILLIGRAM(S): 200 TABLET, FILM COATED ORAL at 10:14

## 2021-10-28 RX ADMIN — Medication 100 MILLIGRAM(S): at 12:18

## 2021-10-28 RX ADMIN — Medication 40 MILLIGRAM(S): at 03:04

## 2021-10-28 RX ADMIN — MIRTAZAPINE 15 MILLIGRAM(S): 45 TABLET, ORALLY DISINTEGRATING ORAL at 23:06

## 2021-10-28 RX ADMIN — Medication 1: at 07:27

## 2021-10-28 RX ADMIN — HALOPERIDOL DECANOATE 1 MILLIGRAM(S): 100 INJECTION INTRAMUSCULAR at 06:01

## 2021-10-28 RX ADMIN — Medication 125 MICROGRAM(S): at 12:18

## 2021-10-28 NOTE — CONSULT NOTE ADULT - PROBLEM SELECTOR RECOMMENDATION 9
- monitory h/h daily, transfuse prn  - hx of diverticular bleed   - hold anticoagulation  - will manage conservatively, no plans for endoscopic work up unless overtly bleeding off anticoagulation   - diet as tolerated
likely diverticular bleed   s/p PRBC transfusion   will not resume AC any further  GI consulted

## 2021-10-28 NOTE — H&P ADULT - NSHPADDITIONALINFOADULT_GEN_ALL_CORE
Patient assigned to me by night hospitalist in charge for management and care for patient for this evening only. Care to be resumed by day hospitalist Dr. Lawson at 08:00 in the morning and thereafter.     Khurram Padilla MD  Medicine Attending  Department of Hospital Medicine  pager: [phone] (available from 20:00 to 08:00)

## 2021-10-28 NOTE — H&P ADULT - ASSESSMENT
89F w/ dementia, hx of diverticular hemorrhage, afib on eliquis, dHF, DM2 p/w symptomatic anemia suspected from recurrent LGIB  - pRBC transfusion ordered by ED. pantoprazole 80mg given. transfusion goal to hb>7  - GI consult in am  - Full H&P to follow 89F w/ dementia, hx of diverticular hemorrhage, afib on eliquis, dHF, DM2 p/w symptomatic anemia suspected from recurrent LGIB

## 2021-10-28 NOTE — BEHAVIORAL HEALTH ASSESSMENT NOTE - NSBHCONSULTRECOMMENDOTHER_PSY_A_CORE FT
Continue with current medications - Seroquil 25mg in the morning and afternoon, Mertizipine 15mg at night  Give melatonin 3mg at night  Give haldol 1mg as needed for agitation  Agitation will likely decrease once that patient has a room on the floors Recommend to check: TSH, B12, folate, RPR, UA, U-tox  Consider head CT  Avoid benzos. opioids (if possible) and meds with anticholinergic properties as they may worsen confusion  Maintain sleep wake cycle  Provide frequent reorientation and redirection  Family member at bedside if possible  Assess for need for glasses and hearing aid (if applicable)

## 2021-10-28 NOTE — BEHAVIORAL HEALTH ASSESSMENT NOTE - HPI (INCLUDE ILLNESS QUALITY, SEVERITY, DURATION, TIMING, CONTEXT, MODIFYING FACTORS, ASSOCIATED SIGNS AND SYMPTOMS)
89 yo F with a PPMHx of dementia and PMH of diverticular hemorrhage, A-fib, diastolic HF, DM2 presenting to the ED with a chief concern of SOB + anemia with suspicion for LGIB. Patient is a retired  who lives in a private home with 24 hr aids. She has 4 children, 1 son is recently decreased in the last 6 months. Psych is being consulted for med management for dementia and agitation throughout this hospital visit.     Patient was found awake and lying in a stretcher in the ED upon arrival. She was compliant for part of the visit and mildly agitated. The patient was A&Ox2 (aware of name and location, unaware of date or event). When asked about her mood the patient states she feels disgusted because of the long wait time in the ED. The patient was able to communicate her social history (noted in the previous paragraph) and states she does not have thoughts of hurting herself, suicide, or hurting others. The patient then refused to answer further questions.     The patient's daughter (Sandra, [phone]) was contacted for collateral information. The daughter states the patient has had dementia for about 5 years and within the last 1-2 years it has been worsening. The patient is forgetful, has decreased sleep, is disoriented about the time of day, and has an increased incidence of aggression and agitation with her aids within the last 1-2 years. The patient sees a neurologist (Dr. Burnett) for care of her dementia. The patient is currently taking seroquel 25mg BID and Mertazipine 30mg at night according to the daughter. In the past the patient has taken Sertraline but discontinued due to ineffectiveness. The daughter states the patient has never had an official diagnosis of depression as far as she is aware but she has noticed her mother does feel down often and is worried about her medical conditions and health. The patient has never done CBT, never visited a Psychiatrist, and has never been hospitalized for a psychiatric condition as far as the daughter is aware. The patient does not use any substances as far as the daughter is aware. There is no family history of psychiatric conditions as far as the daughter knows. 88 yo F with a PPMHx of dementia and PMH of diverticular hemorrhage, A-fib, diastolic HF, DM2 presenting to the ED with a chief concern of SOB + anemia with suspicion for LGIB. Patient is a retired  who lives in a private home with 24 hr aids. She has 4 children, 1 son is recently decreased in the last 6 months. Psych is being consulted for med management for dementia and agitation throughout this hospital visit.     Patient was found awake and lying in a stretcher in the ED upon arrival. She was compliant for part of the visit and mildly agitated. The patient was A&Ox2 (aware of name and location, unaware of date or event). When asked about her mood the patient states she feels disgusted because of the long wait time in the ED. The patient was able to communicate her social history (noted in the previous paragraph) and states she does not have thoughts of hurting herself, suicide, or hurting others. The patient then refused to answer further questions.     The patient's daughter (Sandra, [phone]) was contacted for collateral information. The daughter states the patient has had dementia for about 5 years and within the last 1-2 years it has been worsening. The patient is forgetful, has decreased sleep, is disoriented about the time of day, and has an increased incidence of aggression and agitation with her aids within the last 1-2 years. The patient sees a neurologist (Dr. Burnett) for care of her dementia. The patient is currently taking seroquel 25mg BID and Mertazipine 15mg at night according to the daughter. In the past the patient has taken Sertraline but discontinued due to ineffectiveness. The daughter states the patient has never had an official diagnosis of depression as far as she is aware but she has noticed her mother does feel down often and is worried about her medical conditions and health. The patient has never done CBT, never visited a Psychiatrist, and has never been hospitalized for a psychiatric condition as far as the daughter is aware. The patient does not use any substances as far as the daughter is aware. There is no family history of psychiatric conditions as far as the daughter knows.

## 2021-10-28 NOTE — BEHAVIORAL HEALTH ASSESSMENT NOTE - NSBHCHARTREVIEWVS_PSY_A_CORE FT
Vital Signs Last 24 Hrs  T(C): 36.5 (28 Oct 2021 07:51), Max: 37.1 (27 Oct 2021 21:20)  T(F): 97.7 (28 Oct 2021 07:51), Max: 98.8 (27 Oct 2021 21:20)  HR: 87 (28 Oct 2021 07:51) (80 - 90)  BP: 117/62 (28 Oct 2021 07:51) (109/61 - 133/88)  BP(mean): --  RR: 20 (28 Oct 2021 07:51) (20 - 24)  SpO2: 96% (28 Oct 2021 07:51) (95% - 100%)

## 2021-10-28 NOTE — BEHAVIORAL HEALTH ASSESSMENT NOTE - RISK ASSESSMENT
Low Acute Suicide Risk Risk factors: recent loss of son with last 6 mo, unable to care for self (requires 24 hour aids for all ADLs), +current hospital admission/poor health    Protective factors: no current SIIP/HIIP, no h/o SA/SIB, no h/o psych admissions, no active substance abuse, domiciled, compliant with treatment    Overall, pt is a low risk of harm to self/others and does not meet criteria for psychiatric admission.

## 2021-10-28 NOTE — CONSULT NOTE ADULT - SUBJECTIVE AND OBJECTIVE BOX
full consult to follow... BENITA AMARO  89y Female  MRN:[id number]    Patient is a 89y old  Female who presents with a chief complaint of 89F p/w sob and anemia (28 Oct 2021 14:07)    HPI:  history collected per chart as patient is limited in providing hx due to dementia    89F w/ dementia, recent hospitalization for diverticular hemorrhage, afib on xarelto, diastolic HF, DM2 presents for evaluation of sob and anemia. The patient denies CP, cough, sob at rest, bleeding or dark stool (hx limited due to dementia). Per chart, EMS reports patient found to have low Hb as an outpatient in setting of sob. (28 Oct 2021 03:44)      Patient seen and evaluated at bedside. No acute events overnight except as noted.    Interval HPI: no further BM this afternoon     PAST MEDICAL & SURGICAL HISTORY:  Diabetes mellitus    Atrial fibrillation    Dementia    Diastolic heart failure    No significant past surgical history        REVIEW OF SYSTEMS:  as per hpi     VITALS:  Vital Signs Last 24 Hrs  T(C): 36.8 (28 Oct 2021 14:27), Max: 37.1 (27 Oct 2021 21:20)  T(F): 98.2 (28 Oct 2021 14:27), Max: 98.8 (27 Oct 2021 21:20)  HR: 76 (28 Oct 2021 14:27) (76 - 90)  BP: 130/63 (28 Oct 2021 14:27) (109/61 - 133/88)  BP(mean): --  RR: 20 (28 Oct 2021 14:27) (20 - 24)  SpO2: 96% (28 Oct 2021 14:27) (95% - 100%)  CAPILLARY BLOOD GLUCOSE      POCT Blood Glucose.: 141 mg/dL (28 Oct 2021 11:58)  POCT Blood Glucose.: 162 mg/dL (28 Oct 2021 07:05)    I&O's Summary      PHYSICAL EXAM:  GENERAL: NAD, well-developed  HEAD:  Atraumatic, Normocephalic  EYES: EOMI, PERRLA, conjunctiva and sclera clear  NECK: Supple, No JVD  CHEST/LUNG: Clear to auscultation bilaterally; No wheeze  HEART: S1, S2; No murmurs, rubs, or gallops  ABDOMEN: Soft, Nontender, Nondistended; Bowel sounds present  EXTREMITIES:  2+ Peripheral Pulses, No clubbing, cyanosis, or edema  PSYCH: Normal affect  NEUROLOGY: AAOX1-2; non-focal  SKIN: No rashes or lesions    Consultant(s) Notes Reviewed:  [x ] YES  [ ] NO  Care Discussed with Consultants/Other Providers [ x] YES  [ ] NO    MEDS:  MEDICATIONS  (STANDING):  ascorbic acid 500 milliGRAM(s) Oral daily  dextrose 40% Gel 15 Gram(s) Oral once  dextrose 5%. 1000 milliLiter(s) (50 mL/Hr) IV Continuous <Continuous>  dextrose 5%. 1000 milliLiter(s) (100 mL/Hr) IV Continuous <Continuous>  dextrose 50% Injectable 25 Gram(s) IV Push once  dextrose 50% Injectable 12.5 Gram(s) IV Push once  dextrose 50% Injectable 25 Gram(s) IV Push once  digoxin     Tablet 125 MICROGram(s) Oral daily  furosemide    Tablet 40 milliGRAM(s) Oral daily  glucagon  Injectable 1 milliGRAM(s) IntraMuscular once  insulin lispro (ADMELOG) corrective regimen sliding scale   SubCutaneous every 6 hours  metoprolol succinate  milliGRAM(s) Oral daily  mirtazapine 15 milliGRAM(s) Oral at bedtime  pantoprazole    Tablet 40 milliGRAM(s) Oral before breakfast  QUEtiapine 25 milliGRAM(s) Oral <User Schedule>  QUEtiapine 50 milliGRAM(s) Oral <User Schedule>    MEDICATIONS  (PRN):    ALLERGIES:  No Known Allergies      LABS:                        8.3    12.18 )-----------( 389      ( 28 Oct 2021 08:36 )             26.6     10-28    142  |  100  |  33<H>  ----------------------------<  150<H>  3.5   |  26  |  1.10    Ca    9.2      28 Oct 2021 08:36  Phos  3.5     10-27  Mg     2.2     10-27    TPro  6.5  /  Alb  4.3  /  TBili  1.1  /  DBili  x   /  AST  17  /  ALT  15  /  AlkPhos  102  10-28    PT/INR - ( 28 Oct 2021 01:54 )   PT: 16.5 sec;   INR: 1.40 ratio         PTT - ( 28 Oct 2021 01:54 )  PTT:28.3 sec  CARDIAC MARKERS ( 27 Oct 2021 22:33 )  x     / x     / 16 U/L / x     / 1.4 ng/mL      LIVER FUNCTIONS - ( 28 Oct 2021 08:36 )  Alb: 4.3 g/dL / Pro: 6.5 g/dL / ALK PHOS: 102 U/L / ALT: 15 U/L / AST: 17 U/L / GGT: x           Urinalysis Basic - ( 28 Oct 2021 00:26 )    Color: Light Yellow / Appearance: Clear / S.011 / pH: x  Gluc: x / Ketone: Negative  / Bili: Negative / Urobili: Negative   Blood: x / Protein: Negative / Nitrite: Negative   Leuk Esterase: Negative / RBC: 1 /hpf / WBC 1 /HPF   Sq Epi: x / Non Sq Epi: 2 /hpf / Bacteria: Negative    BNP:Serum Pro-Brain Natriuretic Peptide: 3252 pg/mL (10-27 @ 22:33)    < from: Xray Chest 1 View- PORTABLE-Urgent (Xray Chest 1 View- PORTABLE-Urgent .) (10.27.21 @ 22:03) >  IMPRESSION:    No focal consolidation.    < end of copied text >

## 2021-10-28 NOTE — ED ADULT NURSE NOTE - NS ED NOTE ABUSE RESPONSE YN
Pt LM on  requesting lab results.  Called pt, notified results are final but they have not been reviewed by Dr. Johnson yet. Pt asked if she would get to them today and send her a message, adv pt I will send doctor a message letting her know. Pt agreed.  Message sent to Dr. Johnson   Yes

## 2021-10-28 NOTE — CONSULT NOTE ADULT - SUBJECTIVE AND OBJECTIVE BOX
anemia no plans for endoscopic work up , full consult to follow    Chief Complaint:  Patient is a 89y old  Female who presents with a chief complaint of 89F p/w sob and anemia (29 Oct 2021 12:34)    HPI: 89F w/ dementia, recent hospitalization for diverticular hemorrhage, afib on xarelto, diastolic HF, DM2 presents for evaluation of sob and anemia. The patient denies CP, cough, sob at rest, bleeding or dark stool (hx limited due to dementia), nausea, vomiting, abdominal pain.     Allergies:  No Known Allergies      Medications:  ascorbic acid 500 milliGRAM(s) Oral daily  dextrose 40% Gel 15 Gram(s) Oral once  dextrose 5%. 1000 milliLiter(s) IV Continuous <Continuous>  dextrose 5%. 1000 milliLiter(s) IV Continuous <Continuous>  dextrose 50% Injectable 25 Gram(s) IV Push once  dextrose 50% Injectable 12.5 Gram(s) IV Push once  dextrose 50% Injectable 25 Gram(s) IV Push once  digoxin     Tablet 125 MICROGram(s) Oral daily  furosemide    Tablet 40 milliGRAM(s) Oral daily  glucagon  Injectable 1 milliGRAM(s) IntraMuscular once  insulin lispro (ADMELOG) corrective regimen sliding scale   SubCutaneous Before meals and at bedtime  metoprolol succinate  milliGRAM(s) Oral daily  mirtazapine 15 milliGRAM(s) Oral at bedtime  pantoprazole    Tablet 40 milliGRAM(s) Oral before breakfast  QUEtiapine 25 milliGRAM(s) Oral <User Schedule>      PMHX/PSHX:  Diabetes mellitus    Atrial fibrillation    Dementia    Diastolic heart failure    No significant past surgical history        Family history:  No pertinent family history in first degree relatives      ROS:     General:  No wt loss, fevers, chills, night sweats, fatigue,   Eyes:  Good vision, no reported pain  ENT:  No sore throat, pain, runny nose, dysphagia  CV:  No pain, palpitations, hypo/hypertension  Resp:  No dyspnea, cough, tachypnea, wheezing  GI:  see HPI  :  No pain, bleeding, incontinence, nocturia  Muscle:  No pain, weakness  Neuro:  No weakness, tingling, memory problems  Psych:  No fatigue, insomnia, mood problems, depression  Endocrine:  No polyuria, polydipsia, cold/heat intolerance  Heme:  No petechiae, ecchymosis, easy bruisability  Skin:  No rash, tattoos, scars, edema      PHYSICAL EXAM:   Vital Signs:  Vital Signs Last 24 Hrs  T(C): 36.5 (29 Oct 2021 13:31), Max: 36.8 (28 Oct 2021 14:27)  T(F): 97.7 (29 Oct 2021 13:31), Max: 98.3 (28 Oct 2021 22:16)  HR: 70 (29 Oct 2021 13:31) (70 - 89)  BP: 105/68 (29 Oct 2021 13:31) (105/68 - 141/86)  BP(mean): --  RR: 18 (29 Oct 2021 13:31) (18 - 20)  SpO2: 99% (29 Oct 2021 13:31) (96% - 99%)  Daily     Daily     GENERAL:  Appears stated age, no distress  HEENT:  NC/AT,  conjunctivae clear and pink, no thyromegaly, nodules, adenopathy, no JVD, sclera -anicteric  CHEST:  Full & symmetric excursion, no increased effort, breath sounds clear  HEART:  Regular rhythm, S1, S2, no murmur/rub/S3/S4, no abdominal bruit, no edema  ABDOMEN:  Soft, non-tender, non-distended, normoactive bowel sounds,  no masses ,no hepato-splenomegaly, no signs of chronic liver disease  EXTEREMITIES:  no cyanosis, clubbing or edema  SKIN:  No rash/erythema/ecchymoses/petechiae/wounds/abscess/warm/dry  NEURO:  Awake , alert   LABS:                        8.6    13.38 )-----------( 397      ( 29 Oct 2021 08:52 )             28.3     10-    141  |  99  |  23  ----------------------------<  189<H>  3.4<L>   |  25  |  0.88    Ca    9.2      29 Oct 2021 08:52  Phos  3.5     10-  Mg     2.2     10-27    TPro  6.5  /  Alb  4.3  /  TBili  1.1  /  DBili  x   /  AST  17  /  ALT  15  /  AlkPhos  102  10-28    LIVER FUNCTIONS - ( 28 Oct 2021 08:36 )  Alb: 4.3 g/dL / Pro: 6.5 g/dL / ALK PHOS: 102 U/L / ALT: 15 U/L / AST: 17 U/L / GGT: x           PT/INR - ( 28 Oct 2021 01:54 )   PT: 16.5 sec;   INR: 1.40 ratio         PTT - ( 28 Oct 2021 01:54 )  PTT:28.3 sec  Urinalysis Basic - ( 28 Oct 2021 00:26 )    Color: Light Yellow / Appearance: Clear / S.011 / pH: x  Gluc: x / Ketone: Negative  / Bili: Negative / Urobili: Negative   Blood: x / Protein: Negative / Nitrite: Negative   Leuk Esterase: Negative / RBC: 1 /hpf / WBC 1 /HPF   Sq Epi: x / Non Sq Epi: 2 /hpf / Bacteria: Negative          Imaging:

## 2021-10-28 NOTE — CONSULT NOTE ADULT - ASSESSMENT
89 yo female h/o dementia, gi bleed, afib on xarelto, diastolic chf, dm, here with gi bleed, acute blood loss anemia    gi bleed  likely diverticular  s/p prbc transfusion  gi consult  hold xarelto  serial cbc    afib  now off AC  rate control    chf  stable    dnr    Advanced care planning was discussed with patient and family.  Advanced care planning forms were reviewed and discussed as appropriate.  Differential diagnosis and plan of care discussed with patient after the evaluation.   Pain assessed and judicious use of narcotics when appropriate was discussed.  Importance of Fall prevention discussed.  Counseling on Smoking and Alcohol cessation was offered when appropriate.  Counseling on Diet, exercise, and medication compliance was done.       Approx 60 minutes spent.
89 yo female with h/o dementia, afib on xarelto, diastolic chf, dm, here with acute blood loss anemia. GI consulted for help in management.  
89 F well known to me from office and recent admission for GI bleed s/p sigmoidoscopy for diverticular hemorrhage, dementia, recent hospitalization for diverticular hemorrhage, afib on xarelto, diastolic HF, DM2 presents for evaluation of sob and anemia. The patient denies CP, cough, sob at rest, bleeding or dark stool (hx limited due to dementia). Per chart, EMS reports patient found to have low Hb as an outpatient in setting of sob.    Hgb 6.1

## 2021-10-28 NOTE — CONSULT NOTE ADULT - PROBLEM SELECTOR RECOMMENDATION 4
aspiration and fall precautions  DNR   discussed with family
- continue home medications, care per medicine    The plan of care was discussed with the physician assistant and modifications were made to the notation where appropriate.   Differential diagnosis and plan of care discussed with patient after the evaluation  125 minutes spent on total encounter of which more than fifty percent of the encounter was spent counseling and/or coordinating care by the attending physician.    [facility]  Gastroenterology and Hepatology  [address] [place], [place]  Office: [phone]  Cell: [phone]

## 2021-10-28 NOTE — CHART NOTE - NSCHARTNOTEFT_GEN_A_CORE
BENITA AMARO  89y Female    Called by RN to evaluate pt with agitation. Pt seen and evaluated. Pt seen agitated, climbing out of bed, screaming and fighting with staff.      PAST MEDICAL & SURGICAL HISTORY:  Diabetes mellitus    Atrial fibrillation    Dementia    Diastolic heart failure    No significant past surgical history      Vital Signs Last 24 Hrs  T(C): 36.9 (28 Oct 2021 03:44), Max: 37.1 (27 Oct 2021 21:20)  T(F): 98.5 (28 Oct 2021 03:44), Max: 98.8 (27 Oct 2021 21:20)  HR: 84 (28 Oct 2021 03:44) (80 - 87)  BP: 120/64 (28 Oct 2021 03:44) (109/61 - 133/88)  BP(mean): --  RR: 20 (28 Oct 2021 03:44) (20 - 24)  SpO2: 98% (28 Oct 2021 03:44) (95% - 100%)    Event Summary:    89F w/ dementia, hx of diverticular hemorrhage, afib on eliquis, dHF, DM2 p/w symptomatic anemia 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     Agitation   secondary to worsening dementia  EkG reviewed with   Will give Haldol 1 mg IV X 1  Continue Seroquel  Will place pt on constant observation  Will continue to monitor pt.   Psych consult in AM   F/U with primary team in AM.    Luis Waters NP  [id number]

## 2021-10-28 NOTE — H&P ADULT - HISTORY OF PRESENT ILLNESS
history collected per chart as patient is limited in providing hx due to dementia    89F w/ dementia, recent hospitalization for diverticular hemorrhage, afib on xarelto, diastolic HF, DM2 presents for evaluation of sob and anemia. The patient denies CP, cough, sob at rest, bleeding or dark stool (hx limited due to dementia). Per chart, EMS reports patient found to have low Hb as an outpatient in setting of sob.

## 2021-10-28 NOTE — BEHAVIORAL HEALTH ASSESSMENT NOTE - PAST PSYCHOTROPIC MEDICATION
Sertraline - d/c because not effective   Risperidone - as needed medication, has rarely if ever taken as far as patient's daughter is aware

## 2021-10-28 NOTE — BEHAVIORAL HEALTH ASSESSMENT NOTE - NSBHCHARTREVIEWLAB_PSY_A_CORE FT
CBC Full  -  ( 28 Oct 2021 08:36 )  WBC Count : 12.18 K/uL  RBC Count : 3.44 M/uL  Hemoglobin : 8.3 g/dL  Hematocrit : 26.6 %  Platelet Count - Automated : 389 K/uL  Mean Cell Volume : 77.3 fl  Mean Cell Hemoglobin : 24.1 pg  Mean Cell Hemoglobin Concentration : 31.2 gm/dL  Auto Neutrophil # : x  Auto Lymphocyte # : x  Auto Monocyte # : x  Auto Eosinophil # : x  Auto Basophil # : x  Auto Neutrophil % : x  Auto Lymphocyte % : x  Auto Monocyte % : x  Auto Eosinophil % : x  Auto Basophil % : x      10-28    142  |  100  |  33<H>  ----------------------------<  150<H>  3.5   |  26  |  1.10    Ca    9.2      28 Oct 2021 08:36  Phos  3.5     10-27  Mg     2.2     10-27    TPro  6.5  /  Alb  4.3  /  TBili  1.1  /  DBili  x   /  AST  17  /  ALT  15  /  AlkPhos  102  10-28

## 2021-10-28 NOTE — BEHAVIORAL HEALTH ASSESSMENT NOTE - COMMENTS ON VIOLENCE RISK/PROTECTIVE FACTORS:
Patient has increased aggression and agitation in the last 1-2 years according to the daughter but the patient follows with a Neurologist and is compliant with medications

## 2021-10-28 NOTE — H&P ADULT - NSHPREVIEWOFSYSTEMS_GEN_ALL_CORE
limited due to dementia however patient answers as follows  CONSTITUTIONAL: No fever, no chills  EYES: No eye pain, no acute blindness  ENMT: no pain in mouth, no cuts on tongue  RESPIRATORY: No cough, No sob  CARDIOVASCULAR: No CP, no palpitations  GASTROINTESTINAL: no abdominal pain, no n/v/d  GENITOURINARY: No dysuria, no hematuria  Heme/Lymph: No easy bruising, no swelling of neck lymph nodes  NEUROLOGICAL: No seizure, No acute paralysis  SKIN: No itching, no rashes  MUSCULOSKELETAL: No acute joint pain, no joint swelling

## 2021-10-28 NOTE — CONSULT NOTE ADULT - SUBJECTIVE AND OBJECTIVE BOX
CHIEF COMPLAINT:    HISTORY OF PRESENT ILLNESS:  89 F well known to me from office and recent admission for GI bleed s/p sigmoidoscopy for diverticular hemorrhage, dementia, recent hospitalization for diverticular hemorrhage, afib on xarelto, diastolic HF, DM2 presents for evaluation of sob and anemia. The patient denies CP, cough, sob at rest, bleeding or dark stool (hx limited due to dementia). Per chart, EMS reports patient found to have low Hb as an outpatient in setting of sob.    Hgb 6.1       PAST MEDICAL & SURGICAL HISTORY:  Diabetes mellitus    Atrial fibrillation    Dementia    Diastolic heart failure    No significant past surgical history            MEDICATIONS:  digoxin     Tablet 125 MICROGram(s) Oral daily  furosemide    Tablet 40 milliGRAM(s) Oral daily  metoprolol succinate  milliGRAM(s) Oral daily        mirtazapine 15 milliGRAM(s) Oral at bedtime  QUEtiapine 25 milliGRAM(s) Oral <User Schedule>  QUEtiapine 50 milliGRAM(s) Oral <User Schedule>    pantoprazole    Tablet 40 milliGRAM(s) Oral before breakfast    dextrose 40% Gel 15 Gram(s) Oral once  dextrose 50% Injectable 25 Gram(s) IV Push once  dextrose 50% Injectable 12.5 Gram(s) IV Push once  dextrose 50% Injectable 25 Gram(s) IV Push once  glucagon  Injectable 1 milliGRAM(s) IntraMuscular once  insulin lispro (ADMELOG) corrective regimen sliding scale   SubCutaneous every 6 hours    ascorbic acid 500 milliGRAM(s) Oral daily  dextrose 5%. 1000 milliLiter(s) IV Continuous <Continuous>  dextrose 5%. 1000 milliLiter(s) IV Continuous <Continuous>      FAMILY HISTORY:  No pertinent family history in first degree relatives        SOCIAL HISTORY:    [ ] Non-smoker  [ ] Smoker  [ ] Alcohol    Allergies    No Known Allergies    Intolerances    	    REVIEW OF SYSTEMS:  CONSTITUTIONAL: No fever, weight loss, + fatigue  EYES: No eye pain, visual disturbances, or discharge  ENMT:  No difficulty hearing, tinnitus, vertigo; No sinus or throat pain  NECK: No pain or stiffness  RESPIRATORY: No cough, wheezing, chills or hemoptysis; No Shortness of Breath  CARDIOVASCULAR: No chest pain, palpitations, passing out, dizziness, or leg swelling  GASTROINTESTINAL: No abdominal or epigastric pain. No nausea, vomiting, or hematemesis; No diarrhea or constipation. No melena or hematochezia.  GENITOURINARY: No dysuria, frequency, hematuria, or incontinence  NEUROLOGICAL: No headaches, memory loss, loss of strength, numbness, or tremors  SKIN: No itching, burning, rashes, or lesions   LYMPH Nodes: No enlarged glands  ENDOCRINE: No heat or cold intolerance; No hair loss  MUSCULOSKELETAL: No joint pain or swelling; No muscle, back, or extremity pain  PSYCHIATRIC: No depression, anxiety, mood swings, or difficulty sleeping  HEME/LYMPH: No easy bruising, or bleeding gums  ALLERY AND IMMUNOLOGIC: No hives or eczema	    [ ] All others negative	  [ ] Unable to obtain    PHYSICAL EXAM:  T(C): 36.5 (10-28-21 @ 07:51), Max: 37.1 (10-27-21 @ 21:20)  HR: 87 (10-28-21 @ 07:51) (80 - 90)  BP: 117/62 (10-28-21 @ 07:51) (109/61 - 133/88)  RR: 20 (10-28-21 @ 07:51) (20 - 24)  SpO2: 96% (10-28-21 @ 07:51) (95% - 100%)  Wt(kg): --  I&O's Summary      Appearance: NAD sleepy    HEENT:   Normal oral mucosa, PERRL, EOMI	  Lymphatic: No lymphadenopathy  Cardiovascular: Normal S1 S2, No JVD, No murmurs, No edema  Respiratory: decreased bs   Psychiatry: A & O x 2  Gastrointestinal:  Soft, Non-tender, + BS	  Skin: No rashes, No ecchymoses, No cyanosis	  Neurologic: Non-focal  Extremities: Normal range of motion, No clubbing, cyanosis or edema  Vascular: Peripheral pulses palpable 2+ bilaterally    TELEMETRY: 	    ECG:  	Afib   RADIOLOGY: < from: Xray Chest 1 View- PORTABLE-Urgent (Xray Chest 1 View- PORTABLE-Urgent .) (10.27.21 @ 22:03) >    EXAM:  XR CHEST PORTABLE URGENT 1V                            PROCEDURE DATE:  10/27/2021            INTERPRETATION:  CLINICAL INFORMATION: Anemia, shortness of breath..    TECHNIQUE: Portable AP radiograph of the chest.    COMPARISON: No comparison available.    FINDINGS:    Right costophrenic angle is excluded from view. No focal consolidation, left-sided pleural effusion or pneumothorax. Cardiac silhouette cannot be accurately evaluated in this projection. No acute osseous pathology.      IMPRESSION:    No focal consolidation.    --- End of Report ---              PAVLOPAUL MISHYN MD; Resident Radiology  This document has been electronically signed.  GWEN RHODES MD; Attending Radiologist  This document has been electronically signed. Oct 28 2021  9:33AM    < end of copied text >    OTHER: 	  	  LABS:	 	    CARDIAC MARKERS:                                  8.3    12.18 )-----------( 389      ( 28 Oct 2021 08:36 )             26.6     10-28    142  |  100  |  33<H>  ----------------------------<  150<H>  3.5   |  26  |  1.10    Ca    9.2      28 Oct 2021 08:36  Phos  3.5     10-27  Mg     2.2     10-27    TPro  6.5  /  Alb  4.3  /  TBili  1.1  /  DBili  x   /  AST  17  /  ALT  15  /  AlkPhos  102  10-28    proBNP: Serum Pro-Brain Natriuretic Peptide: 3252 pg/mL (10-27 @ 22:33)    Lipid Profile:   HgA1c:   TSH:

## 2021-10-28 NOTE — ED ADULT NURSE REASSESSMENT NOTE - NS ED NURSE REASSESS COMMENT FT1
#2 PRBC started over 2 hours as per order. 2RNs verifying the pt and blood product, consent in chart. Pt is educated for signs and symptoms of adverse reaction. See Transfusion Administration Record for VS. Bed locked and lowered. Comfort and safety measures maintained.

## 2021-10-28 NOTE — H&P ADULT - NSHPPHYSICALEXAM_GEN_ALL_CORE
Vital Signs Last 24 Hrs  T(C): 36.9 (28 Oct 2021 03:44), Max: 37.1 (27 Oct 2021 21:20)  T(F): 98.5 (28 Oct 2021 03:44), Max: 98.8 (27 Oct 2021 21:20)  HR: 84 (28 Oct 2021 03:44) (80 - 87)  BP: 120/64 (28 Oct 2021 03:44) (109/61 - 133/88)  RR: 20 (28 Oct 2021 03:44) (20 - 24)  SpO2: 98% (28 Oct 2021 03:44) (95% - 100%) on RA    GENERAL: NAD, well-developed  HEAD:  Atraumatic, Normocephalic  EYES: EOMI, PERRL, conjunctiva and sclera clear  ENMT: MMM, good dentition  NECK: Supple, trachea midline  Lung: normal work of breathing, cta b/l  Cardiovascular: S1&S2+, rrr, no m/r/g appreciated; no pitting edema appreciated in b/l LE  ABDOMEN: bs+, soft, nt, nd, no appreciable masses  : No arnold catheter, no CVA tenderness  Neuro: A&Oxname only, no flaccid paralysis in extremities appreciated  SKIN: warm and dry, no visible purulence in exposed areas, normal skin turgor

## 2021-10-28 NOTE — BEHAVIORAL HEALTH ASSESSMENT NOTE - SUMMARY
88 yo F with a PPMHx of dementia and PMH of diverticular hemorrhage, A-fib, diastolic HF, DM2 presenting to the ED with a chief concern of SOB + anemia with suspicion for LGIB. Patient is a retired  who lives in a private home with 24 hr aids. She has 4 children, 1 son is recently decreased in the last 6 months. Psych is being consulted for med management for dementia and agitation throughout this hospital visit.

## 2021-10-28 NOTE — ED ADULT NURSE NOTE - OBJECTIVE STATEMENT
89 year old female presents to ED via EMS complaining of low hemoglobin. Pt poor historian. PMH of rectal bleeds, on blood thinners, unsure which ones. Pt states over the past couple of days she noticed some blood in the toilet but none today. Upon assessment pt A&O x2. Breathing comfortably on room air. Denies pain, states she feels her normal self. 4+ pitting edema noted to bilateral lower extremities. Bed locked and lowered. Comfort and safety measures maintained.

## 2021-10-28 NOTE — BEHAVIORAL HEALTH ASSESSMENT NOTE - CASE SUMMARY
This is an 89-y.o. CF patient with a PPMHx of dementia and PMH of diverticular hemorrhage, A-fib, diastolic HF, DM2 presenting to the ED with a chief concern of SOB + anemia with suspicion for LGIB. Patient is a retired  who lives in a private home with 24 hr aids. She has 4 children, 1 son is recently decreased in the last 6 months. Psych is being consulted for med management for dementia and agitation throughout this hospital visit.    I have seen and evaluated this patient myself. Chart, labs, meds reviewed. I agree with trainee's assessment and plan.

## 2021-10-28 NOTE — CONSULT NOTE ADULT - PROBLEM SELECTOR RECOMMENDATION 2
- off anticoagulation given anemia   - appreciate cardiology input
rate controlled   NO further AC as above  fall precautions

## 2021-10-28 NOTE — BEHAVIORAL HEALTH ASSESSMENT NOTE - OTHER
Recent loss of her son 6 mo ago unable to access patient lying in stretcher, unable to access Patient specifically stated "disgusted" as current mood Patient adamantly denies thoughts of suicide or self harm hx of feeling down, hx of dementia w/ agitation, current hospitalization for a medical condition 24 hour aids

## 2021-10-28 NOTE — ED ADULT NURSE REASSESSMENT NOTE - NS ED NURSE REASSESS COMMENT FT1
#1 PRBC started over one hour per MD Nettles. 2RNs verifying the pt and blood product. Consent in chart. Pt is educated for signs and symptoms of adverse reaction and told to contact RN if feeling any differently. Call bell within reach. On bedside cardiac monitor. Bed locked and lowered. Comfort and safety measures maintained.

## 2021-10-28 NOTE — BEHAVIORAL HEALTH ASSESSMENT NOTE - TELEPSYCHIATRY?
Stable, based on history, physical exam and review of pertinent labs, studies and medications; meds reconciled; changes to treatment plan as per orders. No

## 2021-10-28 NOTE — BEHAVIORAL HEALTH ASSESSMENT NOTE - NSBHCONSULTMEDS_PSY_A_CORE FT
Seroquil 25mg BID  Mertizipine 15mg at night melatonin 3mg p.o. at bedtime  Seroquel 25mg PM and 50mg HS  Mirtazapine 15mg at bedtime

## 2021-10-28 NOTE — BEHAVIORAL HEALTH ASSESSMENT NOTE - OTHER PAST PSYCHIATRIC HISTORY (INCLUDE DETAILS REGARDING ONSET, COURSE OF ILLNESS, INPATIENT/OUTPATIENT TREATMENT)
Dementia - past 5 years. Symptoms of forgetfulness, decreased sleep, disoriented regarding time of day, and increased aggression. Follows with a Neurologist (Dr. Burnett)

## 2021-10-28 NOTE — BEHAVIORAL HEALTH ASSESSMENT NOTE - NS ED BHA SUICIDALITY PRESENT CURRENT PASSIVE IDEATION
2020    TELEHEALTH EVALUATION -- Audio/Visual (During GPMPT-64 public health emergency)    HPI:    Joshua De La Vega (:  1943) has requested an audio/video evaluation for the following concern(s): memory trouble. He has had memory problem that has been ongoing for the past 6 months. He does not feel he has any trouble with his memory. He does not ask repeated questions, but he does misplace things and has trouble naming people and things he should know. He does not manage the finances any longer as his daughter has taken over. He reports it is a struggle to do the math. He drives, and has not gotten lost while driving in a familiar place. His daughter states that he can speed and go at least 70 when he is not on the interstate. His daughter placed a tracker on his phone. His daughter reports he can sometimes struggle with thinking of words and things he needs to say. His sleep is good, he wakes up feeling well rested. He does not take daytime naps. He does not snore. He can sometimes have visual hallucination at night where he can see his  wife. No history of head injury. History provided by patient and family. Review of Systems ______________  Eyes: Negative.  ____  Respiratory: Negative.  ____  Cardiovascular: Negative.  ____  Gastrointestinal: Negative.  ______________________________________      Prior to Visit Medications    Medication Sig Taking?  Authorizing Provider   apixaban (ELIQUIS) 5 MG TABS tablet Take 5 mg by mouth 2 times daily Yes Historical Provider, MD   allopurinol (ZYLOPRIM) 300 MG tablet Take 1 tablet by mouth daily Yes MAIKEL Pimentel - CNP   terazosin (HYTRIN) 10 MG capsule 10 mg nightly  Yes Historical Provider, MD   lisinopril-hydrochlorothiazide (PRINZIDE;ZESTORETIC) 10-12.5 MG per tablet 1 tablet daily  Yes Historical Provider, MD   simvastatin (ZOCOR) 40 MG tablet 40 mg daily  Yes Historical Provider, MD   FLUoxetine (PROZAC) 10 MG capsule Take 10 mg by mouth daily Yes Historical Provider, MD       Social History     Tobacco Use    Smoking status: Former Smoker     Packs/day: 0.00     Years: 10.00     Pack years: 0.00     Types: Pipe     Last attempt to quit: 1999     Years since quittin.3    Smokeless tobacco: Never Used   Substance Use Topics    Alcohol use: Yes     Alcohol/week: 5.0 standard drinks     Types: 5 Shots of liquor per week    Drug use: Never        Past Medical History:   Diagnosis Date    Hyperlipidemia     Hypertension    ,   Past Surgical History:   Procedure Laterality Date    APPENDECTOMY      about 20 years ago     CORONARY ANGIOPLASTY WITH STENT PLACEMENT     ,   Social History     Tobacco Use    Smoking status: Former Smoker     Packs/day: 0.00     Years: 10.00     Pack years: 0.00     Types: Pipe     Last attempt to quit: 1999     Years since quittin.3    Smokeless tobacco: Never Used   Substance Use Topics    Alcohol use:  Yes     Alcohol/week: 5.0 standard drinks     Types: 5 Shots of liquor per week    Drug use: Never   ,   Family History   Problem Relation Age of Onset    No Known Problems Mother     No Known Problems Father     No Known Problems Sister     No Known Problems Brother     No Known Problems Sister     No Known Problems Sister     No Known Problems Brother     No Known Problems Brother        PHYSICAL EXAMINATION:  [ INSTRUCTIONS:  \"[x]\" Indicates a positive item  \"[]\" Indicates a negative item  -- DELETE ALL ITEMS NOT EXAMINED]  Vital Signs: (As obtained by patient/caregiver or practitioner observation)    Blood pressure-  Heart rate-    Respiratory rate-    Temperature-  Pulse oximetry-     Constitutional: [x] Appears well-developed and well-nourished [x] No apparent distress      [] Abnormal-   Mental status  [x] Alert and awake  [x] Oriented to person/place  [x]Able to follow commands      Eyes:  EOM    [x]  Normal  [] Abnormal-  Sclera  [x]  Normal  [] Abnormal - Discharge [x]  None visible  [] Abnormal -    HENT:   [x] Normocephalic, atraumatic. [] Abnormal   [x] Mouth/Throat: Mucous membranes are moist.     External Ears [x] Normal  [] Abnormal-     Neck: [x] No visualized mass     Pulmonary/Chest: [x] Respiratory effort normal.  [x] No visualized signs of difficulty breathing or respiratory distress        [] Abnormal-      Musculoskeletal:   [] Normal gait with no signs of ataxia         [x] Normal range of motion of neck        [x] Abnormal- he thinks henna is the president and the year is 46. He struggles following 2 step commands. His calculation is poor. He is able to to spell the word world but struggles spelling it backwards. There is no anomia or apraxia noted. Neurological:        [x] No Facial Asymmetry (Cranial nerve 7 motor function) (limited exam to video visit)          [x] No gaze palsy        [] Abnormal-         Skin:        [x] No significant exanthematous lesions or discoloration noted on facial skin         [] Abnormal-            Psychiatric:       [x] Normal Affect [] No Hallucinations        [] Abnormal-     Other pertinent observable physical exam findings-     ASSESSMENT/PLAN:  1. Memory problem  2. Visual hallucination    This is a 68year old male who presents with memory problem that has been ongoing for at least the past 6 months. He does not have good insight into his memory trouble. On exam, he thinks henna is the president and the year is 46. He struggles following 2 step commands. His calculation is poor. He is able to to spell the word world but struggles spelling it backwards. There is no anomia or apraxia noted. 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 We will arrange for the patient to undergo CT head WO contrast to evaluated for organic causes of his symptoms as well as EEG to screen for seizure tendency. We will also order lab work checking for vitamin levels.  His daughter states that he can speed and go at least 70 when he is driving on country roads. His daughter placed a tracker on his phone. He and his daughter were counseled on the importance of refraining from driving for his safety and the safety of others. We will also arrange for him to undergo formal evaluation with neuropsychology to evalaute for memory problem, dementia. After detailed discussion with patient and his daughter we agreed on the following plan. Plan:  1. CT head WO contrast  2. EEG  3. Vitamin B12, folate  4. Vitamin B6 level  5. Vitamin D level  6. Hold off on driving for your safety and the safety of others  7. Referral to neuropsychology   8. Follow up in 2 months or sooner if needed. 9. Call if any questions or concerns. Alecia Kirby is a 68 y.o. male being evaluated by a Virtual Visit (video visit) encounter to address concerns as mentioned above. A caregiver was present when appropriate. Due to this being a TeleHealth encounter (During [facility] public health emergency), evaluation of the following organ systems was limited: Vitals/Constitutional/EENT/Resp/CV/GI//MS/Neuro/Skin/Heme-Lymph-Imm. Pursuant to the emergency declaration under the [address] [place] [address], [address] [place] [address] authority and the [employer] and [employer] Act, this Virtual Visit was conducted with patient's (and/or legal guardian's) consent, to reduce the patient's risk of exposure to COVID-19 and provide necessary medical care. The patient (and/or legal guardian) has also been advised to contact this office for worsening conditions or problems, and seek emergency medical treatment and/or call 911 if deemed necessary. Patient identification was verified at the start of the visit: Yes    Total time spent on this encounter: 50 min    Services were provided through a video synchronous discussion virtually to substitute for in-person clinic visit.  Patient and provider were located at their individual homes.    --Thuan Fung MD on 9/2/2020 at 11:50 AM    An electronic signature was used to authenticate this note. No

## 2021-10-28 NOTE — H&P ADULT - NSHPLABSRESULTS_GEN_ALL_CORE
Personally reviewed available labs, imaging and ekg  [1]  CBC Full  -  ( 27 Oct 2021 22:33 )  WBC Count : 14.81 K/uL  RBC Count : 2.82 M/uL  Hemoglobin : 6.4 g/dL  Hematocrit : 21.7 %  Platelet Count - Automated : 428 K/uL  Mean Cell Volume : 77.0 fl  Mean Cell Hemoglobin : 22.7 pg  Mean Cell Hemoglobin Concentration : 29.5 gm/dL  Auto Neutrophil # : 9.95 K/uL  Auto Lymphocyte # : 2.50 K/uL  Auto Monocyte # : 1.40 K/uL  Auto Eosinophil # : 0.80 K/uL  Auto Basophil # : 0.07 K/uL  Auto Neutrophil % : 67.1 %  Auto Lymphocyte % : 16.9 %  Auto Monocyte % : 9.5 %  Auto Eosinophil % : 5.4 %  Auto Basophil % : 0.5 %    10-27    138  |  100  |  38<H>  ----------------------------<  179<H>  4.4   |  23  |  1.25    Ca    9.3      27 Oct 2021 22:33  Phos  3.5     10-27  Mg     2.2     10-27    TPro  6.3  /  Alb  3.9  /  TBili  0.3  /  DBili  x   /  AST  14  /  ALT  14  /  AlkPhos  103  10-27    PT/INR - ( 28 Oct 2021 01:54 )   PT: 16.5 sec;   INR: 1.40 ratio         PTT - ( 28 Oct 2021 01:54 )  PTT:28.3 sec  Imaging:  [ 2] I independently reviewed CXR and no lobar opacification appreciated    EKG:  [2] I independently reviewed EKG/cardiac tracing and afib? 85bpm    Review of old records:  [2] I personally reviewed previous records which identified hx of LGIB 21:14

## 2021-10-28 NOTE — H&P ADULT - PROBLEM SELECTOR PLAN 1
- recent hospitalization for suspected LGIB  - 2UpRBC ordered by ED. goal Hb>7  - GI consult in am  - nontoxic at time of medicine admit (patient already received 1UpRBC

## 2021-10-28 NOTE — BEHAVIORAL HEALTH ASSESSMENT NOTE - COMMENTS ON SUICIDE RISK/PROTECTIVE FACTORS:
+PPMHx of dementia, agitation, and possible depression but patient adamantly denies thoughts of suicide or self harm

## 2021-10-29 ENCOUNTER — TRANSCRIPTION ENCOUNTER (OUTPATIENT)
Age: 86
End: 2021-10-29

## 2021-10-29 VITALS
RESPIRATION RATE: 18 BRPM | SYSTOLIC BLOOD PRESSURE: 118 MMHG | OXYGEN SATURATION: 97 % | TEMPERATURE: 98 F | HEART RATE: 87 BPM | DIASTOLIC BLOOD PRESSURE: 61 MMHG

## 2021-10-29 LAB
A1C WITH ESTIMATED AVERAGE GLUCOSE RESULT: 6.3 % — HIGH (ref 4–5.6)
ANION GAP SERPL CALC-SCNC: 17 MMOL/L — SIGNIFICANT CHANGE UP (ref 5–17)
BUN SERPL-MCNC: 23 MG/DL — SIGNIFICANT CHANGE UP (ref 7–23)
CALCIUM SERPL-MCNC: 9.2 MG/DL — SIGNIFICANT CHANGE UP (ref 8.4–10.5)
CHLORIDE SERPL-SCNC: 99 MMOL/L — SIGNIFICANT CHANGE UP (ref 96–108)
CO2 SERPL-SCNC: 25 MMOL/L — SIGNIFICANT CHANGE UP (ref 22–31)
CREAT SERPL-MCNC: 0.88 MG/DL — SIGNIFICANT CHANGE UP (ref 0.5–1.3)
CULTURE RESULTS: SIGNIFICANT CHANGE UP
ESTIMATED AVERAGE GLUCOSE: 134 MG/DL — HIGH (ref 68–114)
FOLATE SERPL-MCNC: >20 NG/ML — SIGNIFICANT CHANGE UP
GLUCOSE SERPL-MCNC: 189 MG/DL — HIGH (ref 70–99)
HCT VFR BLD CALC: 28.3 % — LOW (ref 34.5–45)
HGB BLD-MCNC: 8.6 G/DL — LOW (ref 11.5–15.5)
MCHC RBC-ENTMCNC: 23.8 PG — LOW (ref 27–34)
MCHC RBC-ENTMCNC: 30.4 GM/DL — LOW (ref 32–36)
MCV RBC AUTO: 78.4 FL — LOW (ref 80–100)
NRBC # BLD: 0 /100 WBCS — SIGNIFICANT CHANGE UP (ref 0–0)
PLATELET # BLD AUTO: 397 K/UL — SIGNIFICANT CHANGE UP (ref 150–400)
POTASSIUM SERPL-MCNC: 3.4 MMOL/L — LOW (ref 3.5–5.3)
POTASSIUM SERPL-SCNC: 3.4 MMOL/L — LOW (ref 3.5–5.3)
RBC # BLD: 3.61 M/UL — LOW (ref 3.8–5.2)
RBC # FLD: 17.4 % — HIGH (ref 10.3–14.5)
SODIUM SERPL-SCNC: 141 MMOL/L — SIGNIFICANT CHANGE UP (ref 135–145)
SPECIMEN SOURCE: SIGNIFICANT CHANGE UP
T PALLIDUM AB TITR SER: NEGATIVE — SIGNIFICANT CHANGE UP
TSH SERPL-MCNC: 0.31 UIU/ML — SIGNIFICANT CHANGE UP (ref 0.27–4.2)
VIT B12 SERPL-MCNC: 647 PG/ML — SIGNIFICANT CHANGE UP (ref 232–1245)
WBC # BLD: 13.38 K/UL — HIGH (ref 3.8–10.5)
WBC # FLD AUTO: 13.38 K/UL — HIGH (ref 3.8–10.5)

## 2021-10-29 PROCEDURE — 83735 ASSAY OF MAGNESIUM: CPT

## 2021-10-29 PROCEDURE — 86780 TREPONEMA PALLIDUM: CPT

## 2021-10-29 PROCEDURE — 87086 URINE CULTURE/COLONY COUNT: CPT

## 2021-10-29 PROCEDURE — 99285 EMERGENCY DEPT VISIT HI MDM: CPT | Mod: 25

## 2021-10-29 PROCEDURE — 96375 TX/PRO/DX INJ NEW DRUG ADDON: CPT

## 2021-10-29 PROCEDURE — 85025 COMPLETE CBC W/AUTO DIFF WBC: CPT

## 2021-10-29 PROCEDURE — 84484 ASSAY OF TROPONIN QUANT: CPT

## 2021-10-29 PROCEDURE — 85730 THROMBOPLASTIN TIME PARTIAL: CPT

## 2021-10-29 PROCEDURE — 83880 ASSAY OF NATRIURETIC PEPTIDE: CPT

## 2021-10-29 PROCEDURE — 82553 CREATINE MB FRACTION: CPT

## 2021-10-29 PROCEDURE — 86850 RBC ANTIBODY SCREEN: CPT

## 2021-10-29 PROCEDURE — 81001 URINALYSIS AUTO W/SCOPE: CPT

## 2021-10-29 PROCEDURE — 82746 ASSAY OF FOLIC ACID SERUM: CPT

## 2021-10-29 PROCEDURE — 82607 VITAMIN B-12: CPT

## 2021-10-29 PROCEDURE — 85610 PROTHROMBIN TIME: CPT

## 2021-10-29 PROCEDURE — 71045 X-RAY EXAM CHEST 1 VIEW: CPT

## 2021-10-29 PROCEDURE — 85027 COMPLETE CBC AUTOMATED: CPT

## 2021-10-29 PROCEDURE — 80048 BASIC METABOLIC PNL TOTAL CA: CPT

## 2021-10-29 PROCEDURE — 36415 COLL VENOUS BLD VENIPUNCTURE: CPT

## 2021-10-29 PROCEDURE — 82272 OCCULT BLD FECES 1-3 TESTS: CPT

## 2021-10-29 PROCEDURE — U0005: CPT

## 2021-10-29 PROCEDURE — 82550 ASSAY OF CK (CPK): CPT

## 2021-10-29 PROCEDURE — P9016: CPT

## 2021-10-29 PROCEDURE — 80053 COMPREHEN METABOLIC PANEL: CPT

## 2021-10-29 PROCEDURE — 36430 TRANSFUSION BLD/BLD COMPNT: CPT

## 2021-10-29 PROCEDURE — 83036 HEMOGLOBIN GLYCOSYLATED A1C: CPT

## 2021-10-29 PROCEDURE — 96374 THER/PROPH/DIAG INJ IV PUSH: CPT

## 2021-10-29 PROCEDURE — 86923 COMPATIBILITY TEST ELECTRIC: CPT

## 2021-10-29 PROCEDURE — 86901 BLOOD TYPING SEROLOGIC RH(D): CPT

## 2021-10-29 PROCEDURE — 82962 GLUCOSE BLOOD TEST: CPT

## 2021-10-29 PROCEDURE — U0003: CPT

## 2021-10-29 PROCEDURE — 84443 ASSAY THYROID STIM HORMONE: CPT

## 2021-10-29 PROCEDURE — 84100 ASSAY OF PHOSPHORUS: CPT

## 2021-10-29 PROCEDURE — 86900 BLOOD TYPING SEROLOGIC ABO: CPT

## 2021-10-29 RX ORDER — INSULIN LISPRO 100/ML
1 VIAL (ML) SUBCUTANEOUS ONCE
Refills: 0 | Status: COMPLETED | OUTPATIENT
Start: 2021-10-29 | End: 2021-10-29

## 2021-10-29 RX ORDER — POTASSIUM CHLORIDE 20 MEQ
40 PACKET (EA) ORAL ONCE
Refills: 0 | Status: COMPLETED | OUTPATIENT
Start: 2021-10-29 | End: 2021-10-29

## 2021-10-29 RX ADMIN — Medication 40 MILLIEQUIVALENT(S): at 11:53

## 2021-10-29 RX ADMIN — Medication 1 UNIT(S): at 22:26

## 2021-10-29 RX ADMIN — MIRTAZAPINE 15 MILLIGRAM(S): 45 TABLET, ORALLY DISINTEGRATING ORAL at 20:48

## 2021-10-29 RX ADMIN — Medication 500 MILLIGRAM(S): at 11:53

## 2021-10-29 RX ADMIN — Medication 100 MILLIGRAM(S): at 07:40

## 2021-10-29 RX ADMIN — QUETIAPINE FUMARATE 25 MILLIGRAM(S): 200 TABLET, FILM COATED ORAL at 13:19

## 2021-10-29 RX ADMIN — QUETIAPINE FUMARATE 25 MILLIGRAM(S): 200 TABLET, FILM COATED ORAL at 07:40

## 2021-10-29 RX ADMIN — Medication 40 MILLIGRAM(S): at 07:40

## 2021-10-29 RX ADMIN — Medication 125 MICROGRAM(S): at 07:40

## 2021-10-29 NOTE — DISCHARGE NOTE PROVIDER - NSCORESITESY/N_GEN_A_CORE_RD
Spoke with Fidelia one of the nurses at [facility] ER Department, report given to Fidelia regarding reason for transfer by ambulance. Fidelia stated that she was able to obtain Dr. Lucero clinic noted from today and if the attending ER Physician need to communicate with Dr. Lucero they will call our clinic   No

## 2021-10-29 NOTE — DISCHARGE NOTE NURSING/CASE MANAGEMENT/SOCIAL WORK - PATIENT PORTAL LINK FT
You can access the FollowMyHealth Patient Portal offered by [facility] by registering at the following website: [email or web address]. By joining [facility]’s FollowMyHealth portal, you will also be able to view your health information using other applications (apps) compatible with our system.

## 2021-10-29 NOTE — DISCHARGE NOTE PROVIDER - CARE PROVIDER_API CALL
JOE DOMINGUEZ [facility] [place] [address]  Phone: [phone]  Fax: [phone]  Follow Up Time: 1 week    Adilson Archuleta)  Internal Medicine  [address] [place] [address]  Phone: [phone]  Fax: [phone]  Follow Up Time:

## 2021-10-29 NOTE — PROGRESS NOTE ADULT - ASSESSMENT
88 yo female h/o dementia, gi bleed, afib on xarelto, diastolic chf, dm, here with gi bleed, acute blood loss anemia    gi bleed  likely diverticular  s/p prbc transfusion  gi consult  hold xarelto  serial cbc - stable  no gi intervention planned     afib  now off AC  rate control    chf  stable    dnr    dc planning     Advanced care planning was discussed with patient and family.  Advanced care planning forms were reviewed and discussed as appropriate.  Differential diagnosis and plan of care discussed with patient after the evaluation.   Pain assessed and judicious use of narcotics when appropriate was discussed.  Importance of Fall prevention discussed.  Counseling on Smoking and Alcohol cessation was offered when appropriate.  Counseling on Diet, exercise, and medication compliance was done.       Approx 60 minutes spent.
89 yo female with h/o dementia, afib on xarelto, diastolic chf, dm, here with acute blood loss anemia. GI consulted for help in management.         Problem/Recommendation - 1:  ·  Problem: Acute blood loss anemia.   ·  Recommendation: - monitory h/h daily, transfuse prn  - hx of diverticular bleed   - hold anticoagulation  - will manage conservatively, no plans for endoscopic work up unless overtly bleeding off anticoagulation   - diet as tolerated.     Problem/Recommendation - 2:  ·  Problem: Chronic atrial fibrillation.   ·  Recommendation: - off anticoagulation given anemia   - appreciate cardiology input.     Problem/Recommendation - 3:  ·  Problem: Chronic diastolic heart failure.   ·  Recommendation: -cardiology following  -on lasix and metoprolol.     Problem/Recommendation - 4:  ·  Problem: Dementia.   ·  Recommendation: - continue home medications, care per medicine    The plan of care was discussed with the physician assistant and modifications were made to the notation where appropriate.   Differential diagnosis and plan of care discussed with patient after the evaluation  35 minutes spent on total encounter of which more than fifty percent of the encounter was spent counseling and/or coordinating care by the attending physician.    [facility]  Gastroenterology and Hepatology  [address] [place], [place]  Office: [phone]  Cell: [phone]  
89 F well known to me from office and recent admission for GI bleed s/p sigmoidoscopy for diverticular hemorrhage, dementia, recent hospitalization for diverticular hemorrhage, afib on xarelto, diastolic HF, DM2 presents for evaluation of sob and anemia. The patient denies CP, cough, sob at rest, bleeding or dark stool (hx limited due to dementia). Per chart, EMS reports patient found to have low Hb as an outpatient in setting of sob.    Hgb 6.1

## 2021-10-29 NOTE — DISCHARGE NOTE PROVIDER - NSDCCPCAREPLAN_GEN_ALL_CORE_FT
PRINCIPAL DISCHARGE DIAGNOSIS  Diagnosis: GI bleed  Assessment and Plan of Treatment: There are 2 common types of GI Bleed, Upper GI Bleed and Lower GI Bleed.  Upper GI Bleed affects the esophagus, stomach, and first part of the small intestine. Lower GI Bleed affects the colon and rectum.  Upper GI Bleed signs and symptoms to notify your Health Care Provider are vomiting blood, or coffee ground vomitus, and bowel movements that look like black tar.  Lower GI Bleed signs and symptoms to notify your health care provider are bright red bloody bowel movements.   Take your medications as prescribed by your Gastroenterologist.  If you have had an Endoscopy or Colonoscopy, follow up with your Gastroenterologist for Pathology results.  Avoid NSAIDs unless your Health Care Provider tells you that it is ok (Aspirin, Ibuprofen, Advil, Motrin, Aleve).  Follow up with your Gastroenterologist within 1-2 weeks of discharge.

## 2021-10-29 NOTE — ADVANCED PRACTICE NURSE CONSULT - REASON FOR CONSULT
Requested by staff to assess skin status: b/l buttocks. staff is concerned that the pt was a/w a deep tissue injury. PMH is noted:  89F w/ dementia, recent hospitalization for diverticular hemorrhage, afib on xarelto, diastolic HF, DM2 presents for evaluation of sob and anemia. The patient denies CP, cough, sob at rest, bleeding or dark stool (hx limited due to dementia). Per chart, EMS reports patient found to have low Hb as an outpatient in setting of sob.
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
## 2021-10-29 NOTE — PROVIDER CONTACT NOTE (OTHER) - ASSESSMENT
Pt is restless and screaming at staff.
Pt is restless and uncooperative. A&Ox2 disoriented to situation

## 2021-10-29 NOTE — DISCHARGE NOTE PROVIDER - NSDCMRMEDTOKEN_GEN_ALL_CORE_FT
ascorbic acid 500 mg oral tablet: 1 tab(s) orally once a day  digoxin 125 mcg (0.125 mg) oral tablet: 1 tab(s) orally once a day  furosemide 40 mg oral tablet: 1 tab(s) orally once a day  Januvia 100 mg oral tablet: 1 tab(s) orally once a day  metoprolol succinate 100 mg oral tablet, extended release: 1 tab(s) orally once a day  mirtazapine 15 mg oral tablet: 1 tab(s) orally once a day (at bedtime)  pantoprazole 40 mg oral delayed release tablet: 1 tab(s) orally once a day  SEROquel 25 mg oral tablet: 1 tab(s) orally 2 times a day  SEROquel 50 mg oral tablet: 1 tab(s) orally once a day (at bedtime)

## 2021-10-29 NOTE — PROGRESS NOTE ADULT - SUBJECTIVE AND OBJECTIVE BOX
Subjective: Patient seen and examined. No new events except as noted.   agitated overnight   refusing meds and labs     REVIEW OF SYSTEMS:    CONSTITUTIONAL:+ weakness, fevers or chills  EYES/ENT: No visual changes;  No vertigo or throat pain   NECK: No pain or stiffness  RESPIRATORY: No cough, wheezing, hemoptysis; No shortness of breath  CARDIOVASCULAR: No chest pain or palpitations  GASTROINTESTINAL: No abdominal or epigastric pain. No nausea, vomiting, or hematemesis; No diarrhea or constipation. No melena or hematochezia.  GENITOURINARY: No dysuria, frequency or hematuria  NEUROLOGICAL: No numbness or weakness  SKIN: No itching, burning, rashes, or lesions   All other review of systems is negative unless indicated above.    MEDICATIONS:  MEDICATIONS  (STANDING):  ascorbic acid 500 milliGRAM(s) Oral daily  dextrose 40% Gel 15 Gram(s) Oral once  dextrose 5%. 1000 milliLiter(s) (50 mL/Hr) IV Continuous <Continuous>  dextrose 5%. 1000 milliLiter(s) (100 mL/Hr) IV Continuous <Continuous>  dextrose 50% Injectable 25 Gram(s) IV Push once  dextrose 50% Injectable 12.5 Gram(s) IV Push once  dextrose 50% Injectable 25 Gram(s) IV Push once  digoxin     Tablet 125 MICROGram(s) Oral daily  furosemide    Tablet 40 milliGRAM(s) Oral daily  glucagon  Injectable 1 milliGRAM(s) IntraMuscular once  insulin lispro (ADMELOG) corrective regimen sliding scale   SubCutaneous Before meals and at bedtime  metoprolol succinate  milliGRAM(s) Oral daily  mirtazapine 15 milliGRAM(s) Oral at bedtime  pantoprazole    Tablet 40 milliGRAM(s) Oral before breakfast  potassium chloride    Tablet ER 40 milliEquivalent(s) Oral once  QUEtiapine 25 milliGRAM(s) Oral <User Schedule>      PHYSICAL EXAM:  T(C): 36.7 (10-29-21 @ 05:01), Max: 36.8 (10-28-21 @ 14:27)  HR: 70 (10-29-21 @ 07:35) (70 - 89)  BP: 138/73 (10-29-21 @ 07:35) (112/66 - 141/86)  RR: 18 (10-29-21 @ 05:01) (18 - 20)  SpO2: 98% (10-29-21 @ 05:01) (96% - 98%)  Wt(kg): --  I&O's Summary          Appearance: NAD sleepy    HEENT:   Normal oral mucosa, PERRL, EOMI	  Lymphatic: No lymphadenopathy  Cardiovascular: Normal S1 S2, No JVD, No murmurs, No edema  Respiratory: decreased bs   Psychiatry: A & O x 2  Gastrointestinal:  Soft, Non-tender, + BS	  Skin: No rashes, No ecchymoses, No cyanosis	  Neurologic: Non-focal  Extremities: Normal range of motion, No clubbing, cyanosis or edema  Vascular: Peripheral pulses palpable 2+ bilaterally        LABS:    CARDIAC MARKERS:  CARDIAC MARKERS ( 27 Oct 2021 22:33 )  x     / x     / 16 U/L / x     / 1.4 ng/mL                                8.6    13.38 )-----------( 397      ( 29 Oct 2021 08:52 )             28.3     10-29    141  |  99  |  23  ----------------------------<  189<H>  3.4<L>   |  25  |  0.88    Ca    9.2      29 Oct 2021 08:52  Phos  3.5     10-27  Mg     2.2     10-27    TPro  6.5  /  Alb  4.3  /  TBili  1.1  /  DBili  x   /  AST  17  /  ALT  15  /  AlkPhos  102  10-28    proBNP:   Lipid Profile:   HgA1c:   TSH:       TELEMETRY: 	    ECG:  	  RADIOLOGY:   DIAGNOSTIC TESTING:  [ ] Echocardiogram:  [ ]  Catheterization:  [ ] Stress Test:    OTHER: 	          
BENITA AMARO  89y Female  MRN:[id number]    Patient is a 89y old  Female who presents with a chief complaint of 89F p/w sob and anemia (28 Oct 2021 14:07)    HPI:  history collected per chart as patient is limited in providing hx due to dementia    89F w/ dementia, recent hospitalization for diverticular hemorrhage, afib on xarelto, diastolic HF, DM2 presents for evaluation of sob and anemia. The patient denies CP, cough, sob at rest, bleeding or dark stool (hx limited due to dementia). Per chart, EMS reports patient found to have low Hb as an outpatient in setting of sob. (28 Oct 2021 03:44)      Patient seen and evaluated at bedside. No acute events overnight except as noted.    Interval HPI: no further bleeding     PAST MEDICAL & SURGICAL HISTORY:  Diabetes mellitus    Atrial fibrillation    Dementia    Diastolic heart failure    No significant past surgical history        REVIEW OF SYSTEMS:  as per hpi     VITALS:  Vital Signs Last 24 Hrs  T(C): 36.7 (29 Oct 2021 05:01), Max: 36.8 (28 Oct 2021 14:27)  T(F): 98 (29 Oct 2021 05:01), Max: 98.3 (28 Oct 2021 22:16)  HR: 70 (29 Oct 2021 07:35) (70 - 89)  BP: 138/73 (29 Oct 2021 07:35) (112/66 - 141/86)  BP(mean): --  RR: 18 (29 Oct 2021 05:01) (18 - 20)  SpO2: 98% (29 Oct 2021 05:01) (96% - 98%)      PHYSICAL EXAM:  GENERAL: NAD, well-developed  HEAD:  Atraumatic, Normocephalic  EYES: EOMI, PERRLA, conjunctiva and sclera clear  NECK: Supple, No JVD  CHEST/LUNG: Clear to auscultation bilaterally; No wheeze  HEART: S1, S2; No murmurs, rubs, or gallops  ABDOMEN: Soft, Nontender, Nondistended; Bowel sounds present  EXTREMITIES:  2+ Peripheral Pulses, No clubbing, cyanosis, or edema  PSYCH: Normal affect  NEUROLOGY: AAOX1-2; non-focal  SKIN: No rashes or lesions    Consultant(s) Notes Reviewed:  [x ] YES  [ ] NO  Care Discussed with Consultants/Other Providers [ x] YES  [ ] NO    MEDS:   MEDICATIONS  (STANDING):  ascorbic acid 500 milliGRAM(s) Oral daily  dextrose 40% Gel 15 Gram(s) Oral once  dextrose 5%. 1000 milliLiter(s) (50 mL/Hr) IV Continuous <Continuous>  dextrose 5%. 1000 milliLiter(s) (100 mL/Hr) IV Continuous <Continuous>  dextrose 50% Injectable 25 Gram(s) IV Push once  dextrose 50% Injectable 12.5 Gram(s) IV Push once  dextrose 50% Injectable 25 Gram(s) IV Push once  digoxin     Tablet 125 MICROGram(s) Oral daily  furosemide    Tablet 40 milliGRAM(s) Oral daily  glucagon  Injectable 1 milliGRAM(s) IntraMuscular once  insulin lispro (ADMELOG) corrective regimen sliding scale   SubCutaneous Before meals and at bedtime  metoprolol succinate  milliGRAM(s) Oral daily  mirtazapine 15 milliGRAM(s) Oral at bedtime  pantoprazole    Tablet 40 milliGRAM(s) Oral before breakfast  QUEtiapine 25 milliGRAM(s) Oral <User Schedule>    ALLERGIES:  No Known Allergies      LABS:                                             8.6    13.38 )-----------( 397      ( 29 Oct 2021 08:52 )             28.3   10-29    141  |  99  |  23  ----------------------------<  189<H>  3.4<L>   |  25  |  0.88    Ca    9.2      29 Oct 2021 08:52  Phos  3.5     10-27  Mg     2.2     10-27    TPro  6.5  /  Alb  4.3  /  TBili  1.1  /  DBili  x   /  AST  17  /  ALT  15  /  AlkPhos  102  10-28      < from: Xray Chest 1 View- PORTABLE-Urgent (Xray Chest 1 View- PORTABLE-Urgent .) (10.27.21 @ 22:03) >  IMPRESSION:    No focal consolidation.    < end of copied text >  
INTERVAL HPI/OVERNIGHT EVENTS:  pt seen and examined, laying comfortably in bed  no melena/brbpr per RN    MEDICATIONS  (STANDING):  ascorbic acid 500 milliGRAM(s) Oral daily  dextrose 40% Gel 15 Gram(s) Oral once  dextrose 5%. 1000 milliLiter(s) (50 mL/Hr) IV Continuous <Continuous>  dextrose 5%. 1000 milliLiter(s) (100 mL/Hr) IV Continuous <Continuous>  dextrose 50% Injectable 25 Gram(s) IV Push once  dextrose 50% Injectable 12.5 Gram(s) IV Push once  dextrose 50% Injectable 25 Gram(s) IV Push once  digoxin     Tablet 125 MICROGram(s) Oral daily  furosemide    Tablet 40 milliGRAM(s) Oral daily  glucagon  Injectable 1 milliGRAM(s) IntraMuscular once  insulin lispro (ADMELOG) corrective regimen sliding scale   SubCutaneous Before meals and at bedtime  metoprolol succinate  milliGRAM(s) Oral daily  mirtazapine 15 milliGRAM(s) Oral at bedtime  pantoprazole    Tablet 40 milliGRAM(s) Oral before breakfast  QUEtiapine 25 milliGRAM(s) Oral <User Schedule>    MEDICATIONS  (PRN):      Allergies    No Known Allergies    Intolerances        Review of Systems:    General:  No wt loss, fevers, chills, night sweats,fatigue,   Eyes:  Good vision, no reported pain  ENT:  No sore throat, pain, runny nose, dysphagia  CV:  No pain, palpitations, hypo/hypertension  Resp:  No dyspnea, cough, tachypnea, wheezing  GI:  see HPI  :  No pain, bleeding, incontinence, nocturia  Muscle:  No pain, weakness  Neuro:  No weakness, tingling, memory problems  Psych:  No fatigue, insomnia, mood problems, depression  Endocrine:  No polyuria, polydypsia, cold/heat intolerance  Heme:  No petechiae, ecchymosis, easy bruisability  Skin:  No rash, tattoos, scars, edema      Vital Signs Last 24 Hrs  T(C): 36.5 (29 Oct 2021 13:31), Max: 36.8 (28 Oct 2021 14:27)  T(F): 97.7 (29 Oct 2021 13:31), Max: 98.3 (28 Oct 2021 22:16)  HR: 70 (29 Oct 2021 13:31) (70 - 89)  BP: 105/68 (29 Oct 2021 13:31) (105/68 - 141/86)  BP(mean): --  RR: 18 (29 Oct 2021 13:31) (18 - 20)  SpO2: 99% (29 Oct 2021 13:31) (96% - 99%)    PHYSICAL EXAM:    Constitutional: NAD, well-developed  HEENT: EOMI, throat clear  Neck: No LAD, supple  Respiratory: CTA and P  Cardiovascular: S1 and S2, RRR, no M  Gastrointestinal: BS+, soft, NT/ND, neg HSM,  Extremities: No peripheral edema, neg clubing, cyanosis  Vascular: 2+ peripheral pulses  Neurological: A/O x 3, no focal deficits  Psychiatric: Normal mood, normal affect  Skin: No rashes      LABS:                        8.6    13.38 )-----------( 397      ( 29 Oct 2021 08:52 )             28.3     10-29    141  |  99  |  23  ----------------------------<  189<H>  3.4<L>   |  25  |  0.88    Ca    9.2      29 Oct 2021 08:52  Phos  3.5     10-27  Mg     2.2     10-27    TPro  6.5  /  Alb  4.3  /  TBili  1.1  /  DBili  x   /  AST  17  /  ALT  15  /  AlkPhos  102  10-28    PT/INR - ( 28 Oct 2021 01:54 )   PT: 16.5 sec;   INR: 1.40 ratio         PTT - ( 28 Oct 2021 01:54 )  PTT:28.3 sec  Urinalysis Basic - ( 28 Oct 2021 00:26 )    Color: Light Yellow / Appearance: Clear / S.011 / pH: x  Gluc: x / Ketone: Negative  / Bili: Negative / Urobili: Negative   Blood: x / Protein: Negative / Nitrite: Negative   Leuk Esterase: Negative / RBC: 1 /hpf / WBC 1 /HPF   Sq Epi: x / Non Sq Epi: 2 /hpf / Bacteria: Negative        RADIOLOGY & ADDITIONAL TESTS:

## 2021-10-29 NOTE — DISCHARGE NOTE PROVIDER - HOSPITAL COURSE
89 F well known to me from office and recent admission for GI bleed s/p sigmoidoscopy for diverticular hemorrhage, dementia, recent hospitalization for diverticular hemorrhage, afib on xarelto, diastolic HF, DM2 presents for evaluation of sob and anemia. The patient denies CP, cough, sob at rest, bleeding or dark stool (hx limited due to dementia). Per chart, EMS reports patient found to have low Hb as an outpatient in setting of sob.    Hgb 6.1.    Problem/Recommendation - 1:  ·  Problem: Acute blood loss anemia.   ·  Recommendation: likely diverticular bleed   s/p PRBC transfusion now H&h stable at 8.6/28.3  will not resume AC any further  GI consulted: no plans for endoscopic w/u      Problem/Recommendation - 2:  ·  Problem: Chronic atrial fibrillation.   ·  Recommendation: rate controlled   NO further AC as above  fall precautions.     Problem/Recommendation - 3:  ·  Problem: Chronic diastolic heart failure.   ·  Recommendation: cont with home lasix dose.     Problem/Recommendation - 4:  ·  Problem: Dementia.   ·  Recommendation: aspiration and fall precautions  DNR/I    Medically cleared for discharge

## 2021-10-29 NOTE — PROVIDER CONTACT NOTE (OTHER) - ACTION/TREATMENT ORDERED:
ACP Andressa made aware, states will come assess pt.
NP made aware. Haldol ordered and constant observation for patient safety.

## 2021-10-29 NOTE — PROVIDER CONTACT NOTE (OTHER) - SITUATION
Pt is uncooperative, rude to staff. Constantly trying to get out of bed without assistance. Refusing all AM labs and medication.
Pt is combative and screaming at staff. Pt  is screaming "Someone help me, I am cold." Blankets provided but patient throws it to staff and on the floor. Pt is constantly trying to get out of bed.

## 2021-10-29 NOTE — ADVANCED PRACTICE NURSE CONSULT - RECOMMEDATIONS
Will recommend the followin. B/l buttocks and sacrum: continue to monitor, routine pericare with Karoline  2.Continue to encourage mobility  3. Complete Cair boots  4. Seat cushion when OOB to chair  5. nutrition consult  Tx plan discussed with RN

## 2021-11-03 NOTE — PATIENT PROFILE ADULT - NUMBER OF YRS
Cata Echavarria (:  1999) is a 25 y.o. female,Established patient, here for evaluation of the following chief complaint(s): Ankle Pain (left ankle pain, started in oct. looks like a rash type area. has used hydrocortisone. )      ASSESSMENT/PLAN:    ICD-10-CM    1. Dermatitis  L30.9 triamcinolone (KENALOG) 0.1 % ointment       Return if symptoms worsen or fail to improve. SUBJECTIVE/OBJECTIVE:  HPI  Here for left ankle pain  Started in October while in [address] [place] [address] rash or bruising, sent pictures and was advised to put hydrocortisone cream on it. Been doing that several times a day and it hasnt helped or gone away. It is a little sore. No injury. It is a little itchy at times. /84   Pulse 81   Temp 97.8 °F (36.6 °C)   Wt 276 lb (125.2 kg)   LMP 2021 (Approximate)   SpO2 97%   BMI 52.15 kg/m²     Review of Systems   Constitutional: Negative for activity change, appetite change, fatigue, fever and unexpected weight change. HENT: Negative for congestion, hearing loss, rhinorrhea, sinus pressure, sore throat and trouble swallowing. Eyes: Negative for visual disturbance. Respiratory: Negative for cough and shortness of breath. Cardiovascular: Negative for chest pain, palpitations and leg swelling. Gastrointestinal: Negative for abdominal pain, constipation, diarrhea, nausea and vomiting. Endocrine: Negative for cold intolerance and heat intolerance. Genitourinary: Negative for flank pain, menstrual problem, pelvic pain, urgency and vaginal discharge. Musculoskeletal: Negative for arthralgias. Skin: Positive for rash (left ankle). Neurological: Negative for headaches. Psychiatric/Behavioral: Negative for dysphoric mood and sleep disturbance. The patient is not nervous/anxious. Physical Exam  Vitals reviewed. HENT:      Head: Normocephalic and atraumatic.       Nose:      Comments: masked     Mouth/Throat:      Comments: masked  Eyes: Conjunctiva/sclera: Conjunctivae normal.   Cardiovascular:      Rate and Rhythm: Normal rate and regular rhythm. Pulses: Normal pulses. Heart sounds: Normal heart sounds. Pulmonary:      Effort: Pulmonary effort is normal.      Breath sounds: Normal breath sounds. Musculoskeletal:      Cervical back: Normal range of motion and neck supple. Skin:     General: Skin is warm. Findings: Rash (left lateral ankle/leg with coarse raised area approx 6cm that blanches with pressure. ) present. Neurological:      Mental Status: She is alert and oriented to person, place, and time. An electronic signature was used to authenticate this note.     --DIMITRI Chapa 0

## 2021-12-08 ENCOUNTER — RX RENEWAL (OUTPATIENT)
Age: 86
End: 2021-12-08

## 2021-12-08 RX ORDER — QUETIAPINE FUMARATE 25 MG/1
25 TABLET ORAL
Qty: 180 | Refills: 3 | Status: ACTIVE | COMMUNITY
Start: 2021-04-08 | End: 1900-01-01

## 2021-12-26 ENCOUNTER — INPATIENT (INPATIENT)
Facility: HOSPITAL | Age: 86
LOS: 10 days | Discharge: NURSING, REHAB OR LONG-TERM CARE | DRG: 884 | End: 2022-01-06
Attending: INTERNAL MEDICINE | Admitting: INTERNAL MEDICINE
Payer: MEDICARE

## 2021-12-26 VITALS
HEIGHT: 68 IN | SYSTOLIC BLOOD PRESSURE: 108 MMHG | WEIGHT: 229.94 LBS | DIASTOLIC BLOOD PRESSURE: 68 MMHG | OXYGEN SATURATION: 97 % | RESPIRATION RATE: 18 BRPM | HEART RATE: 90 BPM | TEMPERATURE: 98 F

## 2021-12-26 DIAGNOSIS — Z96.641 PRESENCE OF RIGHT ARTIFICIAL HIP JOINT: Chronic | ICD-10-CM

## 2021-12-26 DIAGNOSIS — Z87.81 PERSONAL HISTORY OF (HEALED) TRAUMATIC FRACTURE: Chronic | ICD-10-CM

## 2021-12-26 DIAGNOSIS — Z98.1 ARTHRODESIS STATUS: Chronic | ICD-10-CM

## 2021-12-26 DIAGNOSIS — Z90.710 ACQUIRED ABSENCE OF BOTH CERVIX AND UTERUS: Chronic | ICD-10-CM

## 2021-12-26 LAB
ALBUMIN SERPL ELPH-MCNC: 4 G/DL — SIGNIFICANT CHANGE UP (ref 3.3–5)
ALP SERPL-CCNC: 92 U/L — SIGNIFICANT CHANGE UP (ref 40–120)
ALT FLD-CCNC: 15 U/L — SIGNIFICANT CHANGE UP (ref 10–45)
ANION GAP SERPL CALC-SCNC: 15 MMOL/L — SIGNIFICANT CHANGE UP (ref 5–17)
APPEARANCE UR: CLEAR — SIGNIFICANT CHANGE UP
AST SERPL-CCNC: 17 U/L — SIGNIFICANT CHANGE UP (ref 10–40)
BACTERIA # UR AUTO: NEGATIVE — SIGNIFICANT CHANGE UP
BILIRUB SERPL-MCNC: 0.5 MG/DL — SIGNIFICANT CHANGE UP (ref 0.2–1.2)
BILIRUB UR-MCNC: NEGATIVE — SIGNIFICANT CHANGE UP
BUN SERPL-MCNC: 37 MG/DL — HIGH (ref 7–23)
CALCIUM SERPL-MCNC: 9.6 MG/DL — SIGNIFICANT CHANGE UP (ref 8.4–10.5)
CHLORIDE SERPL-SCNC: 97 MMOL/L — SIGNIFICANT CHANGE UP (ref 96–108)
CO2 SERPL-SCNC: 25 MMOL/L — SIGNIFICANT CHANGE UP (ref 22–31)
COLOR SPEC: SIGNIFICANT CHANGE UP
CREAT SERPL-MCNC: 1.3 MG/DL — SIGNIFICANT CHANGE UP (ref 0.5–1.3)
DIFF PNL FLD: NEGATIVE — SIGNIFICANT CHANGE UP
EPI CELLS # UR: 1 /HPF — SIGNIFICANT CHANGE UP
GLUCOSE SERPL-MCNC: 234 MG/DL — HIGH (ref 70–99)
GLUCOSE UR QL: NEGATIVE — SIGNIFICANT CHANGE UP
HCT VFR BLD CALC: 30.2 % — LOW (ref 34.5–45)
HGB BLD-MCNC: 8.6 G/DL — LOW (ref 11.5–15.5)
HYALINE CASTS # UR AUTO: 1 /LPF — SIGNIFICANT CHANGE UP (ref 0–2)
KETONES UR-MCNC: NEGATIVE — SIGNIFICANT CHANGE UP
LEUKOCYTE ESTERASE UR-ACNC: NEGATIVE — SIGNIFICANT CHANGE UP
MAGNESIUM SERPL-MCNC: 2 MG/DL — SIGNIFICANT CHANGE UP (ref 1.6–2.6)
MCHC RBC-ENTMCNC: 20.5 PG — LOW (ref 27–34)
MCHC RBC-ENTMCNC: 28.5 GM/DL — LOW (ref 32–36)
MCV RBC AUTO: 71.9 FL — LOW (ref 80–100)
NITRITE UR-MCNC: NEGATIVE — SIGNIFICANT CHANGE UP
NT-PROBNP SERPL-SCNC: 3145 PG/ML — HIGH (ref 0–300)
PH UR: 6.5 — SIGNIFICANT CHANGE UP (ref 5–8)
PLATELET # BLD AUTO: 352 K/UL — SIGNIFICANT CHANGE UP (ref 150–400)
POTASSIUM SERPL-MCNC: 3.6 MMOL/L — SIGNIFICANT CHANGE UP (ref 3.5–5.3)
POTASSIUM SERPL-SCNC: 3.6 MMOL/L — SIGNIFICANT CHANGE UP (ref 3.5–5.3)
PROT SERPL-MCNC: 6.5 G/DL — SIGNIFICANT CHANGE UP (ref 6–8.3)
PROT UR-MCNC: SIGNIFICANT CHANGE UP
RBC # BLD: 4.2 M/UL — SIGNIFICANT CHANGE UP (ref 3.8–5.2)
RBC # FLD: 19.3 % — HIGH (ref 10.3–14.5)
RBC CASTS # UR COMP ASSIST: 0 /HPF — SIGNIFICANT CHANGE UP (ref 0–4)
SODIUM SERPL-SCNC: 137 MMOL/L — SIGNIFICANT CHANGE UP (ref 135–145)
SP GR SPEC: 1.01 — LOW (ref 1.01–1.02)
TROPONIN T, HIGH SENSITIVITY RESULT: 32 NG/L — SIGNIFICANT CHANGE UP (ref 0–51)
UROBILINOGEN FLD QL: NEGATIVE — SIGNIFICANT CHANGE UP
WBC # BLD: 13.65 K/UL — HIGH (ref 3.8–10.5)
WBC # FLD AUTO: 13.65 K/UL — HIGH (ref 3.8–10.5)
WBC UR QL: 0 /HPF — SIGNIFICANT CHANGE UP (ref 0–5)

## 2021-12-26 PROCEDURE — 71045 X-RAY EXAM CHEST 1 VIEW: CPT | Mod: 26

## 2021-12-26 PROCEDURE — 93010 ELECTROCARDIOGRAM REPORT: CPT

## 2021-12-26 PROCEDURE — 70450 CT HEAD/BRAIN W/O DYE: CPT | Mod: 26,MA

## 2021-12-26 PROCEDURE — 99285 EMERGENCY DEPT VISIT HI MDM: CPT

## 2021-12-26 NOTE — ED PROVIDER NOTE - ATTENDING CONTRIBUTION TO CARE
I have personally seen and examined this patient.  I have fully participated in the care of this patient. I performed a substantive portion of the visit including all aspects of the medical decision making. I have reviewed all pertinent clinical information, including history, physical exam, plan and the Resident’s note and agree except as noted. - MD Lalitha.    see my MDM

## 2021-12-26 NOTE — ED PROVIDER NOTE - PHYSICAL EXAMINATION
Attending/MD Lalitha.   VITALS: reviewed  GEN: No apparent distress, A & O x 2  HEAD/EYES: NC/AT, PERRL, EOMI, anicteric sclerae, no conjunctival pallor  ENT: mucus membranes moist, trachea midline, no JVD, neck is supple  RESP: lungs CTA with equal breath sounds bilaterally, chest wall nontender and atraumatic  CV: heart with reg rhythm S1, S2, no murmur; distal pulses intact and symmetric bilaterally ++ b/l leg pitting edema  ABDOMEN: normoactive bowel sounds, soft, nondistended, nontender  MSK: extremities atraumatic and nontender, no edema, no asymmetry.   SKIN: warm, dry, no rash, no bruising, no cyanosis.  NEURO: alert, mentating appropriately, no facial asymmetry.  PSYCH: Affect appropriate
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
## 2021-12-26 NOTE — ED PROVIDER NOTE - CLINICAL SUMMARY MEDICAL DECISION MAKING FREE TEXT BOX
Attending/MD Lalitha. 89 yo F with dementia, afib, pt has not chart at this visit but reprotedly pt was admitted for anemia and delirium in the past, confusion, agitation, pt was sent in by family, due to manic episodes, combative, telling me no capacity to take care of the pt at home, willing replacement to [facility]. will work up for metabilic, ct head, admission for acute delirium. will address treatable medical condition.

## 2021-12-26 NOTE — ED ADULT NURSE NOTE - NSIMPLEMENTINTERV_GEN_ALL_ED
Implemented All Fall with Harm Risk Interventions:  [place] to call system. Call bell, personal items and telephone within reach. Instruct patient to call for assistance. Room bathroom lighting operational. Non-slip footwear when patient is off stretcher. Physically safe environment: no spills, clutter or unnecessary equipment. Stretcher in lowest position, wheels locked, appropriate side rails in place. Provide visual cue, wrist band, yellow gown, etc. Monitor gait and stability. Monitor for mental status changes and reorient to person, place, and time. Review medications for side effects contributing to fall risk. Reinforce activity limits and safety measures with patient and family. Provide visual clues: red socks.

## 2021-12-26 NOTE — ED PROVIDER NOTE - OBJECTIVE STATEMENT
Attending/MD Lalitha. 89 yo F, with DM on genubia, HTN, CHF, on lasix, Afib on dibgoxin, stopped Eliquos due to decreased Hb, depression on Seroquel, pt became combative, and more agitated. pt was given risperidone 0.5mg, history per family, stating increased size of the leg swelling. No fever or cough.    Address: [address] [place] [address] [place]  Neurologist: Nader Garcia  PCP: Neil Sommers, [phone]

## 2021-12-27 DIAGNOSIS — R41.0 DISORIENTATION, UNSPECIFIED: ICD-10-CM

## 2021-12-27 DIAGNOSIS — F03.91 UNSPECIFIED DEMENTIA WITH BEHAVIORAL DISTURBANCE: ICD-10-CM

## 2021-12-27 DIAGNOSIS — I48.19 OTHER PERSISTENT ATRIAL FIBRILLATION: ICD-10-CM

## 2021-12-27 DIAGNOSIS — D63.8 ANEMIA IN OTHER CHRONIC DISEASES CLASSIFIED ELSEWHERE: ICD-10-CM

## 2021-12-27 DIAGNOSIS — F03.90 UNSPECIFIED DEMENTIA WITHOUT BEHAVIORAL DISTURBANCE: ICD-10-CM

## 2021-12-27 LAB
A1C WITH ESTIMATED AVERAGE GLUCOSE RESULT: 8.2 % — HIGH (ref 4–5.6)
ANION GAP SERPL CALC-SCNC: 15 MMOL/L — SIGNIFICANT CHANGE UP (ref 5–17)
ANISOCYTOSIS BLD QL: SLIGHT — SIGNIFICANT CHANGE UP
BASOPHILS # BLD AUTO: 0.12 K/UL — SIGNIFICANT CHANGE UP (ref 0–0.2)
BASOPHILS NFR BLD AUTO: 0.9 % — SIGNIFICANT CHANGE UP (ref 0–2)
BUN SERPL-MCNC: 35 MG/DL — HIGH (ref 7–23)
CALCIUM SERPL-MCNC: 9.8 MG/DL — SIGNIFICANT CHANGE UP (ref 8.4–10.5)
CHLORIDE SERPL-SCNC: 97 MMOL/L — SIGNIFICANT CHANGE UP (ref 96–108)
CO2 SERPL-SCNC: 26 MMOL/L — SIGNIFICANT CHANGE UP (ref 22–31)
CREAT SERPL-MCNC: 1.18 MG/DL — SIGNIFICANT CHANGE UP (ref 0.5–1.3)
CULTURE RESULTS: NO GROWTH — SIGNIFICANT CHANGE UP
DACRYOCYTES BLD QL SMEAR: SLIGHT — SIGNIFICANT CHANGE UP
ELLIPTOCYTES BLD QL SMEAR: SLIGHT — SIGNIFICANT CHANGE UP
EOSINOPHIL # BLD AUTO: 0.12 K/UL — SIGNIFICANT CHANGE UP (ref 0–0.5)
EOSINOPHIL NFR BLD AUTO: 0.9 % — SIGNIFICANT CHANGE UP (ref 0–6)
ESTIMATED AVERAGE GLUCOSE: 189 MG/DL — HIGH (ref 68–114)
GIANT PLATELETS BLD QL SMEAR: PRESENT — SIGNIFICANT CHANGE UP
GLUCOSE SERPL-MCNC: 280 MG/DL — HIGH (ref 70–99)
HCT VFR BLD CALC: 33.8 % — LOW (ref 34.5–45)
HGB BLD-MCNC: 9.3 G/DL — LOW (ref 11.5–15.5)
LYMPHOCYTES # BLD AUTO: 0.96 K/UL — LOW (ref 1–3.3)
LYMPHOCYTES # BLD AUTO: 7 % — LOW (ref 13–44)
MANUAL SMEAR VERIFICATION: SIGNIFICANT CHANGE UP
MCHC RBC-ENTMCNC: 19.7 PG — LOW (ref 27–34)
MCHC RBC-ENTMCNC: 27.5 GM/DL — LOW (ref 32–36)
MCV RBC AUTO: 71.8 FL — LOW (ref 80–100)
MICROCYTES BLD QL: SLIGHT — SIGNIFICANT CHANGE UP
MONOCYTES # BLD AUTO: 0.83 K/UL — SIGNIFICANT CHANGE UP (ref 0–0.9)
MONOCYTES NFR BLD AUTO: 6.1 % — SIGNIFICANT CHANGE UP (ref 2–14)
NEUTROPHILS # BLD AUTO: 11.62 K/UL — HIGH (ref 1.8–7.4)
NEUTROPHILS NFR BLD AUTO: 85.1 % — HIGH (ref 43–77)
NRBC # BLD: 0 /100 WBCS — SIGNIFICANT CHANGE UP (ref 0–0)
OVALOCYTES BLD QL SMEAR: SLIGHT — SIGNIFICANT CHANGE UP
PLAT MORPH BLD: NORMAL — SIGNIFICANT CHANGE UP
PLATELET # BLD AUTO: 416 K/UL — HIGH (ref 150–400)
POIKILOCYTOSIS BLD QL AUTO: SLIGHT — SIGNIFICANT CHANGE UP
POTASSIUM SERPL-MCNC: 4.1 MMOL/L — SIGNIFICANT CHANGE UP (ref 3.5–5.3)
POTASSIUM SERPL-SCNC: 4.1 MMOL/L — SIGNIFICANT CHANGE UP (ref 3.5–5.3)
RBC # BLD: 4.71 M/UL — SIGNIFICANT CHANGE UP (ref 3.8–5.2)
RBC # FLD: 19.5 % — HIGH (ref 10.3–14.5)
RBC BLD AUTO: ABNORMAL
SARS-COV-2 RNA SPEC QL NAA+PROBE: SIGNIFICANT CHANGE UP
SODIUM SERPL-SCNC: 138 MMOL/L — SIGNIFICANT CHANGE UP (ref 135–145)
SPECIMEN SOURCE: SIGNIFICANT CHANGE UP
TARGETS BLD QL SMEAR: SLIGHT — SIGNIFICANT CHANGE UP
TSH SERPL-MCNC: 0.75 UIU/ML — SIGNIFICANT CHANGE UP (ref 0.27–4.2)
VIT B12 SERPL-MCNC: 458 PG/ML — SIGNIFICANT CHANGE UP (ref 232–1245)
WBC # BLD: 12.99 K/UL — HIGH (ref 3.8–10.5)
WBC # FLD AUTO: 12.99 K/UL — HIGH (ref 3.8–10.5)

## 2021-12-27 PROCEDURE — 99223 1ST HOSP IP/OBS HIGH 75: CPT

## 2021-12-27 RX ORDER — DEXTROSE 50 % IN WATER 50 %
15 SYRINGE (ML) INTRAVENOUS ONCE
Refills: 0 | Status: DISCONTINUED | OUTPATIENT
Start: 2021-12-27 | End: 2022-01-06

## 2021-12-27 RX ORDER — VALPROIC ACID (AS SODIUM SALT) 250 MG/5ML
125 SOLUTION, ORAL ORAL ONCE
Refills: 0 | Status: COMPLETED | OUTPATIENT
Start: 2021-12-27 | End: 2021-12-27

## 2021-12-27 RX ORDER — INSULIN LISPRO 100/ML
VIAL (ML) SUBCUTANEOUS AT BEDTIME
Refills: 0 | Status: DISCONTINUED | OUTPATIENT
Start: 2021-12-27 | End: 2022-01-06

## 2021-12-27 RX ORDER — MIRTAZAPINE 45 MG/1
30 TABLET, ORALLY DISINTEGRATING ORAL DAILY
Refills: 0 | Status: DISCONTINUED | OUTPATIENT
Start: 2021-12-27 | End: 2022-01-06

## 2021-12-27 RX ORDER — HALOPERIDOL DECANOATE 100 MG/ML
1 INJECTION INTRAMUSCULAR ONCE
Refills: 0 | Status: DISCONTINUED | OUTPATIENT
Start: 2021-12-27 | End: 2021-12-27

## 2021-12-27 RX ORDER — GLUCAGON INJECTION, SOLUTION 0.5 MG/.1ML
1 INJECTION, SOLUTION SUBCUTANEOUS ONCE
Refills: 0 | Status: DISCONTINUED | OUTPATIENT
Start: 2021-12-27 | End: 2022-01-06

## 2021-12-27 RX ORDER — HALOPERIDOL DECANOATE 100 MG/ML
5 INJECTION INTRAMUSCULAR ONCE
Refills: 0 | Status: COMPLETED | OUTPATIENT
Start: 2021-12-27 | End: 2021-12-27

## 2021-12-27 RX ORDER — DIGOXIN 250 MCG
125 TABLET ORAL DAILY
Refills: 0 | Status: DISCONTINUED | OUTPATIENT
Start: 2021-12-27 | End: 2022-01-06

## 2021-12-27 RX ORDER — DEXTROSE 50 % IN WATER 50 %
25 SYRINGE (ML) INTRAVENOUS ONCE
Refills: 0 | Status: DISCONTINUED | OUTPATIENT
Start: 2021-12-27 | End: 2022-01-06

## 2021-12-27 RX ORDER — INSULIN LISPRO 100/ML
VIAL (ML) SUBCUTANEOUS
Refills: 0 | Status: DISCONTINUED | OUTPATIENT
Start: 2021-12-27 | End: 2022-01-06

## 2021-12-27 RX ORDER — FUROSEMIDE 40 MG
40 TABLET ORAL DAILY
Refills: 0 | Status: DISCONTINUED | OUTPATIENT
Start: 2021-12-27 | End: 2022-01-04

## 2021-12-27 RX ORDER — SODIUM CHLORIDE 9 MG/ML
1000 INJECTION, SOLUTION INTRAVENOUS
Refills: 0 | Status: DISCONTINUED | OUTPATIENT
Start: 2021-12-27 | End: 2022-01-06

## 2021-12-27 RX ORDER — METOPROLOL TARTRATE 50 MG
100 TABLET ORAL DAILY
Refills: 0 | Status: DISCONTINUED | OUTPATIENT
Start: 2021-12-27 | End: 2022-01-06

## 2021-12-27 RX ORDER — HEPARIN SODIUM 5000 [USP'U]/ML
5000 INJECTION INTRAVENOUS; SUBCUTANEOUS EVERY 8 HOURS
Refills: 0 | Status: DISCONTINUED | OUTPATIENT
Start: 2021-12-27 | End: 2022-01-06

## 2021-12-27 RX ORDER — PANTOPRAZOLE SODIUM 20 MG/1
40 TABLET, DELAYED RELEASE ORAL
Refills: 0 | Status: DISCONTINUED | OUTPATIENT
Start: 2021-12-27 | End: 2022-01-06

## 2021-12-27 RX ORDER — DEXTROSE 50 % IN WATER 50 %
12.5 SYRINGE (ML) INTRAVENOUS ONCE
Refills: 0 | Status: DISCONTINUED | OUTPATIENT
Start: 2021-12-27 | End: 2022-01-06

## 2021-12-27 RX ORDER — FUROSEMIDE 40 MG
40 TABLET ORAL DAILY
Refills: 0 | Status: DISCONTINUED | OUTPATIENT
Start: 2021-12-27 | End: 2021-12-27

## 2021-12-27 RX ORDER — DIGOXIN 250 MCG
250 TABLET ORAL DAILY
Refills: 0 | Status: DISCONTINUED | OUTPATIENT
Start: 2021-12-27 | End: 2021-12-27

## 2021-12-27 RX ADMIN — HALOPERIDOL DECANOATE 5 MILLIGRAM(S): 100 INJECTION INTRAMUSCULAR at 08:52

## 2021-12-27 RX ADMIN — Medication 125 MICROGRAM(S): at 16:18

## 2021-12-27 RX ADMIN — Medication 100 MILLIGRAM(S): at 16:18

## 2021-12-27 RX ADMIN — Medication 40 MILLIGRAM(S): at 16:18

## 2021-12-27 RX ADMIN — Medication 25 MILLIGRAM(S): at 20:45

## 2021-12-27 NOTE — BEHAVIORAL HEALTH ASSESSMENT NOTE - NSBHCHARTREVIEWINVESTIGATE_PSY_A_CORE FT
Ventricular Rate 80 BPM    Atrial Rate 55 BPM    QRS Duration 132 ms    Q-T Interval 388 ms    QTC Calculation(Bazett) 447 ms

## 2021-12-27 NOTE — BEHAVIORAL HEALTH ASSESSMENT NOTE - NSBHCHARTREVIEWIMAGING_PSY_A_CORE FT
< from: CT Head No Cont (12.26.21 @ 22:24) >    IMPRESSION:  No CT evidenceof acute intracranial pathology.    Approximately 2.1 cm, partially calcified mass in the posterior fossa,   arising from the posterior lateral aspect of the right petrous temporal   bone, most likely represents a meningioma.  Contrast-enhanced brainMRI   is recommended.  For further characterization.    < end of copied text >    < from: CT Head No Cont (12.26.21 @ 22:24) >    IMPRESSION:  No CT evidenceof acute intracranial pathology.    Approximately 2.1 cm, partially calcified mass in the posterior fossa,   arising from the posterior lateral aspect of the right petrous temporal   bone, most likely represents a meningioma.  Contrast-enhanced brainMRI   is recommended.  For further characterization.    < end of copied text >

## 2021-12-27 NOTE — BEHAVIORAL HEALTH ASSESSMENT NOTE - NSBHCONSULTRECOMMENDOTHER_PSY_A_CORE FT
melatonin 3mg p.o. at bedtime  adjust Seroquel to 25mg p.o. TID  mirtazapine 30mg p.o. at bedtime Monitor QTc on EKG

## 2021-12-27 NOTE — H&P ADULT - NSHPLABSRESULTS_GEN_ALL_CORE
TELEMETRY: 	    ECG:  	Afib     RADIOLOGY:  < from: CT Head No Cont (12.26.21 @ 22:24) >  ACC: [id number] EXAM:  CT BRAIN                          PROCEDURE DATE:  12/26/2021          INTERPRETATION:  CLINICAL INFORMATION: Delirium.    TECHNIQUE:  Axial CT images were acquired through the head.  Intravenous contrast: None  Two-dimensionalreformats were generated.    COMPARISON STUDY: None    FINDINGS:  There is no CT evidence of acute intracranial hemorrhage, mass effect,   midline shift or acute territorial infarct.    There is moderate generalized cerebral volume loss.  No clinically   significant chronic microvascular ischemic disease or white matter   lesions are visualized by CT technique.                            8.6    13.65 )-----------( 352      ( 26 Dec 2021 23:05 )             30.2     12-26    137  |  97  |  37<H>  ----------------------------<  234<H>  3.6   |  25  |  1.30    Ca    9.6      26 Dec 2021 23:05  Mg     2.0     12-26    TPro  6.5  /  Alb  4.0  /  TBili  0.5  /  DBili  x   /  AST  17  /  ALT  15  /  AlkPhos  92  12-26    proBNP: Serum Pro-Brain Natriuretic Peptide: 3145 pg/mL (12-26 @ 23:05)    Lipid Profile:   HgA1c:   TSH: Thyroid Stimulating Hormone, Serum: 0.75 uIU/mL (12-27 @ 04:50)      There is an approximately 2.1 cm, partially calcified round extra-axial   mass in the posterior fossa arising from the posterior lateral aspect of   the right petrous temporal bone.    The left mastoid is sclerotic and underpneumatized.  There is no   clinically significant mastoid effusion.    The calvarium, skull base, and orbits appear unremarkable.    IMPRESSION:  No CT evidenceof acute intracranial pathology.    Approximately 2.1 cm, partially calcified mass in the posterior fossa,   arising from the posterior lateral aspect of the right petrous temporal   bone, most likely represents a meningioma.  Contrast-enhanced brainMRI   is recommended.  For further characterization.    --- End of Report ---    < end of copied text >  < from: Xray Chest 1 View- PORTABLE-Urgent (Xray Chest 1 View- PORTABLE-Urgent .) (12.26.21 @ 22:40) >

## 2021-12-27 NOTE — BEHAVIORAL HEALTH ASSESSMENT NOTE - NSBHCHARTREVIEWLAB_PSY_A_CORE FT
8.6    13.65 )-----------( 352      ( 26 Dec 2021 23:05 )             30.2         137  |  97  |  37<H>  ----------------------------<  234<H>  3.6   |  25  |  1.30    Ca    9.6      26 Dec 2021 23:05  Mg     2.0         TPro  6.5  /  Alb  4.0  /  TBili  0.5  /  DBili  x   /  AST  17  /  ALT  15  /  AlkPhos  92      Urinalysis Basic - ( 26 Dec 2021 23:05 )    Color: Light Yellow / Appearance: Clear / S.009 / pH: x  Gluc: x / Ketone: Negative  / Bili: Negative / Urobili: Negative   Blood: x / Protein: Trace / Nitrite: Negative   Leuk Esterase: Negative / RBC: 0 /hpf / WBC 0 /HPF   Sq Epi: x / Non Sq Epi: 1 /hpf / Bacteria: Negative

## 2021-12-27 NOTE — ED ADULT NURSE REASSESSMENT NOTE - NS ED NURSE REASSESS COMMENT FT1
Pt becomes aggressive to staff, verbally abusive and expressive to RN at bedside attempting to obtain VS and blood work. Pt refusing blood work/VS at this time, MD aware. Pt resting in bed, awaiting admission.

## 2021-12-27 NOTE — CONSULT NOTE ADULT - ASSESSMENT
89 yo F with dementia, DM,, HTN, depression, CHF, Afib on digoxin stopped Eliquis due to recurrent GI bleed/anemia p/w agitation/AMS.   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 MAYES AAox1-2, agitation  Impression: AMS of unclear etiology, possible toxic metabolic encephalopathy given Leukocytosis. r/o CHF exacerbation   - on enhanced. agitated at times  - check QTC, if <500 seroquel and/or zyprexa PRN for agitation  - not on AC given anemia/GIB. consider DOAC for AF when able; now on digoxin  - MRI brain w/ and w/o in or outpt.  inpatient if no improvement  - consider rEEG if no improvement. unlikely to keep on given agitation  - r/o infection  - f/u psych   - check b12 WNL, rpr, TSH   - telemetry  - PT/OT  - check FS, glucose control <180  - GI/DVT ppx  - Counseling on diet, exercise, and medication adherence was done  - Counseling on smoking cessation and alcohol consumption offered when appropriate.  - Pain assessed and judicious use of narcotics when appropriate was discussed.    - Stroke education given when appropriate.  - Importance of fall prevention discussed.   - Differential diagnosis and plan of care discussed with patient and/or family and primary team  - Thank you for allowing me to participate in the care of this patient. Call with questions.   Konstantin Giordano MD  Vascular Neurology  Office: [phone]

## 2021-12-27 NOTE — BEHAVIORAL HEALTH ASSESSMENT NOTE - NSBHCHARTREVIEWVS_PSY_A_CORE FT
Vital Signs Last 24 Hrs  T(C): 36.4 (27 Dec 2021 08:00), Max: 36.8 (27 Dec 2021 00:22)  T(F): 97.5 (27 Dec 2021 08:00), Max: 98.2 (27 Dec 2021 00:22)  HR: 70 (27 Dec 2021 08:00) (70 - 90)  BP: 128/98 (27 Dec 2021 08:00) (108/68 - 128/98)  BP(mean): --  RR: 19 (27 Dec 2021 08:00) (18 - 19)  SpO2: 95% (27 Dec 2021 08:00) (95% - 98%)  T(C): 36.4 (12-27-21 @ 08:00), Max: 36.8 (12-27-21 @ 00:22)  HR: 70 (12-27-21 @ 08:00) (70 - 90)  BP: 128/98 (12-27-21 @ 08:00) (108/68 - 128/98)  RR: 19 (12-27-21 @ 08:00) (18 - 19)  SpO2: 95% (12-27-21 @ 08:00) (95% - 98%)  Wt(kg): --

## 2021-12-27 NOTE — BEHAVIORAL HEALTH ASSESSMENT NOTE - NSBHCONSULTMEDS_PSY_A_CORE FT
head MRI melatonin 3mg p.o. at bedtime  adjust Seroquel to 25mg p.o. TID  mirtazapine 30mg p.o. at bedtime

## 2021-12-27 NOTE — CHART NOTE - NSCHARTNOTEFT_GEN_A_CORE
MEDICINE NP    GUS AMARO  89y Female    Patient is a 89y old  Female who presents with a chief complaint of        > Event Summary:   Notified by RN, Patient with confused and agitated, refusing any oral medications.  Patient seen in ED- [place],  Noted yelling.  OOB to chair w/ 1:1  @ bedside reporting patient restless, and agitated, trying  to leave.    Patient AOX1, to self.  Does follow some commands but easily upset and unable to direct.      Patient is an 89yoF, with T2DM, HTN, CHF, Afib on Digoxn /stopped Eliquis due to decreased Hgb/GIB, depression on Seroquel, Patient became combative, and more agitated. No taking a shower, yelling, hitting, spitting.  Admitted with  AMS/agitation- CTH w/ possible L meningioma s/p Haldol IV.  Now with recurrent Agitation  -Psych Consult appreciated  -As patient not taking oral medications - ordered Depacon 125mg IV x1 and re-evaluate   -Continue Constant Observation   -F/u Psych in AM  -Monitor closely overnight         -Vital Signs Last 24 Hrs  T(C): 36.4 (28 Dec 2021 00:50), Max: 36.7 (27 Dec 2021 20:53)  T(F): 97.6 (28 Dec 2021 00:50), Max: 98.1 (27 Dec 2021 20:53)  HR: 80 (28 Dec 2021 00:50) (70 - 87)  BP: 133/53 (28 Dec 2021 00:50) (103/65 - 133/53)  RR: 18 (28 Dec 2021 00:50) (17 - 19)  SpO2: 99% (28 Dec 2021 00:50) (95% - 99%)      KAMILLA Alba-BC  Medicine Department  #[id number]

## 2021-12-27 NOTE — CONSULT NOTE ADULT - SUBJECTIVE AND OBJECTIVE BOX
Neurology Consult    Reason for Consult: Patient is a 89y old  Female who presents with a chief complaint of AMS     HPI:  88 yo F with dementia, DM,, HTN, CHF, on lasix, Afib on digoxin stopped Eliquis due to recurrent GI bleed/anemia, dementia depression on Seroquel, pt became combative, and more agitated. pt was given risperidone 0.5mg, history per family, stating increased size of the leg swelling. No fever or cough. (27 Dec 2021 10:11)       PAST MEDICAL & SURGICAL HISTORY:      Allergies: Allergies    No Known Allergies    Intolerances        Social History: Denies toxic habits including tobacco, ETOH or illicit drugs.    Family History: FAMILY HISTORY:  . No family history of strokes    Medications: MEDICATIONS  (STANDING):  dextrose 40% Gel 15 Gram(s) Oral once  dextrose 5%. 1000 milliLiter(s) (50 mL/Hr) IV Continuous <Continuous>  dextrose 5%. 1000 milliLiter(s) (100 mL/Hr) IV Continuous <Continuous>  dextrose 50% Injectable 25 Gram(s) IV Push once  dextrose 50% Injectable 12.5 Gram(s) IV Push once  dextrose 50% Injectable 25 Gram(s) IV Push once  digoxin     Tablet 125 MICROGram(s) Oral daily  furosemide   Injectable 40 milliGRAM(s) IV Push daily  glucagon  Injectable 1 milliGRAM(s) IntraMuscular once  heparin   Injectable 5000 Unit(s) SubCutaneous every 8 hours  metoprolol succinate  milliGRAM(s) Oral daily  mirtazapine 30 milliGRAM(s) Oral daily  pantoprazole    Tablet 40 milliGRAM(s) Oral before breakfast    MEDICATIONS  (PRN):      Review of Systems: unable/limited  given mental status  CONSTITUTIONAL:  No weight loss, fever, chills, weakness or fatigue.  HEENT:  Eyes:  No visual loss, blurred vision, double vision or yellow sclera. Ears, Nose, Throat:  No hearing loss, sneezing, congestion, runny nose or sore throat.  SKIN:  No rash or itching.  CARDIOVASCULAR:  No chest pain, chest pressure or chest discomfort. No palpitations or edema.  RESPIRATORY:  No shortness of breath, cough or sputum.  GASTROINTESTINAL:  No anorexia, nausea, vomiting or diarrhea. No abdominal pain or blood.  GENITOURINARY:  No burning on urination or incontinence   NEUROLOGICAL:  No headache, dizziness, syncope, paralysis, ataxia, numbness or tingling in the extremities. No change in bowel or bladder control. no limb weakness. no vision changes.   MUSCULOSKELETAL:  No muscle, back pain, joint pain or stiffness.  HEMATOLOGIC:  No anemia, bleeding or bruising.  LYMPHATICS:  No enlarged nodes. No history of splenectomy.  PSYCHIATRIC:  No history of depression or anxiety.  ENDOCRINOLOGIC:  No reports of sweating, cold or heat intolerance. No polyuria or polydipsia.      Vitals:  Vital Signs Last 24 Hrs  T(C): 36.4 (27 Dec 2021 08:00), Max: 36.8 (27 Dec 2021 00:22)  T(F): 97.5 (27 Dec 2021 08:00), Max: 98.2 (27 Dec 2021 00:22)  HR: 70 (27 Dec 2021 08:00) (70 - 90)  BP: 128/98 (27 Dec 2021 08:00) (108/68 - 128/98)  BP(mean): --  RR: 19 (27 Dec 2021 08:00) (18 - 19)  SpO2: 95% (27 Dec 2021 08:00) (95% - 98%)    General Exam:   General Appearance: Appropriately dressed and in no acute distress       Head: Normocephalic, atraumatic and no dysmorphic features  Ear, Nose, and Throat: Moist mucous membranes  CVS: S1S2+  Resp: No SOB, no wheeze or rhonchi  GI: soft NT/ND  Extremities: No edema or cyanosis  Skin: No bruises or rashes     Neurological Exam:  Mental Status: Awake, alert and oriented x 1-2 (knows hospital and family members.  Able to follow some simple  verbal commands. Able to name and repeat. fluent speech. No obvious aphasia or dysarthria noted. combative at times   Cranial Nerves: PERRL, EOMI, VFFC, sensation V1-V3 intact,  no obvious facial asymmetry, equal elevation of palate, scm/trap 5/5, tongue is midline on protrusion. no obvious papilledema on fundoscopic exam. hearing is grossly intact.   Motor: Normal bulk, tone and strength throughout. Fine finger movements were intact and symmetric. no tremors or drift noted.    Sensation: Intact to light touch and pinprick throughout. no right/left confusion. no extinction to tactile on DSS.    Reflexes: 1+ throughout at biceps, brachioradialis, triceps, patellars and ankles bilaterally and equal. No clonus. R toe and L toe were both downgoing.  Coordination: not following   Gait: defererd in ED     Data/Labs/Imaging which I personally reviewed.     Labs:     CBC Full  -  ( 26 Dec 2021 23:05 )  WBC Count : 13.65 K/uL  RBC Count : 4.20 M/uL  Hemoglobin : 8.6 g/dL  Hematocrit : 30.2 %  Platelet Count - Automated : 352 K/uL  Mean Cell Volume : 71.9 fl  Mean Cell Hemoglobin : 20.5 pg  Mean Cell Hemoglobin Concentration : 28.5 gm/dL  Auto Neutrophil # : 11.62 K/uL  Auto Lymphocyte # : 0.96 K/uL  Auto Monocyte # : 0.83 K/uL  Auto Eosinophil # : 0.12 K/uL  Auto Basophil # : 0.12 K/uL  Auto Neutrophil % : 85.1 %  Auto Lymphocyte % : 7.0 %  Auto Monocyte % : 6.1 %  Auto Eosinophil % : 0.9 %  Auto Basophil % : 0.9 %        137  |  97  |  37<H>  ----------------------------<  234<H>  3.6   |  25  |  1.30    Ca    9.6      26 Dec 2021 23:05  Mg     2.0         TPro  6.5  /  Alb  4.0  /  TBili  0.5  /  DBili  x   /  AST  17  /  ALT  15  /  AlkPhos  92      LIVER FUNCTIONS - ( 26 Dec 2021 23:05 )  Alb: 4.0 g/dL / Pro: 6.5 g/dL / ALK PHOS: 92 U/L / ALT: 15 U/L / AST: 17 U/L / GGT: x             Urinalysis Basic - ( 26 Dec 2021 23:05 )    Color: Light Yellow / Appearance: Clear / S.009 / pH: x  Gluc: x / Ketone: Negative  / Bili: Negative / Urobili: Negative   Blood: x / Protein: Trace / Nitrite: Negative   Leuk Esterase: Negative / RBC: 0 /hpf / WBC 0 /HPF   Sq Epi: x / Non Sq Epi: 1 /hpf / Bacteria: Negative      < from: CT Head No Cont (21 @ 22:24) >    ACC: [id number] EXAM:  CT BRAIN                          PROCEDURE DATE:  2021          INTERPRETATION:  CLINICAL INFORMATION: Delirium.    TECHNIQUE:  Axial CT images were acquired through the head.  Intravenous contrast: None  Two-dimensionalreformats were generated.    COMPARISON STUDY: None    FINDINGS:  There is no CT evidence of acute intracranial hemorrhage, mass effect,   midline shift or acute territorial infarct.    There is moderate generalized cerebral volume loss.  No clinically   significant chronic microvascular ischemic disease or white matter   lesions are visualized by CT technique.    There is an approximately 2.1 cm, partially calcified round extra-axial   mass in the posterior fossa arising from the posterior lateral aspect of   the right petrous temporal bone.    The left mastoid is sclerotic and underpneumatized.  There is no   clinically significant mastoid effusion.    The calvarium, skull base, and orbits appear unremarkable.    IMPRESSION:  No CT evidenceof acute intracranial pathology.    Approximately 2.1 cm, partially calcified mass in the posterior fossa,   arising from the posterior lateral aspect of the right petrous temporal   bone, most likely represents a meningioma.  Contrast-enhanced brainMRI   is recommended.  For further characterization.    --- End of Report ---          MARTY GALLARDO MD; Resident Radiology  This document has been electronically signed.  DILEEP RODAS MD; Attending Radiologist  This document has been electronically signed. Dec 26 2021 11:53PM    < end of copied text >

## 2021-12-27 NOTE — H&P ADULT - NSHPPHYSICALEXAM_GEN_ALL_CORE
PHYSICAL EXAM:  T(C): 36.4 (12-27-21 @ 08:00), Max: 36.8 (12-27-21 @ 00:22)  HR: 70 (12-27-21 @ 08:00) (70 - 90)  BP: 128/98 (12-27-21 @ 08:00) (108/68 - 128/98)  RR: 19 (12-27-21 @ 08:00) (18 - 19)  SpO2: 95% (12-27-21 @ 08:00) (95% - 98%)  Wt(kg): --  I&O's Summary      Appearance: NAD	  HEENT:   Dry oral mucosa, PERRL, EOMI	  Lymphatic: No lymphadenopathy  Cardiovascular: Irregular S1 S2, No JVD, No murmurs, No edema  Respiratory: decreased bs 	  Psychiatry: A & O x 0, dementia   Gastrointestinal:  Soft, Non-tender, + BS	  Skin: No rashes, No ecchymoses, No cyanosis	  Neurologic: Non-focal  Extremities: Normal range of motion, No clubbing, cyanosis or edema  Vascular: Peripheral pulses palpable 2+ bilaterally

## 2021-12-27 NOTE — H&P ADULT - NSHPREVIEWOFSYSTEMS_GEN_ALL_CORE
REVIEW OF SYSTEMS:  CONSTITUTIONAL: No fever, weight loss, or fatigue  EYES: No eye pain, visual disturbances, or discharge  ENMT:  No difficulty hearing, tinnitus, vertigo; No sinus or throat pain  NECK: No pain or stiffness  RESPIRATORY: No cough, wheezing, chills or hemoptysis; No Shortness of Breath  CARDIOVASCULAR: No chest pain, palpitations, passing out, dizziness, or leg swelling  GASTROINTESTINAL: No abdominal or epigastric pain. No nausea, vomiting, or hematemesis; No diarrhea or constipation. No melena or hematochezia.  GENITOURINARY: No dysuria, frequency, hematuria, or incontinence  NEUROLOGICAL: No headaches, memory loss, loss of strength, numbness, or tremors  SKIN: No itching, burning, rashes, or lesions   LYMPH Nodes: No enlarged glands  ENDOCRINE: No heat or cold intolerance; No hair loss  MUSCULOSKELETAL: No joint pain or swelling; No muscle, back, or extremity pain  PSYCHIATRIC: No depression, anxiety, mood swings, or difficulty sleeping  HEME/LYMPH: No easy bruising, or bleeding gums  ALLERY AND IMMUNOLOGIC: No hives or eczema	    [ ] All others negative	  [ XX] Unable to obtain

## 2021-12-27 NOTE — ED ADULT NURSE REASSESSMENT NOTE - NS ED NURSE REASSESS COMMENT FT1
Pt brought to bathroom in wheelchair with RN. Currently awaiting admission to [facility]. Safety maintained.

## 2021-12-27 NOTE — H&P ADULT - HISTORY OF PRESENT ILLNESS
89 yo F with dementia, DM,, HTN, CHF, on lasix, Afib on digoxin stopped Eliquis due to recurrent GI bleed/anemia, dementia depression on Seroquel, pt became combative, and more agitated. pt was given risperidone 0.5mg, history per family, stating increased size of the leg swelling. No fever or cough.

## 2021-12-27 NOTE — BEHAVIORAL HEALTH ASSESSMENT NOTE - SUMMARY
This is an 89-year-old CF patient with PPH of dementia and PMH of DM,, HTN, CHF, on lasix, Afib on digoxin stopped Eliquis due to recurrent GI bleed/anemia, brought in by the family for increased leg swelling, also for an increase of combativeness and agitation. On admission, pt. was given risperidone 0.5mg, No fever or cough. Consult requested to evaluate the patient for agitation.

## 2021-12-27 NOTE — PROVIDER CONTACT NOTE (OTHER) - REASON
Patient agitated , trying to get out of bed and pulls lines . Wants to leave.
Patient refusing medications , Fingersticks and labs.

## 2021-12-27 NOTE — BEHAVIORAL HEALTH ASSESSMENT NOTE - HPI (INCLUDE ILLNESS QUALITY, SEVERITY, DURATION, TIMING, CONTEXT, MODIFYING FACTORS, ASSOCIATED SIGNS AND SYMPTOMS)
This is an 89-year-old CF patient with PPH of dementia and PMH of DM,, HTN, CHF, on lasix, Afib on digoxin stopped Eliquis due to recurrent GI bleed/anemia, brought in by the family for increased leg swelling, also for an increase of combativeness and agitation. On admission, pt. was given risperidone 0.5mg, No fever or cough. Consult requested to evaluate the patient for agitation. This is an 89-year-old CF patient with PPH of dementia and PMH of DM,, HTN, CHF, on lasix, Afib on digoxin stopped Eliquis due to recurrent GI bleed/anemia, brought in by the family for increased leg swelling, also for an increase of combativeness and agitation. On admission, pt. was given risperidone 0.5mg, No fever or cough. Consult requested to evaluate the patient for agitation.    On evaluation, patient is relatively calm, awake, alert; barely tolerates the interview. She appears aggravated and frustrated, which she confirms. She is unable to state where she is and appears to be restless, stating she wants to leave. She denies any active symptoms, denies mood issues, acknowledges problems with sleep. Denies SI/Hi. Otherwise, not interested in continuing the interview.    Patient's daughter, Mini, states that the patient has been struggling with dementia for the last 4 years. Her cognitive decline has worsened over the past several weeks. Patient has a 24/7 home health aide and has been observed lashing out at them and is getting more difficult to contain and to be cared for at home. She has a hx. of recently diagnosed depression and has been treated with mirtazapine.

## 2021-12-28 LAB
ANION GAP SERPL CALC-SCNC: 16 MMOL/L — SIGNIFICANT CHANGE UP (ref 5–17)
BUN SERPL-MCNC: 37 MG/DL — HIGH (ref 7–23)
CALCIUM SERPL-MCNC: 9.6 MG/DL — SIGNIFICANT CHANGE UP (ref 8.4–10.5)
CHLORIDE SERPL-SCNC: 97 MMOL/L — SIGNIFICANT CHANGE UP (ref 96–108)
CO2 SERPL-SCNC: 27 MMOL/L — SIGNIFICANT CHANGE UP (ref 22–31)
CREAT SERPL-MCNC: 1.14 MG/DL — SIGNIFICANT CHANGE UP (ref 0.5–1.3)
GLUCOSE SERPL-MCNC: 183 MG/DL — HIGH (ref 70–99)
HCT VFR BLD CALC: 33.8 % — LOW (ref 34.5–45)
HGB BLD-MCNC: 9.4 G/DL — LOW (ref 11.5–15.5)
MCHC RBC-ENTMCNC: 19.9 PG — LOW (ref 27–34)
MCHC RBC-ENTMCNC: 27.8 GM/DL — LOW (ref 32–36)
MCV RBC AUTO: 71.5 FL — LOW (ref 80–100)
NRBC # BLD: 0 /100 WBCS — SIGNIFICANT CHANGE UP (ref 0–0)
PLATELET # BLD AUTO: 402 K/UL — HIGH (ref 150–400)
POTASSIUM SERPL-MCNC: 3.6 MMOL/L — SIGNIFICANT CHANGE UP (ref 3.5–5.3)
POTASSIUM SERPL-SCNC: 3.6 MMOL/L — SIGNIFICANT CHANGE UP (ref 3.5–5.3)
RBC # BLD: 4.73 M/UL — SIGNIFICANT CHANGE UP (ref 3.8–5.2)
RBC # FLD: 19.6 % — HIGH (ref 10.3–14.5)
SODIUM SERPL-SCNC: 140 MMOL/L — SIGNIFICANT CHANGE UP (ref 135–145)
T PALLIDUM AB TITR SER: NEGATIVE — SIGNIFICANT CHANGE UP
T PALLIDUM AB TITR SER: NEGATIVE — SIGNIFICANT CHANGE UP
TSH SERPL-MCNC: 0.79 UIU/ML — SIGNIFICANT CHANGE UP (ref 0.27–4.2)
WBC # BLD: 10.73 K/UL — HIGH (ref 3.8–10.5)
WBC # FLD AUTO: 10.73 K/UL — HIGH (ref 3.8–10.5)

## 2021-12-28 RX ORDER — LANOLIN ALCOHOL/MO/W.PET/CERES
3 CREAM (GRAM) TOPICAL AT BEDTIME
Refills: 0 | Status: DISCONTINUED | OUTPATIENT
Start: 2021-12-28 | End: 2021-12-31

## 2021-12-28 RX ORDER — VALPROIC ACID (AS SODIUM SALT) 250 MG/5ML
125 SOLUTION, ORAL ORAL ONCE
Refills: 0 | Status: COMPLETED | OUTPATIENT
Start: 2021-12-28 | End: 2021-12-28

## 2021-12-28 RX ORDER — QUETIAPINE FUMARATE 200 MG/1
25 TABLET, FILM COATED ORAL THREE TIMES A DAY
Refills: 0 | Status: DISCONTINUED | OUTPATIENT
Start: 2021-12-28 | End: 2022-01-06

## 2021-12-28 RX ADMIN — Medication 1: at 12:51

## 2021-12-28 RX ADMIN — Medication 1: at 17:31

## 2021-12-28 RX ADMIN — QUETIAPINE FUMARATE 25 MILLIGRAM(S): 200 TABLET, FILM COATED ORAL at 14:28

## 2021-12-28 RX ADMIN — Medication 125 MICROGRAM(S): at 07:26

## 2021-12-28 RX ADMIN — MIRTAZAPINE 30 MILLIGRAM(S): 45 TABLET, ORALLY DISINTEGRATING ORAL at 11:32

## 2021-12-28 RX ADMIN — Medication 3 MILLIGRAM(S): at 20:48

## 2021-12-28 RX ADMIN — HEPARIN SODIUM 5000 UNIT(S): 5000 INJECTION INTRAVENOUS; SUBCUTANEOUS at 14:28

## 2021-12-28 RX ADMIN — QUETIAPINE FUMARATE 25 MILLIGRAM(S): 200 TABLET, FILM COATED ORAL at 20:48

## 2021-12-28 RX ADMIN — Medication 100 MILLIGRAM(S): at 07:26

## 2021-12-28 RX ADMIN — HEPARIN SODIUM 5000 UNIT(S): 5000 INJECTION INTRAVENOUS; SUBCUTANEOUS at 20:49

## 2021-12-28 RX ADMIN — HEPARIN SODIUM 5000 UNIT(S): 5000 INJECTION INTRAVENOUS; SUBCUTANEOUS at 07:26

## 2021-12-28 RX ADMIN — PANTOPRAZOLE SODIUM 40 MILLIGRAM(S): 20 TABLET, DELAYED RELEASE ORAL at 07:25

## 2021-12-28 RX ADMIN — Medication 40 MILLIGRAM(S): at 07:01

## 2021-12-28 RX ADMIN — Medication 25 MILLIGRAM(S): at 04:56

## 2021-12-28 NOTE — PHYSICAL THERAPY INITIAL EVALUATION ADULT - PERTINENT HX OF CURRENT PROBLEM, REHAB EVAL
89yoF, with T2DM, HTN, CHF, Afib on Digoxn /stopped Eliquis due to decreased Hgb/GIB, depression on Seroquel, pt became combative, and more agitated. No taking a shower, yelling, hitting, spitting. Daughter gave risperidone 0.5mg prior to going with EMS.

## 2021-12-28 NOTE — PROVIDER CONTACT NOTE (MEDICATION) - ASSESSMENT
Pt confused, restless, on constant observation.  Pt put water in med cup, threw pill on floor, threw water, combative
Patient confused and restless.

## 2021-12-28 NOTE — PATIENT PROFILE ADULT - FALL HARM RISK - CONCLUSION
bradycardia, hypotension
Fall with Harm Risk

## 2021-12-28 NOTE — PATIENT PROFILE ADULT - FALL HARM RISK - HARM RISK INTERVENTIONS
Assistance with ambulation/Assistance OOB with selected safe patient handling equipment/Communicate Risk of Fall with Harm to all staff/Discuss with provider need for PT consult/Monitor for mental status changes/Monitor gait and stability/Move patient closer to nurses' station/Provide patient with walking aids - walker, cane, crutches/Reinforce activity limits and safety measures with patient and family/Reorient to person, place and time as needed/Tailored Fall Risk Interventions/Toileting schedule using arm’s reach rule for commode and bathroom/Use of alarms - bed, chair and/or voice tab/Visual Cue: Yellow wristband and red socks/Bed in lowest position, wheels locked, appropriate side rails in place/Call bell, personal items and telephone in reach/Instruct patient to call for assistance before getting out of bed or chair/Non-slip footwear when patient is out of bed/[place] to call system/Physically safe environment - no spills, clutter or unnecessary equipment/Purposeful Proactive Rounding/Room/bathroom lighting operational, light cord in reach

## 2021-12-28 NOTE — PROGRESS NOTE ADULT - SUBJECTIVE AND OBJECTIVE BOX
Subjective: Patient seen and examined. No new events except as noted.   appears comfortable       REVIEW OF SYSTEMS:    CONSTITUTIONAL:+ weakness, fevers or chills  EYES/ENT: No visual changes;  No vertigo or throat pain   NECK: No pain or stiffness  RESPIRATORY: No cough, wheezing, hemoptysis; No shortness of breath  CARDIOVASCULAR: No chest pain or palpitations  GASTROINTESTINAL: No abdominal or epigastric pain. No nausea, vomiting, or hematemesis; No diarrhea or constipation. No melena or hematochezia.  GENITOURINARY: No dysuria, frequency or hematuria  NEUROLOGICAL: No numbness or weakness  SKIN: No itching, burning, rashes, or lesions   All other review of systems is negative unless indicated above.    MEDICATIONS:  MEDICATIONS  (STANDING):  dextrose 40% Gel 15 Gram(s) Oral once  dextrose 5%. 1000 milliLiter(s) (50 mL/Hr) IV Continuous <Continuous>  dextrose 5%. 1000 milliLiter(s) (100 mL/Hr) IV Continuous <Continuous>  dextrose 50% Injectable 25 Gram(s) IV Push once  dextrose 50% Injectable 12.5 Gram(s) IV Push once  dextrose 50% Injectable 25 Gram(s) IV Push once  digoxin     Tablet 125 MICROGram(s) Oral daily  furosemide   Injectable 40 milliGRAM(s) IV Push daily  glucagon  Injectable 1 milliGRAM(s) IntraMuscular once  heparin   Injectable 5000 Unit(s) SubCutaneous every 8 hours  insulin lispro (ADMELOG) corrective regimen sliding scale   SubCutaneous three times a day before meals  insulin lispro (ADMELOG) corrective regimen sliding scale   SubCutaneous at bedtime  melatonin 3 milliGRAM(s) Oral at bedtime  metoprolol succinate  milliGRAM(s) Oral daily  mirtazapine 30 milliGRAM(s) Oral daily  pantoprazole    Tablet 40 milliGRAM(s) Oral before breakfast  QUEtiapine 25 milliGRAM(s) Oral three times a day      PHYSICAL EXAM:  T(C): 36.9 (12-28-21 @ 07:06), Max: 36.9 (12-28-21 @ 07:06)  HR: 82 (12-28-21 @ 07:06) (78 - 87)  BP: 116/65 (12-28-21 @ 07:06) (103/65 - 133/53)  RR: 18 (12-28-21 @ 07:06) (17 - 19)  SpO2: 95% (12-28-21 @ 07:06) (95% - 99%)  Wt(kg): --  I&O's Summary    27 Dec 2021 07:01  -  28 Dec 2021 07:00  --------------------------------------------------------  IN: 820 mL / OUT: 0 mL / NET: 820 mL        Appearance: NAD	  HEENT:   Dry oral mucosa, PERRL, EOMI	  Lymphatic: No lymphadenopathy  Cardiovascular: Irregular S1 S2, No JVD, No murmurs, No edema  Respiratory: decreased bs 	  Psychiatry: A & O x 0, dementia   Gastrointestinal:  Soft, Non-tender, + BS	  Skin: No rashes, No ecchymoses, No cyanosis	  Neurologic: Non-focal  Extremities: Normal range of motion, No clubbing, cyanosis or edema  Vascular: Peripheral pulses palpable 2+ bilaterally  LABS:    CARDIAC MARKERS:                                9.4    10.73 )-----------( 402      ( 28 Dec 2021 08:32 )             33.8     12-28    140  |  97  |  37<H>  ----------------------------<  183<H>  3.6   |  27  |  1.14    Ca    9.6      28 Dec 2021 08:32  Mg     2.0     12-26    TPro  6.5  /  Alb  4.0  /  TBili  0.5  /  DBili  x   /  AST  17  /  ALT  15  /  AlkPhos  92  12-26    proBNP:   Lipid Profile:   HgA1c:   TSH:         TELEMETRY: 	    ECG:  	  RADIOLOGY:   DIAGNOSTIC TESTING:  [ ] Echocardiogram:  [ ]  Catheterization:  [ ] Stress Test:    OTHER:

## 2021-12-28 NOTE — PROGRESS NOTE ADULT - ASSESSMENT
88 yo F with dementia, DM,, HTN, depression, CHF, Afib on digoxin stopped Eliquis due to recurrent GI bleed/anemia p/w agitation/AMS.   CTH with likely L temporal meningioma  mild leukocytosis, mildly elevated BNP  A1c 5.2  b12 458, TSH WNL , RPR NR   o/e non focal. MAYES AAox1-2, agitation  Impression: AMS of unclear etiology, possible toxic metabolic encephalopathy given Leukocytosis. r/o CHF exacerbation   - on enhanced. agitated at times  - check QTC, if <500 seroquel and/or zyprexa PRN for agitation  - not on AC given anemia/GIB. consider DOAC for AF when able; now on digoxin  - MRI brain w/ and w/o in or outpt.  inpatient if no improvement  - consider rEEG if no improvement. unlikely to keep on given agitation  - r/o infection  - f/u psych   - b12 WNL, rpr, TSH --> all WNL  - telemetry  - PT/OT  - check FS, glucose control <180  - GI/DVT ppx  - Counseling on diet, exercise, and medication adherence was done  - Counseling on smoking cessation and alcohol consumption offered when appropriate.  - Pain assessed and judicious use of narcotics when appropriate was discussed.    - Stroke education given when appropriate.  - Importance of fall prevention discussed.   - Differential diagnosis and plan of care discussed with patient and/or family and primary team  - Thank you for allowing me to participate in the care of this patient. Call with questions.   - d/c plan when able   Konstantin Giordano MD  Vascular Neurology  Office: [phone]

## 2021-12-28 NOTE — PHYSICAL THERAPY INITIAL EVALUATION ADULT - DISCHARGE DISPOSITION, PT EVAL
Subacute Rehab/rehabilitation facility if pt not able to get home care then Subacute Rehab/home/home w/ home PT/rehabilitation facility

## 2021-12-28 NOTE — PROGRESS NOTE ADULT - SUBJECTIVE AND OBJECTIVE BOX
Neurology Progress Note    S: Patient seen and examined. No new events overnight. patient denied CP, SOB, HA or pain.  still on 1:1 but calmer     Medication:  dextrose 40% Gel 15 Gram(s) Oral once  dextrose 5%. 1000 milliLiter(s) IV Continuous <Continuous>  dextrose 5%. 1000 milliLiter(s) IV Continuous <Continuous>  dextrose 50% Injectable 25 Gram(s) IV Push once  dextrose 50% Injectable 12.5 Gram(s) IV Push once  dextrose 50% Injectable 25 Gram(s) IV Push once  digoxin     Tablet 125 MICROGram(s) Oral daily  furosemide   Injectable 40 milliGRAM(s) IV Push daily  glucagon  Injectable 1 milliGRAM(s) IntraMuscular once  heparin   Injectable 5000 Unit(s) SubCutaneous every 8 hours  insulin lispro (ADMELOG) corrective regimen sliding scale   SubCutaneous three times a day before meals  insulin lispro (ADMELOG) corrective regimen sliding scale   SubCutaneous at bedtime  melatonin 3 milliGRAM(s) Oral at bedtime  metoprolol succinate  milliGRAM(s) Oral daily  mirtazapine 30 milliGRAM(s) Oral daily  pantoprazole    Tablet 40 milliGRAM(s) Oral before breakfast  QUEtiapine 25 milliGRAM(s) Oral three times a day      Vitals:  Vital Signs Last 24 Hrs  T(C): 36.9 (28 Dec 2021 07:06), Max: 36.9 (28 Dec 2021 07:06)  T(F): 98.4 (28 Dec 2021 07:06), Max: 98.4 (28 Dec 2021 07:06)  HR: 82 (28 Dec 2021 07:06) (78 - 87)  BP: 116/65 (28 Dec 2021 07:06) (103/65 - 133/53)  BP(mean): --  RR: 18 (28 Dec 2021 07:06) (17 - 19)  SpO2: 95% (28 Dec 2021 07:06) (95% - 99%)    General Exam:   General Appearance: Appropriately dressed and in no acute distress       Head: Normocephalic, atraumatic and no dysmorphic features  Ear, Nose, and Throat: Moist mucous membranes  CVS: S1S2+  Resp: No SOB, no wheeze or rhonchi  Abd: soft NTND  Extremities: No edema, no cyanosis  Skin: No bruises, no rashes     Neurological Exam:  Mental Status: Awake, alert and oriented x 1-2 (knows hospital and family members.  Able to follow some simple  verbal commands. Able to name and repeat. fluent speech. No obvious aphasia or dysarthria noted. combative at times   Cranial Nerves: PERRL, EOMI, VFFC, sensation V1-V3 intact,  no obvious facial asymmetry, equal elevation of palate, scm/trap 5/5, tongue is midline on protrusion. no obvious papilledema on fundoscopic exam. hearing is grossly intact.   Motor: Normal bulk, tone and strength throughout. Fine finger movements were intact and symmetric. no tremors or drift noted.    Sensation: Intact to light touch and pinprick throughout. no right/left confusion. no extinction to tactile on DSS.    Reflexes: 1+ throughout at biceps, brachioradialis, triceps, patellars and ankles bilaterally and equal. No clonus. R toe and L toe were both downgoing.  Coordination: not following   Gait: defererd        I personally reviewed the below data/images/labs:      CBC Full  -  ( 28 Dec 2021 08:32 )  WBC Count : 10.73 K/uL  RBC Count : 4.73 M/uL  Hemoglobin : 9.4 g/dL  Hematocrit : 33.8 %  Platelet Count - Automated : 402 K/uL  Mean Cell Volume : 71.5 fl  Mean Cell Hemoglobin : 19.9 pg  Mean Cell Hemoglobin Concentration : 27.8 gm/dL  Auto Neutrophil # : x  Auto Lymphocyte # : x  Auto Monocyte # : x  Auto Eosinophil # : x  Auto Basophil # : x  Auto Neutrophil % : x  Auto Lymphocyte % : x  Auto Monocyte % : x  Auto Eosinophil % : x  Auto Basophil % : x        140  |  97  |  37<H>  ----------------------------<  183<H>  3.6   |  27  |  1.14    Ca    9.6      28 Dec 2021 08:32  Mg     2.0         TPro  6.5  /  Alb  4.0  /  TBili  0.5  /  DBili  x   /  AST  17  /  ALT  15  /  AlkPhos  92      LIVER FUNCTIONS - ( 26 Dec 2021 23:05 )  Alb: 4.0 g/dL / Pro: 6.5 g/dL / ALK PHOS: 92 U/L / ALT: 15 U/L / AST: 17 U/L / GGT: x             Urinalysis Basic - ( 26 Dec 2021 23:05 )    Color: Light Yellow / Appearance: Clear / S.009 / pH: x  Gluc: x / Ketone: Negative  / Bili: Negative / Urobili: Negative   Blood: x / Protein: Trace / Nitrite: Negative   Leuk Esterase: Negative / RBC: 0 /hpf / WBC 0 /HPF   Sq Epi: x / Non Sq Epi: 1 /hpf / Bacteria: Negative      < from: CT Head No Cont (21 @ 22:24) >    ACC: [id number] EXAM:  CT BRAIN                          PROCEDURE DATE:  2021          INTERPRETATION:  CLINICAL INFORMATION: Delirium.    TECHNIQUE:  Axial CT images were acquired through the head.  Intravenous contrast: None  Two-dimensionalreformats were generated.    COMPARISON STUDY: None    FINDINGS:  There is no CT evidence of acute intracranial hemorrhage, mass effect,   midline shift or acute territorial infarct.    There is moderate generalized cerebral volume loss.  No clinically   significant chronic microvascular ischemic disease or white matter   lesions are visualized by CT technique.    There is an approximately 2.1 cm, partially calcified round extra-axial   mass in the posterior fossa arising from the posterior lateral aspect of   the right petrous temporal bone.    The left mastoid is sclerotic and underpneumatized.  There is no   clinically significant mastoid effusion.    The calvarium, skull base, and orbits appear unremarkable.    IMPRESSION:  No CT evidenceof acute intracranial pathology.    Approximately 2.1 cm, partially calcified mass in the posterior fossa,   arising from the posterior lateral aspect of the right petrous temporal   bone, most likely represents a meningioma.  Contrast-enhanced brainMRI   is recommended.  For further characterization.    --- End of Report ---          MARTY GALLARDO MD; Resident Radiology  This document has been electronically signed.  DILEEP RODAS MD; Attending Radiologist  This document has been electronically signed. Dec 26 2021 11:53PM    < end of copied text >

## 2021-12-29 LAB
ANION GAP SERPL CALC-SCNC: 14 MMOL/L — SIGNIFICANT CHANGE UP (ref 5–17)
BUN SERPL-MCNC: 43 MG/DL — HIGH (ref 7–23)
CALCIUM SERPL-MCNC: 9.1 MG/DL — SIGNIFICANT CHANGE UP (ref 8.4–10.5)
CHLORIDE SERPL-SCNC: 98 MMOL/L — SIGNIFICANT CHANGE UP (ref 96–108)
CO2 SERPL-SCNC: 27 MMOL/L — SIGNIFICANT CHANGE UP (ref 22–31)
CREAT SERPL-MCNC: 1.34 MG/DL — HIGH (ref 0.5–1.3)
FOLATE SERPL-MCNC: >20 NG/ML — SIGNIFICANT CHANGE UP
GLUCOSE SERPL-MCNC: 150 MG/DL — HIGH (ref 70–99)
HCT VFR BLD CALC: 31.1 % — LOW (ref 34.5–45)
HGB BLD-MCNC: 8.6 G/DL — LOW (ref 11.5–15.5)
MCHC RBC-ENTMCNC: 20 PG — LOW (ref 27–34)
MCHC RBC-ENTMCNC: 27.7 GM/DL — LOW (ref 32–36)
MCV RBC AUTO: 72.5 FL — LOW (ref 80–100)
NRBC # BLD: 0 /100 WBCS — SIGNIFICANT CHANGE UP (ref 0–0)
PLATELET # BLD AUTO: 349 K/UL — SIGNIFICANT CHANGE UP (ref 150–400)
POTASSIUM SERPL-MCNC: 3.3 MMOL/L — LOW (ref 3.5–5.3)
POTASSIUM SERPL-SCNC: 3.3 MMOL/L — LOW (ref 3.5–5.3)
RBC # BLD: 4.29 M/UL — SIGNIFICANT CHANGE UP (ref 3.8–5.2)
RBC # FLD: 19.8 % — HIGH (ref 10.3–14.5)
SODIUM SERPL-SCNC: 139 MMOL/L — SIGNIFICANT CHANGE UP (ref 135–145)
WBC # BLD: 9 K/UL — SIGNIFICANT CHANGE UP (ref 3.8–10.5)
WBC # FLD AUTO: 9 K/UL — SIGNIFICANT CHANGE UP (ref 3.8–10.5)

## 2021-12-29 PROCEDURE — 95816 EEG AWAKE AND DROWSY: CPT | Mod: 26

## 2021-12-29 PROCEDURE — 70553 MRI BRAIN STEM W/O & W/DYE: CPT | Mod: 26

## 2021-12-29 RX ORDER — HALOPERIDOL DECANOATE 100 MG/ML
1 INJECTION INTRAMUSCULAR EVERY 6 HOURS
Refills: 0 | Status: DISCONTINUED | OUTPATIENT
Start: 2021-12-29 | End: 2021-12-29

## 2021-12-29 RX ORDER — POTASSIUM CHLORIDE 20 MEQ
10 PACKET (EA) ORAL ONCE
Refills: 0 | Status: COMPLETED | OUTPATIENT
Start: 2021-12-29 | End: 2021-12-29

## 2021-12-29 RX ORDER — POTASSIUM CHLORIDE 20 MEQ
40 PACKET (EA) ORAL ONCE
Refills: 0 | Status: COMPLETED | OUTPATIENT
Start: 2021-12-29 | End: 2021-12-29

## 2021-12-29 RX ADMIN — HEPARIN SODIUM 5000 UNIT(S): 5000 INJECTION INTRAVENOUS; SUBCUTANEOUS at 21:06

## 2021-12-29 RX ADMIN — Medication 100 MILLIEQUIVALENT(S): at 12:48

## 2021-12-29 RX ADMIN — MIRTAZAPINE 30 MILLIGRAM(S): 45 TABLET, ORALLY DISINTEGRATING ORAL at 12:43

## 2021-12-29 RX ADMIN — HEPARIN SODIUM 5000 UNIT(S): 5000 INJECTION INTRAVENOUS; SUBCUTANEOUS at 06:56

## 2021-12-29 RX ADMIN — Medication 100 MILLIGRAM(S): at 06:52

## 2021-12-29 RX ADMIN — Medication 40 MILLIGRAM(S): at 06:53

## 2021-12-29 RX ADMIN — Medication 125 MICROGRAM(S): at 06:52

## 2021-12-29 RX ADMIN — HEPARIN SODIUM 5000 UNIT(S): 5000 INJECTION INTRAVENOUS; SUBCUTANEOUS at 13:29

## 2021-12-29 RX ADMIN — QUETIAPINE FUMARATE 25 MILLIGRAM(S): 200 TABLET, FILM COATED ORAL at 12:45

## 2021-12-29 RX ADMIN — QUETIAPINE FUMARATE 25 MILLIGRAM(S): 200 TABLET, FILM COATED ORAL at 21:07

## 2021-12-29 RX ADMIN — QUETIAPINE FUMARATE 25 MILLIGRAM(S): 200 TABLET, FILM COATED ORAL at 06:52

## 2021-12-29 RX ADMIN — Medication 3 MILLIGRAM(S): at 21:07

## 2021-12-29 RX ADMIN — PANTOPRAZOLE SODIUM 40 MILLIGRAM(S): 20 TABLET, DELAYED RELEASE ORAL at 06:52

## 2021-12-29 RX ADMIN — Medication 40 MILLIEQUIVALENT(S): at 12:48

## 2021-12-29 RX ADMIN — Medication 1: at 18:08

## 2021-12-29 NOTE — PROGRESS NOTE ADULT - ASSESSMENT
88 yo F with dementia, DM,, HTN, CHF, on lasix, Afib on digoxin stopped Eliquis due to recurrent GI bleed/anemia, dementia depression on Seroquel, pt became combative, and more agitated. pt was given risperidone 0.5mg, history per family, stating increased size of the leg swelling. No fever or cough.

## 2021-12-29 NOTE — PROGRESS NOTE ADULT - SUBJECTIVE AND OBJECTIVE BOX
Subjective: Patient seen and examined. No new events except as noted.   more awake and alert     REVIEW OF SYSTEMS:    CONSTITUTIONAL: +weakness, fevers or chills  EYES/ENT: No visual changes;  No vertigo or throat pain   NECK: No pain or stiffness  RESPIRATORY: No cough, wheezing, hemoptysis; No shortness of breath  CARDIOVASCULAR: No chest pain or palpitations  GASTROINTESTINAL: No abdominal or epigastric pain. No nausea, vomiting, or hematemesis; No diarrhea or constipation. No melena or hematochezia.  GENITOURINARY: No dysuria, frequency or hematuria  NEUROLOGICAL: No numbness or weakness  SKIN: No itching, burning, rashes, or lesions   All other review of systems is negative unless indicated above.    MEDICATIONS:  MEDICATIONS  (STANDING):  dextrose 40% Gel 15 Gram(s) Oral once  dextrose 5%. 1000 milliLiter(s) (50 mL/Hr) IV Continuous <Continuous>  dextrose 5%. 1000 milliLiter(s) (100 mL/Hr) IV Continuous <Continuous>  dextrose 50% Injectable 25 Gram(s) IV Push once  dextrose 50% Injectable 12.5 Gram(s) IV Push once  dextrose 50% Injectable 25 Gram(s) IV Push once  digoxin     Tablet 125 MICROGram(s) Oral daily  furosemide   Injectable 40 milliGRAM(s) IV Push daily  glucagon  Injectable 1 milliGRAM(s) IntraMuscular once  heparin   Injectable 5000 Unit(s) SubCutaneous every 8 hours  insulin lispro (ADMELOG) corrective regimen sliding scale   SubCutaneous three times a day before meals  insulin lispro (ADMELOG) corrective regimen sliding scale   SubCutaneous at bedtime  melatonin 3 milliGRAM(s) Oral at bedtime  metoprolol succinate  milliGRAM(s) Oral daily  mirtazapine 30 milliGRAM(s) Oral daily  pantoprazole    Tablet 40 milliGRAM(s) Oral before breakfast  QUEtiapine 25 milliGRAM(s) Oral three times a day      PHYSICAL EXAM:  T(C): 36.6 (12-29-21 @ 11:59), Max: 37 (12-28-21 @ 20:48)  HR: 90 (12-29-21 @ 11:59) (72 - 90)  BP: 118/62 (12-29-21 @ 11:59) (99/60 - 122/84)  RR: 18 (12-29-21 @ 11:59) (18 - 18)  SpO2: 95% (12-29-21 @ 11:59) (95% - 98%)  Wt(kg): --  I&O's Summary    28 Dec 2021 07:01  -  29 Dec 2021 07:00  --------------------------------------------------------  IN: 480 mL / OUT: 0 mL / NET: 480 mL    29 Dec 2021 07:01  -  29 Dec 2021 19:07  --------------------------------------------------------  IN: 480 mL / OUT: 0 mL / NET: 480 mL            Appearance: NAD	  HEENT:   Dry oral mucosa, PERRL, EOMI	  Lymphatic: No lymphadenopathy  Cardiovascular: Irregular S1 S2, No JVD, No murmurs, No edema  Respiratory: decreased bs 	  Psychiatry: A & O x 0, dementia   Gastrointestinal:  Soft, Non-tender, + BS	  Skin: No rashes, No ecchymoses, No cyanosis	  Neurologic: Non-focal  Extremities: Normal range of motion, No clubbing, cyanosis or edema  Vascular: Peripheral pulses palpable 2+ bilaterally    LABS:    CARDIAC MARKERS:                                8.6    9.00  )-----------( 349      ( 29 Dec 2021 09:11 )             31.1     12-29    139  |  98  |  43<H>  ----------------------------<  150<H>  3.3<L>   |  27  |  1.34<H>    Ca    9.1      29 Dec 2021 09:11      proBNP:   Lipid Profile:   HgA1c:   TSH:     1          TELEMETRY: 	    ECG:  	  RADIOLOGY:   DIAGNOSTIC TESTING:  [ ] Echocardiogram:  [ ]  Catheterization:  [ ] Stress Test:    OTHER:

## 2021-12-29 NOTE — EEG REPORT - NS EEG TEXT BOX
REPORT OF ROUTINE EEG WITH VIDEO  [facility]: [address], [address] [place] [address], Phone: [phone] [facility]: [phone], [place] [address], Phone: [phone] Office: [address] [place] [address], [place] [address], [place] [address], Phone: [phone]  Patient Name: Armando Saab   Age: 89 year : 3/2/1932 MRN #: MR# [id number], Location: [address] [place] [address] Referring Physician: -  EEG #: [id number] Study Date: 2021   Start Time: 4:28:05 PM    Study Duration: 20.2 		  Technical Information:					 On Instrument: - Placement and Labeling of Electrodes: The EEG was performed utilizing 20 channels referential EEG connections (coronal over temporal over parasagittal montage) using all standard 10-20 electrode placements with EKG.  Recording was at a sampling rate of 256 samples per second per channel.  Time synchronized digital video recording was done simultaneously with EEG recording.  A low light infrared camera was used for low light recording.  Ramy and seizure detection algorithms were utilized. CSA Technical Component: Quantitative EEG analysis using a separate Compressed Spectral Array (CSA) software package was conducted in real-time and run at bedside after set up by the technician, digitally displaying the power of electrographic frequencies included in the 1-30Hz band using a graded color map.  This data was reviewed and interpreted independently, and is reported in a separate section below.  History:  Routine study performed bedside COR: Awake, A&O x2 No HV due to Covid protocol No Photic due to PT Condition 88 y/o Female PMH Dementia, Anemia, A-fib, Senile dementia p/w AMS, CT Head w/ Meningioma  Medication Protonix Remeron Toprol Melatonin  Study Interpretation:  FINDINGS:  The background was continuous, spontaneously variable and reactive.  During wakefulness, the posteriorly dominant rhythm consisted of symmetric, well modulated 7 Hz activity, with an amplitude to 30 uV, that attenuated to eye opening.    Background Slowing: Generalized slowing: theta/delta slowing Focal slowing: none was present.  Sleep Background: Stage II sleep transients were not recorded.  Non-epileptiform activity: None.  Epileptiform Activity:  No epileptiform discharges were present.  Events: No clinical events were recorded. No seizures were recorded.  Activation Procedures:  -Hyperventilation was not performed.   -Photic stimulation was not performed.  Artifacts: Intermittent myogenic and movement artifacts were noted.  ECG: The heart rate on single channel ECG was predominantly between 70-80 BPM.  EEG Classification / Summary:  Abnormal EEG in the awake states. Mild to moderate generalized slowing  Clinical Impression:  Mild to moderate nonspecific diffuse or multifocal cerebral dysfunction.  No epileptiform pattern or seizure seen.  Preliminary Fellow Report, final report pending attending review  Rambo Alcala MD Epilepsy Fellow  Reading Room: [phone] On Call Service After Hours: [phone]    REPORT OF ROUTINE EEG WITH VIDEO  [facility]: [address], [address] [place] [address], Phone: [phone] [facility]: [phone], [place] [address], Phone: [phone] Office: [address] [place] [address], [place] [address], [place] [address], Phone: [phone]  Patient Name: Armando Saab   Age: 89 year : 3/2/1932 MRN #: MR# [id number], Location: [address] [place] [address] Referring Physician: -  EEG #: [id number] Study Date: 2021   Start Time: 4:28:05 PM    Study Duration: 20.2 		  Technical Information:					 On Instrument: - Placement and Labeling of Electrodes: The EEG was performed utilizing 20 channels referential EEG connections (coronal over temporal over parasagittal montage) using all standard 10-20 electrode placements with EKG.  Recording was at a sampling rate of 256 samples per second per channel.  Time synchronized digital video recording was done simultaneously with EEG recording.  A low light infrared camera was used for low light recording.  Ramy and seizure detection algorithms were utilized. CSA Technical Component: Quantitative EEG analysis using a separate Compressed Spectral Array (CSA) software package was conducted in real-time and run at bedside after set up by the technician, digitally displaying the power of electrographic frequencies included in the 1-30Hz band using a graded color map.  This data was reviewed and interpreted independently, and is reported in a separate section below.  History:  Routine study performed bedside COR: Awake, A&O x2 No HV due to Covid protocol No Photic due to PT Condition 88 y/o Female PMH Dementia, Anemia, A-fib, Senile dementia p/w AMS, CT Head w/ Meningioma  Medication Protonix Remeron Toprol Melatonin  Study Interpretation:  FINDINGS:  The background was continuous, spontaneously variable and reactive.  During wakefulness, the posteriorly dominant rhythm consisted of symmetric, well modulated 7 Hz activity, with an amplitude to 30 uV, that attenuated to eye opening.    Background Slowing: Generalized slowing: theta/delta slowing Focal slowing: none was present.  Sleep Background: Stage II sleep transients were not recorded.  Non-epileptiform activity: None.  Epileptiform Activity:  No epileptiform discharges were present.  Events: No clinical events were recorded. No seizures were recorded.  Activation Procedures:  -Hyperventilation was not performed.   -Photic stimulation was not performed.  Artifacts: Intermittent myogenic and movement artifacts were noted.  ECG: The heart rate on single channel ECG was predominantly between 70-80 BPM.  EEG Classification / Summary:  Abnormal EEG in the awake states. Mild to moderate generalized slowing  Clinical Impression:  Mild to moderate nonspecific diffuse or multifocal cerebral dysfunction.  No epileptiform pattern or seizure seen.    Rambo Alcala MD Epilepsy Fellow  Reading Room: [phone] On Call Service After Hours: [phone]

## 2021-12-29 NOTE — PROGRESS NOTE ADULT - ASSESSMENT
88 yo F with dementia, DM,, HTN, depression, CHF, Afib on digoxin stopped Eliquis due to recurrent GI bleed/anemia p/w agitation/AMS.   CTH with likely L temporal meningioma  mild leukocytosis, mildly elevated BNP  A1c 5.2  b12 458, TSH WNL , RPR NR   o/e non focal. SUGEY AAox1-2, agitation    Impression: AMS of unclear etiology, possible toxic metabolic encephalopathy given Leukocytosis. r/o CHF exacerbation./ Most likely progression of her underlying neurocognitive disorder     - on enhanced. agitated at times  - check QTC, if <500 seroquel and/or zyprexa PRN for agitation  - not on AC given anemia/GIB. consider DOAC for AF when able; now on digoxin  - MRI brain w/ and w/o now opending   - consider rEEG if no improvement. unlikely to keep on given agitation. pending   - r/o infection  - f/u psych   - b12 WNL, rpr, TSH --> all WNL  - telemetry  - PT/OT  - check FS, glucose control <180  - GI/DVT ppx  - Counseling on diet, exercise, and medication adherence was done  - Counseling on smoking cessation and alcohol consumption offered when appropriate.  - Pain assessed and judicious use of narcotics when appropriate was discussed.    - Stroke education given when appropriate.  - Importance of fall prevention discussed.   - Differential diagnosis and plan of care discussed with patient and/or family and primary team  - Thank you for allowing me to participate in the care of this patient. Call with questions.   - d/c plan when able   Konstantin Giordano MD  Vascular Neurology  Office: [phone]

## 2021-12-29 NOTE — PROGRESS NOTE ADULT - SUBJECTIVE AND OBJECTIVE BOX
Neurology Progress Note    S: Patient seen and examined. No new events overnight. patient denied CP, SOB, HA or pain. calmer in chair MRI eeg pending     Medication:  MEDICATIONS  (STANDING):  dextrose 40% Gel 15 Gram(s) Oral once  dextrose 5%. 1000 milliLiter(s) (50 mL/Hr) IV Continuous <Continuous>  dextrose 5%. 1000 milliLiter(s) (100 mL/Hr) IV Continuous <Continuous>  dextrose 50% Injectable 25 Gram(s) IV Push once  dextrose 50% Injectable 12.5 Gram(s) IV Push once  dextrose 50% Injectable 25 Gram(s) IV Push once  digoxin     Tablet 125 MICROGram(s) Oral daily  furosemide   Injectable 40 milliGRAM(s) IV Push daily  glucagon  Injectable 1 milliGRAM(s) IntraMuscular once  heparin   Injectable 5000 Unit(s) SubCutaneous every 8 hours  insulin lispro (ADMELOG) corrective regimen sliding scale   SubCutaneous three times a day before meals  insulin lispro (ADMELOG) corrective regimen sliding scale   SubCutaneous at bedtime  melatonin 3 milliGRAM(s) Oral at bedtime  metoprolol succinate  milliGRAM(s) Oral daily  mirtazapine 30 milliGRAM(s) Oral daily  pantoprazole    Tablet 40 milliGRAM(s) Oral before breakfast  potassium chloride    Tablet ER 40 milliEquivalent(s) Oral once  potassium chloride  10 mEq/100 mL IVPB 10 milliEquivalent(s) IV Intermittent once  QUEtiapine 25 milliGRAM(s) Oral three times a day    MEDICATIONS  (PRN):    Vitals:  Vital Signs Last 24 Hrs  T(C): 36.8 (21 @ 02:48), Max: 37 (21 @ 20:48)  T(F): 98.2 (21 @ 02:48), Max: 98.6 (21 @ 20:48)  HR: 72 (21 @ 02:48) (72 - 83)  BP: 122/84 (21 @ 02:48) (99/60 - 130/80)  BP(mean): --  RR: 18 (21 @ 02:48) (18 - 18)  SpO2: 96% (21 @ 02:48) (96% - 98%)      General Exam:   General Appearance: Appropriately dressed and in no acute distress       Head: Normocephalic, atraumatic and no dysmorphic features  Ear, Nose, and Throat: Moist mucous membranes  CVS: S1S2+  Resp: No SOB, no wheeze or rhonchi  Abd: soft NTND  Extremities: No edema, no cyanosis  Skin: No bruises, no rashes     Neurological Exam:  Mental Status: Awake, alert and oriented x 1-2 (knows hospital and family members.  Able to follow some simple  verbal commands. Able to name and repeat. fluent speech. No obvious aphasia or dysarthria noted. combative at times   Cranial Nerves: PERRL, EOMI, VFFC, sensation V1-V3 intact,  no obvious facial asymmetry, equal elevation of palate, scm/trap 5/5, tongue is midline on protrusion. no obvious papilledema on fundoscopic exam. hearing is grossly intact.   Motor: Normal bulk, tone and strength throughout. Fine finger movements were intact and symmetric. no tremors or drift noted.    Sensation: Intact to light touch and pinprick throughout. no right/left confusion. no extinction to tactile on DSS.    Reflexes: 1+ throughout at biceps, brachioradialis, triceps, patellars and ankles bilaterally and equal. No clonus. R toe and L toe were both downgoing.  Coordination: not following   Gait: defererd        I personally reviewed the below data/images/labs:      CBC Full  -  ( 29 Dec 2021 09:11 )  WBC Count : 9.00 K/uL  RBC Count : 4.29 M/uL  Hemoglobin : 8.6 g/dL  Hematocrit : 31.1 %  Platelet Count - Automated : 349 K/uL  Mean Cell Volume : 72.5 fl  Mean Cell Hemoglobin : 20.0 pg  Mean Cell Hemoglobin Concentration : 27.7 gm/dL  Auto Neutrophil # : x  Auto Lymphocyte # : x  Auto Monocyte # : x  Auto Eosinophil # : x  Auto Basophil # : x  Auto Neutrophil % : x  Auto Lymphocyte % : x  Auto Monocyte % : x  Auto Eosinophil % : x  Auto Basophil % : x    12-    139  |  98  |  43<H>  ----------------------------<  150<H>  3.3<L>   |  27  |  1.34<H>    Ca    9.1      29 Dec 2021 09:11           Urinalysis Basic - ( 26 Dec 2021 23:05 )    Color: Light Yellow / Appearance: Clear / S.009 / pH: x  Gluc: x / Ketone: Negative  / Bili: Negative / Urobili: Negative   Blood: x / Protein: Trace / Nitrite: Negative   Leuk Esterase: Negative / RBC: 0 /hpf / WBC 0 /HPF   Sq Epi: x / Non Sq Epi: 1 /hpf / Bacteria: Negative      < from: CT Head No Cont (21 @ 22:24) >    ACC: [id number] EXAM:  CT BRAIN                          PROCEDURE DATE:  2021          INTERPRETATION:  CLINICAL INFORMATION: [facility].    TECHNIQUE:  Axial CT images were acquired through the head.  Intravenous contrast: None  Two-dimensionalreformats were generated.    COMPARISON STUDY: None    FINDINGS:  There is no CT evidence of acute intracranial hemorrhage, mass effect,   midline shift or acute territorial infarct.    There is moderate generalized cerebral volume loss.  No clinically   significant chronic microvascular ischemic disease or white matter   lesions are visualized by CT technique.    There is an approximately 2.1 cm, partially calcified round extra-axial   mass in the posterior fossa arising from the posterior lateral aspect of   the right petrous temporal bone.    The left mastoid is sclerotic and underpneumatized.  There is no   clinically significant mastoid effusion.    The calvarium, skull base, and orbits appear unremarkable.    IMPRESSION:  No CT evidenceof acute intracranial pathology.    Approximately 2.1 cm, partially calcified mass in the posterior fossa,   arising from the posterior lateral aspect of the right petrous temporal   bone, most likely represents a meningioma.  Contrast-enhanced brainMRI   is recommended.  For further characterization.    --- End of Report ---          MARTY GALLARDO MD; Resident Radiology  This document has been electronically signed.  DILEEP RODAS MD; Attending Radiologist  This document has been electronically signed. Dec 26 2021 11:53PM    < end of copied text >

## 2021-12-30 LAB
ANION GAP SERPL CALC-SCNC: 12 MMOL/L — SIGNIFICANT CHANGE UP (ref 5–17)
BUN SERPL-MCNC: 34 MG/DL — HIGH (ref 7–23)
CALCIUM SERPL-MCNC: 9.4 MG/DL — SIGNIFICANT CHANGE UP (ref 8.4–10.5)
CHLORIDE SERPL-SCNC: 98 MMOL/L — SIGNIFICANT CHANGE UP (ref 96–108)
CO2 SERPL-SCNC: 28 MMOL/L — SIGNIFICANT CHANGE UP (ref 22–31)
CREAT SERPL-MCNC: 1.07 MG/DL — SIGNIFICANT CHANGE UP (ref 0.5–1.3)
GLUCOSE SERPL-MCNC: 147 MG/DL — HIGH (ref 70–99)
HCT VFR BLD CALC: 32.3 % — LOW (ref 34.5–45)
HGB BLD-MCNC: 8.8 G/DL — LOW (ref 11.5–15.5)
MAGNESIUM SERPL-MCNC: 2.3 MG/DL — SIGNIFICANT CHANGE UP (ref 1.6–2.6)
MCHC RBC-ENTMCNC: 19.7 PG — LOW (ref 27–34)
MCHC RBC-ENTMCNC: 27.2 GM/DL — LOW (ref 32–36)
MCV RBC AUTO: 72.3 FL — LOW (ref 80–100)
NRBC # BLD: 0 /100 WBCS — SIGNIFICANT CHANGE UP (ref 0–0)
PHOSPHATE SERPL-MCNC: 2.7 MG/DL — SIGNIFICANT CHANGE UP (ref 2.5–4.5)
PLATELET # BLD AUTO: 350 K/UL — SIGNIFICANT CHANGE UP (ref 150–400)
POTASSIUM SERPL-MCNC: 3.5 MMOL/L — SIGNIFICANT CHANGE UP (ref 3.5–5.3)
POTASSIUM SERPL-SCNC: 3.5 MMOL/L — SIGNIFICANT CHANGE UP (ref 3.5–5.3)
RBC # BLD: 4.47 M/UL — SIGNIFICANT CHANGE UP (ref 3.8–5.2)
RBC # FLD: 19.5 % — HIGH (ref 10.3–14.5)
SARS-COV-2 RNA SPEC QL NAA+PROBE: DETECTED
SODIUM SERPL-SCNC: 138 MMOL/L — SIGNIFICANT CHANGE UP (ref 135–145)
WBC # BLD: 8.47 K/UL — SIGNIFICANT CHANGE UP (ref 3.8–10.5)
WBC # FLD AUTO: 8.47 K/UL — SIGNIFICANT CHANGE UP (ref 3.8–10.5)

## 2021-12-30 RX ADMIN — QUETIAPINE FUMARATE 25 MILLIGRAM(S): 200 TABLET, FILM COATED ORAL at 06:46

## 2021-12-30 RX ADMIN — QUETIAPINE FUMARATE 25 MILLIGRAM(S): 200 TABLET, FILM COATED ORAL at 21:50

## 2021-12-30 RX ADMIN — HEPARIN SODIUM 5000 UNIT(S): 5000 INJECTION INTRAVENOUS; SUBCUTANEOUS at 12:44

## 2021-12-30 RX ADMIN — Medication 40 MILLIGRAM(S): at 06:46

## 2021-12-30 RX ADMIN — QUETIAPINE FUMARATE 25 MILLIGRAM(S): 200 TABLET, FILM COATED ORAL at 12:43

## 2021-12-30 RX ADMIN — Medication 125 MICROGRAM(S): at 06:49

## 2021-12-30 RX ADMIN — HEPARIN SODIUM 5000 UNIT(S): 5000 INJECTION INTRAVENOUS; SUBCUTANEOUS at 21:52

## 2021-12-30 RX ADMIN — Medication 1: at 12:44

## 2021-12-30 RX ADMIN — Medication 3 MILLIGRAM(S): at 21:51

## 2021-12-30 RX ADMIN — Medication 100 MILLIGRAM(S): at 06:45

## 2021-12-30 RX ADMIN — Medication 1: at 08:39

## 2021-12-30 RX ADMIN — MIRTAZAPINE 30 MILLIGRAM(S): 45 TABLET, ORALLY DISINTEGRATING ORAL at 12:43

## 2021-12-30 RX ADMIN — HEPARIN SODIUM 5000 UNIT(S): 5000 INJECTION INTRAVENOUS; SUBCUTANEOUS at 06:46

## 2021-12-30 RX ADMIN — PANTOPRAZOLE SODIUM 40 MILLIGRAM(S): 20 TABLET, DELAYED RELEASE ORAL at 06:47

## 2021-12-30 NOTE — PROGRESS NOTE ADULT - SUBJECTIVE AND OBJECTIVE BOX
Subjective: Patient seen and examined. No new events except as noted.     REVIEW OF SYSTEMS:    CONSTITUTIONAL: + weakness, fevers or chills  EYES/ENT: No visual changes;  No vertigo or throat pain   NECK: No pain or stiffness  RESPIRATORY: No cough, wheezing, hemoptysis; No shortness of breath  CARDIOVASCULAR: No chest pain or palpitations  GASTROINTESTINAL: No abdominal or epigastric pain. No nausea, vomiting, or hematemesis; No diarrhea or constipation. No melena or hematochezia.  GENITOURINARY: No dysuria, frequency or hematuria  NEUROLOGICAL: No numbness or weakness  SKIN: No itching, burning, rashes, or lesions   All other review of systems is negative unless indicated above.    MEDICATIONS:  MEDICATIONS  (STANDING):  dextrose 40% Gel 15 Gram(s) Oral once  dextrose 5%. 1000 milliLiter(s) (50 mL/Hr) IV Continuous <Continuous>  dextrose 5%. 1000 milliLiter(s) (100 mL/Hr) IV Continuous <Continuous>  dextrose 50% Injectable 25 Gram(s) IV Push once  dextrose 50% Injectable 12.5 Gram(s) IV Push once  dextrose 50% Injectable 25 Gram(s) IV Push once  digoxin     Tablet 125 MICROGram(s) Oral daily  furosemide   Injectable 40 milliGRAM(s) IV Push daily  glucagon  Injectable 1 milliGRAM(s) IntraMuscular once  heparin   Injectable 5000 Unit(s) SubCutaneous every 8 hours  insulin lispro (ADMELOG) corrective regimen sliding scale   SubCutaneous three times a day before meals  insulin lispro (ADMELOG) corrective regimen sliding scale   SubCutaneous at bedtime  melatonin 3 milliGRAM(s) Oral at bedtime  metoprolol succinate  milliGRAM(s) Oral daily  mirtazapine 30 milliGRAM(s) Oral daily  pantoprazole    Tablet 40 milliGRAM(s) Oral before breakfast  QUEtiapine 25 milliGRAM(s) Oral three times a day      PHYSICAL EXAM:  T(C): 36.7 (12-30-21 @ 04:43), Max: 37 (12-29-21 @ 20:46)  HR: 85 (12-30-21 @ 04:43) (85 - 90)  BP: 125/65 (12-30-21 @ 04:43) (106/63 - 125/65)  RR: 18 (12-30-21 @ 04:43) (18 - 18)  SpO2: 97% (12-30-21 @ 04:43) (95% - 97%)  Wt(kg): --  I&O's Summary    29 Dec 2021 07:01  -  30 Dec 2021 07:00  --------------------------------------------------------  IN: 720 mL / OUT: 0 mL / NET: 720 mL            Appearance: NAD	  HEENT:   Dry oral mucosa, PERRL, EOMI	  Lymphatic: No lymphadenopathy  Cardiovascular: Irregular S1 S2, No JVD, No murmurs, No edema  Respiratory: decreased bs 	  Psychiatry: A & O x 0, dementia   Gastrointestinal:  Soft, Non-tender, + BS	  Skin: No rashes, No ecchymoses, No cyanosis	  Neurologic: Non-focal  Extremities: Normal range of motion, No clubbing, cyanosis or edema  Vascular: Peripheral pulses palpable 2+ bilaterally      LABS:    CARDIAC MARKERS:                                8.6    9.00  )-----------( 349      ( 29 Dec 2021 09:11 )             31.1     12-29    139  |  98  |  43<H>  ----------------------------<  150<H>  3.3<L>   |  27  |  1.34<H>    Ca    9.1      29 Dec 2021 09:11      proBNP:   Lipid Profile:   HgA1c:   TSH:             TELEMETRY: 	    ECG:  	  RADIOLOGY:   DIAGNOSTIC TESTING:  [ ] Echocardiogram:  [ ]  Catheterization:  [ ] Stress Test:    OTHER:

## 2021-12-30 NOTE — PROGRESS NOTE ADULT - ASSESSMENT
88 yo F with dementia, DM,, HTN, depression, CHF, Afib on digoxin stopped Eliquis due to recurrent GI bleed/anemia p/w agitation/AMS.   CTH with likely L temporal meningioma  mild leukocytosis, mildly elevated BNP  A1c 5.2  b12 458, TSH WNL , RPR NR   MRI with 2.2 x 1.5x2cm extra axial mass likely meningioma in R post cranial fossa  EEG mod slowing no seizures   o/e non focal. MAYES AAox1-2, agitation    Impression: AMS of unclear etiology, possible toxic metabolic encephalopathy given Leukocytosis. r/o CHF exacerbation./ Most likely progression of her underlying neurocognitive disorder   - on enhanced. agitated at times  - check QTC, if <500 seroquel and/or zyprexa PRN for agitation  - not on AC given anemia/GIB. consider DOAC for AF when able; now on digoxin  - r/o infection  - f/u psych   - b12 WNL, rpr, TSH --> all WNL  - telemetry  - PT/OT  - check FS, glucose control <180  - GI/DVT ppx  - Thank you for allowing me to participate in the care of this patient. Call with questions.   - d/c plan when able   Konstantin Giordano MD  Vascular Neurology  Office: [phone]

## 2021-12-30 NOTE — PROGRESS NOTE ADULT - SUBJECTIVE AND OBJECTIVE BOX
Neurology Progress Note    S: Patient seen and examined. No new events overnight. patient denied CP, SOB, HA or pain. MRI with meningioma. EEG neg     Medication:  MEDICATIONS  (STANDING):  dextrose 40% Gel 15 Gram(s) Oral once  dextrose 5%. 1000 milliLiter(s) (50 mL/Hr) IV Continuous <Continuous>  dextrose 5%. 1000 milliLiter(s) (100 mL/Hr) IV Continuous <Continuous>  dextrose 50% Injectable 25 Gram(s) IV Push once  dextrose 50% Injectable 12.5 Gram(s) IV Push once  dextrose 50% Injectable 25 Gram(s) IV Push once  digoxin     Tablet 125 MICROGram(s) Oral daily  furosemide   Injectable 40 milliGRAM(s) IV Push daily  glucagon  Injectable 1 milliGRAM(s) IntraMuscular once  heparin   Injectable 5000 Unit(s) SubCutaneous every 8 hours  insulin lispro (ADMELOG) corrective regimen sliding scale   SubCutaneous three times a day before meals  insulin lispro (ADMELOG) corrective regimen sliding scale   SubCutaneous at bedtime  melatonin 3 milliGRAM(s) Oral at bedtime  metoprolol succinate  milliGRAM(s) Oral daily  mirtazapine 30 milliGRAM(s) Oral daily  pantoprazole    Tablet 40 milliGRAM(s) Oral before breakfast  QUEtiapine 25 milliGRAM(s) Oral three times a day    MEDICATIONS  (PRN):      Vitals:  Vital Signs Last 24 Hrs  T(C): 36.7 (21 @ 04:43), Max: 37 (21 @ 20:46)  T(F): 98 (21 @ 04:43), Max: 98.6 (21 @ 20:46)  HR: 85 (21 @ 04:43) (85 - 90)  BP: 125/65 (21 @ 04:43) (106/63 - 125/65)  BP(mean): --  RR: 18 (21 @ 04:43) (18 - 18)  SpO2: 97% (21 @ 04:43) (95% - 97%)        General Exam:   General Appearance: Appropriately dressed and in no acute distress       Head: Normocephalic, atraumatic and no dysmorphic features  Ear, Nose, and Throat: Moist mucous membranes  CVS: S1S2+  Resp: No SOB, no wheeze or rhonchi  Abd: soft NTND  Extremities: No edema, no cyanosis  Skin: No bruises, no rashes     Neurological Exam:  Mental Status: Awake, alert and oriented x 1-2 (knows hospital and family members.  Able to follow some simple  verbal commands. Able to name and repeat. fluent speech. No obvious aphasia or dysarthria noted. combative at times   Cranial Nerves: PERRL, EOMI, VFFC, sensation V1-V3 intact,  no obvious facial asymmetry, equal elevation of palate, scm/trap 5/5, tongue is midline on protrusion. no obvious papilledema on fundoscopic exam. hearing is grossly intact.   Motor: Normal bulk, tone and strength throughout. Fine finger movements were intact and symmetric. no tremors or drift noted.    Sensation: Intact to light touch and pinprick throughout. no right/left confusion. no extinction to tactile on DSS.    Reflexes: 1+ throughout at biceps, brachioradialis, triceps, patellars and ankles bilaterally and equal. No clonus. R toe and L toe were both downgoing.  Coordination: not following   Gait: defererd        I personally reviewed the below data/images/labs:                            8.6    9.00  )-----------( 349      ( 29 Dec 2021 09:11 )             31.1       12-    139  |  98  |  43<H>  ----------------------------<  150<H>  3.3<L>   |  27  |  1.34<H>    Ca    9.1      29 Dec 2021 09:11             Urinalysis Basic - ( 26 Dec 2021 23:05 )    Color: Light Yellow / Appearance: Clear / S.009 / pH: x  Gluc: x / Ketone: Negative  / Bili: Negative / Urobili: Negative   Blood: x / Protein: Trace / Nitrite: Negative   Leuk Esterase: Negative / RBC: 0 /hpf / WBC 0 /HPF   Sq Epi: x / Non Sq Epi: 1 /hpf / Bacteria: Negative      < from: CT Head No Cont (21 @ 22:24) >    ACC: [id number] EXAM:  CT BRAIN                          PROCEDURE DATE:  2021          INTERPRETATION:  CLINICAL INFORMATION: [facility].    TECHNIQUE:  Axial CT images were acquired through the head.  Intravenous contrast: None  Two-dimensionalreformats were generated.    COMPARISON STUDY: None    FINDINGS:  There is no CT evidence of acute intracranial hemorrhage, mass effect,   midline shift or acute territorial infarct.    There is moderate generalized cerebral volume loss.  No clinically   significant chronic microvascular ischemic disease or white matter   lesions are visualized by CT technique.    There is an approximately 2.1 cm, partially calcified round extra-axial   mass in the posterior fossa arising from the posterior lateral aspect of   the right petrous temporal bone.    The left mastoid is sclerotic and underpneumatized.  There is no   clinically significant mastoid effusion.    The calvarium, skull base, and orbits appear unremarkable.    IMPRESSION:  No CT evidenceof acute intracranial pathology.    Approximately 2.1 cm, partially calcified mass in the posterior fossa,   arising from the posterior lateral aspect of the right petrous temporal   bone, most likely represents a meningioma.  Contrast-enhanced brainMRI   is recommended.  For further characterization.    --- End of Report ---          MARTY GALLARDO MD; Resident Radiology  This document has been electronically signed.  DILEEP RODAS MD; Attending Radiologist  This document has been electronically signed. Dec 26 2021 11:53PM    < end of copied text >    < from: MR Head w/wo IV Cont (.29. @ 14:16) >    ACC: [id number] EXAM:  MR BRAIN WAW IC                          PROCEDURE DATE:  2021          INTERPRETATION:  MR BRAIN WITHOUT AND WITH IV CONTRAST    Clinical information: AMS    CT head with meningiom A MRI of the brain   was performed bothprior to and after the administration of intravenous   gadolinium.    TECHNIQUE:  In the sagittal plane T1 pre and post gadolinium enhanced imaging was   performed.  In the axial plane T1 pre-and postcontrast as well as T2,    FLAIR, SWAN, and diffusion weighted imaging was utilized.  In the coronal   plane T1 post gadolinium enhanced images were obtained.  Contrast administered 5 cc Gadavist. Contrast discarded 2.5 cc.    COMPARISON:  Exam is compared to head CT of 2021.    FINDINGS:  *  REGION OF CONCERN /EXTRA-AXIAL:  There is an enhancing extra-axial mass along the right lateral aspect of   the posterior cranial fossa. This abuts the dura overlying the posterior   lateral aspect of the right petrous ridge, tentorium, as well as the dura   overlying the sigmoid sinus. Small dural tails are present. The right   sigmoid sinus itself enhances normally. Mass measures grossly 2.2 x 1.5   cm axially by 2.0 cm craniocaudad. Mass was partly calcified on CT.   Imaging characteristics are most compatible with a calcified meningioma.    *  BRAIN PARENCHYMA:  Very mild mass effect on the adjacent right lateral aspect of the   cerebellum without evidence of abnormal parenchymal signal.  Patient has moderate brain atrophy. There aremild chronic   periventricular and subcortical white matter ischemic changes.    *  VENTRICLES:  There is no hydrocephalus. Ventricular prominence is thought related to   underlying brain atrophy..    *  ENHANCEMENT:  No additional lesion seen.    *OTHER:  A partly empty sella turcica is noted.    IMPRESSION:  2.2 X 1.5 X 2.0 CM  ENHANCING EXTRA-AXIAL MASS RIGHT LATERAL ASPECT OF   THE POSTERIOR CRANIAL FOSSA ABUTTING THE SIGMOID SINUS, TENTORIUM, AND   PETROUS BONE. IMAGING CHARACTERISTICS AREMOST COMPATIBLE WITH A   MENINGIOMA. NO ASSOCIATED EDEMA. PATENT RIGHT SIGMOID SINUS    --- End of Report ---            DORI MATOS MD; Attending Radiologist  This document has been electronically signed. Dec 29 2021  2:45PM    < end of copied text >

## 2021-12-31 DIAGNOSIS — R41.82 ALTERED MENTAL STATUS, UNSPECIFIED: ICD-10-CM

## 2021-12-31 DIAGNOSIS — Z29.9 ENCOUNTER FOR PROPHYLACTIC MEASURES, UNSPECIFIED: ICD-10-CM

## 2021-12-31 DIAGNOSIS — I50.22 CHRONIC SYSTOLIC (CONGESTIVE) HEART FAILURE: ICD-10-CM

## 2021-12-31 DIAGNOSIS — E11.9 TYPE 2 DIABETES MELLITUS WITHOUT COMPLICATIONS: ICD-10-CM

## 2021-12-31 DIAGNOSIS — I10 ESSENTIAL (PRIMARY) HYPERTENSION: ICD-10-CM

## 2021-12-31 DIAGNOSIS — U07.1 COVID-19: ICD-10-CM

## 2021-12-31 LAB
ANION GAP SERPL CALC-SCNC: 14 MMOL/L — SIGNIFICANT CHANGE UP (ref 5–17)
BUN SERPL-MCNC: 27 MG/DL — HIGH (ref 7–23)
CALCIUM SERPL-MCNC: 9.3 MG/DL — SIGNIFICANT CHANGE UP (ref 8.4–10.5)
CHLORIDE SERPL-SCNC: 101 MMOL/L — SIGNIFICANT CHANGE UP (ref 96–108)
CO2 SERPL-SCNC: 25 MMOL/L — SIGNIFICANT CHANGE UP (ref 22–31)
CREAT SERPL-MCNC: 0.98 MG/DL — SIGNIFICANT CHANGE UP (ref 0.5–1.3)
GLUCOSE SERPL-MCNC: 111 MG/DL — HIGH (ref 70–99)
HCT VFR BLD CALC: 31.8 % — LOW (ref 34.5–45)
HGB BLD-MCNC: 8.6 G/DL — LOW (ref 11.5–15.5)
MAGNESIUM SERPL-MCNC: 2.3 MG/DL — SIGNIFICANT CHANGE UP (ref 1.6–2.6)
MCHC RBC-ENTMCNC: 20 PG — LOW (ref 27–34)
MCHC RBC-ENTMCNC: 27 GM/DL — LOW (ref 32–36)
MCV RBC AUTO: 74 FL — LOW (ref 80–100)
NRBC # BLD: 0 /100 WBCS — SIGNIFICANT CHANGE UP (ref 0–0)
PHOSPHATE SERPL-MCNC: 3.1 MG/DL — SIGNIFICANT CHANGE UP (ref 2.5–4.5)
PLATELET # BLD AUTO: 286 K/UL — SIGNIFICANT CHANGE UP (ref 150–400)
POTASSIUM SERPL-MCNC: 3.5 MMOL/L — SIGNIFICANT CHANGE UP (ref 3.5–5.3)
POTASSIUM SERPL-SCNC: 3.5 MMOL/L — SIGNIFICANT CHANGE UP (ref 3.5–5.3)
RBC # BLD: 4.3 M/UL — SIGNIFICANT CHANGE UP (ref 3.8–5.2)
RBC # FLD: 19.8 % — HIGH (ref 10.3–14.5)
SARS-COV-2 RNA SPEC QL NAA+PROBE: DETECTED
SODIUM SERPL-SCNC: 140 MMOL/L — SIGNIFICANT CHANGE UP (ref 135–145)
WBC # BLD: 6.3 K/UL — SIGNIFICANT CHANGE UP (ref 3.8–10.5)
WBC # FLD AUTO: 6.3 K/UL — SIGNIFICANT CHANGE UP (ref 3.8–10.5)

## 2021-12-31 RX ORDER — LANOLIN ALCOHOL/MO/W.PET/CERES
6 CREAM (GRAM) TOPICAL AT BEDTIME
Refills: 0 | Status: DISCONTINUED | OUTPATIENT
Start: 2021-12-31 | End: 2022-01-06

## 2021-12-31 RX ADMIN — QUETIAPINE FUMARATE 25 MILLIGRAM(S): 200 TABLET, FILM COATED ORAL at 06:53

## 2021-12-31 RX ADMIN — MIRTAZAPINE 30 MILLIGRAM(S): 45 TABLET, ORALLY DISINTEGRATING ORAL at 11:34

## 2021-12-31 RX ADMIN — PANTOPRAZOLE SODIUM 40 MILLIGRAM(S): 20 TABLET, DELAYED RELEASE ORAL at 06:54

## 2021-12-31 RX ADMIN — Medication 1: at 18:56

## 2021-12-31 RX ADMIN — Medication 6 MILLIGRAM(S): at 21:10

## 2021-12-31 RX ADMIN — HEPARIN SODIUM 5000 UNIT(S): 5000 INJECTION INTRAVENOUS; SUBCUTANEOUS at 21:10

## 2021-12-31 RX ADMIN — HEPARIN SODIUM 5000 UNIT(S): 5000 INJECTION INTRAVENOUS; SUBCUTANEOUS at 15:07

## 2021-12-31 RX ADMIN — QUETIAPINE FUMARATE 25 MILLIGRAM(S): 200 TABLET, FILM COATED ORAL at 15:06

## 2021-12-31 RX ADMIN — Medication 125 MICROGRAM(S): at 06:54

## 2021-12-31 RX ADMIN — Medication 100 MILLIGRAM(S): at 06:53

## 2021-12-31 RX ADMIN — HEPARIN SODIUM 5000 UNIT(S): 5000 INJECTION INTRAVENOUS; SUBCUTANEOUS at 06:54

## 2021-12-31 RX ADMIN — QUETIAPINE FUMARATE 25 MILLIGRAM(S): 200 TABLET, FILM COATED ORAL at 21:10

## 2021-12-31 RX ADMIN — Medication 2: at 11:34

## 2021-12-31 RX ADMIN — Medication 40 MILLIGRAM(S): at 06:54

## 2021-12-31 NOTE — PROGRESS NOTE ADULT - PROBLEM SELECTOR PLAN 1
- Patient with positive COVID test 12/30  - EKG: NSR with rate 80,   isolation   supportive care   not requiring supplemental oxygen - Patient presenting with AMS in setting of chronic dementia/depression. Patient currently AOx1-2, improving with seroquel and IV haldol  - Ddx: toxic metabolic encephalopathy (leukocytosis) vs. acute delirium on dementia in setting of COVID-19  - CT head (12/26) negative for acute bleed, likely left temporal meningioma  - MRI (12/29): 2.2 x 1.5x2cm extra axial mass likely meningioma in R post cranial fossa  - Neuro following      - EEG negative   - B12, folate, TSH wnl  - Trop negative  - c/w Seroquel oral TID  - recommend seroquel and/or zyprexa PRN for agitation

## 2021-12-31 NOTE — PROGRESS NOTE ADULT - SUBJECTIVE AND OBJECTIVE BOX
Subjective: Patient seen and examined. No new events except as noted.   +COVID       REVIEW OF SYSTEMS:    CONSTITUTIONAL: + weakness, fevers or chills  EYES/ENT: No visual changes;  No vertigo or throat pain   NECK: No pain or stiffness  RESPIRATORY: No cough, wheezing, hemoptysis; No shortness of breath  CARDIOVASCULAR: No chest pain or palpitations  GASTROINTESTINAL: No abdominal or epigastric pain. No nausea, vomiting, or hematemesis; No diarrhea or constipation. No melena or hematochezia.  GENITOURINARY: No dysuria, frequency or hematuria  NEUROLOGICAL: No numbness or weakness  SKIN: No itching, burning, rashes, or lesions   All other review of systems is negative unless indicated above.    MEDICATIONS:  MEDICATIONS  (STANDING):  dextrose 40% Gel 15 Gram(s) Oral once  dextrose 5%. 1000 milliLiter(s) (50 mL/Hr) IV Continuous <Continuous>  dextrose 5%. 1000 milliLiter(s) (100 mL/Hr) IV Continuous <Continuous>  dextrose 50% Injectable 25 Gram(s) IV Push once  dextrose 50% Injectable 12.5 Gram(s) IV Push once  dextrose 50% Injectable 25 Gram(s) IV Push once  digoxin     Tablet 125 MICROGram(s) Oral daily  furosemide   Injectable 40 milliGRAM(s) IV Push daily  glucagon  Injectable 1 milliGRAM(s) IntraMuscular once  heparin   Injectable 5000 Unit(s) SubCutaneous every 8 hours  insulin lispro (ADMELOG) corrective regimen sliding scale   SubCutaneous three times a day before meals  insulin lispro (ADMELOG) corrective regimen sliding scale   SubCutaneous at bedtime  melatonin 3 milliGRAM(s) Oral at bedtime  metoprolol succinate  milliGRAM(s) Oral daily  mirtazapine 30 milliGRAM(s) Oral daily  pantoprazole    Tablet 40 milliGRAM(s) Oral before breakfast  QUEtiapine 25 milliGRAM(s) Oral three times a day      PHYSICAL EXAM:  T(C): 37.1 (12-31-21 @ 04:18), Max: 37.1 (12-30-21 @ 20:56)  HR: 79 (12-31-21 @ 04:18) (79 - 86)  BP: 144/72 (12-31-21 @ 04:18) (108/66 - 144/72)  RR: 18 (12-31-21 @ 04:18) (18 - 18)  SpO2: 95% (12-31-21 @ 04:18) (95% - 96%)  Wt(kg): --  I&O's Summary    30 Dec 2021 07:01  -  31 Dec 2021 07:00  --------------------------------------------------------  IN: 480 mL / OUT: 0 mL / NET: 480 mL          Appearance: NAD	  HEENT:  Dry oral mucosa, PERRL, EOMI	  Lymphatic: No lymphadenopathy , no edema  Cardiovascular: Irregular S1 S2, No JVD, No murmurs , Peripheral pulses palpable 2+ bilaterally  Respiratory: decreased bs   Gastrointestinal:  Soft, Non-tender, + BS	  Skin: No rashes, No ecchymoses, No cyanosis, warm to touch  Musculoskeletal: Normal range of motion, normal strength  Psychiatry:  Mood & affect appropriate  Ext: No edema      LABS:    CARDIAC MARKERS:        COVID-19 PCR: Detected: You can help in the fight against COVID-19. [facility] may contact   you to see if you are interested in voluntarily participating in one of   our clinical trials.   Testing is performed using polymerase chain reaction (PCR) or   transcription mediated amplification (TMA). This COVID-19 (SARS-CoV-2)   nucleic acid amplification test was validated by [facility] and is   in use under the FDA Emergency Use Authorization (EUA) for clinical labs   CLIA-certified to perform high complexity testing. Test results should be   correlated with clinical presentation, patient history, and epidemiology. (12.31.21 @ 00:55)                         8.6    6.30  )-----------( 286      ( 31 Dec 2021 07:28 )             31.8     12-31    140  |  101  |  27<H>  ----------------------------<  111<H>  3.5   |  25  |  0.98    Ca    9.3      31 Dec 2021 07:31  Phos  3.1     12-31  Mg     2.3     12-31      proBNP:   Lipid Profile:   HgA1c:   TSH:             TELEMETRY: 	SR     ECG:  	  RADIOLOGY:   DIAGNOSTIC TESTING:  [ ] Echocardiogram:  [ ]  Catheterization:  [ ] Stress Test:    OTHER:

## 2021-12-31 NOTE — PROGRESS NOTE ADULT - ASSESSMENT
89 yo F with dementia, DM,, HTN, CHF, on lasix, Afib on digoxin stopped Eliquis due to recurrent GI bleed/anemia, dementia depression on Seroquel, pt became combative, and more agitated, admitted for AMS, found to have     89 yo F with dementia, DM,, HTN, CHF, on lasix, Afib on digoxin stopped Eliquis due to recurrent GI bleed/anemia, dementia depression on Seroquel, pt became combative, and more agitated. pt was given risperidone 0.5mg, history per family, stating increased size of the leg swelling. No fever or cough. 88 yo F with dementia, DM,, HTN, CHF, on lasix, Afib on digoxin stopped Eliquis due to recurrent GI bleed/anemia, dementia depression on Seroquel, pt became combative, and more agitated, admitted for AMS, found to have     88 yo F with dementia, DM, HTN (on metoprolol), CHF (on lasix), Afib (on digoxin, stopped Eliquis due to recurrent GI bleed/anemia) dementia, depression (on Seroquel, mirtazapine), who presents with agitation/AMS pt became combative, and more agitated. pt was given risperidone 0.5mg, history per family, stating increased size of the leg swelling. No fever or cough.

## 2021-12-31 NOTE — PROGRESS NOTE ADULT - PROBLEM SELECTOR PLAN 5
- A1C: 8.2. On home, on Januvia.   - c/w low dose insulin SS qac + qhs - per EMR, history of CHF (no echo at [facility])  - BNP 3k on admission  - c/w Lasix 40mg IV daily

## 2021-12-31 NOTE — PROGRESS NOTE ADULT - ASSESSMENT
89 yo F with dementia, DM,, HTN, depression, CHF, Afib on digoxin stopped Eliquis due to recurrent GI bleed/anemia p/w agitation/AMS.   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 MAYES AAox1-2, agitation    Impression: AMS of unclear etiology, possible toxic metabolic encephalopathy given Leukocytosis. r/o CHF exacerbation./ Likely progression of her underlying neurocognitive disorder on top of delerium.    now + COVID  - transfer to covid floor with airborne and contact isolation precuations  - monitor O2. and inflammatory markeres  - on enhanced. agitated at times  - check QTC, if <500 seroquel and/or zyprexa PRN for agitation  - not on AC given anemia/GIB. consider DOAC for AF when able; now on digoxin  - r/o infection  - f/u psych   - b12 WNL, rpr, TSH --> all WNL  - telemetry  - PT/OT  - check FS, glucose control <180  - GI/DVT ppx  - Thank you for allowing me to participate in the care of this patient. Call with questions.     Konstantin Giordano MD  Vascular Neurology  Office: [phone]

## 2021-12-31 NOTE — PROGRESS NOTE ADULT - SUBJECTIVE AND OBJECTIVE BOX
Neurology Progress Note    S: Patient seen and examined. No new events overnight. patient denied CP, SOB, HA or pain. MRI with meningioma. EEG neg. now + COVID    Medication:  MEDICATIONS  (STANDING):  dextrose 40% Gel 15 Gram(s) Oral once  dextrose 5%. 1000 milliLiter(s) (50 mL/Hr) IV Continuous <Continuous>  dextrose 5%. 1000 milliLiter(s) (100 mL/Hr) IV Continuous <Continuous>  dextrose 50% Injectable 25 Gram(s) IV Push once  dextrose 50% Injectable 12.5 Gram(s) IV Push once  dextrose 50% Injectable 25 Gram(s) IV Push once  digoxin     Tablet 125 MICROGram(s) Oral daily  furosemide   Injectable 40 milliGRAM(s) IV Push daily  glucagon  Injectable 1 milliGRAM(s) IntraMuscular once  heparin   Injectable 5000 Unit(s) SubCutaneous every 8 hours  insulin lispro (ADMELOG) corrective regimen sliding scale   SubCutaneous three times a day before meals  insulin lispro (ADMELOG) corrective regimen sliding scale   SubCutaneous at bedtime  melatonin 3 milliGRAM(s) Oral at bedtime  metoprolol succinate  milliGRAM(s) Oral daily  mirtazapine 30 milliGRAM(s) Oral daily  pantoprazole    Tablet 40 milliGRAM(s) Oral before breakfast  QUEtiapine 25 milliGRAM(s) Oral three times a day    MEDICATIONS  (PRN):        Vitals:  Vital Signs Last 24 Hrs  T(C): 36.8 (21 @ 12:05), Max: 37.1 (21 @ 20:56)  T(F): 98.2 (21 @ 12:05), Max: 98.8 (21 @ 20:56)  HR: 80 (21 @ 12:05) (79 - 86)  BP: 104/67 (21 @ 12:05) (104/67 - 144/72)  BP(mean): --  RR: 19 (21 @ 12:05) (18 - 19)  SpO2: 96% (21 @ 12:05) (95% - 96%)        General Exam:   General Appearance: Appropriately dressed and in no acute distress       Head: Normocephalic, atraumatic and no dysmorphic features  Ear, Nose, and Throat: Moist mucous membranes  CVS: S1S2+  Resp: No SOB, no wheeze or rhonchi  Abd: soft NTND  Extremities: No edema, no cyanosis  Skin: No bruises, no rashes     Neurological Exam:  Mental Status: Awake, alert and oriented x 1-2 ([facility] hospital and family members.  Able to follow some simple  verbal commands. Able to name and repeat. fluent speech. No obvious aphasia or dysarthria noted. combative at times   Cranial Nerves: PERRL, EOMI, VFFC, sensation V1-V3 intact,  no obvious facial asymmetry, equal elevation of palate, scm/trap 5/5, tongue is midline on protrusion. no obvious papilledema on fundoscopic exam. hearing is grossly intact.   Motor: Normal bulk, tone and strength throughout. Fine finger movements were intact and symmetric. no tremors or drift noted.    Sensation: Intact to light touch and pinprick throughout. no right/left confusion. no extinction to tactile on DSS.    Reflexes: 1+ throughout at biceps, brachioradialis, triceps, patellars and ankles bilaterally and equal. No clonus. R toe and L toe were both downgoing.  Coordination: not following   Gait: defererd        I personally reviewed the below data/images/labs:    CBC Full  -  ( 31 Dec 2021 07:28 )  WBC Count : 6.30 K/uL  RBC Count : 4.30 M/uL  Hemoglobin : 8.6 g/dL  Hematocrit : 31.8 %  Platelet Count - Automated : 286 K/uL  Mean Cell Volume : 74.0 fl  Mean Cell Hemoglobin : 20.0 pg  Mean Cell Hemoglobin Concentration : 27.0 gm/dL  Auto Neutrophil # : x  Auto Lymphocyte # : x  Auto Monocyte # : x  Auto Eosinophil # : x  Auto Basophil # : x  Auto Neutrophil % : x  Auto Lymphocyte % : x  Auto Monocyte % : x  Auto Eosinophil % : x  Auto Basophil % : x          140  |  101  |  27<H>  ----------------------------<  111<H>  3.5   |  25  |  0.98    Ca    9.3      31 Dec 2021 07:31  Phos  3.1     12-  Mg     2.3     -          Urinalysis Basic - ( 26 Dec 2021 23:05 )    Color: Light Yellow / Appearance: Clear / S.009 / pH: x  Gluc: x / Ketone: Negative  / Bili: Negative / Urobili: Negative   Blood: x / Protein: Trace / Nitrite: Negative   Leuk Esterase: Negative / RBC: 0 /hpf / WBC 0 /HPF   Sq Epi: x / Non Sq Epi: 1 /hpf / Bacteria: Negative      < from: CT Head No Cont (21 @ 22:24) >    ACC: [id number] EXAM:  CT BRAIN                          PROCEDURE DATE:  2021          INTERPRETATION:  CLINICAL INFORMATION: Delirium.    TECHNIQUE:  Axial CT images were acquired through the head.  Intravenous contrast: None  Two-dimensionalreformats were generated.    COMPARISON STUDY: None    FINDINGS:  There is no CT evidence of acute intracranial hemorrhage, mass effect,   midline shift or acute territorial infarct.    There is moderate generalized cerebral volume loss.  No clinically   significant chronic microvascular ischemic disease or white matter   lesions are visualized by CT technique.    There is an approximately 2.1 cm, partially calcified round extra-axial   mass in the posterior fossa arising from the posterior lateral aspect of   the right petrous temporal bone.    The left mastoid is sclerotic and underpneumatized.  There is no   clinically significant mastoid effusion.    The calvarium, skull base, and orbits appear unremarkable.    IMPRESSION:  No CT evidenceof acute intracranial pathology.    Approximately 2.1 cm, partially calcified mass in the posterior fossa,   arising from the posterior lateral aspect of the right petrous temporal   bone, most likely represents a meningioma.  Contrast-enhanced brainMRI   is recommended.  For further characterization.    --- End of Report ---          MARTY GALLARDO MD; Resident Radiology  This document has been electronically signed.  DILEEP RODAS MD; Attending Radiologist  This document has been electronically signed. Dec 26 2021 11:53PM    < end of copied text >    < from: MR Head w/wo IV Cont (21 @ 14:16) >    ACC: [id number] EXAM:  MR BRAIN WAW IC                          PROCEDURE DATE:  2021          INTERPRETATION:  MR BRAIN WITHOUT AND WITH IV CONTRAST    Clinical information: AMS    CT head with meningiom A MRI of the brain   was performed bothprior to and after the administration of intravenous   gadolinium.    TECHNIQUE:  In the sagittal plane T1 pre and post gadolinium enhanced imaging was   performed.  In the axial plane T1 pre-and postcontrast as well as T2,    FLAIR, SWAN, and diffusion weighted imaging was utilized.  In the coronal   plane T1 post gadolinium enhanced images were obtained.  Contrast administered 5 cc Gadavist. Contrast discarded 2.5 cc.    COMPARISON:  Exam is compared to head CT of 2021.    FINDINGS:  *  REGION OF CONCERN /EXTRA-AXIAL:  There is an enhancing extra-axial mass along the right lateral aspect of   the posterior cranial fossa. This abuts the dura overlying the posterior   lateral aspect of the right petrous ridge, tentorium, as well as the dura   overlying the sigmoid sinus. Small dural tails are present. The right   sigmoid sinus itself enhances normally. Mass measures grossly 2.2 x 1.5   cm axially by 2.0 cm craniocaudad. Mass was partly calcified on CT.   Imaging characteristics are most compatible with a calcified meningioma.    *  BRAIN PARENCHYMA:  Very mild mass effect on the adjacent right lateral aspect of the   cerebellum without evidence of abnormal parenchymal signal.  Patient has moderate brain atrophy. There aremild chronic   periventricular and subcortical white matter ischemic changes.    *  VENTRICLES:  There is no hydrocephalus. Ventricular prominence is thought related to   underlying brain atrophy..    *  ENHANCEMENT:  No additional lesion seen.    *OTHER:  A partly empty sella turcica is noted.    IMPRESSION:  2.2 X 1.5 X 2.0 CM  ENHANCING EXTRA-AXIAL MASS RIGHT LATERAL ASPECT OF   THE POSTERIOR CRANIAL FOSSA ABUTTING THE SIGMOID SINUS, TENTORIUM, AND   PETROUS BONE. IMAGING CHARACTERISTICS AREMOST COMPATIBLE WITH A   MENINGIOMA. NO ASSOCIATED EDEMA. PATENT RIGHT SIGMOID SINUS    --- End of Report ---            DORI MATOS MD; Attending Radiologist  This document has been electronically signed. Dec 29 2021  2:45PM    < end of copied text >

## 2021-12-31 NOTE — PROGRESS NOTE ADULT - PROBLEM SELECTOR PLAN 2
- Geriatric psych consulted, f/u recs  - Aspiration and fall precautions  - c/w Seroquel Oral TID  - Neuro eval - Patient with negative COVID test on admission, then positive COVID test 12/30, satting well on RA since admission. No URI symptoms.   - c/w airborne and isolation precautions  - not requiring supplemental oxygen  - downtrending leukocytosis, now wnl  - CXR (12/26): clear lungs  - pending repeat COVID-19 pcr

## 2021-12-31 NOTE — PROGRESS NOTE ADULT - PROBLEM SELECTOR PLAN 3
- Patient with longstanding hx of afib on digoxin (previously on Eliquis, held due to recurrent GI bleed/anemia  - c/w Digoxin 125mcg oral qd  rate controlled   cont with home meds  off AC secondary to previous recurrent GI bleeds and anemia - Geriatric psych consulted, f/u recs  - Aspiration and fall precautions  - c/w Seroquel Oral TID  - c/w mirtazapine 30mg oral qd  - can give IV haldol 1.0 mg for acute agitation (QTc wnl on admission)

## 2021-12-31 NOTE — PROGRESS NOTE ADULT - SUBJECTIVE AND OBJECTIVE BOX
Patient:  GUS AMARO  [id number]    Progress Note    Interval events: No acute events overnight. Patient denies f/c/n/v/cp/ap/bowel or bladder changes.  Pertinent ROS (if any):      Administered:  heparin   Injectable: 5000 Unit(s) SubCutaneous (12-31 @ 15:07)  QUEtiapine: 25 milliGRAM(s) Oral (12-31 @ 15:06)  mirtazapine: 30 milliGRAM(s) Oral (12-31 @ 11:34)  insulin lispro (ADMELOG) corrective regimen sliding scale: 2 Unit(s) SubCutaneous (12-31 @ 11:34)  pantoprazole    Tablet: 40 milliGRAM(s) Oral (12-31 @ 06:54)  heparin   Injectable: 5000 Unit(s) SubCutaneous (12-31 @ 06:54)  furosemide   Injectable: 40 milliGRAM(s) IV Push (12-31 @ 06:54)  digoxin     Tablet: 125 MICROGram(s) Oral (12-31 @ 06:54)  metoprolol succinate ER: 100 milliGRAM(s) Oral (12-31 @ 06:53)  QUEtiapine: 25 milliGRAM(s) Oral (12-31 @ 06:53)        OBJECTIVE:    12-30 @ 07:01  -  12-31 @ 07:00  --------------------------------------------------------  IN: 480 mL / OUT: 0 mL / NET: 480 mL      CAPILLARY BLOOD GLUCOSE      POCT Blood Glucose.: 225 mg/dL (31 Dec 2021 11:33)        VITALS:  T(F): 98.2 (12-31-21 @ 12:05), Max: 98.8 (12-30-21 @ 20:56)  HR: 80 (12-31-21 @ 12:05) (79 - 86)  BP: 104/67 (12-31-21 @ 12:05) (104/67 - 144/72)  BP(mean): --  ABP: --  ABP(mean): --  RR: 19 (12-31-21 @ 12:05) (18 - 19)  SpO2: 96% (12-31-21 @ 12:05) (95% - 96%)    PHYSICAL EXAM:  GENERAL: NAD, lying in bed comfortably  HEAD:  Atraumatic, Normocephalic  EYES: EOMI, PERRLA, conjunctiva and sclera clear  ENT: Moist mucous membranes  NECK: Supple, No JVD  CHEST/LUNG: Clear to auscultation bilaterally; No rales, rhonchi, wheezing, or rubs. Unlabored respirations  HEART: Regular rate and rhythm; No murmurs, rubs, or gallops  ABDOMEN: Bowel sounds present; Soft, Nontender, Nondistended. No hepatomegaly  EXTREMITIES:  2+ Peripheral Pulses, brisk capillary refill. No clubbing, cyanosis, or edema  NERVOUS SYSTEM:  Alert & Oriented X3, speech clear. No deficits   MSK: FROM all 4 extremities, full and equal strength  SKIN: No rashes or lesions    HOSPITAL MEDICATIONS:  Standing Meds:  dextrose 40% Gel 15 Gram(s) Oral once  dextrose 5%. 1000 milliLiter(s) IV Continuous <Continuous>  dextrose 5%. 1000 milliLiter(s) IV Continuous <Continuous>  dextrose 50% Injectable 25 Gram(s) IV Push once  dextrose 50% Injectable 12.5 Gram(s) IV Push once  dextrose 50% Injectable 25 Gram(s) IV Push once  digoxin     Tablet 125 MICROGram(s) Oral daily  furosemide   Injectable 40 milliGRAM(s) IV Push daily  glucagon  Injectable 1 milliGRAM(s) IntraMuscular once  heparin   Injectable 5000 Unit(s) SubCutaneous every 8 hours  insulin lispro (ADMELOG) corrective regimen sliding scale   SubCutaneous three times a day before meals  insulin lispro (ADMELOG) corrective regimen sliding scale   SubCutaneous at bedtime  melatonin 3 milliGRAM(s) Oral at bedtime  metoprolol succinate  milliGRAM(s) Oral daily  mirtazapine 30 milliGRAM(s) Oral daily  pantoprazole    Tablet 40 milliGRAM(s) Oral before breakfast  QUEtiapine 25 milliGRAM(s) Oral three times a day      PRN Meds:      LABS:  CBC 12-31-21 @ 07:28                        8.6    6.30  )-----------( 286                   31.8       Hgb trend: 8.6 <-- , 8.8 <-- , 8.6 <--   WBC trend: 6.30 <-- , 8.47 <-- , 9.00 <--       CMP 12-31-21 @ 07:31    140  |  101  |  27<H>  ----------------------------<  111<H>  3.5   |  25  |  0.98    Ca    9.3      12-31-21 @ 07:31  Phos  3.1     12-31  Mg     2.3     12-31        Serum Cr trend: 0.98 <-- , 1.07 <-- , 1.34 <--         ABG Trend:     VBG Trend:       MICROBIOLOGY:       RADIOLOGY:  [ ] Reviewed and interpreted by me    EKG:   Patient:  GUS AMARO  [id number]    Progress Note    Interval events: No acute events overnight. Patient denies f/c/n/v/cp/ap/bowel or bladder changes.    Administered:  heparin   Injectable: 5000 Unit(s) SubCutaneous (12-31 @ 15:07)  QUEtiapine: 25 milliGRAM(s) Oral (12-31 @ 15:06)  mirtazapine: 30 milliGRAM(s) Oral (12-31 @ 11:34)  insulin lispro (ADMELOG) corrective regimen sliding scale: 2 Unit(s) SubCutaneous (12-31 @ 11:34)  pantoprazole    Tablet: 40 milliGRAM(s) Oral (12-31 @ 06:54)  heparin   Injectable: 5000 Unit(s) SubCutaneous (12-31 @ 06:54)  furosemide   Injectable: 40 milliGRAM(s) IV Push (12-31 @ 06:54)  digoxin     Tablet: 125 MICROGram(s) Oral (12-31 @ 06:54)  metoprolol succinate ER: 100 milliGRAM(s) Oral (12-31 @ 06:53)  QUEtiapine: 25 milliGRAM(s) Oral (12-31 @ 06:53)    OBJECTIVE:    12-30 @ 07:01  -  12-31 @ 07:00  --------------------------------------------------------  IN: 480 mL / OUT: 0 mL / NET: 480 mL      CAPILLARY BLOOD GLUCOSE      POCT Blood Glucose.: 225 mg/dL (31 Dec 2021 11:33)    VITALS:  T(F): 98.2 (12-31-21 @ 12:05), Max: 98.8 (12-30-21 @ 20:56)  HR: 80 (12-31-21 @ 12:05) (79 - 86)  BP: 104/67 (12-31-21 @ 12:05) (104/67 - 144/72)  BP(mean): --  ABP: --  ABP(mean): --  RR: 19 (12-31-21 @ 12:05) (18 - 19)  SpO2: 96% (12-31-21 @ 12:05) (95% - 96%)    PHYSICAL EXAM:  GENERAL: NAD, lying in bed comfortably  HEAD:  Atraumatic, Normocephalic  EYES: EOMI, PERRLA, conjunctiva and sclera clear  ENT: Moist mucous membranes  NECK: Supple, No JVD  CHEST/LUNG: Clear to auscultation bilaterally; No rales, rhonchi, wheezing, or rubs. Unlabored respirations  HEART: Regular rate and rhythm; No murmurs, rubs, or gallops  ABDOMEN: Bowel sounds present; Soft, Nontender, Nondistended. No hepatomegaly  EXTREMITIES:  2+ Peripheral Pulses, brisk capillary refill. No clubbing, cyanosis, or edema  NERVOUS SYSTEM:  Alert & Oriented X3, speech clear. No deficits   MSK: FROM all 4 extremities, full and equal strength  SKIN: No rashes or lesions    HOSPITAL MEDICATIONS:  Standing Meds:  dextrose 40% Gel 15 Gram(s) Oral once  dextrose 5%. 1000 milliLiter(s) IV Continuous <Continuous>  dextrose 5%. 1000 milliLiter(s) IV Continuous <Continuous>  dextrose 50% Injectable 25 Gram(s) IV Push once  dextrose 50% Injectable 12.5 Gram(s) IV Push once  dextrose 50% Injectable 25 Gram(s) IV Push once  digoxin     Tablet 125 MICROGram(s) Oral daily  furosemide   Injectable 40 milliGRAM(s) IV Push daily  glucagon  Injectable 1 milliGRAM(s) IntraMuscular once  heparin   Injectable 5000 Unit(s) SubCutaneous every 8 hours  insulin lispro (ADMELOG) corrective regimen sliding scale   SubCutaneous three times a day before meals  insulin lispro (ADMELOG) corrective regimen sliding scale   SubCutaneous at bedtime  melatonin 3 milliGRAM(s) Oral at bedtime  metoprolol succinate  milliGRAM(s) Oral daily  mirtazapine 30 milliGRAM(s) Oral daily  pantoprazole    Tablet 40 milliGRAM(s) Oral before breakfast  QUEtiapine 25 milliGRAM(s) Oral three times a day      PRN Meds:      LABS:  CBC 12-31-21 @ 07:28                        8.6    6.30  )-----------( 286                   31.8       Hgb trend: 8.6 <-- , 8.8 <-- , 8.6 <--   WBC trend: 6.30 <-- , 8.47 <-- , 9.00 <--       CMP 12-31-21 @ 07:31    140  |  101  |  27<H>  ----------------------------<  111<H>  3.5   |  25  |  0.98    Ca    9.3      12-31-21 @ 07:31  Phos  3.1     12-31  Mg     2.3     12-31        Serum Cr trend: 0.98 <-- , 1.07 <-- , 1.34 <--         ABG Trend:     VBG Trend:       MICROBIOLOGY:       RADIOLOGY:  [ ] Reviewed and interpreted by me    EKG:

## 2021-12-31 NOTE — PROGRESS NOTE ADULT - PROBLEM SELECTOR PLAN 6
- DVT: Heparin q8h sq  - Diet: Regular CC, no snacks, DASH/TLC  - Dispo: pending negative covid - Monitor Hgb, no active bleeds, melena, hematochezia  - previous history of recurrent GI bleed  - Transfuse Hb <7

## 2021-12-31 NOTE — PROGRESS NOTE ADULT - PROBLEM SELECTOR PLAN 4
Monitor Hgb - Patient with longstanding hx of afib on digoxin (previously on Eliquis, held due to recurrent GI bleed/anemia  - Admission EKG: Afib with rate 55, QTC wnl  - c/w Digoxin 125mcg oral qd  - c/w metoprolol 100mg oral daily  - off AC secondary to previous recurrent GI bleeds and anemia  - BNP 3K on admission  - tele?

## 2021-12-31 NOTE — PROGRESS NOTE ADULT - PROBLEM SELECTOR PLAN 9
- DVT: Heparin q8h sq  - Diet: Regular CC, no snacks, DASH/TLC  - Dispo: pending negative covid, PT recs: home with assist - DVT: Heparin q8h sq  - Diet: Regular CC, no snacks, DASH/TLC  - Dispo: pending negative covid,  working on getting [facility] for pt. 6 Rehab facilities rejected patient so far.

## 2022-01-01 ENCOUNTER — INPATIENT (INPATIENT)
Facility: HOSPITAL | Age: 87
LOS: 15 days | DRG: 56 | End: 2022-05-11
Attending: INTERNAL MEDICINE | Admitting: INTERNAL MEDICINE
Payer: MEDICARE

## 2022-01-01 VITALS
SYSTOLIC BLOOD PRESSURE: 136 MMHG | OXYGEN SATURATION: 96 % | RESPIRATION RATE: 20 BRPM | TEMPERATURE: 98 F | DIASTOLIC BLOOD PRESSURE: 89 MMHG | WEIGHT: 134.92 LBS | HEART RATE: 85 BPM | HEIGHT: 67 IN

## 2022-01-01 VITALS
DIASTOLIC BLOOD PRESSURE: 48 MMHG | TEMPERATURE: 98 F | HEART RATE: 100 BPM | SYSTOLIC BLOOD PRESSURE: 95 MMHG | RESPIRATION RATE: 20 BRPM

## 2022-01-01 DIAGNOSIS — R52 PAIN, UNSPECIFIED: ICD-10-CM

## 2022-01-01 DIAGNOSIS — Z71.89 OTHER SPECIFIED COUNSELING: ICD-10-CM

## 2022-01-01 DIAGNOSIS — R06.00 DYSPNEA, UNSPECIFIED: ICD-10-CM

## 2022-01-01 DIAGNOSIS — D32.9 BENIGN NEOPLASM OF MENINGES, UNSPECIFIED: ICD-10-CM

## 2022-01-01 DIAGNOSIS — R53.2 FUNCTIONAL QUADRIPLEGIA: ICD-10-CM

## 2022-01-01 DIAGNOSIS — G30.9 ALZHEIMER'S DISEASE, UNSPECIFIED: ICD-10-CM

## 2022-01-01 DIAGNOSIS — I50.9 HEART FAILURE, UNSPECIFIED: ICD-10-CM

## 2022-01-01 DIAGNOSIS — R41.82 ALTERED MENTAL STATUS, UNSPECIFIED: ICD-10-CM

## 2022-01-01 DIAGNOSIS — Z29.9 ENCOUNTER FOR PROPHYLACTIC MEASURES, UNSPECIFIED: ICD-10-CM

## 2022-01-01 DIAGNOSIS — N39.0 URINARY TRACT INFECTION, SITE NOT SPECIFIED: ICD-10-CM

## 2022-01-01 DIAGNOSIS — E11.9 TYPE 2 DIABETES MELLITUS WITHOUT COMPLICATIONS: ICD-10-CM

## 2022-01-01 DIAGNOSIS — I48.20 CHRONIC ATRIAL FIBRILLATION, UNSPECIFIED: ICD-10-CM

## 2022-01-01 DIAGNOSIS — G93.40 ENCEPHALOPATHY, UNSPECIFIED: ICD-10-CM

## 2022-01-01 DIAGNOSIS — Z51.5 ENCOUNTER FOR PALLIATIVE CARE: ICD-10-CM

## 2022-01-01 DIAGNOSIS — R53.81 OTHER MALAISE: ICD-10-CM

## 2022-01-01 LAB
-  AMPICILLIN: SIGNIFICANT CHANGE UP
-  CIPROFLOXACIN: SIGNIFICANT CHANGE UP
-  LEVOFLOXACIN: SIGNIFICANT CHANGE UP
-  NITROFURANTOIN: SIGNIFICANT CHANGE UP
-  TETRACYCLINE: SIGNIFICANT CHANGE UP
-  VANCOMYCIN: SIGNIFICANT CHANGE UP
A1C WITH ESTIMATED AVERAGE GLUCOSE RESULT: 6.2 % — HIGH (ref 4–5.6)
ALBUMIN SERPL ELPH-MCNC: 3.1 G/DL — LOW (ref 3.3–5)
ALBUMIN SERPL ELPH-MCNC: 3.3 G/DL — SIGNIFICANT CHANGE UP (ref 3.3–5)
ALBUMIN SERPL ELPH-MCNC: 3.7 G/DL — SIGNIFICANT CHANGE UP (ref 3.3–5)
ALBUMIN SERPL ELPH-MCNC: 3.8 G/DL — SIGNIFICANT CHANGE UP (ref 3.3–5)
ALP SERPL-CCNC: 53 U/L — SIGNIFICANT CHANGE UP (ref 40–120)
ALP SERPL-CCNC: 59 U/L — SIGNIFICANT CHANGE UP (ref 40–120)
ALP SERPL-CCNC: 70 U/L — SIGNIFICANT CHANGE UP (ref 40–120)
ALP SERPL-CCNC: 76 U/L — SIGNIFICANT CHANGE UP (ref 40–120)
ALT FLD-CCNC: 17 U/L — SIGNIFICANT CHANGE UP (ref 10–45)
ALT FLD-CCNC: 6 U/L — LOW (ref 10–45)
ALT FLD-CCNC: 7 U/L — LOW (ref 10–45)
ALT FLD-CCNC: 8 U/L — LOW (ref 10–45)
ANION GAP SERPL CALC-SCNC: 14 MMOL/L — SIGNIFICANT CHANGE UP (ref 5–17)
ANION GAP SERPL CALC-SCNC: 15 MMOL/L — SIGNIFICANT CHANGE UP (ref 5–17)
ANION GAP SERPL CALC-SCNC: 15 MMOL/L — SIGNIFICANT CHANGE UP (ref 5–17)
ANION GAP SERPL CALC-SCNC: 16 MMOL/L — SIGNIFICANT CHANGE UP (ref 5–17)
ANION GAP SERPL CALC-SCNC: 17 MMOL/L — SIGNIFICANT CHANGE UP (ref 5–17)
ANION GAP SERPL CALC-SCNC: 21 MMOL/L — HIGH (ref 5–17)
APPEARANCE UR: ABNORMAL
APTT BLD: 23.2 SEC — LOW (ref 27.5–35.5)
AST SERPL-CCNC: 10 U/L — SIGNIFICANT CHANGE UP (ref 10–40)
AST SERPL-CCNC: 11 U/L — SIGNIFICANT CHANGE UP (ref 10–40)
AST SERPL-CCNC: 12 U/L — SIGNIFICANT CHANGE UP (ref 10–40)
AST SERPL-CCNC: 49 U/L — HIGH (ref 10–40)
BACTERIA # UR AUTO: ABNORMAL
BASE EXCESS BLDV CALC-SCNC: 4.4 MMOL/L — HIGH (ref -2–2)
BASOPHILS # BLD AUTO: 0.06 K/UL — SIGNIFICANT CHANGE UP (ref 0–0.2)
BASOPHILS # BLD AUTO: 0.06 K/UL — SIGNIFICANT CHANGE UP (ref 0–0.2)
BASOPHILS NFR BLD AUTO: 0.6 % — SIGNIFICANT CHANGE UP (ref 0–2)
BASOPHILS NFR BLD AUTO: 0.8 % — SIGNIFICANT CHANGE UP (ref 0–2)
BILIRUB SERPL-MCNC: 0.4 MG/DL — SIGNIFICANT CHANGE UP (ref 0.2–1.2)
BILIRUB SERPL-MCNC: 0.4 MG/DL — SIGNIFICANT CHANGE UP (ref 0.2–1.2)
BILIRUB SERPL-MCNC: 0.5 MG/DL — SIGNIFICANT CHANGE UP (ref 0.2–1.2)
BILIRUB SERPL-MCNC: 0.7 MG/DL — SIGNIFICANT CHANGE UP (ref 0.2–1.2)
BILIRUB UR-MCNC: NEGATIVE — SIGNIFICANT CHANGE UP
BUN SERPL-MCNC: 10 MG/DL — SIGNIFICANT CHANGE UP (ref 7–23)
BUN SERPL-MCNC: 15 MG/DL — SIGNIFICANT CHANGE UP (ref 7–23)
BUN SERPL-MCNC: 15 MG/DL — SIGNIFICANT CHANGE UP (ref 7–23)
BUN SERPL-MCNC: 16 MG/DL — SIGNIFICANT CHANGE UP (ref 7–23)
BUN SERPL-MCNC: 17 MG/DL — SIGNIFICANT CHANGE UP (ref 7–23)
BUN SERPL-MCNC: 18 MG/DL — SIGNIFICANT CHANGE UP (ref 7–23)
BUN SERPL-MCNC: 25 MG/DL — HIGH (ref 7–23)
BUN SERPL-MCNC: 29 MG/DL — HIGH (ref 7–23)
BUN SERPL-MCNC: 29 MG/DL — HIGH (ref 7–23)
BUN SERPL-MCNC: 30 MG/DL — HIGH (ref 7–23)
BUN SERPL-MCNC: 31 MG/DL — HIGH (ref 7–23)
BUN SERPL-MCNC: 31 MG/DL — HIGH (ref 7–23)
CA-I SERPL-SCNC: 1.3 MMOL/L — SIGNIFICANT CHANGE UP (ref 1.15–1.33)
CALCIUM SERPL-MCNC: 10 MG/DL — SIGNIFICANT CHANGE UP (ref 8.4–10.5)
CALCIUM SERPL-MCNC: 10 MG/DL — SIGNIFICANT CHANGE UP (ref 8.4–10.5)
CALCIUM SERPL-MCNC: 10.5 MG/DL — SIGNIFICANT CHANGE UP (ref 8.4–10.5)
CALCIUM SERPL-MCNC: 7.6 MG/DL — LOW (ref 8.4–10.5)
CALCIUM SERPL-MCNC: 9.5 MG/DL — SIGNIFICANT CHANGE UP (ref 8.4–10.5)
CALCIUM SERPL-MCNC: 9.6 MG/DL — SIGNIFICANT CHANGE UP (ref 8.4–10.5)
CALCIUM SERPL-MCNC: 9.7 MG/DL — SIGNIFICANT CHANGE UP (ref 8.4–10.5)
CALCIUM SERPL-MCNC: 9.8 MG/DL — SIGNIFICANT CHANGE UP (ref 8.4–10.5)
CALCIUM SERPL-MCNC: 9.8 MG/DL — SIGNIFICANT CHANGE UP (ref 8.4–10.5)
CALCIUM SERPL-MCNC: 9.9 MG/DL — SIGNIFICANT CHANGE UP (ref 8.4–10.5)
CHLORIDE BLDV-SCNC: 104 MMOL/L — SIGNIFICANT CHANGE UP (ref 96–108)
CHLORIDE SERPL-SCNC: 100 MMOL/L — SIGNIFICANT CHANGE UP (ref 96–108)
CHLORIDE SERPL-SCNC: 103 MMOL/L — SIGNIFICANT CHANGE UP (ref 96–108)
CHLORIDE SERPL-SCNC: 104 MMOL/L — SIGNIFICANT CHANGE UP (ref 96–108)
CHLORIDE SERPL-SCNC: 105 MMOL/L — SIGNIFICANT CHANGE UP (ref 96–108)
CHLORIDE SERPL-SCNC: 106 MMOL/L — SIGNIFICANT CHANGE UP (ref 96–108)
CHLORIDE SERPL-SCNC: 106 MMOL/L — SIGNIFICANT CHANGE UP (ref 96–108)
CHLORIDE SERPL-SCNC: 107 MMOL/L — SIGNIFICANT CHANGE UP (ref 96–108)
CHLORIDE SERPL-SCNC: 108 MMOL/L — SIGNIFICANT CHANGE UP (ref 96–108)
CHLORIDE SERPL-SCNC: 108 MMOL/L — SIGNIFICANT CHANGE UP (ref 96–108)
CHLORIDE SERPL-SCNC: 109 MMOL/L — HIGH (ref 96–108)
CK SERPL-CCNC: 16 U/L — LOW (ref 25–170)
CO2 BLDV-SCNC: 32 MMOL/L — HIGH (ref 22–26)
CO2 SERPL-SCNC: 18 MMOL/L — LOW (ref 22–31)
CO2 SERPL-SCNC: 21 MMOL/L — LOW (ref 22–31)
CO2 SERPL-SCNC: 22 MMOL/L — SIGNIFICANT CHANGE UP (ref 22–31)
CO2 SERPL-SCNC: 22 MMOL/L — SIGNIFICANT CHANGE UP (ref 22–31)
CO2 SERPL-SCNC: 23 MMOL/L — SIGNIFICANT CHANGE UP (ref 22–31)
CO2 SERPL-SCNC: 23 MMOL/L — SIGNIFICANT CHANGE UP (ref 22–31)
CO2 SERPL-SCNC: 24 MMOL/L — SIGNIFICANT CHANGE UP (ref 22–31)
CO2 SERPL-SCNC: 25 MMOL/L — SIGNIFICANT CHANGE UP (ref 22–31)
CO2 SERPL-SCNC: 25 MMOL/L — SIGNIFICANT CHANGE UP (ref 22–31)
CO2 SERPL-SCNC: 28 MMOL/L — SIGNIFICANT CHANGE UP (ref 22–31)
CO2 SERPL-SCNC: 30 MMOL/L — SIGNIFICANT CHANGE UP (ref 22–31)
CO2 SERPL-SCNC: 30 MMOL/L — SIGNIFICANT CHANGE UP (ref 22–31)
COLOR SPEC: YELLOW — SIGNIFICANT CHANGE UP
CREAT SERPL-MCNC: 0.49 MG/DL — LOW (ref 0.5–1.3)
CREAT SERPL-MCNC: 0.57 MG/DL — SIGNIFICANT CHANGE UP (ref 0.5–1.3)
CREAT SERPL-MCNC: 0.64 MG/DL — SIGNIFICANT CHANGE UP (ref 0.5–1.3)
CREAT SERPL-MCNC: 0.65 MG/DL — SIGNIFICANT CHANGE UP (ref 0.5–1.3)
CREAT SERPL-MCNC: 0.65 MG/DL — SIGNIFICANT CHANGE UP (ref 0.5–1.3)
CREAT SERPL-MCNC: 0.66 MG/DL — SIGNIFICANT CHANGE UP (ref 0.5–1.3)
CREAT SERPL-MCNC: 0.7 MG/DL — SIGNIFICANT CHANGE UP (ref 0.5–1.3)
CREAT SERPL-MCNC: 0.7 MG/DL — SIGNIFICANT CHANGE UP (ref 0.5–1.3)
CREAT SERPL-MCNC: 0.71 MG/DL — SIGNIFICANT CHANGE UP (ref 0.5–1.3)
CREAT SERPL-MCNC: 0.77 MG/DL — SIGNIFICANT CHANGE UP (ref 0.5–1.3)
CREAT SERPL-MCNC: 0.79 MG/DL — SIGNIFICANT CHANGE UP (ref 0.5–1.3)
CREAT SERPL-MCNC: 1.07 MG/DL — SIGNIFICANT CHANGE UP (ref 0.5–1.3)
CULTURE RESULTS: SIGNIFICANT CHANGE UP
DIFF PNL FLD: NEGATIVE — SIGNIFICANT CHANGE UP
DIGOXIN SERPL-MCNC: 0.5 NG/ML — LOW (ref 0.8–2)
EGFR: 71 ML/MIN/1.73M2 — SIGNIFICANT CHANGE UP
EGFR: 73 ML/MIN/1.73M2 — SIGNIFICANT CHANGE UP
EGFR: 81 ML/MIN/1.73M2 — SIGNIFICANT CHANGE UP
EGFR: 82 ML/MIN/1.73M2 — SIGNIFICANT CHANGE UP
EGFR: 82 ML/MIN/1.73M2 — SIGNIFICANT CHANGE UP
EGFR: 83 ML/MIN/1.73M2 — SIGNIFICANT CHANGE UP
EGFR: 84 ML/MIN/1.73M2 — SIGNIFICANT CHANGE UP
EGFR: 86 ML/MIN/1.73M2 — SIGNIFICANT CHANGE UP
EGFR: 89 ML/MIN/1.73M2 — SIGNIFICANT CHANGE UP
EOSINOPHIL # BLD AUTO: 0.15 K/UL — SIGNIFICANT CHANGE UP (ref 0–0.5)
EOSINOPHIL # BLD AUTO: 0.19 K/UL — SIGNIFICANT CHANGE UP (ref 0–0.5)
EOSINOPHIL NFR BLD AUTO: 1.4 % — SIGNIFICANT CHANGE UP (ref 0–6)
EOSINOPHIL NFR BLD AUTO: 2.5 % — SIGNIFICANT CHANGE UP (ref 0–6)
EPI CELLS # UR: 1 /HPF — SIGNIFICANT CHANGE UP
ESTIMATED AVERAGE GLUCOSE: 131 MG/DL — HIGH (ref 68–114)
GAS PNL BLDV: 141 MMOL/L — SIGNIFICANT CHANGE UP (ref 136–145)
GAS PNL BLDV: SIGNIFICANT CHANGE UP
GAS PNL BLDV: SIGNIFICANT CHANGE UP
GLUCOSE BLDC GLUCOMTR-MCNC: 102 MG/DL — HIGH (ref 70–99)
GLUCOSE BLDC GLUCOMTR-MCNC: 104 MG/DL — HIGH (ref 70–99)
GLUCOSE BLDC GLUCOMTR-MCNC: 105 MG/DL — HIGH (ref 70–99)
GLUCOSE BLDC GLUCOMTR-MCNC: 108 MG/DL — HIGH (ref 70–99)
GLUCOSE BLDC GLUCOMTR-MCNC: 108 MG/DL — HIGH (ref 70–99)
GLUCOSE BLDC GLUCOMTR-MCNC: 109 MG/DL — HIGH (ref 70–99)
GLUCOSE BLDC GLUCOMTR-MCNC: 110 MG/DL — HIGH (ref 70–99)
GLUCOSE BLDC GLUCOMTR-MCNC: 117 MG/DL — HIGH (ref 70–99)
GLUCOSE BLDC GLUCOMTR-MCNC: 118 MG/DL — HIGH (ref 70–99)
GLUCOSE BLDC GLUCOMTR-MCNC: 119 MG/DL — HIGH (ref 70–99)
GLUCOSE BLDC GLUCOMTR-MCNC: 120 MG/DL — HIGH (ref 70–99)
GLUCOSE BLDC GLUCOMTR-MCNC: 122 MG/DL — HIGH (ref 70–99)
GLUCOSE BLDC GLUCOMTR-MCNC: 128 MG/DL — HIGH (ref 70–99)
GLUCOSE BLDC GLUCOMTR-MCNC: 129 MG/DL — HIGH (ref 70–99)
GLUCOSE BLDC GLUCOMTR-MCNC: 136 MG/DL — HIGH (ref 70–99)
GLUCOSE BLDC GLUCOMTR-MCNC: 149 MG/DL — HIGH (ref 70–99)
GLUCOSE BLDC GLUCOMTR-MCNC: 160 MG/DL — HIGH (ref 70–99)
GLUCOSE BLDC GLUCOMTR-MCNC: 162 MG/DL — HIGH (ref 70–99)
GLUCOSE BLDC GLUCOMTR-MCNC: 163 MG/DL — HIGH (ref 70–99)
GLUCOSE BLDC GLUCOMTR-MCNC: 167 MG/DL — HIGH (ref 70–99)
GLUCOSE BLDC GLUCOMTR-MCNC: 170 MG/DL — HIGH (ref 70–99)
GLUCOSE BLDC GLUCOMTR-MCNC: 170 MG/DL — HIGH (ref 70–99)
GLUCOSE BLDC GLUCOMTR-MCNC: 171 MG/DL — HIGH (ref 70–99)
GLUCOSE BLDC GLUCOMTR-MCNC: 177 MG/DL — HIGH (ref 70–99)
GLUCOSE BLDC GLUCOMTR-MCNC: 186 MG/DL — HIGH (ref 70–99)
GLUCOSE BLDC GLUCOMTR-MCNC: 188 MG/DL — HIGH (ref 70–99)
GLUCOSE BLDC GLUCOMTR-MCNC: 195 MG/DL — HIGH (ref 70–99)
GLUCOSE BLDC GLUCOMTR-MCNC: 63 MG/DL — LOW (ref 70–99)
GLUCOSE BLDC GLUCOMTR-MCNC: 64 MG/DL — LOW (ref 70–99)
GLUCOSE BLDC GLUCOMTR-MCNC: 64 MG/DL — LOW (ref 70–99)
GLUCOSE BLDC GLUCOMTR-MCNC: 66 MG/DL — LOW (ref 70–99)
GLUCOSE BLDC GLUCOMTR-MCNC: 73 MG/DL — SIGNIFICANT CHANGE UP (ref 70–99)
GLUCOSE BLDC GLUCOMTR-MCNC: 74 MG/DL — SIGNIFICANT CHANGE UP (ref 70–99)
GLUCOSE BLDC GLUCOMTR-MCNC: 77 MG/DL — SIGNIFICANT CHANGE UP (ref 70–99)
GLUCOSE BLDC GLUCOMTR-MCNC: 78 MG/DL — SIGNIFICANT CHANGE UP (ref 70–99)
GLUCOSE BLDC GLUCOMTR-MCNC: 85 MG/DL — SIGNIFICANT CHANGE UP (ref 70–99)
GLUCOSE BLDC GLUCOMTR-MCNC: 86 MG/DL — SIGNIFICANT CHANGE UP (ref 70–99)
GLUCOSE BLDC GLUCOMTR-MCNC: 87 MG/DL — SIGNIFICANT CHANGE UP (ref 70–99)
GLUCOSE BLDC GLUCOMTR-MCNC: 90 MG/DL — SIGNIFICANT CHANGE UP (ref 70–99)
GLUCOSE BLDC GLUCOMTR-MCNC: 98 MG/DL — SIGNIFICANT CHANGE UP (ref 70–99)
GLUCOSE BLDC GLUCOMTR-MCNC: 98 MG/DL — SIGNIFICANT CHANGE UP (ref 70–99)
GLUCOSE BLDV-MCNC: 88 MG/DL — SIGNIFICANT CHANGE UP (ref 70–99)
GLUCOSE SERPL-MCNC: 111 MG/DL — HIGH (ref 70–99)
GLUCOSE SERPL-MCNC: 125 MG/DL — HIGH (ref 70–99)
GLUCOSE SERPL-MCNC: 146 MG/DL — HIGH (ref 70–99)
GLUCOSE SERPL-MCNC: 155 MG/DL — HIGH (ref 70–99)
GLUCOSE SERPL-MCNC: 166 MG/DL — HIGH (ref 70–99)
GLUCOSE SERPL-MCNC: 331 MG/DL — HIGH (ref 70–99)
GLUCOSE SERPL-MCNC: 57 MG/DL — LOW (ref 70–99)
GLUCOSE SERPL-MCNC: 73 MG/DL — SIGNIFICANT CHANGE UP (ref 70–99)
GLUCOSE SERPL-MCNC: 90 MG/DL — SIGNIFICANT CHANGE UP (ref 70–99)
GLUCOSE SERPL-MCNC: 95 MG/DL — SIGNIFICANT CHANGE UP (ref 70–99)
GLUCOSE SERPL-MCNC: 96 MG/DL — SIGNIFICANT CHANGE UP (ref 70–99)
GLUCOSE SERPL-MCNC: 96 MG/DL — SIGNIFICANT CHANGE UP (ref 70–99)
GLUCOSE UR QL: NEGATIVE — SIGNIFICANT CHANGE UP
HCO3 BLDV-SCNC: 30 MMOL/L — HIGH (ref 22–29)
HCT VFR BLD CALC: 30.5 % — LOW (ref 34.5–45)
HCT VFR BLD CALC: 31.8 % — LOW (ref 34.5–45)
HCT VFR BLD CALC: 32 % — LOW (ref 34.5–45)
HCT VFR BLD CALC: 33.6 % — LOW (ref 34.5–45)
HCT VFR BLD CALC: 34 % — LOW (ref 34.5–45)
HCT VFR BLD CALC: 36 % — SIGNIFICANT CHANGE UP (ref 34.5–45)
HCT VFR BLD CALC: 37.6 % — SIGNIFICANT CHANGE UP (ref 34.5–45)
HCT VFR BLD CALC: 38.4 % — SIGNIFICANT CHANGE UP (ref 34.5–45)
HCT VFR BLD CALC: 39.9 % — SIGNIFICANT CHANGE UP (ref 34.5–45)
HCT VFR BLD CALC: 40.5 % — SIGNIFICANT CHANGE UP (ref 34.5–45)
HCT VFR BLDA CALC: 34 % — LOW (ref 34.5–46.5)
HGB BLD CALC-MCNC: 11.3 G/DL — LOW (ref 11.7–16.1)
HGB BLD-MCNC: 10.1 G/DL — LOW (ref 11.5–15.5)
HGB BLD-MCNC: 10.4 G/DL — LOW (ref 11.5–15.5)
HGB BLD-MCNC: 10.9 G/DL — LOW (ref 11.5–15.5)
HGB BLD-MCNC: 11 G/DL — LOW (ref 11.5–15.5)
HGB BLD-MCNC: 11.4 G/DL — LOW (ref 11.5–15.5)
HGB BLD-MCNC: 11.4 G/DL — LOW (ref 11.5–15.5)
HGB BLD-MCNC: 8.4 G/DL — LOW (ref 11.5–15.5)
HGB BLD-MCNC: 9.1 G/DL — LOW (ref 11.5–15.5)
HGB BLD-MCNC: 9.2 G/DL — LOW (ref 11.5–15.5)
HGB BLD-MCNC: 9.6 G/DL — LOW (ref 11.5–15.5)
IMM GRANULOCYTES NFR BLD AUTO: 0.7 % — SIGNIFICANT CHANGE UP (ref 0–1.5)
IMM GRANULOCYTES NFR BLD AUTO: 1.1 % — SIGNIFICANT CHANGE UP (ref 0–1.5)
INR BLD: 1.12 RATIO — SIGNIFICANT CHANGE UP (ref 0.88–1.16)
KETONES UR-MCNC: NEGATIVE — SIGNIFICANT CHANGE UP
LACTATE BLDV-MCNC: 1.8 MMOL/L — SIGNIFICANT CHANGE UP (ref 0.7–2)
LEUKOCYTE ESTERASE UR-ACNC: ABNORMAL
LYMPHOCYTES # BLD AUTO: 2.17 K/UL — SIGNIFICANT CHANGE UP (ref 1–3.3)
LYMPHOCYTES # BLD AUTO: 2.21 K/UL — SIGNIFICANT CHANGE UP (ref 1–3.3)
LYMPHOCYTES # BLD AUTO: 20.7 % — SIGNIFICANT CHANGE UP (ref 13–44)
LYMPHOCYTES # BLD AUTO: 29.1 % — SIGNIFICANT CHANGE UP (ref 13–44)
MAGNESIUM SERPL-MCNC: 1.8 MG/DL — SIGNIFICANT CHANGE UP (ref 1.6–2.6)
MAGNESIUM SERPL-MCNC: 1.8 MG/DL — SIGNIFICANT CHANGE UP (ref 1.6–2.6)
MAGNESIUM SERPL-MCNC: 1.9 MG/DL — SIGNIFICANT CHANGE UP (ref 1.6–2.6)
MAGNESIUM SERPL-MCNC: 2 MG/DL — SIGNIFICANT CHANGE UP (ref 1.6–2.6)
MAGNESIUM SERPL-MCNC: 2.2 MG/DL — SIGNIFICANT CHANGE UP (ref 1.6–2.6)
MAGNESIUM SERPL-MCNC: 2.3 MG/DL — SIGNIFICANT CHANGE UP (ref 1.6–2.6)
MCHC RBC-ENTMCNC: 19.9 PG — LOW (ref 27–34)
MCHC RBC-ENTMCNC: 24.4 PG — LOW (ref 27–34)
MCHC RBC-ENTMCNC: 24.4 PG — LOW (ref 27–34)
MCHC RBC-ENTMCNC: 24.6 PG — LOW (ref 27–34)
MCHC RBC-ENTMCNC: 24.8 PG — LOW (ref 27–34)
MCHC RBC-ENTMCNC: 24.8 PG — LOW (ref 27–34)
MCHC RBC-ENTMCNC: 25.1 PG — LOW (ref 27–34)
MCHC RBC-ENTMCNC: 25.2 PG — LOW (ref 27–34)
MCHC RBC-ENTMCNC: 27.5 GM/DL — LOW (ref 32–36)
MCHC RBC-ENTMCNC: 28.1 GM/DL — LOW (ref 32–36)
MCHC RBC-ENTMCNC: 28.6 GM/DL — LOW (ref 32–36)
MCHC RBC-ENTMCNC: 28.8 GM/DL — LOW (ref 32–36)
MCHC RBC-ENTMCNC: 28.9 GM/DL — LOW (ref 32–36)
MCHC RBC-ENTMCNC: 29 GM/DL — LOW (ref 32–36)
MCHC RBC-ENTMCNC: 29.7 GM/DL — LOW (ref 32–36)
MCV RBC AUTO: 72.1 FL — LOW (ref 80–100)
MCV RBC AUTO: 84.1 FL — SIGNIFICANT CHANGE UP (ref 80–100)
MCV RBC AUTO: 84.8 FL — SIGNIFICANT CHANGE UP (ref 80–100)
MCV RBC AUTO: 85.3 FL — SIGNIFICANT CHANGE UP (ref 80–100)
MCV RBC AUTO: 85.6 FL — SIGNIFICANT CHANGE UP (ref 80–100)
MCV RBC AUTO: 85.9 FL — SIGNIFICANT CHANGE UP (ref 80–100)
MCV RBC AUTO: 88 FL — SIGNIFICANT CHANGE UP (ref 80–100)
MCV RBC AUTO: 88 FL — SIGNIFICANT CHANGE UP (ref 80–100)
MCV RBC AUTO: 88.1 FL — SIGNIFICANT CHANGE UP (ref 80–100)
MCV RBC AUTO: 88.3 FL — SIGNIFICANT CHANGE UP (ref 80–100)
METHOD TYPE: SIGNIFICANT CHANGE UP
MONOCYTES # BLD AUTO: 0.82 K/UL — SIGNIFICANT CHANGE UP (ref 0–0.9)
MONOCYTES # BLD AUTO: 0.87 K/UL — SIGNIFICANT CHANGE UP (ref 0–0.9)
MONOCYTES NFR BLD AUTO: 11 % — SIGNIFICANT CHANGE UP (ref 2–14)
MONOCYTES NFR BLD AUTO: 8.1 % — SIGNIFICANT CHANGE UP (ref 2–14)
MRSA PCR RESULT.: SIGNIFICANT CHANGE UP
NEUTROPHILS # BLD AUTO: 4.14 K/UL — SIGNIFICANT CHANGE UP (ref 1.8–7.4)
NEUTROPHILS # BLD AUTO: 7.33 K/UL — SIGNIFICANT CHANGE UP (ref 1.8–7.4)
NEUTROPHILS NFR BLD AUTO: 55.5 % — SIGNIFICANT CHANGE UP (ref 43–77)
NEUTROPHILS NFR BLD AUTO: 68.5 % — SIGNIFICANT CHANGE UP (ref 43–77)
NITRITE UR-MCNC: NEGATIVE — SIGNIFICANT CHANGE UP
NRBC # BLD: 0 /100 WBCS — SIGNIFICANT CHANGE UP (ref 0–0)
ORGANISM # SPEC MICROSCOPIC CNT: SIGNIFICANT CHANGE UP
ORGANISM # SPEC MICROSCOPIC CNT: SIGNIFICANT CHANGE UP
PCO2 BLDV: 50 MMHG — HIGH (ref 39–42)
PH BLDV: 7.39 — SIGNIFICANT CHANGE UP (ref 7.32–7.43)
PH UR: 7 — SIGNIFICANT CHANGE UP (ref 5–8)
PHOSPHATE SERPL-MCNC: 2.3 MG/DL — LOW (ref 2.5–4.5)
PHOSPHATE SERPL-MCNC: 3.2 MG/DL — SIGNIFICANT CHANGE UP (ref 2.5–4.5)
PHOSPHATE SERPL-MCNC: 3.3 MG/DL — SIGNIFICANT CHANGE UP (ref 2.5–4.5)
PHOSPHATE SERPL-MCNC: 3.3 MG/DL — SIGNIFICANT CHANGE UP (ref 2.5–4.5)
PHOSPHATE SERPL-MCNC: 3.6 MG/DL — SIGNIFICANT CHANGE UP (ref 2.5–4.5)
PLATELET # BLD AUTO: 250 K/UL — SIGNIFICANT CHANGE UP (ref 150–400)
PLATELET # BLD AUTO: 252 K/UL — SIGNIFICANT CHANGE UP (ref 150–400)
PLATELET # BLD AUTO: 257 K/UL — SIGNIFICANT CHANGE UP (ref 150–400)
PLATELET # BLD AUTO: 271 K/UL — SIGNIFICANT CHANGE UP (ref 150–400)
PLATELET # BLD AUTO: 274 K/UL — SIGNIFICANT CHANGE UP (ref 150–400)
PLATELET # BLD AUTO: 275 K/UL — SIGNIFICANT CHANGE UP (ref 150–400)
PLATELET # BLD AUTO: 291 K/UL — SIGNIFICANT CHANGE UP (ref 150–400)
PLATELET # BLD AUTO: 303 K/UL — SIGNIFICANT CHANGE UP (ref 150–400)
PLATELET # BLD AUTO: 303 K/UL — SIGNIFICANT CHANGE UP (ref 150–400)
PLATELET # BLD AUTO: 316 K/UL — SIGNIFICANT CHANGE UP (ref 150–400)
PO2 BLDV: 26 MMHG — SIGNIFICANT CHANGE UP (ref 25–45)
POTASSIUM BLDV-SCNC: 3.7 MMOL/L — SIGNIFICANT CHANGE UP (ref 3.5–5.1)
POTASSIUM SERPL-MCNC: 2.2 MMOL/L — CRITICAL LOW (ref 3.5–5.3)
POTASSIUM SERPL-MCNC: 2.9 MMOL/L — CRITICAL LOW (ref 3.5–5.3)
POTASSIUM SERPL-MCNC: 3.1 MMOL/L — LOW (ref 3.5–5.3)
POTASSIUM SERPL-MCNC: 3.1 MMOL/L — LOW (ref 3.5–5.3)
POTASSIUM SERPL-MCNC: 3.2 MMOL/L — LOW (ref 3.5–5.3)
POTASSIUM SERPL-MCNC: 3.3 MMOL/L — LOW (ref 3.5–5.3)
POTASSIUM SERPL-MCNC: 3.6 MMOL/L — SIGNIFICANT CHANGE UP (ref 3.5–5.3)
POTASSIUM SERPL-MCNC: 3.6 MMOL/L — SIGNIFICANT CHANGE UP (ref 3.5–5.3)
POTASSIUM SERPL-MCNC: 3.7 MMOL/L — SIGNIFICANT CHANGE UP (ref 3.5–5.3)
POTASSIUM SERPL-MCNC: 3.9 MMOL/L — SIGNIFICANT CHANGE UP (ref 3.5–5.3)
POTASSIUM SERPL-SCNC: 2.2 MMOL/L — CRITICAL LOW (ref 3.5–5.3)
POTASSIUM SERPL-SCNC: 2.9 MMOL/L — CRITICAL LOW (ref 3.5–5.3)
POTASSIUM SERPL-SCNC: 3.1 MMOL/L — LOW (ref 3.5–5.3)
POTASSIUM SERPL-SCNC: 3.1 MMOL/L — LOW (ref 3.5–5.3)
POTASSIUM SERPL-SCNC: 3.2 MMOL/L — LOW (ref 3.5–5.3)
POTASSIUM SERPL-SCNC: 3.3 MMOL/L — LOW (ref 3.5–5.3)
POTASSIUM SERPL-SCNC: 3.6 MMOL/L — SIGNIFICANT CHANGE UP (ref 3.5–5.3)
POTASSIUM SERPL-SCNC: 3.6 MMOL/L — SIGNIFICANT CHANGE UP (ref 3.5–5.3)
POTASSIUM SERPL-SCNC: 3.7 MMOL/L — SIGNIFICANT CHANGE UP (ref 3.5–5.3)
POTASSIUM SERPL-SCNC: 3.9 MMOL/L — SIGNIFICANT CHANGE UP (ref 3.5–5.3)
PROLACTIN SERPL-MCNC: 13.8 NG/ML — SIGNIFICANT CHANGE UP (ref 3.4–24.1)
PROT SERPL-MCNC: 6 G/DL — SIGNIFICANT CHANGE UP (ref 6–8.3)
PROT SERPL-MCNC: 6.4 G/DL — SIGNIFICANT CHANGE UP (ref 6–8.3)
PROT SERPL-MCNC: 6.5 G/DL — SIGNIFICANT CHANGE UP (ref 6–8.3)
PROT SERPL-MCNC: 7.4 G/DL — SIGNIFICANT CHANGE UP (ref 6–8.3)
PROT UR-MCNC: ABNORMAL
PROTHROM AB SERPL-ACNC: 12.9 SEC — SIGNIFICANT CHANGE UP (ref 10.5–13.4)
RBC # BLD: 3.61 M/UL — LOW (ref 3.8–5.2)
RBC # BLD: 3.74 M/UL — LOW (ref 3.8–5.2)
RBC # BLD: 3.82 M/UL — SIGNIFICANT CHANGE UP (ref 3.8–5.2)
RBC # BLD: 4.01 M/UL — SIGNIFICANT CHANGE UP (ref 3.8–5.2)
RBC # BLD: 4.19 M/UL — SIGNIFICANT CHANGE UP (ref 3.8–5.2)
RBC # BLD: 4.23 M/UL — SIGNIFICANT CHANGE UP (ref 3.8–5.2)
RBC # BLD: 4.47 M/UL — SIGNIFICANT CHANGE UP (ref 3.8–5.2)
RBC # BLD: 4.5 M/UL — SIGNIFICANT CHANGE UP (ref 3.8–5.2)
RBC # BLD: 4.52 M/UL — SIGNIFICANT CHANGE UP (ref 3.8–5.2)
RBC # BLD: 4.6 M/UL — SIGNIFICANT CHANGE UP (ref 3.8–5.2)
RBC # FLD: 18 % — HIGH (ref 10.3–14.5)
RBC # FLD: 18.1 % — HIGH (ref 10.3–14.5)
RBC # FLD: 18.2 % — HIGH (ref 10.3–14.5)
RBC # FLD: 18.5 % — HIGH (ref 10.3–14.5)
RBC # FLD: 18.5 % — HIGH (ref 10.3–14.5)
RBC # FLD: 18.8 % — HIGH (ref 10.3–14.5)
RBC # FLD: 18.9 % — HIGH (ref 10.3–14.5)
RBC # FLD: 19.9 % — HIGH (ref 10.3–14.5)
RBC CASTS # UR COMP ASSIST: 10 /HPF — HIGH (ref 0–4)
S AUREUS DNA NOSE QL NAA+PROBE: DETECTED
SAO2 % BLDV: 35.2 % — LOW (ref 67–88)
SARS-COV-2 RNA SPEC QL NAA+PROBE: SIGNIFICANT CHANGE UP
SODIUM SERPL-SCNC: 138 MMOL/L — SIGNIFICANT CHANGE UP (ref 135–145)
SODIUM SERPL-SCNC: 142 MMOL/L — SIGNIFICANT CHANGE UP (ref 135–145)
SODIUM SERPL-SCNC: 143 MMOL/L — SIGNIFICANT CHANGE UP (ref 135–145)
SODIUM SERPL-SCNC: 144 MMOL/L — SIGNIFICANT CHANGE UP (ref 135–145)
SODIUM SERPL-SCNC: 144 MMOL/L — SIGNIFICANT CHANGE UP (ref 135–145)
SODIUM SERPL-SCNC: 147 MMOL/L — HIGH (ref 135–145)
SODIUM SERPL-SCNC: 149 MMOL/L — HIGH (ref 135–145)
SODIUM SERPL-SCNC: 151 MMOL/L — HIGH (ref 135–145)
SODIUM SERPL-SCNC: 152 MMOL/L — HIGH (ref 135–145)
SODIUM SERPL-SCNC: 152 MMOL/L — HIGH (ref 135–145)
SP GR SPEC: 1.02 — SIGNIFICANT CHANGE UP (ref 1.01–1.02)
SPECIMEN SOURCE: SIGNIFICANT CHANGE UP
TROPONIN T, HIGH SENSITIVITY RESULT: 30 NG/L — SIGNIFICANT CHANGE UP (ref 0–51)
TSH SERPL-MCNC: 0.25 UIU/ML — LOW (ref 0.27–4.2)
UROBILINOGEN FLD QL: NEGATIVE — SIGNIFICANT CHANGE UP
VALPROATE SERPL-MCNC: 20 UG/ML — LOW (ref 50–100)
WBC # BLD: 10.47 K/UL — SIGNIFICANT CHANGE UP (ref 3.8–10.5)
WBC # BLD: 10.69 K/UL — HIGH (ref 3.8–10.5)
WBC # BLD: 14.63 K/UL — HIGH (ref 3.8–10.5)
WBC # BLD: 16.64 K/UL — HIGH (ref 3.8–10.5)
WBC # BLD: 6.51 K/UL — SIGNIFICANT CHANGE UP (ref 3.8–10.5)
WBC # BLD: 7.19 K/UL — SIGNIFICANT CHANGE UP (ref 3.8–10.5)
WBC # BLD: 7.46 K/UL — SIGNIFICANT CHANGE UP (ref 3.8–10.5)
WBC # BLD: 8.13 K/UL — SIGNIFICANT CHANGE UP (ref 3.8–10.5)
WBC # BLD: 9.03 K/UL — SIGNIFICANT CHANGE UP (ref 3.8–10.5)
WBC # BLD: 9.29 K/UL — SIGNIFICANT CHANGE UP (ref 3.8–10.5)
WBC # FLD AUTO: 10.47 K/UL — SIGNIFICANT CHANGE UP (ref 3.8–10.5)
WBC # FLD AUTO: 10.69 K/UL — HIGH (ref 3.8–10.5)
WBC # FLD AUTO: 14.63 K/UL — HIGH (ref 3.8–10.5)
WBC # FLD AUTO: 16.64 K/UL — HIGH (ref 3.8–10.5)
WBC # FLD AUTO: 6.51 K/UL — SIGNIFICANT CHANGE UP (ref 3.8–10.5)
WBC # FLD AUTO: 7.19 K/UL — SIGNIFICANT CHANGE UP (ref 3.8–10.5)
WBC # FLD AUTO: 7.46 K/UL — SIGNIFICANT CHANGE UP (ref 3.8–10.5)
WBC # FLD AUTO: 8.13 K/UL — SIGNIFICANT CHANGE UP (ref 3.8–10.5)
WBC # FLD AUTO: 9.03 K/UL — SIGNIFICANT CHANGE UP (ref 3.8–10.5)
WBC # FLD AUTO: 9.29 K/UL — SIGNIFICANT CHANGE UP (ref 3.8–10.5)
WBC UR QL: >50 /HPF — HIGH (ref 0–5)

## 2022-01-01 PROCEDURE — 99232 SBSQ HOSP IP/OBS MODERATE 35: CPT

## 2022-01-01 PROCEDURE — 99238 HOSP IP/OBS DSCHRG MGMT 30/<: CPT

## 2022-01-01 PROCEDURE — 70450 CT HEAD/BRAIN W/O DYE: CPT | Mod: MA

## 2022-01-01 PROCEDURE — 81001 URINALYSIS AUTO W/SCOPE: CPT

## 2022-01-01 PROCEDURE — 87086 URINE CULTURE/COLONY COUNT: CPT

## 2022-01-01 PROCEDURE — 87040 BLOOD CULTURE FOR BACTERIA: CPT

## 2022-01-01 PROCEDURE — 83605 ASSAY OF LACTIC ACID: CPT

## 2022-01-01 PROCEDURE — 82550 ASSAY OF CK (CPK): CPT

## 2022-01-01 PROCEDURE — 82947 ASSAY GLUCOSE BLOOD QUANT: CPT

## 2022-01-01 PROCEDURE — 82435 ASSAY OF BLOOD CHLORIDE: CPT

## 2022-01-01 PROCEDURE — 87640 STAPH A DNA AMP PROBE: CPT

## 2022-01-01 PROCEDURE — 99233 SBSQ HOSP IP/OBS HIGH 50: CPT

## 2022-01-01 PROCEDURE — 85025 COMPLETE CBC W/AUTO DIFF WBC: CPT

## 2022-01-01 PROCEDURE — 99497 ADVNCD CARE PLAN 30 MIN: CPT | Mod: 25

## 2022-01-01 PROCEDURE — 92610 EVALUATE SWALLOWING FUNCTION: CPT

## 2022-01-01 PROCEDURE — 82330 ASSAY OF CALCIUM: CPT

## 2022-01-01 PROCEDURE — 84132 ASSAY OF SERUM POTASSIUM: CPT

## 2022-01-01 PROCEDURE — 71045 X-RAY EXAM CHEST 1 VIEW: CPT

## 2022-01-01 PROCEDURE — 87186 SC STD MICRODIL/AGAR DIL: CPT

## 2022-01-01 PROCEDURE — 87635 SARS-COV-2 COVID-19 AMP PRB: CPT

## 2022-01-01 PROCEDURE — 99233 SBSQ HOSP IP/OBS HIGH 50: CPT | Mod: GC

## 2022-01-01 PROCEDURE — 82962 GLUCOSE BLOOD TEST: CPT

## 2022-01-01 PROCEDURE — 85610 PROTHROMBIN TIME: CPT

## 2022-01-01 PROCEDURE — 85018 HEMOGLOBIN: CPT

## 2022-01-01 PROCEDURE — 99223 1ST HOSP IP/OBS HIGH 75: CPT

## 2022-01-01 PROCEDURE — 80048 BASIC METABOLIC PNL TOTAL CA: CPT

## 2022-01-01 PROCEDURE — 85027 COMPLETE CBC AUTOMATED: CPT

## 2022-01-01 PROCEDURE — 84484 ASSAY OF TROPONIN QUANT: CPT

## 2022-01-01 PROCEDURE — 83036 HEMOGLOBIN GLYCOSYLATED A1C: CPT

## 2022-01-01 PROCEDURE — 93005 ELECTROCARDIOGRAM TRACING: CPT

## 2022-01-01 PROCEDURE — 85730 THROMBOPLASTIN TIME PARTIAL: CPT

## 2022-01-01 PROCEDURE — 36415 COLL VENOUS BLD VENIPUNCTURE: CPT

## 2022-01-01 PROCEDURE — 97162 PT EVAL MOD COMPLEX 30 MIN: CPT

## 2022-01-01 PROCEDURE — 80053 COMPREHEN METABOLIC PANEL: CPT

## 2022-01-01 PROCEDURE — 84100 ASSAY OF PHOSPHORUS: CPT

## 2022-01-01 PROCEDURE — 82803 BLOOD GASES ANY COMBINATION: CPT

## 2022-01-01 PROCEDURE — 71045 X-RAY EXAM CHEST 1 VIEW: CPT | Mod: 26

## 2022-01-01 PROCEDURE — 70450 CT HEAD/BRAIN W/O DYE: CPT | Mod: 26,MA

## 2022-01-01 PROCEDURE — 93010 ELECTROCARDIOGRAM REPORT: CPT

## 2022-01-01 PROCEDURE — U0003: CPT

## 2022-01-01 PROCEDURE — 84146 ASSAY OF PROLACTIN: CPT

## 2022-01-01 PROCEDURE — 87641 MR-STAPH DNA AMP PROBE: CPT

## 2022-01-01 PROCEDURE — 83735 ASSAY OF MAGNESIUM: CPT

## 2022-01-01 PROCEDURE — 82565 ASSAY OF CREATININE: CPT

## 2022-01-01 PROCEDURE — 97530 THERAPEUTIC ACTIVITIES: CPT

## 2022-01-01 PROCEDURE — 84295 ASSAY OF SERUM SODIUM: CPT

## 2022-01-01 PROCEDURE — 99285 EMERGENCY DEPT VISIT HI MDM: CPT | Mod: FS,25

## 2022-01-01 PROCEDURE — 84443 ASSAY THYROID STIM HORMONE: CPT

## 2022-01-01 PROCEDURE — 70496 CT ANGIOGRAPHY HEAD: CPT | Mod: 26

## 2022-01-01 PROCEDURE — 99285 EMERGENCY DEPT VISIT HI MDM: CPT

## 2022-01-01 PROCEDURE — 80164 ASSAY DIPROPYLACETIC ACD TOT: CPT

## 2022-01-01 PROCEDURE — 80162 ASSAY OF DIGOXIN TOTAL: CPT

## 2022-01-01 PROCEDURE — 85014 HEMATOCRIT: CPT

## 2022-01-01 PROCEDURE — 70496 CT ANGIOGRAPHY HEAD: CPT

## 2022-01-01 PROCEDURE — U0005: CPT

## 2022-01-01 RX ORDER — MULTIVIT-MIN/FERROUS GLUCONATE 9 MG/15 ML
1 LIQUID (ML) ORAL DAILY
Refills: 0 | Status: DISCONTINUED | OUTPATIENT
Start: 2022-01-01 | End: 2022-01-01

## 2022-01-01 RX ORDER — HEPARIN SODIUM 5000 [USP'U]/ML
5000 INJECTION INTRAVENOUS; SUBCUTANEOUS EVERY 12 HOURS
Refills: 0 | Status: DISCONTINUED | OUTPATIENT
Start: 2022-01-01 | End: 2022-01-01

## 2022-01-01 RX ORDER — DIVALPROEX SODIUM 500 MG/1
1 TABLET, DELAYED RELEASE ORAL
Qty: 0 | Refills: 0 | DISCHARGE

## 2022-01-01 RX ORDER — MULTIVIT-MIN/FERROUS GLUCONATE 9 MG/15 ML
1 LIQUID (ML) ORAL
Qty: 0 | Refills: 0 | DISCHARGE

## 2022-01-01 RX ORDER — MORPHINE SULFATE 50 MG/1
2 CAPSULE, EXTENDED RELEASE ORAL EVERY 4 HOURS
Refills: 0 | Status: DISCONTINUED | OUTPATIENT
Start: 2022-01-01 | End: 2022-01-01

## 2022-01-01 RX ORDER — MORPHINE SULFATE 50 MG/1
2 CAPSULE, EXTENDED RELEASE ORAL
Refills: 0 | Status: DISCONTINUED | OUTPATIENT
Start: 2022-01-01 | End: 2022-01-01

## 2022-01-01 RX ORDER — SODIUM CHLORIDE 9 MG/ML
1000 INJECTION, SOLUTION INTRAVENOUS
Refills: 0 | Status: DISCONTINUED | OUTPATIENT
Start: 2022-01-01 | End: 2022-01-01

## 2022-01-01 RX ORDER — POTASSIUM PHOSPHATE, MONOBASIC POTASSIUM PHOSPHATE, DIBASIC 236; 224 MG/ML; MG/ML
15 INJECTION, SOLUTION INTRAVENOUS ONCE
Refills: 0 | Status: COMPLETED | OUTPATIENT
Start: 2022-01-01 | End: 2022-01-01

## 2022-01-01 RX ORDER — DEXTROSE 50 % IN WATER 50 %
25 SYRINGE (ML) INTRAVENOUS ONCE
Refills: 0 | Status: DISCONTINUED | OUTPATIENT
Start: 2022-01-01 | End: 2022-01-01

## 2022-01-01 RX ORDER — METOPROLOL TARTRATE 50 MG
100 TABLET ORAL DAILY
Refills: 0 | Status: DISCONTINUED | OUTPATIENT
Start: 2022-01-01 | End: 2022-01-01

## 2022-01-01 RX ORDER — ACETAMINOPHEN 500 MG
650 TABLET ORAL EVERY 6 HOURS
Refills: 0 | Status: DISCONTINUED | OUTPATIENT
Start: 2022-01-01 | End: 2022-01-01

## 2022-01-01 RX ORDER — QUETIAPINE FUMARATE 200 MG/1
1 TABLET, FILM COATED ORAL
Qty: 0 | Refills: 0 | DISCHARGE

## 2022-01-01 RX ORDER — FUROSEMIDE 40 MG
40 TABLET ORAL DAILY
Refills: 0 | Status: DISCONTINUED | OUTPATIENT
Start: 2022-01-01 | End: 2022-01-01

## 2022-01-01 RX ORDER — ROBINUL 0.2 MG/ML
0.4 INJECTION INTRAMUSCULAR; INTRAVENOUS EVERY 6 HOURS
Refills: 0 | Status: DISCONTINUED | OUTPATIENT
Start: 2022-01-01 | End: 2022-01-01

## 2022-01-01 RX ORDER — FUROSEMIDE 40 MG
80 TABLET ORAL DAILY
Refills: 0 | Status: DISCONTINUED | OUTPATIENT
Start: 2022-01-01 | End: 2022-01-01

## 2022-01-01 RX ORDER — PIPERACILLIN AND TAZOBACTAM 4; .5 G/20ML; G/20ML
3.38 INJECTION, POWDER, LYOPHILIZED, FOR SOLUTION INTRAVENOUS EVERY 8 HOURS
Refills: 0 | Status: DISCONTINUED | OUTPATIENT
Start: 2022-01-01 | End: 2022-01-01

## 2022-01-01 RX ORDER — FUROSEMIDE 40 MG
1 TABLET ORAL
Qty: 0 | Refills: 0 | DISCHARGE

## 2022-01-01 RX ORDER — CHLORHEXIDINE GLUCONATE 213 G/1000ML
1 SOLUTION TOPICAL
Refills: 0 | Status: DISCONTINUED | OUTPATIENT
Start: 2022-01-01 | End: 2022-01-01

## 2022-01-01 RX ORDER — POTASSIUM CHLORIDE 20 MEQ
10 PACKET (EA) ORAL
Refills: 0 | Status: COMPLETED | OUTPATIENT
Start: 2022-01-01 | End: 2022-01-01

## 2022-01-01 RX ORDER — PANTOPRAZOLE SODIUM 20 MG/1
40 TABLET, DELAYED RELEASE ORAL DAILY
Refills: 0 | Status: DISCONTINUED | OUTPATIENT
Start: 2022-01-01 | End: 2022-01-01

## 2022-01-01 RX ORDER — METOPROLOL TARTRATE 50 MG
5 TABLET ORAL ONCE
Refills: 0 | Status: COMPLETED | OUTPATIENT
Start: 2022-01-01 | End: 2022-01-01

## 2022-01-01 RX ORDER — FOLIC ACID 0.8 MG
1 TABLET ORAL DAILY
Refills: 0 | Status: DISCONTINUED | OUTPATIENT
Start: 2022-01-01 | End: 2022-01-01

## 2022-01-01 RX ORDER — DEXTROSE 50 % IN WATER 50 %
15 SYRINGE (ML) INTRAVENOUS ONCE
Refills: 0 | Status: DISCONTINUED | OUTPATIENT
Start: 2022-01-01 | End: 2022-01-01

## 2022-01-01 RX ORDER — MORPHINE SULFATE 50 MG/1
1 CAPSULE, EXTENDED RELEASE ORAL EVERY 4 HOURS
Refills: 0 | Status: DISCONTINUED | OUTPATIENT
Start: 2022-01-01 | End: 2022-01-01

## 2022-01-01 RX ORDER — DEXTROSE 50 % IN WATER 50 %
12.5 SYRINGE (ML) INTRAVENOUS ONCE
Refills: 0 | Status: COMPLETED | OUTPATIENT
Start: 2022-01-01 | End: 2022-01-01

## 2022-01-01 RX ORDER — MORPHINE SULFATE 50 MG/1
1 CAPSULE, EXTENDED RELEASE ORAL
Refills: 0 | Status: DISCONTINUED | OUTPATIENT
Start: 2022-01-01 | End: 2022-01-01

## 2022-01-01 RX ORDER — DIGOXIN 250 MCG
1 TABLET ORAL
Qty: 0 | Refills: 0 | DISCHARGE

## 2022-01-01 RX ORDER — VALPROIC ACID (AS SODIUM SALT) 250 MG/5ML
250 SOLUTION, ORAL ORAL
Refills: 0 | Status: DISCONTINUED | OUTPATIENT
Start: 2022-01-01 | End: 2022-01-01

## 2022-01-01 RX ORDER — POTASSIUM CHLORIDE 20 MEQ
1 PACKET (EA) ORAL
Qty: 0 | Refills: 0 | DISCHARGE

## 2022-01-01 RX ORDER — MAGNESIUM SULFATE 500 MG/ML
1 VIAL (ML) INJECTION ONCE
Refills: 0 | Status: COMPLETED | OUTPATIENT
Start: 2022-01-01 | End: 2022-01-01

## 2022-01-01 RX ORDER — DICLOFENAC SODIUM 30 MG/G
0 GEL TOPICAL
Qty: 0 | Refills: 0 | DISCHARGE

## 2022-01-01 RX ORDER — SENNA PLUS 8.6 MG/1
2 TABLET ORAL
Qty: 0 | Refills: 0 | DISCHARGE

## 2022-01-01 RX ORDER — GLUCAGON INJECTION, SOLUTION 0.5 MG/.1ML
1 INJECTION, SOLUTION SUBCUTANEOUS ONCE
Refills: 0 | Status: DISCONTINUED | OUTPATIENT
Start: 2022-01-01 | End: 2022-01-01

## 2022-01-01 RX ORDER — SODIUM CHLORIDE 9 MG/ML
500 INJECTION INTRAMUSCULAR; INTRAVENOUS; SUBCUTANEOUS
Refills: 0 | Status: DISCONTINUED | OUTPATIENT
Start: 2022-01-01 | End: 2022-01-01

## 2022-01-01 RX ORDER — ACETAMINOPHEN 500 MG
2 TABLET ORAL
Qty: 0 | Refills: 0 | DISCHARGE

## 2022-01-01 RX ORDER — VANCOMYCIN HCL 1 G
1000 VIAL (EA) INTRAVENOUS EVERY 12 HOURS
Refills: 0 | Status: DISCONTINUED | OUTPATIENT
Start: 2022-01-01 | End: 2022-01-01

## 2022-01-01 RX ORDER — DEXTROSE 50 % IN WATER 50 %
25 SYRINGE (ML) INTRAVENOUS ONCE
Refills: 0 | Status: COMPLETED | OUTPATIENT
Start: 2022-01-01 | End: 2022-01-01

## 2022-01-01 RX ORDER — PANTOPRAZOLE SODIUM 20 MG/1
40 TABLET, DELAYED RELEASE ORAL
Refills: 0 | Status: DISCONTINUED | OUTPATIENT
Start: 2022-01-01 | End: 2022-01-01

## 2022-01-01 RX ORDER — ACETAMINOPHEN 500 MG
1000 TABLET ORAL ONCE
Refills: 0 | Status: COMPLETED | OUTPATIENT
Start: 2022-01-01 | End: 2022-01-01

## 2022-01-01 RX ORDER — SITAGLIPTIN 50 MG/1
1 TABLET, FILM COATED ORAL
Qty: 0 | Refills: 0 | DISCHARGE

## 2022-01-01 RX ORDER — CEFTRIAXONE 500 MG/1
INJECTION, POWDER, FOR SOLUTION INTRAMUSCULAR; INTRAVENOUS
Refills: 0 | Status: DISCONTINUED | OUTPATIENT
Start: 2022-01-01 | End: 2022-01-01

## 2022-01-01 RX ORDER — ASCORBIC ACID 60 MG
500 TABLET,CHEWABLE ORAL DAILY
Refills: 0 | Status: DISCONTINUED | OUTPATIENT
Start: 2022-01-01 | End: 2022-01-01

## 2022-01-01 RX ORDER — AMOXICILLIN 250 MG/5ML
500 SUSPENSION, RECONSTITUTED, ORAL (ML) ORAL THREE TIMES A DAY
Refills: 0 | Status: DISCONTINUED | OUTPATIENT
Start: 2022-01-01 | End: 2022-01-01

## 2022-01-01 RX ORDER — MIRTAZAPINE 45 MG/1
1 TABLET, ORALLY DISINTEGRATING ORAL
Qty: 0 | Refills: 0 | DISCHARGE

## 2022-01-01 RX ORDER — PIPERACILLIN AND TAZOBACTAM 4; .5 G/20ML; G/20ML
3.38 INJECTION, POWDER, LYOPHILIZED, FOR SOLUTION INTRAVENOUS ONCE
Refills: 0 | Status: COMPLETED | OUTPATIENT
Start: 2022-01-01 | End: 2022-01-01

## 2022-01-01 RX ORDER — DEXTROSE 50 % IN WATER 50 %
12.5 SYRINGE (ML) INTRAVENOUS ONCE
Refills: 0 | Status: DISCONTINUED | OUTPATIENT
Start: 2022-01-01 | End: 2022-01-01

## 2022-01-01 RX ORDER — CEFTRIAXONE 500 MG/1
1000 INJECTION, POWDER, FOR SOLUTION INTRAMUSCULAR; INTRAVENOUS ONCE
Refills: 0 | Status: COMPLETED | OUTPATIENT
Start: 2022-01-01 | End: 2022-01-01

## 2022-01-01 RX ORDER — METOPROLOL TARTRATE 50 MG
5 TABLET ORAL EVERY 6 HOURS
Refills: 0 | Status: DISCONTINUED | OUTPATIENT
Start: 2022-01-01 | End: 2022-01-01

## 2022-01-01 RX ORDER — OMEPRAZOLE 10 MG/1
1 CAPSULE, DELAYED RELEASE ORAL
Qty: 0 | Refills: 0 | DISCHARGE

## 2022-01-01 RX ORDER — SODIUM CHLORIDE 9 MG/ML
500 INJECTION INTRAMUSCULAR; INTRAVENOUS; SUBCUTANEOUS ONCE
Refills: 0 | Status: COMPLETED | OUTPATIENT
Start: 2022-01-01 | End: 2022-01-01

## 2022-01-01 RX ORDER — FOLIC ACID 0.8 MG
1 TABLET ORAL
Qty: 0 | Refills: 0 | DISCHARGE

## 2022-01-01 RX ORDER — METOPROLOL TARTRATE 50 MG
1 TABLET ORAL
Qty: 0 | Refills: 0 | DISCHARGE

## 2022-01-01 RX ORDER — POTASSIUM CHLORIDE 20 MEQ
40 PACKET (EA) ORAL EVERY 4 HOURS
Refills: 0 | Status: DISCONTINUED | OUTPATIENT
Start: 2022-01-01 | End: 2022-01-01

## 2022-01-01 RX ORDER — DIVALPROEX SODIUM 500 MG/1
250 TABLET, DELAYED RELEASE ORAL
Refills: 0 | Status: DISCONTINUED | OUTPATIENT
Start: 2022-01-01 | End: 2022-01-01

## 2022-01-01 RX ORDER — POTASSIUM CHLORIDE 20 MEQ
10 PACKET (EA) ORAL
Refills: 0 | Status: DISCONTINUED | OUTPATIENT
Start: 2022-01-01 | End: 2022-01-01

## 2022-01-01 RX ORDER — INSULIN LISPRO 100/ML
VIAL (ML) SUBCUTANEOUS EVERY 6 HOURS
Refills: 0 | Status: DISCONTINUED | OUTPATIENT
Start: 2022-01-01 | End: 2022-01-01

## 2022-01-01 RX ORDER — MORPHINE SULFATE 50 MG/1
2 CAPSULE, EXTENDED RELEASE ORAL EVERY 6 HOURS
Refills: 0 | Status: DISCONTINUED | OUTPATIENT
Start: 2022-01-01 | End: 2022-01-01

## 2022-01-01 RX ORDER — CEFTRIAXONE 500 MG/1
1000 INJECTION, POWDER, FOR SOLUTION INTRAMUSCULAR; INTRAVENOUS EVERY 24 HOURS
Refills: 0 | Status: DISCONTINUED | OUTPATIENT
Start: 2022-01-01 | End: 2022-01-01

## 2022-01-01 RX ADMIN — Medication 40 MILLIGRAM(S): at 05:32

## 2022-01-01 RX ADMIN — POTASSIUM PHOSPHATE, MONOBASIC POTASSIUM PHOSPHATE, DIBASIC 62.5 MILLIMOLE(S): 236; 224 INJECTION, SOLUTION INTRAVENOUS at 06:27

## 2022-01-01 RX ADMIN — Medication 50 MILLIGRAM(S): at 06:04

## 2022-01-01 RX ADMIN — MORPHINE SULFATE 1 MILLIGRAM(S): 50 CAPSULE, EXTENDED RELEASE ORAL at 18:43

## 2022-01-01 RX ADMIN — Medication 50 MILLIGRAM(S): at 05:10

## 2022-01-01 RX ADMIN — Medication 50 MILLIGRAM(S): at 05:45

## 2022-01-01 RX ADMIN — Medication 5 MILLIGRAM(S): at 13:18

## 2022-01-01 RX ADMIN — MIRTAZAPINE 30 MILLIGRAM(S): 45 TABLET, ORALLY DISINTEGRATING ORAL at 13:19

## 2022-01-01 RX ADMIN — Medication 5 MILLIGRAM(S): at 06:15

## 2022-01-01 RX ADMIN — HEPARIN SODIUM 5000 UNIT(S): 5000 INJECTION INTRAVENOUS; SUBCUTANEOUS at 05:09

## 2022-01-01 RX ADMIN — MORPHINE SULFATE 2 MILLIGRAM(S): 50 CAPSULE, EXTENDED RELEASE ORAL at 05:18

## 2022-01-01 RX ADMIN — CHLORHEXIDINE GLUCONATE 1 APPLICATION(S): 213 SOLUTION TOPICAL at 06:07

## 2022-01-01 RX ADMIN — HEPARIN SODIUM 5000 UNIT(S): 5000 INJECTION INTRAVENOUS; SUBCUTANEOUS at 19:01

## 2022-01-01 RX ADMIN — Medication 50 MILLIGRAM(S): at 05:32

## 2022-01-01 RX ADMIN — CHLORHEXIDINE GLUCONATE 1 APPLICATION(S): 213 SOLUTION TOPICAL at 06:17

## 2022-01-01 RX ADMIN — PANTOPRAZOLE SODIUM 40 MILLIGRAM(S): 20 TABLET, DELAYED RELEASE ORAL at 12:56

## 2022-01-01 RX ADMIN — Medication 5 MILLIGRAM(S): at 00:18

## 2022-01-01 RX ADMIN — Medication 5 MILLIGRAM(S): at 00:37

## 2022-01-01 RX ADMIN — MORPHINE SULFATE 1 MILLIGRAM(S): 50 CAPSULE, EXTENDED RELEASE ORAL at 22:45

## 2022-01-01 RX ADMIN — SODIUM CHLORIDE 50 MILLILITER(S): 9 INJECTION INTRAMUSCULAR; INTRAVENOUS; SUBCUTANEOUS at 10:19

## 2022-01-01 RX ADMIN — Medication 10 MILLIGRAM(S): at 05:18

## 2022-01-01 RX ADMIN — MORPHINE SULFATE 2 MILLIGRAM(S): 50 CAPSULE, EXTENDED RELEASE ORAL at 05:14

## 2022-01-01 RX ADMIN — Medication 100 MILLIEQUIVALENT(S): at 13:44

## 2022-01-01 RX ADMIN — MORPHINE SULFATE 2 MILLIGRAM(S): 50 CAPSULE, EXTENDED RELEASE ORAL at 12:04

## 2022-01-01 RX ADMIN — CHLORHEXIDINE GLUCONATE 1 APPLICATION(S): 213 SOLUTION TOPICAL at 21:50

## 2022-01-01 RX ADMIN — MORPHINE SULFATE 2 MILLIGRAM(S): 50 CAPSULE, EXTENDED RELEASE ORAL at 14:52

## 2022-01-01 RX ADMIN — Medication 50 MILLIGRAM(S): at 17:29

## 2022-01-01 RX ADMIN — PIPERACILLIN AND TAZOBACTAM 25 GRAM(S): 4; .5 INJECTION, POWDER, LYOPHILIZED, FOR SOLUTION INTRAVENOUS at 17:38

## 2022-01-01 RX ADMIN — Medication 10 MILLIGRAM(S): at 18:39

## 2022-01-01 RX ADMIN — Medication 100 MILLIGRAM(S): at 05:18

## 2022-01-01 RX ADMIN — HEPARIN SODIUM 5000 UNIT(S): 5000 INJECTION INTRAVENOUS; SUBCUTANEOUS at 18:05

## 2022-01-01 RX ADMIN — Medication 400 MILLIGRAM(S): at 20:57

## 2022-01-01 RX ADMIN — HEPARIN SODIUM 5000 UNIT(S): 5000 INJECTION INTRAVENOUS; SUBCUTANEOUS at 06:06

## 2022-01-01 RX ADMIN — SODIUM CHLORIDE 50 MILLILITER(S): 9 INJECTION INTRAMUSCULAR; INTRAVENOUS; SUBCUTANEOUS at 08:29

## 2022-01-01 RX ADMIN — MORPHINE SULFATE 1 MILLIGRAM(S): 50 CAPSULE, EXTENDED RELEASE ORAL at 06:04

## 2022-01-01 RX ADMIN — Medication 100 MILLIEQUIVALENT(S): at 14:30

## 2022-01-01 RX ADMIN — PIPERACILLIN AND TAZOBACTAM 25 GRAM(S): 4; .5 INJECTION, POWDER, LYOPHILIZED, FOR SOLUTION INTRAVENOUS at 00:08

## 2022-01-01 RX ADMIN — QUETIAPINE FUMARATE 25 MILLIGRAM(S): 200 TABLET, FILM COATED ORAL at 22:58

## 2022-01-01 RX ADMIN — MORPHINE SULFATE 2 MILLIGRAM(S): 50 CAPSULE, EXTENDED RELEASE ORAL at 23:47

## 2022-01-01 RX ADMIN — Medication 50 MILLIGRAM(S): at 20:47

## 2022-01-01 RX ADMIN — SODIUM CHLORIDE 60 MILLILITER(S): 9 INJECTION, SOLUTION INTRAVENOUS at 09:30

## 2022-01-01 RX ADMIN — PIPERACILLIN AND TAZOBACTAM 25 GRAM(S): 4; .5 INJECTION, POWDER, LYOPHILIZED, FOR SOLUTION INTRAVENOUS at 23:39

## 2022-01-01 RX ADMIN — PIPERACILLIN AND TAZOBACTAM 25 GRAM(S): 4; .5 INJECTION, POWDER, LYOPHILIZED, FOR SOLUTION INTRAVENOUS at 18:21

## 2022-01-01 RX ADMIN — MORPHINE SULFATE 2 MILLIGRAM(S): 50 CAPSULE, EXTENDED RELEASE ORAL at 23:28

## 2022-01-01 RX ADMIN — Medication 1: at 17:51

## 2022-01-01 RX ADMIN — CHLORHEXIDINE GLUCONATE 1 APPLICATION(S): 213 SOLUTION TOPICAL at 13:45

## 2022-01-01 RX ADMIN — CHLORHEXIDINE GLUCONATE 1 APPLICATION(S): 213 SOLUTION TOPICAL at 21:37

## 2022-01-01 RX ADMIN — PIPERACILLIN AND TAZOBACTAM 25 GRAM(S): 4; .5 INJECTION, POWDER, LYOPHILIZED, FOR SOLUTION INTRAVENOUS at 00:36

## 2022-01-01 RX ADMIN — Medication 5 MILLIGRAM(S): at 17:26

## 2022-01-01 RX ADMIN — QUETIAPINE FUMARATE 25 MILLIGRAM(S): 200 TABLET, FILM COATED ORAL at 05:18

## 2022-01-01 RX ADMIN — Medication 50 MILLIGRAM(S): at 05:25

## 2022-01-01 RX ADMIN — Medication 40 MILLIGRAM(S): at 06:19

## 2022-01-01 RX ADMIN — Medication 50 MILLIGRAM(S): at 05:40

## 2022-01-01 RX ADMIN — SODIUM CHLORIDE 30 MILLILITER(S): 9 INJECTION, SOLUTION INTRAVENOUS at 13:50

## 2022-01-01 RX ADMIN — MORPHINE SULFATE 2 MILLIGRAM(S): 50 CAPSULE, EXTENDED RELEASE ORAL at 12:24

## 2022-01-01 RX ADMIN — Medication 50 MILLIGRAM(S): at 20:19

## 2022-01-01 RX ADMIN — PANTOPRAZOLE SODIUM 40 MILLIGRAM(S): 20 TABLET, DELAYED RELEASE ORAL at 06:16

## 2022-01-01 RX ADMIN — Medication 5 MILLIGRAM(S): at 00:39

## 2022-01-01 RX ADMIN — Medication 5 MILLIGRAM(S): at 18:22

## 2022-01-01 RX ADMIN — MORPHINE SULFATE 1 MILLIGRAM(S): 50 CAPSULE, EXTENDED RELEASE ORAL at 05:49

## 2022-01-01 RX ADMIN — Medication 0.2 MILLIGRAM(S): at 04:45

## 2022-01-01 RX ADMIN — Medication 5 MILLIGRAM(S): at 18:05

## 2022-01-01 RX ADMIN — Medication 5 MILLIGRAM(S): at 14:53

## 2022-01-01 RX ADMIN — PIPERACILLIN AND TAZOBACTAM 25 GRAM(S): 4; .5 INJECTION, POWDER, LYOPHILIZED, FOR SOLUTION INTRAVENOUS at 23:04

## 2022-01-01 RX ADMIN — HEPARIN SODIUM 5000 UNIT(S): 5000 INJECTION INTRAVENOUS; SUBCUTANEOUS at 18:25

## 2022-01-01 RX ADMIN — HEPARIN SODIUM 5000 UNIT(S): 5000 INJECTION INTRAVENOUS; SUBCUTANEOUS at 17:26

## 2022-01-01 RX ADMIN — PANTOPRAZOLE SODIUM 40 MILLIGRAM(S): 20 TABLET, DELAYED RELEASE ORAL at 12:50

## 2022-01-01 RX ADMIN — SODIUM CHLORIDE 60 MILLILITER(S): 9 INJECTION, SOLUTION INTRAVENOUS at 16:33

## 2022-01-01 RX ADMIN — PIPERACILLIN AND TAZOBACTAM 25 GRAM(S): 4; .5 INJECTION, POWDER, LYOPHILIZED, FOR SOLUTION INTRAVENOUS at 00:29

## 2022-01-01 RX ADMIN — PIPERACILLIN AND TAZOBACTAM 25 GRAM(S): 4; .5 INJECTION, POWDER, LYOPHILIZED, FOR SOLUTION INTRAVENOUS at 09:00

## 2022-01-01 RX ADMIN — Medication 250 MILLIGRAM(S): at 22:35

## 2022-01-01 RX ADMIN — Medication 5 MILLIGRAM(S): at 06:18

## 2022-01-01 RX ADMIN — Medication 50 MILLIGRAM(S): at 18:04

## 2022-01-01 RX ADMIN — Medication 100 MILLIEQUIVALENT(S): at 12:50

## 2022-01-01 RX ADMIN — Medication 50 MILLIGRAM(S): at 19:14

## 2022-01-01 RX ADMIN — HEPARIN SODIUM 5000 UNIT(S): 5000 INJECTION INTRAVENOUS; SUBCUTANEOUS at 17:38

## 2022-01-01 RX ADMIN — Medication 100 MILLIEQUIVALENT(S): at 12:13

## 2022-01-01 RX ADMIN — MORPHINE SULFATE 2 MILLIGRAM(S): 50 CAPSULE, EXTENDED RELEASE ORAL at 05:45

## 2022-01-01 RX ADMIN — Medication 40 MILLIGRAM(S): at 06:17

## 2022-01-01 RX ADMIN — Medication 12.5 GRAM(S): at 06:05

## 2022-01-01 RX ADMIN — PIPERACILLIN AND TAZOBACTAM 25 GRAM(S): 4; .5 INJECTION, POWDER, LYOPHILIZED, FOR SOLUTION INTRAVENOUS at 00:34

## 2022-01-01 RX ADMIN — Medication 50 MILLIGRAM(S): at 05:18

## 2022-01-01 RX ADMIN — Medication 100 MILLIEQUIVALENT(S): at 14:31

## 2022-01-01 RX ADMIN — CHLORHEXIDINE GLUCONATE 1 APPLICATION(S): 213 SOLUTION TOPICAL at 12:12

## 2022-01-01 RX ADMIN — Medication 5 MILLIGRAM(S): at 12:51

## 2022-01-01 RX ADMIN — PIPERACILLIN AND TAZOBACTAM 25 GRAM(S): 4; .5 INJECTION, POWDER, LYOPHILIZED, FOR SOLUTION INTRAVENOUS at 14:08

## 2022-01-01 RX ADMIN — HEPARIN SODIUM 5000 UNIT(S): 5000 INJECTION INTRAVENOUS; SUBCUTANEOUS at 17:29

## 2022-01-01 RX ADMIN — Medication 100 MILLIEQUIVALENT(S): at 11:21

## 2022-01-01 RX ADMIN — PANTOPRAZOLE SODIUM 40 MILLIGRAM(S): 20 TABLET, DELAYED RELEASE ORAL at 12:24

## 2022-01-01 RX ADMIN — Medication 5 MILLIGRAM(S): at 00:08

## 2022-01-01 RX ADMIN — PIPERACILLIN AND TAZOBACTAM 25 GRAM(S): 4; .5 INJECTION, POWDER, LYOPHILIZED, FOR SOLUTION INTRAVENOUS at 15:48

## 2022-01-01 RX ADMIN — Medication 100 MILLIEQUIVALENT(S): at 16:02

## 2022-01-01 RX ADMIN — Medication 100 MILLIEQUIVALENT(S): at 17:56

## 2022-01-01 RX ADMIN — PANTOPRAZOLE SODIUM 40 MILLIGRAM(S): 20 TABLET, DELAYED RELEASE ORAL at 12:04

## 2022-01-01 RX ADMIN — CHLORHEXIDINE GLUCONATE 1 APPLICATION(S): 213 SOLUTION TOPICAL at 22:10

## 2022-01-01 RX ADMIN — Medication 50 MILLIGRAM(S): at 05:24

## 2022-01-01 RX ADMIN — Medication 50 MILLIGRAM(S): at 06:06

## 2022-01-01 RX ADMIN — Medication 50 MILLIGRAM(S): at 06:11

## 2022-01-01 RX ADMIN — Medication 5 MILLIGRAM(S): at 18:23

## 2022-01-01 RX ADMIN — SODIUM CHLORIDE 50 MILLILITER(S): 9 INJECTION, SOLUTION INTRAVENOUS at 18:25

## 2022-01-01 RX ADMIN — Medication 5 MILLIGRAM(S): at 12:39

## 2022-01-01 RX ADMIN — Medication 1: at 09:40

## 2022-01-01 RX ADMIN — MORPHINE SULFATE 2 MILLIGRAM(S): 50 CAPSULE, EXTENDED RELEASE ORAL at 00:38

## 2022-01-01 RX ADMIN — Medication 5 MILLIGRAM(S): at 23:22

## 2022-01-01 RX ADMIN — Medication 40 MILLIGRAM(S): at 06:03

## 2022-01-01 RX ADMIN — Medication 40 MILLIGRAM(S): at 06:28

## 2022-01-01 RX ADMIN — PIPERACILLIN AND TAZOBACTAM 25 GRAM(S): 4; .5 INJECTION, POWDER, LYOPHILIZED, FOR SOLUTION INTRAVENOUS at 10:19

## 2022-01-01 RX ADMIN — CHLORHEXIDINE GLUCONATE 1 APPLICATION(S): 213 SOLUTION TOPICAL at 21:48

## 2022-01-01 RX ADMIN — MORPHINE SULFATE 2 MILLIGRAM(S): 50 CAPSULE, EXTENDED RELEASE ORAL at 13:19

## 2022-01-01 RX ADMIN — SODIUM CHLORIDE 30 MILLILITER(S): 9 INJECTION, SOLUTION INTRAVENOUS at 20:00

## 2022-01-01 RX ADMIN — CEFTRIAXONE 100 MILLIGRAM(S): 500 INJECTION, POWDER, FOR SOLUTION INTRAMUSCULAR; INTRAVENOUS at 21:45

## 2022-01-01 RX ADMIN — SODIUM CHLORIDE 50 MILLILITER(S): 9 INJECTION INTRAMUSCULAR; INTRAVENOUS; SUBCUTANEOUS at 22:55

## 2022-01-01 RX ADMIN — Medication 50 MILLIGRAM(S): at 18:02

## 2022-01-01 RX ADMIN — Medication 5 MILLIGRAM(S): at 06:03

## 2022-01-01 RX ADMIN — Medication 50 MILLIGRAM(S): at 17:27

## 2022-01-01 RX ADMIN — MORPHINE SULFATE 1 MILLIGRAM(S): 50 CAPSULE, EXTENDED RELEASE ORAL at 00:00

## 2022-01-01 RX ADMIN — PIPERACILLIN AND TAZOBACTAM 25 GRAM(S): 4; .5 INJECTION, POWDER, LYOPHILIZED, FOR SOLUTION INTRAVENOUS at 06:30

## 2022-01-01 RX ADMIN — Medication 100 MILLIEQUIVALENT(S): at 14:00

## 2022-01-01 RX ADMIN — Medication 5 MILLIGRAM(S): at 18:04

## 2022-01-01 RX ADMIN — CHLORHEXIDINE GLUCONATE 1 APPLICATION(S): 213 SOLUTION TOPICAL at 06:11

## 2022-01-01 RX ADMIN — Medication 5 MILLIGRAM(S): at 12:24

## 2022-01-01 RX ADMIN — MORPHINE SULFATE 2 MILLIGRAM(S): 50 CAPSULE, EXTENDED RELEASE ORAL at 16:51

## 2022-01-01 RX ADMIN — PIPERACILLIN AND TAZOBACTAM 25 GRAM(S): 4; .5 INJECTION, POWDER, LYOPHILIZED, FOR SOLUTION INTRAVENOUS at 18:06

## 2022-01-01 RX ADMIN — Medication 125 MICROGRAM(S): at 05:18

## 2022-01-01 RX ADMIN — MORPHINE SULFATE 2 MILLIGRAM(S): 50 CAPSULE, EXTENDED RELEASE ORAL at 23:56

## 2022-01-01 RX ADMIN — SODIUM CHLORIDE 50 MILLILITER(S): 9 INJECTION, SOLUTION INTRAVENOUS at 01:08

## 2022-01-01 RX ADMIN — SODIUM CHLORIDE 50 MILLILITER(S): 9 INJECTION, SOLUTION INTRAVENOUS at 21:46

## 2022-01-01 RX ADMIN — Medication 5 MILLIGRAM(S): at 17:29

## 2022-01-01 RX ADMIN — MORPHINE SULFATE 1 MILLIGRAM(S): 50 CAPSULE, EXTENDED RELEASE ORAL at 10:28

## 2022-01-01 RX ADMIN — Medication 5 MILLIGRAM(S): at 00:35

## 2022-01-01 RX ADMIN — MORPHINE SULFATE 2 MILLIGRAM(S): 50 CAPSULE, EXTENDED RELEASE ORAL at 06:46

## 2022-01-01 RX ADMIN — MORPHINE SULFATE 2 MILLIGRAM(S): 50 CAPSULE, EXTENDED RELEASE ORAL at 17:08

## 2022-01-01 RX ADMIN — Medication 5 MILLIGRAM(S): at 23:40

## 2022-01-01 RX ADMIN — Medication 1: at 00:35

## 2022-01-01 RX ADMIN — Medication 1: at 13:18

## 2022-01-01 RX ADMIN — Medication 5 MILLIGRAM(S): at 05:49

## 2022-01-01 RX ADMIN — Medication 25 GRAM(S): at 00:14

## 2022-01-01 RX ADMIN — MORPHINE SULFATE 1 MILLIGRAM(S): 50 CAPSULE, EXTENDED RELEASE ORAL at 18:58

## 2022-01-01 RX ADMIN — MORPHINE SULFATE 2 MILLIGRAM(S): 50 CAPSULE, EXTENDED RELEASE ORAL at 05:25

## 2022-01-01 RX ADMIN — HEPARIN SODIUM 5000 UNIT(S): 5000 INJECTION INTRAVENOUS; SUBCUTANEOUS at 06:04

## 2022-01-01 RX ADMIN — Medication 50 MILLIGRAM(S): at 17:38

## 2022-01-01 RX ADMIN — SODIUM CHLORIDE 30 MILLILITER(S): 9 INJECTION, SOLUTION INTRAVENOUS at 23:03

## 2022-01-01 RX ADMIN — Medication 6 MILLIGRAM(S): at 22:58

## 2022-01-01 RX ADMIN — Medication 50 MILLIGRAM(S): at 18:21

## 2022-01-01 RX ADMIN — PIPERACILLIN AND TAZOBACTAM 25 GRAM(S): 4; .5 INJECTION, POWDER, LYOPHILIZED, FOR SOLUTION INTRAVENOUS at 14:51

## 2022-01-01 RX ADMIN — PIPERACILLIN AND TAZOBACTAM 200 GRAM(S): 4; .5 INJECTION, POWDER, LYOPHILIZED, FOR SOLUTION INTRAVENOUS at 10:25

## 2022-01-01 RX ADMIN — Medication 5 MILLIGRAM(S): at 11:45

## 2022-01-01 RX ADMIN — CHLORHEXIDINE GLUCONATE 1 APPLICATION(S): 213 SOLUTION TOPICAL at 22:11

## 2022-01-01 RX ADMIN — SODIUM CHLORIDE 500 MILLILITER(S): 9 INJECTION INTRAMUSCULAR; INTRAVENOUS; SUBCUTANEOUS at 20:57

## 2022-01-01 RX ADMIN — MORPHINE SULFATE 2 MILLIGRAM(S): 50 CAPSULE, EXTENDED RELEASE ORAL at 06:27

## 2022-01-01 RX ADMIN — Medication 50 MILLIGRAM(S): at 18:25

## 2022-01-01 RX ADMIN — Medication 5 MILLIGRAM(S): at 11:16

## 2022-01-01 RX ADMIN — Medication 5 MILLIGRAM(S): at 18:16

## 2022-01-01 RX ADMIN — CHLORHEXIDINE GLUCONATE 1 APPLICATION(S): 213 SOLUTION TOPICAL at 05:11

## 2022-01-01 RX ADMIN — PIPERACILLIN AND TAZOBACTAM 25 GRAM(S): 4; .5 INJECTION, POWDER, LYOPHILIZED, FOR SOLUTION INTRAVENOUS at 18:09

## 2022-01-01 RX ADMIN — Medication 100 MILLIEQUIVALENT(S): at 23:21

## 2022-01-01 RX ADMIN — HEPARIN SODIUM 5000 UNIT(S): 5000 INJECTION INTRAVENOUS; SUBCUTANEOUS at 06:17

## 2022-01-01 RX ADMIN — PIPERACILLIN AND TAZOBACTAM 25 GRAM(S): 4; .5 INJECTION, POWDER, LYOPHILIZED, FOR SOLUTION INTRAVENOUS at 08:29

## 2022-01-01 RX ADMIN — MORPHINE SULFATE 2 MILLIGRAM(S): 50 CAPSULE, EXTENDED RELEASE ORAL at 09:47

## 2022-01-01 RX ADMIN — Medication 5 MILLIGRAM(S): at 05:32

## 2022-01-01 RX ADMIN — Medication 100 MILLIEQUIVALENT(S): at 18:08

## 2022-01-01 RX ADMIN — MORPHINE SULFATE 2 MILLIGRAM(S): 50 CAPSULE, EXTENDED RELEASE ORAL at 18:04

## 2022-01-01 RX ADMIN — PANTOPRAZOLE SODIUM 40 MILLIGRAM(S): 20 TABLET, DELAYED RELEASE ORAL at 13:18

## 2022-01-01 RX ADMIN — Medication 50 MILLIGRAM(S): at 06:14

## 2022-01-01 RX ADMIN — Medication 5 MILLIGRAM(S): at 12:56

## 2022-01-01 RX ADMIN — CEFTRIAXONE 100 MILLIGRAM(S): 500 INJECTION, POWDER, FOR SOLUTION INTRAMUSCULAR; INTRAVENOUS at 01:55

## 2022-01-01 RX ADMIN — Medication 5 MILLIGRAM(S): at 05:45

## 2022-01-01 RX ADMIN — HEPARIN SODIUM 5000 UNIT(S): 5000 INJECTION INTRAVENOUS; SUBCUTANEOUS at 13:19

## 2022-01-01 RX ADMIN — PANTOPRAZOLE SODIUM 40 MILLIGRAM(S): 20 TABLET, DELAYED RELEASE ORAL at 11:16

## 2022-01-01 RX ADMIN — HEPARIN SODIUM 5000 UNIT(S): 5000 INJECTION INTRAVENOUS; SUBCUTANEOUS at 06:19

## 2022-01-01 RX ADMIN — Medication 5 MILLIGRAM(S): at 17:39

## 2022-01-01 RX ADMIN — SODIUM CHLORIDE 30 MILLILITER(S): 9 INJECTION, SOLUTION INTRAVENOUS at 12:50

## 2022-01-01 RX ADMIN — Medication 50 MILLIGRAM(S): at 17:49

## 2022-01-01 RX ADMIN — MORPHINE SULFATE 2 MILLIGRAM(S): 50 CAPSULE, EXTENDED RELEASE ORAL at 11:17

## 2022-01-01 RX ADMIN — SODIUM CHLORIDE 50 MILLILITER(S): 9 INJECTION INTRAMUSCULAR; INTRAVENOUS; SUBCUTANEOUS at 06:03

## 2022-01-01 RX ADMIN — PIPERACILLIN AND TAZOBACTAM 25 GRAM(S): 4; .5 INJECTION, POWDER, LYOPHILIZED, FOR SOLUTION INTRAVENOUS at 22:05

## 2022-01-01 RX ADMIN — PANTOPRAZOLE SODIUM 40 MILLIGRAM(S): 20 TABLET, DELAYED RELEASE ORAL at 14:51

## 2022-01-01 RX ADMIN — MORPHINE SULFATE 2 MILLIGRAM(S): 50 CAPSULE, EXTENDED RELEASE ORAL at 18:16

## 2022-01-01 RX ADMIN — Medication 40 MILLIGRAM(S): at 13:27

## 2022-01-01 RX ADMIN — PIPERACILLIN AND TAZOBACTAM 25 GRAM(S): 4; .5 INJECTION, POWDER, LYOPHILIZED, FOR SOLUTION INTRAVENOUS at 06:50

## 2022-01-01 RX ADMIN — SODIUM CHLORIDE 50 MILLILITER(S): 9 INJECTION, SOLUTION INTRAVENOUS at 07:06

## 2022-01-01 RX ADMIN — Medication 50 MILLIGRAM(S): at 06:18

## 2022-01-01 RX ADMIN — CHLORHEXIDINE GLUCONATE 1 APPLICATION(S): 213 SOLUTION TOPICAL at 06:14

## 2022-01-01 RX ADMIN — MORPHINE SULFATE 2 MILLIGRAM(S): 50 CAPSULE, EXTENDED RELEASE ORAL at 03:44

## 2022-01-01 RX ADMIN — Medication 5 MILLIGRAM(S): at 17:38

## 2022-01-01 RX ADMIN — QUETIAPINE FUMARATE 25 MILLIGRAM(S): 200 TABLET, FILM COATED ORAL at 13:19

## 2022-01-01 RX ADMIN — Medication 1: at 12:40

## 2022-01-01 RX ADMIN — Medication 500 MILLIGRAM(S): at 23:32

## 2022-01-01 RX ADMIN — HEPARIN SODIUM 5000 UNIT(S): 5000 INJECTION INTRAVENOUS; SUBCUTANEOUS at 22:58

## 2022-01-01 RX ADMIN — MORPHINE SULFATE 2 MILLIGRAM(S): 50 CAPSULE, EXTENDED RELEASE ORAL at 09:33

## 2022-01-01 RX ADMIN — Medication 5 MILLIGRAM(S): at 05:18

## 2022-01-01 RX ADMIN — Medication 100 MILLIEQUIVALENT(S): at 04:59

## 2022-01-01 RX ADMIN — PIPERACILLIN AND TAZOBACTAM 25 GRAM(S): 4; .5 INJECTION, POWDER, LYOPHILIZED, FOR SOLUTION INTRAVENOUS at 06:10

## 2022-01-01 RX ADMIN — Medication 50 MILLIGRAM(S): at 17:24

## 2022-01-01 RX ADMIN — SODIUM CHLORIDE 60 MILLILITER(S): 9 INJECTION, SOLUTION INTRAVENOUS at 00:08

## 2022-01-01 RX ADMIN — Medication 5 MILLIGRAM(S): at 06:11

## 2022-01-01 RX ADMIN — CHLORHEXIDINE GLUCONATE 1 APPLICATION(S): 213 SOLUTION TOPICAL at 22:05

## 2022-01-01 RX ADMIN — CEFTRIAXONE 100 MILLIGRAM(S): 500 INJECTION, POWDER, FOR SOLUTION INTRAMUSCULAR; INTRAVENOUS at 22:30

## 2022-01-01 RX ADMIN — Medication 100 GRAM(S): at 06:25

## 2022-01-01 RX ADMIN — MORPHINE SULFATE 1 MILLIGRAM(S): 50 CAPSULE, EXTENDED RELEASE ORAL at 22:20

## 2022-01-01 RX ADMIN — HEPARIN SODIUM 5000 UNIT(S): 5000 INJECTION INTRAVENOUS; SUBCUTANEOUS at 05:25

## 2022-01-01 RX ADMIN — PIPERACILLIN AND TAZOBACTAM 200 GRAM(S): 4; .5 INJECTION, POWDER, LYOPHILIZED, FOR SOLUTION INTRAVENOUS at 13:00

## 2022-01-01 RX ADMIN — Medication 5 MILLIGRAM(S): at 23:58

## 2022-01-01 RX ADMIN — Medication 5 MILLIGRAM(S): at 13:45

## 2022-01-01 RX ADMIN — HEPARIN SODIUM 5000 UNIT(S): 5000 INJECTION INTRAVENOUS; SUBCUTANEOUS at 05:19

## 2022-01-01 RX ADMIN — Medication 100 MILLIEQUIVALENT(S): at 15:58

## 2022-01-01 RX ADMIN — Medication 1: at 06:41

## 2022-01-01 RX ADMIN — Medication 1000 MILLIGRAM(S): at 02:18

## 2022-01-01 RX ADMIN — Medication 10 MILLIGRAM(S): at 04:52

## 2022-01-01 RX ADMIN — Medication 50 MILLIGRAM(S): at 05:13

## 2022-01-01 RX ADMIN — MORPHINE SULFATE 2 MILLIGRAM(S): 50 CAPSULE, EXTENDED RELEASE ORAL at 17:26

## 2022-01-01 RX ADMIN — SODIUM CHLORIDE 50 MILLILITER(S): 9 INJECTION INTRAMUSCULAR; INTRAVENOUS; SUBCUTANEOUS at 23:21

## 2022-01-01 RX ADMIN — Medication 1: at 06:52

## 2022-01-01 RX ADMIN — PANTOPRAZOLE SODIUM 40 MILLIGRAM(S): 20 TABLET, DELAYED RELEASE ORAL at 12:51

## 2022-01-01 RX ADMIN — Medication 50 MILLIGRAM(S): at 18:14

## 2022-01-01 RX ADMIN — Medication 500 MILLIGRAM(S): at 15:40

## 2022-01-01 RX ADMIN — Medication 5 MILLIGRAM(S): at 05:31

## 2022-01-01 RX ADMIN — Medication 50 MILLIGRAM(S): at 18:07

## 2022-01-01 RX ADMIN — Medication 0.2 MILLIGRAM(S): at 15:07

## 2022-01-01 RX ADMIN — Medication 100 MILLIEQUIVALENT(S): at 08:57

## 2022-01-01 RX ADMIN — Medication 100 MILLIEQUIVALENT(S): at 01:20

## 2022-01-01 NOTE — PROGRESS NOTE ADULT - PROBLEM SELECTOR PLAN 3
- Geriatric psych consulted, f/u recs  - Aspiration and fall precautions  - c/w Seroquel Oral TID  - c/w mirtazapine 30mg oral qd  - can give IV haldol 1.0 mg for acute agitation (QTc wnl on admission)

## 2022-01-01 NOTE — PROGRESS NOTE ADULT - SUBJECTIVE AND OBJECTIVE BOX
Neurology Progress Note    S: Patient seen and examined, now in covid unit on airborne and contact iso.  MRI with meningioma. EEG neg. now + COVID    Medication:  MEDICATIONS  (STANDING):  dextrose 40% Gel 15 Gram(s) Oral once  dextrose 5%. 1000 milliLiter(s) (50 mL/Hr) IV Continuous <Continuous>  dextrose 5%. 1000 milliLiter(s) (100 mL/Hr) IV Continuous <Continuous>  dextrose 50% Injectable 25 Gram(s) IV Push once  dextrose 50% Injectable 12.5 Gram(s) IV Push once  dextrose 50% Injectable 25 Gram(s) IV Push once  digoxin     Tablet 125 MICROGram(s) Oral daily  furosemide   Injectable 40 milliGRAM(s) IV Push daily  glucagon  Injectable 1 milliGRAM(s) IntraMuscular once  heparin   Injectable 5000 Unit(s) SubCutaneous every 8 hours  insulin lispro (ADMELOG) corrective regimen sliding scale   SubCutaneous three times a day before meals  insulin lispro (ADMELOG) corrective regimen sliding scale   SubCutaneous at bedtime  melatonin 6 milliGRAM(s) Oral at bedtime  metoprolol succinate  milliGRAM(s) Oral daily  mirtazapine 30 milliGRAM(s) Oral daily  pantoprazole    Tablet 40 milliGRAM(s) Oral before breakfast  QUEtiapine 25 milliGRAM(s) Oral three times a day    MEDICATIONS  (PRN):    Vitals:  Vital Signs Last 24 Hrs  T(C): 36.9 (22 @ 04:51), Max: 37.1 (21 @ 22:43)  T(F): 98.5 (22 @ 04:51), Max: 98.8 (21 @ 22:43)  HR: 80 (22 @ 04:51) (79 - 80)  BP: 116/68 (22 @ 04:51) (104/67 - 124/67)  BP(mean): --  RR: 20 (22 @ 04:51) (18 - 20)  SpO2: 96% (22 @ 04:51) (96% - 98%)        General Exam:   General Appearance: Appropriately dressed and in no acute distress       Head: Normocephalic, atraumatic and no dysmorphic features  Ear, Nose, and Throat: Moist mucous membranes  CVS: S1S2+  Resp: No SOB, no wheeze or rhonchi  Abd: soft NTND  Extremities: No edema, no cyanosis  Skin: No bruises, no rashes     Neurological Exam:  Mental Status: Awake, alert and oriented x 1-2 (knows hospital and family members.  Able to follow some simple  verbal commands. Able to name and repeat. fluent speech. No obvious aphasia or dysarthria noted. combative at times   Cranial Nerves: PERRL, EOMI, VFFC, sensation V1-V3 intact,  no obvious facial asymmetry, equal elevation of palate, scm/trap 5/5, tongue is midline on protrusion. no obvious papilledema on fundoscopic exam. hearing is grossly intact.   Motor: Normal bulk, tone and strength throughout. Fine finger movements were intact and symmetric. no tremors or drift noted.    Sensation: Intact to light touch and pinprick throughout. no right/left confusion. no extinction to tactile on DSS.    Reflexes: 1+ throughout at biceps, brachioradialis, triceps, patellars and ankles bilaterally and equal. No clonus. R toe and L toe were both downgoing.  Coordination: not following   Gait: defererd        I personally reviewed the below data/images/labs:    CBC Full  -  ( 2022 06:46 )  WBC Count : 6.51 K/uL  RBC Count : 4.23 M/uL  Hemoglobin : 8.4 g/dL  Hematocrit : 30.5 %  Platelet Count - Automated : 275 K/uL  Mean Cell Volume : 72.1 fl  Mean Cell Hemoglobin : 19.9 pg  Mean Cell Hemoglobin Concentration : 27.5 gm/dL  Auto Neutrophil # : x  Auto Lymphocyte # : x  Auto Monocyte # : x  Auto Eosinophil # : x  Auto Basophil # : x  Auto Neutrophil % : x  Auto Lymphocyte % : x  Auto Monocyte % : x  Auto Eosinophil % : x  Auto Basophil % : x        138  |  100  |  29<H>  ----------------------------<  96  3.9   |  22  |  1.07    Ca    9.5      2022 06:46  Phos  3.3       Mg     2.3         TPro  6.5  /  Alb  3.8  /  TBili  0.7  /  DBili  x   /  AST  49<H>  /  ALT  17  /  AlkPhos  76          Urinalysis Basic - ( 26 Dec 2021 23:05 )    Color: Light Yellow / Appearance: Clear / S.009 / pH: x  Gluc: x / Ketone: Negative  / Bili: Negative / Urobili: Negative   Blood: x / Protein: Trace / Nitrite: Negative   Leuk Esterase: Negative / RBC: 0 /hpf / WBC 0 /HPF   Sq Epi: x / Non Sq Epi: 1 /hpf / Bacteria: Negative      < from: CT Head No Cont (21 @ 22:24) >    ACC: [id number] EXAM:  CT BRAIN                          PROCEDURE DATE:  2021          INTERPRETATION:  CLINICAL INFORMATION: Delirium.    TECHNIQUE:  Axial CT images were acquired through the head.  Intravenous contrast: None  Two-dimensionalreformats were generated.    COMPARISON STUDY: None    FINDINGS:  There is no CT evidence of acute intracranial hemorrhage, mass effect,   midline shift or acute territorial infarct.    There is moderate generalized cerebral volume loss.  No clinically   significant chronic microvascular ischemic disease or white matter   lesions are visualized by CT technique.    There is an approximately 2.1 cm, partially calcified round extra-axial   mass in the posterior fossa arising from the posterior lateral aspect of   the right petrous temporal bone.    The left mastoid is sclerotic and underpneumatized.  There is no   clinically significant mastoid effusion.    The calvarium, skull base, and orbits appear unremarkable.    IMPRESSION:  No CT evidenceof acute intracranial pathology.    Approximately 2.1 cm, partially calcified mass in the posterior fossa,   arising from the posterior lateral aspect of the right petrous temporal   bone, most likely represents a meningioma.  Contrast-enhanced brainMRI   is recommended.  For further characterization.    --- End of Report ---          MARTY GALLARDO MD; Resident Radiology  This document has been electronically signed.  DILEEP RODAS MD; Attending Radiologist  This document has been electronically signed. Dec 26 2021 11:53PM    < end of copied text >    < from: MR Head w/wo IV Cont (21 @ 14:16) >    ACC: [id number] EXAM:  MR BRAIN WAW IC                          PROCEDURE DATE:  2021          INTERPRETATION:  MR BRAIN WITHOUT AND WITH IV CONTRAST    Clinical information: AMS    CT head with meningiom A MRI of the brain   was performed bothprior to and after the administration of intravenous   gadolinium.    TECHNIQUE:  In the sagittal plane T1 pre and post gadolinium enhanced imaging was   performed.  In the axial plane T1 pre-and postcontrast as well as T2,    FLAIR, SWAN, and diffusion weighted imaging was utilized.  In the coronal   plane T1 post gadolinium enhanced images were obtained.  Contrast administered 5 cc Gadavist. Contrast discarded 2.5 cc.    COMPARISON:  Exam is compared to head CT of 2021.    FINDINGS:  *  REGION OF CONCERN /EXTRA-AXIAL:  There is an enhancing extra-axial mass along the right lateral aspect of   the posterior cranial fossa. This abuts the dura overlying the posterior   lateral aspect of the right petrous ridge, tentorium, as well as the dura   overlying the sigmoid sinus. Small dural tails are present. The right   sigmoid sinus itself enhances normally. Mass measures grossly 2.2 x 1.5   cm axially by 2.0 cm craniocaudad. Mass was partly calcified on CT.   Imaging characteristics are most compatible with a calcified meningioma.    *  BRAIN PARENCHYMA:  Very mild mass effect on the adjacent right lateral aspect of the   cerebellum without evidence of abnormal parenchymal signal.  Patient has moderate brain atrophy. There aremild chronic   periventricular and subcortical white matter ischemic changes.    *  VENTRICLES:  There is no hydrocephalus. Ventricular prominence is thought related to   underlying brain atrophy..    *  ENHANCEMENT:  No additional lesion seen.    *OTHER:  A partly empty sella turcica is noted.    IMPRESSION:  2.2 X 1.5 X 2.0 CM  ENHANCING EXTRA-AXIAL MASS RIGHT LATERAL ASPECT OF   THE POSTERIOR CRANIAL FOSSA ABUTTING THE SIGMOID SINUS, TENTORIUM, AND   PETROUS BONE. IMAGING CHARACTERISTICS AREMOST COMPATIBLE WITH A   MENINGIOMA. NO ASSOCIATED EDEMA. PATENT RIGHT SIGMOID SINUS    --- End of Report ---            DORI MATOS MD; Attending Radiologist  This document has been electronically signed. Dec 29 2021  2:45PM    < end of copied text >

## 2022-01-01 NOTE — PROGRESS NOTE ADULT - PROBLEM SELECTOR PLAN 4
- Patient with longstanding hx of afib on digoxin (previously on Eliquis, held due to recurrent GI bleed/anemia  - Admission EKG: Afib with rate 55, QTC wnl  - c/w Digoxin 125mcg oral qd  - c/w metoprolol 100mg oral daily  - off AC secondary to previous recurrent GI bleeds and anemia  - BNP 3K on admission

## 2022-01-01 NOTE — PROGRESS NOTE ADULT - ASSESSMENT
89 yo F with dementia, DM, HTN (on metoprolol), CHF (on lasix), Afib (on digoxin, stopped Eliquis due to recurrent GI bleed/anemia) dementia, depression (on Seroquel, mirtazapine), who presents with agitation/AMS as pt became combative, and more agitated. Patient diagnosed with COVID while hospitalized, asymptomatic. Currently pending placement to [facility].

## 2022-01-01 NOTE — PROGRESS NOTE ADULT - ASSESSMENT
88 yo F with dementia, DM,, HTN, depression, CHF, Afib on digoxin stopped Eliquis due to recurrent GI bleed/anemia p/w agitation/AMS.   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 MAYES AAox1-2, agitation at times   in chair otdya     Impression: AMS of unclear etiology, possible toxic metabolic encephalopathy given Leukocytosis. r/o CHF exacerbation./ Likely progression of her underlying neurocognitive disorder on top of delerium.    now + COVID  - now transfer to covid floor with airborne and contact isolation precuations  - monitor O2. and inflammatory markeres  - check QTC, if <500 seroquel and/or zyprexa PRN for agitation  - not on AC given anemia/GIB. consider DOAC for AF when able; now on digoxin  - f/u psych   - b12 WNL, rpr, TSH --> all WNL  - telemetry  - PT/OT  - check FS, glucose control <180  - GI/DVT ppx  - Thank you for allowing me to participate in the care of this patient. Call with questions.   - d/c plan when able   Konstantin Giordano MD  Vascular Neurology  Office: [phone]

## 2022-01-01 NOTE — PROGRESS NOTE ADULT - PROBLEM SELECTOR PLAN 9
- DVT: Heparin q8h sq  - Diet: Regular CC, no snacks, DASH/TLC  - Dispo: pending negative covid,  working on getting PRECIOUS for pt. 6 Rehab facilities rejected patient so far.

## 2022-01-01 NOTE — PROGRESS NOTE ADULT - PROBLEM SELECTOR PLAN 6
- Monitor Hgb, no active bleeds, melena, hematochezia  - previous history of recurrent GI bleed  - Transfuse Hb <7

## 2022-01-01 NOTE — PROGRESS NOTE ADULT - PROBLEM SELECTOR PLAN 5
- per EMR, history of CHF (no echo at [facility])  - BNP 3k on admission  - c/w Lasix 40mg IV daily

## 2022-01-01 NOTE — PROGRESS NOTE ADULT - PROBLEM SELECTOR PLAN 1
- Patient presenting with AMS in setting of chronic dementia/depression. Patient currently AOx1-2, improving with seroquel and IV haldol  - Ddx: toxic metabolic encephalopathy (leukocytosis) vs. acute delirium on dementia in setting of COVID-19  - CT head (12/26) negative for acute bleed, likely left temporal meningioma  - MRI (12/29): 2.2 x 1.5x2cm extra axial mass likely meningioma in R post cranial fossa  - Neuro following      - EEG negative   - B12, folate, TSH wnl  - Trop negative  - c/w Seroquel oral TID  - recommend seroquel and/or zyprexa PRN for agitation

## 2022-01-01 NOTE — PROGRESS NOTE ADULT - PROBLEM SELECTOR PLAN 2
- Patient with negative COVID test on admission, then positive COVID test 12/30, satting well on RA since admission. No URI symptoms.   - c/w airborne and isolation precautions  - not requiring supplemental oxygen  - downtrending leukocytosis, now wnl  - CXR (12/26): clear lungs  - pending repeat COVID-19 pcr

## 2022-01-01 NOTE — PROGRESS NOTE ADULT - SUBJECTIVE AND OBJECTIVE BOX
Subjective: Patient seen and examined. No new events except as noted.   moved to [facility] unit     REVIEW OF SYSTEMS:    CONSTITUTIONAL: + weakness, fevers or chills  EYES/ENT: No visual changes;  No vertigo or throat pain   NECK: No pain or stiffness  RESPIRATORY: No cough, wheezing, hemoptysis; No shortness of breath  CARDIOVASCULAR: No chest pain or palpitations  GASTROINTESTINAL: No abdominal or epigastric pain. No nausea, vomiting, or hematemesis; No diarrhea or constipation. No melena or hematochezia.  GENITOURINARY: No dysuria, frequency or hematuria  NEUROLOGICAL: No numbness or weakness  SKIN: No itching, burning, rashes, or lesions   All other review of systems is negative unless indicated above.    MEDICATIONS:  MEDICATIONS  (STANDING):  dextrose 40% Gel 15 Gram(s) Oral once  dextrose 5%. 1000 milliLiter(s) (50 mL/Hr) IV Continuous <Continuous>  dextrose 5%. 1000 milliLiter(s) (100 mL/Hr) IV Continuous <Continuous>  dextrose 50% Injectable 25 Gram(s) IV Push once  dextrose 50% Injectable 12.5 Gram(s) IV Push once  dextrose 50% Injectable 25 Gram(s) IV Push once  digoxin     Tablet 125 MICROGram(s) Oral daily  furosemide   Injectable 40 milliGRAM(s) IV Push daily  glucagon  Injectable 1 milliGRAM(s) IntraMuscular once  heparin   Injectable 5000 Unit(s) SubCutaneous every 8 hours  insulin lispro (ADMELOG) corrective regimen sliding scale   SubCutaneous three times a day before meals  insulin lispro (ADMELOG) corrective regimen sliding scale   SubCutaneous at bedtime  melatonin 6 milliGRAM(s) Oral at bedtime  metoprolol succinate  milliGRAM(s) Oral daily  mirtazapine 30 milliGRAM(s) Oral daily  pantoprazole    Tablet 40 milliGRAM(s) Oral before breakfast  QUEtiapine 25 milliGRAM(s) Oral three times a day      PHYSICAL EXAM:  T(C): 37.3 (01-01-22 @ 14:46), Max: 37.3 (01-01-22 @ 14:46)  HR: 82 (01-01-22 @ 14:46) (79 - 82)  BP: 109/62 (01-01-22 @ 14:46) (109/62 - 124/67)  RR: 17 (01-01-22 @ 14:46) (17 - 20)  SpO2: 94% (01-01-22 @ 14:46) (94% - 97%)  Wt(kg): --  I&O's Summary    01 Jan 2022 07:01  -  01 Jan 2022 19:40  --------------------------------------------------------  IN: 800 mL / OUT: 0 mL / NET: 800 mL              Appearance: NAD	  HEENT:  Dry oral mucosa, PERRL, EOMI	  Lymphatic: No lymphadenopathy , no edema  Cardiovascular: Irregular S1 S2, No JVD, No murmurs , Peripheral pulses palpable 2+ bilaterally  Respiratory: decreased bs   Gastrointestinal:  Soft, Non-tender, + BS	  Skin: No rashes, No ecchymoses, No cyanosis, warm to touch  Musculoskeletal: Normal range of motion, normal strength  Psychiatry:  Mood & affect appropriate  Ext: No edema        LABS:    CARDIAC MARKERS:                                8.4    6.51  )-----------( 275      ( 01 Jan 2022 06:46 )             30.5     01-01    138  |  100  |  29<H>  ----------------------------<  96  3.9   |  22  |  1.07    Ca    9.5      01 Jan 2022 06:46  Phos  3.3     01-01  Mg     2.3     01-01    TPro  6.5  /  Alb  3.8  /  TBili  0.7  /  DBili  x   /  AST  49<H>  /  ALT  17  /  AlkPhos  76  01-01    proBNP:   Lipid Profile:   HgA1c:   TSH:             TELEMETRY: 	    ECG:  	  RADIOLOGY:   DIAGNOSTIC TESTING:  [ ] Echocardiogram:  [ ]  Catheterization:  [ ] Stress Test:    OTHER:

## 2022-01-01 NOTE — PROGRESS NOTE ADULT - SUBJECTIVE AND OBJECTIVE BOX
Patient:  GUS AMARO  [id number]    Progress Note    Interval events: No acute events. Patient was not altered last night, easily redirectable, did not require additional sedation. She is updated on plan for discharge to [facility] when accepted and in agreement. She denies f/c/n/v/cp/ap/bowel or bladder changes. AOx2-3, non-agitated, pleasant affect.   Pertinent ROS (if any):  Denies f/c/n/v/cp/ap/bowel or bladder changes.     Administered:  furosemide   Injectable: 40 milliGRAM(s) IV Push (01-01 @ 06:28)  pantoprazole    Tablet: 40 milliGRAM(s) Oral (01-01 @ 06:16)  heparin   Injectable: 5000 Unit(s) SubCutaneous (01-01 @ 05:19)  metoprolol succinate ER: 100 milliGRAM(s) Oral (01-01 @ 05:18)  digoxin     Tablet: 125 MICROGram(s) Oral (01-01 @ 05:18)  QUEtiapine: 25 milliGRAM(s) Oral (01-01 @ 05:18)  melatonin: 6 milliGRAM(s) Oral (12-31 @ 21:10)  heparin   Injectable: 5000 Unit(s) SubCutaneous (12-31 @ 21:10)  QUEtiapine: 25 milliGRAM(s) Oral (12-31 @ 21:10)      OBJECTIVE:    CAPILLARY BLOOD GLUCOSE      POCT Blood Glucose.: 130 mg/dL (31 Dec 2021 21:15)      VITALS:  T(F): 98.5 (01-01-22 @ 04:51), Max: 98.8 (12-31-21 @ 22:43)  HR: 80 (01-01-22 @ 04:51) (79 - 80)  BP: 116/68 (01-01-22 @ 04:51) (104/67 - 124/67)  BP(mean): --  ABP: --  ABP(mean): --  RR: 20 (01-01-22 @ 04:51) (18 - 20)  SpO2: 96% (01-01-22 @ 04:51) (96% - 98%)    PHYSICAL EXAM:  GENERAL: NAD, lying in bed comfortably, AOx2-3, easily redirectable  HEAD:  Atraumatic, Normocephalic  EYES: EOMI, PERRLA, conjunctiva and sclera clear  ENT: Moist mucous membranes  NECK: Supple, No JVD  CHEST/LUNG: Clear to auscultation bilaterally; No rales, rhonchi, wheezing, or rubs. Unlabored respirations  HEART: Regular rate and rhythm; No murmurs, rubs, or gallops  ABDOMEN: Bowel sounds present; Soft, Nontender, Nondistended. No hepatomegaly  EXTREMITIES:  2+ Peripheral Pulses, brisk capillary refill. No clubbing, cyanosis, or edema  NERVOUS SYSTEM:  Alert & Oriented X3, speech clear. No deficits   MSK: FROM all 4 extremities, full and equal strength  SKIN: No rashes or lesions    HOSPITAL MEDICATIONS:  Standing Meds:  dextrose 40% Gel 15 Gram(s) Oral once  dextrose 5%. 1000 milliLiter(s) IV Continuous <Continuous>  dextrose 5%. 1000 milliLiter(s) IV Continuous <Continuous>  dextrose 50% Injectable 25 Gram(s) IV Push once  dextrose 50% Injectable 12.5 Gram(s) IV Push once  dextrose 50% Injectable 25 Gram(s) IV Push once  digoxin     Tablet 125 MICROGram(s) Oral daily  furosemide   Injectable 40 milliGRAM(s) IV Push daily  glucagon  Injectable 1 milliGRAM(s) IntraMuscular once  heparin   Injectable 5000 Unit(s) SubCutaneous every 8 hours  insulin lispro (ADMELOG) corrective regimen sliding scale   SubCutaneous three times a day before meals  insulin lispro (ADMELOG) corrective regimen sliding scale   SubCutaneous at bedtime  melatonin 6 milliGRAM(s) Oral at bedtime  metoprolol succinate  milliGRAM(s) Oral daily  mirtazapine 30 milliGRAM(s) Oral daily  pantoprazole    Tablet 40 milliGRAM(s) Oral before breakfast  QUEtiapine 25 milliGRAM(s) Oral three times a day    PRN Meds:    LABS:  CBC 12-31-21 @ 07:28                        8.6    6.30  )-----------( 286                   31.8       Hgb trend: 8.6 <-- , 8.8 <-- , 8.6 <--   WBC trend: 6.30 <-- , 8.47 <-- , 9.00 <--       CMP 12-31-21 @ 07:31    140  |  101  |  27<H>  ----------------------------<  111<H>  3.5   |  25  |  0.98    Ca    9.3      12-31-21 @ 07:31  Phos  3.1     12-31  Mg     2.3     12-31      Serum Cr trend: 0.98 <-- , 1.07 <-- , 1.34 <--       ABG Trend:     VBG Trend:       MICROBIOLOGY:       RADIOLOGY:  [ ] Reviewed and interpreted by me    EKG:

## 2022-01-02 ENCOUNTER — TRANSCRIPTION ENCOUNTER (OUTPATIENT)
Age: 87
End: 2022-01-02

## 2022-01-02 LAB
ANION GAP SERPL CALC-SCNC: 18 MMOL/L — HIGH (ref 5–17)
BUN SERPL-MCNC: 32 MG/DL — HIGH (ref 7–23)
CALCIUM SERPL-MCNC: 8.8 MG/DL — SIGNIFICANT CHANGE UP (ref 8.4–10.5)
CHLORIDE SERPL-SCNC: 97 MMOL/L — SIGNIFICANT CHANGE UP (ref 96–108)
CO2 SERPL-SCNC: 23 MMOL/L — SIGNIFICANT CHANGE UP (ref 22–31)
CREAT SERPL-MCNC: 1.15 MG/DL — SIGNIFICANT CHANGE UP (ref 0.5–1.3)
CULTURE RESULTS: SIGNIFICANT CHANGE UP
CULTURE RESULTS: SIGNIFICANT CHANGE UP
GLUCOSE SERPL-MCNC: 117 MG/DL — HIGH (ref 70–99)
HCT VFR BLD CALC: 30.6 % — LOW (ref 34.5–45)
HGB BLD-MCNC: 8.4 G/DL — LOW (ref 11.5–15.5)
MAGNESIUM SERPL-MCNC: 2.1 MG/DL — SIGNIFICANT CHANGE UP (ref 1.6–2.6)
MCHC RBC-ENTMCNC: 19.9 PG — LOW (ref 27–34)
MCHC RBC-ENTMCNC: 27.5 GM/DL — LOW (ref 32–36)
MCV RBC AUTO: 72.5 FL — LOW (ref 80–100)
NRBC # BLD: 0 /100 WBCS — SIGNIFICANT CHANGE UP (ref 0–0)
PHOSPHATE SERPL-MCNC: 3.4 MG/DL — SIGNIFICANT CHANGE UP (ref 2.5–4.5)
PLATELET # BLD AUTO: 265 K/UL — SIGNIFICANT CHANGE UP (ref 150–400)
POTASSIUM SERPL-MCNC: 3.2 MMOL/L — LOW (ref 3.5–5.3)
POTASSIUM SERPL-SCNC: 3.2 MMOL/L — LOW (ref 3.5–5.3)
RBC # BLD: 4.22 M/UL — SIGNIFICANT CHANGE UP (ref 3.8–5.2)
RBC # FLD: 20 % — HIGH (ref 10.3–14.5)
SODIUM SERPL-SCNC: 138 MMOL/L — SIGNIFICANT CHANGE UP (ref 135–145)
SPECIMEN SOURCE: SIGNIFICANT CHANGE UP
SPECIMEN SOURCE: SIGNIFICANT CHANGE UP
WBC # BLD: 6.93 K/UL — SIGNIFICANT CHANGE UP (ref 3.8–10.5)
WBC # FLD AUTO: 6.93 K/UL — SIGNIFICANT CHANGE UP (ref 3.8–10.5)

## 2022-01-02 RX ORDER — POTASSIUM CHLORIDE 20 MEQ
10 PACKET (EA) ORAL
Refills: 0 | Status: DISCONTINUED | OUTPATIENT
Start: 2022-01-02 | End: 2022-01-02

## 2022-01-02 RX ORDER — POTASSIUM CHLORIDE 20 MEQ
40 PACKET (EA) ORAL ONCE
Refills: 0 | Status: COMPLETED | OUTPATIENT
Start: 2022-01-02 | End: 2022-01-02

## 2022-01-02 RX ADMIN — HEPARIN SODIUM 5000 UNIT(S): 5000 INJECTION INTRAVENOUS; SUBCUTANEOUS at 06:22

## 2022-01-02 RX ADMIN — QUETIAPINE FUMARATE 25 MILLIGRAM(S): 200 TABLET, FILM COATED ORAL at 21:56

## 2022-01-02 RX ADMIN — Medication 100 MILLIGRAM(S): at 06:22

## 2022-01-02 RX ADMIN — Medication 40 MILLIEQUIVALENT(S): at 13:01

## 2022-01-02 RX ADMIN — HEPARIN SODIUM 5000 UNIT(S): 5000 INJECTION INTRAVENOUS; SUBCUTANEOUS at 13:02

## 2022-01-02 RX ADMIN — Medication 1: at 18:26

## 2022-01-02 RX ADMIN — QUETIAPINE FUMARATE 25 MILLIGRAM(S): 200 TABLET, FILM COATED ORAL at 13:01

## 2022-01-02 RX ADMIN — Medication 6 MILLIGRAM(S): at 21:56

## 2022-01-02 RX ADMIN — QUETIAPINE FUMARATE 25 MILLIGRAM(S): 200 TABLET, FILM COATED ORAL at 06:22

## 2022-01-02 RX ADMIN — HEPARIN SODIUM 5000 UNIT(S): 5000 INJECTION INTRAVENOUS; SUBCUTANEOUS at 21:56

## 2022-01-02 RX ADMIN — Medication 40 MILLIGRAM(S): at 06:02

## 2022-01-02 RX ADMIN — Medication 125 MICROGRAM(S): at 06:22

## 2022-01-02 RX ADMIN — Medication 1: at 12:53

## 2022-01-02 RX ADMIN — MIRTAZAPINE 30 MILLIGRAM(S): 45 TABLET, ORALLY DISINTEGRATING ORAL at 12:55

## 2022-01-02 RX ADMIN — PANTOPRAZOLE SODIUM 40 MILLIGRAM(S): 20 TABLET, DELAYED RELEASE ORAL at 06:22

## 2022-01-02 NOTE — PROGRESS NOTE ADULT - ASSESSMENT
89 yo F with dementia, DM,, HTN, depression, CHF, Afib on digoxin stopped Eliquis due to recurrent GI bleed/anemia p/w agitation/AMS.   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 MAYES AAox1-2, agitation at times   in chair otdya     Impression: AMS of unclear etiology, possible toxic metabolic encephalopathy given Leukocytosis. r/o CHF exacerbation./ Likely progression of her underlying neurocognitive disorder on top of delerium.    now + COVID  -   covid floor with airborne and contact isolation precuations  - monitor O2. and inflammatory markeres  - check QTC, if <500 seroquel and/or zyprexa PRN for agitation  - not on AC given anemia/GIB. consider DOAC for AF when able; now on digoxin  - f/u psych   - b12 WNL, rpr, TSH --> all WNL  - telemetry  - PT/OT  - check FS, glucose control <180  - GI/DVT ppx  - Thank you for allowing me to participate in the care of this patient. Call with questions.   - d/c plan when able to PRECIOUS. rejected from multiple facilities   Konstantin Giordano MD  Vascular Neurology  Office: [phone]

## 2022-01-02 NOTE — PROGRESS NOTE ADULT - ASSESSMENT
89 yo F with dementia, DM, HTN (on metoprolol), CHF (on lasix), Afib (on digoxin, stopped Eliquis due to recurrent GI bleed/anemia) dementia, depression (on Seroquel, mirtazapine), who presents with agitation/AMS as pt became combative, and more agitated. Patient diagnosed with COVID while hospitalized, asymptomatic. Currently pending placement to [facility].

## 2022-01-02 NOTE — PROGRESS NOTE ADULT - PROBLEM SELECTOR PLAN 5
- per EMR, history of CHF (no echo at [facility])  - BNP 3k on admission  - c/w Lasix 40mg IV daily

## 2022-01-02 NOTE — DISCHARGE NOTE PROVIDER - NSDCCPCAREPLAN_GEN_ALL_CORE_FT
PRINCIPAL DISCHARGE DIAGNOSIS  Diagnosis: Altered mental status  Assessment and Plan of Treatment: You were initially hospitalized due to concern for altered mental status, as you were more agitated at home. In the hospital, you were found to have a temporal meningioma (or small outgrowth of cells near your brain) which is likely an incidental finding. Your EEG was negative for seizures. Neurology team saw you and their work-up was negative for any metabolic causes of your agitation. It is believed that your agitation is due to advancement of your neurocognitive disorder (denentia), an acute episode of delirium in the setting of a transient infection/Covid-19, which has now resolved. PT team recommend you spend time at [facility]hab to gain strength before returning home.  IF you re-experience changes in your mental status, episodes of passing out, or seizure-like activity, please return to the hospital for ongoing mangagement.      SECONDARY DISCHARGE DIAGNOSES  Diagnosis: COVID-19  Assessment and Plan of Treatment: While hospitalized, you were incidentally diagnosed with COVID-19 infection, based on PCR testing. However, you never had any symptoms such as cough/shortness of breath/sore throat/headache/runny nose, etc. Per hospital policy, you were placed on isolation and airborne precautions for some time. You are now recommended to go to rehab to get stronger before returning home.

## 2022-01-02 NOTE — PROGRESS NOTE ADULT - SUBJECTIVE AND OBJECTIVE BOX
Félix Lopez [facility]  Internal Medicine  [facility] [id number]  [phone]    Patient is a 89y old  Female who presents with a chief complaint of Disorientation/Delirium (01 Jan 2022 07:08)      SUBJECTIVE / OVERNIGHT EVENTS:  - no events overnight; vitals stable    MEDICATIONS  (STANDING):  dextrose 40% Gel 15 Gram(s) Oral once  dextrose 5%. 1000 milliLiter(s) (50 mL/Hr) IV Continuous <Continuous>  dextrose 5%. 1000 milliLiter(s) (100 mL/Hr) IV Continuous <Continuous>  dextrose 50% Injectable 25 Gram(s) IV Push once  dextrose 50% Injectable 12.5 Gram(s) IV Push once  dextrose 50% Injectable 25 Gram(s) IV Push once  digoxin     Tablet 125 MICROGram(s) Oral daily  furosemide   Injectable 40 milliGRAM(s) IV Push daily  glucagon  Injectable 1 milliGRAM(s) IntraMuscular once  heparin   Injectable 5000 Unit(s) SubCutaneous every 8 hours  insulin lispro (ADMELOG) corrective regimen sliding scale   SubCutaneous three times a day before meals  insulin lispro (ADMELOG) corrective regimen sliding scale   SubCutaneous at bedtime  melatonin 6 milliGRAM(s) Oral at bedtime  metoprolol succinate  milliGRAM(s) Oral daily  mirtazapine 30 milliGRAM(s) Oral daily  pantoprazole    Tablet 40 milliGRAM(s) Oral before breakfast  QUEtiapine 25 milliGRAM(s) Oral three times a day    MEDICATIONS  (PRN):      PHYSICAL EXAM:  Vital Signs Last 24 Hrs  T(C): 37.1 (02 Jan 2022 04:24), Max: 37.3 (01 Jan 2022 14:46)  T(F): 98.7 (02 Jan 2022 04:24), Max: 99.2 (01 Jan 2022 14:46)  HR: 80 (02 Jan 2022 04:24) (80 - 85)  BP: 100/52 (02 Jan 2022 04:24) (100/52 - 115/59)  BP(mean): --  RR: 16 (02 Jan 2022 04:24) (16 - 18)  SpO2: 93% (02 Jan 2022 04:24) (93% - 97%)    GENERAL: NAD, lying in bed comfortably  EYES: EOMI, conjunctiva and sclera clear  ENT: Moist mucous membranes  NECK: Supple, No JVD  CHEST/LUNG: Clear to auscultation bilaterally; No rales, rhonchi, wheezing, or rubs. Unlabored respirations  HEART: Regular rate and rhythm; No murmurs, rubs, or gallops  ABDOMEN: Bowel sounds present; Soft, Nontender, Nondistended. No hepatomegaly  EXTREMITIES:  2+ Peripheral Pulses, brisk capillary refill. No clubbing, cyanosis, or edema  NERVOUS SYSTEM:  AOx2-3, easily redirectable  MSK: FROM all 4 extremities, full and equal strength  SKIN: No rashes or lesions    ----  I&O's Summary    01 Jan 2022 07:01  -  02 Jan 2022 07:00  --------------------------------------------------------  IN: 800 mL / OUT: 0 mL / NET: 800 mL      ----  LABS:                        8.4    6.51  )-----------( 275      ( 01 Jan 2022 06:46 )             30.5     ----  01-01    138  |  100  |  29<H>  ----------------------------<  96  3.9   |  22  |  1.07    Ca    9.5      01 Jan 2022 06:46  Phos  3.3     01-01  Mg     2.3     01-01    TPro  6.5  /  Alb  3.8  /  TBili  0.7  /  DBili  x   /  AST  49<H>  /  ALT  17  /  AlkPhos  76  01-01    ----    ----

## 2022-01-02 NOTE — DISCHARGE NOTE PROVIDER - CARE PROVIDER_API CALL
Neil Lawson ()  Cardiology; Internal Medicine  [address] [place] [address], [address]  [place] [address]  Phone: [phone]  Fax: [phone]  Established Patient  Follow Up Time: 1 week

## 2022-01-02 NOTE — DISCHARGE NOTE PROVIDER - NSDCMRMEDTOKEN_GEN_ALL_CORE_FT
digoxin 125 mcg (0.125 mg) oral tablet: 1 tab(s) orally once a day  folic acid 1 mg oral tablet: 1 tab(s) orally once a day  furosemide 80 mg oral tablet: 1 tab(s) orally 2 times a day  Januvia 100 mg oral tablet: 1 tab(s) orally once a day  Melatonin: 1 tab(s) orally 2 times a day  metoprolol succinate 100 mg oral tablet, extended release: 1 tab(s) orally once a day  mirtazapine 30 mg oral tablet: 1 tab(s) orally once a day (at bedtime)  pantoprazole 40 mg oral delayed release tablet: 1 tab(s) orally once a day  Potassium Chloride (Eqv-Klor-Con M20) 20 mEq oral tablet, extended release: 1 tab(s) orally once a day  PreserVision AREDS oral capsule: 1 cap(s) orally once a day  Senna 8.6 mg oral tablet: 2 tab(s) orally once a day (at bedtime)  SEROquel 25 mg oral tablet: 1 tab(s) orally 2 times a day  Vitamin C:   Vitamin D2:    digoxin 125 mcg (0.125 mg) oral tablet: 1 tab(s) orally once a day  folic acid 1 mg oral tablet: 1 tab(s) orally once a day  furosemide 80 mg oral tablet: 1 tab(s) orally 2 times a day  Januvia 100 mg oral tablet: 1 tab(s) orally once a day  Melatonin: 1 tab(s) orally 2 times a day  metoprolol succinate 100 mg oral tablet, extended release: 1 tab(s) orally once a day  mirtazapine 30 mg oral tablet: 1 tab(s) orally once a day (at bedtime)  pantoprazole 40 mg oral delayed release tablet: 1 tab(s) orally once a day  PreserVision AREDS oral capsule: 1 cap(s) orally once a day  Senna 8.6 mg oral tablet: 2 tab(s) orally once a day (at bedtime)  SEROquel 25 mg oral tablet: 1 tab(s) orally 2 times a day

## 2022-01-02 NOTE — PROGRESS NOTE ADULT - SUBJECTIVE AND OBJECTIVE BOX
Subjective: Patient seen and examined. No new events except as noted.     REVIEW OF SYSTEMS:    CONSTITUTIONAL:+ weakness, fevers or chills  EYES/ENT: No visual changes;  No vertigo or throat pain   NECK: No pain or stiffness  RESPIRATORY: No cough, wheezing, hemoptysis; No shortness of breath  CARDIOVASCULAR: No chest pain or palpitations  GASTROINTESTINAL: No abdominal or epigastric pain. No nausea, vomiting, or hematemesis; No diarrhea or constipation. No melena or hematochezia.  GENITOURINARY: No dysuria, frequency or hematuria  NEUROLOGICAL: No numbness or weakness  SKIN: No itching, burning, rashes, or lesions   All other review of systems is negative unless indicated above.    MEDICATIONS:  MEDICATIONS  (STANDING):  dextrose 40% Gel 15 Gram(s) Oral once  dextrose 5%. 1000 milliLiter(s) (100 mL/Hr) IV Continuous <Continuous>  dextrose 5%. 1000 milliLiter(s) (50 mL/Hr) IV Continuous <Continuous>  dextrose 50% Injectable 25 Gram(s) IV Push once  dextrose 50% Injectable 12.5 Gram(s) IV Push once  dextrose 50% Injectable 25 Gram(s) IV Push once  digoxin     Tablet 125 MICROGram(s) Oral daily  furosemide   Injectable 40 milliGRAM(s) IV Push daily  glucagon  Injectable 1 milliGRAM(s) IntraMuscular once  heparin   Injectable 5000 Unit(s) SubCutaneous every 8 hours  insulin lispro (ADMELOG) corrective regimen sliding scale   SubCutaneous three times a day before meals  insulin lispro (ADMELOG) corrective regimen sliding scale   SubCutaneous at bedtime  melatonin 6 milliGRAM(s) Oral at bedtime  metoprolol succinate  milliGRAM(s) Oral daily  mirtazapine 30 milliGRAM(s) Oral daily  pantoprazole    Tablet 40 milliGRAM(s) Oral before breakfast  QUEtiapine 25 milliGRAM(s) Oral three times a day      PHYSICAL EXAM:  T(C): 37.1 (01-02-22 @ 04:24), Max: 37.3 (01-01-22 @ 14:46)  HR: 80 (01-02-22 @ 04:24) (80 - 85)  BP: 100/52 (01-02-22 @ 04:24) (100/52 - 115/59)  RR: 16 (01-02-22 @ 04:24) (16 - 18)  SpO2: 93% (01-02-22 @ 04:24) (93% - 97%)  Wt(kg): --  I&O's Summary    01 Jan 2022 07:01  -  02 Jan 2022 07:00  --------------------------------------------------------  IN: 800 mL / OUT: 0 mL / NET: 800 mL              Appearance: NAD	  HEENT:  Dry oral mucosa, PERRL, EOMI	  Lymphatic: No lymphadenopathy , no edema  Cardiovascular: Irregular S1 S2, No JVD, No murmurs , Peripheral pulses palpable 2+ bilaterally  Respiratory: decreased bs   Gastrointestinal:  Soft, Non-tender, + BS	  Skin: No rashes, No ecchymoses, No cyanosis, warm to touch  Musculoskeletal: Normal range of motion, normal strength  Psychiatry:  Mood & affect appropriate  Ext: No edema        LABS:    CARDIAC MARKERS:                                8.4    6.93  )-----------( 265      ( 02 Jan 2022 06:57 )             30.6     01-02    138  |  97  |  32<H>  ----------------------------<  117<H>  3.2<L>   |  23  |  1.15    Ca    8.8      02 Jan 2022 06:56  Phos  3.4     01-02  Mg     2.1     01-02    TPro  6.5  /  Alb  3.8  /  TBili  0.7  /  DBili  x   /  AST  49<H>  /  ALT  17  /  AlkPhos  76  01-01    proBNP:   Lipid Profile:   HgA1c:   TSH:             TELEMETRY: 	    ECG:  	  RADIOLOGY:   DIAGNOSTIC TESTING:  [ ] Echocardiogram:  [ ]  Catheterization:  [ ] Stress Test:    OTHER:

## 2022-01-02 NOTE — PROGRESS NOTE ADULT - SUBJECTIVE AND OBJECTIVE BOX
Neurology Progress Note    S: Patient seen and examined, now in covid unit on airborne and contact iso.  MRI with meningioma. EEG neg.  + COVID    Medication:  MEDICATIONS  (STANDING):  dextrose 40% Gel 15 Gram(s) Oral once  dextrose 5%. 1000 milliLiter(s) (100 mL/Hr) IV Continuous <Continuous>  dextrose 5%. 1000 milliLiter(s) (50 mL/Hr) IV Continuous <Continuous>  dextrose 50% Injectable 25 Gram(s) IV Push once  dextrose 50% Injectable 12.5 Gram(s) IV Push once  dextrose 50% Injectable 25 Gram(s) IV Push once  digoxin     Tablet 125 MICROGram(s) Oral daily  furosemide   Injectable 40 milliGRAM(s) IV Push daily  glucagon  Injectable 1 milliGRAM(s) IntraMuscular once  heparin   Injectable 5000 Unit(s) SubCutaneous every 8 hours  insulin lispro (ADMELOG) corrective regimen sliding scale   SubCutaneous three times a day before meals  insulin lispro (ADMELOG) corrective regimen sliding scale   SubCutaneous at bedtime  melatonin 6 milliGRAM(s) Oral at bedtime  metoprolol succinate  milliGRAM(s) Oral daily  mirtazapine 30 milliGRAM(s) Oral daily  pantoprazole    Tablet 40 milliGRAM(s) Oral before breakfast  QUEtiapine 25 milliGRAM(s) Oral three times a day    MEDICATIONS  (PRN):      Vitals:    Vital Signs Last 24 Hrs  T(C): 37.1 (22 @ 04:24), Max: 37.3 (22 @ 14:46)  T(F): 98.7 (22 @ 04:24), Max: 99.2 (22 @ 14:46)  HR: 80 (22 @ 04:24) (80 - 85)  BP: 100/52 (22 @ 04:24) (100/52 - 115/59)  BP(mean): --  RR: 16 (22 @ 04:24) (16 - 18)  SpO2: 93% (22 @ 04:24) (93% - 97%)            General Exam:   General Appearance: Appropriately dressed and in no acute distress       Head: Normocephalic, atraumatic and no dysmorphic features  Ear, Nose, and Throat: Moist mucous membranes  CVS: S1S2+  Resp: No SOB, no wheeze or rhonchi  Abd: soft NTND  Extremities: No edema, no cyanosis  Skin: No bruises, no rashes     Neurological Exam:  Mental Status: Awake, alert and oriented x 1-2 (knows hospital and family members.  Able to follow some simple  verbal commands. Able to name and repeat. fluent speech. No obvious aphasia or dysarthria noted. combative at times   Cranial Nerves: PERRL, EOMI, VFFC, sensation V1-V3 intact,  no obvious facial asymmetry, equal elevation of palate, scm/trap 5/5, tongue is midline on protrusion. no obvious papilledema on fundoscopic exam. hearing is grossly intact.   Motor: Normal bulk, tone and strength throughout. Fine finger movements were intact and symmetric. no tremors or drift noted.    Sensation: Intact to light touch and pinprick throughout. no right/left confusion. no extinction to tactile on DSS.    Reflexes: 1+ throughout at biceps, brachioradialis, triceps, patellars and ankles bilaterally and equal. No clonus. R toe and L toe were both downgoing.  Coordination: not following   Gait: defererd        I personally reviewed the below data/images/labs:    CBC Full  -  ( 2022 06:57 )  WBC Count : 6.93 K/uL  RBC Count : 4.22 M/uL  Hemoglobin : 8.4 g/dL  Hematocrit : 30.6 %  Platelet Count - Automated : 265 K/uL  Mean Cell Volume : 72.5 fl  Mean Cell Hemoglobin : 19.9 pg  Mean Cell Hemoglobin Concentration : 27.5 gm/dL  Auto Neutrophil # : x  Auto Lymphocyte # : x  Auto Monocyte # : x  Auto Eosinophil # : x  Auto Basophil # : x  Auto Neutrophil % : x  Auto Lymphocyte % : x  Auto Monocyte % : x  Auto Eosinophil % : x  Auto Basophil % : x        138  |  97  |  32<H>  ----------------------------<  117<H>  3.2<L>   |  23  |  1.15    Ca    8.8      2022 06:56  Phos  3.4       Mg     2.1         TPro  6.5  /  Alb  3.8  /  TBili  0.7  /  DBili  x   /  AST  49<H>  /  ALT  17  /  AlkPhos  76          Urinalysis Basic - ( 26 Dec 2021 23:05 )    Color: Light Yellow / Appearance: Clear / S.009 / pH: x  Gluc: x / Ketone: Negative  / Bili: Negative / Urobili: Negative   Blood: x / Protein: Trace / Nitrite: Negative   Leuk Esterase: Negative / RBC: 0 /hpf / WBC 0 /HPF   Sq Epi: x / Non Sq Epi: 1 /hpf / Bacteria: Negative      < from: CT Head No Cont (21 @ 22:24) >    ACC: [id number] EXAM:  CT BRAIN                          PROCEDURE DATE:  2021          INTERPRETATION:  CLINICAL INFORMATION: Delirium.    TECHNIQUE:  Axial CT images were acquired through the head.  Intravenous contrast: None  Two-dimensionalreformats were generated.    COMPARISON STUDY: None    FINDINGS:  There is no CT evidence of acute intracranial hemorrhage, mass effect,   midline shift or acute territorial infarct.    There is moderate generalized cerebral volume loss.  No clinically   significant chronic microvascular ischemic disease or white matter   lesions are visualized by CT technique.    There is an approximately 2.1 cm, partially calcified round extra-axial   mass in the posterior fossa arising from the posterior lateral aspect of   the right petrous temporal bone.    The left mastoid is sclerotic and underpneumatized.  There is no   clinically significant mastoid effusion.    The calvarium, skull base, and orbits appear unremarkable.    IMPRESSION:  No CT evidenceof acute intracranial pathology.    Approximately 2.1 cm, partially calcified mass in the posterior fossa,   arising from the posterior lateral aspect of the right petrous temporal   bone, most likely represents a meningioma.  Contrast-enhanced brainMRI   is recommended.  For further characterization.    --- End of Report ---          MARTY GALLARDO MD; Resident Radiology  This document has been electronically signed.  DILEEP RODAS MD; Attending Radiologist  This document has been electronically signed. Dec 26 2021 11:53PM    < end of copied text >    < from: MR Head w/wo IV Cont (21 @ 14:16) >    ACC: [id number] EXAM:  MR BRAIN WAW IC                          PROCEDURE DATE:  2021          INTERPRETATION:  MR BRAIN WITHOUT AND WITH IV CONTRAST    Clinical information: AMS    CT head with meningiom A MRI of the brain   was performed bothprior to and after the administration of intravenous   gadolinium.    TECHNIQUE:  In the sagittal plane T1 pre and post gadolinium enhanced imaging was   performed.  In the axial plane T1 pre-and postcontrast as well as T2,    FLAIR, SWAN, and diffusion weighted imaging was utilized.  In the coronal   plane T1 post gadolinium enhanced images were obtained.  Contrast administered 5 cc Gadavist. Contrast discarded 2.5 cc.    COMPARISON:  Exam is compared to head CT of 2021.    FINDINGS:  *  REGION OF CONCERN /EXTRA-AXIAL:  There is an enhancing extra-axial mass along the right lateral aspect of   the posterior cranial fossa. This abuts the dura overlying the posterior   lateral aspect of the right petrous ridge, tentorium, as well as the dura   overlying the sigmoid sinus. Small dural tails are present. The right   sigmoid sinus itself enhances normally. Mass measures grossly 2.2 x 1.5   cm axially by 2.0 cm craniocaudad. Mass was partly calcified on CT.   Imaging characteristics are most compatible with a calcified meningioma.    *  BRAIN PARENCHYMA:  Very mild mass effect on the adjacent right lateral aspect of the   cerebellum without evidence of abnormal parenchymal signal.  Patient has moderate brain atrophy. There aremild chronic   periventricular and subcortical white matter ischemic changes.    *  VENTRICLES:  There is no hydrocephalus. Ventricular prominence is thought related to   underlying brain atrophy..    *  ENHANCEMENT:  No additional lesion seen.    *OTHER:  A partly empty sella turcica is noted.    IMPRESSION:  2.2 X 1.5 X 2.0 CM  ENHANCING EXTRA-AXIAL MASS RIGHT LATERAL ASPECT OF   THE POSTERIOR CRANIAL FOSSA ABUTTING THE SIGMOID SINUS, TENTORIUM, AND   PETROUS BONE. IMAGING CHARACTERISTICS AREMOST COMPATIBLE WITH A   MENINGIOMA. NO ASSOCIATED EDEMA. PATENT RIGHT SIGMOID SINUS    --- End of Report ---            DORI MATOS MD; Attending Radiologist  This document has been electronically signed. Dec 29 2021  2:45PM    < end of copied text >

## 2022-01-02 NOTE — DISCHARGE NOTE PROVIDER - HOSPITAL COURSE
88 yo F with dementia, DM, HTN (on metoprolol), CHF (on lasix), Afib (on digoxin, stopped Eliquis due to recurrent GI bleed/anemia) dementia, depression (on Seroquel, mirtazapine), who presents with agitation/AMS as pt became combative, and more agitated at home.  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 AMS of unclear etiology, likely delirium on underlying neurocognitive disease vs. possibly toxic metabolic encephalopathy given leukocytosis. Patient diagnosed with COVID while hospitalized, asymptomatic, on RA.       While hospitalized, patient diagnosed with COVID-19 via PCR test. Patient asymptomatic, on RA, withour URI symptoms. Patient occasionally agitated, however easily redirectable, requiring PRN zyprexa and ativan at times. Neurology following for AMS work-up as mentioned above. PT recommendation for [facility] vs. home with PT. Patient pending discharge to [facility] _____   89 yo F with dementia, DM, HTN (on metoprolol), CHF (on lasix), Afib (on digoxin, stopped Eliquis due to recurrent GI bleed/anemia) dementia, depression (on Seroquel, mirtazapine), who presents with agitation/AMS as pt became combative, and more agitated at home.  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 AMS of unclear etiology, likely delirium on underlying neurocognitive disease vs. possibly toxic metabolic encephalopathy given leukocytosis. Patient diagnosed with COVID while hospitalized, asymptomatic, on RA.       While hospitalized, patient diagnosed with COVID-19 via PCR test. Patient asymptomatic, on RA, withour URI symptoms. Patient occasionally agitated, however easily redirectable, requiring PRN zyprexa and ativan at times. Neurology following for AMS work-up as mentioned above. Patient stable for discharge to [facility].

## 2022-01-03 LAB
ANION GAP SERPL CALC-SCNC: 16 MMOL/L — SIGNIFICANT CHANGE UP (ref 5–17)
BASOPHILS # BLD AUTO: 0.02 K/UL — SIGNIFICANT CHANGE UP (ref 0–0.2)
BASOPHILS NFR BLD AUTO: 0.3 % — SIGNIFICANT CHANGE UP (ref 0–2)
BUN SERPL-MCNC: 31 MG/DL — HIGH (ref 7–23)
CALCIUM SERPL-MCNC: 8.8 MG/DL — SIGNIFICANT CHANGE UP (ref 8.4–10.5)
CHLORIDE SERPL-SCNC: 100 MMOL/L — SIGNIFICANT CHANGE UP (ref 96–108)
CO2 SERPL-SCNC: 22 MMOL/L — SIGNIFICANT CHANGE UP (ref 22–31)
CREAT SERPL-MCNC: 1.12 MG/DL — SIGNIFICANT CHANGE UP (ref 0.5–1.3)
EOSINOPHIL # BLD AUTO: 0.19 K/UL — SIGNIFICANT CHANGE UP (ref 0–0.5)
EOSINOPHIL NFR BLD AUTO: 3.1 % — SIGNIFICANT CHANGE UP (ref 0–6)
GLUCOSE SERPL-MCNC: 115 MG/DL — HIGH (ref 70–99)
HCT VFR BLD CALC: 31.6 % — LOW (ref 34.5–45)
HGB BLD-MCNC: 8.5 G/DL — LOW (ref 11.5–15.5)
IMM GRANULOCYTES NFR BLD AUTO: 0.7 % — SIGNIFICANT CHANGE UP (ref 0–1.5)
LYMPHOCYTES # BLD AUTO: 2.31 K/UL — SIGNIFICANT CHANGE UP (ref 1–3.3)
LYMPHOCYTES # BLD AUTO: 37.8 % — SIGNIFICANT CHANGE UP (ref 13–44)
MAGNESIUM SERPL-MCNC: 2.1 MG/DL — SIGNIFICANT CHANGE UP (ref 1.6–2.6)
MCHC RBC-ENTMCNC: 19.8 PG — LOW (ref 27–34)
MCHC RBC-ENTMCNC: 26.9 GM/DL — LOW (ref 32–36)
MCV RBC AUTO: 73.5 FL — LOW (ref 80–100)
MONOCYTES # BLD AUTO: 0.79 K/UL — SIGNIFICANT CHANGE UP (ref 0–0.9)
MONOCYTES NFR BLD AUTO: 12.9 % — SIGNIFICANT CHANGE UP (ref 2–14)
NEUTROPHILS # BLD AUTO: 2.76 K/UL — SIGNIFICANT CHANGE UP (ref 1.8–7.4)
NEUTROPHILS NFR BLD AUTO: 45.2 % — SIGNIFICANT CHANGE UP (ref 43–77)
NRBC # BLD: 0 /100 WBCS — SIGNIFICANT CHANGE UP (ref 0–0)
PHOSPHATE SERPL-MCNC: 3.3 MG/DL — SIGNIFICANT CHANGE UP (ref 2.5–4.5)
PLATELET # BLD AUTO: 248 K/UL — SIGNIFICANT CHANGE UP (ref 150–400)
POTASSIUM SERPL-MCNC: 3.6 MMOL/L — SIGNIFICANT CHANGE UP (ref 3.5–5.3)
POTASSIUM SERPL-SCNC: 3.6 MMOL/L — SIGNIFICANT CHANGE UP (ref 3.5–5.3)
RBC # BLD: 4.3 M/UL — SIGNIFICANT CHANGE UP (ref 3.8–5.2)
RBC # FLD: 19.9 % — HIGH (ref 10.3–14.5)
SARS-COV-2 RNA SPEC QL NAA+PROBE: DETECTED
SODIUM SERPL-SCNC: 138 MMOL/L — SIGNIFICANT CHANGE UP (ref 135–145)
WBC # BLD: 6.11 K/UL — SIGNIFICANT CHANGE UP (ref 3.8–10.5)
WBC # FLD AUTO: 6.11 K/UL — SIGNIFICANT CHANGE UP (ref 3.8–10.5)

## 2022-01-03 RX ORDER — POTASSIUM CHLORIDE 20 MEQ
40 PACKET (EA) ORAL ONCE
Refills: 0 | Status: COMPLETED | OUTPATIENT
Start: 2022-01-03 | End: 2022-01-03

## 2022-01-03 RX ADMIN — Medication 40 MILLIEQUIVALENT(S): at 18:05

## 2022-01-03 RX ADMIN — QUETIAPINE FUMARATE 25 MILLIGRAM(S): 200 TABLET, FILM COATED ORAL at 05:27

## 2022-01-03 RX ADMIN — MIRTAZAPINE 30 MILLIGRAM(S): 45 TABLET, ORALLY DISINTEGRATING ORAL at 13:02

## 2022-01-03 RX ADMIN — Medication 6 MILLIGRAM(S): at 22:03

## 2022-01-03 RX ADMIN — QUETIAPINE FUMARATE 25 MILLIGRAM(S): 200 TABLET, FILM COATED ORAL at 22:04

## 2022-01-03 RX ADMIN — PANTOPRAZOLE SODIUM 40 MILLIGRAM(S): 20 TABLET, DELAYED RELEASE ORAL at 06:40

## 2022-01-03 RX ADMIN — HEPARIN SODIUM 5000 UNIT(S): 5000 INJECTION INTRAVENOUS; SUBCUTANEOUS at 22:07

## 2022-01-03 RX ADMIN — HEPARIN SODIUM 5000 UNIT(S): 5000 INJECTION INTRAVENOUS; SUBCUTANEOUS at 05:26

## 2022-01-03 RX ADMIN — Medication 100 MILLIGRAM(S): at 05:27

## 2022-01-03 RX ADMIN — Medication 125 MICROGRAM(S): at 05:27

## 2022-01-03 RX ADMIN — HEPARIN SODIUM 5000 UNIT(S): 5000 INJECTION INTRAVENOUS; SUBCUTANEOUS at 13:02

## 2022-01-03 RX ADMIN — Medication 40 MILLIGRAM(S): at 06:40

## 2022-01-03 RX ADMIN — QUETIAPINE FUMARATE 25 MILLIGRAM(S): 200 TABLET, FILM COATED ORAL at 13:02

## 2022-01-03 NOTE — PROGRESS NOTE ADULT - SUBJECTIVE AND OBJECTIVE BOX
Subjective: Patient seen and examined. No new events except as noted.     REVIEW OF SYSTEMS:    CONSTITUTIONAL: N+ weakness, fevers or chills  EYES/ENT: No visual changes;  No vertigo or throat pain   NECK: No pain or stiffness  RESPIRATORY: No cough, wheezing, hemoptysis; No shortness of breath  CARDIOVASCULAR: No chest pain or palpitations  GASTROINTESTINAL: No abdominal or epigastric pain. No nausea, vomiting, or hematemesis; No diarrhea or constipation. No melena or hematochezia.  GENITOURINARY: No dysuria, frequency or hematuria  NEUROLOGICAL: No numbness or weakness  SKIN: No itching, burning, rashes, or lesions   All other review of systems is negative unless indicated above.    MEDICATIONS:  MEDICATIONS  (STANDING):  dextrose 40% Gel 15 Gram(s) Oral once  dextrose 5%. 1000 milliLiter(s) (50 mL/Hr) IV Continuous <Continuous>  dextrose 5%. 1000 milliLiter(s) (100 mL/Hr) IV Continuous <Continuous>  dextrose 50% Injectable 25 Gram(s) IV Push once  dextrose 50% Injectable 12.5 Gram(s) IV Push once  dextrose 50% Injectable 25 Gram(s) IV Push once  digoxin     Tablet 125 MICROGram(s) Oral daily  furosemide   Injectable 40 milliGRAM(s) IV Push daily  glucagon  Injectable 1 milliGRAM(s) IntraMuscular once  heparin   Injectable 5000 Unit(s) SubCutaneous every 8 hours  insulin lispro (ADMELOG) corrective regimen sliding scale   SubCutaneous three times a day before meals  insulin lispro (ADMELOG) corrective regimen sliding scale   SubCutaneous at bedtime  melatonin 6 milliGRAM(s) Oral at bedtime  metoprolol succinate  milliGRAM(s) Oral daily  mirtazapine 30 milliGRAM(s) Oral daily  pantoprazole    Tablet 40 milliGRAM(s) Oral before breakfast  QUEtiapine 25 milliGRAM(s) Oral three times a day      PHYSICAL EXAM:  T(C): 36.7 (01-03-22 @ 04:05), Max: 36.7 (01-02-22 @ 21:17)  HR: 72 (01-03-22 @ 04:05) (72 - 73)  BP: 112/74 (01-03-22 @ 04:05) (93/59 - 114/76)  RR: 16 (01-03-22 @ 04:05) (16 - 17)  SpO2: 99% (01-03-22 @ 04:05) (94% - 99%)  Wt(kg): --  I&O's Summary    02 Jan 2022 07:01  -  03 Jan 2022 07:00  --------------------------------------------------------  IN: 360 mL / OUT: 0 mL / NET: 360 mL          Appearance: NAD	  HEENT:  Dry oral mucosa, PERRL, EOMI	  Lymphatic: No lymphadenopathy , no edema  Cardiovascular: Irregular S1 S2, No JVD, No murmurs , Peripheral pulses palpable 2+ bilaterally  Respiratory: decreased bs   Gastrointestinal:  Soft, Non-tender, + BS	  Skin: No rashes, No ecchymoses, No cyanosis, warm to touch  Musculoskeletal: Normal range of motion, normal strength  Psychiatry:  Mood & affect appropriate  Ext: No edema      LABS:    CARDIAC MARKERS:                                8.5    6.11  )-----------( 248      ( 03 Jan 2022 08:01 )             31.6     01-03    138  |  100  |  31<H>  ----------------------------<  115<H>  3.6   |  22  |  1.12    Ca    8.8      03 Jan 2022 07:37  Phos  3.3     01-03  Mg     2.1     01-03      proBNP:   Lipid Profile:   HgA1c:   TSH:             TELEMETRY: 	    ECG:  	  RADIOLOGY:   DIAGNOSTIC TESTING:  [ ] Echocardiogram:  [ ]  Catheterization:  [ ] Stress Test:    OTHER:

## 2022-01-03 NOTE — PROGRESS NOTE ADULT - ASSESSMENT
89 yo F with dementia, DM, HTN (on metoprolol), CHF (on lasix), Afib (on digoxin, stopped Eliquis due to recurrent GI bleed/anemia) dementia, depression (on Seroquel, mirtazapine), who presents with agitation/AMS as pt became combative, and more agitated. Patient diagnosed with COVID while hospitalized, asymptomatic. Currently pending placement to [facility].

## 2022-01-03 NOTE — PROGRESS NOTE ADULT - SUBJECTIVE AND OBJECTIVE BOX
Patient:  GUS AMARO  [id number]    Progress Note    Interval events: No acute events.  Pertinent ROS (if any):      Administered:  pantoprazole    Tablet: 40 milliGRAM(s) Oral (01-03 @ 06:40)  furosemide   Injectable: 40 milliGRAM(s) IV Push (01-03 @ 06:40)  metoprolol succinate ER: 100 milliGRAM(s) Oral (01-03 @ 05:27)  digoxin     Tablet: 125 MICROGram(s) Oral (01-03 @ 05:27)  QUEtiapine: 25 milliGRAM(s) Oral (01-03 @ 05:27)  heparin   Injectable: 5000 Unit(s) SubCutaneous (01-03 @ 05:26)  melatonin: 6 milliGRAM(s) Oral (01-02 @ 21:56)  heparin   Injectable: 5000 Unit(s) SubCutaneous (01-02 @ 21:56)  QUEtiapine: 25 milliGRAM(s) Oral (01-02 @ 21:56)        OBJECTIVE:    01-02 @ 07:01  -  01-03 @ 07:00  --------------------------------------------------------  IN: 360 mL / OUT: 0 mL / NET: 360 mL      CAPILLARY BLOOD GLUCOSE      POCT Blood Glucose.: 209 mg/dL (02 Jan 2022 22:03)        VITALS:  T(F): 98 (01-03-22 @ 04:05), Max: 98.1 (01-02-22 @ 21:17)  HR: 72 (01-03-22 @ 04:05) (72 - 73)  BP: 112/74 (01-03-22 @ 04:05) (93/59 - 114/76)  BP(mean): --  ABP: --  ABP(mean): --  RR: 16 (01-03-22 @ 04:05) (16 - 17)  SpO2: 99% (01-03-22 @ 04:05) (94% - 99%)    PHYSICAL EXAM:  GENERAL: NAD, lying in bed comfortably  HEAD:  Atraumatic, Normocephalic  EYES: EOMI, PERRLA, conjunctiva and sclera clear  ENT: Moist mucous membranes  NECK: Supple, No JVD  CHEST/LUNG: Clear to auscultation bilaterally; No rales, rhonchi, wheezing, or rubs. Unlabored respirations  HEART: Regular rate and rhythm; No murmurs, rubs, or gallops  ABDOMEN: Bowel sounds present; Soft, Nontender, Nondistended. No hepatomegaly  EXTREMITIES:  2+ Peripheral Pulses, brisk capillary refill. No clubbing, cyanosis, or edema  NERVOUS SYSTEM:  Alert & Oriented X3, speech clear. No deficits   MSK: FROM all 4 extremities, full and equal strength  SKIN: No rashes or lesions    HOSPITAL MEDICATIONS:  Standing Meds:  dextrose 40% Gel 15 Gram(s) Oral once  dextrose 5%. 1000 milliLiter(s) IV Continuous <Continuous>  dextrose 5%. 1000 milliLiter(s) IV Continuous <Continuous>  dextrose 50% Injectable 25 Gram(s) IV Push once  dextrose 50% Injectable 12.5 Gram(s) IV Push once  dextrose 50% Injectable 25 Gram(s) IV Push once  digoxin     Tablet 125 MICROGram(s) Oral daily  furosemide   Injectable 40 milliGRAM(s) IV Push daily  glucagon  Injectable 1 milliGRAM(s) IntraMuscular once  heparin   Injectable 5000 Unit(s) SubCutaneous every 8 hours  insulin lispro (ADMELOG) corrective regimen sliding scale   SubCutaneous three times a day before meals  insulin lispro (ADMELOG) corrective regimen sliding scale   SubCutaneous at bedtime  melatonin 6 milliGRAM(s) Oral at bedtime  metoprolol succinate  milliGRAM(s) Oral daily  mirtazapine 30 milliGRAM(s) Oral daily  pantoprazole    Tablet 40 milliGRAM(s) Oral before breakfast  QUEtiapine 25 milliGRAM(s) Oral three times a day      PRN Meds:      LABS:  CBC 01-02-22 @ 06:57                        8.4    6.93  )-----------( 265                   30.6       Hgb trend: 8.4 <-- , 8.4 <--   WBC trend: 6.93 <-- , 6.51 <--       CMP 01-02-22 @ 06:56    138  |  97  |  32<H>  ----------------------------<  117<H>  3.2<L>   |  23  |  1.15    Phos  3.4     01-02  Mg     2.1     01-02        Serum Cr trend: 1.15 <-- , 1.07 <--         ABG Trend:     VBG Trend:       MICROBIOLOGY:       RADIOLOGY:  [ ] Reviewed and interpreted by me    EKG:   Patient:  GUS AMARO  [id number]    Progress Note    Interval events: No acute events. Patient without complaints, AOx1-2, not requiring chemical sedation last night. Endorses improvement with melatonin  Pertinent ROS (if any):  Denies f/c/n/v/cp/ap/bowel or bladder change.     Administered:  pantoprazole    Tablet: 40 milliGRAM(s) Oral (01-03 @ 06:40)  furosemide   Injectable: 40 milliGRAM(s) IV Push (01-03 @ 06:40)  metoprolol succinate ER: 100 milliGRAM(s) Oral (01-03 @ 05:27)  digoxin     Tablet: 125 MICROGram(s) Oral (01-03 @ 05:27)  QUEtiapine: 25 milliGRAM(s) Oral (01-03 @ 05:27)  heparin   Injectable: 5000 Unit(s) SubCutaneous (01-03 @ 05:26)  melatonin: 6 milliGRAM(s) Oral (01-02 @ 21:56)  heparin   Injectable: 5000 Unit(s) SubCutaneous (01-02 @ 21:56)  QUEtiapine: 25 milliGRAM(s) Oral (01-02 @ 21:56)    OBJECTIVE:  01-02 @ 07:01  -  01-03 @ 07:00  --------------------------------------------------------  IN: 360 mL / OUT: 0 mL / NET: 360 mL      CAPILLARY BLOOD GLUCOSE      POCT Blood Glucose.: 209 mg/dL (02 Jan 2022 22:03)        VITALS:  T(F): 98 (01-03-22 @ 04:05), Max: 98.1 (01-02-22 @ 21:17)  HR: 72 (01-03-22 @ 04:05) (72 - 73)  BP: 112/74 (01-03-22 @ 04:05) (93/59 - 114/76)  BP(mean): --  ABP: --  ABP(mean): --  RR: 16 (01-03-22 @ 04:05) (16 - 17)  SpO2: 99% (01-03-22 @ 04:05) (94% - 99%)    GENERAL: NAD, lying in bed comfortably  EYES: EOMI, conjunctiva and sclera clear  ENT: Moist mucous membranes  NECK: Supple, No JVD  CHEST/LUNG: Clear to auscultation bilaterally; No rales, rhonchi, wheezing, or rubs. Unlabored respirations  HEART: Regular rate and rhythm; No murmurs, rubs, or gallops  ABDOMEN: Bowel sounds present; Soft, Nontender, Nondistended. No hepatomegaly  EXTREMITIES:  2+ Peripheral Pulses, brisk capillary refill. No clubbing, cyanosis, or edema  NERVOUS SYSTEM:  AOx2-3, easily redirectable  MSK: FROM all 4 extremities, full and equal strength  SKIN: No rashes or lesions    HOSPITAL MEDICATIONS:  Standing Meds:  dextrose 40% Gel 15 Gram(s) Oral once  dextrose 5%. 1000 milliLiter(s) IV Continuous <Continuous>  dextrose 5%. 1000 milliLiter(s) IV Continuous <Continuous>  dextrose 50% Injectable 25 Gram(s) IV Push once  dextrose 50% Injectable 12.5 Gram(s) IV Push once  dextrose 50% Injectable 25 Gram(s) IV Push once  digoxin     Tablet 125 MICROGram(s) Oral daily  furosemide   Injectable 40 milliGRAM(s) IV Push daily  glucagon  Injectable 1 milliGRAM(s) IntraMuscular once  heparin   Injectable 5000 Unit(s) SubCutaneous every 8 hours  insulin lispro (ADMELOG) corrective regimen sliding scale   SubCutaneous three times a day before meals  insulin lispro (ADMELOG) corrective regimen sliding scale   SubCutaneous at bedtime  melatonin 6 milliGRAM(s) Oral at bedtime  metoprolol succinate  milliGRAM(s) Oral daily  mirtazapine 30 milliGRAM(s) Oral daily  pantoprazole    Tablet 40 milliGRAM(s) Oral before breakfast  QUEtiapine 25 milliGRAM(s) Oral three times a day      PRN Meds:      LABS:  CBC 01-02-22 @ 06:57                        8.4    6.93  )-----------( 265                   30.6       Hgb trend: 8.4 <-- , 8.4 <--   WBC trend: 6.93 <-- , 6.51 <--       CMP 01-02-22 @ 06:56    138  |  97  |  32<H>  ----------------------------<  117<H>  3.2<L>   |  23  |  1.15    Phos  3.4     01-02  Mg     2.1     01-02        Serum Cr trend: 1.15 <-- , 1.07 <--         ABG Trend:     VBG Trend:       MICROBIOLOGY:       RADIOLOGY:  [ ] Reviewed and interpreted by me    EKG:

## 2022-01-03 NOTE — PROGRESS NOTE ADULT - PROBLEM SELECTOR PLAN 3
Please let patient know that I was satisfied with the results. They are all mostly in the normal range. There is a test that has been added to the vitamin B12 to make sure that she is not deficient but I think that test is going to come out okay.   Her wh - Geriatric psych consulted, f/u recs  - Aspiration and fall precautions  - c/w Seroquel Oral TID  - c/w mirtazapine 30mg oral qd  - can give IV haldol 1.0 mg for acute agitation (QTc wnl on admission)

## 2022-01-03 NOTE — PROGRESS NOTE ADULT - ASSESSMENT
89 yo F with dementia, DM,, HTN, depression, CHF, Afib on digoxin stopped Eliquis due to recurrent GI bleed/anemia p/w agitation/AMS.   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 MAYES AAox1-2, agitation at times   in chair otdya     Impression: AMS of unclear etiology, possible toxic metabolic encephalopathy given Leukocytosis. r/o CHF exacerbation./ Likely progression of her underlying neurocognitive disorder on top of delerium.    now + COVID  -   covid floor with airborne and contact isolation precuations  - monitor O2. and inflammatory markeres  - check QTC, if <500 seroquel and/or zyprexa PRN for agitation  - not on AC given anemia/GIB. consider DOAC for AF when able; now on digoxin  - f/u psych   - b12 WNL, rpr, TSH --> all WNL  - telemetry  - PT/OT  - check FS, glucose control <180  - GI/DVT ppx  - Thank you for allowing me to participate in the care of this patient. Call with questions.   - d/c plan when able to PRECIOUS. rejected from multiple facilities   Konstantin Giordano MD  Vascular Neurology  Office: [phone]

## 2022-01-03 NOTE — PROGRESS NOTE ADULT - PROBLEM SELECTOR PLAN 5
- per EMR, history of CHF (no echo at [facility])  - BNP 3k on admission  - c/w Lasix 40mg IV daily

## 2022-01-03 NOTE — PROGRESS NOTE ADULT - SUBJECTIVE AND OBJECTIVE BOX
Neurology Progress Note    S: Patient seen and examined, now in covid unit on airborne and contact iso.  MRI with meningioma. EEG neg.  + COVID    Medication:    MEDICATIONS  (STANDING):  dextrose 40% Gel 15 Gram(s) Oral once  dextrose 5%. 1000 milliLiter(s) (50 mL/Hr) IV Continuous <Continuous>  dextrose 5%. 1000 milliLiter(s) (100 mL/Hr) IV Continuous <Continuous>  dextrose 50% Injectable 25 Gram(s) IV Push once  dextrose 50% Injectable 12.5 Gram(s) IV Push once  dextrose 50% Injectable 25 Gram(s) IV Push once  digoxin     Tablet 125 MICROGram(s) Oral daily  furosemide   Injectable 40 milliGRAM(s) IV Push daily  glucagon  Injectable 1 milliGRAM(s) IntraMuscular once  heparin   Injectable 5000 Unit(s) SubCutaneous every 8 hours  insulin lispro (ADMELOG) corrective regimen sliding scale   SubCutaneous three times a day before meals  insulin lispro (ADMELOG) corrective regimen sliding scale   SubCutaneous at bedtime  melatonin 6 milliGRAM(s) Oral at bedtime  metoprolol succinate  milliGRAM(s) Oral daily  mirtazapine 30 milliGRAM(s) Oral daily  pantoprazole    Tablet 40 milliGRAM(s) Oral before breakfast  QUEtiapine 25 milliGRAM(s) Oral three times a day    MEDICATIONS  (PRN):        Vitals:  Vital Signs Last 24 Hrs  T(C): 36.7 (22 @ 04:05), Max: 36.7 (22 @ 21:17)  T(F): 98 (22 @ 04:05), Max: 98.1 (22 @ 21:17)  HR: 72 (22 @ 04:05) (72 - 73)  BP: 112/74 (22 @ 04:05) (93/59 - 114/76)  BP(mean): --  RR: 16 (22 @ 04:05) (16 - 17)  SpO2: 99% (22 @ 04:05) (94% - 99%)          General Exam:   General Appearance: Appropriately dressed and in no acute distress       Head: Normocephalic, atraumatic and no dysmorphic features  Ear, Nose, and Throat: Moist mucous membranes  CVS: S1S2+  Resp: No SOB, no wheeze or rhonchi  Abd: soft NTND  Extremities: No edema, no cyanosis  Skin: No bruises, no rashes     Neurological Exam:  Mental Status: Awake, alert and oriented x 1-2 (knows hospital and family members.  Able to follow some simple  verbal commands. Able to name and repeat. fluent speech. No obvious aphasia or dysarthria noted. combative at times   Cranial Nerves: PERRL, EOMI, VFFC, sensation V1-V3 intact,  no obvious facial asymmetry, equal elevation of palate, scm/trap 5/5, tongue is midline on protrusion. no obvious papilledema on fundoscopic exam. hearing is grossly intact.   Motor: Normal bulk, tone and strength throughout. Fine finger movements were intact and symmetric. no tremors or drift noted.    Sensation: Intact to light touch and pinprick throughout. no right/left confusion. no extinction to tactile on DSS.    Reflexes: 1+ throughout at biceps, brachioradialis, triceps, patellars and ankles bilaterally and equal. No clonus. R toe and L toe were both downgoing.  Coordination: not following   Gait: defererd        I personally reviewed the below data/images/labs:    CBC Full  -  ( 2022 08:01 )  WBC Count : 6.11 K/uL  RBC Count : 4.30 M/uL  Hemoglobin : 8.5 g/dL  Hematocrit : 31.6 %  Platelet Count - Automated : 248 K/uL  Mean Cell Volume : 73.5 fl  Mean Cell Hemoglobin : 19.8 pg  Mean Cell Hemoglobin Concentration : 26.9 gm/dL  Auto Neutrophil # : 2.76 K/uL  Auto Lymphocyte # : 2.31 K/uL  Auto Monocyte # : 0.79 K/uL  Auto Eosinophil # : 0.19 K/uL  Auto Basophil # : 0.02 K/uL  Auto Neutrophil % : 45.2 %  Auto Lymphocyte % : 37.8 %  Auto Monocyte % : 12.9 %  Auto Eosinophil % : 3.1 %  Auto Basophil % : 0.3 %    -    138  |  100  |  31<H>  ----------------------------<  115<H>  3.6   |  22  |  1.12    Ca    8.8      2022 07:37  Phos  3.3     -  Mg     2.1     -03        Urinalysis Basic - ( 26 Dec 2021 23:05 )    Color: Light Yellow / Appearance: Clear / S.009 / pH: x  Gluc: x / Ketone: Negative  / Bili: Negative / Urobili: Negative   Blood: x / Protein: Trace / Nitrite: Negative   Leuk Esterase: Negative / RBC: 0 /hpf / WBC 0 /HPF   Sq Epi: x / Non Sq Epi: 1 /hpf / Bacteria: Negative      < from: CT Head No Cont (21 @ 22:24) >    ACC: [id number] EXAM:  CT BRAIN                          PROCEDURE DATE:  2021          INTERPRETATION:  CLINICAL INFORMATION: Delirium.    TECHNIQUE:  Axial CT images were acquired through the head.  Intravenous contrast: None  Two-dimensionalreformats were generated.    COMPARISON STUDY: None    FINDINGS:  There is no CT evidence of acute intracranial hemorrhage, mass effect,   midline shift or acute territorial infarct.    There is moderate generalized cerebral volume loss.  No clinically   significant chronic microvascular ischemic disease or white matter   lesions are visualized by CT technique.    There is an approximately 2.1 cm, partially calcified round extra-axial   mass in the posterior fossa arising from the posterior lateral aspect of   the right petrous temporal bone.    The left mastoid is sclerotic and underpneumatized.  There is no   clinically significant mastoid effusion.    The calvarium, skull base, and orbits appear unremarkable.    IMPRESSION:  No CT evidenceof acute intracranial pathology.    Approximately 2.1 cm, partially calcified mass in the posterior fossa,   arising from the posterior lateral aspect of the right petrous temporal   bone, most likely represents a meningioma.  Contrast-enhanced brainMRI   is recommended.  For further characterization.    --- End of Report ---          MARTY GALLARDO MD; Resident Radiology  This document has been electronically signed.  DILEEP RODAS MD; Attending Radiologist  This document has been electronically signed. Dec 26 2021 11:53PM    < end of copied text >    < from: MR Head w/wo IV Cont (21 @ 14:16) >    ACC: [id number] EXAM:  MR BRAIN WAW IC                          PROCEDURE DATE:  2021          INTERPRETATION:  MR BRAIN WITHOUT AND WITH IV CONTRAST    Clinical information: AMS    CT head with meningiom A MRI of the brain   was performed bothprior to and after the administration of intravenous   gadolinium.    TECHNIQUE:  In the sagittal plane T1 pre and post gadolinium enhanced imaging was   performed.  In the axial plane T1 pre-and postcontrast as well as T2,    FLAIR, SWAN, and diffusion weighted imaging was utilized.  In the coronal   plane T1 post gadolinium enhanced images were obtained.  Contrast administered 5 cc Gadavist. Contrast discarded 2.5 cc.    COMPARISON:  Exam is compared to head CT of 2021.    FINDINGS:  *  REGION OF CONCERN /EXTRA-AXIAL:  There is an enhancing extra-axial mass along the right lateral aspect of   the posterior cranial fossa. This abuts the dura overlying the posterior   lateral aspect of the right petrous ridge, tentorium, as well as the dura   overlying the sigmoid sinus. Small dural tails are present. The right   sigmoid sinus itself enhances normally. Mass measures grossly 2.2 x 1.5   cm axially by 2.0 cm craniocaudad. Mass was partly calcified on CT.   Imaging characteristics are most compatible with a calcified meningioma.    *  BRAIN PARENCHYMA:  Very mild mass effect on the adjacent right lateral aspect of the   cerebellum without evidence of abnormal parenchymal signal.  Patient has moderate brain atrophy. There aremild chronic   periventricular and subcortical white matter ischemic changes.    *  VENTRICLES:  There is no hydrocephalus. Ventricular prominence is thought related to   underlying brain atrophy..    *  ENHANCEMENT:  No additional lesion seen.    *OTHER:  A partly empty sella turcica is noted.    IMPRESSION:  2.2 X 1.5 X 2.0 CM  ENHANCING EXTRA-AXIAL MASS RIGHT LATERAL ASPECT OF   THE POSTERIOR CRANIAL FOSSA ABUTTING THE SIGMOID SINUS, TENTORIUM, AND   PETROUS BONE. IMAGING CHARACTERISTICS AREMOST COMPATIBLE WITH A   MENINGIOMA. NO ASSOCIATED EDEMA. PATENT RIGHT SIGMOID SINUS    --- End of Report ---            DORI MATOS MD; Attending Radiologist  This document has been electronically signed. Dec 29 2021  2:45PM    < end of copied text >

## 2022-01-04 ENCOUNTER — APPOINTMENT (OUTPATIENT)
Dept: NEUROLOGY | Facility: CLINIC | Age: 87
End: 2022-01-04

## 2022-01-04 LAB
ALBUMIN SERPL ELPH-MCNC: 4.1 G/DL — SIGNIFICANT CHANGE UP (ref 3.3–5)
ALP SERPL-CCNC: 78 U/L — SIGNIFICANT CHANGE UP (ref 40–120)
ALT FLD-CCNC: 16 U/L — SIGNIFICANT CHANGE UP (ref 10–45)
ANION GAP SERPL CALC-SCNC: 15 MMOL/L — SIGNIFICANT CHANGE UP (ref 5–17)
AST SERPL-CCNC: 21 U/L — SIGNIFICANT CHANGE UP (ref 10–40)
BILIRUB SERPL-MCNC: 0.6 MG/DL — SIGNIFICANT CHANGE UP (ref 0.2–1.2)
BUN SERPL-MCNC: 28 MG/DL — HIGH (ref 7–23)
CALCIUM SERPL-MCNC: 8.9 MG/DL — SIGNIFICANT CHANGE UP (ref 8.4–10.5)
CHLORIDE SERPL-SCNC: 101 MMOL/L — SIGNIFICANT CHANGE UP (ref 96–108)
CO2 SERPL-SCNC: 23 MMOL/L — SIGNIFICANT CHANGE UP (ref 22–31)
CREAT SERPL-MCNC: 1.05 MG/DL — SIGNIFICANT CHANGE UP (ref 0.5–1.3)
GLUCOSE SERPL-MCNC: 126 MG/DL — HIGH (ref 70–99)
HCT VFR BLD CALC: 32.5 % — LOW (ref 34.5–45)
HGB BLD-MCNC: 9 G/DL — LOW (ref 11.5–15.5)
MAGNESIUM SERPL-MCNC: 2 MG/DL — SIGNIFICANT CHANGE UP (ref 1.6–2.6)
MCHC RBC-ENTMCNC: 20 PG — LOW (ref 27–34)
MCHC RBC-ENTMCNC: 27.7 GM/DL — LOW (ref 32–36)
MCV RBC AUTO: 72.1 FL — LOW (ref 80–100)
NRBC # BLD: 0 /100 WBCS — SIGNIFICANT CHANGE UP (ref 0–0)
PHOSPHATE SERPL-MCNC: 2.7 MG/DL — SIGNIFICANT CHANGE UP (ref 2.5–4.5)
PLATELET # BLD AUTO: 245 K/UL — SIGNIFICANT CHANGE UP (ref 150–400)
POTASSIUM SERPL-MCNC: 4.1 MMOL/L — SIGNIFICANT CHANGE UP (ref 3.5–5.3)
POTASSIUM SERPL-SCNC: 4.1 MMOL/L — SIGNIFICANT CHANGE UP (ref 3.5–5.3)
PROT SERPL-MCNC: 6.6 G/DL — SIGNIFICANT CHANGE UP (ref 6–8.3)
RBC # BLD: 4.51 M/UL — SIGNIFICANT CHANGE UP (ref 3.8–5.2)
RBC # FLD: 19.9 % — HIGH (ref 10.3–14.5)
SODIUM SERPL-SCNC: 139 MMOL/L — SIGNIFICANT CHANGE UP (ref 135–145)
WBC # BLD: 5.93 K/UL — SIGNIFICANT CHANGE UP (ref 3.8–10.5)
WBC # FLD AUTO: 5.93 K/UL — SIGNIFICANT CHANGE UP (ref 3.8–10.5)

## 2022-01-04 RX ORDER — FUROSEMIDE 40 MG
80 TABLET ORAL DAILY
Refills: 0 | Status: DISCONTINUED | OUTPATIENT
Start: 2022-01-04 | End: 2022-01-06

## 2022-01-04 RX ORDER — SODIUM,POTASSIUM PHOSPHATES 278-250MG
1 POWDER IN PACKET (EA) ORAL ONCE
Refills: 0 | Status: COMPLETED | OUTPATIENT
Start: 2022-01-04 | End: 2022-01-04

## 2022-01-04 RX ADMIN — PANTOPRAZOLE SODIUM 40 MILLIGRAM(S): 20 TABLET, DELAYED RELEASE ORAL at 06:20

## 2022-01-04 RX ADMIN — Medication 100 MILLIGRAM(S): at 06:21

## 2022-01-04 RX ADMIN — HEPARIN SODIUM 5000 UNIT(S): 5000 INJECTION INTRAVENOUS; SUBCUTANEOUS at 21:57

## 2022-01-04 RX ADMIN — Medication 40 MILLIGRAM(S): at 06:27

## 2022-01-04 RX ADMIN — MIRTAZAPINE 30 MILLIGRAM(S): 45 TABLET, ORALLY DISINTEGRATING ORAL at 13:08

## 2022-01-04 RX ADMIN — Medication 6 MILLIGRAM(S): at 21:57

## 2022-01-04 RX ADMIN — Medication 1 PACKET(S): at 13:07

## 2022-01-04 RX ADMIN — HEPARIN SODIUM 5000 UNIT(S): 5000 INJECTION INTRAVENOUS; SUBCUTANEOUS at 06:21

## 2022-01-04 RX ADMIN — QUETIAPINE FUMARATE 25 MILLIGRAM(S): 200 TABLET, FILM COATED ORAL at 13:08

## 2022-01-04 RX ADMIN — QUETIAPINE FUMARATE 25 MILLIGRAM(S): 200 TABLET, FILM COATED ORAL at 21:57

## 2022-01-04 RX ADMIN — Medication 1: at 13:07

## 2022-01-04 RX ADMIN — QUETIAPINE FUMARATE 25 MILLIGRAM(S): 200 TABLET, FILM COATED ORAL at 06:21

## 2022-01-04 RX ADMIN — Medication 80 MILLIGRAM(S): at 18:27

## 2022-01-04 RX ADMIN — HEPARIN SODIUM 5000 UNIT(S): 5000 INJECTION INTRAVENOUS; SUBCUTANEOUS at 13:08

## 2022-01-04 RX ADMIN — Medication 125 MICROGRAM(S): at 06:21

## 2022-01-04 NOTE — PROGRESS NOTE ADULT - SUBJECTIVE AND OBJECTIVE BOX
Neurology Progress Note    S: Patient seen and examined, now in covid unit on airborne and contact iso.  MRI with meningioma. EEG neg.  + COVID    Medication:    MEDICATIONS  (STANDING):  dextrose 40% Gel 15 Gram(s) Oral once  dextrose 5%. 1000 milliLiter(s) (50 mL/Hr) IV Continuous <Continuous>  dextrose 5%. 1000 milliLiter(s) (100 mL/Hr) IV Continuous <Continuous>  dextrose 50% Injectable 25 Gram(s) IV Push once  dextrose 50% Injectable 12.5 Gram(s) IV Push once  dextrose 50% Injectable 25 Gram(s) IV Push once  digoxin     Tablet 125 MICROGram(s) Oral daily  furosemide   Injectable 40 milliGRAM(s) IV Push daily  glucagon  Injectable 1 milliGRAM(s) IntraMuscular once  heparin   Injectable 5000 Unit(s) SubCutaneous every 8 hours  insulin lispro (ADMELOG) corrective regimen sliding scale   SubCutaneous three times a day before meals  insulin lispro (ADMELOG) corrective regimen sliding scale   SubCutaneous at bedtime  melatonin 6 milliGRAM(s) Oral at bedtime  metoprolol succinate  milliGRAM(s) Oral daily  mirtazapine 30 milliGRAM(s) Oral daily  pantoprazole    Tablet 40 milliGRAM(s) Oral before breakfast  potassium phosphate / sodium phosphate Powder (PHOS-NaK) 1 Packet(s) Oral once  QUEtiapine 25 milliGRAM(s) Oral three times a day    MEDICATIONS  (PRN):          Vitals:  Vital Signs Last 24 Hrs  T(C): 36.9 (22 @ 06:02), Max: 36.9 (22 @ 06:02)  T(F): 98.5 (22 @ 06:02), Max: 98.5 (22 @ 06:02)  HR: 85 (22 @ 06:02) (72 - 85)  BP: 118/64 (22 @ 06:02) (116/64 - 122/69)  BP(mean): --  RR: 16 (22 @ 06:02) (16 - 16)  SpO2: 97% (22 @ 06:02) (96% - 98%)          General Exam:   General Appearance: Appropriately dressed and in no acute distress       Head: Normocephalic, atraumatic and no dysmorphic features  Ear, Nose, and Throat: Moist mucous membranes  CVS: S1S2+  Resp: No SOB, no wheeze or rhonchi  Abd: soft NTND  Extremities: No edema, no cyanosis  Skin: No bruises, no rashes     Neurological Exam:  Mental Status: Awake, alert and oriented x 1-2 (knows hospital and family members.  Able to follow some simple  verbal commands. Able to name and repeat. fluent speech. No obvious aphasia or dysarthria noted. combative at times   Cranial Nerves: PERRL, EOMI, VFFC, sensation V1-V3 intact,  no obvious facial asymmetry, equal elevation of palate, scm/trap 5/5, tongue is midline on protrusion. no obvious papilledema on fundoscopic exam. hearing is grossly intact.   Motor: Normal bulk, tone and strength throughout. Fine finger movements were intact and symmetric. no tremors or drift noted.    Sensation: Intact to light touch and pinprick throughout. no right/left confusion. no extinction to tactile on DSS.    Reflexes: 1+ throughout at biceps, brachioradialis, triceps, patellars and ankles bilaterally and equal. No clonus. R toe and L toe were both downgoing.  Coordination: not following   Gait: defererd        I personally reviewed the below data/images/labs:    CBC Full  -  ( 2022 07:08 )  WBC Count : 5.93 K/uL  RBC Count : 4.51 M/uL  Hemoglobin : 9.0 g/dL  Hematocrit : 32.5 %  Platelet Count - Automated : 245 K/uL  Mean Cell Volume : 72.1 fl  Mean Cell Hemoglobin : 20.0 pg  Mean Cell Hemoglobin Concentration : 27.7 gm/dL  Auto Neutrophil # : x  Auto Lymphocyte # : x  Auto Monocyte # : x  Auto Eosinophil # : x  Auto Basophil # : x  Auto Neutrophil % : x  Auto Lymphocyte % : x  Auto Monocyte % : x  Auto Eosinophil % : x  Auto Basophil % : x    -04    139  |  101  |  28<H>  ----------------------------<  126<H>  4.1   |  23  |  1.05    Ca    8.9      2022 07:07  Phos  2.7     01-04  Mg     2.0     -04    TPro  6.6  /  Alb  4.1  /  TBili  0.6  /  DBili  x   /  AST  21  /  ALT  16  /  AlkPhos  78  -        Urinalysis Basic - ( 26 Dec 2021 23:05 )    Color: Light Yellow / Appearance: Clear / S.009 / pH: x  Gluc: x / Ketone: Negative  / Bili: Negative / Urobili: Negative   Blood: x / Protein: Trace / Nitrite: Negative   Leuk Esterase: Negative / RBC: 0 /hpf / WBC 0 /HPF   Sq Epi: x / Non Sq Epi: 1 /hpf / Bacteria: Negative      < from: CT Head No Cont (21 @ 22:24) >    ACC: [id number] EXAM:  CT BRAIN                          PROCEDURE DATE:  2021          INTERPRETATION:  CLINICAL INFORMATION: Delirium.    TECHNIQUE:  Axial CT images were acquired through the head.  Intravenous contrast: None  Two-dimensionalreformats were generated.    COMPARISON STUDY: None    FINDINGS:  There is no CT evidence of acute intracranial hemorrhage, mass effect,   midline shift or acute territorial infarct.    There is moderate generalized cerebral volume loss.  No clinically   significant chronic microvascular ischemic disease or white matter   lesions are visualized by CT technique.    There is an approximately 2.1 cm, partially calcified round extra-axial   mass in the posterior fossa arising from the posterior lateral aspect of   the right petrous temporal bone.    The left mastoid is sclerotic and underpneumatized.  There is no   clinically significant mastoid effusion.    The calvarium, skull base, and orbits appear unremarkable.    IMPRESSION:  No CT evidenceof acute intracranial pathology.    Approximately 2.1 cm, partially calcified mass in the posterior fossa,   arising from the posterior lateral aspect of the right petrous temporal   bone, most likely represents a meningioma.  Contrast-enhanced brainMRI   is recommended.  For further characterization.    --- End of Report ---          MARTY GALLARDO MD; Resident Radiology  This document has been electronically signed.  DILEEP RODAS MD; Attending Radiologist  This document has been electronically signed. Dec 26 2021 11:53PM    < end of copied text >    < from: MR Head w/wo IV Cont (21 @ 14:16) >    ACC: [id number] EXAM:  MR BRAIN WAW IC                          PROCEDURE DATE:  2021          INTERPRETATION:  MR BRAIN WITHOUT AND WITH IV CONTRAST    Clinical information: AMS    CT head with meningiom A MRI of the brain   was performed bothprior to and after the administration of intravenous   gadolinium.    TECHNIQUE:  In the sagittal plane T1 pre and post gadolinium enhanced imaging was   performed.  In the axial plane T1 pre-and postcontrast as well as T2,    FLAIR, SWAN, and diffusion weighted imaging was utilized.  In the coronal   plane T1 post gadolinium enhanced images were obtained.  Contrast administered 5 cc Gadavist. Contrast discarded 2.5 cc.    COMPARISON:  Exam is compared to head CT of 2021.    FINDINGS:  *  REGION OF CONCERN /EXTRA-AXIAL:  There is an enhancing extra-axial mass along the right lateral aspect of   the posterior cranial fossa. This abuts the dura overlying the posterior   lateral aspect of the right petrous ridge, tentorium, as well as the dura   overlying the sigmoid sinus. Small dural tails are present. The right   sigmoid sinus itself enhances normally. Mass measures grossly 2.2 x 1.5   cm axially by 2.0 cm craniocaudad. Mass was partly calcified on CT.   Imaging characteristics are most compatible with a calcified meningioma.    *  BRAIN PARENCHYMA:  Very mild mass effect on the adjacent right lateral aspect of the   cerebellum without evidence of abnormal parenchymal signal.  Patient has moderate brain atrophy. There aremild chronic   periventricular and subcortical white matter ischemic changes.    *  VENTRICLES:  There is no hydrocephalus. Ventricular prominence is thought related to   underlying brain atrophy..    *  ENHANCEMENT:  No additional lesion seen.    *OTHER:  A partly empty sella turcica is noted.    IMPRESSION:  2.2 X 1.5 X 2.0 CM  ENHANCING EXTRA-AXIAL MASS RIGHT LATERAL ASPECT OF   THE POSTERIOR CRANIAL FOSSA ABUTTING THE SIGMOID SINUS, TENTORIUM, AND   PETROUS BONE. IMAGING CHARACTERISTICS AREMOST COMPATIBLE WITH A   MENINGIOMA. NO ASSOCIATED EDEMA. PATENT RIGHT SIGMOID SINUS    --- End of Report ---            DORI MATOS MD; Attending Radiologist  This document has been electronically signed. Dec 29 2021  2:45PM    < end of copied text >

## 2022-01-04 NOTE — DIETITIAN INITIAL EVALUATION ADULT. - PERTINENT LABORATORY DATA
BUN 28, glucose 126    HbA1c (12/27): 8.2%     CAPILLARY BLOOD GLUCOSE  POCT Blood Glucose.: 144 mg/dL (04 Jan 2022 08:44)  POCT Blood Glucose.: 143 mg/dL (03 Jan 2022 22:30)  POCT Blood Glucose.: 111 mg/dL (03 Jan 2022 17:50)  POCT Blood Glucose.: 146 mg/dL (03 Jan 2022 12:21)

## 2022-01-04 NOTE — DIETITIAN INITIAL EVALUATION ADULT. - REASON INDICATOR FOR ASSESSMENT
Pt seen for length of stay assessment.   Source: EMR/comprehensive chart review.     Pt is an 89 yo female with PMH of dementia, DM, HTN, CHF on Lasix, afib, and depression. Pt became combative and more agitated, given risperidone, family stated increased size of leg swelling. Admitted 12/27.

## 2022-01-04 NOTE — PROGRESS NOTE ADULT - SUBJECTIVE AND OBJECTIVE BOX
----- Message from Louis Webster MD sent at 7/2/2019  8:20 AM CDT -----  4 mo fu   Patient:  GUS AMARO  [id number]    Progress Note    Interval events: No acute events. Patient resting comfortably overnight. Did not require additional sedation. She was COVID+ recently on repeat test. CM/SW aware of complex discharge planning.   Pertinent ROS (if any):  Denies f/c/n/v/cp/ap/bowel or bladder changes.     Administered:  furosemide   Injectable: 40 milliGRAM(s) IV Push (01-04 @ 06:27)  metoprolol succinate ER: 100 milliGRAM(s) Oral (01-04 @ 06:21)  heparin   Injectable: 5000 Unit(s) SubCutaneous (01-04 @ 06:21)  digoxin     Tablet: 125 MICROGram(s) Oral (01-04 @ 06:21)  QUEtiapine: 25 milliGRAM(s) Oral (01-04 @ 06:21)  pantoprazole    Tablet: 40 milliGRAM(s) Oral (01-04 @ 06:20)  heparin   Injectable: 5000 Unit(s) SubCutaneous (01-03 @ 22:07)  QUEtiapine: 25 milliGRAM(s) Oral (01-03 @ 22:04)  melatonin: 6 milliGRAM(s) Oral (01-03 @ 22:03)    OBJECTIVE:    01-03 @ 07:01  -  01-04 @ 07:00  --------------------------------------------------------  IN: 720 mL / OUT: 0 mL / NET: 720 mL      CAPILLARY BLOOD GLUCOSE      POCT Blood Glucose.: 143 mg/dL (03 Jan 2022 22:30)    VITALS:  T(F): 98.5 (01-04-22 @ 06:02), Max: 98.5 (01-04-22 @ 06:02)  HR: 85 (01-04-22 @ 06:02) (72 - 85)  BP: 118/64 (01-04-22 @ 06:02) (116/64 - 122/69)  BP(mean): --  ABP: --  ABP(mean): --  RR: 16 (01-04-22 @ 06:02) (16 - 16)  SpO2: 97% (01-04-22 @ 06:02) (96% - 98%)    GENERAL: NAD, lying in bed comfortably, elderly-appearing female  EYES: EOMI, conjunctiva and sclera clear  ENT: Moist mucous membranes  NECK: Supple, No JVD  CHEST/LUNG: Clear to auscultation bilaterally; No rales, rhonchi, wheezing, or rubs. Unlabored respirations  HEART: Regular rate and rhythm; No murmurs, rubs, or gallops  ABDOMEN: Bowel sounds present; Soft, Nontender, Nondistended. No hepatomegaly  EXTREMITIES:  2+ Peripheral Pulses, brisk capillary refill. No clubbing, cyanosis, or edema  NERVOUS SYSTEM:  AOx2-3, easily redirectable  MSK: FROM all 4 extremities, full and equal strength  SKIN: No rashes or lesions    HOSPITAL MEDICATIONS:  Standing Meds:  dextrose 40% Gel 15 Gram(s) Oral once  dextrose 5%. 1000 milliLiter(s) IV Continuous <Continuous>  dextrose 5%. 1000 milliLiter(s) IV Continuous <Continuous>  dextrose 50% Injectable 25 Gram(s) IV Push once  dextrose 50% Injectable 12.5 Gram(s) IV Push once  dextrose 50% Injectable 25 Gram(s) IV Push once  digoxin     Tablet 125 MICROGram(s) Oral daily  furosemide   Injectable 40 milliGRAM(s) IV Push daily  glucagon  Injectable 1 milliGRAM(s) IntraMuscular once  heparin   Injectable 5000 Unit(s) SubCutaneous every 8 hours  insulin lispro (ADMELOG) corrective regimen sliding scale   SubCutaneous three times a day before meals  insulin lispro (ADMELOG) corrective regimen sliding scale   SubCutaneous at bedtime  melatonin 6 milliGRAM(s) Oral at bedtime  metoprolol succinate  milliGRAM(s) Oral daily  mirtazapine 30 milliGRAM(s) Oral daily  pantoprazole    Tablet 40 milliGRAM(s) Oral before breakfast  QUEtiapine 25 milliGRAM(s) Oral three times a day    PRN Meds:    LABS:  CBC 01-04-22 @ 07:08                        9.0    5.93  )-----------( 245                   32.5       Hgb trend: 9.0 <-- , 8.5 <-- , 8.4 <--   WBC trend: 5.93 <-- , 6.11 <-- , 6.93 <--       CMP 01-03-22 @ 07:37    138  |  100  |  31<H>  ----------------------------<  115<H>  3.6   |  22  |  1.12    Ca    8.8      01-03-22 @ 07:37  Phos  3.3     01-03  Mg     2.1     01-03        Serum Cr trend: 1.12 <-- , 1.15 <--         ABG Trend:     VBG Trend:       MICROBIOLOGY:       RADIOLOGY:  [ ] Reviewed and interpreted by me    EKG:

## 2022-01-04 NOTE — PROGRESS NOTE ADULT - SUBJECTIVE AND OBJECTIVE BOX
Subjective: Patient seen and examined. No new events except as noted.     REVIEW OF SYSTEMS:    CONSTITUTIONAL:+ weakness, fevers or chills  EYES/ENT: No visual changes;  No vertigo or throat pain   NECK: No pain or stiffness  RESPIRATORY: No cough, wheezing, hemoptysis; No shortness of breath  CARDIOVASCULAR: No chest pain or palpitations  GASTROINTESTINAL: No abdominal or epigastric pain. No nausea, vomiting, or hematemesis; No diarrhea or constipation. No melena or hematochezia.  GENITOURINARY: No dysuria, frequency or hematuria  NEUROLOGICAL: No numbness or weakness  SKIN: No itching, burning, rashes, or lesions   All other review of systems is negative unless indicated above.    MEDICATIONS:  MEDICATIONS  (STANDING):  dextrose 40% Gel 15 Gram(s) Oral once  dextrose 5%. 1000 milliLiter(s) (50 mL/Hr) IV Continuous <Continuous>  dextrose 5%. 1000 milliLiter(s) (100 mL/Hr) IV Continuous <Continuous>  dextrose 50% Injectable 25 Gram(s) IV Push once  dextrose 50% Injectable 12.5 Gram(s) IV Push once  dextrose 50% Injectable 25 Gram(s) IV Push once  digoxin     Tablet 125 MICROGram(s) Oral daily  furosemide   Injectable 40 milliGRAM(s) IV Push daily  glucagon  Injectable 1 milliGRAM(s) IntraMuscular once  heparin   Injectable 5000 Unit(s) SubCutaneous every 8 hours  insulin lispro (ADMELOG) corrective regimen sliding scale   SubCutaneous three times a day before meals  insulin lispro (ADMELOG) corrective regimen sliding scale   SubCutaneous at bedtime  melatonin 6 milliGRAM(s) Oral at bedtime  metoprolol succinate  milliGRAM(s) Oral daily  mirtazapine 30 milliGRAM(s) Oral daily  pantoprazole    Tablet 40 milliGRAM(s) Oral before breakfast  potassium phosphate / sodium phosphate Powder (PHOS-NaK) 1 Packet(s) Oral once  QUEtiapine 25 milliGRAM(s) Oral three times a day      PHYSICAL EXAM:  T(C): 36.9 (01-04-22 @ 06:02), Max: 36.9 (01-04-22 @ 06:02)  HR: 85 (01-04-22 @ 06:02) (72 - 85)  BP: 118/64 (01-04-22 @ 06:02) (116/64 - 122/69)  RR: 16 (01-04-22 @ 06:02) (16 - 16)  SpO2: 97% (01-04-22 @ 06:02) (96% - 98%)  Wt(kg): --  I&O's Summary    03 Jan 2022 07:01  -  04 Jan 2022 07:00  --------------------------------------------------------  IN: 720 mL / OUT: 0 mL / NET: 720 mL        Appearance: NAD	  HEENT:  Dry oral mucosa, PERRL, EOMI	  Lymphatic: No lymphadenopathy , no edema  Cardiovascular: Irregular S1 S2, No JVD, No murmurs , Peripheral pulses palpable 2+ bilaterally  Respiratory: decreased bs   Gastrointestinal:  Soft, Non-tender, + BS	  Skin: No rashes, No ecchymoses, No cyanosis, warm to touch  Musculoskeletal: Normal range of motion, normal strength  Psychiatry:  Mood & affect appropriate  Ext: No edema      LABS:    CARDIAC MARKERS:                                9.0    5.93  )-----------( 245      ( 04 Jan 2022 07:08 )             32.5     01-04    139  |  101  |  28<H>  ----------------------------<  126<H>  4.1   |  23  |  1.05    Ca    8.9      04 Jan 2022 07:07  Phos  2.7     01-04  Mg     2.0     01-04    TPro  6.6  /  Alb  4.1  /  TBili  0.6  /  DBili  x   /  AST  21  /  ALT  16  /  AlkPhos  78  01-04    proBNP:   Lipid Profile:   HgA1c:   TSH:             TELEMETRY: 	    ECG:  	  RADIOLOGY:   DIAGNOSTIC TESTING:  [ ] Echocardiogram:  [ ]  Catheterization:  [ ] Stress Test:    OTHER:

## 2022-01-04 NOTE — DIETITIAN INITIAL EVALUATION ADULT. - OTHER INFO
Pt sleeping at time of visit. Noted with dementia, confused, disoriented- likely poor candidate for RD interview at this time. Observed breakfast tray at bedside, pt consumed >75%. Noted with variable PO intake in-house, overall good. No acute GI distress noted. Last documented BM yesterday. Per Patient Profile, pt swallows foods/liquids without difficulty. NKFA.     Dosing weight 104.3 kg (12/26). Unable to obtain new weight at time of visit - pt sleeping in chair. [facility] Will continue to monitor and trend weights as able.

## 2022-01-04 NOTE — DIETITIAN INITIAL EVALUATION ADULT. - DIET TYPE
Liberalize diet to Low Sodium, consistent carbohydrate (evening snack) to encourage intake. Monitor/adjust diet PRN./low sodium/consistent carbohydrate (evening snack)

## 2022-01-04 NOTE — PROGRESS NOTE ADULT - PROBLEM SELECTOR PLAN 5
- per EMR, history of CHF (no echo at [facility])  - BNP 3k on admission  - c/w Lasix 40mg IV daily - per EMR, history of CHF (no echo at [facility])  - BNP 3k on admission  - s/p Lasix 40mg IV daily  - restart Lasix 80mg PO - per EMR, history of CHF (no echo at [facility])  - BNP 3k on admission  - s/p Lasix 40mg IV daily  - start Lasix 80mg PO (patient on 80 BID at home)

## 2022-01-04 NOTE — DIETITIAN INITIAL EVALUATION ADULT. - CURRENT DIET ORDER MEETS ESTIMATED NUTRIENT REQUIREMENTS
Repetition Rate (Hz): 10 Cooling (In C): 15 Pulse Duration: 20 Post Procedure Text: The patient tolerated the procedure well. Ice-chilled washclothes were applied to the treatment area for comfort. Post care was reviewed with the patient. Post-Care Instructions: I reviewed with the patient in detail post-care instructions. Patient should stay away from the sun and wear sun protection until treated areas are fully healed.\\n\\n*no residual heat. Device Serial Number (Optional): [id number], [id number], [place] Pulse Duration Units: milliseconds Hide Repetition Rate?: No Fluence (J/Cm2): 25 Passes: 2 Additional Comments (Optional): * mild erythema, Passes: 1 Detail Level: Zone Spot Size: Finesse Adapter Size: 11 mm round Cooling ?: Yes Spot Size: Finesse Adapter Size: 15 x 15 mm square Cooling (In C): 11 Anesthesia Volume In Cc: 0 Fluence (J/Cm2): 9 Consent: Written consent obtained, risks reviewed including but not limited to crusting, scabbing, blistering, scarring, darker or lighter pigmentary change, bruising, and/or incomplete response Preprocedure Text: The treatment areas were thoroughly cleaned. Any exposed at risk hair that was not to be treated was covered in white paper tape. Clear ultrasound gel was applied to the treatment area. The area was treated with no immediate stacking of pulses.\\n\\nCOVID screening performed prior to patient entering building.\\nPatient notified of safe practice measures for COVID-19, also posted throughout office.\\nPatient wearing mask. Mask removed to examine face only.\\n\\n*pt. Declined use of steroid or anti-biotic in past 7 days. No sun exposure for 2 weeks\\n* pre- op by TL Statement Selected

## 2022-01-04 NOTE — PROGRESS NOTE ADULT - ASSESSMENT
88 yo F with dementia, DM,, HTN, depression, CHF, Afib on digoxin stopped Eliquis due to recurrent GI bleed/anemia p/w agitation/AMS.   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 MAYES AAox1-2, agitation at times   in chair otdya     Impression: AMS of unclear etiology, possible toxic metabolic encephalopathy given Leukocytosis. r/o CHF exacerbation./ Likely progression of her underlying neurocognitive disorder on top of delerium.    now + COVID  -   covid floor with airborne and contact isolation precuations  - monitor O2. and inflammatory markeres  - check QTC, if <500 seroquel and/or zyprexa PRN for agitation  - not on AC given anemia/GIB. consider DOAC for AF when able; now on digoxin  - f/u psych   - b12 WNL, rpr, TSH --> all WNL  - telemetry  - PT/OT  - check FS, glucose control <180  - GI/DVT ppx  - Thank you for allowing me to participate in the care of this patient. Call with questions.   - d/c plan when able to PRECIOUS. rejected from multiple facilities after covid quarantine   Konstantin Giordano MD  Vascular Neurology  Office: [phone]

## 2022-01-04 NOTE — DIETITIAN INITIAL EVALUATION ADULT. - ORAL INTAKE PTA/DIET HISTORY
Per chart, pt with no weight loss PTA. Per chart review, pt taking potassium chloride, folic acid, PreserVision, vitamin C, vitamin D2, and senna PTA. Per chart, pt taking Januvia PTA. HbA1c 8.2% suggests fair glycemic control in setting of advanced age. Noted pt taking furosemide PTA.

## 2022-01-04 NOTE — DIETITIAN INITIAL EVALUATION ADULT. - ADD RECOMMEND
Consider multivitamin supplement if no medical contraindications. Monitor PO intake, weight, labs, skin, GI status, diet.

## 2022-01-05 LAB
ANION GAP SERPL CALC-SCNC: 15 MMOL/L — SIGNIFICANT CHANGE UP (ref 5–17)
BUN SERPL-MCNC: 27 MG/DL — HIGH (ref 7–23)
CALCIUM SERPL-MCNC: 9.2 MG/DL — SIGNIFICANT CHANGE UP (ref 8.4–10.5)
CHLORIDE SERPL-SCNC: 99 MMOL/L — SIGNIFICANT CHANGE UP (ref 96–108)
CO2 SERPL-SCNC: 23 MMOL/L — SIGNIFICANT CHANGE UP (ref 22–31)
CREAT SERPL-MCNC: 1.17 MG/DL — SIGNIFICANT CHANGE UP (ref 0.5–1.3)
GLUCOSE SERPL-MCNC: 142 MG/DL — HIGH (ref 70–99)
HCT VFR BLD CALC: 35.1 % — SIGNIFICANT CHANGE UP (ref 34.5–45)
HGB BLD-MCNC: 9.1 G/DL — LOW (ref 11.5–15.5)
MAGNESIUM SERPL-MCNC: 2 MG/DL — SIGNIFICANT CHANGE UP (ref 1.6–2.6)
MCHC RBC-ENTMCNC: 20 PG — LOW (ref 27–34)
MCHC RBC-ENTMCNC: 25.9 GM/DL — LOW (ref 32–36)
MCV RBC AUTO: 77.3 FL — LOW (ref 80–100)
NRBC # BLD: 0 /100 WBCS — SIGNIFICANT CHANGE UP (ref 0–0)
PHOSPHATE SERPL-MCNC: 3 MG/DL — SIGNIFICANT CHANGE UP (ref 2.5–4.5)
PLATELET # BLD AUTO: 235 K/UL — SIGNIFICANT CHANGE UP (ref 150–400)
POTASSIUM SERPL-MCNC: 4.1 MMOL/L — SIGNIFICANT CHANGE UP (ref 3.5–5.3)
POTASSIUM SERPL-SCNC: 4.1 MMOL/L — SIGNIFICANT CHANGE UP (ref 3.5–5.3)
RBC # BLD: 4.54 M/UL — SIGNIFICANT CHANGE UP (ref 3.8–5.2)
RBC # FLD: 20.4 % — HIGH (ref 10.3–14.5)
SODIUM SERPL-SCNC: 137 MMOL/L — SIGNIFICANT CHANGE UP (ref 135–145)
WBC # BLD: 6.66 K/UL — SIGNIFICANT CHANGE UP (ref 3.8–10.5)
WBC # FLD AUTO: 6.66 K/UL — SIGNIFICANT CHANGE UP (ref 3.8–10.5)

## 2022-01-05 PROCEDURE — 99232 SBSQ HOSP IP/OBS MODERATE 35: CPT

## 2022-01-05 RX ADMIN — QUETIAPINE FUMARATE 25 MILLIGRAM(S): 200 TABLET, FILM COATED ORAL at 22:09

## 2022-01-05 RX ADMIN — MIRTAZAPINE 30 MILLIGRAM(S): 45 TABLET, ORALLY DISINTEGRATING ORAL at 13:00

## 2022-01-05 RX ADMIN — Medication 125 MICROGRAM(S): at 06:27

## 2022-01-05 RX ADMIN — Medication 1: at 08:52

## 2022-01-05 RX ADMIN — HEPARIN SODIUM 5000 UNIT(S): 5000 INJECTION INTRAVENOUS; SUBCUTANEOUS at 22:10

## 2022-01-05 RX ADMIN — Medication 6 MILLIGRAM(S): at 22:10

## 2022-01-05 RX ADMIN — QUETIAPINE FUMARATE 25 MILLIGRAM(S): 200 TABLET, FILM COATED ORAL at 06:27

## 2022-01-05 RX ADMIN — QUETIAPINE FUMARATE 25 MILLIGRAM(S): 200 TABLET, FILM COATED ORAL at 13:01

## 2022-01-05 RX ADMIN — Medication 100 MILLIGRAM(S): at 06:27

## 2022-01-05 RX ADMIN — PANTOPRAZOLE SODIUM 40 MILLIGRAM(S): 20 TABLET, DELAYED RELEASE ORAL at 06:27

## 2022-01-05 RX ADMIN — Medication 80 MILLIGRAM(S): at 06:27

## 2022-01-05 RX ADMIN — HEPARIN SODIUM 5000 UNIT(S): 5000 INJECTION INTRAVENOUS; SUBCUTANEOUS at 06:30

## 2022-01-05 RX ADMIN — HEPARIN SODIUM 5000 UNIT(S): 5000 INJECTION INTRAVENOUS; SUBCUTANEOUS at 13:01

## 2022-01-05 NOTE — PROGRESS NOTE ADULT - ASSESSMENT
88 yo F with dementia, DM, HTN (on metoprolol), CHF (on lasix), Afib (on digoxin, stopped Eliquis due to recurrent GI bleed/anemia) dementia, depression (on Seroquel, mirtazapine), who presents with agitation/AMS as pt became combative, and more agitated. Patient diagnosed with COVID while hospitalized, asymptomatic. Currently pending placement to [facility].

## 2022-01-05 NOTE — BH CONSULTATION LIAISON PROGRESS NOTE - NSBHCHARTREVIEWVS_PSY_A_CORE FT
Vital Signs Last 24 Hrs  T(C): 36.7 (05 Jan 2022 14:12), Max: 36.9 (05 Jan 2022 04:52)  T(F): 98 (05 Jan 2022 14:12), Max: 98.5 (05 Jan 2022 04:52)  HR: 69 (05 Jan 2022 14:12) (69 - 84)  BP: 118/72 (05 Jan 2022 14:12) (115/73 - 118/72)  BP(mean): --  RR: 18 (05 Jan 2022 14:12) (18 - 18)  SpO2: 98% (05 Jan 2022 14:12) (97% - 98%)

## 2022-01-05 NOTE — PROGRESS NOTE ADULT - SUBJECTIVE AND OBJECTIVE BOX
Patient:  GUS AMARO  [id number]    Progress Note    Interval events: No acute events.  Pertinent ROS (if any):      Administered:  heparin   Injectable: 5000 Unit(s) SubCutaneous (01-05 @ 06:30)  pantoprazole    Tablet: 40 milliGRAM(s) Oral (01-05 @ 06:27)  metoprolol succinate ER: 100 milliGRAM(s) Oral (01-05 @ 06:27)  furosemide    Tablet: 80 milliGRAM(s) Oral (01-05 @ 06:27)  digoxin     Tablet: 125 MICROGram(s) Oral (01-05 @ 06:27)  QUEtiapine: 25 milliGRAM(s) Oral (01-05 @ 06:27)  melatonin: 6 milliGRAM(s) Oral (01-04 @ 21:57)  heparin   Injectable: 5000 Unit(s) SubCutaneous (01-04 @ 21:57)  QUEtiapine: 25 milliGRAM(s) Oral (01-04 @ 21:57)        OBJECTIVE:    01-04 @ 07:01  -  01-05 @ 07:00  --------------------------------------------------------  IN: 720 mL / OUT: 0 mL / NET: 720 mL      CAPILLARY BLOOD GLUCOSE      POCT Blood Glucose.: 203 mg/dL (04 Jan 2022 21:51)        VITALS:  T(F): 98.5 (01-05-22 @ 04:52), Max: 98.7 (01-04-22 @ 14:05)  HR: 84 (01-05-22 @ 04:52) (67 - 84)  BP: 115/73 (01-05-22 @ 04:52) (104/63 - 116/71)  BP(mean): --  ABP: --  ABP(mean): --  RR: 18 (01-05-22 @ 04:52) (18 - 18)  SpO2: 97% (01-05-22 @ 04:52) (96% - 98%)    PHYSICAL EXAM:  GENERAL: NAD, lying in bed comfortably  HEAD:  Atraumatic, Normocephalic  EYES: EOMI, PERRLA, conjunctiva and sclera clear  ENT: Moist mucous membranes  NECK: Supple, No JVD  CHEST/LUNG: Clear to auscultation bilaterally; No rales, rhonchi, wheezing, or rubs. Unlabored respirations  HEART: Regular rate and rhythm; No murmurs, rubs, or gallops  ABDOMEN: Bowel sounds present; Soft, Nontender, Nondistended. No hepatomegaly  EXTREMITIES:  2+ Peripheral Pulses, brisk capillary refill. No clubbing, cyanosis, or edema  NERVOUS SYSTEM:  Alert & Oriented X3, speech clear. No deficits   MSK: FROM all 4 extremities, full and equal strength  SKIN: No rashes or lesions    HOSPITAL MEDICATIONS:  Standing Meds:  dextrose 40% Gel 15 Gram(s) Oral once  dextrose 5%. 1000 milliLiter(s) IV Continuous <Continuous>  dextrose 5%. 1000 milliLiter(s) IV Continuous <Continuous>  dextrose 50% Injectable 25 Gram(s) IV Push once  dextrose 50% Injectable 12.5 Gram(s) IV Push once  dextrose 50% Injectable 25 Gram(s) IV Push once  digoxin     Tablet 125 MICROGram(s) Oral daily  furosemide    Tablet 80 milliGRAM(s) Oral daily  glucagon  Injectable 1 milliGRAM(s) IntraMuscular once  heparin   Injectable 5000 Unit(s) SubCutaneous every 8 hours  insulin lispro (ADMELOG) corrective regimen sliding scale   SubCutaneous three times a day before meals  insulin lispro (ADMELOG) corrective regimen sliding scale   SubCutaneous at bedtime  melatonin 6 milliGRAM(s) Oral at bedtime  metoprolol succinate  milliGRAM(s) Oral daily  mirtazapine 30 milliGRAM(s) Oral daily  pantoprazole    Tablet 40 milliGRAM(s) Oral before breakfast  QUEtiapine 25 milliGRAM(s) Oral three times a day      PRN Meds:      LABS:  CBC 01-04-22 @ 07:08                        9.0    5.93  )-----------( 245                   32.5       Hgb trend: 9.0 <-- , 8.5 <--   WBC trend: 5.93 <-- , 6.11 <--       CMP 01-04-22 @ 07:07    139  |  101  |  28<H>  ----------------------------<  126<H>  4.1   |  23  |  1.05    Phos  2.7     01-04  Mg     2.0     01-04    TPro  6.6  /  Alb  4.1  /  TBili  0.6  /  DBili  x   /  AST  21  /  ALT  16  /  AlkPhos  78  01-04      Serum Cr trend: 1.05 <-- , 1.12 <--         ABG Trend:     VBG Trend:       MICROBIOLOGY:       RADIOLOGY:  [ ] Reviewed and interpreted by me    EKG:   Patient:  GUS AMARO  [id number]    Progress Note    Interval events: No acute events. Patient resting comfortably in bed. Unchanged mental status.  Pertinent ROS (if any):  Denies f/c/n/v/cp/ap/bowel or bladder changes.     Administered:  heparin   Injectable: 5000 Unit(s) SubCutaneous (01-05 @ 06:30)  pantoprazole    Tablet: 40 milliGRAM(s) Oral (01-05 @ 06:27)  metoprolol succinate ER: 100 milliGRAM(s) Oral (01-05 @ 06:27)  furosemide    Tablet: 80 milliGRAM(s) Oral (01-05 @ 06:27)  digoxin     Tablet: 125 MICROGram(s) Oral (01-05 @ 06:27)  QUEtiapine: 25 milliGRAM(s) Oral (01-05 @ 06:27)  melatonin: 6 milliGRAM(s) Oral (01-04 @ 21:57)  heparin   Injectable: 5000 Unit(s) SubCutaneous (01-04 @ 21:57)  QUEtiapine: 25 milliGRAM(s) Oral (01-04 @ 21:57)      OBJECTIVE:    01-04 @ 07:01  -  01-05 @ 07:00  --------------------------------------------------------  IN: 720 mL / OUT: 0 mL / NET: 720 mL      CAPILLARY BLOOD GLUCOSE      POCT Blood Glucose.: 203 mg/dL (04 Jan 2022 21:51)        VITALS:  T(F): 98.5 (01-05-22 @ 04:52), Max: 98.7 (01-04-22 @ 14:05)  HR: 84 (01-05-22 @ 04:52) (67 - 84)  BP: 115/73 (01-05-22 @ 04:52) (104/63 - 116/71)  BP(mean): --  ABP: --  ABP(mean): --  RR: 18 (01-05-22 @ 04:52) (18 - 18)  SpO2: 97% (01-05-22 @ 04:52) (96% - 98%)    GENERAL: NAD, lying in bed comfortably, elderly-appearing female  EYES: EOMI, conjunctiva and sclera clear  ENT: Moist mucous membranes  NECK: Supple, No JVD  CHEST/LUNG: Clear to auscultation bilaterally; No rales, rhonchi, wheezing, or rubs. Unlabored respirations  HEART: Regular rate and rhythm; No murmurs, rubs, or gallops  ABDOMEN: Bowel sounds present; Soft, Nontender, Nondistended. No hepatomegaly  EXTREMITIES:  2+ Peripheral Pulses, brisk capillary refill. No clubbing, cyanosis, or edema  NERVOUS SYSTEM:  AOx2-3, easily redirectable  MSK: FROM all 4 extremities, full and equal strength  SKIN: No rashes or lesions    HOSPITAL MEDICATIONS:  Standing Meds:  dextrose 40% Gel 15 Gram(s) Oral once  dextrose 5%. 1000 milliLiter(s) IV Continuous <Continuous>  dextrose 5%. 1000 milliLiter(s) IV Continuous <Continuous>  dextrose 50% Injectable 25 Gram(s) IV Push once  dextrose 50% Injectable 12.5 Gram(s) IV Push once  dextrose 50% Injectable 25 Gram(s) IV Push once  digoxin     Tablet 125 MICROGram(s) Oral daily  furosemide    Tablet 80 milliGRAM(s) Oral daily  glucagon  Injectable 1 milliGRAM(s) IntraMuscular once  heparin   Injectable 5000 Unit(s) SubCutaneous every 8 hours  insulin lispro (ADMELOG) corrective regimen sliding scale   SubCutaneous three times a day before meals  insulin lispro (ADMELOG) corrective regimen sliding scale   SubCutaneous at bedtime  melatonin 6 milliGRAM(s) Oral at bedtime  metoprolol succinate  milliGRAM(s) Oral daily  mirtazapine 30 milliGRAM(s) Oral daily  pantoprazole    Tablet 40 milliGRAM(s) Oral before breakfast  QUEtiapine 25 milliGRAM(s) Oral three times a day      PRN Meds:      LABS:  CBC 01-04-22 @ 07:08                        9.0    5.93  )-----------( 245                   32.5       Hgb trend: 9.0 <-- , 8.5 <--   WBC trend: 5.93 <-- , 6.11 <--       CMP 01-04-22 @ 07:07    139  |  101  |  28<H>  ----------------------------<  126<H>  4.1   |  23  |  1.05    Phos  2.7     01-04  Mg     2.0     01-04    TPro  6.6  /  Alb  4.1  /  TBili  0.6  /  DBili  x   /  AST  21  /  ALT  16  /  AlkPhos  78  01-04      Serum Cr trend: 1.05 <-- , 1.12 <--         ABG Trend:     VBG Trend:       MICROBIOLOGY:       RADIOLOGY:  [ ] Reviewed and interpreted by me    EKG:

## 2022-01-05 NOTE — BH CONSULTATION LIAISON PROGRESS NOTE - NSBHFUPINTERVALHXFT_PSY_A_CORE
Patient seen and evaluated, staff consulted, chart, labs, meds reviewed. Patient and staff report no issues overnight. Patient denies changes in clarity of sensorium and orientation. No SI/HI, no delusions or perceptual disturbances, no prominent psych. sx. noted or reported. Patient is oriented to self and location, but not to time. Patient remains mildly irritable, but not an overall management problem.    Counseling and support provided.

## 2022-01-05 NOTE — PROGRESS NOTE ADULT - ASSESSMENT
89 yo F with dementia, DM,, HTN, depression, CHF, Afib on digoxin stopped Eliquis due to recurrent GI bleed/anemia p/w agitation/AMS.   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 MAYES AAox1-2, agitation at times     Impression: AMS of unclear etiology, possible toxic metabolic encephalopathy given Leukocytosis. r/o CHF exacerbation./ Likely progression of her underlying neurocognitive disorder on top of delerium.    now + COVID  -   covid floor with airborne and contact isolation precuations  - monitor O2. and inflammatory markeres  - check QTC, if <500 seroquel and/or zyprexa PRN for agitation  - getting remeron QHS 30mg for sleep   - not on AC given anemia/GIB. consider DOAC for AF when able; now on digoxin  - f/u psych   - b12 WNL, rpr, TSH --> all WNL  - telemetry  - PT/OT  - check FS, glucose control <180  - GI/DVT ppx  - Thank you for allowing me to participate in the care of this patient. Call with questions.   - d/c plan when able to PRECIOUS. rejected from multiple facilities after covid quarantine   Konstantin Giordano MD  Vascular Neurology  Office: [phone]

## 2022-01-05 NOTE — BH CONSULTATION LIAISON PROGRESS NOTE - CURRENT MEDICATION
MEDICATIONS  (STANDING):  dextrose 40% Gel 15 Gram(s) Oral once  dextrose 5%. 1000 milliLiter(s) (50 mL/Hr) IV Continuous <Continuous>  dextrose 5%. 1000 milliLiter(s) (100 mL/Hr) IV Continuous <Continuous>  dextrose 50% Injectable 25 Gram(s) IV Push once  dextrose 50% Injectable 12.5 Gram(s) IV Push once  dextrose 50% Injectable 25 Gram(s) IV Push once  digoxin     Tablet 125 MICROGram(s) Oral daily  furosemide    Tablet 80 milliGRAM(s) Oral daily  glucagon  Injectable 1 milliGRAM(s) IntraMuscular once  heparin   Injectable 5000 Unit(s) SubCutaneous every 8 hours  insulin lispro (ADMELOG) corrective regimen sliding scale   SubCutaneous three times a day before meals  insulin lispro (ADMELOG) corrective regimen sliding scale   SubCutaneous at bedtime  melatonin 6 milliGRAM(s) Oral at bedtime  metoprolol succinate  milliGRAM(s) Oral daily  mirtazapine 30 milliGRAM(s) Oral daily  pantoprazole    Tablet 40 milliGRAM(s) Oral before breakfast  QUEtiapine 25 milliGRAM(s) Oral three times a day    MEDICATIONS  (PRN):

## 2022-01-05 NOTE — PROGRESS NOTE ADULT - PROBLEM SELECTOR PLAN 7
- A1C: 8.2. At home, on Januvia.   - c/w low dose insulin SS qac + qhs

## 2022-01-05 NOTE — PROGRESS NOTE ADULT - SUBJECTIVE AND OBJECTIVE BOX
Subjective: Patient seen and examined. No new events except as noted.     REVIEW OF SYSTEMS:    CONSTITUTIONAL: No weakness, fevers or chills  EYES/ENT: No visual changes;  No vertigo or throat pain   NECK: No pain or stiffness  RESPIRATORY: No cough, wheezing, hemoptysis; No shortness of breath  CARDIOVASCULAR: No chest pain or palpitations  GASTROINTESTINAL: No abdominal or epigastric pain. No nausea, vomiting, or hematemesis; No diarrhea or constipation. No melena or hematochezia.  GENITOURINARY: No dysuria, frequency or hematuria  NEUROLOGICAL: No numbness or weakness  SKIN: No itching, burning, rashes, or lesions   All other review of systems is negative unless indicated above.    MEDICATIONS:  MEDICATIONS  (STANDING):  dextrose 40% Gel 15 Gram(s) Oral once  dextrose 5%. 1000 milliLiter(s) (50 mL/Hr) IV Continuous <Continuous>  dextrose 5%. 1000 milliLiter(s) (100 mL/Hr) IV Continuous <Continuous>  dextrose 50% Injectable 25 Gram(s) IV Push once  dextrose 50% Injectable 12.5 Gram(s) IV Push once  dextrose 50% Injectable 25 Gram(s) IV Push once  digoxin     Tablet 125 MICROGram(s) Oral daily  furosemide    Tablet 80 milliGRAM(s) Oral daily  glucagon  Injectable 1 milliGRAM(s) IntraMuscular once  heparin   Injectable 5000 Unit(s) SubCutaneous every 8 hours  insulin lispro (ADMELOG) corrective regimen sliding scale   SubCutaneous three times a day before meals  insulin lispro (ADMELOG) corrective regimen sliding scale   SubCutaneous at bedtime  melatonin 6 milliGRAM(s) Oral at bedtime  metoprolol succinate  milliGRAM(s) Oral daily  mirtazapine 30 milliGRAM(s) Oral daily  pantoprazole    Tablet 40 milliGRAM(s) Oral before breakfast  QUEtiapine 25 milliGRAM(s) Oral three times a day      PHYSICAL EXAM:  T(C): 36.7 (01-05-22 @ 14:12), Max: 36.9 (01-05-22 @ 04:52)  HR: 87 (01-05-22 @ 15:30) (69 - 87)  BP: 113/72 (01-05-22 @ 15:30) (113/72 - 118/72)  RR: 18 (01-05-22 @ 14:12) (18 - 18)  SpO2: 93% (01-05-22 @ 15:30) (93% - 98%)  Wt(kg): --  I&O's Summary    04 Jan 2022 07:01  -  05 Jan 2022 07:00  --------------------------------------------------------  IN: 720 mL / OUT: 0 mL / NET: 720 mL          Appearance: NAD	  HEENT:  Dry oral mucosa, PERRL, EOMI	  Lymphatic: No lymphadenopathy , no edema  Cardiovascular: Irregular S1 S2, No JVD, No murmurs , Peripheral pulses palpable 2+ bilaterally  Respiratory: decreased bs   Gastrointestinal:  Soft, Non-tender, + BS	  Skin: No rashes, No ecchymoses, No cyanosis, warm to touch  Musculoskeletal: Normal range of motion, normal strength  Psychiatry:  Mood & affect appropriate  Ext: No edema          LABS:    CARDIAC MARKERS:                                9.1    6.66  )-----------( 235      ( 05 Jan 2022 06:45 )             35.1     01-05    137  |  99  |  27<H>  ----------------------------<  142<H>  4.1   |  23  |  1.17    Ca    9.2      05 Jan 2022 06:43  Phos  3.0     01-05  Mg     2.0     01-05    TPro  6.6  /  Alb  4.1  /  TBili  0.6  /  DBili  x   /  AST  21  /  ALT  16  /  AlkPhos  78  01-04    proBNP:   Lipid Profile:   HgA1c:   TSH:             TELEMETRY: 	    ECG:  	  RADIOLOGY:   DIAGNOSTIC TESTING:  [ ] Echocardiogram:  [ ]  Catheterization:  [ ] Stress Test:    OTHER:

## 2022-01-05 NOTE — PROGRESS NOTE ADULT - PROBLEM SELECTOR PLAN 5
- per EMR, history of CHF (no echo at [facility])  - BNP 3k on admission  - s/p Lasix 40mg IV daily  - start Lasix 80mg PO (patient on 80 BID at home)

## 2022-01-05 NOTE — BH CONSULTATION LIAISON PROGRESS NOTE - NSBHCHARTREVIEWLAB_PSY_A_CORE FT
9.1    6.66  )-----------( 235      ( 05 Jan 2022 06:45 )             35.1     01-05    137  |  99  |  27<H>  ----------------------------<  142<H>  4.1   |  23  |  1.17    Ca    9.2      05 Jan 2022 06:43  Phos  3.0     01-05  Mg     2.0     01-05    TPro  6.6  /  Alb  4.1  /  TBili  0.6  /  DBili  x   /  AST  21  /  ALT  16  /  AlkPhos  78  01-04

## 2022-01-05 NOTE — PROGRESS NOTE ADULT - PROBLEM SELECTOR PLAN 8
- -140s today  - c/w Metoprolol 100mg oral daily

## 2022-01-05 NOTE — BH CONSULTATION LIAISON PROGRESS NOTE - NSBHASSESSMENTFT_PSY_ALL_CORE
This is an 89-year-old CF patient with PPH of dementia and PMH of DM,, HTN, CHF, on lasix, Afib on digoxin stopped Eliquis due to recurrent GI bleed/anemia, brought in by the family for increased leg swelling, also for an increase of combativeness and agitation. On admission, pt. was given risperidone 0.5mg, No fever or cough. Consult requested to evaluate the patient for agitation.    On follow up, patient shows moderate improvement in behavior - no recent behavioral outbursts, no PRNs required.

## 2022-01-05 NOTE — PROGRESS NOTE ADULT - SUBJECTIVE AND OBJECTIVE BOX
Neurology Progress Note    S: Patient seen and examined, now in covid unit on airborne and contact iso.  MRI with meningioma. EEG neg.  + COVID    Medication:    MEDICATIONS  (STANDING):  dextrose 40% Gel 15 Gram(s) Oral once  dextrose 5%. 1000 milliLiter(s) (50 mL/Hr) IV Continuous <Continuous>  dextrose 5%. 1000 milliLiter(s) (100 mL/Hr) IV Continuous <Continuous>  dextrose 50% Injectable 25 Gram(s) IV Push once  dextrose 50% Injectable 12.5 Gram(s) IV Push once  dextrose 50% Injectable 25 Gram(s) IV Push once  digoxin     Tablet 125 MICROGram(s) Oral daily  furosemide    Tablet 80 milliGRAM(s) Oral daily  glucagon  Injectable 1 milliGRAM(s) IntraMuscular once  heparin   Injectable 5000 Unit(s) SubCutaneous every 8 hours  insulin lispro (ADMELOG) corrective regimen sliding scale   SubCutaneous three times a day before meals  insulin lispro (ADMELOG) corrective regimen sliding scale   SubCutaneous at bedtime  melatonin 6 milliGRAM(s) Oral at bedtime  metoprolol succinate  milliGRAM(s) Oral daily  mirtazapine 30 milliGRAM(s) Oral daily  pantoprazole    Tablet 40 milliGRAM(s) Oral before breakfast  QUEtiapine 25 milliGRAM(s) Oral three times a day    MEDICATIONS  (PRN):        Vitals:  MEDICATIONS  (STANDING):  dextrose 40% Gel 15 Gram(s) Oral once  dextrose 5%. 1000 milliLiter(s) (50 mL/Hr) IV Continuous <Continuous>  dextrose 5%. 1000 milliLiter(s) (100 mL/Hr) IV Continuous <Continuous>  dextrose 50% Injectable 25 Gram(s) IV Push once  dextrose 50% Injectable 12.5 Gram(s) IV Push once  dextrose 50% Injectable 25 Gram(s) IV Push once  digoxin     Tablet 125 MICROGram(s) Oral daily  furosemide    Tablet 80 milliGRAM(s) Oral daily  glucagon  Injectable 1 milliGRAM(s) IntraMuscular once  heparin   Injectable 5000 Unit(s) SubCutaneous every 8 hours  insulin lispro (ADMELOG) corrective regimen sliding scale   SubCutaneous three times a day before meals  insulin lispro (ADMELOG) corrective regimen sliding scale   SubCutaneous at bedtime  melatonin 6 milliGRAM(s) Oral at bedtime  metoprolol succinate  milliGRAM(s) Oral daily  mirtazapine 30 milliGRAM(s) Oral daily  pantoprazole    Tablet 40 milliGRAM(s) Oral before breakfast  QUEtiapine 25 milliGRAM(s) Oral three times a day    MEDICATIONS  (PRN):        General Exam:   General Appearance: Appropriately dressed and in no acute distress       Head: Normocephalic, atraumatic and no dysmorphic features  Ear, Nose, and Throat: Moist mucous membranes  CVS: S1S2+  Resp: No SOB, no wheeze or rhonchi  Abd: soft NTND  Extremities: No edema, no cyanosis  Skin: No bruises, no rashes     Neurological Exam:  Mental Status: Awake, alert and oriented x 1-2 (knows hospital and family members.  Able to follow some simple  verbal commands. Able to name and repeat. fluent speech. No obvious aphasia or dysarthria noted. combative at times   Cranial Nerves: PERRL, EOMI, VFFC, sensation V1-V3 intact,  no obvious facial asymmetry, equal elevation of palate, scm/trap 5/5, tongue is midline on protrusion. no obvious papilledema on fundoscopic exam. hearing is grossly intact.   Motor: Normal bulk, tone and strength throughout. Fine finger movements were intact and symmetric. no tremors or drift noted.    Sensation: Intact to light touch and pinprick throughout. no right/left confusion. no extinction to tactile on DSS.    Reflexes: 1+ throughout at biceps, brachioradialis, triceps, patellars and ankles bilaterally and equal. No clonus. R toe and L toe were both downgoing.  Coordination: not following   Gait: defererd        I personally reviewed the below data/images/labs:    CBC Full  -  ( 2022 06:45 )  WBC Count : 6.66 K/uL  RBC Count : 4.54 M/uL  Hemoglobin : 9.1 g/dL  Hematocrit : 35.1 %  Platelet Count - Automated : 235 K/uL  Mean Cell Volume : 77.3 fl  Mean Cell Hemoglobin : 20.0 pg  Mean Cell Hemoglobin Concentration : 25.9 gm/dL  Auto Neutrophil # : x  Auto Lymphocyte # : x  Auto Monocyte # : x  Auto Eosinophil # : x  Auto Basophil # : x  Auto Neutrophil % : x  Auto Lymphocyte % : x  Auto Monocyte % : x  Auto Eosinophil % : x  Auto Basophil % : x    01-05    137  |  99  |  27<H>  ----------------------------<  142<H>  4.1   |  23  |  1.17    Ca    9.2      2022 06:43  Phos  3.0     01-05  Mg     2.0     -05    TPro  6.6  /  Alb  4.1  /  TBili  0.6  /  DBili  x   /  AST  21  /  ALT  16  /  AlkPhos  78  01-04            Urinalysis Basic - ( 26 Dec 2021 23:05 )    Color: Light Yellow / Appearance: Clear / S.009 / pH: x  Gluc: x / Ketone: Negative  / Bili: Negative / Urobili: Negative   Blood: x / Protein: Trace / Nitrite: Negative   Leuk Esterase: Negative / RBC: 0 /hpf / WBC 0 /HPF   Sq Epi: x / Non Sq Epi: 1 /hpf / Bacteria: Negative      < from: CT Head No Cont (21 @ 22:24) >    ACC: [id number] EXAM:  CT BRAIN                          PROCEDURE DATE:  2021          INTERPRETATION:  CLINICAL INFORMATION: Delirium.    TECHNIQUE:  Axial CT images were acquired through the head.  Intravenous contrast: None  Two-dimensionalreformats were generated.    COMPARISON STUDY: None    FINDINGS:  There is no CT evidence of acute intracranial hemorrhage, mass effect,   midline shift or acute territorial infarct.    There is moderate generalized cerebral volume loss.  No clinically   significant chronic microvascular ischemic disease or white matter   lesions are visualized by CT technique.    There is an approximately 2.1 cm, partially calcified round extra-axial   mass in the posterior fossa arising from the posterior lateral aspect of   the right petrous temporal bone.    The left mastoid is sclerotic and underpneumatized.  There is no   clinically significant mastoid effusion.    The calvarium, skull base, and orbits appear unremarkable.    IMPRESSION:  No CT evidenceof acute intracranial pathology.    Approximately 2.1 cm, partially calcified mass in the posterior fossa,   arising from the posterior lateral aspect of the right petrous temporal   bone, most likely represents a meningioma.  Contrast-enhanced brainMRI   is recommended.  For further characterization.    --- End of Report ---          MARTY GALLARDO MD; Resident Radiology  This document has been electronically signed.  DILEEP RODAS MD; Attending Radiologist  This document has been electronically signed. Dec 26 2021 11:53PM    < end of copied text >    < from: MR Head w/wo IV Cont (21 @ 14:16) >    ACC: [id number] EXAM:  MR BRAIN WAW IC                          PROCEDURE DATE:  2021          INTERPRETATION:  MR BRAIN WITHOUT AND WITH IV CONTRAST    Clinical information: AMS    CT head with meningiom A MRI of the brain   was performed bothprior to and after the administration of intravenous   gadolinium.    TECHNIQUE:  In the sagittal plane T1 pre and post gadolinium enhanced imaging was   performed.  In the axial plane T1 pre-and postcontrast as well as T2,    FLAIR, SWAN, and diffusion weighted imaging was utilized.  In the coronal   plane T1 post gadolinium enhanced images were obtained.  Contrast administered 5 cc Gadavist. Contrast discarded 2.5 cc.    COMPARISON:  Exam is compared to head CT of 2021.    FINDINGS:  *  REGION OF CONCERN /EXTRA-AXIAL:  There is an enhancing extra-axial mass along the right lateral aspect of   the posterior cranial fossa. This abuts the dura overlying the posterior   lateral aspect of the right petrous ridge, tentorium, as well as the dura   overlying the sigmoid sinus. Small dural tails are present. The right   sigmoid sinus itself enhances normally. Mass measures grossly 2.2 x 1.5   cm axially by 2.0 cm craniocaudad. Mass was partly calcified on CT.   Imaging characteristics are most compatible with a calcified meningioma.    *  BRAIN PARENCHYMA:  Very mild mass effect on the adjacent right lateral aspect of the   cerebellum without evidence of abnormal parenchymal signal.  Patient has moderate brain atrophy. There aremild chronic   periventricular and subcortical white matter ischemic changes.    *  VENTRICLES:  There is no hydrocephalus. Ventricular prominence is thought related to   underlying brain atrophy..    *  ENHANCEMENT:  No additional lesion seen.    *OTHER:  A partly empty sella turcica is noted.    IMPRESSION:  2.2 X 1.5 X 2.0 CM  ENHANCING EXTRA-AXIAL MASS RIGHT LATERAL ASPECT OF   THE POSTERIOR CRANIAL FOSSA ABUTTING THE SIGMOID SINUS, TENTORIUM, AND   PETROUS BONE. IMAGING CHARACTERISTICS AREMOST COMPATIBLE WITH A   MENINGIOMA. NO ASSOCIATED EDEMA. PATENT RIGHT SIGMOID SINUS    --- End of Report ---            DORI MATOS MD; Attending Radiologist  This document has been electronically signed. Dec 29 2021  2:45PM    < end of copied text >

## 2022-01-06 ENCOUNTER — TRANSCRIPTION ENCOUNTER (OUTPATIENT)
Age: 87
End: 2022-01-06

## 2022-01-06 VITALS
TEMPERATURE: 99 F | SYSTOLIC BLOOD PRESSURE: 104 MMHG | DIASTOLIC BLOOD PRESSURE: 68 MMHG | OXYGEN SATURATION: 95 % | RESPIRATION RATE: 18 BRPM | HEART RATE: 78 BPM

## 2022-01-06 LAB
ANION GAP SERPL CALC-SCNC: 15 MMOL/L — SIGNIFICANT CHANGE UP (ref 5–17)
BUN SERPL-MCNC: 27 MG/DL — HIGH (ref 7–23)
CALCIUM SERPL-MCNC: 8.9 MG/DL — SIGNIFICANT CHANGE UP (ref 8.4–10.5)
CHLORIDE SERPL-SCNC: 100 MMOL/L — SIGNIFICANT CHANGE UP (ref 96–108)
CO2 SERPL-SCNC: 24 MMOL/L — SIGNIFICANT CHANGE UP (ref 22–31)
CREAT SERPL-MCNC: 1.08 MG/DL — SIGNIFICANT CHANGE UP (ref 0.5–1.3)
GLUCOSE SERPL-MCNC: 125 MG/DL — HIGH (ref 70–99)
HCT VFR BLD CALC: 32.6 % — LOW (ref 34.5–45)
HGB BLD-MCNC: 8.9 G/DL — LOW (ref 11.5–15.5)
MAGNESIUM SERPL-MCNC: 2 MG/DL — SIGNIFICANT CHANGE UP (ref 1.6–2.6)
MCHC RBC-ENTMCNC: 19.9 PG — LOW (ref 27–34)
MCHC RBC-ENTMCNC: 27.3 GM/DL — LOW (ref 32–36)
MCV RBC AUTO: 72.8 FL — LOW (ref 80–100)
NRBC # BLD: 0 /100 WBCS — SIGNIFICANT CHANGE UP (ref 0–0)
PHOSPHATE SERPL-MCNC: 3.2 MG/DL — SIGNIFICANT CHANGE UP (ref 2.5–4.5)
PLATELET # BLD AUTO: 229 K/UL — SIGNIFICANT CHANGE UP (ref 150–400)
POTASSIUM SERPL-MCNC: 3.9 MMOL/L — SIGNIFICANT CHANGE UP (ref 3.5–5.3)
POTASSIUM SERPL-SCNC: 3.9 MMOL/L — SIGNIFICANT CHANGE UP (ref 3.5–5.3)
RBC # BLD: 4.48 M/UL — SIGNIFICANT CHANGE UP (ref 3.8–5.2)
RBC # FLD: 20.2 % — HIGH (ref 10.3–14.5)
SARS-COV-2 RNA SPEC QL NAA+PROBE: DETECTED
SODIUM SERPL-SCNC: 139 MMOL/L — SIGNIFICANT CHANGE UP (ref 135–145)
WBC # BLD: 6.69 K/UL — SIGNIFICANT CHANGE UP (ref 3.8–10.5)
WBC # FLD AUTO: 6.69 K/UL — SIGNIFICANT CHANGE UP (ref 3.8–10.5)

## 2022-01-06 RX ADMIN — Medication 100 MILLIGRAM(S): at 06:30

## 2022-01-06 RX ADMIN — MIRTAZAPINE 30 MILLIGRAM(S): 45 TABLET, ORALLY DISINTEGRATING ORAL at 12:33

## 2022-01-06 RX ADMIN — Medication 80 MILLIGRAM(S): at 06:30

## 2022-01-06 RX ADMIN — QUETIAPINE FUMARATE 25 MILLIGRAM(S): 200 TABLET, FILM COATED ORAL at 14:57

## 2022-01-06 RX ADMIN — Medication 125 MICROGRAM(S): at 06:30

## 2022-01-06 RX ADMIN — HEPARIN SODIUM 5000 UNIT(S): 5000 INJECTION INTRAVENOUS; SUBCUTANEOUS at 14:57

## 2022-01-06 RX ADMIN — QUETIAPINE FUMARATE 25 MILLIGRAM(S): 200 TABLET, FILM COATED ORAL at 06:30

## 2022-01-06 RX ADMIN — PANTOPRAZOLE SODIUM 40 MILLIGRAM(S): 20 TABLET, DELAYED RELEASE ORAL at 06:30

## 2022-01-06 RX ADMIN — HEPARIN SODIUM 5000 UNIT(S): 5000 INJECTION INTRAVENOUS; SUBCUTANEOUS at 06:30

## 2022-01-06 NOTE — PROGRESS NOTE ADULT - SUBJECTIVE AND OBJECTIVE BOX
Neurology Progress Note    S: Patient seen and examined, now in covid unit on airborne and contact iso.  MRI with meningioma. EEG neg.  + COVID dc plan     Medication:    MEDICATIONS  (STANDING):  dextrose 40% Gel 15 Gram(s) Oral once  dextrose 5%. 1000 milliLiter(s) (50 mL/Hr) IV Continuous <Continuous>  dextrose 5%. 1000 milliLiter(s) (100 mL/Hr) IV Continuous <Continuous>  dextrose 50% Injectable 25 Gram(s) IV Push once  dextrose 50% Injectable 12.5 Gram(s) IV Push once  dextrose 50% Injectable 25 Gram(s) IV Push once  digoxin     Tablet 125 MICROGram(s) Oral daily  furosemide    Tablet 80 milliGRAM(s) Oral daily  glucagon  Injectable 1 milliGRAM(s) IntraMuscular once  heparin   Injectable 5000 Unit(s) SubCutaneous every 8 hours  insulin lispro (ADMELOG) corrective regimen sliding scale   SubCutaneous three times a day before meals  insulin lispro (ADMELOG) corrective regimen sliding scale   SubCutaneous at bedtime  melatonin 6 milliGRAM(s) Oral at bedtime  metoprolol succinate  milliGRAM(s) Oral daily  mirtazapine 30 milliGRAM(s) Oral daily  pantoprazole    Tablet 40 milliGRAM(s) Oral before breakfast  QUEtiapine 25 milliGRAM(s) Oral three times a day    MEDICATIONS  (PRN):        Vitals:  Vital Signs Last 24 Hrs  T(C): 37.1 (22 @ 13:33), Max: 37.4 (22 @ 21:42)  T(F): 98.8 (22 @ 13:33), Max: 99.4 (22 @ 21:42)  HR: 74 (22 @ 13:33) (74 - 100)  BP: 112/68 (22 @ 13:33) (110/67 - 122/79)  BP(mean): --  RR: 18 (22 @ 13:33) (18 - 18)  SpO2: 96% (22 @ 13:33) (93% - 97%)        General Exam:   General Appearance: Appropriately dressed and in no acute distress       Head: Normocephalic, atraumatic and no dysmorphic features  Ear, Nose, and Throat: Moist mucous membranes  CVS: S1S2+  Resp: No SOB, no wheeze or rhonchi  Abd: soft NTND  Extremities: No edema, no cyanosis  Skin: No bruises, no rashes     Neurological Exam:  Mental Status: Awake, alert and oriented x 1-2 (knows hospital and family members.  Able to follow some simple  verbal commands. Able to name and repeat. fluent speech. No obvious aphasia or dysarthria noted. combative at times   Cranial Nerves: PERRL, EOMI, VFFC, sensation V1-V3 intact,  no obvious facial asymmetry, equal elevation of palate, scm/trap 5/5, tongue is midline on protrusion. no obvious papilledema on fundoscopic exam. hearing is grossly intact.   Motor: Normal bulk, tone and strength throughout. Fine finger movements were intact and symmetric. no tremors or drift noted.    Sensation: Intact to light touch and pinprick throughout. no right/left confusion. no extinction to tactile on DSS.    Reflexes: 1+ throughout at biceps, brachioradialis, triceps, patellars and ankles bilaterally and equal. No clonus. R toe and L toe were both downgoing.  Coordination: not following   Gait: defererd        I personally reviewed the below data/images/labs:  CBC Full  -  ( 2022 06:36 )  WBC Count : 6.69 K/uL  RBC Count : 4.48 M/uL  Hemoglobin : 8.9 g/dL  Hematocrit : 32.6 %  Platelet Count - Automated : 229 K/uL  Mean Cell Volume : 72.8 fl  Mean Cell Hemoglobin : 19.9 pg  Mean Cell Hemoglobin Concentration : 27.3 gm/dL  Auto Neutrophil # : x  Auto Lymphocyte # : x  Auto Monocyte # : x  Auto Eosinophil # : x  Auto Basophil # : x  Auto Neutrophil % : x  Auto Lymphocyte % : x  Auto Monocyte % : x  Auto Eosinophil % : x  Auto Basophil % : x        139  |  100  |  27<H>  ----------------------------<  125<H>  3.9   |  24  |  1.08    Ca    8.9      2022 06:33  Phos  3.2     -  Mg     2.0     -            Urinalysis Basic - ( 26 Dec 2021 23:05 )    Color: Light Yellow / Appearance: Clear / S.009 / pH: x  Gluc: x / Ketone: Negative  / Bili: Negative / Urobili: Negative   Blood: x / Protein: Trace / Nitrite: Negative   Leuk Esterase: Negative / RBC: 0 /hpf / WBC 0 /HPF   Sq Epi: x / Non Sq Epi: 1 /hpf / Bacteria: Negative      < from: CT Head No Cont (21 @ 22:24) >    ACC: [id number] EXAM:  CT BRAIN                          PROCEDURE DATE:  2021          INTERPRETATION:  CLINICAL INFORMATION: Delirium.    TECHNIQUE:  Axial CT images were acquired through the head.  Intravenous contrast: None  Two-dimensionalreformats were generated.    COMPARISON STUDY: None    FINDINGS:  There is no CT evidence of acute intracranial hemorrhage, mass effect,   midline shift or acute territorial infarct.    There is moderate generalized cerebral volume loss.  No clinically   significant chronic microvascular ischemic disease or white matter   lesions are visualized by CT technique.    There is an approximately 2.1 cm, partially calcified round extra-axial   mass in the posterior fossa arising from the posterior lateral aspect of   the right petrous temporal bone.    The left mastoid is sclerotic and underpneumatized.  There is no   clinically significant mastoid effusion.    The calvarium, skull base, and orbits appear unremarkable.    IMPRESSION:  No CT evidenceof acute intracranial pathology.    Approximately 2.1 cm, partially calcified mass in the posterior fossa,   arising from the posterior lateral aspect of the right petrous temporal   bone, most likely represents a meningioma.  Contrast-enhanced brainMRI   is recommended.  For further characterization.    --- End of Report ---          MARTY GALLARDO MD; Resident Radiology  This document has been electronically signed.  DILEEP RODAS MD; Attending Radiologist  This document has been electronically signed. Dec 26 2021 11:53PM    < end of copied text >    < from: MR Head w/wo IV Cont (21 @ 14:16) >    ACC: [id number] EXAM:  MR BRAIN WAW IC                          PROCEDURE DATE:  2021          INTERPRETATION:  MR BRAIN WITHOUT AND WITH IV CONTRAST    Clinical information: AMS    CT head with meningiom A MRI of the brain   was performed bothprior to and after the administration of intravenous   gadolinium.    TECHNIQUE:  In the sagittal plane T1 pre and post gadolinium enhanced imaging was   performed.  In the axial plane T1 pre-and postcontrast as well as T2,    FLAIR, SWAN, and diffusion weighted imaging was utilized.  In the coronal   plane T1 post gadolinium enhanced images were obtained.  Contrast administered 5 cc Gadavist. Contrast discarded 2.5 cc.    COMPARISON:  Exam is compared to head CT of 2021.    FINDINGS:  *  REGION OF CONCERN /EXTRA-AXIAL:  There is an enhancing extra-axial mass along the right lateral aspect of   the posterior cranial fossa. This abuts the dura overlying the posterior   lateral aspect of the right petrous ridge, tentorium, as well as the dura   overlying the sigmoid sinus. Small dural tails are present. The right   sigmoid sinus itself enhances normally. Mass measures grossly 2.2 x 1.5   cm axially by 2.0 cm craniocaudad. Mass was partly calcified on CT.   Imaging characteristics are most compatible with a calcified meningioma.    *  BRAIN PARENCHYMA:  Very mild mass effect on the adjacent right lateral aspect of the   cerebellum without evidence of abnormal parenchymal signal.  Patient has moderate brain atrophy. There aremild chronic   periventricular and subcortical white matter ischemic changes.    *  VENTRICLES:  There is no hydrocephalus. Ventricular prominence is thought related to   underlying brain atrophy..    *  ENHANCEMENT:  No additional lesion seen.    *OTHER:  A partly empty sella turcica is noted.    IMPRESSION:  2.2 X 1.5 X 2.0 CM  ENHANCING EXTRA-AXIAL MASS RIGHT LATERAL ASPECT OF   THE POSTERIOR CRANIAL FOSSA ABUTTING THE SIGMOID SINUS, TENTORIUM, AND   PETROUS BONE. IMAGING CHARACTERISTICS AREMOST COMPATIBLE WITH A   MENINGIOMA. NO ASSOCIATED EDEMA. PATENT RIGHT SIGMOID SINUS    --- End of Report ---            DORI MATOS MD; Attending Radiologist  This document has been electronically signed. Dec 29 2021  2:45PM    < end of copied text >

## 2022-01-06 NOTE — PROGRESS NOTE ADULT - PROVIDER SPECIALTY LIST ADULT
Internal Medicine
Internal Medicine
Neurology
Neurology
Cardiology
Neurology
Cardiology
Internal Medicine

## 2022-01-06 NOTE — PROGRESS NOTE ADULT - NSPROGADDITIONALINFOA_GEN_ALL_CORE
- Counseling on diet, exercise, and medication adherence was done  - Counseling on smoking cessation and alcohol consumption offered when appropriate.  - Pain assessed and judicious use of narcotics when appropriate was discussed.    - Stroke education given when appropriate.  - Importance of fall prevention discussed.   - Differential diagnosis and plan of care discussed with patient and/or family and primary team

## 2022-01-06 NOTE — DISCHARGE NOTE NURSING/CASE MANAGEMENT/SOCIAL WORK - PATIENT PORTAL LINK FT
You can access the FollowMyHealth Patient Portal offered by [facility] by registering at the following website: [email or web address]. By joining [facility]’s FollowMyHealth portal, you will also be able to view your health information using other applications (apps) compatible with our system.

## 2022-01-06 NOTE — PROGRESS NOTE ADULT - SUBJECTIVE AND OBJECTIVE BOX
Subjective: Patient seen and examined. No new events except as noted.     REVIEW OF SYSTEMS:    CONSTITUTIONAL:+ weakness, fevers or chills  EYES/ENT: No visual changes;  No vertigo or throat pain   NECK: No pain or stiffness  RESPIRATORY: No cough, wheezing, hemoptysis; No shortness of breath  CARDIOVASCULAR: No chest pain or palpitations  GASTROINTESTINAL: No abdominal or epigastric pain. No nausea, vomiting, or hematemesis; No diarrhea or constipation. No melena or hematochezia.  GENITOURINARY: No dysuria, frequency or hematuria  NEUROLOGICAL: No numbness or weakness  SKIN: No itching, burning, rashes, or lesions   All other review of systems is negative unless indicated above.    MEDICATIONS:  MEDICATIONS  (STANDING):  dextrose 40% Gel 15 Gram(s) Oral once  dextrose 5%. 1000 milliLiter(s) (50 mL/Hr) IV Continuous <Continuous>  dextrose 5%. 1000 milliLiter(s) (100 mL/Hr) IV Continuous <Continuous>  dextrose 50% Injectable 25 Gram(s) IV Push once  dextrose 50% Injectable 12.5 Gram(s) IV Push once  dextrose 50% Injectable 25 Gram(s) IV Push once  digoxin     Tablet 125 MICROGram(s) Oral daily  furosemide    Tablet 80 milliGRAM(s) Oral daily  glucagon  Injectable 1 milliGRAM(s) IntraMuscular once  heparin   Injectable 5000 Unit(s) SubCutaneous every 8 hours  insulin lispro (ADMELOG) corrective regimen sliding scale   SubCutaneous three times a day before meals  insulin lispro (ADMELOG) corrective regimen sliding scale   SubCutaneous at bedtime  melatonin 6 milliGRAM(s) Oral at bedtime  metoprolol succinate  milliGRAM(s) Oral daily  mirtazapine 30 milliGRAM(s) Oral daily  pantoprazole    Tablet 40 milliGRAM(s) Oral before breakfast  QUEtiapine 25 milliGRAM(s) Oral three times a day      PHYSICAL EXAM:  T(C): 36.7 (01-06-22 @ 04:53), Max: 37.4 (01-05-22 @ 21:42)  HR: 76 (01-06-22 @ 04:53) (69 - 100)  BP: 110/67 (01-06-22 @ 04:53) (110/67 - 122/79)  RR: 18 (01-06-22 @ 04:53) (18 - 18)  SpO2: 95% (01-06-22 @ 04:53) (93% - 98%)  Wt(kg): --  I&O's Summary    05 Jan 2022 07:01  -  06 Jan 2022 07:00  --------------------------------------------------------  IN: 600 mL / OUT: 0 mL / NET: 600 mL          Appearance: NAD  HEENT:   Normal oral mucosa, PERRL, EOMI	  Lymphatic: No lymphadenopathy , no edema  Cardiovascular: Irregular  S1 S2, No JVD, No murmurs , Peripheral pulses palpable 2+ bilaterally  Respiratory: Lungs clear to auscultation, normal effort 	  Gastrointestinal:  Soft, Non-tender, + BS	  Skin: No rashes, No ecchymoses, No cyanosis, warm to touch  Musculoskeletal: Normal range of motion, normal strength  Psychiatry:  Mood & affect appropriate  Ext: No edema      LABS:    CARDIAC MARKERS:                                8.9    6.69  )-----------( 229      ( 06 Jan 2022 06:36 )             32.6     01-06    139  |  100  |  27<H>  ----------------------------<  125<H>  3.9   |  24  |  1.08    Ca    8.9      06 Jan 2022 06:33  Phos  3.2     01-06  Mg     2.0     01-06      proBNP:   Lipid Profile:   HgA1c:   TSH:             TELEMETRY: 	    ECG:  	  RADIOLOGY:   DIAGNOSTIC TESTING:  [ ] Echocardiogram:  [ ]  Catheterization:  [ ] Stress Test:    OTHER:

## 2022-01-06 NOTE — PROGRESS NOTE ADULT - ASSESSMENT
Statement Selected 88 yo F with dementia, DM,, HTN, depression, CHF, Afib on digoxin stopped Eliquis due to recurrent GI bleed/anemia p/w agitation/AMS.   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 MAYES AAox1-2, agitation at times     Impression: AMS of unclear etiology, possible toxic metabolic encephalopathy given Leukocytosis. r/o CHF exacerbation./ Likely progression of her underlying neurocognitive disorder on top of delerium.    now + COVID  -   covid floor with airborne and contact isolation precuations  - monitor O2. and inflammatory markeres  - check QTC, if <500 seroquel and/or zyprexa PRN for agitation  - getting remeron QHS 30mg for sleep   - not on AC given anemia/GIB. consider DOAC for AF when able; now on digoxin  - f/u psych   - b12 WNL, rpr, TSH --> all WNL  - telemetry  - PT/OT  - check FS, glucose control <180  - GI/DVT ppx  - Thank you for allowing me to participate in the care of this patient. Call with questions.   - d/c plan when able to PRECIOUS. rejected from multiple facilities after covid quarantine . no objection to d/c when able   Konstantin Giordano MD  Vascular Neurology  Office: [phone]

## 2022-01-06 NOTE — DISCHARGE NOTE NURSING/CASE MANAGEMENT/SOCIAL WORK - NSDCPEFALRISK_GEN_ALL_CORE
For information on Fall & Injury Prevention, visit: [email or web address] OR  [email or web address] OR  https://www.cdc.gov/steadi/patient.html

## 2022-01-20 ENCOUNTER — INPATIENT (INPATIENT)
Facility: HOSPITAL | Age: 87
LOS: 7 days | Discharge: NURSING, REHAB OR LONG-TERM CARE | DRG: 640 | End: 2022-01-28
Attending: INTERNAL MEDICINE | Admitting: INTERNAL MEDICINE
Payer: MEDICARE

## 2022-01-20 VITALS
SYSTOLIC BLOOD PRESSURE: 132 MMHG | HEART RATE: 79 BPM | HEIGHT: 64 IN | TEMPERATURE: 98 F | WEIGHT: 154.98 LBS | OXYGEN SATURATION: 99 % | RESPIRATION RATE: 18 BRPM | DIASTOLIC BLOOD PRESSURE: 75 MMHG

## 2022-01-20 DIAGNOSIS — R41.82 ALTERED MENTAL STATUS, UNSPECIFIED: ICD-10-CM

## 2022-01-20 DIAGNOSIS — Z90.710 ACQUIRED ABSENCE OF BOTH CERVIX AND UTERUS: Chronic | ICD-10-CM

## 2022-01-20 DIAGNOSIS — Z29.9 ENCOUNTER FOR PROPHYLACTIC MEASURES, UNSPECIFIED: ICD-10-CM

## 2022-01-20 DIAGNOSIS — Z87.81 PERSONAL HISTORY OF (HEALED) TRAUMATIC FRACTURE: Chronic | ICD-10-CM

## 2022-01-20 DIAGNOSIS — F03.91 UNSPECIFIED DEMENTIA WITH BEHAVIORAL DISTURBANCE: ICD-10-CM

## 2022-01-20 DIAGNOSIS — I50.32 CHRONIC DIASTOLIC (CONGESTIVE) HEART FAILURE: ICD-10-CM

## 2022-01-20 DIAGNOSIS — E11.9 TYPE 2 DIABETES MELLITUS WITHOUT COMPLICATIONS: ICD-10-CM

## 2022-01-20 DIAGNOSIS — I48.20 CHRONIC ATRIAL FIBRILLATION, UNSPECIFIED: ICD-10-CM

## 2022-01-20 DIAGNOSIS — G93.41 METABOLIC ENCEPHALOPATHY: ICD-10-CM

## 2022-01-20 DIAGNOSIS — Z96.641 PRESENCE OF RIGHT ARTIFICIAL HIP JOINT: Chronic | ICD-10-CM

## 2022-01-20 DIAGNOSIS — Z98.1 ARTHRODESIS STATUS: Chronic | ICD-10-CM

## 2022-01-20 DIAGNOSIS — E87.0 HYPEROSMOLALITY AND HYPERNATREMIA: ICD-10-CM

## 2022-01-20 PROBLEM — I50.30 UNSPECIFIED DIASTOLIC (CONGESTIVE) HEART FAILURE: Chronic | Status: ACTIVE | Noted: 2021-10-27

## 2022-01-20 PROBLEM — F03.90 UNSPECIFIED DEMENTIA WITHOUT BEHAVIORAL DISTURBANCE: Chronic | Status: ACTIVE | Noted: 2021-10-27

## 2022-01-20 PROBLEM — I48.91 UNSPECIFIED ATRIAL FIBRILLATION: Chronic | Status: ACTIVE | Noted: 2021-10-27

## 2022-01-20 LAB
ALBUMIN SERPL ELPH-MCNC: 4.1 G/DL — SIGNIFICANT CHANGE UP (ref 3.3–5)
ALP SERPL-CCNC: 82 U/L — SIGNIFICANT CHANGE UP (ref 40–120)
ALT FLD-CCNC: 8 U/L — LOW (ref 10–45)
ANION GAP SERPL CALC-SCNC: 15 MMOL/L — SIGNIFICANT CHANGE UP (ref 5–17)
ANISOCYTOSIS BLD QL: SIGNIFICANT CHANGE UP
APPEARANCE UR: CLEAR — SIGNIFICANT CHANGE UP
APTT BLD: 27.9 SEC — SIGNIFICANT CHANGE UP (ref 27.5–35.5)
AST SERPL-CCNC: 12 U/L — SIGNIFICANT CHANGE UP (ref 10–40)
BACTERIA # UR AUTO: NEGATIVE — SIGNIFICANT CHANGE UP
BASOPHILS # BLD AUTO: 0 K/UL — SIGNIFICANT CHANGE UP (ref 0–0.2)
BASOPHILS NFR BLD AUTO: 0 % — SIGNIFICANT CHANGE UP (ref 0–2)
BILIRUB SERPL-MCNC: 1.3 MG/DL — HIGH (ref 0.2–1.2)
BILIRUB UR-MCNC: NEGATIVE — SIGNIFICANT CHANGE UP
BUN SERPL-MCNC: 50 MG/DL — HIGH (ref 7–23)
CALCIUM SERPL-MCNC: 10.2 MG/DL — SIGNIFICANT CHANGE UP (ref 8.4–10.5)
CHLORIDE SERPL-SCNC: 109 MMOL/L — HIGH (ref 96–108)
CO2 SERPL-SCNC: 30 MMOL/L — SIGNIFICANT CHANGE UP (ref 22–31)
COLOR SPEC: YELLOW — SIGNIFICANT CHANGE UP
CREAT SERPL-MCNC: 1.16 MG/DL — SIGNIFICANT CHANGE UP (ref 0.5–1.3)
DACRYOCYTES BLD QL SMEAR: SLIGHT — SIGNIFICANT CHANGE UP
DIFF PNL FLD: NEGATIVE — SIGNIFICANT CHANGE UP
ELLIPTOCYTES BLD QL SMEAR: SLIGHT — SIGNIFICANT CHANGE UP
EOSINOPHIL # BLD AUTO: 0.13 K/UL — SIGNIFICANT CHANGE UP (ref 0–0.5)
EOSINOPHIL NFR BLD AUTO: 1 % — SIGNIFICANT CHANGE UP (ref 0–6)
EPI CELLS # UR: 1 /HPF — SIGNIFICANT CHANGE UP
GLUCOSE BLDC GLUCOMTR-MCNC: 109 MG/DL — HIGH (ref 70–99)
GLUCOSE SERPL-MCNC: 175 MG/DL — HIGH (ref 70–99)
GLUCOSE UR QL: NEGATIVE — SIGNIFICANT CHANGE UP
HCT VFR BLD CALC: 40.3 % — SIGNIFICANT CHANGE UP (ref 34.5–45)
HGB BLD-MCNC: 10.7 G/DL — LOW (ref 11.5–15.5)
HYALINE CASTS # UR AUTO: 7 /LPF — HIGH (ref 0–2)
HYPOCHROMIA BLD QL: SLIGHT — SIGNIFICANT CHANGE UP
INR BLD: 1.33 RATIO — HIGH (ref 0.88–1.16)
KETONES UR-MCNC: NEGATIVE — SIGNIFICANT CHANGE UP
LEUKOCYTE ESTERASE UR-ACNC: NEGATIVE — SIGNIFICANT CHANGE UP
LYMPHOCYTES # BLD AUTO: 2.54 K/UL — SIGNIFICANT CHANGE UP (ref 1–3.3)
LYMPHOCYTES # BLD AUTO: 20 % — SIGNIFICANT CHANGE UP (ref 13–44)
MANUAL SMEAR VERIFICATION: SIGNIFICANT CHANGE UP
MCHC RBC-ENTMCNC: 20 PG — LOW (ref 27–34)
MCHC RBC-ENTMCNC: 26.6 GM/DL — LOW (ref 32–36)
MCV RBC AUTO: 75.2 FL — LOW (ref 80–100)
MICROCYTES BLD QL: SIGNIFICANT CHANGE UP
MONOCYTES # BLD AUTO: 1.4 K/UL — HIGH (ref 0–0.9)
MONOCYTES NFR BLD AUTO: 11 % — SIGNIFICANT CHANGE UP (ref 2–14)
NEUTROPHILS # BLD AUTO: 8.64 K/UL — HIGH (ref 1.8–7.4)
NEUTROPHILS NFR BLD AUTO: 68 % — SIGNIFICANT CHANGE UP (ref 43–77)
NITRITE UR-MCNC: NEGATIVE — SIGNIFICANT CHANGE UP
NRBC # BLD: 0 /100 — SIGNIFICANT CHANGE UP (ref 0–0)
OVALOCYTES BLD QL SMEAR: SLIGHT — SIGNIFICANT CHANGE UP
PH UR: 6 — SIGNIFICANT CHANGE UP (ref 5–8)
PLAT MORPH BLD: NORMAL — SIGNIFICANT CHANGE UP
PLATELET # BLD AUTO: 383 K/UL — SIGNIFICANT CHANGE UP (ref 150–400)
POIKILOCYTOSIS BLD QL AUTO: SIGNIFICANT CHANGE UP
POLYCHROMASIA BLD QL SMEAR: SLIGHT — SIGNIFICANT CHANGE UP
POTASSIUM SERPL-MCNC: 3.5 MMOL/L — SIGNIFICANT CHANGE UP (ref 3.5–5.3)
POTASSIUM SERPL-SCNC: 3.5 MMOL/L — SIGNIFICANT CHANGE UP (ref 3.5–5.3)
PROT SERPL-MCNC: 7.3 G/DL — SIGNIFICANT CHANGE UP (ref 6–8.3)
PROT UR-MCNC: SIGNIFICANT CHANGE UP
PROTHROM AB SERPL-ACNC: 15.8 SEC — HIGH (ref 10.6–13.6)
RBC # BLD: 5.36 M/UL — HIGH (ref 3.8–5.2)
RBC # FLD: 21.4 % — HIGH (ref 10.3–14.5)
RBC BLD AUTO: ABNORMAL
RBC CASTS # UR COMP ASSIST: 1 /HPF — SIGNIFICANT CHANGE UP (ref 0–4)
SARS-COV-2 RNA SPEC QL NAA+PROBE: SIGNIFICANT CHANGE UP
SCHISTOCYTES BLD QL AUTO: SLIGHT — SIGNIFICANT CHANGE UP
SODIUM SERPL-SCNC: 154 MMOL/L — HIGH (ref 135–145)
SP GR SPEC: 1.01 — SIGNIFICANT CHANGE UP (ref 1.01–1.02)
TROPONIN T, HIGH SENSITIVITY RESULT: 111 NG/L — HIGH (ref 0–51)
TROPONIN T, HIGH SENSITIVITY RESULT: 136 NG/L — HIGH (ref 0–51)
UROBILINOGEN FLD QL: NEGATIVE — SIGNIFICANT CHANGE UP
WBC # BLD: 12.7 K/UL — HIGH (ref 3.8–10.5)
WBC # FLD AUTO: 12.7 K/UL — HIGH (ref 3.8–10.5)
WBC UR QL: 1 /HPF — SIGNIFICANT CHANGE UP (ref 0–5)

## 2022-01-20 PROCEDURE — 71045 X-RAY EXAM CHEST 1 VIEW: CPT | Mod: 26

## 2022-01-20 PROCEDURE — 70450 CT HEAD/BRAIN W/O DYE: CPT | Mod: 26,MA

## 2022-01-20 PROCEDURE — 99284 EMERGENCY DEPT VISIT MOD MDM: CPT | Mod: FS

## 2022-01-20 PROCEDURE — 99223 1ST HOSP IP/OBS HIGH 75: CPT

## 2022-01-20 RX ORDER — DEXTROSE 50 % IN WATER 50 %
25 SYRINGE (ML) INTRAVENOUS ONCE
Refills: 0 | Status: DISCONTINUED | OUTPATIENT
Start: 2022-01-20 | End: 2022-01-28

## 2022-01-20 RX ORDER — ACETAMINOPHEN 500 MG
650 TABLET ORAL EVERY 6 HOURS
Refills: 0 | Status: DISCONTINUED | OUTPATIENT
Start: 2022-01-20 | End: 2022-01-28

## 2022-01-20 RX ORDER — SODIUM CHLORIDE 9 MG/ML
500 INJECTION INTRAMUSCULAR; INTRAVENOUS; SUBCUTANEOUS ONCE
Refills: 0 | Status: COMPLETED | OUTPATIENT
Start: 2022-01-20 | End: 2022-01-20

## 2022-01-20 RX ORDER — RIVAROXABAN 15 MG-20MG
1 KIT ORAL
Qty: 0 | Refills: 0 | DISCHARGE

## 2022-01-20 RX ORDER — DEXTROSE 50 % IN WATER 50 %
12.5 SYRINGE (ML) INTRAVENOUS ONCE
Refills: 0 | Status: DISCONTINUED | OUTPATIENT
Start: 2022-01-20 | End: 2022-01-28

## 2022-01-20 RX ORDER — SENNA PLUS 8.6 MG/1
2 TABLET ORAL AT BEDTIME
Refills: 0 | Status: DISCONTINUED | OUTPATIENT
Start: 2022-01-20 | End: 2022-01-28

## 2022-01-20 RX ORDER — QUETIAPINE FUMARATE 200 MG/1
1 TABLET, FILM COATED ORAL
Qty: 0 | Refills: 0 | DISCHARGE

## 2022-01-20 RX ORDER — ASCORBIC ACID 60 MG
1 TABLET,CHEWABLE ORAL
Qty: 0 | Refills: 0 | DISCHARGE

## 2022-01-20 RX ORDER — MIRTAZAPINE 45 MG/1
30 TABLET, ORALLY DISINTEGRATING ORAL AT BEDTIME
Refills: 0 | Status: DISCONTINUED | OUTPATIENT
Start: 2022-01-20 | End: 2022-01-28

## 2022-01-20 RX ORDER — LATANOPROST 0.05 MG/ML
1 SOLUTION/ DROPS OPHTHALMIC; TOPICAL
Qty: 0 | Refills: 0 | DISCHARGE

## 2022-01-20 RX ORDER — SODIUM CHLORIDE 9 MG/ML
1000 INJECTION, SOLUTION INTRAVENOUS
Refills: 0 | Status: DISCONTINUED | OUTPATIENT
Start: 2022-01-20 | End: 2022-01-24

## 2022-01-20 RX ORDER — PANTOPRAZOLE SODIUM 20 MG/1
1 TABLET, DELAYED RELEASE ORAL
Qty: 0 | Refills: 0 | DISCHARGE

## 2022-01-20 RX ORDER — FOLIC ACID 0.8 MG
1 TABLET ORAL DAILY
Refills: 0 | Status: DISCONTINUED | OUTPATIENT
Start: 2022-01-20 | End: 2022-01-28

## 2022-01-20 RX ORDER — DIGOXIN 250 MCG
1 TABLET ORAL
Qty: 0 | Refills: 0 | DISCHARGE

## 2022-01-20 RX ORDER — INSULIN LISPRO 100/ML
VIAL (ML) SUBCUTANEOUS AT BEDTIME
Refills: 0 | Status: DISCONTINUED | OUTPATIENT
Start: 2022-01-20 | End: 2022-01-28

## 2022-01-20 RX ORDER — METOPROLOL TARTRATE 50 MG
100 TABLET ORAL DAILY
Refills: 0 | Status: DISCONTINUED | OUTPATIENT
Start: 2022-01-20 | End: 2022-01-28

## 2022-01-20 RX ORDER — MIRTAZAPINE 45 MG/1
1 TABLET, ORALLY DISINTEGRATING ORAL
Qty: 0 | Refills: 0 | DISCHARGE

## 2022-01-20 RX ORDER — SITAGLIPTIN 50 MG/1
1 TABLET, FILM COATED ORAL
Qty: 0 | Refills: 0 | DISCHARGE

## 2022-01-20 RX ORDER — SERTRALINE 25 MG/1
1 TABLET, FILM COATED ORAL
Qty: 0 | Refills: 0 | DISCHARGE

## 2022-01-20 RX ORDER — DEXTROSE 50 % IN WATER 50 %
15 SYRINGE (ML) INTRAVENOUS ONCE
Refills: 0 | Status: DISCONTINUED | OUTPATIENT
Start: 2022-01-20 | End: 2022-01-28

## 2022-01-20 RX ORDER — QUETIAPINE FUMARATE 200 MG/1
25 TABLET, FILM COATED ORAL
Refills: 0 | Status: DISCONTINUED | OUTPATIENT
Start: 2022-01-20 | End: 2022-01-28

## 2022-01-20 RX ORDER — FUROSEMIDE 40 MG
1 TABLET ORAL
Qty: 0 | Refills: 0 | DISCHARGE

## 2022-01-20 RX ORDER — DIGOXIN 250 MCG
125 TABLET ORAL DAILY
Refills: 0 | Status: DISCONTINUED | OUTPATIENT
Start: 2022-01-20 | End: 2022-01-28

## 2022-01-20 RX ORDER — SODIUM CHLORIDE 9 MG/ML
1000 INJECTION, SOLUTION INTRAVENOUS
Refills: 0 | Status: DISCONTINUED | OUTPATIENT
Start: 2022-01-20 | End: 2022-01-28

## 2022-01-20 RX ORDER — PANTOPRAZOLE SODIUM 20 MG/1
40 TABLET, DELAYED RELEASE ORAL
Refills: 0 | Status: DISCONTINUED | OUTPATIENT
Start: 2022-01-20 | End: 2022-01-28

## 2022-01-20 RX ORDER — INSULIN LISPRO 100/ML
VIAL (ML) SUBCUTANEOUS
Refills: 0 | Status: DISCONTINUED | OUTPATIENT
Start: 2022-01-20 | End: 2022-01-28

## 2022-01-20 RX ORDER — OMEPRAZOLE 10 MG/1
1 CAPSULE, DELAYED RELEASE ORAL
Qty: 0 | Refills: 0 | DISCHARGE

## 2022-01-20 RX ORDER — GLUCAGON INJECTION, SOLUTION 0.5 MG/.1ML
1 INJECTION, SOLUTION SUBCUTANEOUS ONCE
Refills: 0 | Status: DISCONTINUED | OUTPATIENT
Start: 2022-01-20 | End: 2022-01-28

## 2022-01-20 RX ADMIN — MIRTAZAPINE 30 MILLIGRAM(S): 45 TABLET, ORALLY DISINTEGRATING ORAL at 22:23

## 2022-01-20 RX ADMIN — Medication 650 MILLIGRAM(S): at 22:23

## 2022-01-20 RX ADMIN — Medication 650 MILLIGRAM(S): at 23:00

## 2022-01-20 RX ADMIN — SENNA PLUS 2 TABLET(S): 8.6 TABLET ORAL at 22:23

## 2022-01-20 RX ADMIN — SODIUM CHLORIDE 75 MILLILITER(S): 9 INJECTION, SOLUTION INTRAVENOUS at 22:23

## 2022-01-20 RX ADMIN — SODIUM CHLORIDE 500 MILLILITER(S): 9 INJECTION INTRAMUSCULAR; INTRAVENOUS; SUBCUTANEOUS at 16:00

## 2022-01-20 RX ADMIN — SODIUM CHLORIDE 75 MILLILITER(S): 9 INJECTION, SOLUTION INTRAVENOUS at 20:40

## 2022-01-20 NOTE — ED ADULT NURSE REASSESSMENT NOTE - NS ED NURSE REASSESS COMMENT FT1
Received report from YOLA Sadler. Pt is A&Ox1 (oriented to self), resting comfortably in stretcher. Pt admitted as per MD orders, awaiting bed assignment. Call bell within reach, comfort & safety provided.

## 2022-01-20 NOTE — ED PROVIDER NOTE - PROGRESS NOTE DETAILS
Spoke with daughter/emergency contact, Nadiya  Who states she does not want pt transferred back to the [facility] and would like placement in a new rehab facility  TO be further d/w inpatient   Maurizio Bustamante PA-C Spoke with Dr. Lawson who accpeted pt for admission under Dr. Michael Bustamante PA-C

## 2022-01-20 NOTE — H&P ADULT - NSHPLABSRESULTS_GEN_ALL_CORE
LABS:                         10.7   12.70 )-----------( 383      ( 2022 14:34 )             40.3     01-    154<H>  |  109<H>  |  50<H>  ----------------------------<  175<H>  3.5   |  30  |  1.16    Ca    10.2      2022 14:34    TPro  7.3  /  Alb  4.1  /  TBili  1.3<H>  /  DBili  x   /  AST  12  /  ALT  8<L>  /  AlkPhos  82  -20    PT/INR - ( 2022 14:34 )   PT: 15.8 sec;   INR: 1.33 ratio         PTT - ( 2022 14:34 )  PTT:27.9 sec      Urinalysis Basic - ( 2022 17:16 )    Color: Yellow / Appearance: Clear / S.013 / pH: x  Gluc: x / Ketone: Negative  / Bili: Negative / Urobili: Negative   Blood: x / Protein: Trace / Nitrite: Negative   Leuk Esterase: Negative / RBC: 1 /hpf / WBC 1 /HPF   Sq Epi: x / Non Sq Epi: 1 /hpf / Bacteria: Negative            Records reviewed from prior hospitalization.  Labs reviewed remarkable for elevated wbc, cr 1.16, sodium 154, bili 1.4    CXR personally reviewed - clear lungs    < from: CT Head No Cont (22 @ 14:39) >      IMPRESSION:    No evidence of acute lobar infarct, intracranial hemorrhage or mass   effect.    Stable in size posterior fossa meningioma adjacent to the right   transverse/sigmoid sinus and tentorium cerebelli. Further correlation   with MRI of the brain with and without contrast is recommended for more   detailed evaluation, if thereare no contraindications.    < end of copied text >

## 2022-01-20 NOTE — ED PROVIDER NOTE - OBJECTIVE STATEMENT
89 yo F with a PMH of Afib not on AC, diastolic HF, DM, recent GI bleed s/p sigmoidoscopy for diverticular hemorrhage, dementia, from [facility] p/w worsening AMS/lethargy from baseline. Per their documentation pts daughter Eric went to see pt and was concerned that pt was more confused than normal and could not formulate a sentence so asked for pt to be transferred to ED. Per daughter/emergency contact, Nadiya, this change occurred acutely last week. Pt repeatedly asked for pt to be transferred but was never sent to the hospital until today. Tested COVID positive 12/2021. No fever, cough, diarrhea. Recent hospitalization for GIB, decision made to d/c AC.

## 2022-01-20 NOTE — ED PROVIDER NOTE - PHYSICAL EXAMINATION
CONSTITUTIONAL: Frail/chronically ill appearing. NAD.   ENT: Airway patent, dry mucous membranes.   EYES: Pupils equal, round and reactive to light. EOMI. Conjunctiva normal appearing.   CARDIAC: Normal rate, regular rhythm.  Heart sounds S1, S2.    RESPIRATORY: Breath sounds clear and equal bilaterally.   GASTROINTESTINAL: Abdomen soft, non-tender, not distended.  MUSCULOSKELETAL: Spine appears normal.  NEUROLOGICAL: Alert, ,mumbling and speaking incomprehensible words. Moving all extremities.   SKIN: Skin pale in color, warm, dry and intact.

## 2022-01-20 NOTE — H&P ADULT - NSHPPHYSICALEXAM_GEN_ALL_CORE
Vital Signs Last 24 Hrs  T(C): 36.6 (20 Jan 2022 13:06), Max: 36.6 (20 Jan 2022 13:06)  T(F): 97.8 (20 Jan 2022 13:06), Max: 97.8 (20 Jan 2022 13:06)  HR: 70 (20 Jan 2022 13:59) (70 - 79)  BP: 119/54 (20 Jan 2022 13:59) (119/54 - 132/75)  BP(mean): --  RR: 18 (20 Jan 2022 13:59) (18 - 18)  SpO2: 100% (20 Jan 2022 13:59) (99% - 100%)    PHYSICAL EXAM:  CONSTITUTIONAL: NAD, elderly, frail appearing  EYES: PERRLA; conjunctiva and sclera clear  ENMT: dry oral mucosa, no pharyngeal injection or exudates; normal dentition  NECK: Supple, no palpable masses; no thyromegaly  RESPIRATORY: Normal respiratory effort; lungs are clear to auscultation bilaterally  CARDIOVASCULAR: Regular rate and rhythm, normal S1 and S2, no murmur/rub/gallop; No lower extremity edema; Peripheral pulses are 2+ bilaterally  ABDOMEN: Nontender to palpation, normoactive bowel sounds, no rebound/guarding; No hepatosplenomegaly  MUSCULOSKELETAL:  gait unable to be assessed; no clubbing or cyanosis of digits; no joint swelling or tenderness to palpation  PSYCH: A+O to person only; affect appropriate  NEUROLOGY: smile symmetric, pupils reactive, tongue midline, moving all four ext, able to follow 1 step command  SKIN: No rashes; no palpable lesions
Patient with no florid ascites on physical exam; abdomen soft and nondistended.  However, patient previously treated for SBP and patient with esophageal varices.   Patient complains of diffuse abdominal pain particularly in RUQ and LLQ.  May benefit from at least diagnostic paracentesis. Obtain abdominal US to assess ascites fluid.

## 2022-01-20 NOTE — ED PROVIDER NOTE - CLINICAL SUMMARY MEDICAL DECISION MAKING FREE TEXT BOX
worsening mental status  r/o infectious cause/metabolic derangement  hx of Afib not on AC consider CVA    Labs  EKG  CXR/CTH  Gentle fluid hydration  Admit worsening mental status  r/o infectious cause/metabolic derangement  hx of Afib not on AC consider CVA    Labs  EKG  CXR/CTH  Gentle fluid hydration  Admit    DARIA Bran MD: Pt is an 88 y/o female with PMH AF (not on AC), diastolic CHF, DM, recent GIB, dementia is sent in from [facility] for AMS. Per daughter, pt's baseline is that she can speak, and pt was noted 1 week ago to be more confused and unable to speak. Daughter asked for transfer to ED, however, it did not happen until today. Pt tested + for covid in December. No recent fevers/chills, trauma. Plan: CTH, CXR, infectious/metabolic w/u, TBA for further eval and mgt

## 2022-01-20 NOTE — H&P ADULT - HISTORY OF PRESENT ILLNESS
history limited due to dementia  89F w/ PMHx of dementia, Afib not on AC due GIB, DM2, diastolic CHF presents with altered mental status. History largely per chart as pt unable to relate. Per ER notes, patient has known baseline dementia but was still able to hold conversation. On visit 1wk prior, pt was noted to have worsening confusion and speech was no longer making sense so requested to send to ER for further eval. Pt subsequently referred today for workup of same. No reported fever, chills, cough, respiratory distress or GIB. Pt has been off AC since prior admission for GIB. On interview pt is oriented to self only. Unable to relate hx of presentation. Denies pain or respiratory distress.

## 2022-01-20 NOTE — H&P ADULT - PROBLEM SELECTOR PLAN 2
-hold lasix  -1.4L free water defecit  -start D5W 75cc/hr x 10 hr, repeat sodium in AM  -goal sodium ~ 144 by tomorrow afternoon

## 2022-01-20 NOTE — ED PROVIDER NOTE - NSICDXPASTSURGICALHX_GEN_ALL_CORE_FT
PAST SURGICAL HISTORY:  History of ankle fusion left, 2001    History of hip replacement, total, right 2013    History of hysterectomy 1981    History of wrist fracture right 2006, left 2007    No significant past surgical history

## 2022-01-20 NOTE — ED PROVIDER NOTE - NSICDXFAMILYHX_GEN_ALL_CORE_FT
FAMILY HISTORY:  No pertinent family history in first degree relatives    Sibling  Still living? No  Family history of colon cancer, Age at diagnosis: Age Unknown    Child  Still living? Yes, Estimated age: 51-60  Family history of breast cancer, Age at diagnosis: Age Unknown

## 2022-01-20 NOTE — ED PROVIDER NOTE - NSICDXPASTMEDICALHX_GEN_ALL_CORE_FT
PAST MEDICAL HISTORY:  Atrial fibrillation     Dementia     Depression     Diabetes mellitus     Diastolic heart failure     Glaucoma     Hearing loss bilateral    Hypertension     Macular degeneration     Obesity (BMI 30-39.9)     Osteoarthritis of both knees     Urinary incontinence

## 2022-01-20 NOTE — ED ADULT NURSE NOTE - OBJECTIVE STATEMENT
Pt BIBA from [facility] for AMS.  Per Pt's daughter Sandra, pt is confused and not making sense.  Pt has baseline dementia.  Daughter requested transfer to [facility] for CT scan.  Pt has PMHx of Dementia, CHF, Afib, DM2, HTN, GI bleed, and anemia.  Pt alert and oriented x 0.  Pt minimally responsive.  Pt placed on cardiac monitor.  Respiratory Rate even and unlabored, lung sound clear to air.  Skin warm and dry.  Pt lined and labs sent.  One set of cultures draw and documented.

## 2022-01-20 NOTE — H&P ADULT - PROBLEM SELECTOR PLAN 1
-suspect due to hypernatremia from diuretic, poor PO, management as below  -low suspicion for infection as UA and CXR negative, non toxic appearing  -monitor off abx and f/u cultures

## 2022-01-20 NOTE — H&P ADULT - ASSESSMENT
89F w/ PMHx of dementia, Afib not on AC due GIB, DM2, diastolic CHF presents with altered mental status. Suspect acute metabolic encephalopathy due to hypernatremia in the setting of poor PO. No obvious sign of infection at this time. Head CT negative and no focal deficit on exam. Consider mrib if not improving with correction of lytes.

## 2022-01-21 LAB
A1C WITH ESTIMATED AVERAGE GLUCOSE RESULT: 7.3 % — HIGH (ref 4–5.6)
ANION GAP SERPL CALC-SCNC: 15 MMOL/L — SIGNIFICANT CHANGE UP (ref 5–17)
ANION GAP SERPL CALC-SCNC: 17 MMOL/L — SIGNIFICANT CHANGE UP (ref 5–17)
BUN SERPL-MCNC: 40 MG/DL — HIGH (ref 7–23)
BUN SERPL-MCNC: 45 MG/DL — HIGH (ref 7–23)
CALCIUM SERPL-MCNC: 9.7 MG/DL — SIGNIFICANT CHANGE UP (ref 8.4–10.5)
CALCIUM SERPL-MCNC: 9.9 MG/DL — SIGNIFICANT CHANGE UP (ref 8.4–10.5)
CHLORIDE SERPL-SCNC: 105 MMOL/L — SIGNIFICANT CHANGE UP (ref 96–108)
CHLORIDE SERPL-SCNC: 109 MMOL/L — HIGH (ref 96–108)
CO2 SERPL-SCNC: 28 MMOL/L — SIGNIFICANT CHANGE UP (ref 22–31)
CO2 SERPL-SCNC: 28 MMOL/L — SIGNIFICANT CHANGE UP (ref 22–31)
CREAT SERPL-MCNC: 0.98 MG/DL — SIGNIFICANT CHANGE UP (ref 0.5–1.3)
CREAT SERPL-MCNC: 1.11 MG/DL — SIGNIFICANT CHANGE UP (ref 0.5–1.3)
CULTURE RESULTS: NO GROWTH — SIGNIFICANT CHANGE UP
ESTIMATED AVERAGE GLUCOSE: 163 MG/DL — HIGH (ref 68–114)
GLUCOSE BLDC GLUCOMTR-MCNC: 126 MG/DL — HIGH (ref 70–99)
GLUCOSE BLDC GLUCOMTR-MCNC: 173 MG/DL — HIGH (ref 70–99)
GLUCOSE BLDC GLUCOMTR-MCNC: 178 MG/DL — HIGH (ref 70–99)
GLUCOSE BLDC GLUCOMTR-MCNC: 85 MG/DL — SIGNIFICANT CHANGE UP (ref 70–99)
GLUCOSE SERPL-MCNC: 137 MG/DL — HIGH (ref 70–99)
GLUCOSE SERPL-MCNC: 161 MG/DL — HIGH (ref 70–99)
HCT VFR BLD CALC: 41.6 % — SIGNIFICANT CHANGE UP (ref 34.5–45)
HGB BLD-MCNC: 11.1 G/DL — LOW (ref 11.5–15.5)
MCHC RBC-ENTMCNC: 20 PG — LOW (ref 27–34)
MCHC RBC-ENTMCNC: 26.7 GM/DL — LOW (ref 32–36)
MCV RBC AUTO: 75.1 FL — LOW (ref 80–100)
NRBC # BLD: 0 /100 WBCS — SIGNIFICANT CHANGE UP (ref 0–0)
PLATELET # BLD AUTO: 363 K/UL — SIGNIFICANT CHANGE UP (ref 150–400)
POTASSIUM SERPL-MCNC: 2.9 MMOL/L — CRITICAL LOW (ref 3.5–5.3)
POTASSIUM SERPL-MCNC: 3.1 MMOL/L — LOW (ref 3.5–5.3)
POTASSIUM SERPL-SCNC: 2.9 MMOL/L — CRITICAL LOW (ref 3.5–5.3)
POTASSIUM SERPL-SCNC: 3.1 MMOL/L — LOW (ref 3.5–5.3)
RBC # BLD: 5.54 M/UL — HIGH (ref 3.8–5.2)
RBC # FLD: 21.3 % — HIGH (ref 10.3–14.5)
SODIUM SERPL-SCNC: 148 MMOL/L — HIGH (ref 135–145)
SODIUM SERPL-SCNC: 154 MMOL/L — HIGH (ref 135–145)
SPECIMEN SOURCE: SIGNIFICANT CHANGE UP
WBC # BLD: 12.78 K/UL — HIGH (ref 3.8–10.5)
WBC # FLD AUTO: 12.78 K/UL — HIGH (ref 3.8–10.5)

## 2022-01-21 RX ORDER — SODIUM CHLORIDE 9 MG/ML
1000 INJECTION, SOLUTION INTRAVENOUS
Refills: 0 | Status: DISCONTINUED | OUTPATIENT
Start: 2022-01-21 | End: 2022-01-22

## 2022-01-21 RX ORDER — POTASSIUM CHLORIDE 20 MEQ
20 PACKET (EA) ORAL ONCE
Refills: 0 | Status: COMPLETED | OUTPATIENT
Start: 2022-01-21 | End: 2022-01-21

## 2022-01-21 RX ORDER — POTASSIUM CHLORIDE 20 MEQ
40 PACKET (EA) ORAL EVERY 4 HOURS
Refills: 0 | Status: COMPLETED | OUTPATIENT
Start: 2022-01-21 | End: 2022-01-22

## 2022-01-21 RX ORDER — POTASSIUM CHLORIDE 20 MEQ
10 PACKET (EA) ORAL
Refills: 0 | Status: COMPLETED | OUTPATIENT
Start: 2022-01-21 | End: 2022-01-22

## 2022-01-21 RX ADMIN — PANTOPRAZOLE SODIUM 40 MILLIGRAM(S): 20 TABLET, DELAYED RELEASE ORAL at 05:42

## 2022-01-21 RX ADMIN — SODIUM CHLORIDE 100 MILLILITER(S): 9 INJECTION, SOLUTION INTRAVENOUS at 14:00

## 2022-01-21 RX ADMIN — Medication 100 MILLIEQUIVALENT(S): at 23:45

## 2022-01-21 RX ADMIN — SENNA PLUS 2 TABLET(S): 8.6 TABLET ORAL at 22:03

## 2022-01-21 RX ADMIN — Medication 1: at 18:46

## 2022-01-21 RX ADMIN — MIRTAZAPINE 30 MILLIGRAM(S): 45 TABLET, ORALLY DISINTEGRATING ORAL at 22:02

## 2022-01-21 RX ADMIN — Medication 1: at 13:04

## 2022-01-21 RX ADMIN — QUETIAPINE FUMARATE 25 MILLIGRAM(S): 200 TABLET, FILM COATED ORAL at 17:15

## 2022-01-21 RX ADMIN — Medication 100 MILLIEQUIVALENT(S): at 22:49

## 2022-01-21 RX ADMIN — QUETIAPINE FUMARATE 25 MILLIGRAM(S): 200 TABLET, FILM COATED ORAL at 05:42

## 2022-01-21 NOTE — PROVIDER CONTACT NOTE (MEDICATION) - ASSESSMENT
Pt. awake and alert but confused. Daughter-in-law Bernarda visited and tries to convince pt. to take meds, unsuccessfully

## 2022-01-21 NOTE — CONSULT NOTE ADULT - SUBJECTIVE AND OBJECTIVE BOX
CHIEF COMPLAINT:    HISTORY OF PRESENT ILLNESS:  89F w/ PMHx of dementia, Afib not on AC due GIB, DM2, diastolic CHF presents with altered mental status. History largely per chart as pt unable to relate. Per ER notes, patient has known baseline dementia but was still able to hold conversation. On visit 1wk prior, pt was noted to have worsening confusion and speech was no longer making sense so requested to send to ER for further eval. Pt subsequently referred today for workup of same. No reported fever, chills, cough, respiratory distress or GIB. Pt has been off AC since prior admission for GIB. On interview pt is oriented to self only.         PAST MEDICAL & SURGICAL HISTORY:  Osteoarthritis of both knees    Hypertension    Urinary incontinence    Glaucoma    Macular degeneration    Hearing loss  bilateral    Obesity (BMI 30-39.9)    Depression    Diabetes mellitus    Atrial fibrillation    Dementia    Diastolic heart failure    History of hysterectomy  1981    History of ankle fusion  left, 2001    History of wrist fracture  right 2006, left 2007    History of hip replacement, total, right  2013    No significant past surgical history            MEDICATIONS:  digoxin     Tablet 125 MICROGram(s) Oral daily  metoprolol succinate  milliGRAM(s) Oral daily        acetaminophen     Tablet .. 650 milliGRAM(s) Oral every 6 hours PRN  mirtazapine 30 milliGRAM(s) Oral at bedtime  QUEtiapine 25 milliGRAM(s) Oral two times a day    pantoprazole    Tablet 40 milliGRAM(s) Oral before breakfast  senna 2 Tablet(s) Oral at bedtime    dextrose 40% Gel 15 Gram(s) Oral once  dextrose 50% Injectable 25 Gram(s) IV Push once  dextrose 50% Injectable 12.5 Gram(s) IV Push once  dextrose 50% Injectable 25 Gram(s) IV Push once  glucagon  Injectable 1 milliGRAM(s) IntraMuscular once  insulin lispro (ADMELOG) corrective regimen sliding scale   SubCutaneous three times a day before meals  insulin lispro (ADMELOG) corrective regimen sliding scale   SubCutaneous at bedtime    dextrose 5%. 1000 milliLiter(s) IV Continuous <Continuous>  dextrose 5%. 1000 milliLiter(s) IV Continuous <Continuous>  dextrose 5%. 1000 milliLiter(s) IV Continuous <Continuous>  folic acid 1 milliGRAM(s) Oral daily      FAMILY HISTORY:  Family history of breast cancer (Child)    Family history of colon cancer (Sibling)    No pertinent family history in first degree relatives        SOCIAL HISTORY:    [ ] Non-smoker  [ ] Smoker  [ ] Alcohol    Allergies    No Known Allergies    Intolerances    aspirin (Other)  	    REVIEW OF SYSTEMS:    [ ] All others negative	  [XX ] Unable to obtain    PHYSICAL EXAM:  T(C): 36.6 (01-21-22 @ 04:15), Max: 36.8 (01-20-22 @ 19:15)  HR: 72 (01-21-22 @ 04:15) (67 - 79)  BP: 114/59 (01-21-22 @ 04:15) (111/72 - 132/75)  RR: 18 (01-21-22 @ 04:15) (18 - 20)  SpO2: 96% (01-21-22 @ 04:15) (96% - 100%)  Wt(kg): --  I&O's Summary      Appearance: NAD  HEENT:  Dry oral mucosa, PERRL, EOMI	  Lymphatic: No lymphadenopathy  Cardiovascular: Normal S1 S2, No JVD, No murmurs, No edema  Respiratory: decreased bs   Psychiatry: A & O x 0  Gastrointestinal:  Soft, Non-tender, + BS	  Skin: No rashes, No ecchymoses, No cyanosis	  Neurologic: Non-focal  Extremities: Normal range of motion, No clubbing, cyanosis or edema  Vascular: Peripheral pulses palpable 2+ bilaterally    TELEMETRY: 	    ECG:  	Afib   RADIOLOGY:  < from: CT Head No Cont (01.20.22 @ 14:39) >    ACC: [id number] EXAM:  CT BRAIN                          PROCEDURE DATE:  01/20/2022          INTERPRETATION:  CT OF THE HEAD WITHOUT CONTRAST    CLINICAL INDICATION: Altered mental status.    TECHNIQUE: Volumetric CT acquisition was performed through the brain and   reviewed using brain and bone window technique. Sagittal and coronal   reconstructed images were obtained. Dose optimization techniques were   utilized including kVp/mA modulation along with iterative reconstructions.      COMPARISON: CT brain, 2/9/2021.    FINDINGS:    There is prominence of the ventricles, sulci and cisterns of the brain   which may be age related.  There are scattered white matter hypodensities   which are nonspecific but most commonly represent chronic microvascular   ischemic changes.  There is no CT evidence for established territorial   infarction.    There is no intracranial hemorrhage, midline shift or herniation. There   is no abnormal extra-axial fluid collection or hydrocephalus. Again noted   is a peripherally calcified mass in the posterior cranial fossa on the   right adjacent to the posterior aspect of the right transverse/sigmoid   sinus and to the tentorium cerebelli, presumably represent a calcified   meningioma. The mass measures 2.4 x1.4 cm, similar since prior   examination.    The visualized globes are symmetric bilaterally. The paranasal sinuses   and tympanomastoid air cells are clear.      IMPRESSION:    No evidence of acute lobar infarct, intracranial hemorrhage or mass   effect.    Stable in size posterior fossa meningioma adjacent to the right   transverse/sigmoid sinus and tentorium cerebelli. Further correlation   with MRI of the brain with and without contrast is recommended for more   detailed evaluation, if thereare no contraindications.    --- End of Report ---    < end of copied text >  < from: Xray Chest 1 View AP/PA (01.20.22 @ 14:58) >    ACC: [id number] EXAM:  XR CHEST AP OR PA 1V                          PROCEDURE DATE:  01/20/2022          INTERPRETATION:  EXAMINATION: XR CHEST    CLINICAL INDICATION: Altered mental status.    TECHNIQUE: Single portable view of the chest was obtained.    COMPARISON: X-ray chest 10/27/2021.    FINDINGS:    Heart size cannot be appropriately assessed in this projection. There are   no focal pulmonary consolidations or pneumothorax.    IMPRESSION:  Clear lungs.    --- End of Report ---          ELE TRIPP MD; Resident Interventional Radiology  This document has been electronically signed.  GWEN RHODES MD; Attending Radiologist  This document has been electronically signed. Jan 20 2022 10:51PM    < end of copied text >    OTHER: 	  	  LABS:	 	    CARDIAC MARKERS:        COVID-19 PCR (01.20.22 @ 14:35)   COVID-19 PCR: NotDetec: You can help in the fight against COVID-19. [facility] may contact   you to see if you are interested in voluntarily participating in one of   our clinical trials.                           11.1   12.78 )-----------( 363      ( 21 Jan 2022 07:40 )             41.6     01-21    154<H>  |  109<H>  |  45<H>  ----------------------------<  137<H>  3.1<L>   |  28  |  1.11    Ca    9.9      21 Jan 2022 07:37    TPro  7.3  /  Alb  4.1  /  TBili  1.3<H>  /  DBili  x   /  AST  12  /  ALT  8<L>  /  AlkPhos  82  01-20    proBNP:   Lipid Profile:   HgA1c:   TSH:

## 2022-01-21 NOTE — PROVIDER CONTACT NOTE (OTHER) - BACKGROUND
Pt admitted for altered mental status, PMH of atrial fibrillation (afib). At start of shift, HR 70-80s, afib.

## 2022-01-21 NOTE — CONSULT NOTE ADULT - PROBLEM SELECTOR RECOMMENDATION 9
suspect due to hypernatremia from diuretic, poor PO, management as below  low suspicion for infection as UA and CXR negative, non toxic appearing

## 2022-01-21 NOTE — CONSULT NOTE ADULT - ASSESSMENT
89F w/ PMHx of dementia, Afib not on AC due GIB, DM2, diastolic CHF presents with altered mental status. Suspect acute metabolic encephalopathy due to hypernatremia in the setting of poor PO. No obvious sign of infection at this time. Head CT negative and no focal deficit on exam.

## 2022-01-21 NOTE — CONSULT NOTE ADULT - SUBJECTIVE AND OBJECTIVE BOX
HPI: Ms. Saab is an 89 year-old woman with history of multiple medical issues including mild dementia, hypertension, diabetes mellitus, atrial fibrillation, and congestive heart failure with preserved ejection fraction. She presented 22 to the [facility] ER with worsening confusion over the past several days. Her serum sodium on admission yesterday was noted to be elevated at 154meq/L. She received 500cc of NS in the ER, and she was placed on D5W, 75cc/h x 10hours last night. Despite the IVF, her serum sodium remained at 154meq/L as of today. In light of the hypernatremia, a renal consultation was requested.    I see that Ms. Saab takes Lasix 80mg daily at home.    PAST MEDICAL & SURGICAL HISTORY:  Osteoarthritis of both knees  Hypertension  Urinary incontinence  Glaucoma  Macular degeneration  Hearing loss bilateral  Obesity (BMI 30-39.9)  Depression  Diabetes mellitus  Atrial fibrillation  Dementia  Diastolic heart failure  History of hysterectomy 1981  History of ankle fusion left,   History of wrist fracture right 2006, left 2007  History of hip replacement, total, right       Allergies  aspirin (Other)    SOCIAL HISTORY:  Denies ETOh,Smoking,     FAMILY HISTORY:  Family history of breast cancer (Child)  Family history of colon cancer (Sibling)    REVIEW OF SYSTEMS:  unable to obtain from patient due to dementia/delirium    VITAL:  T(C): , Max: 36.8 (22 @ 19:15)  T(F): , Max: 98.2 (22 @ 19:15)  HR: 81 (22 @ 12:14)  BP: 121/67 (22 @ 12:14)  RR: 18 (22 @ 12:14)  SpO2: 97% (22 @ 12:14)    PHYSICAL EXAM:  Constitutional: NAD, Alert  HEENT: NCAT, MMM  Neck: Supple, No JVD  Respiratory: CTA-b/l  Cardiovascular: RRR s1s2, no m/r/g  Gastrointestinal: BS+, soft, NT/ND  Extremities: No peripheral edema b/l  Neurological: no focal deficits; strength grossly intact  Back: no CVAT b/l  Skin: No rashes, no nevi    LABS:                        11.1   12.78 )-----------( 363      ( 2022 07:40 )             41.6     Na(154)/K(3.1)/Cl(109)/HCO3(28)/BUN(45)/Cr(1.11)Glu(137)/Ca(9.9)/Mg(--)/PO4(--)     @ 07:37  Na(154)/K(3.5)/Cl(109)/HCO3(30)/BUN(50)/Cr(1.16)Glu(175)/Ca(10.2)/Mg(--)/PO4(--)     @ 14:34    Urinalysis Basic - ( 2022 17:16 )  Color: Yellow / Appearance: Clear / S.013 / pH: x  Gluc: x / Ketone: Negative  / Bili: Negative / Urobili: Negative   Blood: x / Protein: Trace / Nitrite: Negative   Leuk Esterase: Negative / RBC: 1 /hpf / WBC 1 /HPF   Sq Epi: x / Non Sq Epi: 1 /hpf / Bacteria: Negative    IMAGING:  < from: Xray Chest 1 View AP/PA (22 @ 14:58) >  Clear lungs.    ASSESSMENT:  (1)Hypernatremia - likely multifactorial from (a)poor free water access of late, and (b)Lasix use, with associated impairment in urinary concentrating ability. 3.5L free water deficit.  (2)Hypokalemia - whole body deficit from limited intake, and urinary losses associated with Lasix.  (3)AMS - hypernatremia-induced?  RECOMMEND:  (1)D5W+10meq/L KCL, 100cc/h for now  (2)KCL 20meq po x 1  (3)No further Lasix for now  (4)BMP+Mg daily  (5)Dose new meds for GFR 50ml/min (present dosing is acceptable)    Thank you for involving [facility] in this patient's care.    With warm regards,    David Hong MD   [facility]  Office: [phone]  Cell: [phone]             HPI: Ms. Saab is an 89 year-old woman with history of multiple medical issues including mild dementia, hypertension, diabetes mellitus, atrial fibrillation, and congestive heart failure with preserved ejection fraction. She presented 22 to the [facility] ER with worsening confusion over the past several days. Her serum sodium on admission yesterday was noted to be elevated at 154meq/L. She received 500cc of NS in the ER, and she was placed on D5W, 75cc/h x 10hours last night. Despite the IVF, her serum sodium remained at 154meq/L as of today. In light of the hypernatremia, a renal consultation was requested.    I see that Ms. Saab takes Lasix 80mg daily at home.    Unable to obtain history from patient due to dementia/delirium        PAST MEDICAL & SURGICAL HISTORY:  Osteoarthritis of both knees  Hypertension  Urinary incontinence  Glaucoma  Macular degeneration  Hearing loss bilateral  Obesity (BMI 30-39.9)  Depression  Diabetes mellitus  Atrial fibrillation  Dementia  Diastolic heart failure  History of hysterectomy   History of ankle fusion left,   History of wrist fracture right 2006, left   History of hip replacement, total, right       Allergies  aspirin (Other)    SOCIAL HISTORY:  Denies ETOh,Smoking,     FAMILY HISTORY:  Family history of breast cancer (Child)  Family history of colon cancer (Sibling)    REVIEW OF SYSTEMS:  unable to obtain from patient due to dementia/delirium    VITAL:  T(C): , Max: 36.8 (22 @ 19:15)  T(F): , Max: 98.2 (22 @ 19:15)  HR: 81 (22 @ 12:14)  BP: 121/67 (22 @ 12:14)  RR: 18 (22 @ 12:14)  SpO2: 97% (22 @ 12:14)    PHYSICAL EXAM:  Constitutional: Not answering simple questions appropriately  HEENT: NCAT, MMM  Neck: Supple, No JVD  Respiratory: CTA-b/l  Cardiovascular: RRR s1s2, no m/r/g  Gastrointestinal: BS+, soft, NT/ND  Extremities: No peripheral edema b/l  Neurological: no focal deficits; strength grossly intact  Back: no CVAT b/l  Skin: No rashes, no nevi    LABS:                        11.1   12.78 )-----------( 363      ( 2022 07:40 )             41.6     Na(154)/K(3.1)/Cl(109)/HCO3(28)/BUN(45)/Cr(1.11)Glu(137)/Ca(9.9)/Mg(--)/PO4(--)     @ 07:37  Na(154)/K(3.5)/Cl(109)/HCO3(30)/BUN(50)/Cr(1.16)Glu(175)/Ca(10.2)/Mg(--)/PO4(--)     @ 14:34    Urinalysis Basic - ( 2022 17:16 )  Color: Yellow / Appearance: Clear / S.013 / pH: x  Gluc: x / Ketone: Negative  / Bili: Negative / Urobili: Negative   Blood: x / Protein: Trace / Nitrite: Negative   Leuk Esterase: Negative / RBC: 1 /hpf / WBC 1 /HPF   Sq Epi: x / Non Sq Epi: 1 /hpf / Bacteria: Negative    IMAGING:  < from: Xray Chest 1 View AP/PA (22 @ 14:58) >  Clear lungs.        ASSESSMENT:  (1)Hypernatremia - likely multifactorial from (a)poor free water access of late, and (b)Lasix use, with associated impairment in urinary concentrating ability. 3.5L free water deficit.  (2)Hypokalemia - whole body deficit from limited intake, and urinary losses associated with Lasix.  (3)AMS - hypernatremia-induced?      RECOMMEND:  (1)D5W+10meq/L KCL, 100cc/h for now  (2)KCL 20meq po x 1  (3)No further Lasix for now  (4)BMP+Mg daily  (5)Dose new meds for GFR 50ml/min (present dosing is acceptable)    Thank you for involving [facility] in this patient's care.    With warm regards,    David Hong MD   [facility]  Office: [phone]  Cell: [phone]

## 2022-01-21 NOTE — PROVIDER CONTACT NOTE (OTHER) - ASSESSMENT
Pt is A&Ox1, no signs of dizziness, lightheadedness, discomfort assessed. VSS. HR is sustaining 70-90s on telemetry, afib.

## 2022-01-21 NOTE — PROGRESS NOTE ADULT - SUBJECTIVE AND OBJECTIVE BOX
BENITA AMARO  89y Female  MRN:[id number]    Patient is a 89y old  Female who presents with a chief complaint of altered mental status (2022 13:01)    HPI:  history limited due to dementia  89F w/ PMHx of dementia, Afib not on AC due GIB, DM2, diastolic CHF presents with altered mental status. History largely per chart as pt unable to relate. Per ER notes, patient has known baseline dementia but was still able to hold conversation. On visit 1wk prior, pt was noted to have worsening confusion and speech was no longer making sense so requested to send to ER for further eval. Pt subsequently referred today for workup of same. No reported fever, chills, cough, respiratory distress or GIB. Pt has been off AC since prior admission for GIB. On interview pt is oriented to self only. Unable to relate hx of presentation. Denies pain or respiratory distress.  (2022 18:17)      Patient seen and evaluated at bedside. No acute events overnight except as noted.    Interval HPI: no acute events o/n     PAST MEDICAL & SURGICAL HISTORY:  Osteoarthritis of both knees    Hypertension    Urinary incontinence    Glaucoma    Macular degeneration    Hearing loss  bilateral    Obesity (BMI 30-39.9)    Depression    Diabetes mellitus    Atrial fibrillation    Dementia    Diastolic heart failure    History of hysterectomy  1981    History of ankle fusion  left, 2001    History of wrist fracture  right 2006, left 2007    History of hip replacement, total, right      No significant past surgical history        REVIEW OF SYSTEMS:  as per hpi       VITALS:  Vital Signs Last 24 Hrs  T(C): 36.1 (2022 12:14), Max: 36.8 (2022 19:15)  T(F): 97 (2022 12:14), Max: 98.2 (2022 19:15)  HR: 81 (2022 12:14) (67 - 81)  BP: 121/67 (2022 12:14) (111/72 - 127/68)  BP(mean): --  RR: 18 (2022 12:14) (18 - 20)  SpO2: 97% (2022 12:14) (96% - 100%)  CAPILLARY BLOOD GLUCOSE      POCT Blood Glucose.: 178 mg/dL (2022 12:57)  POCT Blood Glucose.: 126 mg/dL (2022 08:46)  POCT Blood Glucose.: 109 mg/dL (2022 21:28)    I&O's Summary      PHYSICAL EXAM:  GENERAL: NAD, well-developed  HEAD:  Atraumatic, Normocephalic  EYES: EOMI, PERRLA, conjunctiva and sclera clear  NECK: Supple, No JVD  CHEST/LUNG: Clear to auscultation bilaterally; No wheeze  HEART: S1, S2; No murmurs, rubs, or gallops  ABDOMEN: Soft, Nontender, Nondistended; Bowel sounds present  EXTREMITIES:  2+ Peripheral Pulses, No clubbing, cyanosis, or edema  PSYCH: Normal affect  NEUROLOGY: confused   SKIN: No rashes or lesions    Consultant(s) Notes Reviewed:  [x ] YES  [ ] NO  Care Discussed with Consultants/Other Providers [ x] YES  [ ] NO    MEDS:  MEDICATIONS  (STANDING):  dextrose 40% Gel 15 Gram(s) Oral once  dextrose 5% 1000 milliLiter(s) (100 mL/Hr) IV Continuous <Continuous>  dextrose 5%. 1000 milliLiter(s) (75 mL/Hr) IV Continuous <Continuous>  dextrose 5%. 1000 milliLiter(s) (50 mL/Hr) IV Continuous <Continuous>  dextrose 5%. 1000 milliLiter(s) (100 mL/Hr) IV Continuous <Continuous>  dextrose 50% Injectable 25 Gram(s) IV Push once  dextrose 50% Injectable 12.5 Gram(s) IV Push once  dextrose 50% Injectable 25 Gram(s) IV Push once  digoxin     Tablet 125 MICROGram(s) Oral daily  folic acid 1 milliGRAM(s) Oral daily  glucagon  Injectable 1 milliGRAM(s) IntraMuscular once  insulin lispro (ADMELOG) corrective regimen sliding scale   SubCutaneous three times a day before meals  insulin lispro (ADMELOG) corrective regimen sliding scale   SubCutaneous at bedtime  metoprolol succinate  milliGRAM(s) Oral daily  mirtazapine 30 milliGRAM(s) Oral at bedtime  pantoprazole    Tablet 40 milliGRAM(s) Oral before breakfast  potassium chloride    Tablet ER 20 milliEquivalent(s) Oral once  QUEtiapine 25 milliGRAM(s) Oral two times a day  senna 2 Tablet(s) Oral at bedtime    MEDICATIONS  (PRN):  acetaminophen     Tablet .. 650 milliGRAM(s) Oral every 6 hours PRN Temp greater or equal to 38C (100.4F), Mild Pain (1 - 3)    ALLERGIES:  aspirin (Other)  No Known Allergies      LABS:                        11.1   12.78 )-----------( 363      ( 2022 07:40 )             41.6     -    154<H>  |  109<H>  |  45<H>  ----------------------------<  137<H>  3.1<L>   |  28  |  1.11    Ca    9.9      2022 07:37    TPro  7.3  /  Alb  4.1  /  TBili  1.3<H>  /  DBili  x   /  AST  12  /  ALT  8<L>  /  AlkPhos  82  -    PT/INR - ( 2022 14:34 )   PT: 15.8 sec;   INR: 1.33 ratio         PTT - ( 2022 14:34 )  PTT:27.9 sec      LIVER FUNCTIONS - ( 2022 14:34 )  Alb: 4.1 g/dL / Pro: 7.3 g/dL / ALK PHOS: 82 U/L / ALT: 8 U/L / AST: 12 U/L / GGT: x           Urinalysis Basic - ( 2022 17:16 )    Color: Yellow / Appearance: Clear / S.013 / pH: x  Gluc: x / Ketone: Negative  / Bili: Negative / Urobili: Negative   Blood: x / Protein: Trace / Nitrite: Negative   Leuk Esterase: Negative / RBC: 1 /hpf / WBC 1 /HPF   Sq Epi: x / Non Sq Epi: 1 /hpf / Bacteria: Negative       < from: CT Head No Cont (22 @ 14:39) >  IMPRESSION:    No evidence of acute lobar infarct, intracranial hemorrhage or mass   effect.    Stable in size posterior fossa meningioma adjacent to the right   transverse/sigmoid sinus and tentorium cerebelli. Further correlation   with MRI of the brain with and without contrast is recommended for more   detailed evaluation, if thereare no contraindications.    < end of copied text >  < from: Xray Chest 1 View AP/PA (22 @ 14:58) >  IMPRESSION:  Clear lungs.    < end of copied text >

## 2022-01-21 NOTE — PROVIDER CONTACT NOTE (OTHER) - ACTION/TREATMENT ORDERED:
DWAINE Hurst notified, instructed to reschedule HR medications to 0800 and reassess with day team providers. Will endorse to day shift RN.

## 2022-01-22 LAB
ANION GAP SERPL CALC-SCNC: 14 MMOL/L — SIGNIFICANT CHANGE UP (ref 5–17)
BUN SERPL-MCNC: 34 MG/DL — HIGH (ref 7–23)
CALCIUM SERPL-MCNC: 9.2 MG/DL — SIGNIFICANT CHANGE UP (ref 8.4–10.5)
CHLORIDE SERPL-SCNC: 105 MMOL/L — SIGNIFICANT CHANGE UP (ref 96–108)
CO2 SERPL-SCNC: 28 MMOL/L — SIGNIFICANT CHANGE UP (ref 22–31)
CREAT SERPL-MCNC: 0.91 MG/DL — SIGNIFICANT CHANGE UP (ref 0.5–1.3)
GLUCOSE BLDC GLUCOMTR-MCNC: 104 MG/DL — HIGH (ref 70–99)
GLUCOSE BLDC GLUCOMTR-MCNC: 152 MG/DL — HIGH (ref 70–99)
GLUCOSE BLDC GLUCOMTR-MCNC: 201 MG/DL — HIGH (ref 70–99)
GLUCOSE BLDC GLUCOMTR-MCNC: 91 MG/DL — SIGNIFICANT CHANGE UP (ref 70–99)
GLUCOSE SERPL-MCNC: 114 MG/DL — HIGH (ref 70–99)
MAGNESIUM SERPL-MCNC: 2.1 MG/DL — SIGNIFICANT CHANGE UP (ref 1.6–2.6)
POTASSIUM SERPL-MCNC: 3.3 MMOL/L — LOW (ref 3.5–5.3)
POTASSIUM SERPL-SCNC: 3.3 MMOL/L — LOW (ref 3.5–5.3)
SODIUM SERPL-SCNC: 147 MMOL/L — HIGH (ref 135–145)

## 2022-01-22 RX ORDER — POTASSIUM CHLORIDE 20 MEQ
40 PACKET (EA) ORAL ONCE
Refills: 0 | Status: COMPLETED | OUTPATIENT
Start: 2022-01-22 | End: 2022-01-22

## 2022-01-22 RX ORDER — SODIUM CHLORIDE 9 MG/ML
1000 INJECTION, SOLUTION INTRAVENOUS
Refills: 0 | Status: DISCONTINUED | OUTPATIENT
Start: 2022-01-22 | End: 2022-01-23

## 2022-01-22 RX ORDER — POTASSIUM CHLORIDE 20 MEQ
40 PACKET (EA) ORAL ONCE
Refills: 0 | Status: DISCONTINUED | OUTPATIENT
Start: 2022-01-22 | End: 2022-01-22

## 2022-01-22 RX ADMIN — SENNA PLUS 2 TABLET(S): 8.6 TABLET ORAL at 20:35

## 2022-01-22 RX ADMIN — Medication 1 MILLIGRAM(S): at 17:55

## 2022-01-22 RX ADMIN — Medication 1: at 09:52

## 2022-01-22 RX ADMIN — Medication 100 MILLIEQUIVALENT(S): at 00:59

## 2022-01-22 RX ADMIN — Medication 2: at 17:55

## 2022-01-22 RX ADMIN — MIRTAZAPINE 30 MILLIGRAM(S): 45 TABLET, ORALLY DISINTEGRATING ORAL at 20:36

## 2022-01-22 RX ADMIN — QUETIAPINE FUMARATE 25 MILLIGRAM(S): 200 TABLET, FILM COATED ORAL at 10:25

## 2022-01-22 RX ADMIN — QUETIAPINE FUMARATE 25 MILLIGRAM(S): 200 TABLET, FILM COATED ORAL at 20:36

## 2022-01-22 RX ADMIN — SODIUM CHLORIDE 100 MILLILITER(S): 9 INJECTION, SOLUTION INTRAVENOUS at 14:52

## 2022-01-22 RX ADMIN — SODIUM CHLORIDE 50 MILLILITER(S): 9 INJECTION, SOLUTION INTRAVENOUS at 22:40

## 2022-01-22 RX ADMIN — Medication 100 MILLIGRAM(S): at 10:25

## 2022-01-22 RX ADMIN — Medication 40 MILLIEQUIVALENT(S): at 17:55

## 2022-01-22 RX ADMIN — Medication 125 MICROGRAM(S): at 10:26

## 2022-01-22 NOTE — PROGRESS NOTE ADULT - SUBJECTIVE AND OBJECTIVE BOX
Jorge A Timothy  2010  [id number]    Situation:  Verbal report received from: Kiara Brown RN  Procedure: Procedure(s):  ESOPHAGOGASTRODUODENOSCOPY (EGD)  ESOPHAGOGASTRODUODENAL (EGD) BIOPSY    Background:    Preoperative diagnosis: Abdominal pain  Reflux  Postoperative diagnosis: Normal     :  Dr. Tomasa Halsted  Assistant(s): Endoscopy Technician-1: Oscar De  Endoscopy RN-1: Roel Marinelli RN    Specimens:   ID Type Source Tests Collected by Time Destination   1 : Duodenum Biopsy Preservative   Ashleigh Ludwig MD 8/14/2018 1448 Pathology   2 : Stomach Biopsy Preservative   Ashleigh Ludwig MD 8/14/2018 1450 Pathology   3 : Distal Esophagus Biopsy Vetoative   Ashleigh Ludwig MD 8/14/2018 1451 Pathology   4 : Proximal Esophagus Biopsy Presdesmondative   Ashleigh Ludwig MD 8/14/2018 1452 Pathology     H. Pylori  no    Assessment:  Intra-procedure medications     Anesthesia gave intra-procedure sedation and medications, see anesthesia flow sheet yes    Intravenous fluids: NS@ KVO     Vital signs stable     Abdominal assessment: round and soft     Recommendation:  Discharge patient per MD order.     Family or Friend   Permission to share finding with family or friend yes Patient seen and examined.  Alert and awake.  NAD noted.  on IVF at 100cc/hr    REVIEW OF SYSTEMS:  UTO/dementia    MEDICATIONS  (STANDING):  dextrose 40% Gel 15 Gram(s) Oral once  dextrose 5% 1000 milliLiter(s) (50 mL/Hr) IV Continuous <Continuous>  dextrose 5%. 1000 milliLiter(s) (100 mL/Hr) IV Continuous <Continuous>  dextrose 5%. 1000 milliLiter(s) (50 mL/Hr) IV Continuous <Continuous>  dextrose 5%. 1000 milliLiter(s) (75 mL/Hr) IV Continuous <Continuous>  dextrose 50% Injectable 25 Gram(s) IV Push once  dextrose 50% Injectable 12.5 Gram(s) IV Push once  dextrose 50% Injectable 25 Gram(s) IV Push once  digoxin     Tablet 125 MICROGram(s) Oral daily  folic acid 1 milliGRAM(s) Oral daily  glucagon  Injectable 1 milliGRAM(s) IntraMuscular once  insulin lispro (ADMELOG) corrective regimen sliding scale   SubCutaneous three times a day before meals  insulin lispro (ADMELOG) corrective regimen sliding scale   SubCutaneous at bedtime  metoprolol succinate  milliGRAM(s) Oral daily  mirtazapine 30 milliGRAM(s) Oral at bedtime  pantoprazole    Tablet 40 milliGRAM(s) Oral before breakfast  QUEtiapine 25 milliGRAM(s) Oral two times a day  senna 2 Tablet(s) Oral at bedtime      VITAL:  T(C): , Max: 36.5 (01-22-22 @ 11:41)  T(F): , Max: 97.7 (01-22-22 @ 11:41)  HR: 66 (01-22-22 @ 11:41)  BP: 118/71 (01-22-22 @ 11:41)  BP(mean): --  RR: 18 (01-22-22 @ 11:41)  SpO2: 97% (01-22-22 @ 11:41)  Wt(kg): --    I and O's:    01-21 @ 07:01  -  01-22 @ 07:00  --------------------------------------------------------  IN: 2630 mL / OUT: 0 mL / NET: 2630 mL    01-22 @ 07:01  -  01-22 @ 18:42  --------------------------------------------------------  IN: 180 mL / OUT: 250 mL / NET: -70 mL          PHYSICAL EXAM:    Constitutional: Not answering simple questions appropriately  HEENT: NCAT, MMM  Neck: Supple, No JVD  Respiratory: CTA-b/l  Cardiovascular: RRR s1s2, no m/r/g  Gastrointestinal: BS+, soft, NT/ND  Extremities: No peripheral edema b/l  Neurological: no focal deficits; strength grossly intact        LABS:                        11.1   12.78 )-----------( 363      ( 21 Jan 2022 07:40 )             41.6     01-22    147<H>  |  105  |  34<H>  ----------------------------<  114<H>  3.3<L>   |  28  |  0.91    Ca    9.2      22 Jan 2022 06:51  Mg     2.1     01-22

## 2022-01-22 NOTE — PROGRESS NOTE ADULT - SUBJECTIVE AND OBJECTIVE BOX
BENITA AMARO  89y Female  MRN:[id number]    Patient is a 89y old  Female who presents with a chief complaint of altered mental status (21 Jan 2022 13:01)    HPI:  history limited due to dementia  89F w/ PMHx of dementia, Afib not on AC due GIB, DM2, diastolic CHF presents with altered mental status. History largely per chart as pt unable to relate. Per ER notes, patient has known baseline dementia but was still able to hold conversation. On visit 1wk prior, pt was noted to have worsening confusion and speech was no longer making sense so requested to send to ER for further eval. Pt subsequently referred today for workup of same. No reported fever, chills, cough, respiratory distress or GIB. Pt has been off AC since prior admission for GIB. On interview pt is oriented to self only. Unable to relate hx of presentation. Denies pain or respiratory distress.  (20 Jan 2022 18:17)      Patient seen and evaluated at bedside. No acute events overnight except as noted.    Interval HPI: no acute events o/n     PAST MEDICAL & SURGICAL HISTORY:  Osteoarthritis of both knees    Hypertension    Urinary incontinence    Glaucoma    Macular degeneration    Hearing loss  bilateral    Obesity (BMI 30-39.9)    Depression    Diabetes mellitus    Atrial fibrillation    Dementia    Diastolic heart failure    History of hysterectomy  1981    History of ankle fusion  left, 2001    History of wrist fracture  right 2006, left 2007    History of hip replacement, total, right  2013    No significant past surgical history        REVIEW OF SYSTEMS:  as per hpi       VITALS:   Vital Signs Last 24 Hrs  T(C): 36.7 (22 Jan 2022 21:10), Max: 36.7 (22 Jan 2022 21:10)  T(F): 98 (22 Jan 2022 21:10), Max: 98 (22 Jan 2022 21:10)  HR: 57 (22 Jan 2022 21:10) (57 - 85)  BP: 96/58 (22 Jan 2022 21:10) (96/58 - 135/88)  BP(mean): --  RR: 17 (22 Jan 2022 21:10) (16 - 18)  SpO2: 95% (22 Jan 2022 21:10) (93% - 97%)      PHYSICAL EXAM:  GENERAL: NAD, well-developed  HEAD:  Atraumatic, Normocephalic  EYES: EOMI, PERRLA, conjunctiva and sclera clear  NECK: Supple, No JVD  CHEST/LUNG: Clear to auscultation bilaterally; No wheeze  HEART: S1, S2; No murmurs, rubs, or gallops  ABDOMEN: Soft, Nontender, Nondistended; Bowel sounds present  EXTREMITIES:  2+ Peripheral Pulses, No clubbing, cyanosis, or edema  PSYCH: Normal affect  NEUROLOGY: confused   SKIN: No rashes or lesions    Consultant(s) Notes Reviewed:  [x ] YES  [ ] NO  Care Discussed with Consultants/Other Providers [ x] YES  [ ] NO    MEDS:   MEDICATIONS  (STANDING):  dextrose 40% Gel 15 Gram(s) Oral once  dextrose 5% 1000 milliLiter(s) (50 mL/Hr) IV Continuous <Continuous>  dextrose 5%. 1000 milliLiter(s) (75 mL/Hr) IV Continuous <Continuous>  dextrose 5%. 1000 milliLiter(s) (50 mL/Hr) IV Continuous <Continuous>  dextrose 5%. 1000 milliLiter(s) (100 mL/Hr) IV Continuous <Continuous>  dextrose 50% Injectable 25 Gram(s) IV Push once  dextrose 50% Injectable 12.5 Gram(s) IV Push once  dextrose 50% Injectable 25 Gram(s) IV Push once  digoxin     Tablet 125 MICROGram(s) Oral daily  folic acid 1 milliGRAM(s) Oral daily  glucagon  Injectable 1 milliGRAM(s) IntraMuscular once  insulin lispro (ADMELOG) corrective regimen sliding scale   SubCutaneous three times a day before meals  insulin lispro (ADMELOG) corrective regimen sliding scale   SubCutaneous at bedtime  metoprolol succinate  milliGRAM(s) Oral daily  mirtazapine 30 milliGRAM(s) Oral at bedtime  pantoprazole    Tablet 40 milliGRAM(s) Oral before breakfast  potassium chloride    Tablet ER 40 milliEquivalent(s) Oral once  QUEtiapine 25 milliGRAM(s) Oral two times a day  senna 2 Tablet(s) Oral at bedtime    MEDICATIONS  (PRN):  acetaminophen     Tablet .. 650 milliGRAM(s) Oral every 6 hours PRN Temp greater or equal to 38C (100.4F), Mild Pain (1 - 3)      ALLERGIES:  aspirin (Other)  No Known Allergies      LABS:                                              11.1   12.78 )-----------( 363      ( 21 Jan 2022 07:40 )             41.6   01-22    147<H>  |  105  |  34<H>  ----------------------------<  114<H>  3.3<L>   |  28  |  0.91    Ca    9.2      22 Jan 2022 06:51  Mg     2.1     01-22           < from: CT Head No Cont (01.20.22 @ 14:39) >  IMPRESSION:    No evidence of acute lobar infarct, intracranial hemorrhage or mass   effect.    Stable in size posterior fossa meningioma adjacent to the right   transverse/sigmoid sinus and tentorium cerebelli. Further correlation   with MRI of the brain with and without contrast is recommended for more   detailed evaluation, if thereare no contraindications.    < end of copied text >  < from: Xray Chest 1 View AP/PA (01.20.22 @ 14:58) >  IMPRESSION:  Clear lungs.    < end of copied text >

## 2022-01-22 NOTE — PROGRESS NOTE ADULT - SUBJECTIVE AND OBJECTIVE BOX
Subjective: Patient seen and examined. No new events except as noted.   no chest pain  no sob    REVIEW OF SYSTEMS:    CONSTITUTIONAL: + weakness, fevers or chills  EYES/ENT: No visual changes;  No vertigo or throat pain   NECK: No pain or stiffness  RESPIRATORY: No cough, wheezing, hemoptysis; No shortness of breath  CARDIOVASCULAR: No chest pain or palpitations  GASTROINTESTINAL: No abdominal or epigastric pain. No nausea, vomiting, or hematemesis; No diarrhea or constipation. No melena or hematochezia.  GENITOURINARY: No dysuria, frequency or hematuria  NEUROLOGICAL: No numbness or weakness  SKIN: No itching, burning, rashes, or lesions   All other review of systems is negative unless indicated above.    MEDICATIONS:  MEDICATIONS  (STANDING):  dextrose 40% Gel 15 Gram(s) Oral once  dextrose 5% 1000 milliLiter(s) (100 mL/Hr) IV Continuous <Continuous>  dextrose 5%. 1000 milliLiter(s) (75 mL/Hr) IV Continuous <Continuous>  dextrose 5%. 1000 milliLiter(s) (50 mL/Hr) IV Continuous <Continuous>  dextrose 5%. 1000 milliLiter(s) (100 mL/Hr) IV Continuous <Continuous>  dextrose 50% Injectable 25 Gram(s) IV Push once  dextrose 50% Injectable 12.5 Gram(s) IV Push once  dextrose 50% Injectable 25 Gram(s) IV Push once  digoxin     Tablet 125 MICROGram(s) Oral daily  folic acid 1 milliGRAM(s) Oral daily  glucagon  Injectable 1 milliGRAM(s) IntraMuscular once  insulin lispro (ADMELOG) corrective regimen sliding scale   SubCutaneous three times a day before meals  insulin lispro (ADMELOG) corrective regimen sliding scale   SubCutaneous at bedtime  metoprolol succinate  milliGRAM(s) Oral daily  mirtazapine 30 milliGRAM(s) Oral at bedtime  pantoprazole    Tablet 40 milliGRAM(s) Oral before breakfast  QUEtiapine 25 milliGRAM(s) Oral two times a day  senna 2 Tablet(s) Oral at bedtime      PHYSICAL EXAM:  T(C): 36.4 (01-22-22 @ 05:08), Max: 36.4 (01-22-22 @ 05:08)  HR: 85 (01-22-22 @ 05:58) (57 - 85)  BP: 135/88 (01-22-22 @ 05:08) (114/74 - 135/88)  RR: 16 (01-22-22 @ 05:08) (16 - 18)  SpO2: 93% (01-22-22 @ 05:08) (93% - 97%)  Wt(kg): --  I&O's Summary    21 Jan 2022 07:01  -  22 Jan 2022 07:00  --------------------------------------------------------  IN: 2630 mL / OUT: 0 mL / NET: 2630 mL      Appearance: NAD  HEENT:  Dry oral mucosa, PERRL, EOMI	  Lymphatic: No lymphadenopathy  Cardiovascular: Normal S1 S2, No JVD, No murmurs, No edema  Respiratory: decreased bs   Psychiatry: A & O x 0  Gastrointestinal:  Soft, Non-tender, + BS	  Skin: No rashes, No ecchymoses, No cyanosis	  Neurologic: Non-focal  Extremities: Normal range of motion, No clubbing, cyanosis or edema  Vascular: Peripheral pulses palpable 2+ bilaterally    LABS:    CARDIAC MARKERS:                 11.1   12.78 )-----------( 363      ( 21 Jan 2022 07:40 )             41.6     01-22    147<H>  |  105  |  34<H>  ----------------------------<  114<H>  3.3<L>   |  28  |  0.91    Ca    9.2      22 Jan 2022 06:51  Mg     2.1     01-22    TPro  7.3  /  Alb  4.1  /  TBili  1.3<H>  /  DBili  x   /  AST  12  /  ALT  8<L>  /  AlkPhos  82  01-20    proBNP:   Lipid Profile:   HgA1c:   TSH:     7      TELEMETRY: 	    ECG:  	  RADIOLOGY:   DIAGNOSTIC TESTING:  [ ] Echocardiogram:  [ ]  Catheterization:  [ ] Stress Test:    OTHER:

## 2022-01-23 LAB
ANION GAP SERPL CALC-SCNC: 19 MMOL/L — HIGH (ref 5–17)
BUN SERPL-MCNC: 25 MG/DL — HIGH (ref 7–23)
CALCIUM SERPL-MCNC: 9.4 MG/DL — SIGNIFICANT CHANGE UP (ref 8.4–10.5)
CHLORIDE SERPL-SCNC: 103 MMOL/L — SIGNIFICANT CHANGE UP (ref 96–108)
CO2 SERPL-SCNC: 20 MMOL/L — LOW (ref 22–31)
CREAT SERPL-MCNC: 0.8 MG/DL — SIGNIFICANT CHANGE UP (ref 0.5–1.3)
GLUCOSE BLDC GLUCOMTR-MCNC: 115 MG/DL — HIGH (ref 70–99)
GLUCOSE BLDC GLUCOMTR-MCNC: 143 MG/DL — HIGH (ref 70–99)
GLUCOSE BLDC GLUCOMTR-MCNC: 170 MG/DL — HIGH (ref 70–99)
GLUCOSE BLDC GLUCOMTR-MCNC: 173 MG/DL — HIGH (ref 70–99)
GLUCOSE SERPL-MCNC: 96 MG/DL — SIGNIFICANT CHANGE UP (ref 70–99)
MAGNESIUM SERPL-MCNC: 2.1 MG/DL — SIGNIFICANT CHANGE UP (ref 1.6–2.6)
PHOSPHATE SERPL-MCNC: 2.7 MG/DL — SIGNIFICANT CHANGE UP (ref 2.5–4.5)
POTASSIUM SERPL-MCNC: 5.2 MMOL/L — SIGNIFICANT CHANGE UP (ref 3.5–5.3)
POTASSIUM SERPL-SCNC: 5.2 MMOL/L — SIGNIFICANT CHANGE UP (ref 3.5–5.3)
SODIUM SERPL-SCNC: 142 MMOL/L — SIGNIFICANT CHANGE UP (ref 135–145)

## 2022-01-23 RX ADMIN — Medication 125 MICROGRAM(S): at 05:18

## 2022-01-23 RX ADMIN — QUETIAPINE FUMARATE 25 MILLIGRAM(S): 200 TABLET, FILM COATED ORAL at 05:18

## 2022-01-23 RX ADMIN — Medication 100 MILLIGRAM(S): at 05:18

## 2022-01-23 RX ADMIN — SODIUM CHLORIDE 50 MILLILITER(S): 9 INJECTION, SOLUTION INTRAVENOUS at 09:15

## 2022-01-23 RX ADMIN — Medication 1 MILLIGRAM(S): at 09:21

## 2022-01-23 RX ADMIN — Medication 40 MILLIEQUIVALENT(S): at 00:05

## 2022-01-23 RX ADMIN — Medication 1: at 13:49

## 2022-01-23 RX ADMIN — MIRTAZAPINE 30 MILLIGRAM(S): 45 TABLET, ORALLY DISINTEGRATING ORAL at 21:20

## 2022-01-23 RX ADMIN — PANTOPRAZOLE SODIUM 40 MILLIGRAM(S): 20 TABLET, DELAYED RELEASE ORAL at 05:18

## 2022-01-23 RX ADMIN — QUETIAPINE FUMARATE 25 MILLIGRAM(S): 200 TABLET, FILM COATED ORAL at 17:37

## 2022-01-23 RX ADMIN — Medication 1: at 18:00

## 2022-01-23 NOTE — PROGRESS NOTE ADULT - SUBJECTIVE AND OBJECTIVE BOX
Subjective: Patient seen and examined. No new events except as noted.     REVIEW OF SYSTEMS:  Unable to obtain     MEDICATIONS:  MEDICATIONS  (STANDING):  dextrose 40% Gel 15 Gram(s) Oral once  dextrose 5% 1000 milliLiter(s) (50 mL/Hr) IV Continuous <Continuous>  dextrose 5%. 1000 milliLiter(s) (100 mL/Hr) IV Continuous <Continuous>  dextrose 5%. 1000 milliLiter(s) (50 mL/Hr) IV Continuous <Continuous>  dextrose 5%. 1000 milliLiter(s) (75 mL/Hr) IV Continuous <Continuous>  dextrose 50% Injectable 25 Gram(s) IV Push once  dextrose 50% Injectable 25 Gram(s) IV Push once  dextrose 50% Injectable 12.5 Gram(s) IV Push once  digoxin     Tablet 125 MICROGram(s) Oral daily  folic acid 1 milliGRAM(s) Oral daily  glucagon  Injectable 1 milliGRAM(s) IntraMuscular once  insulin lispro (ADMELOG) corrective regimen sliding scale   SubCutaneous three times a day before meals  insulin lispro (ADMELOG) corrective regimen sliding scale   SubCutaneous at bedtime  metoprolol succinate  milliGRAM(s) Oral daily  mirtazapine 30 milliGRAM(s) Oral at bedtime  pantoprazole    Tablet 40 milliGRAM(s) Oral before breakfast  QUEtiapine 25 milliGRAM(s) Oral two times a day  senna 2 Tablet(s) Oral at bedtime      PHYSICAL EXAM:  T(C): 36.7 (01-22-22 @ 21:10), Max: 36.7 (01-22-22 @ 21:10)  HR: 57 (01-22-22 @ 21:10) (57 - 85)  BP: 96/58 (01-22-22 @ 21:10) (96/58 - 135/88)  RR: 17 (01-22-22 @ 21:10) (16 - 18)  SpO2: 95% (01-22-22 @ 21:10) (93% - 97%)  Wt(kg): --  I&O's Summary    21 Jan 2022 07:01  -  22 Jan 2022 07:00  --------------------------------------------------------  IN: 2630 mL / OUT: 0 mL / NET: 2630 mL    22 Jan 2022 07:01  -  23 Jan 2022 05:01  --------------------------------------------------------  IN: 180 mL / OUT: 250 mL / NET: -70 mL            Appearance: NAD  HEENT:  Dry oral mucosa, PERRL, EOMI	  Lymphatic: No lymphadenopathy  Cardiovascular: irregular S1 S2, No JVD, No murmurs, No edema  Respiratory: decreased bs   Psychiatry: A & O x 0  Gastrointestinal:  Soft, Non-tender, + BS	  Skin: No rashes, No ecchymoses, No cyanosis	  Neurologic: Non-focal  Extremities: Normal range of motion, No clubbing, cyanosis or edema  Vascular: Peripheral pulses palpable 2+ bilaterally    LABS:    CARDIAC MARKERS:                                11.1   12.78 )-----------( 363      ( 21 Jan 2022 07:40 )             41.6     01-22    147<H>  |  105  |  34<H>  ----------------------------<  114<H>  3.3<L>   |  28  |  0.91    Ca    9.2      22 Jan 2022 06:51  Mg     2.1     01-22      proBNP:   Lipid Profile:   HgA1c:   TSH:     7          TELEMETRY: 	    ECG:  	  RADIOLOGY:   DIAGNOSTIC TESTING:  [ ] Echocardiogram:  [ ]  Catheterization:  [ ] Stress Test:    OTHER:

## 2022-01-23 NOTE — PATIENT PROFILE ADULT - CAREGIVER OBTAIN DETAILS
Patient is confused and from nursing home patient seen by attending for penile discharge, will treat for Urethritis,   patient given instructions, will be discharged home

## 2022-01-23 NOTE — PROGRESS NOTE ADULT - SUBJECTIVE AND OBJECTIVE BOX
BENITA AMARO  89y Female  MRN:[id number]    Patient is a 89y old  Female who presents with a chief complaint of altered mental status (21 Jan 2022 13:01)    HPI:  history limited due to dementia  89F w/ PMHx of dementia, Afib not on AC due GIB, DM2, diastolic CHF presents with altered mental status. History largely per chart as pt unable to relate. Per ER notes, patient has known baseline dementia but was still able to hold conversation. On visit 1wk prior, pt was noted to have worsening confusion and speech was no longer making sense so requested to send to ER for further eval. Pt subsequently referred today for workup of same. No reported fever, chills, cough, respiratory distress or GIB. Pt has been off AC since prior admission for GIB. On interview pt is oriented to self only. Unable to relate hx of presentation. Denies pain or respiratory distress.  (20 Jan 2022 18:17)      Patient seen and evaluated at bedside. No acute events overnight except as noted.    Interval HPI: no acute events o/n     PAST MEDICAL & SURGICAL HISTORY:  Osteoarthritis of both knees    Hypertension    Urinary incontinence    Glaucoma    Macular degeneration    Hearing loss  bilateral    Obesity (BMI 30-39.9)    Depression    Diabetes mellitus    Atrial fibrillation    Dementia    Diastolic heart failure    History of hysterectomy  1981    History of ankle fusion  left, 2001    History of wrist fracture  right 2006, left 2007    History of hip replacement, total, right  2013    No significant past surgical history        REVIEW OF SYSTEMS:  as per hpi       VITALS:   Vital Signs Last 24 Hrs  T(C): 36.3 (23 Jan 2022 12:32), Max: 36.7 (22 Jan 2022 21:10)  T(F): 97.4 (23 Jan 2022 12:32), Max: 98 (22 Jan 2022 21:10)  HR: 79 (23 Jan 2022 12:32) (57 - 79)  BP: 138/83 (23 Jan 2022 12:32) (96/58 - 138/83)  BP(mean): --  RR: 18 (23 Jan 2022 12:32) (17 - 18)  SpO2: 95% (23 Jan 2022 12:32) (94% - 95%)    PHYSICAL EXAM:  GENERAL: NAD, well-developed  HEAD:  Atraumatic, Normocephalic  EYES: EOMI, PERRLA, conjunctiva and sclera clear  NECK: Supple, No JVD  CHEST/LUNG: Clear to auscultation bilaterally; No wheeze  HEART: S1, S2; No murmurs, rubs, or gallops  ABDOMEN: Soft, Nontender, Nondistended; Bowel sounds present  EXTREMITIES:  2+ Peripheral Pulses, No clubbing, cyanosis, or edema  PSYCH: Normal affect  NEUROLOGY: confused   SKIN: No rashes or lesions    Consultant(s) Notes Reviewed:  [x ] YES  [ ] NO  Care Discussed with Consultants/Other Providers [ x] YES  [ ] NO    MEDS:   MEDICATIONS  (STANDING):  dextrose 40% Gel 15 Gram(s) Oral once  dextrose 5%. 1000 milliLiter(s) (75 mL/Hr) IV Continuous <Continuous>  dextrose 5%. 1000 milliLiter(s) (50 mL/Hr) IV Continuous <Continuous>  dextrose 5%. 1000 milliLiter(s) (100 mL/Hr) IV Continuous <Continuous>  dextrose 50% Injectable 25 Gram(s) IV Push once  dextrose 50% Injectable 12.5 Gram(s) IV Push once  dextrose 50% Injectable 25 Gram(s) IV Push once  digoxin     Tablet 125 MICROGram(s) Oral daily  folic acid 1 milliGRAM(s) Oral daily  glucagon  Injectable 1 milliGRAM(s) IntraMuscular once  insulin lispro (ADMELOG) corrective regimen sliding scale   SubCutaneous three times a day before meals  insulin lispro (ADMELOG) corrective regimen sliding scale   SubCutaneous at bedtime  metoprolol succinate  milliGRAM(s) Oral daily  mirtazapine 30 milliGRAM(s) Oral at bedtime  pantoprazole    Tablet 40 milliGRAM(s) Oral before breakfast  QUEtiapine 25 milliGRAM(s) Oral two times a day  senna 2 Tablet(s) Oral at bedtime    MEDICATIONS  (PRN):  acetaminophen     Tablet .. 650 milliGRAM(s) Oral every 6 hours PRN Temp greater or equal to 38C (100.4F), Mild Pain (1 - 3)      ALLERGIES:  aspirin (Other)  No Known Allergies      LABS:                     01-23    142  |  103  |  25<H>  ----------------------------<  96  5.2   |  20<L>  |  0.80    Ca    9.4      23 Jan 2022 07:21  Phos  2.7     01-23  Mg     2.1     01-23             < from: CT Head No Cont (01.20.22 @ 14:39) >  IMPRESSION:    No evidence of acute lobar infarct, intracranial hemorrhage or mass   effect.    Stable in size posterior fossa meningioma adjacent to the right   transverse/sigmoid sinus and tentorium cerebelli. Further correlation   with MRI of the brain with and without contrast is recommended for more   detailed evaluation, if thereare no contraindications.    < end of copied text >  < from: Xray Chest 1 View AP/PA (01.20.22 @ 14:58) >  IMPRESSION:  Clear lungs.    < end of copied text >

## 2022-01-23 NOTE — PROGRESS NOTE ADULT - SUBJECTIVE AND OBJECTIVE BOX
Patient seen and examined Alert and awake.  NAD noted.  s/p D5W+10meq/L KCL 50cc/hr    REVIEW OF SYSTEMS:  UTO/dementia    MEDICATIONS  (STANDING):  dextrose 40% Gel 15 Gram(s) Oral once  dextrose 5%. 1000 milliLiter(s) (100 mL/Hr) IV Continuous <Continuous>  dextrose 5%. 1000 milliLiter(s) (75 mL/Hr) IV Continuous <Continuous>  dextrose 5%. 1000 milliLiter(s) (50 mL/Hr) IV Continuous <Continuous>  dextrose 50% Injectable 25 Gram(s) IV Push once  dextrose 50% Injectable 12.5 Gram(s) IV Push once  dextrose 50% Injectable 25 Gram(s) IV Push once  digoxin     Tablet 125 MICROGram(s) Oral daily  folic acid 1 milliGRAM(s) Oral daily  glucagon  Injectable 1 milliGRAM(s) IntraMuscular once  insulin lispro (ADMELOG) corrective regimen sliding scale   SubCutaneous three times a day before meals  insulin lispro (ADMELOG) corrective regimen sliding scale   SubCutaneous at bedtime  metoprolol succinate  milliGRAM(s) Oral daily  mirtazapine 30 milliGRAM(s) Oral at bedtime  pantoprazole    Tablet 40 milliGRAM(s) Oral before breakfast  QUEtiapine 25 milliGRAM(s) Oral two times a day  senna 2 Tablet(s) Oral at bedtime      VITAL:  T(C): , Max: 36.7 (01-22-22 @ 21:10)  T(F): , Max: 98 (01-22-22 @ 21:10)  HR: 61 (01-23-22 @ 05:14)  BP: 120/64 (01-23-22 @ 05:14)  BP(mean): --  RR: 18 (01-23-22 @ 05:14)  SpO2: 94% (01-23-22 @ 05:14)  Wt(kg): --    I and O's:    01-22 @ 07:01  -  01-23 @ 07:00  --------------------------------------------------------  IN: 780 mL / OUT: 250 mL / NET: 530 mL          PHYSICAL EXAM:    Constitutional: Not answering simple questions appropriately  HEENT: NCAT, MMM  Neck: Supple, No JVD  Respiratory: CTA-b/l  Cardiovascular: RRR s1s2, no m/r/g  Gastrointestinal: BS+, soft, NT/ND  Extremities: No peripheral edema b/l  Neurological: no focal deficits; strength grossly intact    LABS:    01-23    142  |  103  |  25<H>  ----------------------------<  96  5.2   |  20<L>  |  0.80    Ca    9.4      23 Jan 2022 07:21  Phos  2.7     01-23  Mg     2.1     01-23

## 2022-01-24 ENCOUNTER — TRANSCRIPTION ENCOUNTER (OUTPATIENT)
Age: 87
End: 2022-01-24

## 2022-01-24 LAB
ANION GAP SERPL CALC-SCNC: 10 MMOL/L — SIGNIFICANT CHANGE UP (ref 5–17)
BUN SERPL-MCNC: 17 MG/DL — SIGNIFICANT CHANGE UP (ref 7–23)
CALCIUM SERPL-MCNC: 9.3 MG/DL — SIGNIFICANT CHANGE UP (ref 8.4–10.5)
CHLORIDE SERPL-SCNC: 107 MMOL/L — SIGNIFICANT CHANGE UP (ref 96–108)
CO2 SERPL-SCNC: 27 MMOL/L — SIGNIFICANT CHANGE UP (ref 22–31)
CREAT SERPL-MCNC: 0.84 MG/DL — SIGNIFICANT CHANGE UP (ref 0.5–1.3)
GLUCOSE BLDC GLUCOMTR-MCNC: 101 MG/DL — HIGH (ref 70–99)
GLUCOSE BLDC GLUCOMTR-MCNC: 102 MG/DL — HIGH (ref 70–99)
GLUCOSE BLDC GLUCOMTR-MCNC: 108 MG/DL — HIGH (ref 70–99)
GLUCOSE BLDC GLUCOMTR-MCNC: 109 MG/DL — HIGH (ref 70–99)
GLUCOSE SERPL-MCNC: 114 MG/DL — HIGH (ref 70–99)
HCT VFR BLD CALC: 37.3 % — SIGNIFICANT CHANGE UP (ref 34.5–45)
HGB BLD-MCNC: 9.9 G/DL — LOW (ref 11.5–15.5)
MCHC RBC-ENTMCNC: 20 PG — LOW (ref 27–34)
MCHC RBC-ENTMCNC: 26.5 GM/DL — LOW (ref 32–36)
MCV RBC AUTO: 75.2 FL — LOW (ref 80–100)
NRBC # BLD: 0 /100 WBCS — SIGNIFICANT CHANGE UP (ref 0–0)
PLATELET # BLD AUTO: 279 K/UL — SIGNIFICANT CHANGE UP (ref 150–400)
POTASSIUM SERPL-MCNC: 4.6 MMOL/L — SIGNIFICANT CHANGE UP (ref 3.5–5.3)
POTASSIUM SERPL-SCNC: 4.6 MMOL/L — SIGNIFICANT CHANGE UP (ref 3.5–5.3)
RBC # BLD: 4.96 M/UL — SIGNIFICANT CHANGE UP (ref 3.8–5.2)
RBC # FLD: 21.4 % — HIGH (ref 10.3–14.5)
SARS-COV-2 RNA SPEC QL NAA+PROBE: DETECTED
SODIUM SERPL-SCNC: 144 MMOL/L — SIGNIFICANT CHANGE UP (ref 135–145)
WBC # BLD: 9.52 K/UL — SIGNIFICANT CHANGE UP (ref 3.8–10.5)
WBC # FLD AUTO: 9.52 K/UL — SIGNIFICANT CHANGE UP (ref 3.8–10.5)

## 2022-01-24 RX ADMIN — QUETIAPINE FUMARATE 25 MILLIGRAM(S): 200 TABLET, FILM COATED ORAL at 17:15

## 2022-01-24 RX ADMIN — QUETIAPINE FUMARATE 25 MILLIGRAM(S): 200 TABLET, FILM COATED ORAL at 05:14

## 2022-01-24 RX ADMIN — Medication 125 MICROGRAM(S): at 05:14

## 2022-01-24 RX ADMIN — PANTOPRAZOLE SODIUM 40 MILLIGRAM(S): 20 TABLET, DELAYED RELEASE ORAL at 05:14

## 2022-01-24 RX ADMIN — Medication 100 MILLIGRAM(S): at 05:14

## 2022-01-24 RX ADMIN — MIRTAZAPINE 30 MILLIGRAM(S): 45 TABLET, ORALLY DISINTEGRATING ORAL at 22:37

## 2022-01-24 RX ADMIN — Medication 1 MILLIGRAM(S): at 12:42

## 2022-01-24 NOTE — GOALS OF CARE CONVERSATION - ADVANCED CARE PLANNING - CONVERSATION DETAILS
Due to capacity spoke with daughter Sandra Wilkes about goals of care.  Prior [facility] 8/2021 located [facility].  Confirmed no changes to status.  DNR/DNI.   Pt has another daughter who lives in [place] and a daughter in law Bernarda who also is involved in her care.  She currently lives in her home and has 24hr supervision.  She would like her to go to a Rehab Center and get her strength back to return to her prior function.  Code [facility] was provided.  No [employer] exemptions noted. MAGEN Patel was notified of DNR order to be placed.       Leisa Rodríguez RN  Palliative Care Educator  [phone] cell

## 2022-01-24 NOTE — GOALS OF CARE CONVERSATION - ADVANCED CARE PLANNING - WHAT MATTERS MOST
pt to return to her prior function return home and have at home care.  Pt is DNR/DNI decided prior to this admission

## 2022-01-24 NOTE — PROGRESS NOTE ADULT - SUBJECTIVE AND OBJECTIVE BOX
BENITA AMARO  89y Female  MRN:[id number]    Patient is a 89y old  Female who presents with a chief complaint of altered mental status (21 Jan 2022 13:01)    HPI:  history limited due to dementia  89F w/ PMHx of dementia, Afib not on AC due GIB, DM2, diastolic CHF presents with altered mental status. History largely per chart as pt unable to relate. Per ER notes, patient has known baseline dementia but was still able to hold conversation. On visit 1wk prior, pt was noted to have worsening confusion and speech was no longer making sense so requested to send to ER for further eval. Pt subsequently referred today for workup of same. No reported fever, chills, cough, respiratory distress or GIB. Pt has been off AC since prior admission for GIB. On interview pt is oriented to self only. Unable to relate hx of presentation. Denies pain or respiratory distress.  (20 Jan 2022 18:17)      Patient seen and evaluated at bedside. No acute events overnight except as noted.    Interval HPI: no acute events o/n     PAST MEDICAL & SURGICAL HISTORY:  Osteoarthritis of both knees    Hypertension    Urinary incontinence    Glaucoma    Macular degeneration    Hearing loss  bilateral    Obesity (BMI 30-39.9)    Depression    Diabetes mellitus    Atrial fibrillation    Dementia    Diastolic heart failure    History of hysterectomy  1981    History of ankle fusion  left, 2001    History of wrist fracture  right 2006, left 2007    History of hip replacement, total, right  2013    No significant past surgical history        REVIEW OF SYSTEMS:  as per hpi       VITALS:   Vital Signs Last 24 Hrs  T(C): 37.1 (24 Jan 2022 04:56), Max: 37.1 (24 Jan 2022 04:56)  T(F): 98.7 (24 Jan 2022 04:56), Max: 98.7 (24 Jan 2022 04:56)  HR: 74 (24 Jan 2022 04:56) (74 - 90)  BP: 127/68 (24 Jan 2022 04:56) (124/83 - 127/68)  BP(mean): --  RR: 18 (24 Jan 2022 04:56) (17 - 18)  SpO2: 98% (24 Jan 2022 04:56) (98% - 98%)    PHYSICAL EXAM:  GENERAL: NAD, well-developed  HEAD:  Atraumatic, Normocephalic  EYES: EOMI, PERRLA, conjunctiva and sclera clear  NECK: Supple, No JVD  CHEST/LUNG: Clear to auscultation bilaterally; No wheeze  HEART: S1, S2; No murmurs, rubs, or gallops  ABDOMEN: Soft, Nontender, Nondistended; Bowel sounds present  EXTREMITIES:  2+ Peripheral Pulses, No clubbing, cyanosis, or edema  PSYCH: Normal affect  NEUROLOGY: confused   SKIN: No rashes or lesions    Consultant(s) Notes Reviewed:  [x ] YES  [ ] NO  Care Discussed with Consultants/Other Providers [ x] YES  [ ] NO    MEDS:   MEDICATIONS  (STANDING):  dextrose 40% Gel 15 Gram(s) Oral once  dextrose 5%. 1000 milliLiter(s) (50 mL/Hr) IV Continuous <Continuous>  dextrose 5%. 1000 milliLiter(s) (100 mL/Hr) IV Continuous <Continuous>  dextrose 50% Injectable 25 Gram(s) IV Push once  dextrose 50% Injectable 12.5 Gram(s) IV Push once  dextrose 50% Injectable 25 Gram(s) IV Push once  digoxin     Tablet 125 MICROGram(s) Oral daily  folic acid 1 milliGRAM(s) Oral daily  glucagon  Injectable 1 milliGRAM(s) IntraMuscular once  insulin lispro (ADMELOG) corrective regimen sliding scale   SubCutaneous three times a day before meals  insulin lispro (ADMELOG) corrective regimen sliding scale   SubCutaneous at bedtime  metoprolol succinate  milliGRAM(s) Oral daily  mirtazapine 30 milliGRAM(s) Oral at bedtime  pantoprazole    Tablet 40 milliGRAM(s) Oral before breakfast  QUEtiapine 25 milliGRAM(s) Oral two times a day  senna 2 Tablet(s) Oral at bedtime    MEDICATIONS  (PRN):  acetaminophen     Tablet .. 650 milliGRAM(s) Oral every 6 hours PRN Temp greater or equal to 38C (100.4F), Mild Pain (1 - 3)      ALLERGIES:  aspirin (Other)  No Known Allergies      LABS:                                    9.9    9.52  )-----------( 279      ( 24 Jan 2022 10:30 )             37.3   01-24    144  |  107  |  17  ----------------------------<  114<H>  4.6   |  27  |  0.84    Ca    9.3      24 Jan 2022 10:30  Phos  2.7     01-23  Mg     2.1     01-23             < from: CT Head No Cont (01.20.22 @ 14:39) >  IMPRESSION:    No evidence of acute lobar infarct, intracranial hemorrhage or mass   effect.    Stable in size posterior fossa meningioma adjacent to the right   transverse/sigmoid sinus and tentorium cerebelli. Further correlation   with MRI of the brain with and without contrast is recommended for more   detailed evaluation, if thereare no contraindications.    < end of copied text >  < from: Xray Chest 1 View AP/PA (01.20.22 @ 14:58) >  IMPRESSION:  Clear lungs.    < end of copied text >

## 2022-01-24 NOTE — DISCHARGE NOTE PROVIDER - PROVIDER TOKENS
PROVIDER:[TOKEN:[[id number]],FOLLOWUP:[2 weeks]],PROVIDER:[TOKEN:[[id number]],FOLLOWUP:[1 week]] PROVIDER:[TOKEN:[[id number]],FOLLOWUP:[2 weeks]],PROVIDER:[TOKEN:[[id number]],FOLLOWUP:[1 week]],PROVIDER:[TOKEN:[[id number]],FOLLOWUP:[1 week]]

## 2022-01-24 NOTE — DISCHARGE NOTE PROVIDER - CARE PROVIDERS DIRECT ADDRESSES
,[email or web address],DirectAddress_Unknown ,[email or web address],[email or web address],[email or web address]

## 2022-01-24 NOTE — PROGRESS NOTE ADULT - SUBJECTIVE AND OBJECTIVE BOX
Overnight events noted      VITAL:  T(C): , Max: 37.1 (01-24-22 @ 04:56)  T(F): , Max: 98.7 (01-24-22 @ 04:56)  HR: 74 (01-24-22 @ 04:56)  BP: 127/68 (01-24-22 @ 04:56)  BP(mean): --  RR: 18 (01-24-22 @ 04:56)  SpO2: 98% (01-24-22 @ 04:56)  Wt(kg): --      PHYSICAL EXAM:  Constitutional: Not answering simple questions appropriately  HEENT: NCAT, MMM  Neck: Supple, No JVD  Respiratory: CTA-b/l  Cardiovascular: RRR s1s2, no m/r/g  Gastrointestinal: BS+, soft, NT/ND  Extremities: No peripheral edema b/l  Neurological: no focal deficits; strength grossly intact    LABS:                        9.9    9.52  )-----------( 279      ( 24 Jan 2022 10:30 )             37.3     Na(144)/K(4.6)/Cl(107)/HCO3(27)/BUN(17)/Cr(0.84)Glu(114)/Ca(9.3)/Mg(--)/PO4(--)    01-24 @ 10:30  Na(142)/K(5.2)/Cl(103)/HCO3(20)/BUN(25)/Cr(0.80)Glu(96)/Ca(9.4)/Mg(2.1)/PO4(2.7)    01-23 @ 07:21  Na(147)/K(3.3)/Cl(105)/HCO3(28)/BUN(34)/Cr(0.91)Glu(114)/Ca(9.2)/Mg(2.1)/PO4(--)    01-22 @ 06:51  Na(148)/K(2.9)/Cl(105)/HCO3(28)/BUN(40)/Cr(0.98)Glu(161)/Ca(9.7)/Mg(--)/PO4(--)    01-21 @ 18:20      IMPRESSION: 89F w/ mild dementia, HTN, DM2, AFib, and HFpEF, 1/20/22 p/w AMS/hypernatremia    (1)Hypernatremia - multifactorial from poor free water access and Lasix. Now much improved/reasonably stable as of today, off hypotonic IVF    (2)Hypokalemia - resolved    (3)AMS -      RECOMMEND:  (1)Encourage free water intake by mouth  (2)D/C planning per primary team            David Hong MD  [facility]  Office: [phone]  Cell: [phone]       no complaints      VITAL:  T(C): , Max: 37.1 (01-24-22 @ 04:56)  T(F): , Max: 98.7 (01-24-22 @ 04:56)  HR: 74 (01-24-22 @ 04:56)  BP: 127/68 (01-24-22 @ 04:56)  BP(mean): --  RR: 18 (01-24-22 @ 04:56)  SpO2: 98% (01-24-22 @ 04:56)  Wt(kg): --      PHYSICAL EXAM:  Constitutional: lethargic, mildly confused, frail  HEENT: NCAT, DMM  Neck: Supple, No JVD  Respiratory: CTA-b/l  Cardiovascular: RRR s1s2, no m/r/g  Gastrointestinal: BS+, soft, NT/ND  Extremities: No peripheral edema b/l  Neurological: no focal deficits; strength grossly intact    LABS:                        9.9    9.52  )-----------( 279      ( 24 Jan 2022 10:30 )             37.3     Na(144)/K(4.6)/Cl(107)/HCO3(27)/BUN(17)/Cr(0.84)Glu(114)/Ca(9.3)/Mg(--)/PO4(--)    01-24 @ 10:30  Na(142)/K(5.2)/Cl(103)/HCO3(20)/BUN(25)/Cr(0.80)Glu(96)/Ca(9.4)/Mg(2.1)/PO4(2.7)    01-23 @ 07:21  Na(147)/K(3.3)/Cl(105)/HCO3(28)/BUN(34)/Cr(0.91)Glu(114)/Ca(9.2)/Mg(2.1)/PO4(--)    01-22 @ 06:51  Na(148)/K(2.9)/Cl(105)/HCO3(28)/BUN(40)/Cr(0.98)Glu(161)/Ca(9.7)/Mg(--)/PO4(--)    01-21 @ 18:20      IMPRESSION: 89F w/ mild dementia, HTN, DM2, AFib, and HFpEF, 1/20/22 p/w AMS/hypernatremia    (1)Hypernatremia - multifactorial from poor free water access and Lasix. Now much improved/reasonably stable as of today, off hypotonic IVF    (2)Hypokalemia - resolved    (3)AMS - improved relative to admission      RECOMMEND:  (1)Encourage free water intake by mouth  (2)D/C planning per primary team            David Hong MD  [facility]  Office: [phone]  Cell: [phone]

## 2022-01-24 NOTE — DISCHARGE NOTE PROVIDER - CARE PROVIDER_API CALL
Nader Merida)  Neurology  [address], [address]  [place] [address]  Phone: [phone]  Fax: [phone]  Follow Up Time: 2 weeks    Neil Lawson ()  Cardiology; Internal Medicine  [address], [address]  [place] [address]  Phone: [phone]  Fax: [phone]  Follow Up Time: 1 week   Nader Merida)  Neurology  [address], [address]  [place] [address]  Phone: [phone]  Fax: [phone]  Follow Up Time: 2 weeks    Neil Lawson ()  Cardiology; Internal Medicine  [address], [address]  [place] [address]  Phone: [phone]  Fax: [phone]  Follow Up Time: 1 week    David Hong)  Internal Medicine; Nephrology  [address], [address]  [place] [address]  Phone: [phone]  Fax: [phone]  Follow Up Time: 1 week

## 2022-01-24 NOTE — PROGRESS NOTE ADULT - SUBJECTIVE AND OBJECTIVE BOX
Subjective: Patient seen and examined. No new events except as noted.     REVIEW OF SYSTEMS:  Unable to obtain.    MEDICATIONS:  MEDICATIONS  (STANDING):  dextrose 40% Gel 15 Gram(s) Oral once  dextrose 5%. 1000 milliLiter(s) (75 mL/Hr) IV Continuous <Continuous>  dextrose 5%. 1000 milliLiter(s) (50 mL/Hr) IV Continuous <Continuous>  dextrose 5%. 1000 milliLiter(s) (100 mL/Hr) IV Continuous <Continuous>  dextrose 50% Injectable 25 Gram(s) IV Push once  dextrose 50% Injectable 12.5 Gram(s) IV Push once  dextrose 50% Injectable 25 Gram(s) IV Push once  digoxin     Tablet 125 MICROGram(s) Oral daily  folic acid 1 milliGRAM(s) Oral daily  glucagon  Injectable 1 milliGRAM(s) IntraMuscular once  insulin lispro (ADMELOG) corrective regimen sliding scale   SubCutaneous three times a day before meals  insulin lispro (ADMELOG) corrective regimen sliding scale   SubCutaneous at bedtime  metoprolol succinate  milliGRAM(s) Oral daily  mirtazapine 30 milliGRAM(s) Oral at bedtime  pantoprazole    Tablet 40 milliGRAM(s) Oral before breakfast  QUEtiapine 25 milliGRAM(s) Oral two times a day  senna 2 Tablet(s) Oral at bedtime      PHYSICAL EXAM:  T(C): 37.1 (01-24-22 @ 04:56), Max: 37.1 (01-24-22 @ 04:56)  HR: 74 (01-24-22 @ 04:56) (74 - 90)  BP: 127/68 (01-24-22 @ 04:56) (124/83 - 138/83)  RR: 18 (01-24-22 @ 04:56) (17 - 18)  SpO2: 98% (01-24-22 @ 04:56) (95% - 98%)  Wt(kg): --  I&O's Summary    23 Jan 2022 07:01  -  24 Jan 2022 07:00  --------------------------------------------------------  IN: 420 mL / OUT: 200 mL / NET: 220 mL    Appearance: NAD  HEENT:  Dry oral mucosa, PERRL, EOMI	  Lymphatic: No lymphadenopathy  Cardiovascular: irregular S1 S2, No JVD, No murmurs, No edema  Respiratory: decreased bs   Psychiatry: A & O x 0  Gastrointestinal:  Soft, Non-tender, + BS	  Skin: No rashes, No ecchymoses, No cyanosis	  Neurologic: Non-focal  Extremities: Normal range of motion, No clubbing, cyanosis or edema  Vascular: Peripheral pulses palpable 2+ bilaterally    LABS:    CARDIAC MARKERS:      01-23    142  |  103  |  25<H>  ----------------------------<  96  5.2   |  20<L>  |  0.80    Ca    9.4      23 Jan 2022 07:21  Phos  2.7     01-23  Mg     2.1     01-23      proBNP:   Lipid Profile:   HgA1c:   TSH:             TELEMETRY: 	    ECG:  	  RADIOLOGY:   DIAGNOSTIC TESTING:  [ ] Echocardiogram:  [ ]  Catheterization:  [ ] Stress Test:    OTHER:

## 2022-01-24 NOTE — DISCHARGE NOTE PROVIDER - NSDCCPCAREPLAN_GEN_ALL_CORE_FT
PRINCIPAL DISCHARGE DIAGNOSIS  Diagnosis: AMS (altered mental status)  Assessment and Plan of Treatment: AMS from hypernatremia, which has since resolved  Encourage PO hydration      SECONDARY DISCHARGE DIAGNOSES  Diagnosis: Hypernatremia  Assessment and Plan of Treatment: Resoolved iwth IVF  Encourage PO hydration    Diagnosis: Dementia with behavioral disturbance  Assessment and Plan of Treatment: continue Remeron and seroquel    Diagnosis: Type 2 diabetes mellitus  Assessment and Plan of Treatment: HgA1C this admission 7.3  Make sure you get your HgA1c checked every three months.  If you take oral diabetes medications, check your blood glucose two times a day.  If you take insulin, check your blood glucose before meals and at bedtime.  It's important not to skip any meals.  Keep a log of your blood glucose results and always take it with you to your doctor appointments.  Keep a list of your current medications including injectables and over the counter medications and bring this medication list with you to all your doctor appointments.  If you have not seen your ophthalmologist this year call for appointment.  Check your feet daily for redness, sores, or openings. Do not self treat. If no improvement in two days call your primary care physician for an appointment.  Low blood sugar (hypoglycemia) is a blood sugar below 70mg/dl. Check your blood sugar if you feel signs/symptoms of hypoglycemia. If your blood sugar is below 70 take 15 grams of carbohydrates (ex 4 oz of apple juice, 3-4 glucose tablets, or 4-6 oz of regular soda) wait 15 minutes and repeat blood sugar to make sure it comes up above 70.  If your blood sugar is above 70 and you are due for a meal, have a meal.  If you are not due for a meal have a snack.  This snack helps keeps your blood sugar at a safe range.      Diagnosis: Chronic diastolic congestive heart failure  Assessment and Plan of Treatment: Weigh yourself daily.  If you gain 3lbs in 3 days, or 5lbs in a week call your Health Care Provider.  Do not eat or drink foods containing more than 2000mg of salt (sodium) in your diet every day.  Call your Health Care Provider if you have any swelling or increased swelling in your feet, ankles, and/or stomach.  Take all of your medication as directed.  If you become dizzy call your Health Care Provider.      Diagnosis: Chronic atrial fibrillation  Assessment and Plan of Treatment: Atrial fibrillation is the most common heart rhythm problem.  The condition puts you at risk for has stroke and heart attack  It helps if you control your blood pressure, not drink more than 1-2 alcohol drinks per day, cut down on caffeine, getting treatment for over active thyroid gland, and get regular exercise  Call your doctor if you feel your heart racing or beating unusually, chest tightness or pain, lightheaded, faint, shortness of breath especially with exercise  It is important to take your heart medication as prescribed  You may be on anticoagulation which is very important to take as directed - you may need blood work to monitor drug levels       PRINCIPAL DISCHARGE DIAGNOSIS  Diagnosis: AMS (altered mental status)  Assessment and Plan of Treatment: SHAHIDA esparza from hypernatremia, which has since resolved.  CT head negative.   Encourage PO hydration      SECONDARY DISCHARGE DIAGNOSES  Diagnosis: Chronic atrial fibrillation  Assessment and Plan of Treatment: continue with Toprol as directed.  no blood thinner due to recent gi bleed.   Atrial fibrillation is the most common heart rhythm problem.  The condition puts you at risk for has stroke and heart attack  It helps if you control your blood pressure, not drink more than 1-2 alcohol drinks per day, cut down on caffeine, getting treatment for over active thyroid gland, and get regular exercise  Call your doctor if you feel your heart racing or beating unusually, chest tightness or pain, lightheaded, faint, shortness of breath especially with exercise  It is important to take your heart medication as prescribed  You may be on anticoagulation which is very important to take as directed - you may need blood work to monitor drug levels      Diagnosis: Dementia with behavioral disturbance  Assessment and Plan of Treatment: continue Remeron and seroquel.   continue with safety and fall precaution.    Diagnosis: Type 2 diabetes mellitus  Assessment and Plan of Treatment: HgA1C this admission 7.3  Make sure you get your HgA1c checked every three months.  If you take oral diabetes medications, check your blood glucose two times a day.  If you take insulin, check your blood glucose before meals and at bedtime.  It's important not to skip any meals.  Keep a log of your blood glucose results and always take it with you to your doctor appointments.  Keep a list of your current medications including injectables and over the counter medications and bring this medication list with you to all your doctor appointments.  If you have not seen your ophthalmologist this year call for appointment.  Check your feet daily for redness, sores, or openings. Do not self treat. If no improvement in two days call your primary care physician for an appointment.  Low blood sugar (hypoglycemia) is a blood sugar below 70mg/dl. Check your blood sugar if you feel signs/symptoms of hypoglycemia. If your blood sugar is below 70 take 15 grams of carbohydrates (ex 4 oz of apple juice, 3-4 glucose tablets, or 4-6 oz of regular soda) wait 15 minutes and repeat blood sugar to make sure it comes up above 70.  If your blood sugar is above 70 and you are due for a meal, have a meal.  If you are not due for a meal have a snack.  This snack helps keeps your blood sugar at a safe range.      Diagnosis: Chronic diastolic congestive heart failure  Assessment and Plan of Treatment: off diiuretic for poor oral intake and hypernatremia.   Weigh yourself daily.  If you gain 3lbs in 3 days, or 5lbs in a week call your Health Care Provider.  Do not eat or drink foods containing more than 2000mg of salt (sodium) in your diet every day.  Call your Health Care Provider if you have any swelling or increased swelling in your feet, ankles, and/or stomach.  Take all of your medication as directed.  If you become dizzy call your Health Care Provider.      Diagnosis: Hypernatremia  Assessment and Plan of Treatment: Resoolved iwth IVF  Encourage PO hydration

## 2022-01-24 NOTE — DISCHARGE NOTE PROVIDER - HOSPITAL COURSE
89F w/ PMHx of dementia, Afib not on AC due GIB, DM2, diastolic CHF presents with altered mental status.  Head CT negative and no focal deficit on exam.   Noted to have hypernatremia.  Acute metabolic encephalopathy due to hypernatremia from diuretic use, poor PO intake. Lasix held. started ivf. Hypernatremia improved with IVF. Mental status improved compared to admission. low suspicion for infection as UA and CXR negative, non toxic appearing. Family not interested for pt to go back to LTC facility. Discharged to [facility]             89F w/ PMHx of dementia, Afib not on AC due GIB, DM2, diastolic CHF presents with altered mental status.  Head CT negative and no focal deficit on exam.   Noted to have hypernatremia.  Acute metabolic encephalopathy due to hypernatremia from diuretic use, poor PO intake. Lasix held. started ivf. Hypernatremia improved with IVF. Mental status improved compared to admission. low suspicion for infection as UA and CXR negative, non toxic appearing. Cardiology following fo h/o afib and chronic diastolic chf. Not in chf. No AC for afib. continued digoxin and metoprolol. Family not interested for pt to go back to LTC facility. Discharged to [facility]             89F w/ PMHx of dementia, Afib not on AC due GIB, DM2, diastolic CHF presents with altered mental status.  Head CT negative and no focal deficit on exam.   Noted to have hypernatremia.  Acute metabolic encephalopathy due to hypernatremia from diuretic use, poor PO intake. Lasix held. started ivf. Hypernatremia improved with IVF. Mental status improved compared to admission. low suspicion for infection as UA and CXR negative, non toxic appearing. Cardiology following fo h/o afib and chronic diastolic chf. Not in chf. No AC for afib. continued digoxin and metoprolol. Family not interested for pt to go back to LTC facility. Discharged to [facility]  Covid x 1 positive on 1/24 and rpt covid negative. sent rpt covid on 1/25 negative.  pt remains stable for DC and will f/u with PCP in 1 week.              89F w/ PMHx of dementia, Afib not on AC due GIB, DM2, diastolic CHF presents with altered mental status.  Head CT negative and no focal deficit on exam.   Noted to have hypernatremia.  Acute metabolic encephalopathy due to hypernatremia from diuretic use, poor PO intake. Lasix held. started ivf. Hypernatremia improved with IVF. Mental status improved compared to admission. low suspicion for infection as UA and CXR negative, non toxic appearing. Cardiology following fo h/o afib and chronic diastolic chf. Not in chf. No AC for afib. continued digoxin and metoprolol. Family not interested for pt to go back to LTC facility. Discharged to [facility].  Covid x 1 positive on 1/24 and rpt covid negative. sent rpt covid on 1/25 negative. Saturating well on room air.   pt remains stable for DC and will f/u with PCP in 1 week.

## 2022-01-24 NOTE — PROVIDER CONTACT NOTE (CRITICAL VALUE NOTIFICATION) - ACTION/TREATMENT ORDERED:
Repeat Covid swab STAT
NP aware. Continue  D5 + 10 mEq K @ 100 ml/ hr. Will endorse to upcoming nurse

## 2022-01-24 NOTE — PROVIDER CONTACT NOTE (CRITICAL VALUE NOTIFICATION) - BACKGROUND
Patient admitted with AMS.Hypernatremia
Patient admitted for AMS and Hypernatremia. PMH  :DM, Afib, Depression, HTN.

## 2022-01-24 NOTE — DISCHARGE NOTE PROVIDER - NSDCMRMEDTOKEN_GEN_ALL_CORE_FT
digoxin 125 mcg (0.125 mg) oral tablet: 1 tab(s) orally once a day  folic acid 1 mg oral tablet: 1 tab(s) orally once a day  furosemide 40 mg oral tablet: 2 tab(s) orally once a day  Januvia 100 mg oral tablet: 1 tab(s) orally once a day  metoprolol succinate 100 mg oral tablet, extended release: 1 tab(s) orally once a day  mirtazapine 15 mg oral tablet: 2 tab(s) orally once a day (at bedtime)  omeprazole 20 mg oral delayed release tablet: 1 tab(s) orally once a day  senna oral tablet: 2 tab(s) orally once a day  SEROquel 25 mg oral tablet: 1 tab(s) orally 2 times a day   digoxin 125 mcg (0.125 mg) oral tablet: 1 tab(s) orally once a day  folic acid 1 mg oral tablet: 1 tab(s) orally once a day  Januvia 100 mg oral tablet: 1 tab(s) orally once a day  metoprolol succinate 100 mg oral tablet, extended release: 1 tab(s) orally once a day  mirtazapine 30 mg oral tablet: 1 tab(s) orally once a day (at bedtime)  omeprazole 20 mg oral delayed release tablet: 1 tab(s) orally once a day  QUEtiapine 25 mg oral tablet: 1 tab(s) orally 2 times a day  senna oral tablet: 2 tab(s) orally once a day (at bedtime)

## 2022-01-25 LAB
GLUCOSE BLDC GLUCOMTR-MCNC: 107 MG/DL — HIGH (ref 70–99)
GLUCOSE BLDC GLUCOMTR-MCNC: 118 MG/DL — HIGH (ref 70–99)
GLUCOSE BLDC GLUCOMTR-MCNC: 145 MG/DL — HIGH (ref 70–99)
GLUCOSE BLDC GLUCOMTR-MCNC: 98 MG/DL — SIGNIFICANT CHANGE UP (ref 70–99)
SARS-COV-2 RNA SPEC QL NAA+PROBE: DETECTED
SARS-COV-2 RNA SPEC QL NAA+PROBE: SIGNIFICANT CHANGE UP

## 2022-01-25 RX ORDER — MIRTAZAPINE 45 MG/1
2 TABLET, ORALLY DISINTEGRATING ORAL
Qty: 0 | Refills: 0 | DISCHARGE

## 2022-01-25 RX ORDER — SENNA PLUS 8.6 MG/1
2 TABLET ORAL
Qty: 0 | Refills: 0 | DISCHARGE
Start: 2022-01-25

## 2022-01-25 RX ORDER — DIGOXIN 250 MCG
1 TABLET ORAL
Qty: 0 | Refills: 0 | DISCHARGE
Start: 2022-01-25

## 2022-01-25 RX ORDER — DIGOXIN 250 MCG
1 TABLET ORAL
Qty: 0 | Refills: 0 | DISCHARGE

## 2022-01-25 RX ORDER — QUETIAPINE FUMARATE 200 MG/1
1 TABLET, FILM COATED ORAL
Qty: 0 | Refills: 0 | DISCHARGE

## 2022-01-25 RX ORDER — SENNA PLUS 8.6 MG/1
2 TABLET ORAL
Qty: 0 | Refills: 0 | DISCHARGE

## 2022-01-25 RX ORDER — FOLIC ACID 0.8 MG
1 TABLET ORAL
Qty: 0 | Refills: 0 | DISCHARGE

## 2022-01-25 RX ORDER — FOLIC ACID 0.8 MG
1 TABLET ORAL
Qty: 0 | Refills: 0 | DISCHARGE
Start: 2022-01-25

## 2022-01-25 RX ORDER — METOPROLOL TARTRATE 50 MG
1 TABLET ORAL
Qty: 0 | Refills: 0 | DISCHARGE
Start: 2022-01-25

## 2022-01-25 RX ORDER — MIRTAZAPINE 45 MG/1
1 TABLET, ORALLY DISINTEGRATING ORAL
Qty: 0 | Refills: 0 | DISCHARGE
Start: 2022-01-25

## 2022-01-25 RX ORDER — FUROSEMIDE 40 MG
2 TABLET ORAL
Qty: 0 | Refills: 0 | DISCHARGE

## 2022-01-25 RX ORDER — QUETIAPINE FUMARATE 200 MG/1
1 TABLET, FILM COATED ORAL
Qty: 0 | Refills: 0 | DISCHARGE
Start: 2022-01-25

## 2022-01-25 RX ORDER — METOPROLOL TARTRATE 50 MG
1 TABLET ORAL
Qty: 0 | Refills: 0 | DISCHARGE

## 2022-01-25 RX ADMIN — SENNA PLUS 2 TABLET(S): 8.6 TABLET ORAL at 21:04

## 2022-01-25 RX ADMIN — PANTOPRAZOLE SODIUM 40 MILLIGRAM(S): 20 TABLET, DELAYED RELEASE ORAL at 05:07

## 2022-01-25 RX ADMIN — Medication 1 MILLIGRAM(S): at 10:55

## 2022-01-25 RX ADMIN — QUETIAPINE FUMARATE 25 MILLIGRAM(S): 200 TABLET, FILM COATED ORAL at 17:45

## 2022-01-25 RX ADMIN — Medication 125 MICROGRAM(S): at 05:07

## 2022-01-25 RX ADMIN — QUETIAPINE FUMARATE 25 MILLIGRAM(S): 200 TABLET, FILM COATED ORAL at 05:09

## 2022-01-25 RX ADMIN — MIRTAZAPINE 30 MILLIGRAM(S): 45 TABLET, ORALLY DISINTEGRATING ORAL at 21:04

## 2022-01-25 RX ADMIN — Medication 100 MILLIGRAM(S): at 05:07

## 2022-01-25 NOTE — PROGRESS NOTE ADULT - SUBJECTIVE AND OBJECTIVE BOX
Subjective: Patient seen and examined. No new events except as noted.   sleeping     REVIEW OF SYSTEMS:  Unable to obtain.    MEDICATIONS:  MEDICATIONS  (STANDING):  dextrose 40% Gel 15 Gram(s) Oral once  dextrose 5%. 1000 milliLiter(s) (50 mL/Hr) IV Continuous <Continuous>  dextrose 5%. 1000 milliLiter(s) (100 mL/Hr) IV Continuous <Continuous>  dextrose 50% Injectable 25 Gram(s) IV Push once  dextrose 50% Injectable 12.5 Gram(s) IV Push once  dextrose 50% Injectable 25 Gram(s) IV Push once  digoxin     Tablet 125 MICROGram(s) Oral daily  folic acid 1 milliGRAM(s) Oral daily  glucagon  Injectable 1 milliGRAM(s) IntraMuscular once  insulin lispro (ADMELOG) corrective regimen sliding scale   SubCutaneous three times a day before meals  insulin lispro (ADMELOG) corrective regimen sliding scale   SubCutaneous at bedtime  metoprolol succinate  milliGRAM(s) Oral daily  mirtazapine 30 milliGRAM(s) Oral at bedtime  pantoprazole    Tablet 40 milliGRAM(s) Oral before breakfast  QUEtiapine 25 milliGRAM(s) Oral two times a day  senna 2 Tablet(s) Oral at bedtime      PHYSICAL EXAM:  T(C): 36.6 (01-25-22 @ 05:02), Max: 36.7 (01-24-22 @ 22:29)  HR: 72 (01-25-22 @ 05:02) (65 - 76)  BP: 128/75 (01-25-22 @ 05:02) (114/65 - 135/73)  RR: 18 (01-25-22 @ 05:02) (18 - 18)  SpO2: 95% (01-25-22 @ 05:02) (95% - 97%)  Wt(kg): --  I&O's Summary    24 Jan 2022 07:01  -  25 Jan 2022 07:00  --------------------------------------------------------  IN: 240 mL / OUT: 100 mL / NET: 140 mL        Appearance: NAD  HEENT:  Dry oral mucosa, PERRL, EOMI	  Lymphatic: No lymphadenopathy  Cardiovascular: irregular S1 S2, No JVD, No murmurs, No edema  Respiratory: decreased bs   Psychiatry: A & O x 0  Gastrointestinal:  Soft, Non-tender, + BS	  Skin: No rashes, No ecchymoses, No cyanosis	  Neurologic: Non-focal  Extremities: Normal range of motion, No clubbing, cyanosis or edema  Vascular: Peripheral pulses palpable 2+ bilaterally      LABS:    CARDIAC MARKERS:                          9.9    9.52  )-----------( 279      ( 24 Jan 2022 10:30 )             37.3     01-24    144  |  107  |  17  ----------------------------<  114<H>  4.6   |  27  |  0.84    Ca    9.3      24 Jan 2022 10:30      proBNP:   Lipid Profile:   HgA1c:   TSH:     TELEMETRY: 	    ECG:  	  RADIOLOGY:   DIAGNOSTIC TESTING:  [ ] Echocardiogram:  [ ]  Catheterization:  [ ] Stress Test:    OTHER:

## 2022-01-25 NOTE — PROGRESS NOTE ADULT - SUBJECTIVE AND OBJECTIVE BOX
Overnight events noted      VITAL:  T(C): , Max: 36.7 (01-24-22 @ 22:29)  T(F): , Max: 98.1 (01-24-22 @ 22:29)  HR: 72 (01-25-22 @ 05:02)  BP: 128/75 (01-25-22 @ 05:02)  BP(mean): --  RR: 18 (01-25-22 @ 05:02)  SpO2: 95% (01-25-22 @ 05:02)      PHYSICAL EXAM:  Constitutional: lethargic, mildly confused, frail  HEENT: NCAT, DMM  Neck: Supple, No JVD  Respiratory: CTA-b/l  Cardiovascular: RRR s1s2, no m/r/g  Gastrointestinal: BS+, soft, NT/ND  Extremities: No peripheral edema b/l  Neurological: no focal deficits; strength grossly intact    LABS:                        9.9    9.52  )-----------( 279      ( 24 Jan 2022 10:30 )             37.3     Na(144)/K(4.6)/Cl(107)/HCO3(27)/BUN(17)/Cr(0.84)Glu(114)/Ca(9.3)/Mg(--)/PO4(--)    01-24 @ 10:30  Na(142)/K(5.2)/Cl(103)/HCO3(20)/BUN(25)/Cr(0.80)Glu(96)/Ca(9.4)/Mg(2.1)/PO4(2.7)    01-23 @ 07:21      IMPRESSION: 89F w/ mild dementia, HTN, DM2, AFib, and HFpEF, 1/20/22 p/w AMS/hypernatremia    (1)Hypernatremia - multifactorial from poor free water access and Lasix.     (2)AMS - improved relative to admission      RECOMMEND:  (1)Encourage free water intake by mouth  (2)D/C planning per primary team            David Hong MD  [facility]  Office: [phone]  Cell: [phone]       no complaints  refusing to drink fluids, per staff    VITAL:  T(C): , Max: 36.7 (01-24-22 @ 22:29)  T(F): , Max: 98.1 (01-24-22 @ 22:29)  HR: 72 (01-25-22 @ 05:02)  BP: 128/75 (01-25-22 @ 05:02)  BP(mean): --  RR: 18 (01-25-22 @ 05:02)  SpO2: 95% (01-25-22 @ 05:02)      PHYSICAL EXAM:  Constitutional: lethargic, mildly confused, frail  HEENT: NCAT, DMM  Neck: Supple, No JVD  Respiratory: CTA-b/l  Cardiovascular: RRR s1s2, no m/r/g  Gastrointestinal: BS+, soft, NT/ND  Extremities: No peripheral edema b/l  Neurological: no focal deficits; strength grossly intact    LABS:                        9.9    9.52  )-----------( 279      ( 24 Jan 2022 10:30 )             37.3     Na(144)/K(4.6)/Cl(107)/HCO3(27)/BUN(17)/Cr(0.84)Glu(114)/Ca(9.3)/Mg(--)/PO4(--)    01-24 @ 10:30  Na(142)/K(5.2)/Cl(103)/HCO3(20)/BUN(25)/Cr(0.80)Glu(96)/Ca(9.4)/Mg(2.1)/PO4(2.7)    01-23 @ 07:21      IMPRESSION: 89F w/ mild dementia, HTN, DM2, AFib, and HFpEF, 1/20/22 p/w AMS/hypernatremia    (1)Hypernatremia - multifactorial from poor free water access and Lasix. She is refusing to drink fluids...in light of this, she is at risk for recurrence of her hypernatremia. We could add DDAVP if she continues to refuse fluids by mouth and if her serum sodium rises from present levels    (2)AMS - improved relative to admission      RECOMMEND:  (1)Encourage free water intake by mouth  (2)Could add DDAVP 0.1mg po bid if hypernatremia recurs              David Hong MD  [facility]  Office: [phone]  Cell: [phone]

## 2022-01-25 NOTE — PHYSICAL THERAPY INITIAL EVALUATION ADULT - GAIT DEVIATIONS NOTED, PT EVAL
decreased meme/decreased weight-shifting ability Posterior lean noted during transfer/amb./decreased meme/decreased weight-shifting ability

## 2022-01-25 NOTE — PROGRESS NOTE ADULT - SUBJECTIVE AND OBJECTIVE BOX
BENITA AMARO  89y Female  MRN:[id number]    Patient is a 89y old  Female who presents with a chief complaint of altered mental status (21 Jan 2022 13:01)    HPI:  history limited due to dementia  89F w/ PMHx of dementia, Afib not on AC due GIB, DM2, diastolic CHF presents with altered mental status. History largely per chart as pt unable to relate. Per ER notes, patient has known baseline dementia but was still able to hold conversation. On visit 1wk prior, pt was noted to have worsening confusion and speech was no longer making sense so requested to send to ER for further eval. Pt subsequently referred today for workup of same. No reported fever, chills, cough, respiratory distress or GIB. Pt has been off AC since prior admission for GIB. On interview pt is oriented to self only. Unable to relate hx of presentation. Denies pain or respiratory distress.  (20 Jan 2022 18:17)      Patient seen and evaluated at bedside. No acute events overnight except as noted.    Interval HPI: no acute events o/n     PAST MEDICAL & SURGICAL HISTORY:  Osteoarthritis of both knees    Hypertension    Urinary incontinence    Glaucoma    Macular degeneration    Hearing loss  bilateral    Obesity (BMI 30-39.9)    Depression    Diabetes mellitus    Atrial fibrillation    Dementia    Diastolic heart failure    History of hysterectomy  1981    History of ankle fusion  left, 2001    History of wrist fracture  right 2006, left 2007    History of hip replacement, total, right  2013    No significant past surgical history        REVIEW OF SYSTEMS:  as per hpi       VITALS:   Vital Signs Last 24 Hrs  T(C): 36.4 (25 Jan 2022 11:31), Max: 36.7 (24 Jan 2022 22:29)  T(F): 97.5 (25 Jan 2022 11:31), Max: 98.1 (24 Jan 2022 22:29)  HR: 67 (25 Jan 2022 11:31) (67 - 76)  BP: 105/64 (25 Jan 2022 11:31) (105/64 - 135/73)  BP(mean): --  RR: 18 (25 Jan 2022 11:31) (18 - 18)  SpO2: 95% (25 Jan 2022 11:31) (95% - 95%)    PHYSICAL EXAM:  GENERAL: NAD, well-developed  HEAD:  Atraumatic, Normocephalic  EYES: EOMI, PERRLA, conjunctiva and sclera clear  NECK: Supple, No JVD  CHEST/LUNG: Clear to auscultation bilaterally; No wheeze  HEART: S1, S2; No murmurs, rubs, or gallops  ABDOMEN: Soft, Nontender, Nondistended; Bowel sounds present  EXTREMITIES:  2+ Peripheral Pulses, No clubbing, cyanosis, or edema  PSYCH: Normal affect  NEUROLOGY: confused   SKIN: No rashes or lesions    Consultant(s) Notes Reviewed:  [x ] YES  [ ] NO  Care Discussed with Consultants/Other Providers [ x] YES  [ ] NO    MEDS:   MEDICATIONS  (STANDING):  dextrose 40% Gel 15 Gram(s) Oral once  dextrose 5%. 1000 milliLiter(s) (50 mL/Hr) IV Continuous <Continuous>  dextrose 5%. 1000 milliLiter(s) (100 mL/Hr) IV Continuous <Continuous>  dextrose 50% Injectable 25 Gram(s) IV Push once  dextrose 50% Injectable 12.5 Gram(s) IV Push once  dextrose 50% Injectable 25 Gram(s) IV Push once  digoxin     Tablet 125 MICROGram(s) Oral daily  folic acid 1 milliGRAM(s) Oral daily  glucagon  Injectable 1 milliGRAM(s) IntraMuscular once  insulin lispro (ADMELOG) corrective regimen sliding scale   SubCutaneous three times a day before meals  insulin lispro (ADMELOG) corrective regimen sliding scale   SubCutaneous at bedtime  metoprolol succinate  milliGRAM(s) Oral daily  mirtazapine 30 milliGRAM(s) Oral at bedtime  pantoprazole    Tablet 40 milliGRAM(s) Oral before breakfast  QUEtiapine 25 milliGRAM(s) Oral two times a day  senna 2 Tablet(s) Oral at bedtime    MEDICATIONS  (PRN):  acetaminophen     Tablet .. 650 milliGRAM(s) Oral every 6 hours PRN Temp greater or equal to 38C (100.4F), Mild Pain (1 - 3)      ALLERGIES:  aspirin (Other)  No Known Allergies      LABS:                                             9.9    9.52  )-----------( 279      ( 24 Jan 2022 10:30 )             37.3   01-24    144  |  107  |  17  ----------------------------<  114<H>  4.6   |  27  |  0.84    Ca    9.3      24 Jan 2022 10:30             < from: CT Head No Cont (01.20.22 @ 14:39) >  IMPRESSION:    No evidence of acute lobar infarct, intracranial hemorrhage or mass   effect.    Stable in size posterior fossa meningioma adjacent to the right   transverse/sigmoid sinus and tentorium cerebelli. Further correlation   with MRI of the brain with and without contrast is recommended for more   detailed evaluation, if thereare no contraindications.    < end of copied text >  < from: Xray Chest 1 View AP/PA (01.20.22 @ 14:58) >  IMPRESSION:  Clear lungs.    < end of copied text >

## 2022-01-25 NOTE — PHYSICAL THERAPY INITIAL EVALUATION ADULT - ADDITIONAL COMMENTS
Patient lives alone in pvt house 7 steps to enter. HHA 24 hrsx 7 days. Patient ambulated with rolling walker independent.  pt owns rw, bed side commode, w/c,, hospital bed at home. Per daughter, Patient lives alone in pvt house 7 steps to enter. HHA 24 hrsx 7 days. Patient ambulated with rolling walker independent.  pt owns rw, bed side commode, w/c,, hospital bed at home. Currently admitted from rehab.

## 2022-01-25 NOTE — PHYSICAL THERAPY INITIAL EVALUATION ADULT - PERTINENT HX OF CURRENT PROBLEM, REHAB EVAL
89F w/ PMHx of dementia, Afib not on AC due GIB, DM2, diastolic CHF presents with altered mental status. CTH- No acute findings. Acute metabolic encephalopathy. suspect due to hypernatremia from diuretic, poor PO

## 2022-01-25 NOTE — PHYSICAL THERAPY INITIAL EVALUATION ADULT - DID THE PATIENT HAVE SURGERY?
Injection given without difficulties.Bandaid applied. Patient instructed to stay in the clinic for 15 minutes. Patient verbalized understanding and will notify nurse with any complaints.   n/a

## 2022-01-26 DIAGNOSIS — U07.1 COVID-19: ICD-10-CM

## 2022-01-26 LAB
ANION GAP SERPL CALC-SCNC: 13 MMOL/L — SIGNIFICANT CHANGE UP (ref 5–17)
BUN SERPL-MCNC: 15 MG/DL — SIGNIFICANT CHANGE UP (ref 7–23)
CALCIUM SERPL-MCNC: 9.2 MG/DL — SIGNIFICANT CHANGE UP (ref 8.4–10.5)
CHLORIDE SERPL-SCNC: 106 MMOL/L — SIGNIFICANT CHANGE UP (ref 96–108)
CO2 SERPL-SCNC: 22 MMOL/L — SIGNIFICANT CHANGE UP (ref 22–31)
CREAT SERPL-MCNC: 0.66 MG/DL — SIGNIFICANT CHANGE UP (ref 0.5–1.3)
GLUCOSE BLDC GLUCOMTR-MCNC: 105 MG/DL — HIGH (ref 70–99)
GLUCOSE BLDC GLUCOMTR-MCNC: 117 MG/DL — HIGH (ref 70–99)
GLUCOSE BLDC GLUCOMTR-MCNC: 85 MG/DL — SIGNIFICANT CHANGE UP (ref 70–99)
GLUCOSE BLDC GLUCOMTR-MCNC: 95 MG/DL — SIGNIFICANT CHANGE UP (ref 70–99)
GLUCOSE SERPL-MCNC: 97 MG/DL — SIGNIFICANT CHANGE UP (ref 70–99)
POTASSIUM SERPL-MCNC: 4.3 MMOL/L — SIGNIFICANT CHANGE UP (ref 3.5–5.3)
POTASSIUM SERPL-SCNC: 4.3 MMOL/L — SIGNIFICANT CHANGE UP (ref 3.5–5.3)
SODIUM SERPL-SCNC: 141 MMOL/L — SIGNIFICANT CHANGE UP (ref 135–145)

## 2022-01-26 RX ADMIN — Medication 1 MILLIGRAM(S): at 12:44

## 2022-01-26 RX ADMIN — QUETIAPINE FUMARATE 25 MILLIGRAM(S): 200 TABLET, FILM COATED ORAL at 05:04

## 2022-01-26 RX ADMIN — Medication 125 MICROGRAM(S): at 05:04

## 2022-01-26 RX ADMIN — SENNA PLUS 2 TABLET(S): 8.6 TABLET ORAL at 22:43

## 2022-01-26 RX ADMIN — PANTOPRAZOLE SODIUM 40 MILLIGRAM(S): 20 TABLET, DELAYED RELEASE ORAL at 05:06

## 2022-01-26 RX ADMIN — Medication 100 MILLIGRAM(S): at 05:04

## 2022-01-26 RX ADMIN — MIRTAZAPINE 30 MILLIGRAM(S): 45 TABLET, ORALLY DISINTEGRATING ORAL at 22:43

## 2022-01-26 RX ADMIN — QUETIAPINE FUMARATE 25 MILLIGRAM(S): 200 TABLET, FILM COATED ORAL at 22:44

## 2022-01-26 NOTE — PROGRESS NOTE ADULT - SUBJECTIVE AND OBJECTIVE BOX
Overnight events noted      VITAL:  T(C): , Max: 36.4 (01-25-22 @ 11:31)  T(F): , Max: 97.6 (01-26-22 @ 04:53)  HR: 68 (01-26-22 @ 04:53)  BP: 143/73 (01-26-22 @ 04:53)  BP(mean): --  RR: 18 (01-26-22 @ 04:53)  SpO2: 96% (01-26-22 @ 04:53)      PHYSICAL EXAM:  Constitutional: lethargic, mildly confused, frail  HEENT: NCAT, DMM  Neck: Supple, No JVD  Respiratory: CTA-b/l  Cardiovascular: RRR s1s2, no m/r/g  Gastrointestinal: BS+, soft, NT/ND  Extremities: No peripheral edema b/l  Neurological: no focal deficits; strength grossly intact    LABS:                        9.9    9.52  )-----------( 279      ( 24 Jan 2022 10:30 )             37.3     Na(144)/K(4.6)/Cl(107)/HCO3(27)/BUN(17)/Cr(0.84)Glu(114)/Ca(9.3)/Mg(--)/PO4(--)    01-24 @ 10:30      IMPRESSION: 89F w/ mild dementia, HTN, DM2, AFib, and HFpEF, 1/20/22 p/w AMS/hypernatremia    (1)Hypernatremia - multifactorial from poor free water access and Lasix. At risk for recurrence of hypernatremia given her poor free water intake by mouth. We could add DDAVP if she continues to refuse fluids by mouth and if her serum sodium rises above 145 from this point forward      RECOMMEND:  (1)Encourage free water intake by mouth  (2)Trend BMP  (3)Add DDAVP 0.1mg po bid if Na rises to 146 or higher            David Hong MD  [facility]  Office: [phone]  Cell: [phone]       no complaints    VITAL:  T(C): , Max: 36.4 (01-25-22 @ 11:31)  T(F): , Max: 97.6 (01-26-22 @ 04:53)  HR: 68 (01-26-22 @ 04:53)  BP: 143/73 (01-26-22 @ 04:53)  BP(mean): --  RR: 18 (01-26-22 @ 04:53)  SpO2: 96% (01-26-22 @ 04:53)      PHYSICAL EXAM:  Constitutional: lethargic, mildly confused, frail  HEENT: NCAT, DMM  Neck: Supple, No JVD  Respiratory: CTA-b/l  Cardiovascular: RRR s1s2, no m/r/g  Gastrointestinal: BS+, soft, NT/ND  Extremities: No peripheral edema b/l  Neurological: no focal deficits; strength grossly intact    LABS:                        9.9    9.52  )-----------( 279      ( 24 Jan 2022 10:30 )             37.3     Na(144)/K(4.6)/Cl(107)/HCO3(27)/BUN(17)/Cr(0.84)Glu(114)/Ca(9.3)/Mg(--)/PO4(--)    01-24 @ 10:30      IMPRESSION: 89F w/ mild dementia, HTN, DM2, AFib, and HFpEF, 1/20/22 p/w AMS/hypernatremia    (1)Hypernatremia - multifactorial from poor free water access and Lasix. At risk for recurrence of hypernatremia given her poor free water intake by mouth. We could add DDAVP if she continues to refuse fluids by mouth and if her serum sodium rises above 145 from this point forward      RECOMMEND:  (1)Encourage free water intake by mouth  (2)Trend BMP  (3)Add DDAVP 0.1mg po bid if Na rises to 146 or higher            David Hong MD  [facility]  Office: [phone]  Cell: [phone]

## 2022-01-26 NOTE — PROGRESS NOTE ADULT - SUBJECTIVE AND OBJECTIVE BOX
Subjective: Patient seen and examined. No new events except as noted.     REVIEW OF SYSTEMS:    CONSTITUTIONAL: + weakness, fevers or chills  EYES/ENT: No visual changes;  No vertigo or throat pain   NECK: No pain or stiffness  RESPIRATORY: No cough, wheezing, hemoptysis; No shortness of breath  CARDIOVASCULAR: No chest pain or palpitations  GASTROINTESTINAL: No abdominal or epigastric pain. No nausea, vomiting, or hematemesis; No diarrhea or constipation. No melena or hematochezia.  GENITOURINARY: No dysuria, frequency or hematuria  NEUROLOGICAL: No numbness or weakness  SKIN: No itching, burning, rashes, or lesions   All other review of systems is negative unless indicated above.    MEDICATIONS:  MEDICATIONS  (STANDING):  dextrose 40% Gel 15 Gram(s) Oral once  dextrose 5%. 1000 milliLiter(s) (50 mL/Hr) IV Continuous <Continuous>  dextrose 5%. 1000 milliLiter(s) (100 mL/Hr) IV Continuous <Continuous>  dextrose 50% Injectable 25 Gram(s) IV Push once  dextrose 50% Injectable 12.5 Gram(s) IV Push once  dextrose 50% Injectable 25 Gram(s) IV Push once  digoxin     Tablet 125 MICROGram(s) Oral daily  folic acid 1 milliGRAM(s) Oral daily  glucagon  Injectable 1 milliGRAM(s) IntraMuscular once  insulin lispro (ADMELOG) corrective regimen sliding scale   SubCutaneous three times a day before meals  insulin lispro (ADMELOG) corrective regimen sliding scale   SubCutaneous at bedtime  metoprolol succinate  milliGRAM(s) Oral daily  mirtazapine 30 milliGRAM(s) Oral at bedtime  pantoprazole    Tablet 40 milliGRAM(s) Oral before breakfast  QUEtiapine 25 milliGRAM(s) Oral two times a day  senna 2 Tablet(s) Oral at bedtime      PHYSICAL EXAM:  T(C): 36.4 (01-26-22 @ 11:00), Max: 36.4 (01-25-22 @ 22:19)  HR: 66 (01-26-22 @ 11:00) (66 - 70)  BP: 134/72 (01-26-22 @ 11:00) (114/68 - 143/73)  RR: 18 (01-26-22 @ 11:00) (17 - 18)  SpO2: 98% (01-26-22 @ 11:00) (96% - 98%)  Wt(kg): --  I&O's Summary    25 Jan 2022 07:01  -  26 Jan 2022 07:00  --------------------------------------------------------  IN: 500 mL / OUT: 300 mL / NET: 200 mL    26 Jan 2022 07:01  -  26 Jan 2022 12:53  --------------------------------------------------------  IN: 240 mL / OUT: 0 mL / NET: 240 mL      Appearance: NAD  HEENT:  Dry oral mucosa, PERRL, EOMI	  Lymphatic: No lymphadenopathy  Cardiovascular: irregular S1 S2, No JVD, No murmurs, No edema  Respiratory: decreased bs   Psychiatry: A & O x 0  Gastrointestinal:  Soft, Non-tender, + BS	  Skin: No rashes, No ecchymoses, No cyanosis	  Neurologic: Non-focal  Extremities: Normal range of motion, No clubbing, cyanosis or edema  Vascular: Peripheral pulses palpable 2+ bilaterally      LABS:    CARDIAC MARKERS:      01-26    141  |  106  |  15  ----------------------------<  97  4.3   |  22  |  0.66    Ca    9.2      26 Jan 2022 09:58      proBNP:   Lipid Profile:   HgA1c:   TSH:     TELEMETRY: 	    ECG:  	  RADIOLOGY:   DIAGNOSTIC TESTING:  [ ] Echocardiogram:  [ ]  Catheterization:  [ ] Stress Test:    OTHER:

## 2022-01-26 NOTE — PROGRESS NOTE ADULT - SUBJECTIVE AND OBJECTIVE BOX
BENITA AMARO  89y Female  MRN:[id number]    Patient is a 89y old  Female who presents with a chief complaint of altered mental status (21 Jan 2022 13:01)    HPI:  history limited due to dementia  89F w/ PMHx of dementia, Afib not on AC due GIB, DM2, diastolic CHF presents with altered mental status. History largely per chart as pt unable to relate. Per ER notes, patient has known baseline dementia but was still able to hold conversation. On visit 1wk prior, pt was noted to have worsening confusion and speech was no longer making sense so requested to send to ER for further eval. Pt subsequently referred today for workup of same. No reported fever, chills, cough, respiratory distress or GIB. Pt has been off AC since prior admission for GIB. On interview pt is oriented to self only. Unable to relate hx of presentation. Denies pain or respiratory distress.  (20 Jan 2022 18:17)      Patient seen and evaluated at bedside. No acute events overnight except as noted.    Interval HPI: no acute events o/n     PAST MEDICAL & SURGICAL HISTORY:  Osteoarthritis of both knees    Hypertension    Urinary incontinence    Glaucoma    Macular degeneration    Hearing loss  bilateral    Obesity (BMI 30-39.9)    Depression    Diabetes mellitus    Atrial fibrillation    Dementia    Diastolic heart failure    History of hysterectomy  1981    History of ankle fusion  left, 2001    History of wrist fracture  right 2006, left 2007    History of hip replacement, total, right  2013    No significant past surgical history        REVIEW OF SYSTEMS:  as per hpi       VITALS:  Vital Signs Last 24 Hrs  T(C): 36.5 (26 Jan 2022 20:50), Max: 36.5 (26 Jan 2022 20:50)  T(F): 97.7 (26 Jan 2022 20:50), Max: 97.7 (26 Jan 2022 20:50)  HR: 50 (26 Jan 2022 20:50) (50 - 70)  BP: 150/108 (26 Jan 2022 20:50) (114/68 - 150/108)  BP(mean): --  RR: 18 (26 Jan 2022 20:50) (17 - 18)  SpO2: 94% (26 Jan 2022 20:50) (94% - 98%)    PHYSICAL EXAM:  GENERAL: NAD, well-developed  HEAD:  Atraumatic, Normocephalic  EYES: EOMI, PERRLA, conjunctiva and sclera clear  NECK: Supple, No JVD  CHEST/LUNG: Clear to auscultation bilaterally; No wheeze  HEART: S1, S2; No murmurs, rubs, or gallops  ABDOMEN: Soft, Nontender, Nondistended; Bowel sounds present  EXTREMITIES:  2+ Peripheral Pulses, No clubbing, cyanosis, or edema  PSYCH: Normal affect  NEUROLOGY: confused   SKIN: No rashes or lesions    Consultant(s) Notes Reviewed:  [x ] YES  [ ] NO  Care Discussed with Consultants/Other Providers [ x] YES  [ ] NO    MEDS:  MEDICATIONS  (STANDING):  dextrose 40% Gel 15 Gram(s) Oral once  dextrose 5%. 1000 milliLiter(s) (100 mL/Hr) IV Continuous <Continuous>  dextrose 5%. 1000 milliLiter(s) (50 mL/Hr) IV Continuous <Continuous>  dextrose 50% Injectable 25 Gram(s) IV Push once  dextrose 50% Injectable 12.5 Gram(s) IV Push once  dextrose 50% Injectable 25 Gram(s) IV Push once  digoxin     Tablet 125 MICROGram(s) Oral daily  folic acid 1 milliGRAM(s) Oral daily  glucagon  Injectable 1 milliGRAM(s) IntraMuscular once  insulin lispro (ADMELOG) corrective regimen sliding scale   SubCutaneous three times a day before meals  insulin lispro (ADMELOG) corrective regimen sliding scale   SubCutaneous at bedtime  metoprolol succinate  milliGRAM(s) Oral daily  mirtazapine 30 milliGRAM(s) Oral at bedtime  pantoprazole    Tablet 40 milliGRAM(s) Oral before breakfast  QUEtiapine 25 milliGRAM(s) Oral two times a day  senna 2 Tablet(s) Oral at bedtime    MEDICATIONS  (PRN):  acetaminophen     Tablet .. 650 milliGRAM(s) Oral every 6 hours PRN Temp greater or equal to 38C (100.4F), Mild Pain (1 - 3)      ALLERGIES:  aspirin (Other)  No Known Allergies      LABS:                   01-26    141  |  106  |  15  ----------------------------<  97  4.3   |  22  |  0.66    Ca    9.2      26 Jan 2022 09:58               < from: CT Head No Cont (01.20.22 @ 14:39) >  IMPRESSION:    No evidence of acute lobar infarct, intracranial hemorrhage or mass   effect.    Stable in size posterior fossa meningioma adjacent to the right   transverse/sigmoid sinus and tentorium cerebelli. Further correlation   with MRI of the brain with and without contrast is recommended for more   detailed evaluation, if thereare no contraindications.    < end of copied text >  < from: Xray Chest 1 View AP/PA (01.20.22 @ 14:58) >  IMPRESSION:  Clear lungs.    < end of copied text >

## 2022-01-27 LAB
ANION GAP SERPL CALC-SCNC: 13 MMOL/L — SIGNIFICANT CHANGE UP (ref 5–17)
BUN SERPL-MCNC: 15 MG/DL — SIGNIFICANT CHANGE UP (ref 7–23)
CALCIUM SERPL-MCNC: 9.2 MG/DL — SIGNIFICANT CHANGE UP (ref 8.4–10.5)
CHLORIDE SERPL-SCNC: 105 MMOL/L — SIGNIFICANT CHANGE UP (ref 96–108)
CO2 SERPL-SCNC: 23 MMOL/L — SIGNIFICANT CHANGE UP (ref 22–31)
CREAT SERPL-MCNC: 0.65 MG/DL — SIGNIFICANT CHANGE UP (ref 0.5–1.3)
GLUCOSE BLDC GLUCOMTR-MCNC: 118 MG/DL — HIGH (ref 70–99)
GLUCOSE BLDC GLUCOMTR-MCNC: 133 MG/DL — HIGH (ref 70–99)
GLUCOSE BLDC GLUCOMTR-MCNC: 136 MG/DL — HIGH (ref 70–99)
GLUCOSE BLDC GLUCOMTR-MCNC: 151 MG/DL — HIGH (ref 70–99)
GLUCOSE BLDC GLUCOMTR-MCNC: 68 MG/DL — LOW (ref 70–99)
GLUCOSE BLDC GLUCOMTR-MCNC: 68 MG/DL — LOW (ref 70–99)
GLUCOSE BLDC GLUCOMTR-MCNC: 86 MG/DL — SIGNIFICANT CHANGE UP (ref 70–99)
GLUCOSE BLDC GLUCOMTR-MCNC: 90 MG/DL — SIGNIFICANT CHANGE UP (ref 70–99)
GLUCOSE SERPL-MCNC: 65 MG/DL — LOW (ref 70–99)
HCT VFR BLD CALC: 35.1 % — SIGNIFICANT CHANGE UP (ref 34.5–45)
HGB BLD-MCNC: 9.7 G/DL — LOW (ref 11.5–15.5)
MCHC RBC-ENTMCNC: 20.6 PG — LOW (ref 27–34)
MCHC RBC-ENTMCNC: 27.6 GM/DL — LOW (ref 32–36)
MCV RBC AUTO: 74.7 FL — LOW (ref 80–100)
NRBC # BLD: 0 /100 WBCS — SIGNIFICANT CHANGE UP (ref 0–0)
PLATELET # BLD AUTO: 232 K/UL — SIGNIFICANT CHANGE UP (ref 150–400)
POTASSIUM SERPL-MCNC: 4 MMOL/L — SIGNIFICANT CHANGE UP (ref 3.5–5.3)
POTASSIUM SERPL-SCNC: 4 MMOL/L — SIGNIFICANT CHANGE UP (ref 3.5–5.3)
RBC # BLD: 4.7 M/UL — SIGNIFICANT CHANGE UP (ref 3.8–5.2)
RBC # FLD: 21.5 % — HIGH (ref 10.3–14.5)
SARS-COV-2 RNA SPEC QL NAA+PROBE: SIGNIFICANT CHANGE UP
SODIUM SERPL-SCNC: 141 MMOL/L — SIGNIFICANT CHANGE UP (ref 135–145)
WBC # BLD: 10.51 K/UL — HIGH (ref 3.8–10.5)
WBC # FLD AUTO: 10.51 K/UL — HIGH (ref 3.8–10.5)

## 2022-01-27 RX ORDER — DEXTROSE 50 % IN WATER 50 %
15 SYRINGE (ML) INTRAVENOUS ONCE
Refills: 0 | Status: COMPLETED | OUTPATIENT
Start: 2022-01-27 | End: 2022-01-27

## 2022-01-27 RX ADMIN — Medication 100 MILLIGRAM(S): at 05:52

## 2022-01-27 RX ADMIN — Medication 1 MILLIGRAM(S): at 11:21

## 2022-01-27 RX ADMIN — MIRTAZAPINE 30 MILLIGRAM(S): 45 TABLET, ORALLY DISINTEGRATING ORAL at 22:21

## 2022-01-27 RX ADMIN — QUETIAPINE FUMARATE 25 MILLIGRAM(S): 200 TABLET, FILM COATED ORAL at 17:35

## 2022-01-27 RX ADMIN — PANTOPRAZOLE SODIUM 40 MILLIGRAM(S): 20 TABLET, DELAYED RELEASE ORAL at 05:53

## 2022-01-27 RX ADMIN — Medication 15 GRAM(S): at 18:06

## 2022-01-27 RX ADMIN — Medication 125 MICROGRAM(S): at 05:53

## 2022-01-27 RX ADMIN — SENNA PLUS 2 TABLET(S): 8.6 TABLET ORAL at 22:21

## 2022-01-27 RX ADMIN — QUETIAPINE FUMARATE 25 MILLIGRAM(S): 200 TABLET, FILM COATED ORAL at 05:52

## 2022-01-27 NOTE — PROGRESS NOTE ADULT - PROBLEM SELECTOR PLAN 7
-lovenox  -cont PPI  -monitor closely for bleeding

## 2022-01-27 NOTE — PROGRESS NOTE ADULT - PROBLEM SELECTOR PLAN 3
-off ac  -cont home metoprolol and dig  cards f/u
-off ac  -cont home metoprolol and dig  cards f/u
aspiration and fall precautions   cont with meds.
-off ac  -cont home metoprolol and dig  cards f/u
aspiration and fall precautions   cont with meds.
aspiration and fall precautions   cont with meds.
-off ac  -cont home metoprolol and dig  cards f/u
aspiration and fall precautions   cont with meds.
aspiration and fall precautions   cont with meds.
-off ac  -cont home metoprolol and dig  cards f/u
-off ac  -cont home metoprolol and dig  cards f/u
aspiration and fall precautions   cont with meds.
-off ac  -cont home metoprolol and dig  cards f/u

## 2022-01-27 NOTE — PROGRESS NOTE ADULT - PROBLEM SELECTOR PLAN 1
-suspect due to hypernatremia from diuretic, poor PO, management as below  -low suspicion for infection as UA and CXR negative, non toxic appearing  -monitor off abx and f/u cultures  improved
suspect due to hypernatremia from diuretic, poor PO, management as below  low suspicion for infection as UA and CXR negative, non toxic appearing.
suspect due to hypernatremia from diuretic, poor PO, management as below  low suspicion for infection as UA and CXR negative, non toxic appearing.
-suspect due to hypernatremia from diuretic, poor PO, management as below  -low suspicion for infection as UA and CXR negative, non toxic appearing  -monitor off abx and f/u cultures  improved
suspect due to hypernatremia from diuretic, poor PO, management as below  low suspicion for infection as UA and CXR negative, non toxic appearing.
suspect due to hypernatremia from diuretic, poor PO, management as below  low suspicion for infection as UA and CXR negative, non toxic appearing.
-suspect due to hypernatremia from diuretic, poor PO, management as below  -low suspicion for infection as UA and CXR negative, non toxic appearing  -monitor off abx and f/u cultures  improved
suspect due to hypernatremia from diuretic, poor PO, management as below  low suspicion for infection as UA and CXR negative, non toxic appearing.
-suspect due to hypernatremia from diuretic, poor PO, management as below  -low suspicion for infection as UA and CXR negative, non toxic appearing  -monitor off abx and f/u cultures
suspect due to hypernatremia from diuretic, poor PO, management as below  low suspicion for infection as UA and CXR negative, non toxic appearing.
-suspect due to hypernatremia from diuretic, poor PO, management as below  -low suspicion for infection as UA and CXR negative, non toxic appearing  -monitor off abx and f/u cultures
-suspect due to hypernatremia from diuretic, poor PO, management as below  -low suspicion for infection as UA and CXR negative, non toxic appearing  -monitor off abx and f/u cultures  improved
-suspect due to hypernatremia from diuretic, poor PO, management as below  -low suspicion for infection as UA and CXR negative, non toxic appearing  -monitor off abx and f/u cultures  improved

## 2022-01-27 NOTE — PROGRESS NOTE ADULT - PROBLEM SELECTOR PLAN 2
-hold lasix  renal consulted  ivf as per renal  serial bmp  improving
rate controlled   no AC secondary to previous recurrent GI bleeds.
-hold lasix  renal consulted  ivf as per renal  serial bmp  improved
rate controlled   no AC secondary to previous recurrent GI bleeds.
-hold lasix  renal consulted  ivf as per renal  serial bmp  improving
rate controlled   no AC secondary to previous recurrent GI bleeds.
-hold lasix  renal consulted  ivf as per renal  serial bmp  improving
-hold lasix  renal consulted  ivf as per renal  serial bmp
-hold lasix  renal consulted  ivf as per renal  serial bmp  improving
-hold lasix  renal consulted  ivf as per renal  serial bmp  improved

## 2022-01-27 NOTE — PROGRESS NOTE ADULT - TIME-BASED BILLING (NON-CRITICAL CARE)
Time-based billing (NON-critical care)
Allergy;
Time-based billing (NON-critical care)

## 2022-01-27 NOTE — PROGRESS NOTE ADULT - PROBLEM SELECTOR PLAN 5
compensated   cont with meds.
compensated   cont with meds.
-insulin sliding scale  -hold januvia
-insulin sliding scale  -hold januvia
compensated   cont with meds.
compensated   cont with meds.
-insulin sliding scale  -hold januvia
-insulin sliding scale  -hold januvia
compensated   cont with meds.
compensated   cont with meds.
-insulin sliding scale  -hold januvia

## 2022-01-27 NOTE — PROGRESS NOTE ADULT - NSPROGADDITIONALINFOA_GEN_ALL_CORE
d/w family at bedside      advanced care planning was discussed with patient and family.  Advanced care planning forms were reviewed and discussed as appropriate.  Differential diagnosis and plan of care discussed with patient after the evaluation.   Pain assessed and judicious use of narcotics when appropriate was discussed.  Importance of Fall prevention discussed.  Counseling on Smoking and Alcohol cessation was offered when appropriate.  Counseling on Diet, exercise, and medication compliance was done.       Approx 60 minutes spent.
dc planning      advanced care planning was discussed with patient and family.  Advanced care planning forms were reviewed and discussed as appropriate.  Differential diagnosis and plan of care discussed with patient after the evaluation.   Pain assessed and judicious use of narcotics when appropriate was discussed.  Importance of Fall prevention discussed.  Counseling on Smoking and Alcohol cessation was offered when appropriate.  Counseling on Diet, exercise, and medication compliance was done.       Approx 60 minutes spent.
d/w family at bedside      advanced care planning was discussed with patient and family.  Advanced care planning forms were reviewed and discussed as appropriate.  Differential diagnosis and plan of care discussed with patient after the evaluation.   Pain assessed and judicious use of narcotics when appropriate was discussed.  Importance of Fall prevention discussed.  Counseling on Smoking and Alcohol cessation was offered when appropriate.  Counseling on Diet, exercise, and medication compliance was done.       Approx 60 minutes spent.
dc planning      advanced care planning was discussed with patient and family.  Advanced care planning forms were reviewed and discussed as appropriate.  Differential diagnosis and plan of care discussed with patient after the evaluation.   Pain assessed and judicious use of narcotics when appropriate was discussed.  Importance of Fall prevention discussed.  Counseling on Smoking and Alcohol cessation was offered when appropriate.  Counseling on Diet, exercise, and medication compliance was done.       Approx 60 minutes spent.
d/w family at bedside      advanced care planning was discussed with patient and family.  Advanced care planning forms were reviewed and discussed as appropriate.  Differential diagnosis and plan of care discussed with patient after the evaluation.   Pain assessed and judicious use of narcotics when appropriate was discussed.  Importance of Fall prevention discussed.  Counseling on Smoking and Alcohol cessation was offered when appropriate.  Counseling on Diet, exercise, and medication compliance was done.       Approx 60 minutes spent.

## 2022-01-27 NOTE — PROGRESS NOTE ADULT - SUBJECTIVE AND OBJECTIVE BOX
Subjective: Patient seen and examined. No new events except as noted.     REVIEW OF SYSTEMS:  Unable to obtain.     MEDICATIONS:  MEDICATIONS  (STANDING):  dextrose 40% Gel 15 Gram(s) Oral once  dextrose 5%. 1000 milliLiter(s) (50 mL/Hr) IV Continuous <Continuous>  dextrose 5%. 1000 milliLiter(s) (100 mL/Hr) IV Continuous <Continuous>  dextrose 50% Injectable 25 Gram(s) IV Push once  dextrose 50% Injectable 12.5 Gram(s) IV Push once  dextrose 50% Injectable 25 Gram(s) IV Push once  digoxin     Tablet 125 MICROGram(s) Oral daily  folic acid 1 milliGRAM(s) Oral daily  glucagon  Injectable 1 milliGRAM(s) IntraMuscular once  insulin lispro (ADMELOG) corrective regimen sliding scale   SubCutaneous three times a day before meals  insulin lispro (ADMELOG) corrective regimen sliding scale   SubCutaneous at bedtime  metoprolol succinate  milliGRAM(s) Oral daily  mirtazapine 30 milliGRAM(s) Oral at bedtime  pantoprazole    Tablet 40 milliGRAM(s) Oral before breakfast  QUEtiapine 25 milliGRAM(s) Oral two times a day  senna 2 Tablet(s) Oral at bedtime      PHYSICAL EXAM:  T(C): 36.4 (01-27-22 @ 11:41), Max: 36.6 (01-27-22 @ 03:52)  HR: 68 (01-27-22 @ 11:41) (50 - 71)  BP: 121/67 (01-27-22 @ 11:41) (121/67 - 150/108)  RR: 18 (01-27-22 @ 11:41) (18 - 18)  SpO2: 95% (01-27-22 @ 11:41) (94% - 95%)  Wt(kg): --  I&O's Summary    26 Jan 2022 07:01  -  27 Jan 2022 07:00  --------------------------------------------------------  IN: 1080 mL / OUT: 700 mL / NET: 380 mL    27 Jan 2022 07:01  -  27 Jan 2022 14:56  --------------------------------------------------------  IN: 180 mL / OUT: 0 mL / NET: 180 mL      Appearance: Normal, NAD  HEENT:   Normal oral mucosa, PERRL, EOMI	  Lymphatic: No lymphadenopathy , no edema  Cardiovascular: Normal S1 S2, No JVD, No murmurs , Peripheral pulses palpable 2+ bilaterally  Respiratory: Lungs clear to auscultation, normal effort 	  Gastrointestinal:  Soft, Non-tender, + BS	  Skin: No rashes, No ecchymoses, No cyanosis, warm to touch  Musculoskeletal: Normal range of motion, normal strength  Psychiatry:  Mood & affect appropriate, confused  Ext: No edema      LABS:    CARDIAC MARKERS:                        9.7    10.51 )-----------( 232      ( 27 Jan 2022 05:35 )             35.1     01-27    141  |  105  |  15  ----------------------------<  65<L>  4.0   |  23  |  0.65    Ca    9.2      27 Jan 2022 05:34      proBNP:   Lipid Profile:   HgA1c:   TSH:       TELEMETRY: 	    ECG:  	  RADIOLOGY:   DIAGNOSTIC TESTING:  [ ] Echocardiogram:  [ ]  Catheterization:  [ ] Stress Test:    OTHER:

## 2022-01-27 NOTE — PROGRESS NOTE ADULT - SUBJECTIVE AND OBJECTIVE BOX
BENITA AMARO  89y Female  MRN:[id number]    Patient is a 89y old  Female who presents with a chief complaint of altered mental status (21 Jan 2022 13:01)    HPI:  history limited due to dementia  89F w/ PMHx of dementia, Afib not on AC due GIB, DM2, diastolic CHF presents with altered mental status. History largely per chart as pt unable to relate. Per ER notes, patient has known baseline dementia but was still able to hold conversation. On visit 1wk prior, pt was noted to have worsening confusion and speech was no longer making sense so requested to send to ER for further eval. Pt subsequently referred today for workup of same. No reported fever, chills, cough, respiratory distress or GIB. Pt has been off AC since prior admission for GIB. On interview pt is oriented to self only. Unable to relate hx of presentation. Denies pain or respiratory distress.  (20 Jan 2022 18:17)      Patient seen and evaluated at bedside. No acute events overnight except as noted.    Interval HPI: no acute events o/n     PAST MEDICAL & SURGICAL HISTORY:  Osteoarthritis of both knees    Hypertension    Urinary incontinence    Glaucoma    Macular degeneration    Hearing loss  bilateral    Obesity (BMI 30-39.9)    Depression    Diabetes mellitus    Atrial fibrillation    Dementia    Diastolic heart failure    History of hysterectomy  1981    History of ankle fusion  left, 2001    History of wrist fracture  right 2006, left 2007    History of hip replacement, total, right  2013    No significant past surgical history        REVIEW OF SYSTEMS:  as per hpi       VITALS:   Vital Signs Last 24 Hrs  T(C): 36.4 (27 Jan 2022 11:41), Max: 36.6 (27 Jan 2022 03:52)  T(F): 97.5 (27 Jan 2022 11:41), Max: 97.8 (27 Jan 2022 03:52)  HR: 68 (27 Jan 2022 11:41) (50 - 71)  BP: 121/67 (27 Jan 2022 11:41) (121/67 - 150/108)  BP(mean): --  RR: 18 (27 Jan 2022 11:41) (18 - 18)  SpO2: 95% (27 Jan 2022 11:41) (94% - 95%)    PHYSICAL EXAM:  GENERAL: NAD, well-developed  HEAD:  Atraumatic, Normocephalic  EYES: EOMI, PERRLA, conjunctiva and sclera clear  NECK: Supple, No JVD  CHEST/LUNG: Clear to auscultation bilaterally; No wheeze  HEART: S1, S2; No murmurs, rubs, or gallops  ABDOMEN: Soft, Nontender, Nondistended; Bowel sounds present  EXTREMITIES:  2+ Peripheral Pulses, No clubbing, cyanosis, or edema  PSYCH: Normal affect  NEUROLOGY: confused   SKIN: No rashes or lesions    Consultant(s) Notes Reviewed:  [x ] YES  [ ] NO  Care Discussed with Consultants/Other Providers [ x] YES  [ ] NO    MEDS:   MEDICATIONS  (STANDING):  dextrose 40% Gel 15 Gram(s) Oral once  dextrose 5%. 1000 milliLiter(s) (50 mL/Hr) IV Continuous <Continuous>  dextrose 5%. 1000 milliLiter(s) (100 mL/Hr) IV Continuous <Continuous>  dextrose 50% Injectable 25 Gram(s) IV Push once  dextrose 50% Injectable 12.5 Gram(s) IV Push once  dextrose 50% Injectable 25 Gram(s) IV Push once  digoxin     Tablet 125 MICROGram(s) Oral daily  folic acid 1 milliGRAM(s) Oral daily  glucagon  Injectable 1 milliGRAM(s) IntraMuscular once  insulin lispro (ADMELOG) corrective regimen sliding scale   SubCutaneous three times a day before meals  insulin lispro (ADMELOG) corrective regimen sliding scale   SubCutaneous at bedtime  metoprolol succinate  milliGRAM(s) Oral daily  mirtazapine 30 milliGRAM(s) Oral at bedtime  pantoprazole    Tablet 40 milliGRAM(s) Oral before breakfast  QUEtiapine 25 milliGRAM(s) Oral two times a day  senna 2 Tablet(s) Oral at bedtime    MEDICATIONS  (PRN):  acetaminophen     Tablet .. 650 milliGRAM(s) Oral every 6 hours PRN Temp greater or equal to 38C (100.4F), Mild Pain (1 - 3)      ALLERGIES:  aspirin (Other)  No Known Allergies      LABS:                                    9.7    10.51 )-----------( 232      ( 27 Jan 2022 05:35 )             35.1   01-27    141  |  105  |  15  ----------------------------<  65<L>  4.0   |  23  |  0.65    Ca    9.2      27 Jan 2022 05:34             < from: CT Head No Cont (01.20.22 @ 14:39) >  IMPRESSION:    No evidence of acute lobar infarct, intracranial hemorrhage or mass   effect.    Stable in size posterior fossa meningioma adjacent to the right   transverse/sigmoid sinus and tentorium cerebelli. Further correlation   with MRI of the brain with and without contrast is recommended for more   detailed evaluation, if thereare no contraindications.    < end of copied text >  < from: Xray Chest 1 View AP/PA (01.20.22 @ 14:58) >  IMPRESSION:  Clear lungs.    < end of copied text >

## 2022-01-27 NOTE — PROGRESS NOTE ADULT - PROBLEM SELECTOR PROBLEM 1
Acute metabolic encephalopathy

## 2022-01-27 NOTE — PROGRESS NOTE ADULT - PROBLEM/PLAN-3
DISPLAY PLAN FREE TEXT
3

## 2022-01-27 NOTE — PROGRESS NOTE ADULT - ASSESSMENT
Ms. Saab is an 89 year-old woman with mild dementia, hypertension, diabetes mellitus, atrial fibrillation, and congestive heart failure with preserved ejection fraction, who presented 1/20/22 to the [facility] ER with worsening confusion over the past several days.  Nephrology consulted for hypernatremia (154).      (1)Hypernatremia - likely from poor free water access of late (3.5L free water deficit) and Lasix use, with associated impairment in urinary concentrating ability.   This am improved to Na 147    (2)Hypokalemia - whole body deficit from limited intake, and urinary losses associated with Lasix. on D5W+ Kcl 10 mEq- supplemented this am with additional po Kcl 40 mEq x1    (3)AMS - hypernatremia-induced?      RECOMMEND:  (1)decreased D5W+10meq/L KCL, to 50cc/hr from 100cc/h         : trend BMP, can discontinue if serum na <145  in am   (2)a/w KCL 40meq po x 1 given this am for K 3.3  (3) continue deferring Lasix for now  (4)BMP+Mg daily  (5)Dose new meds for GFR 50ml/min  (6) replete lytes as needed    d/w Nurse
Ms. Saab is an 89 year-old woman with mild dementia, hypertension, diabetes mellitus, atrial fibrillation, and congestive heart failure with preserved ejection fraction, who presented 1/20/22 to the [facility] ER with worsening confusion over the past several days.  Nephrology consulted for hypernatremia (154).      (1)Hypernatremia - likely from poor free water access of late (3.5L free water deficit) and Lasix use, with associated impairment in urinary concentrating ability.   This am improved to Na 142, and IVF was discontinued per primary team    (2)Hypokalemia - whole body deficit from limited intake, and urinary losses associated with Lasix.   S/p D5W+ Kcl 10 mEq- supplemented po Kcl 40 mEq x1 yesterday - K 5.2 this am    (3)AMS - hypernatremia-induced?      RECOMMEND:  (1) a/w discontinued D5W+10meq/L KCL 50cc/hr for the given na 142 this am   (2)BMP+Mg daily  (3) continue holding Lasix for now  (4) Dose new meds for GFR 50ml/min    
89F w/ PMHx of dementia, Afib not on AC due GIB, DM2, diastolic CHF presents with altered mental status. Suspect acute metabolic encephalopathy due to hypernatremia in the setting of poor PO. No obvious sign of infection at this time. Head CT negative and no focal deficit on exam.

## 2022-01-27 NOTE — PROGRESS NOTE ADULT - PROBLEM SELECTOR PROBLEM 5
Chronic diastolic congestive heart failure
Type 2 diabetes mellitus
Type 2 diabetes mellitus
Chronic diastolic congestive heart failure
Type 2 diabetes mellitus
Chronic diastolic congestive heart failure
Type 2 diabetes mellitus
Chronic diastolic congestive heart failure
Type 2 diabetes mellitus

## 2022-01-27 NOTE — PROGRESS NOTE ADULT - PROBLEM SELECTOR PLAN 4
RISS.
-cont remeron and seroquel
RISS.
RISS.
-cont remeron and seroquel

## 2022-01-27 NOTE — PROGRESS NOTE ADULT - PROVIDER SPECIALTY LIST ADULT
Nephrology
Internal Medicine
Nephrology
Cardiology
Internal Medicine
Cardiology
Internal Medicine
Cardiology
Internal Medicine
Cardiology
Internal Medicine

## 2022-01-27 NOTE — PROGRESS NOTE ADULT - PROBLEM SELECTOR PLAN 6
-hold lasix as above

## 2022-01-27 NOTE — PROGRESS NOTE ADULT - PROBLEM SELECTOR PROBLEM 4
Type 2 diabetes mellitus
Dementia with behavioral disturbance
Dementia with behavioral disturbance
Type 2 diabetes mellitus
Type 2 diabetes mellitus
Dementia with behavioral disturbance
Type 2 diabetes mellitus
Type 2 diabetes mellitus
Dementia with behavioral disturbance
Type 2 diabetes mellitus

## 2022-01-27 NOTE — PROGRESS NOTE ADULT - SUBJECTIVE AND OBJECTIVE BOX
Overnight events noted      VITAL:  T(C): , Max: 36.6 (01-27-22 @ 03:52)  T(F): , Max: 97.8 (01-27-22 @ 03:52)  HR: 71 (01-27-22 @ 03:52)  BP: 130/63 (01-27-22 @ 03:52)  BP(mean): --  RR: 18 (01-27-22 @ 03:52)  SpO2: 94% (01-27-22 @ 03:52)      PHYSICAL EXAM:  Constitutional: lethargic, mildly confused, frail  HEENT: NCAT, DMM  Neck: Supple, No JVD  Respiratory: CTA-b/l  Cardiovascular: RRR s1s2, no m/r/g  Gastrointestinal: BS+, soft, NT/ND  Extremities: No peripheral edema b/l  Neurological: no focal deficits; strength grossly intact    LABS:                        9.7    10.51 )-----------( 232      ( 27 Jan 2022 05:35 )             35.1     Na(141)/K(4.0)/Cl(105)/HCO3(23)/BUN(15)/Cr(0.65)Glu(65)/Ca(9.2)/Mg(--)/PO4(--)    01-27 @ 05:34  Na(141)/K(4.3)/Cl(106)/HCO3(22)/BUN(15)/Cr(0.66)Glu(97)/Ca(9.2)/Mg(--)/PO4(--)    01-26 @ 09:58  Na(144)/K(4.6)/Cl(107)/HCO3(27)/BUN(17)/Cr(0.84)Glu(114)/Ca(9.3)/Mg(--)/PO4(--)    01-24 @ 10:30      IMPRESSION: 89F w/ mild dementia, HTN, DM2, AFib, and HFpEF, 1/20/22 p/w AMS/hypernatremia    (1)Hypernatremia - multifactorial from poor free water access and Lasix. Appears now to have resolved      RECOMMEND:  (1)Encourage free water intake by mouth  (2)Reconsult as needed            David Hong MD  [facility]  Office: [phone]  Cell: [phone]       no complaints      VITAL:  T(C): , Max: 36.6 (01-27-22 @ 03:52)  T(F): , Max: 97.8 (01-27-22 @ 03:52)  HR: 71 (01-27-22 @ 03:52)  BP: 130/63 (01-27-22 @ 03:52)  BP(mean): --  RR: 18 (01-27-22 @ 03:52)  SpO2: 94% (01-27-22 @ 03:52)      PHYSICAL EXAM:  Constitutional: lethargic, mildly confused, frail  HEENT: NCAT, DMM  Neck: Supple, No JVD  Respiratory: CTA-b/l  Cardiovascular: RRR s1s2, no m/r/g  Gastrointestinal: BS+, soft, NT/ND  Extremities: No peripheral edema b/l  Neurological: no focal deficits; strength grossly intact    LABS:                        9.7    10.51 )-----------( 232      ( 27 Jan 2022 05:35 )             35.1     Na(141)/K(4.0)/Cl(105)/HCO3(23)/BUN(15)/Cr(0.65)Glu(65)/Ca(9.2)/Mg(--)/PO4(--)    01-27 @ 05:34  Na(141)/K(4.3)/Cl(106)/HCO3(22)/BUN(15)/Cr(0.66)Glu(97)/Ca(9.2)/Mg(--)/PO4(--)    01-26 @ 09:58  Na(144)/K(4.6)/Cl(107)/HCO3(27)/BUN(17)/Cr(0.84)Glu(114)/Ca(9.3)/Mg(--)/PO4(--)    01-24 @ 10:30      IMPRESSION: 89F w/ mild dementia, HTN, DM2, AFib, and HFpEF, 1/20/22 p/w AMS/hypernatremia    (1)Hypernatremia - multifactorial from poor free water access and Lasix. Appears now to have resolved      RECOMMEND:  (1)Encourage free water intake by mouth  (2)Reconsult as needed            David Hong MD  [facility]  Office: [phone]  Cell: [phone]

## 2022-01-27 NOTE — PROGRESS NOTE ADULT - PROBLEM SELECTOR PROBLEM 2
Chronic atrial fibrillation
Hypernatremia
Chronic atrial fibrillation
Hypernatremia
Hypernatremia
Chronic atrial fibrillation
Hypernatremia
Chronic atrial fibrillation
Hypernatremia
Chronic atrial fibrillation
Chronic atrial fibrillation

## 2022-01-27 NOTE — PROGRESS NOTE ADULT - PROBLEM SELECTOR PROBLEM 3
Chronic atrial fibrillation
Dementia with behavioral disturbance
Chronic atrial fibrillation
Chronic atrial fibrillation
Dementia with behavioral disturbance
Dementia with behavioral disturbance
Chronic atrial fibrillation
Dementia with behavioral disturbance
Chronic atrial fibrillation

## 2022-01-28 ENCOUNTER — TRANSCRIPTION ENCOUNTER (OUTPATIENT)
Age: 87
End: 2022-01-28

## 2022-01-28 VITALS
HEART RATE: 78 BPM | OXYGEN SATURATION: 96 % | TEMPERATURE: 98 F | DIASTOLIC BLOOD PRESSURE: 79 MMHG | RESPIRATION RATE: 18 BRPM | SYSTOLIC BLOOD PRESSURE: 131 MMHG

## 2022-01-28 LAB
GLUCOSE BLDC GLUCOMTR-MCNC: 111 MG/DL — HIGH (ref 70–99)
GLUCOSE BLDC GLUCOMTR-MCNC: 78 MG/DL — SIGNIFICANT CHANGE UP (ref 70–99)
GLUCOSE BLDC GLUCOMTR-MCNC: 94 MG/DL — SIGNIFICANT CHANGE UP (ref 70–99)

## 2022-01-28 PROCEDURE — 84484 ASSAY OF TROPONIN QUANT: CPT

## 2022-01-28 PROCEDURE — 36415 COLL VENOUS BLD VENIPUNCTURE: CPT

## 2022-01-28 PROCEDURE — 81001 URINALYSIS AUTO W/SCOPE: CPT

## 2022-01-28 PROCEDURE — 86901 BLOOD TYPING SEROLOGIC RH(D): CPT

## 2022-01-28 PROCEDURE — 86900 BLOOD TYPING SEROLOGIC ABO: CPT

## 2022-01-28 PROCEDURE — 71045 X-RAY EXAM CHEST 1 VIEW: CPT

## 2022-01-28 PROCEDURE — 83036 HEMOGLOBIN GLYCOSYLATED A1C: CPT

## 2022-01-28 PROCEDURE — U0003: CPT

## 2022-01-28 PROCEDURE — 85610 PROTHROMBIN TIME: CPT

## 2022-01-28 PROCEDURE — 85027 COMPLETE CBC AUTOMATED: CPT

## 2022-01-28 PROCEDURE — 80053 COMPREHEN METABOLIC PANEL: CPT

## 2022-01-28 PROCEDURE — 99285 EMERGENCY DEPT VISIT HI MDM: CPT

## 2022-01-28 PROCEDURE — 97162 PT EVAL MOD COMPLEX 30 MIN: CPT

## 2022-01-28 PROCEDURE — 86850 RBC ANTIBODY SCREEN: CPT

## 2022-01-28 PROCEDURE — 83735 ASSAY OF MAGNESIUM: CPT

## 2022-01-28 PROCEDURE — 82962 GLUCOSE BLOOD TEST: CPT

## 2022-01-28 PROCEDURE — 70450 CT HEAD/BRAIN W/O DYE: CPT | Mod: MA

## 2022-01-28 PROCEDURE — 84100 ASSAY OF PHOSPHORUS: CPT

## 2022-01-28 PROCEDURE — 85730 THROMBOPLASTIN TIME PARTIAL: CPT

## 2022-01-28 PROCEDURE — 80048 BASIC METABOLIC PNL TOTAL CA: CPT

## 2022-01-28 PROCEDURE — 85025 COMPLETE CBC W/AUTO DIFF WBC: CPT

## 2022-01-28 PROCEDURE — 87086 URINE CULTURE/COLONY COUNT: CPT

## 2022-01-28 PROCEDURE — U0005: CPT

## 2022-01-28 RX ADMIN — QUETIAPINE FUMARATE 25 MILLIGRAM(S): 200 TABLET, FILM COATED ORAL at 06:06

## 2022-01-28 RX ADMIN — PANTOPRAZOLE SODIUM 40 MILLIGRAM(S): 20 TABLET, DELAYED RELEASE ORAL at 06:05

## 2022-01-28 RX ADMIN — Medication 1 MILLIGRAM(S): at 11:18

## 2022-01-28 RX ADMIN — Medication 100 MILLIGRAM(S): at 06:05

## 2022-01-28 RX ADMIN — Medication 125 MICROGRAM(S): at 06:05

## 2022-01-28 NOTE — CHART NOTE - NSCHARTNOTEFT_GEN_A_CORE
Interval Hx: Patient with potassium of 2.9. Ordered for 3 k riders as well as oral potassium supplementation.     Patient refusing to take the oral supplementation at this time, even with education.  Will discontinue oral supplementation and reevaluate in AM. Continue w/ IV supplementation.  Day team and attending to follow.    -Reji Hurst PA-C, [id number], Dept of Medicine
Discharge Note:    Requested by Dr. Correa to facilitate d/c to [facility].  V/s, labs, diagnostics reviewed, pt stable to d/c now.  Medication reconciliation reviewed, revised, and resolved with Dr. Correa who medically cleared w/ f/u as directed.   Please refer to d/c note for hospital details.    Charley Jiang, TORRES-c  [id number]

## 2022-01-28 NOTE — DISCHARGE NOTE NURSING/CASE MANAGEMENT/SOCIAL WORK - PATIENT PORTAL LINK FT
You can access the FollowMyHealth Patient Portal offered by [facility] by registering at the following website: [email or web address]. By joining [facility]’s FollowMyHealth portal, you will also be able to view your health information using other applications (apps) compatible with our system.

## 2022-01-28 NOTE — DISCHARGE NOTE NURSING/CASE MANAGEMENT/SOCIAL WORK - NSDCPEFALRISK_GEN_ALL_CORE
For information on Fall & Injury Prevention, visit: [email or web address] OR  [email or web address] OR  https://www.cdc.gov/steadi/patient.html

## 2022-04-07 RX ORDER — FUROSEMIDE 40 MG
1 TABLET ORAL
Qty: 0 | Refills: 0 | DISCHARGE

## 2022-04-07 RX ORDER — ERGOCALCIFEROL 1.25 MG/1
0 CAPSULE ORAL
Qty: 0 | Refills: 0 | DISCHARGE

## 2022-04-07 RX ORDER — SITAGLIPTIN 50 MG/1
1 TABLET, FILM COATED ORAL
Qty: 0 | Refills: 0 | DISCHARGE

## 2022-04-07 RX ORDER — MULTIVIT-MIN/FERROUS GLUCONATE 9 MG/15 ML
1 LIQUID (ML) ORAL
Qty: 0 | Refills: 0 | DISCHARGE

## 2022-04-07 RX ORDER — POTASSIUM CHLORIDE 20 MEQ
1 PACKET (EA) ORAL
Qty: 0 | Refills: 0 | DISCHARGE

## 2022-04-07 RX ORDER — PANTOPRAZOLE SODIUM 20 MG/1
1 TABLET, DELAYED RELEASE ORAL
Qty: 0 | Refills: 0 | DISCHARGE

## 2022-04-07 RX ORDER — METOPROLOL TARTRATE 50 MG
1 TABLET ORAL
Qty: 0 | Refills: 0 | DISCHARGE

## 2022-04-07 RX ORDER — QUETIAPINE FUMARATE 200 MG/1
0.5 TABLET, FILM COATED ORAL
Qty: 0 | Refills: 0 | DISCHARGE

## 2022-04-07 RX ORDER — SERTRALINE 25 MG/1
1 TABLET, FILM COATED ORAL
Qty: 0 | Refills: 0 | DISCHARGE

## 2022-04-07 RX ORDER — FOLIC ACID 0.8 MG
1 TABLET ORAL
Qty: 0 | Refills: 0 | DISCHARGE

## 2022-04-07 RX ORDER — MIRTAZAPINE 45 MG/1
1 TABLET, ORALLY DISINTEGRATING ORAL
Qty: 0 | Refills: 0 | DISCHARGE

## 2022-04-07 RX ORDER — QUETIAPINE FUMARATE 200 MG/1
1 TABLET, FILM COATED ORAL
Qty: 0 | Refills: 0 | DISCHARGE

## 2022-04-07 RX ORDER — LANOLIN ALCOHOL/MO/W.PET/CERES
1 CREAM (GRAM) TOPICAL
Qty: 0 | Refills: 0 | DISCHARGE

## 2022-04-07 RX ORDER — SENNA PLUS 8.6 MG/1
2 TABLET ORAL
Qty: 0 | Refills: 0 | DISCHARGE

## 2022-04-07 RX ORDER — ASCORBIC ACID 60 MG
0 TABLET,CHEWABLE ORAL
Qty: 0 | Refills: 0 | DISCHARGE

## 2022-04-07 RX ORDER — DIGOXIN 250 MCG
1 TABLET ORAL
Qty: 0 | Refills: 0 | DISCHARGE

## 2022-04-07 RX ORDER — RIVAROXABAN 15 MG-20MG
1 KIT ORAL
Qty: 0 | Refills: 0 | DISCHARGE

## 2022-04-13 NOTE — PATIENT PROFILE ADULT. - PSH
Medication:tizanadine     Last Seen:3/17/22    Last Refilled:3/17/22 30 tabs w 0 refills     Next Appointment:9/22/22              History of ankle fusion  left, 2001  History of hip replacement, total, right  2013  History of hysterectomy  1981  History of wrist fracture  right 2006, left 2007

## 2022-04-25 NOTE — ED ADULT NURSE NOTE - NSIMPLEMENTINTERV_GEN_ALL_ED
Implemented All Fall with Harm Risk Interventions:  [place] to call system. Call bell, personal items and telephone within reach. Instruct patient to call for assistance. Room bathroom lighting operational. Non-slip footwear when patient is off stretcher. Physically safe environment: no spills, clutter or unnecessary equipment. Stretcher in lowest position, wheels locked, appropriate side rails in place. Provide visual cue, wrist band, yellow gown, etc. Monitor gait and stability. Monitor for mental status changes and reorient to person, place, and time. Review medications for side effects contributing to fall risk. Reinforce activity limits and safety measures with patient and family. Provide visual clues: red socks.

## 2022-04-25 NOTE — H&P ADULT - HISTORY OF PRESENT ILLNESS
The patient is lethargic and unable to provide history. Therefore history is obtained from the patient's daughter ms. Larisa Knight via telephone    89F w/ dementia, meningioma, HF, DM2, afib off ac due to GI bleed p/w ams. On day of presentation, the patient's daughter went to visit the patient at her SNF and found the patient to be slumped over in her wheelchair with minimal interaction at approx 12:30pm. The patient's cognitive baseline has been reportedly declining over the course of months however she reportedly is able to have simple conversation with family. The patient has had dementia for some time but she has more rapidly declined since the end of Jan2022 which has been attributed to isolation during hospitalizations, rehab and while being in her SNF. Her daughter reports dosage adjustment of seroquel and divalproex reportedly improving her cognition over the weeks prior which is why there is surprise for current mental status. Additionally, the patient is known to have a meningioma however details of the state of disease are not known to the family.    Her daughter confirms that the patient is DNR/DNI and that the family would not want to pursue invasive surgery but is open to medical treatment and further diagnostic testing.

## 2022-04-25 NOTE — ED ADULT NURSE NOTE - OBJECTIVE STATEMENT
As per EMS report, pt was alert, and in usual state of orientation yesterday afternoon, and @ 1230 today was found slumped over in wheelchair, responsive to tactile stimulation and was subsequently transported to ED later this evening.  Upon arrival, she responds to tactile and some verbal stimulation, but is unable to answer any questions appropriately.  Pt does have hx dementia.

## 2022-04-25 NOTE — H&P ADULT - NSHPADDITIONALINFOADULT_GEN_ALL_CORE
Patient assigned to me by night hospitalist in charge for management and care for patient for this evening only. Care to be resumed by day hospitalist Dr. Ruiz at 08:00 in the morning and thereafter.     Martin Sanchez MD  Medicine Attending  Department of Hospital Medicine  pager: [phone] (available from 20:00 to 08:00)

## 2022-04-25 NOTE — H&P ADULT - PROBLEM SELECTOR PLAN 6
cardiology consult in the am  c/w metoprolol and furosemide dosing  clinically does not appear to be in acute heart failure

## 2022-04-25 NOTE — ED PROVIDER NOTE - NSICDXPASTMEDICALHX_GEN_ALL_CORE_FT
PAST MEDICAL HISTORY:  Diabetes      PAST MEDICAL HISTORY:  Chronic CHF     Dementia     Diabetes

## 2022-04-25 NOTE — H&P ADULT - NSHPLABSRESULTS_GEN_ALL_CORE
Personally reviewed available labs, imaging and ekg  [1]  CBC Full  -  ( 25 Apr 2022 18:42 )  WBC Count : 10.69 K/uL  RBC Count : 4.50 M/uL  Hemoglobin : 11.0 g/dL  Hematocrit : 38.4 %  Platelet Count - Automated : 316 K/uL  Mean Cell Volume : 85.3 fl  Mean Cell Hemoglobin : 24.4 pg  Mean Cell Hemoglobin Concentration : 28.6 gm/dL  Auto Neutrophil # : 7.33 K/uL  Auto Lymphocyte # : 2.21 K/uL  Auto Monocyte # : 0.87 K/uL  Auto Eosinophil # : 0.15 K/uL  Auto Basophil # : 0.06 K/uL  Auto Neutrophil % : 68.5 %  Auto Lymphocyte % : 20.7 %  Auto Monocyte % : 8.1 %  Auto Eosinophil % : 1.4 %  Auto Basophil % : 0.6 %    04-25    142  |  103  |  18  ----------------------------<  95  3.7   |  25  |  0.77    Ca    9.9      25 Apr 2022 18:42    TPro  7.4  /  Alb  3.7  /  TBili  0.5  /  DBili  x   /  AST  12  /  ALT  8<L>  /  AlkPhos  70  04-25    Urinalysis + Microscopic Examination (04.25.22 @ 20:26)    Glucose Qualitative, Urine: Negative    Blood, Urine: Negative    pH Urine: 7.0    Leukocyte Esterase Concentration: Large    Color: Yellow    Urine Appearance: Turbid    Urobilinogen: Negative    Specific Gravity: 1.017    Protein, Urine: 30 mg/dL    Nitrite: Negative    Ketone - Urine: Negative    Bilirubin: Negative    Red Blood Cell - Urine: 10 /hpf    White Blood Cell - Urine: >50 /HPF    Epithelial Cells: 1 /hpf    Bacteria: Occasional      Imaging:  [ 2] I independently reviewed CXR and no lobar opacification is appreciated  < from: CT Head No Cont (04.25.22 @ 19:20) >    IMPRESSION:  Right lateral tentorium/sphenoid plate region partially   calcified lesion suspicious for a meningioma in this region with the   measurements given above. Given the location possibility of infiltration   of the sigmoid sinus at this level should be considered. Venogram either   MRV or CTV may be helpful for more complete evaluation as clinically   warranted. Age-appropriate involutional changes and microvascular   ischemic disease age    < end of copied text >      EKG:  [2] I independently reviewed EKG/cardiac tracing and afib present    Review of old records:  [2] I personally reviewed previous records which identified medications dosed at SNF

## 2022-04-25 NOTE — H&P ADULT - NSHPPHYSICALEXAM_GEN_ALL_CORE
Vital Signs Last 24 Hrs  T(C): 38 (25 Apr 2022 20:20), Max: 38 (25 Apr 2022 20:20)  T(F): 100.4 (25 Apr 2022 20:20), Max: 100.4 (25 Apr 2022 20:20)  HR: 68 (25 Apr 2022 21:00) (68 - 85)  BP: 125/62 (25 Apr 2022 21:00) (125/62 - 136/89)  RR: 18 (25 Apr 2022 21:00) (18 - 20)  SpO2: 98% (25 Apr 2022 21:00) (96% - 98%)    GENERAL: well-developed, no orthopnea appreciated  HEAD:  Atraumatic, Normocephalic  EYES: clear conjunctiva, pupil b/l responsive to light, minor pupil asymmetry L (4mm)>R(3mm), b/l conjugate central gaze, unable to assess EOMI properly as patient resisting lid opening  ENMT: MMM, good dentition  NECK: Supple, trachea midline  Lung: normal work of breathing, cta b/l  Cardiovascular: S1&S2+, rrr, no m/r/g appreciated; edema appreciated in b/l LE+  ABDOMEN: bs+, soft, nt, nd, no appreciable masses  : No subramanian catheter, no CVA tenderness  Neuro: lethargic and nor responding to verbal commands, retracts all extremities when attempting to asses muscle tone and strength  SKIN: warm and dry, no visible purulence in exposed areas, normal skin turgor

## 2022-04-25 NOTE — ED PROVIDER NOTE - ATTENDING CONTRIBUTION TO CARE
pt is a 89 y/o female from [place] not been here before with psych history, dm, encephalopathy presents with worsening mental status since yesterday found in wheelchair like this today, no trauma or fall, no signs of trauma, non focal exam, arousable, ct head, labs, r/o infectious vs metabolic cause, vss. rectal temp, labs, ua, cxr.

## 2022-04-25 NOTE — ED PROVIDER NOTE - PROGRESS NOTE DETAILS
case d/w Dr. Godwin - labs and imaging to date reviewed. suspected metabolic cause, likely UTI. pending UA collection. Recommends admission to Dr. Whatley - Camilo Hart PA-C

## 2022-04-25 NOTE — ED PROVIDER NOTE - OBJECTIVE STATEMENT
89y F diabetes, unspecified psychiatric disorder, dementia BIBA from NH for ams and lethargy x1 day. pt unable to provide history or verbalize complaint 89y F diabetes, CHF, dementia (AOx2 at baseline) BIBA from NH for ams and lethargy x1 day. pt unable to provide history or verbalize complaint. collateral from daughter Larisa - pt typically able to converse, notable change in mental status   PMD/cardiology Tato

## 2022-04-25 NOTE — ED PROVIDER NOTE - NS ED ATTENDING STATEMENT MOD
I have seen and examined this patient and fully participated in the care of this patient as the teaching attending.  The service was shared with the BERENICE.  I reviewed and verified the documentation and independently performed the documented:

## 2022-04-25 NOTE — H&P ADULT - ASSESSMENT
89F w/ dementia, meningioma, HF, DM2, afib off ac due to GI bleed p/w ams admit to medicine for further evaluation of acute encephalopathy which maybe from meningioma vs UTI

## 2022-04-25 NOTE — ED ADULT NURSE REASSESSMENT NOTE - NS ED NURSE REASSESS COMMENT FT1
Pt straight cathed using sterile technique. Procedure, risks, and benefits of catheter explained to patient. Second RN present to confirm sterility. Pt tolerated procedure well. Sterile specimens collected and sent to lab. Will cont to monitor. Approx 200 mL cloudy, yellow urine output noted.

## 2022-04-25 NOTE — H&P ADULT - PROBLEM SELECTOR PLAN 1
- etiology of unclear at time of admit. ddx includes but not limited to meningioma, uti, medication  - per radiology ordered CT venogram of brain. will need neurology consult in the am. discussed findings with the patient's daughter Ms. Larisa Lang. c/w valproic acid dosing (will need team to follow up level in am) for mood and seizure ppx. hold seroquel tonight. Family confirms DNR/DNI and would not want to have patient undergo neurosurgery if etiology of presentation is related to meningioma and such an option were to be considered. Would want further diagnostic testing to confirm diagnosis  - UA c/w pyuria in setting of mild leukocytosis. empiric treatment with ceftriaxone, urine culture collected  - check trop (given known cardiac disease however less suspicion for MI), tsh, prolactin  - npo w/ meds overnight given mental status. reevalaute during the day change of diet

## 2022-04-25 NOTE — H&P ADULT - PROBLEM SELECTOR PLAN 5
cardiology consult in the am  off ac due to GI bleed?  c/w metoprolol  obtain dig level prior to dosing

## 2022-04-26 NOTE — PATIENT PROFILE ADULT - FALL HARM RISK - HARM RISK INTERVENTIONS
Assistance with ambulation/Assistance OOB with selected safe patient handling equipment/Communicate Risk of Fall with Harm to all staff/Discuss with provider need for PT consult/Monitor gait and stability/Reinforce activity limits and safety measures with patient and family/Tailored Fall Risk Interventions/Visual Cue: Yellow wristband and red socks/Bed in lowest position, wheels locked, appropriate side rails in place/Call bell, personal items and telephone in reach/Instruct patient to call for assistance before getting out of bed or chair/Non-slip footwear when patient is out of bed/[place] to call system/Physically safe environment - no spills, clutter or unnecessary equipment/Purposeful Proactive Rounding/Room/bathroom lighting operational, light cord in reach

## 2022-04-26 NOTE — PROGRESS NOTE ADULT - SUBJECTIVE AND OBJECTIVE BOX
JULIOCESAR BASURTO  89y Female  MRN:[id number]    Patient is a 89y old  Female who presents with a chief complaint of 89F p/w ams (2022 09:54)    HPI:  The patient is lethargic and unable to provide history. Therefore history is obtained from the patient's daughter ms. Larisa Knight via telephone    89F w/ dementia, meningioma, HF, DM2, afib off ac due to GI bleed p/w ams. On day of presentation, the patient's daughter went to visit the patient at her SNF and found the patient to be slumped over in her wheelchair with minimal interaction at approx 12:30pm. The patient's cognitive baseline has been reportedly declining over the course of months however she reportedly is able to have simple conversation with family. The patient has had dementia for some time but she has more rapidly declined since the end of  which has been attributed to isolation during hospitalizations, rehab and while being in her SNF. Her daughter reports dosage adjustment of seroquel and divalproex reportedly improving her cognition over the weeks prior which is why there is surprise for current mental status. Additionally, the patient is known to have a meningioma however details of the state of disease are not known to the family.    Her daughter confirms that the patient is DNR/DNI and that the family would not want to pursue invasive surgery but is open to medical treatment and further diagnostic testing. (2022 22:40)      Patient seen and evaluated at bedside. No acute events overnight except as noted.    Interval HPI: no acute events o/n    PAST MEDICAL & SURGICAL HISTORY:  Diabetes    Dementia    Chronic CHF    No significant past surgical history        REVIEW OF SYSTEMS:  as per hpi     VITALS:  Vital Signs Last 24 Hrs  T(C): 36.1 (2022 21:08), Max: 36.6 (2022 10:28)  T(F): 97 (2022 21:08), Max: 97.8 (2022 10:28)  HR: 72 (2022 21:08) (57 - 100)  BP: 135/75 (2022 21:08) (104/64 - 138/68)  BP(mean): --  RR: 18 (2022 21:08) (18 - 18)  SpO2: 94% (2022 21:08) (94% - 98%)  CAPILLARY BLOOD GLUCOSE      POCT Blood Glucose.: 98 mg/dL (2022 19:03)  POCT Blood Glucose.: 66 mg/dL (2022 18:59)  POCT Blood Glucose.: 74 mg/dL (2022 12:35)  POCT Blood Glucose.: 63 mg/dL (2022 12:20)  POCT Blood Glucose.: 78 mg/dL (2022 06:18)  POCT Blood Glucose.: 86 mg/dL (2022 01:53)    I&O's Summary      PHYSICAL EXAM:  GENERAL: NAD, well-developed  HEAD:  Atraumatic, Normocephalic  EYES: EOMI, PERRLA, conjunctiva and sclera clear  NECK: Supple, No JVD  CHEST/LUNG: Clear to auscultation bilaterally; No wheeze  HEART: S1, S2; No murmurs, rubs, or gallops  ABDOMEN: Soft, Nontender, Nondistended; Bowel sounds present  EXTREMITIES:  2+ Peripheral Pulses, No clubbing, cyanosis, or edema  PSYCH: Normal affect  NEUROLOGY: AAOX0  SKIN: No rashes or lesions    Consultant(s) Notes Reviewed:  [x ] YES  [ ] NO  Care Discussed with Consultants/Other Providers [ x] YES  [ ] NO    MEDS:  MEDICATIONS  (STANDING):  cefTRIAXone   IVPB      cefTRIAXone   IVPB 1000 milliGRAM(s) IV Intermittent every 24 hours  dextrose 5%. 1000 milliLiter(s) (50 mL/Hr) IV Continuous <Continuous>  dextrose 5%. 1000 milliLiter(s) (100 mL/Hr) IV Continuous <Continuous>  dextrose 5%. 1000 milliLiter(s) (50 mL/Hr) IV Continuous <Continuous>  dextrose 50% Injectable 25 Gram(s) IV Push once  dextrose 50% Injectable 12.5 Gram(s) IV Push once  dextrose 50% Injectable 25 Gram(s) IV Push once  folic acid 1 milliGRAM(s) Oral daily  furosemide    Tablet 80 milliGRAM(s) Oral daily  glucagon  Injectable 1 milliGRAM(s) IntraMuscular once  heparin   Injectable 5000 Unit(s) SubCutaneous every 12 hours  insulin lispro (ADMELOG) corrective regimen sliding scale   SubCutaneous every 6 hours  metoprolol succinate  milliGRAM(s) Oral daily  multivitamin/minerals 1 Tablet(s) Oral daily  pantoprazole    Tablet 40 milliGRAM(s) Oral before breakfast  valproate sodium IVPB 250 milliGRAM(s) IV Intermittent two times a day    MEDICATIONS  (PRN):  dextrose Oral Gel 15 Gram(s) Oral once PRN Blood Glucose LESS THAN 70 milliGRAM(s)/deciliter    ALLERGIES:  No Known Allergies      LABS:                        9.6    7.46  )-----------( 257      ( 2022 07:32 )             33.6         143  |  104  |  17  ----------------------------<  73  3.3<L>   |  25  |  0.71    Ca    9.6      2022 07:32  Phos  3.2       Mg     1.9         TPro  6.4  /  Alb  3.3  /  TBili  0.4  /  DBili  x   /  AST  11  /  ALT  7<L>  /  AlkPhos  59      PT/INR - ( 2022 07:32 )   PT: 12.9 sec;   INR: 1.12 ratio         PTT - ( 2022 07:32 )  PTT:23.2 sec  CARDIAC MARKERS ( 2022 18:42 )  x     / x     / 16 U/L / x     / x          LIVER FUNCTIONS - ( 2022 07:32 )  Alb: 3.3 g/dL / Pro: 6.4 g/dL / ALK PHOS: 59 U/L / ALT: 7 U/L / AST: 11 U/L / GGT: x           Urinalysis Basic - ( 2022 20:26 )    Color: Yellow / Appearance: Turbid / S.017 / pH: x  Gluc: x / Ketone: Negative  / Bili: Negative / Urobili: Negative   Blood: x / Protein: 30 mg/dL / Nitrite: Negative   Leuk Esterase: Large / RBC: 10 /hpf / WBC >50 /HPF   Sq Epi: x / Non Sq Epi: 1 /hpf / Bacteria: Occasional      TSH: Thyroid Stimulating Hormone, Serum: 0.25 uIU/mL ( @ 10:19)        cultures: Culture Results:   >100,000 CFU/ml Enterococcus species ( @ 23:16)      RADIOLOGY & ADDITIONAL TESTS:    Imaging Personally Reviewed:  [ ] YES  [ ] NO

## 2022-04-26 NOTE — PROGRESS NOTE ADULT - PROBLEM SELECTOR PLAN 1
- likely 2/2 uti  cont ceftriax  f/u cult and sens - likely 2/2 uti  cont ceftriax  f/u cult and sens    failed dysphagia screen  npo pending swallow eval

## 2022-04-26 NOTE — CONSULT NOTE ADULT - ASSESSMENT
89F w/ dementia, meningioma, HF, DM2, afib off ac due to GI bleed p/w ams admit to medicine for further evaluation of acute encephalopathy likely from  UTI

## 2022-04-26 NOTE — CONSULT NOTE ADULT - SUBJECTIVE AND OBJECTIVE BOX
CHIEF COMPLAINT:    HISTORY OF PRESENT ILLNESS:  89F well known  to me from office w/ dementia, meningioma, HF, DM2, afib off ac due to GI bleed p/w ams. On day of presentation, the patient's daughter went to visit the patient at her SNF and found the patient to be slumped over in her wheelchair with minimal interaction at approx 12:30pm. The patient's cognitive baseline has been reportedly declining over the course of months however she reportedly is able to have simple conversation with family. The patient has had dementia for some time but she has more rapidly declined since the end of Jan2022 which has been attributed to isolation during hospitalizations, rehab and while being in her SNF. Her daughter reports dosage adjustment of seroquel and divalproex reportedly improving her cognition over the weeks prior which is why there is surprise for current mental status. Additionally, the patient is known to have a meningioma however details of the state of disease are not known to the family.      PAST MEDICAL & SURGICAL HISTORY:  Diabetes    Dementia    Chronic CHF    No significant past surgical history            MEDICATIONS:  furosemide    Tablet 80 milliGRAM(s) Oral daily  heparin   Injectable 5000 Unit(s) SubCutaneous every 12 hours  metoprolol succinate  milliGRAM(s) Oral daily    cefTRIAXone   IVPB      cefTRIAXone   IVPB 1000 milliGRAM(s) IV Intermittent every 24 hours      diVALproex  milliGRAM(s) Oral two times a day    pantoprazole    Tablet 40 milliGRAM(s) Oral before breakfast    dextrose 50% Injectable 25 Gram(s) IV Push once  dextrose 50% Injectable 12.5 Gram(s) IV Push once  dextrose 50% Injectable 25 Gram(s) IV Push once  dextrose Oral Gel 15 Gram(s) Oral once PRN  glucagon  Injectable 1 milliGRAM(s) IntraMuscular once  insulin lispro (ADMELOG) corrective regimen sliding scale   SubCutaneous every 6 hours    dextrose 5%. 1000 milliLiter(s) IV Continuous <Continuous>  dextrose 5%. 1000 milliLiter(s) IV Continuous <Continuous>  dextrose 5%. 1000 milliLiter(s) IV Continuous <Continuous>  folic acid 1 milliGRAM(s) Oral daily  multivitamin/minerals 1 Tablet(s) Oral daily      FAMILY HISTORY:  No pertinent family history in first degree relatives        SOCIAL HISTORY:    [ ] Non-smoker  [ ] Smoker  [ ] Alcohol    Allergies    No Known Allergies    Intolerances    REVIEW OF SYSTEMS:  	    [ ] All others negative	  [XX ] Unable to obtain    PHYSICAL EXAM:  T(C): 36.4 (04-26-22 @ 02:35), Max: 38 (04-25-22 @ 20:20)  HR: 63 (04-26-22 @ 02:35) (63 - 85)  BP: 104/64 (04-26-22 @ 02:35) (104/64 - 136/89)  RR: 18 (04-26-22 @ 02:35) (18 - 20)  SpO2: 96% (04-26-22 @ 02:35) (96% - 98%)  Wt(kg): --  I&O's Summary      Appearance: NAD  HEENT:  Dry  oral mucosa, PERRL, EOMI	  Lymphatic: No lymphadenopathy  Cardiovascular: Irregular S1 S2, No JVD, No murmurs, No edema  Respiratory: decrease bs   Psychiatry: A & O x 0  Gastrointestinal:  Soft, Non-tender, + BS	  Skin: No rashes, No ecchymoses, No cyanosis	  Neuro: lethargic and nor responding to verbal commands, retracts all extremities when attempting to asses muscle tone and strength  Extremities: decreased  range of motion, No clubbing, cyanosis or edema  Vascular: Peripheral pulses palpable 2+ bilaterally    TELEMETRY: 	    ECG:  	Afib non specific stt changes   RADIOLOGY: < from: CT Venogram Brain w/ IV Cont (04.25.22 @ 23:56) >    ACC: [id number] EXAM:  CT VENOGRAM BRAIN (W)AW IC                          PROCEDURE DATE:  04/25/2022          INTERPRETATION:  Clinical indication: Right posterior fossa meningioma   adjacent to sigmoid sinus          After the intravenous power injection of 70 cc of Omnipaque 300 using a   timing run for the venous system, serial thin sections were obtained   through the intracranial circulation centered at the [id number] on   a multi slice CT scanner reformatted with coronal and sagittal 2 D-MIP   projections, including 3 D reconstructions using a separate 3D Vit[employer]   software workstation. A total of 70 cc of Omnipaque were intravenously   injected. 30 cc were discarded.      Although performed to evaluate the venous system, the intracranial   arterial system is opacified and appears normal.  The carotid and vertebral arteries enhance normally. The distal vertebral   arteries are well identified as are the posterior-inferior cerebellar   arteries bilaterally. The region of the vertebral basilar junction is   normal. The basilar artery is normal. The posterior cerebral and superior   cerebellar arteries are normal.    Evaluation of the carotid arteries demonstrate normal appearance to the   cervical, petrous cavernous and supraclinoid internal carotid arteries   are unremarkable. The anterior cerebral arteries and intercommunicating   artery and middle cerebral arteries are normal.    There is a calcified mass in the right posterior fossa adjacent to the   right sigmoid sinus without evidence of sinus invasion. The mass 1.9 cm   in AP diameter by 2 cm transversely by 2 cm in craniocaudal diameter  and   abuts the right sigmoid sinus.      The normal intracranial venous circulation is identified. The right   transversesinus is dominant. The superior sagittal sinus, internal   cerebral veins, vein of Miguel Angel, straight sinus, transverse sinuses,   sigmoid sinuses and internal jugular veins are normal cortical veins are   normal.        IMPRESSION: Calcified right posterior fossa meningioma adjacent to the   right sigmoid sinus without sinus invasion.      --- End of Report ---            JEWEL HAYDEN MD; Attending Radiologist  This document has been electronically signed. Apr 26 2022  8:53AM    < end of copied text >  < from: CT Head No Cont (04.25.22 @ 19:20) >    ACC: [id number] EXAM:  CT BRAIN                          PROCEDURE DATE:  04/25/2022          INTERPRETATION:  INDICATIONS:  AMS    TECHNIQUE:  Serial axial images were obtained from the skull base to the   vertex without intravenous contrast. Sagittal and Coronal reformats were   performed    COMPARISON EXAMINATION: None.    FINDINGS:  VENTRICLES AND SULCI:  Age-appropriate involutional change  INTRA-AXIAL: Microvascular ischemic changes involving the periventricular   and subcortical white matter age indeterminate  EXTRA-AXIAL:  At the level of the right lateral tentorium and right   sigmoid plate, a calcified soft tissue lesion is identified measuring 1.6   x 2.3 x 1.9 cm ( [id number]) consistent with a meningioma at this level.   Possible invasion at the level of the sigmoid sinus is a definite   consideration. Some subtle edema is suggested surrounding the lesion.  VISUALIZED SINUSES: Ethmoid mucosal thickening.  VISUALIZED MASTOIDS:  Clear.  CALVARIUM:  Normal.  MISCELLANEOUS:  None.    IMPRESSION:  Right lateral tentorium/sphenoid plate region partially   calcified lesion suspicious for a meningioma in this region with the   measurements given above. Given the location possibility of infiltration   of the sigmoid sinus at this level should be considered. Venogram either   MRV or CTV may be helpful for more complete evaluation as clinically   warranted. Age-appropriate involutional changes and microvascular   ischemic disease age    --- End of Report ---            SUZY NGUYEN MD; Attending Radiologist  This document has been electronically signed. Apr 25 2022  7:37PM    < end of copied text >  < from: Xray Chest 1 View- PORTABLE-Urgent (Xray Chest 1 View- PORTABLE-Urgent .) (04.25.22 @ 19:23) >    ACC: [id number] EXAM:  XR CHEST PORTABLE URGENT 1V                          PROCEDURE DATE:  04/25/2022          INTERPRETATION:  INDICATION: Altered mental status. Evaluate for acute   pathology.    COMPARISON: None.    FINDINGS:  Heart/Vascular: Heart size is enlarged.  Pulmonary: Segmental linear and discoid atelectases and/or scarring seen   in lower right lung. No focal consolidation. No pneumothorax or pleural   effusion.  Bones: No acute bony pathology.    Impression:  No focal consolidation.        --- End of Report ---           GEORGIA AVALOS MD; Resident Radiology  This document has been electronically signed.  GUNJAN MORTENSEN MD; Attending Radiologist  This document has been electronically signed. Apr 26 2022  8:46AM    < end of copied text >    OTHER: 	  	  LABS:	 	    CARDIAC MARKERS:      Urinalysis + Microscopic Examination (04.25.22 @ 20:26)   Glucose Qualitative, Urine: Negative   Blood, Urine: Negative   pH Urine: 7.0   Leukocyte Esterase Concentration: Large   Color: Yellow   Urine Appearance: Turbid   Urobilinogen: Negative   Specific Gravity: 1.017   Protein, Urine: 30 mg/dL   Nitrite: Negative   Ketone - Urine: Negative   Bilirubin: Negative   Red Blood Cell - Urine: 10 /hpf   White Blood Cell - Urine: >50 /HPF   Epithelial Cells: 1 /hpf   Bacteria: Occasional                             9.6    7.46  )-----------( 257      ( 26 Apr 2022 07:32 )             33.6     04-26    143  |  104  |  17  ----------------------------<  73  3.3<L>   |  25  |  0.71    Ca    9.6      26 Apr 2022 07:32  Phos  3.2     04-26  Mg     1.9     04-26    TPro  6.4  /  Alb  3.3  /  TBili  0.4  /  DBili  x   /  AST  11  /  ALT  7<L>  /  AlkPhos  59  04-26    proBNP:   Lipid Profile:   HgA1c:   TSH:

## 2022-04-26 NOTE — PROGRESS NOTE ADULT - ASSESSMENT
89F w/ dementia, meningioma, HF, DM2, afib off ac due to GI bleed p/w ams admit to medicine for further evaluation of acute encephalopathy which maybe from meningioma vs UTI 89F w/ dementia, meningioma, HF, DM2, afib off ac due to GI bleed p/w ams admit to medicine for further evaluation of acute encephalopathy which maybe from UTI

## 2022-04-27 NOTE — SWALLOW BEDSIDE ASSESSMENT ADULT - PHARYNGEAL PHASE
No signs or symptoms of laryngeal penetration/aspiration evident with any consistency presented.  Pharyngeal swallow judged to be timely; laryngeal elevation palpated

## 2022-04-27 NOTE — SWALLOW BEDSIDE ASSESSMENT ADULT - COMMENTS
4/25/22 CXR: Impression: No focal consolidation.  4/27/22 Per Neuro: AMS likely toxic metabolic encephalopathy in setting of infection on top of her underlying dementia; doubt embolic stroke off AC as exam is nonfocal.    addtnl hx: The patient's cognitive baseline has been reportedly declining over the course of months however she reportedly is able to have simple conversation with family. The patient has had dementia for some time but she has more rapidly declined since the end of Jan2022 which has been attributed to isolation during hospitalizations, rehab and while being in her SNF. Her daughter reports dosage adjustment of seroquel and divalproex reportedly improving her cognition over the weeks prior which is why there is surprise for current mental status. Additionally, the patient is known to have a meningioma however details of the state of disease are not known to the family.  Per MD: daughter confirms that the patient is DNR/DNI and that the family would not want to pursue invasive surgery but is open to medical treatment and further diagnostic testing.

## 2022-04-27 NOTE — PROGRESS NOTE ADULT - SUBJECTIVE AND OBJECTIVE BOX
Subjective: Patient seen and examined. No new events except as noted.   Resting comfortably.     REVIEW OF SYSTEMS:  Unable to obtain.    MEDICATIONS:  MEDICATIONS  (STANDING):  cefTRIAXone   IVPB      cefTRIAXone   IVPB 1000 milliGRAM(s) IV Intermittent every 24 hours  chlorhexidine 2% Cloths 1 Application(s) Topical <User Schedule>  dextrose 5%. 1000 milliLiter(s) (100 mL/Hr) IV Continuous <Continuous>  dextrose 5%. 1000 milliLiter(s) (50 mL/Hr) IV Continuous <Continuous>  dextrose 5%. 1000 milliLiter(s) (50 mL/Hr) IV Continuous <Continuous>  dextrose 50% Injectable 25 Gram(s) IV Push once  dextrose 50% Injectable 12.5 Gram(s) IV Push once  dextrose 50% Injectable 25 Gram(s) IV Push once  folic acid 1 milliGRAM(s) Oral daily  furosemide    Tablet 80 milliGRAM(s) Oral daily  glucagon  Injectable 1 milliGRAM(s) IntraMuscular once  heparin   Injectable 5000 Unit(s) SubCutaneous every 12 hours  insulin lispro (ADMELOG) corrective regimen sliding scale   SubCutaneous every 6 hours  metoprolol succinate  milliGRAM(s) Oral daily  multivitamin/minerals 1 Tablet(s) Oral daily  pantoprazole    Tablet 40 milliGRAM(s) Oral before breakfast  valproate sodium IVPB 250 milliGRAM(s) IV Intermittent two times a day    PHYSICAL EXAM:  T(C): 36.7 (04-27-22 @ 05:10), Max: 37.1 (04-27-22 @ 00:29)  HR: 82 (04-27-22 @ 05:10) (72 - 100)  BP: 110/63 (04-27-22 @ 05:10) (110/63 - 138/68)  RR: 18 (04-27-22 @ 05:10) (18 - 18)  SpO2: 95% (04-27-22 @ 05:10) (93% - 98%)  Wt(kg): --  I&O's Summary    26 Apr 2022 07:01  -  27 Apr 2022 07:00  --------------------------------------------------------  IN: 0 mL / OUT: 0 mL / NET: 0 mL    Appearance: NAD	  HEENT: dry oral mucosa, PERRL, EOMI	  Lymphatic: No lymphadenopathy , no edema  Cardiovascular: Irregular S1 S2, No JVD, No murmurs , Peripheral pulses palpable 2+ bilaterally  Respiratory: Decreased BS  Gastrointestinal:  Soft, Non-tender, + BS	  Skin: No rashes, No ecchymoses, No cyanosis, warm to touch  Neurological Exam:  Mental Status: Awake, alert and oriented x 1.  follows some simple with mimicking. confused. + dysarthria   Cranial Nerves: PERRL, EOMI, VFFC, sensation V1-V3 intact,  ? R  facial asymmetry, equal elevation of palate, scm/trap 5/5, tongue is midline on protrusion. no obvious papilledema on fundoscopic exam. hearing is grossly intact.   Motor: DORADO uppers> lowers   Sensation: Intact to light touch and pinprick throughout. no right/left confusion. no extinction to tactile on DSS. Romberg was negative.   Reflexes: 1+ throughout at biceps, brachioradialis, triceps, patellars and ankles bilaterally and equal. No clonus. R toe and L toe were both downgoing.  Coordination: unable   Gait: unable   Ext: No edema    LABS:    CARDIAC MARKERS:  CARDIAC MARKERS ( 25 Apr 2022 18:42 )  x     / x     / 16 U/L / x     / x                            9.2    7.19  )-----------( 252      ( 27 Apr 2022 07:17 )             32.0     04-27    147<H>  |  109<H>  |  15  ----------------------------<  57<L>  3.2<L>   |  24  |  0.70    Ca    9.7      27 Apr 2022 07:28  Phos  3.2     04-26  Mg     1.9     04-26    TPro  6.0  /  Alb  3.1<L>  /  TBili  0.4  /  DBili  x   /  AST  10  /  ALT  6<L>  /  AlkPhos  53  04-27    proBNP:   Lipid Profile:   HgA1c:   TSH:     TELEMETRY: 	    ECG:  	  RADIOLOGY:   DIAGNOSTIC TESTING:  [ ] Echocardiogram:  [ ]  Catheterization:  [ ] Stress Test:    OTHER:

## 2022-04-27 NOTE — PROGRESS NOTE ADULT - SUBJECTIVE AND OBJECTIVE BOX
JULIOCESAR BASURTO  89y Female  MRN:[id number]    Patient is a 89y old  Female who presents with a chief complaint of 89F p/w ams (26 Apr 2022 09:54)    HPI:  The patient is lethargic and unable to provide history. Therefore history is obtained from the patient's daughter ms. Larisa Knight via telephone    89F w/ dementia, meningioma, HF, DM2, afib off ac due to GI bleed p/w ams. On day of presentation, the patient's daughter went to visit the patient at her SNF and found the patient to be slumped over in her wheelchair with minimal interaction at approx 12:30pm. The patient's cognitive baseline has been reportedly declining over the course of months however she reportedly is able to have simple conversation with family. The patient has had dementia for some time but she has more rapidly declined since the end of Jan2022 which has been attributed to isolation during hospitalizations, rehab and while being in her SNF. Her daughter reports dosage adjustment of seroquel and divalproex reportedly improving her cognition over the weeks prior which is why there is surprise for current mental status. Additionally, the patient is known to have a meningioma however details of the state of disease are not known to the family.    Her daughter confirms that the patient is DNR/DNI and that the family would not want to pursue invasive surgery but is open to medical treatment and further diagnostic testing. (25 Apr 2022 22:40)      Patient seen and evaluated at bedside. No acute events overnight except as noted.    Interval HPI: no acute events o/n    PAST MEDICAL & SURGICAL HISTORY:  Diabetes    Dementia    Chronic CHF    No significant past surgical history        REVIEW OF SYSTEMS:  as per hpi     VITALS:   Vital Signs Last 24 Hrs  T(C): 36.4 (27 Apr 2022 11:41), Max: 37.1 (27 Apr 2022 00:29)  T(F): 97.6 (27 Apr 2022 11:41), Max: 98.8 (27 Apr 2022 00:29)  HR: 87 (27 Apr 2022 11:41) (72 - 100)  BP: 124/74 (27 Apr 2022 11:41) (110/63 - 138/68)  BP(mean): --  RR: 18 (27 Apr 2022 11:41) (18 - 18)  SpO2: 95% (27 Apr 2022 11:41) (93% - 98%)      PHYSICAL EXAM:  GENERAL: NAD, frail, lethargic  HEAD:  Atraumatic, Normocephalic  EYES: EOMI, PERRLA, conjunctiva and sclera clear  NECK: Supple, No JVD  CHEST/LUNG: Clear to auscultation bilaterally; No wheeze  HEART: S1, S2; No murmurs, rubs, or gallops  ABDOMEN: Soft, Nontender, Nondistended; Bowel sounds present  EXTREMITIES:  2+ Peripheral Pulses, No clubbing, cyanosis, or edema  PSYCH: Normal affect  NEUROLOGY: AAOX0  SKIN: No rashes or lesions    Consultant(s) Notes Reviewed:  [x ] YES  [ ] NO  Care Discussed with Consultants/Other Providers [ x] YES  [ ] NO    MEDS:  MEDICATIONS  (STANDING):  cefTRIAXone   IVPB      cefTRIAXone   IVPB 1000 milliGRAM(s) IV Intermittent every 24 hours  dextrose 5%. 1000 milliLiter(s) (50 mL/Hr) IV Continuous <Continuous>  dextrose 5%. 1000 milliLiter(s) (100 mL/Hr) IV Continuous <Continuous>  dextrose 5%. 1000 milliLiter(s) (50 mL/Hr) IV Continuous <Continuous>  dextrose 50% Injectable 25 Gram(s) IV Push once  dextrose 50% Injectable 12.5 Gram(s) IV Push once  dextrose 50% Injectable 25 Gram(s) IV Push once  folic acid 1 milliGRAM(s) Oral daily  furosemide    Tablet 80 milliGRAM(s) Oral daily  glucagon  Injectable 1 milliGRAM(s) IntraMuscular once  heparin   Injectable 5000 Unit(s) SubCutaneous every 12 hours  insulin lispro (ADMELOG) corrective regimen sliding scale   SubCutaneous every 6 hours  metoprolol succinate  milliGRAM(s) Oral daily  multivitamin/minerals 1 Tablet(s) Oral daily  pantoprazole    Tablet 40 milliGRAM(s) Oral before breakfast  valproate sodium IVPB 250 milliGRAM(s) IV Intermittent two times a day    MEDICATIONS  (PRN):  dextrose Oral Gel 15 Gram(s) Oral once PRN Blood Glucose LESS THAN 70 milliGRAM(s)/deciliter    ALLERGIES:  No Known Allergies      LABS:                                            9.2    7.19  )-----------( 252      ( 27 Apr 2022 07:17 )             32.0   04-27    147<H>  |  109<H>  |  15  ----------------------------<  57<L>  3.2<L>   |  24  |  0.70    Ca    9.7      27 Apr 2022 07:28  Phos  3.2     04-26  Mg     1.9     04-26    TPro  6.0  /  Alb  3.1<L>  /  TBili  0.4  /  DBili  x   /  AST  10  /  ALT  6<L>  /  AlkPhos  53  04-27        TSH: Thyroid Stimulating Hormone, Serum: 0.25 uIU/mL (04-26 @ 10:19)        cultures: Culture Results:   >100,000 CFU/ml Enterococcus species (04-25 @ 23:16)     < from: CT Venogram Brain w/ IV Cont (04.25.22 @ 23:56) >    IMPRESSION: Calcified right posterior fossa meningioma adjacent to the   right sigmoid sinus without sinus invasion.      --- End of Report ---        < end of copied text >  < from: CT Head No Cont (04.25.22 @ 19:20) >  IMPRESSION:  Right lateral tentorium/sphenoid plate region partially   calcified lesion suspicious for a meningioma in this region with the   measurements given above. Given the location possibility of infiltration   of the sigmoid sinus at this level should be considered. Venogram either   MRV or CTV may be helpful for more complete evaluation as clinically   warranted. Age-appropriate involutional changes and microvascular   ischemic disease age    --- End of Report ---      < end of copied text >

## 2022-04-27 NOTE — PROGRESS NOTE ADULT - PROBLEM SELECTOR PLAN 1
- likely 2/2 uti  cont ceftriax  f/u cult and sens    failed dysphagia screen  npo pending swallow eval

## 2022-04-27 NOTE — SWALLOW BEDSIDE ASSESSMENT ADULT - SWALLOW EVAL: RECOMMENDED DIET
Purees with moderately thickened liquids. Purees with moderately thickened liquids; feed only when totally awake/alert. Purees with moderately thickened liquids via tspn; feed only when totally awake/alert.

## 2022-04-27 NOTE — CONSULT NOTE ADULT - ASSESSMENT
89F w/ dementia, meningioma, CHF, DM2, afib off ac due to GI bleed p/w ams  CTH with meningioma R lateral spneoid plate  CTV neg for sinus thrombosis : calcified R post sara meningioma noted.   + UA   Imprssion: AMS likely toxic metabolic encephalopahty in setting of infection on top of her underlying dementia.  doubt embolic stroke off AC as exam is nonfocal   - no further intervention for calcified post fossa meningioma.  chronic   - ceftriaxone for infection  - getting  BID likely for mood as opposed to seizure  - for AF, AC if cleared by GI, h/o bleeds.    - f/u urine cx's   - b12, RPR TSH if not checked    - repeat CTH if no improvement with tratment of infection  - frequent re orientation to avoid sundowning  - seroquel or zyprexa PRN if QTC < 500  - telemetry  - PT/OT/SS/SLP, OOBC  - check FS, glucose control <180  - GI/DVT ppx  - Counseling on diet, exercise, and medication adherence was done  - Counseling on smoking cessation and alcohol consumption offered when appropriate.  - Pain assessed and judicious use of narcotics when appropriate was discussed.    - Stroke education given when appropriate.  - Importance of fall prevention discussed.   - Differential diagnosis and plan of care discussed with patient and/or family and primary team  - Thank you for allowing me to participate in the care of this patient. Call with questions.   Tariq Lane MD  Vascular Neurology  Office: [phone]

## 2022-04-27 NOTE — SWALLOW BEDSIDE ASSESSMENT ADULT - DIET PRIOR TO ADMI
Puremaritza with thin liquids per daughter report, was feeding herself 3 meals per day pta; dgtr reports h/o thickened liquids although most recently pt receiving thin liquids pta and reports of puree texture as dentures were lost at another rehab facility.

## 2022-04-27 NOTE — PROGRESS NOTE ADULT - ASSESSMENT
89F w/ dementia, meningioma, HF, DM2, afib off ac due to GI bleed p/w ams admit to medicine for further evaluation of acute encephalopathy which maybe from UTI

## 2022-04-27 NOTE — SWALLOW BEDSIDE ASSESSMENT ADULT - ORAL PREPARATORY PHASE
decreased orientation to po trials; decreased oral awareness; decreased oral grading/labial approximation to cup; improved with tspn;

## 2022-04-27 NOTE — CONSULT NOTE ADULT - SUBJECTIVE AND OBJECTIVE BOX
Neurology Consult    Reason for Consult: Patient is a 89y old  Female who presents with a chief complaint of 89F p/w ams (2022 21:28)      HPI:  The patient is lethargic and unable to provide history. Therefore history is obtained from the patient's daughter ms. Larisa Knight via telephone    89F w/ dementia, meningioma, CHF, DM2, afib off ac due to GI bleed p/w ams. On day of presentation, the patient's daughter went to visit the patient at her SNF and found the patient to be slumped over in her wheelchair with minimal interaction at approx 12:30pm. The patient's cognitive baseline has been reportedly declining over the course of months however she reportedly is able to have simple conversation with family. The patient has had dementia for some time but she has more rapidly declined since the end of  which has been attributed to isolation during hospitalizations, rehab and while being in her SNF. Her daughter reports dosage adjustment of seroquel and divalproex reportedly improving her cognition over the weeks prior which is why there is surprise for current mental status. Additionally, the patient is known to have a meningioma however details of the state of disease are not known to the family.    Her daughter confirms that the patient is DNR/DNI and that the family would not want to pursue invasive surgery but is open to medical treatment and further diagnostic testing. (2022 22:40)       PAST MEDICAL & SURGICAL HISTORY:  Diabetes    Dementia    Chronic CHF    No significant past surgical history        Allergies: Allergies    No Known Allergies    Intolerances        Social History: Denies toxic habits including tobacco, ETOH or illicit drugs.    Family History: FAMILY HISTORY:  No pertinent family history in first degree relatives    . No family history of strokes    Medications: MEDICATIONS  (STANDING):  cefTRIAXone   IVPB      cefTRIAXone   IVPB 1000 milliGRAM(s) IV Intermittent every 24 hours  chlorhexidine 2% Cloths 1 Application(s) Topical <User Schedule>  dextrose 5%. 1000 milliLiter(s) (100 mL/Hr) IV Continuous <Continuous>  dextrose 5%. 1000 milliLiter(s) (50 mL/Hr) IV Continuous <Continuous>  dextrose 5%. 1000 milliLiter(s) (50 mL/Hr) IV Continuous <Continuous>  dextrose 50% Injectable 25 Gram(s) IV Push once  dextrose 50% Injectable 12.5 Gram(s) IV Push once  dextrose 50% Injectable 25 Gram(s) IV Push once  folic acid 1 milliGRAM(s) Oral daily  furosemide    Tablet 80 milliGRAM(s) Oral daily  glucagon  Injectable 1 milliGRAM(s) IntraMuscular once  heparin   Injectable 5000 Unit(s) SubCutaneous every 12 hours  insulin lispro (ADMELOG) corrective regimen sliding scale   SubCutaneous every 6 hours  metoprolol succinate  milliGRAM(s) Oral daily  multivitamin/minerals 1 Tablet(s) Oral daily  pantoprazole    Tablet 40 milliGRAM(s) Oral before breakfast  valproate sodium IVPB 250 milliGRAM(s) IV Intermittent two times a day    MEDICATIONS  (PRN):  dextrose Oral Gel 15 Gram(s) Oral once PRN Blood Glucose LESS THAN 70 milliGRAM(s)/deciliter      Review of Systems:    unable given mental status     Vitals:  Vital Signs Last 24 Hrs  T(C): 36.7 (2022 05:10), Max: 37.1 (2022 00:29)  T(F): 98 (2022 05:10), Max: 98.8 (2022 00:29)  HR: 82 (2022 05:10) (57 - 100)  BP: 110/63 (2022 05:10) (110/63 - 138/68)  BP(mean): --  RR: 18 (2022 05:10) (18 - 18)  SpO2: 95% (2022 05:10) (93% - 98%)    General Exam:   General Appearance: Appropriately dressed and in no acute distress       Head: Normocephalic, atraumatic and no dysmorphic features  Ear, Nose, and Throat: Moist mucous membranes  CVS: S1S2+  Resp: No SOB, no wheeze or rhonchi  GI: soft NT/ND  Extremities: No edema or cyanosis  Skin: No bruises or rashes     Neurological Exam:  Mental Status: Awake, alert and oriented x 1.  follows some simple with mimicking. confused. + dysarthria   Cranial Nerves: PERRL, EOMI, VFFC, sensation V1-V3 intact,  ? R  facial asymmetry, equal elevation of palate, scm/trap 5/5, tongue is midline on protrusion. no obvious papilledema on fundoscopic exam. hearing is grossly intact.   Motor: DORADO uppers> lowers   Sensation: Intact to light touch and pinprick throughout. no right/left confusion. no extinction to tactile on DSS. Romberg was negative.   Reflexes: 1+ throughout at biceps, brachioradialis, triceps, patellars and ankles bilaterally and equal. No clonus. R toe and L toe were both downgoing.  Coordination: unable   Gait: unable     Data/Labs/Imaging which I personally reviewed.     Labs:     CBC Full  -  ( 2022 07:17 )  WBC Count : 7.19 K/uL  RBC Count : 3.74 M/uL  Hemoglobin : 9.2 g/dL  Hematocrit : 32.0 %  Platelet Count - Automated : 252 K/uL  Mean Cell Volume : 85.6 fl  Mean Cell Hemoglobin : 24.6 pg  Mean Cell Hemoglobin Concentration : 28.8 gm/dL  Auto Neutrophil # : x  Auto Lymphocyte # : x  Auto Monocyte # : x  Auto Eosinophil # : x  Auto Basophil # : x  Auto Neutrophil % : x  Auto Lymphocyte % : x  Auto Monocyte % : x  Auto Eosinophil % : x  Auto Basophil % : x    04-27    147<H>  |  109<H>  |  15  ----------------------------<  57<L>  3.2<L>   |  24  |  0.70    Ca    9.7      2022 07:28  Phos  3.2     04-26  Mg     1.9     04-26    TPro  6.0  /  Alb  3.1<L>  /  TBili  0.4  /  DBili  x   /  AST  10  /  ALT  6<L>  /  AlkPhos  53  04-27    LIVER FUNCTIONS - ( 2022 07:28 )  Alb: 3.1 g/dL / Pro: 6.0 g/dL / ALK PHOS: 53 U/L / ALT: 6 U/L / AST: 10 U/L / GGT: x           PT/INR - ( 2022 07:32 )   PT: 12.9 sec;   INR: 1.12 ratio         PTT - ( 2022 07:32 )  PTT:23.2 sec  Urinalysis Basic - ( 2022 20:26 )    Color: Yellow / Appearance: Turbid / S.017 / pH: x  Gluc: x / Ketone: Negative  / Bili: Negative / Urobili: Negative   Blood: x / Protein: 30 mg/dL / Nitrite: Negative   Leuk Esterase: Large / RBC: 10 /hpf / WBC >50 /HPF   Sq Epi: x / Non Sq Epi: 1 /hpf / Bacteria: Occasional      < from: CT Head No Cont (22 @ 19:20) >    ACC: [id number] EXAM:  CT BRAIN                          PROCEDURE DATE:  2022          INTERPRETATION:  INDICATIONS:  AMS    TECHNIQUE:  Serial axial images were obtained from the skull base to the   vertex without intravenous contrast. Sagittal and Coronal reformats were   performed    COMPARISON EXAMINATION: None.    FINDINGS:  VENTRICLES AND SULCI:  Age-appropriate involutional change  INTRA-AXIAL: Microvascular ischemic changes involving the periventricular   and subcortical white matter age indeterminate  EXTRA-AXIAL:  At the level of the right lateral tentorium and right   sigmoid plate, a calcified soft tissue lesion is identified measuring 1.6   x 2.3 x 1.9 cm ( [id number]) consistent with a meningioma at this level.   Possible invasion at the level of the sigmoid sinus is a definite   consideration. Some subtle edema is suggested surrounding the lesion.  VISUALIZED SINUSES: Ethmoid mucosal thickening.  VISUALIZED MASTOIDS:  Clear.  CALVARIUM:  Normal.  MISCELLANEOUS:  None.    IMPRESSION:  Right lateral tentorium/sphenoid plate region partially   calcified lesion suspicious for a meningioma in this region with the   measurements given above. Given the location possibility of infiltration   of the sigmoid sinus at this level should be considered. Venogram either   MRV or CTV may be helpful for more complete evaluation as clinically   warranted. Age-appropriate involutional changes and microvascular   ischemic disease age    --- End of Report ---            SUZY NGUYEN MD; Attending Radiologist  This document has been electronically signed. 2022  7:37PM    < end of copied text >  < from: CT Venogram Brain w/ IV Cont (22 @ 23:56) >    ACC: [id number] EXAM:  CT VENOGRAM BRAIN (W)AW IC                          PROCEDURE DATE:  2022          INTERPRETATION:  Clinical indication: Right posterior fossa meningioma   adjacent to sigmoid sinus          After the intravenous power injection of 70 cc of Omnipaque 300 using a   timing run for the venous system, serial thin sections were obtained   through the intracranial circulation centered at the [id number] on   a multi slice CT scanner reformatted with coronal and sagittal 2 D-MIP   projections, including 3 D reconstructions using a separate 3D [employer]   software workstation. A total of 70 cc of Omnipaque were intravenously   injected. 30 cc were discarded.      Although performed to evaluate the venous system, the intracranial   arterial system is opacified and appears normal.  The carotid and vertebral arteries enhance normally. The distal vertebral   arteries are well identified as are the posterior-inferior cerebellar   arteries bilaterally. The region of the vertebral basilar junction is   normal. The basilar artery is normal. The posterior cerebral and superior   cerebellar arteries are normal.    Evaluation of the carotid arteries demonstrate normal appearance to the   cervical, petrous cavernous and supraclinoid internal carotid arteries   are unremarkable. The anterior cerebral arteries and intercommunicating   artery and middle cerebral arteries are normal.    There is a calcified mass in the right posterior fossa adjacent to the   right sigmoid sinus without evidence of sinus invasion. The mass 1.9 cm   in AP diameter by 2 cm transversely by 2 cm in craniocaudal diameter  and   abuts the right sigmoid sinus.      The normal intracranial venous circulation is identified. The right   transversesinus is dominant. The superior sagittal sinus, internal   cerebral veins, vein of Miguel Angel, straight sinus, transverse sinuses,   sigmoid sinuses and internal jugular veins are normal cortical veins are   normal.        IMPRESSION: Calcified right posterior fossa meningioma adjacent to the   right sigmoid sinus without sinus invasion.      --- End of Report ---            JEWEL HAYDEN MD; Attending Radiologist  This document has been electronically signed. 2022  8:53AM    < end of copied text >

## 2022-04-27 NOTE — SWALLOW BEDSIDE ASSESSMENT ADULT - SLP GENERAL OBSERVATIONS
Pt aroused to verbal and tactile stimulation; oriented x 0; denied pain.  Pt required max cues and redirection to PO trials; frequently closing eyes although accepting tspn with tactile cue to touch lip prior to accepting PO trials.

## 2022-04-27 NOTE — SWALLOW BEDSIDE ASSESSMENT ADULT - ASR SWALLOW ASPIRATION MONITOR
*If evident, report to MD immediately and d/c oral diet./change of breathing pattern/cough/gurgly voice/fever/pneumonia/throat clearing/upper respiratory infection *If evident, report to MD immediately and suggest GOC discussion re: provision of nutrition/hydration and meds./change of breathing pattern/cough/gurgly voice/fever/pneumonia/throat clearing/upper respiratory infection

## 2022-04-27 NOTE — SWALLOW BEDSIDE ASSESSMENT ADULT - SWALLOW EVAL: DIAGNOSIS
Pt presents with oropharyngeal dysphagia superimposed upon by underlying cognitive deficits.  Formation, control and transfer of bolus are impaired with decreased insight/awareness during PO trials.  Pt requires total feeding assistance.   No signs or symptoms of laryngeal penetration/aspiration evident with any consistency presented.  Pharyngeal swallow judged to be timely and laryngeal elevation was palpated.  Pt intermittently spoke with bolus in oral cavity.

## 2022-04-27 NOTE — SWALLOW BEDSIDE ASSESSMENT ADULT - SWALLOW EVAL: RECOMMENDED FEEDING/EATING TECHNIQUES
check mouth frequently for oral residue/pocketing/crush medication (when feasible)/maintain upright posture during/after eating for 30 mins/oral hygiene/small sips/bites

## 2022-04-27 NOTE — SWALLOW BEDSIDE ASSESSMENT ADULT - ORAL PHASE
intermittent verbalizations prior to clearing oral cavity; decreased control with cup sips/Within functional limits

## 2022-04-27 NOTE — SWALLOW BEDSIDE ASSESSMENT ADULT - SWALLOW EVAL: PATIENT/FAMILY GOALS STATEMENT
Per SLP d/w Pt's daughter via telephone; dgtr confirmed MOLST in chart stating Pt/family/HCP does not wish for tube feeds.

## 2022-04-27 NOTE — SWALLOW BEDSIDE ASSESSMENT ADULT - SWALLOW EVAL: CURRENT DIET
Left message for patient to call to review echocardiogram results. Pt npo pending swallow eval, per MD.

## 2022-04-28 NOTE — DIETITIAN INITIAL EVALUATION ADULT - REASON FOR ADMISSION
Per chart "89F w/ dementia, meningioma, HF, DM2, afib off ac due to GI bleed p/w ams admit to medicine for further evaluation of acute encephalopathy which maybe from UTI"

## 2022-04-28 NOTE — DIETITIAN INITIAL EVALUATION ADULT - ORAL INTAKE PTA/DIET HISTORY
Per daughter (Larisa): Pt with fair appetite/PO intake PTA. Of note, Pt on appetite stimulant regimen PTA (Remeron).

## 2022-04-28 NOTE — DIETITIAN INITIAL EVALUATION ADULT - PERTINENT MEDS FT
MEDICATIONS  (STANDING):  cefTRIAXone   IVPB      cefTRIAXone   IVPB 1000 milliGRAM(s) IV Intermittent every 24 hours  chlorhexidine 2% Cloths 1 Application(s) Topical <User Schedule>  dextrose 5% 1000 milliLiter(s) (50 mL/Hr) IV Continuous <Continuous>  dextrose 5%. 1000 milliLiter(s) (100 mL/Hr) IV Continuous <Continuous>  dextrose 5%. 1000 milliLiter(s) (50 mL/Hr) IV Continuous <Continuous>  dextrose 5%. 1000 milliLiter(s) (50 mL/Hr) IV Continuous <Continuous>  dextrose 50% Injectable 25 Gram(s) IV Push once  dextrose 50% Injectable 12.5 Gram(s) IV Push once  dextrose 50% Injectable 25 Gram(s) IV Push once  folic acid 1 milliGRAM(s) Oral daily  furosemide    Tablet 80 milliGRAM(s) Oral daily  glucagon  Injectable 1 milliGRAM(s) IntraMuscular once  heparin   Injectable 5000 Unit(s) SubCutaneous every 12 hours  insulin lispro (ADMELOG) corrective regimen sliding scale   SubCutaneous every 6 hours  metoprolol succinate  milliGRAM(s) Oral daily  multivitamin/minerals 1 Tablet(s) Oral daily  pantoprazole    Tablet 40 milliGRAM(s) Oral before breakfast  potassium chloride  10 mEq/100 mL IVPB 10 milliEquivalent(s) IV Intermittent every 1 hour  valproate sodium IVPB 250 milliGRAM(s) IV Intermittent two times a day    MEDICATIONS  (PRN):  dextrose Oral Gel 15 Gram(s) Oral once PRN Blood Glucose LESS THAN 70 milliGRAM(s)/deciliter

## 2022-04-28 NOTE — DIETITIAN INITIAL EVALUATION ADULT - NS FNS WEIGHT CHANGE REASON
UBW: 160 pounds ()  Dosing weight: 134.9 pounds (04-)    ** Daughter Larisa reports weight loss x 4 months/unintentional

## 2022-04-28 NOTE — DIETITIAN INITIAL EVALUATION ADULT - NSFNSNUTRHOMESUPPLEMENTFT_GEN_A_CORE
Micronutrient supplementation: folic acid, multivitamin, KCl. Protein-energy supplementation: Ensure (doesn't usually drink it, provided regularly at SNF PTA per Larisa)

## 2022-04-28 NOTE — PHYSICAL THERAPY INITIAL EVALUATION ADULT - ASR WT BEARING STATUS EVAL
UA c/w pyuria in setting of mild leukocytosis. empiric treatment with ceftriaxone.  CT VEnogram: Calcified right post fossa meningioma adjacent to the R sigmoid sinus without sinus invasion. Neurology c/s: no intervention; meningioma is chronic. Encephalopathy from infection on top of Alzheimer's dementia./no weight-bearing restrictions

## 2022-04-28 NOTE — DIETITIAN INITIAL EVALUATION ADULT - ETIOLOGY
decreased ability to consume adequate protein-energy in setting of increased physiological demand for nutrients  Increased physiological demand for nutrients

## 2022-04-28 NOTE — PROGRESS NOTE ADULT - SUBJECTIVE AND OBJECTIVE BOX
Neurology Progress Note    S: Patient seen and examined. No new events overnight. patient denied CP, SOB, HA or pain.     Medication:  ascorbic acid 500 milliGRAM(s) Oral daily  cefTRIAXone   IVPB      cefTRIAXone   IVPB 1000 milliGRAM(s) IV Intermittent every 24 hours  chlorhexidine 2% Cloths 1 Application(s) Topical <User Schedule>  dextrose 5% 1000 milliLiter(s) IV Continuous <Continuous>  dextrose 5%. 1000 milliLiter(s) IV Continuous <Continuous>  dextrose 5%. 1000 milliLiter(s) IV Continuous <Continuous>  dextrose 5%. 1000 milliLiter(s) IV Continuous <Continuous>  dextrose 50% Injectable 25 Gram(s) IV Push once  dextrose 50% Injectable 12.5 Gram(s) IV Push once  dextrose 50% Injectable 25 Gram(s) IV Push once  dextrose Oral Gel 15 Gram(s) Oral once PRN  folic acid 1 milliGRAM(s) Oral daily  furosemide    Tablet 80 milliGRAM(s) Oral daily  glucagon  Injectable 1 milliGRAM(s) IntraMuscular once  heparin   Injectable 5000 Unit(s) SubCutaneous every 12 hours  insulin lispro (ADMELOG) corrective regimen sliding scale   SubCutaneous every 6 hours  metoprolol succinate  milliGRAM(s) Oral daily  multivitamin/minerals 1 Tablet(s) Oral daily  pantoprazole    Tablet 40 milliGRAM(s) Oral before breakfast  potassium chloride  10 mEq/100 mL IVPB 10 milliEquivalent(s) IV Intermittent every 1 hour  valproate sodium IVPB 250 milliGRAM(s) IV Intermittent two times a day      Vitals:  Vital Signs Last 24 Hrs  T(C): 36.8 (28 Apr 2022 11:18), Max: 36.9 (28 Apr 2022 05:22)  T(F): 98.3 (28 Apr 2022 11:18), Max: 98.4 (28 Apr 2022 05:22)  HR: 93 (28 Apr 2022 11:18) (80 - 93)  BP: 129/83 (28 Apr 2022 11:18) (120/72 - 129/83)  BP(mean): --  RR: 18 (28 Apr 2022 11:18) (17 - 18)  SpO2: 96% (28 Apr 2022 11:18) (94% - 96%)    General Exam:   General Appearance: Appropriately dressed and in no acute distress       Head: Normocephalic, atraumatic and no dysmorphic features  Ear, Nose, and Throat: Moist mucous membranes  CVS: S1S2+  Resp: No SOB, no wheeze or rhonchi  Abd: soft NTND  Extremities: No edema, no cyanosis  Skin: No bruises, no rashes     Neurological Exam:  Mental Status: Awake, alert and oriented x 1.  follows some simple with mimicking. confused. + dysarthria   Cranial Nerves: PERRL, EOMI, VFFC, sensation V1-V3 intact,  ? R  facial asymmetry, equal elevation of palate, scm/trap 5/5, tongue is midline on protrusion. no obvious papilledema on fundoscopic exam. hearing is grossly intact.   Motor: DORADO uppers> lowers   Sensation: Intact to light touch and pinprick throughout. no right/left confusion. no extinction to tactile on DSS. Romberg was negative.   Reflexes: 1+ throughout at biceps, brachioradialis, triceps, patellars and ankles bilaterally and equal. No clonus. R toe and L toe were both downgoing.  Coordination: unable   Gait: unable     I personally reviewed the below data/images/labs:      CBC Full  -  ( 28 Apr 2022 07:06 )  WBC Count : 8.13 K/uL  RBC Count : 4.19 M/uL  Hemoglobin : 10.4 g/dL  Hematocrit : 36.0 %  Platelet Count - Automated : 271 K/uL  Mean Cell Volume : 85.9 fl  Mean Cell Hemoglobin : 24.8 pg  Mean Cell Hemoglobin Concentration : 28.9 gm/dL  Auto Neutrophil # : x  Auto Lymphocyte # : x  Auto Monocyte # : x  Auto Eosinophil # : x  Auto Basophil # : x  Auto Neutrophil % : x  Auto Lymphocyte % : x  Auto Monocyte % : x  Auto Eosinophil % : x  Auto Basophil % : x    04-28    147<H>  |  107  |  15  ----------------------------<  90  3.2<L>   |  23  |  0.65    Ca    9.8      28 Apr 2022 07:01    TPro  6.0  /  Alb  3.1<L>  /  TBili  0.4  /  DBili  x   /  AST  10  /  ALT  6<L>  /  AlkPhos  53  04-27    LIVER FUNCTIONS - ( 27 Apr 2022 07:28 )  Alb: 3.1 g/dL / Pro: 6.0 g/dL / ALK PHOS: 53 U/L / ALT: 6 U/L / AST: 10 U/L / GGT: x               < from: CT Head No Cont (04.25.22 @ 19:20) >    ACC: [id number] EXAM:  CT BRAIN                          PROCEDURE DATE:  04/25/2022          INTERPRETATION:  INDICATIONS:  AMS    TECHNIQUE:  Serial axial images were obtained from the skull base to the   vertex without intravenous contrast. Sagittal and Coronal reformats were   performed    COMPARISON EXAMINATION: None.    FINDINGS:  VENTRICLES AND SULCI:  Age-appropriate involutional change  INTRA-AXIAL: Microvascular ischemic changes involving the periventricular   and subcortical white matter age indeterminate  EXTRA-AXIAL:  At the level of the right lateral tentorium and right   sigmoid plate, a calcified soft tissue lesion is identified measuring 1.6   x 2.3 x 1.9 cm ( [id number]) consistent with a meningioma at this level.   Possible invasion at the level of the sigmoid sinus is a definite   consideration. Some subtle edema is suggested surrounding the lesion.  VISUALIZED SINUSES: Ethmoid mucosal thickening.  VISUALIZED MASTOIDS:  Clear.  CALVARIUM:  Normal.  MISCELLANEOUS:  None.    IMPRESSION:  Right lateral tentorium/sphenoid plate region partially   calcified lesion suspicious for a meningioma in this region with the   measurements given above. Given the location possibility of infiltration   of the sigmoid sinus at this level should be considered. Venogram either   MRV or CTV may be helpful for more complete evaluation as clinically   warranted. Age-appropriate involutional changes and microvascular   ischemic disease age    --- End of Report ---            SUZY NGUYEN MD; Attending Radiologist  This document has been electronically signed. Apr 25 2022  7:37PM    < end of copied text >  < from: CT Venogram Brain w/ IV Cont (04.25.22 @ 23:56) >    ACC: [id number] EXAM:  CT VENOGRAM BRAIN (W)AW IC                          PROCEDURE DATE:  04/25/2022          INTERPRETATION:  Clinical indication: Right posterior fossa meningioma   adjacent to sigmoid sinus          After the intravenous power injection of 70 cc of Omnipaque 300 using a   timing run for the venous system, serial thin sections were obtained   through the intracranial circulation centered at the [id number] on   a multi slice CT scanner reformatted with coronal and sagittal 2 D-MIP   projections, including 3 D reconstructions using a separate 3D [employer]   software workstation. A total of 70 cc of Omnipaque were intravenously   injected. 30 cc were discarded.      Although performed to evaluate the venous system, the intracranial   arterial system is opacified and appears normal.  The carotid and vertebral arteries enhance normally. The distal vertebral   arteries are well identified as are the posterior-inferior cerebellar   arteries bilaterally. The region of the vertebral basilar junction is   normal. The basilar artery is normal. The posterior cerebral and superior   cerebellar arteries are normal.    Evaluation of the carotid arteries demonstrate normal appearance to the   cervical, petrous cavernous and supraclinoid internal carotid arteries   are unremarkable. The anterior cerebral arteries and intercommunicating   artery and middle cerebral arteries are normal.    There is a calcified mass in the right posterior fossa adjacent to the   right sigmoid sinus without evidence of sinus invasion. The mass 1.9 cm   in AP diameter by 2 cm transversely by 2 cm in craniocaudal diameter  and   abuts the right sigmoid sinus.      The normal intracranial venous circulation is identified. The right   transversesinus is dominant. The superior sagittal sinus, internal   cerebral veins, vein of Miguel Angel, straight sinus, transverse sinuses,   sigmoid sinuses and internal jugular veins are normal cortical veins are   normal.        IMPRESSION: Calcified right posterior fossa meningioma adjacent to the   right sigmoid sinus without sinus invasion.      --- End of Report ---

## 2022-04-28 NOTE — PHYSICAL THERAPY INITIAL EVALUATION ADULT - ACTIVE RANGE OF MOTION EXAMINATION, REHAB EVAL
Active assisted ROM done; L shoulder 0-90d/bilateral upper extremity Active ROM was WFL (within functional limits)/bilateral  lower extremity Active ROM was WFL (within functional limits)

## 2022-04-28 NOTE — PHYSICAL THERAPY INITIAL EVALUATION ADULT - PRECAUTIONS/LIMITATIONS, REHAB EVAL
CTH: R lateral tentorium/sphenoid plate region partially calcified lesion suspicious for a meningioma in this region with the measurements given above. Given the location possibility of infiltration of the sigmoid sinus at this level should be considered. Age-appropriate involutional changes and microvascular ischemic disease age. CTH: R lateral tentorium/sphenoid plate region partially calcified lesion suspicious for a meningioma in this region with the measurements given above. Given the location possibility of infiltration of the sigmoid sinus at this level should be considered. Age-appropriate involutional changes and microvascular ischemic disease age./fall precautions

## 2022-04-28 NOTE — DIETITIAN INITIAL EVALUATION ADULT - ENERGY INTAKE
Poor (<50%) Per team, Pt with poor appetite/PO intake in setting of lethargy, AMS. Daughter amenable to Pt receiving oral nutritional supplement to optimize PO intake: Glucerna 2x/day (440 ajit, 20 Gm protein). RD will provide Magic Cup 2x/day (9 Gm protein each).

## 2022-04-28 NOTE — DIETITIAN INITIAL EVALUATION ADULT - OTHER INFO
Hx of DM managed with Januvia 1x/day. Fingersticks checked regularly at SNF (daughter unaware of avg. ranges). Hgba1c 6.2%, indicates good glucose control PTA. Fingersticks and serum glucose in-house controlled in setting of poor PO intake, receiving SSI for glycemic management.. RD to continue to monitor blood glucose trends as available/able.

## 2022-04-28 NOTE — DIETITIAN INITIAL EVALUATION ADULT - NSFNSNUTRCHEWSWALLOWFT_GEN_A_CORE
Hx of dysphagia (mechanical soft diet/thin liquids at SNF PTA). Most recent swallow evaluation (04/27) in setting of AMS recommending puree foods and moderately thick liquids.

## 2022-04-28 NOTE — CHART NOTE - NSCHARTNOTEFT_GEN_A_CORE
Chart reviewed.  Patient seen and examined.    HPI: The patient is lethargic and unable to provide history. Therefore history is obtained from the patient's daughter ms. Larisa Knight via telephone  89F w/ dementia, meningioma, HF, DM2, afib off ac due to GI bleed p/w ams. On day of presentation, the patient's daughter went to visit the patient at her SNF and found the patient to be slumped over in her wheelchair with minimal interaction at approx 12:30pm. The patient's cognitive baseline has been reportedly declining over the course of months however she reportedly is able to have simple conversation with family. The patient has had dementia for some time but she has more rapidly declined since the end of Jan2022 which has been attributed to isolation during hospitalizations, rehab and while being in her SNF. Her daughter reports dosage adjustment of seroquel and divalproex reportedly improving her cognition over the weeks prior which is why there is surprise for current mental status. Additionally, the patient is known to have a meningioma however details of the state of disease are not known to the family.  Her daughter confirms that the patient is DNR/DNI and that the family would not want to pursue invasive surgery but is open to medical treatment and further diagnostic testing. (25 Apr 2022 22:40)      MEDICATIONS  (STANDING):  cefTRIAXone   IVPB      cefTRIAXone   IVPB 1000 milliGRAM(s) IV Intermittent every 24 hours  chlorhexidine 2% Cloths 1 Application(s) Topical <User Schedule>  dextrose 5%. 1000 milliLiter(s) (100 mL/Hr) IV Continuous <Continuous>  dextrose 5%. 1000 milliLiter(s) (50 mL/Hr) IV Continuous <Continuous>  dextrose 5%. 1000 milliLiter(s) (50 mL/Hr) IV Continuous <Continuous>  dextrose 5%. 1000 milliLiter(s) (50 mL/Hr) IV Continuous <Continuous>  dextrose 50% Injectable 25 Gram(s) IV Push once  dextrose 50% Injectable 12.5 Gram(s) IV Push once  dextrose 50% Injectable 25 Gram(s) IV Push once  folic acid 1 milliGRAM(s) Oral daily  furosemide    Tablet 80 milliGRAM(s) Oral daily  glucagon  Injectable 1 milliGRAM(s) IntraMuscular once  heparin   Injectable 5000 Unit(s) SubCutaneous every 12 hours  insulin lispro (ADMELOG) corrective regimen sliding scale   SubCutaneous every 6 hours  metoprolol succinate  milliGRAM(s) Oral daily  multivitamin/minerals 1 Tablet(s) Oral daily  pantoprazole    Tablet 40 milliGRAM(s) Oral before breakfast  valproate sodium IVPB 250 milliGRAM(s) IV Intermittent two times a day    MEDICATIONS  (PRN):  dextrose Oral Gel 15 Gram(s) Oral once PRN Blood Glucose LESS THAN 70 milliGRAM(s)/deciliter      VITALS:  Vital Signs Last 24 Hrs  T(C): 36.7 (27 Apr 2022 19:52), Max: 37.1 (27 Apr 2022 00:29)  T(F): 98.1 (27 Apr 2022 19:52), Max: 98.8 (27 Apr 2022 00:29)  HR: 82 (27 Apr 2022 19:52) (78 - 87)  BP: 128/81 (27 Apr 2022 19:52) (110/63 - 135/88)  BP(mean): --  RR: 18 (27 Apr 2022 19:52) (18 - 18)  SpO2: 94% (27 Apr 2022 19:52) (93% - 95%)        BRIEF FOCUS PHYSICAL EXAM:    GENERAL: supine in bed NAD  NEURO: lethargic but alert, oriented x 0  CV: regular        LABS:                        9.2    7.19  )-----------( 252      ( 27 Apr 2022 07:17 )             32.0     04-27    147<H>  |  109<H>  |  15  ----------------------------<  57<L>  3.2<L>   |  24  |  0.70    Ca    9.7      27 Apr 2022 07:28  Phos  3.2     04-26  Mg     1.9     04-26    TPro  6.0  /  Alb  3.1<L>  /  TBili  0.4  /  DBili  x   /  AST  10  /  ALT  6<L>  /  AlkPhos  53  04-27    LIVER FUNCTIONS - ( 27 Apr 2022 07:28 )  Alb: 3.1 g/dL / Pro: 6.0 g/dL / ALK PHOS: 53 U/L / ALT: 6 U/L / AST: 10 U/L / GGT: x           PT/INR - ( 26 Apr 2022 07:32 )   PT: 12.9 sec;   INR: 1.12 ratio       PTT - ( 26 Apr 2022 07:32 )  PTT:23.2 sec  Culture - Urine (collected 04-25-22 @ 23:16)  Source: Catheterized Catheterized  Preliminary Report (04-26-22 @ 20:05):    >100,000 CFU/ml Enterococcus species      A/P  pt admiited for acute encephalopathy, UTI, meningioma; has been having poor po intake c/b hypoglycemia requiring D50 IVP  hypokalemia daily noted likely due to poor po intake + D5W  fingerstick now 77  D50 PRN on oder  will also change D5W IVF to D5W w/ 10meq K given recurrent hypokalemia  continuing remaining plan of care  monitor

## 2022-04-28 NOTE — DIETITIAN INITIAL EVALUATION ADULT - SIGNS/SYMPTOMS
<75% EER x >1 month, <50% EER x 4 days, 18% Wt loss x 4 months, mild muscle/fat depletion pressure injury

## 2022-04-28 NOTE — DIETITIAN INITIAL EVALUATION ADULT - NSFNSGIIOFT_GEN_A_CORE
Per team, no overt signs of GI distress reported. Last BM was PTA (x 4 days), RD will provide pureed prunes 1x/day, will discuss ? need for bowel regimen with team. Bowel regimen: none noted.

## 2022-04-28 NOTE — DIETITIAN INITIAL EVALUATION ADULT - DIET TYPE
Glucerna 2x/day (440 ajit, 20 Gm protein)/pureed/supplement (specify)/moderately thick liquids/no liquids

## 2022-04-28 NOTE — PROGRESS NOTE ADULT - SUBJECTIVE AND OBJECTIVE BOX
Subjective: Patient seen and examined. No new events except as noted.   Pt awake this am, just kept repeating "good".  Repleting K.    REVIEW OF SYSTEMS:  Unable to obtain.    MEDICATIONS:  MEDICATIONS  (STANDING):  cefTRIAXone   IVPB      cefTRIAXone   IVPB 1000 milliGRAM(s) IV Intermittent every 24 hours  chlorhexidine 2% Cloths 1 Application(s) Topical <User Schedule>  dextrose 5% 1000 milliLiter(s) (50 mL/Hr) IV Continuous <Continuous>  dextrose 5%. 1000 milliLiter(s) (100 mL/Hr) IV Continuous <Continuous>  dextrose 5%. 1000 milliLiter(s) (50 mL/Hr) IV Continuous <Continuous>  dextrose 5%. 1000 milliLiter(s) (50 mL/Hr) IV Continuous <Continuous>  dextrose 50% Injectable 25 Gram(s) IV Push once  dextrose 50% Injectable 12.5 Gram(s) IV Push once  dextrose 50% Injectable 25 Gram(s) IV Push once  folic acid 1 milliGRAM(s) Oral daily  furosemide    Tablet 80 milliGRAM(s) Oral daily  glucagon  Injectable 1 milliGRAM(s) IntraMuscular once  heparin   Injectable 5000 Unit(s) SubCutaneous every 12 hours  insulin lispro (ADMELOG) corrective regimen sliding scale   SubCutaneous every 6 hours  metoprolol succinate  milliGRAM(s) Oral daily  multivitamin/minerals 1 Tablet(s) Oral daily  pantoprazole    Tablet 40 milliGRAM(s) Oral before breakfast  potassium chloride  10 mEq/100 mL IVPB 10 milliEquivalent(s) IV Intermittent every 1 hour  valproate sodium IVPB 250 milliGRAM(s) IV Intermittent two times a day    PHYSICAL EXAM:  T(C): 36.9 (04-28-22 @ 05:22), Max: 36.9 (04-28-22 @ 05:22)  HR: 80 (04-28-22 @ 05:22) (80 - 87)  BP: 120/72 (04-28-22 @ 05:22) (120/72 - 128/81)  RR: 17 (04-28-22 @ 05:22) (17 - 18)  SpO2: 96% (04-28-22 @ 05:22) (94% - 96%)  Wt(kg): --  I&O's Summary    Appearance: NAD	  HEENT: dry oral mucosa, PERRL, EOMI	  Lymphatic: No lymphadenopathy , no edema  Cardiovascular: Irregular S1 S2, No JVD, No murmurs , Peripheral pulses palpable 2+ bilaterally  Respiratory: Decreased BS  Gastrointestinal:  Soft, Non-tender, + BS	  Skin: No rashes, No ecchymoses, No cyanosis, warm to touch  Neurological Exam:  Mental Status: Awake, alert and oriented x 1.  follows some simple with mimicking. confused. + dysarthria   Cranial Nerves: PERRL, EOMI, VFFC, sensation V1-V3 intact,  ? R  facial asymmetry, equal elevation of palate, scm/trap 5/5, tongue is midline on protrusion. no obvious papilledema on fundoscopic exam. hearing is grossly intact.   Motor: DORADO uppers> lowers   Sensation: Intact to light touch and pinprick throughout. no right/left confusion. no extinction to tactile on DSS. Romberg was negative.   Reflexes: 1+ throughout at biceps, brachioradialis, triceps, patellars and ankles bilaterally and equal. No clonus. R toe and L toe were both downgoing.  Coordination: unable   Gait: unable   Ext: No edema    LABS:    CARDIAC MARKERS:  CARDIAC MARKERS ( 25 Apr 2022 18:42 )  x     / x     / 16 U/L / x     / x                            10.4   8.13  )-----------( 271      ( 28 Apr 2022 07:06 )             36.0     04-28    147<H>  |  107  |  15  ----------------------------<  90  3.2<L>   |  23  |  0.65    Ca    9.8      28 Apr 2022 07:01    TPro  6.0  /  Alb  3.1<L>  /  TBili  0.4  /  DBili  x   /  AST  10  /  ALT  6<L>  /  AlkPhos  53  04-27    proBNP:   Lipid Profile:   HgA1c:   TSH:     TELEMETRY: 	    ECG:  	  RADIOLOGY:   DIAGNOSTIC TESTING:  [ ] Echocardiogram:  [ ]  Catheterization:  [ ] Stress Test:    OTHER:

## 2022-04-28 NOTE — DIETITIAN INITIAL EVALUATION ADULT - ADD RECOMMEND
1) New malnutrition notification sent.   2) Continue current diet as ordered/tolerated. Defer texture/consistency needs to team.  3) Add Glucerna 2x/day (thickened appropriately).   4) Add vitamin C to promote wound healing.   5) Continue micronutrient supplementation. multivitamin, folic acid.  6) Continue to monitor and replete electrolytes if low.   7) Monitor nutritional intake/tolerance, weights, labs, hydration status, skin integrity, BM, GI symptoms.

## 2022-04-28 NOTE — DIETITIAN INITIAL EVALUATION ADULT - REASON INDICATOR FOR ASSESSMENT
Consult ordered for: nutrition assessment  Source: EMR, communication with team, phone interview with daughter (Larisa)  ** Pt with AMS - inappropriate for interview.

## 2022-04-28 NOTE — DIETITIAN INITIAL EVALUATION ADULT - NSFNSADHERENCEPTAFT_GEN_A_CORE
Therapeutic restrictions/modifications: mechanical soft foods/thin liquids, limits concentrated sweets.

## 2022-04-28 NOTE — PROGRESS NOTE ADULT - SUBJECTIVE AND OBJECTIVE BOX
JULIOCESAR BASURTO  89y Female  MRN:[id number]    Patient is a 89y old  Female who presents with a chief complaint of 89F p/w ams (26 Apr 2022 09:54)    HPI:  The patient is lethargic and unable to provide history. Therefore history is obtained from the patient's daughter ms. Larisa Knight via telephone    89F w/ dementia, meningioma, HF, DM2, afib off ac due to GI bleed p/w ams. On day of presentation, the patient's daughter went to visit the patient at her SNF and found the patient to be slumped over in her wheelchair with minimal interaction at approx 12:30pm. The patient's cognitive baseline has been reportedly declining over the course of months however she reportedly is able to have simple conversation with family. The patient has had dementia for some time but she has more rapidly declined since the end of Jan2022 which has been attributed to isolation during hospitalizations, rehab and while being in her SNF. Her daughter reports dosage adjustment of seroquel and divalproex reportedly improving her cognition over the weeks prior which is why there is surprise for current mental status. Additionally, the patient is known to have a meningioma however details of the state of disease are not known to the family.    Her daughter confirms that the patient is DNR/DNI and that the family would not want to pursue invasive surgery but is open to medical treatment and further diagnostic testing. (25 Apr 2022 22:40)      Patient seen and evaluated at bedside. No acute events overnight except as noted.    Interval HPI: no acute events o/n    PAST MEDICAL & SURGICAL HISTORY:  Diabetes    Dementia    Chronic CHF    No significant past surgical history        REVIEW OF SYSTEMS:  as per hpi     VITALS:   Vital Signs Last 24 Hrs  T(C): 36.8 (28 Apr 2022 11:18), Max: 36.9 (28 Apr 2022 05:22)  T(F): 98.3 (28 Apr 2022 11:18), Max: 98.4 (28 Apr 2022 05:22)  HR: 93 (28 Apr 2022 11:18) (80 - 93)  BP: 129/83 (28 Apr 2022 11:18) (120/72 - 129/83)  BP(mean): --  RR: 18 (28 Apr 2022 11:18) (17 - 18)  SpO2: 96% (28 Apr 2022 11:18) (94% - 96%)      PHYSICAL EXAM:  GENERAL: NAD, frail, lethargic  HEAD:  Atraumatic, Normocephalic  EYES: EOMI, PERRLA, conjunctiva and sclera clear  NECK: Supple, No JVD  CHEST/LUNG: Clear to auscultation bilaterally; No wheeze  HEART: S1, S2; No murmurs, rubs, or gallops  ABDOMEN: Soft, Nontender, Nondistended; Bowel sounds present  EXTREMITIES:  2+ Peripheral Pulses, No clubbing, cyanosis, or edema  PSYCH: Normal affect  NEUROLOGY: AAOX0  SKIN: No rashes or lesions    Consultant(s) Notes Reviewed:  [x ] YES  [ ] NO  Care Discussed with Consultants/Other Providers [ x] YES  [ ] NO    MEDS:   MEDICATIONS  (STANDING):  cefTRIAXone   IVPB      cefTRIAXone   IVPB 1000 milliGRAM(s) IV Intermittent every 24 hours  chlorhexidine 2% Cloths 1 Application(s) Topical <User Schedule>  dextrose 5% 1000 milliLiter(s) (50 mL/Hr) IV Continuous <Continuous>  dextrose 5%. 1000 milliLiter(s) (100 mL/Hr) IV Continuous <Continuous>  dextrose 5%. 1000 milliLiter(s) (50 mL/Hr) IV Continuous <Continuous>  dextrose 5%. 1000 milliLiter(s) (50 mL/Hr) IV Continuous <Continuous>  dextrose 50% Injectable 25 Gram(s) IV Push once  dextrose 50% Injectable 12.5 Gram(s) IV Push once  dextrose 50% Injectable 25 Gram(s) IV Push once  folic acid 1 milliGRAM(s) Oral daily  furosemide    Tablet 80 milliGRAM(s) Oral daily  glucagon  Injectable 1 milliGRAM(s) IntraMuscular once  heparin   Injectable 5000 Unit(s) SubCutaneous every 12 hours  insulin lispro (ADMELOG) corrective regimen sliding scale   SubCutaneous every 6 hours  metoprolol succinate  milliGRAM(s) Oral daily  multivitamin/minerals 1 Tablet(s) Oral daily  pantoprazole    Tablet 40 milliGRAM(s) Oral before breakfast  potassium chloride  10 mEq/100 mL IVPB 10 milliEquivalent(s) IV Intermittent every 1 hour  valproate sodium IVPB 250 milliGRAM(s) IV Intermittent two times a day    MEDICATIONS  (PRN):  dextrose Oral Gel 15 Gram(s) Oral once PRN Blood Glucose LESS THAN 70 milliGRAM(s)/deciliter  /deciliter    ALLERGIES:  No Known Allergies      LABS:                         10.4   8.13  )-----------( 271      ( 28 Apr 2022 07:06 )             36.0   04-28    147<H>  |  107  |  15  ----------------------------<  90  3.2<L>   |  23  |  0.65    Ca    9.8      28 Apr 2022 07:01    TPro  6.0  /  Alb  3.1<L>  /  TBili  0.4  /  DBili  x   /  AST  10  /  ALT  6<L>  /  AlkPhos  53  04-27          cultures: Culture Results:   >100,000 CFU/ml Enterococcus species (04-25 @ 23:16)     < from: CT Venogram Brain w/ IV Cont (04.25.22 @ 23:56) >    IMPRESSION: Calcified right posterior fossa meningioma adjacent to the   right sigmoid sinus without sinus invasion.      --- End of Report ---        < end of copied text >  < from: CT Head No Cont (04.25.22 @ 19:20) >  IMPRESSION:  Right lateral tentorium/sphenoid plate region partially   calcified lesion suspicious for a meningioma in this region with the   measurements given above. Given the location possibility of infiltration   of the sigmoid sinus at this level should be considered. Venogram either   MRV or CTV may be helpful for more complete evaluation as clinically   warranted. Age-appropriate involutional changes and microvascular   ischemic disease age    --- End of Report ---      < end of copied text >

## 2022-04-28 NOTE — DIETITIAN NUTRITION RISK NOTIFICATION - TREATMENT: THE FOLLOWING DIET HAS BEEN RECOMMENDED
Diet, Pureed:   Moderately Thick Liquids (MODTHICKLIQS)  Supplement Feeding Modality:  Oral  Glucerna Shake Cans or Servings Per Day:  2       Frequency:  Daily (04-28-22 @ 15:02) [Pending Verification By Attending]  Diet, Pureed:   Moderately Thick Liquids (MODTHICKLIQS) (04-27-22 @ 16:53) [Active]

## 2022-04-28 NOTE — DIETITIAN INITIAL EVALUATION ADULT - PERTINENT LABORATORY DATA
04-28    147<H>  |  107  |  15  ----------------------------<  90  3.2<L>   |  23  |  0.65    Ca    9.8      28 Apr 2022 07:01    TPro  6.0  /  Alb  3.1<L>  /  TBili  0.4  /  DBili  x   /  AST  10  /  ALT  6<L>  /  AlkPhos  53  04-27  POCT Blood Glucose.: 109 mg/dL (04-28-22 @ 12:12)  A1C with Estimated Average Glucose Result: 6.2 % (04-26-22 @ 10:03)

## 2022-04-28 NOTE — PROGRESS NOTE ADULT - ASSESSMENT
89F w/ dementia, meningioma, CHF, DM2, afib off ac due to GI bleed p/w ams  CTH with meningioma R lateral spneoid plate  CTV neg for sinus thrombosis : calcified R post sara meningioma noted.   + UA   Imprssion: AMS likely toxic metabolic encephalopahty in setting of infection on top of her underlying dementia.  doubt embolic stroke off AC as exam is nonfocal   - no further intervention for calcified post fossa meningioma.  chronic   - ceftriaxone for infection  - getting  BID likely for mood as opposed to seizure  - for AF, AC if cleared by GI, h/o bleeds.    - f/u urine cx's   - b12, RPR TSH if not checked    - repeat CTH if no improvement with tratment of infection  - frequent re orientation to avoid sundowning  - seroquel or zyprexa PRN if QTC < 500  - telemetry  - PT/OT/SS/SLP, OOBC  - check FS, glucose control <180  - GI/DVT ppx]  - Thank you for allowing me to participate in the care of this patient. Call with questions.   Tariq Lane MD  Vascular Neurology  Office: [phone]

## 2022-04-28 NOTE — DIETITIAN INITIAL EVALUATION ADULT - PERSON TAUGHT/METHOD
Discussed importance of adequate consumption of meals/supplements to optimize protein-energy intake./verbal instruction/individual instruction/patient instructed

## 2022-04-29 NOTE — PROGRESS NOTE ADULT - SUBJECTIVE AND OBJECTIVE BOX
Subjective: Patient seen and examined. No new events except as noted.     REVIEW OF SYSTEMS:  Unable to obtain       MEDICATIONS:  MEDICATIONS  (STANDING):  amoxicillin 500 milliGRAM(s) Oral three times a day  ascorbic acid 500 milliGRAM(s) Oral daily  chlorhexidine 2% Cloths 1 Application(s) Topical <User Schedule>  dextrose 5% 1000 milliLiter(s) (50 mL/Hr) IV Continuous <Continuous>  dextrose 5%. 1000 milliLiter(s) (50 mL/Hr) IV Continuous <Continuous>  dextrose 5%. 1000 milliLiter(s) (50 mL/Hr) IV Continuous <Continuous>  dextrose 5%. 1000 milliLiter(s) (100 mL/Hr) IV Continuous <Continuous>  dextrose 50% Injectable 25 Gram(s) IV Push once  dextrose 50% Injectable 12.5 Gram(s) IV Push once  dextrose 50% Injectable 25 Gram(s) IV Push once  folic acid 1 milliGRAM(s) Oral daily  furosemide    Tablet 80 milliGRAM(s) Oral daily  glucagon  Injectable 1 milliGRAM(s) IntraMuscular once  heparin   Injectable 5000 Unit(s) SubCutaneous every 12 hours  insulin lispro (ADMELOG) corrective regimen sliding scale   SubCutaneous every 6 hours  metoprolol succinate  milliGRAM(s) Oral daily  multivitamin/minerals 1 Tablet(s) Oral daily  pantoprazole    Tablet 40 milliGRAM(s) Oral before breakfast  valproate sodium IVPB 250 milliGRAM(s) IV Intermittent two times a day      PHYSICAL EXAM:  T(C): 37.3 (04-29-22 @ 09:51), Max: 37.3 (04-29-22 @ 09:51)  HR: 119 (04-29-22 @ 09:51) (93 - 119)  BP: 119/71 (04-29-22 @ 09:51) (118/66 - 129/83)  RR: 18 (04-29-22 @ 09:51) (17 - 18)  SpO2: 94% (04-29-22 @ 09:51) (94% - 98%)  Wt(kg): --  I&O's Summary    28 Apr 2022 07:01  -  29 Apr 2022 07:00  --------------------------------------------------------  IN: 0 mL / OUT: 0 mL / NET: 0 mL            Appearance: NAD	  HEENT: dry oral mucosa, PERRL, EOMI	  Lymphatic: No lymphadenopathy , no edema  Cardiovascular: Irregular S1 S2, No JVD, No murmurs , Peripheral pulses palpable 2+ bilaterally  Respiratory: Decreased BS  Gastrointestinal:  Soft, Non-tender, + BS	  Skin: No rashes, No ecchymoses, No cyanosis, warm to touch  Neurological Exam:  Mental Status: Awake, alert and oriented x 1.  follows some simple with mimicking. confused. + dysarthria   Cranial Nerves: PERRL, EOMI, VFFC, sensation V1-V3 intact,  ? R  facial asymmetry, equal elevation of palate, scm/trap 5/5, tongue is midline on protrusion. no obvious papilledema on fundoscopic exam. hearing is grossly intact.   Motor: DORADO uppers> lowers   Sensation: Intact to light touch and pinprick throughout. no right/left confusion. no extinction to tactile on DSS. Romberg was negative.   Reflexes: 1+ throughout at biceps, brachioradialis, triceps, patellars and ankles bilaterally and equal. No clonus. R toe and L toe were both downgoing.  Coordination: unable   Gait: unable   Ext: No edema          LABS:    CARDIAC MARKERS:                                10.4   8.13  )-----------( 271      ( 28 Apr 2022 07:06 )             36.0     04-28    147<H>  |  107  |  15  ----------------------------<  90  3.2<L>   |  23  |  0.65    Ca    9.8      28 Apr 2022 07:01      proBNP:   Lipid Profile:   HgA1c:   TSH:             TELEMETRY: 	    ECG:  	  RADIOLOGY:   DIAGNOSTIC TESTING:  [ ] Echocardiogram:  [ ]  Catheterization:  [ ] Stress Test:    OTHER:

## 2022-04-29 NOTE — DISCHARGE NOTE PROVIDER - NSDCMRMEDTOKEN_GEN_ALL_CORE_FT
digoxin 125 mcg (0.125 mg) oral tablet: 1 tab(s) orally 3 times a week ([facility])  divalproex sodium 250 mg oral delayed release tablet: 1 tab(s) orally 2 times a day  folic acid 1 mg oral tablet: 1 tab(s) orally once a day  furosemide 80 mg oral tablet: 1 tab(s) orally once a day  Januvia 100 mg oral tablet: 1 tab(s) orally once a day  metoprolol succinate 100 mg oral tablet, extended release: 1 tab(s) orally once a day  mirtazapine 30 mg oral tablet: 1 tab(s) orally once a day (at bedtime)  multivitamin with minerals: 1 tab(s) orally once a day  omeprazole 20 mg oral delayed release capsule: 1 cap(s) orally once a day  potassium chloride 20 mEq oral tablet, extended release: 1 tab(s) orally once a day  QUEtiapine 25 mg oral tablet: 1 tab(s) orally once a day (at bedtime)  senna 8.6 mg oral tablet: 2 tab(s) orally once a day (at bedtime)  Tylenol 325 mg oral tablet: 2 tab(s) orally 3 times a day  Voltaren 1% topical gel: Apply topically to affected area (right knee &amp; right ankle) 2 times a day, As Needed

## 2022-04-29 NOTE — DISCHARGE NOTE PROVIDER - NSDCCPCAREPLAN_GEN_ALL_CORE_FT
PRINCIPAL DISCHARGE DIAGNOSIS  Diagnosis: AMS (altered mental status)  Assessment and Plan of Treatment: - AMS likely toxic metabolic encephalopahty in setting of infection on top of her underlying dementia.  doubt embolic stroke off AC as exam is nonfocal   - no further intervention for calcified post fossa meningioma.  chronic   - Likely due to uti  - Seen by ID and neuro  - Switched to oral amoxillin x 5 days        SECONDARY DISCHARGE DIAGNOSES  Diagnosis: Acute UTI  Assessment and Plan of Treatment: - Seen by ID  - Switched to oral amoxillin x 5 days    Diagnosis: Dementia, Alzheimer's, with behavior disturbance  Assessment and Plan of Treatment: - Continue with home meds  - Continue with supportive care.    Diagnosis: Chronic atrial fibrillation  Assessment and Plan of Treatment: - off ac due to GI bleed  - Continue with metoprolol and digoxin      Diagnosis: Chronic HF (heart failure)  Assessment and Plan of Treatment: - Continue with metoprolol and lasix    Diagnosis: Meningioma  Assessment and Plan of Treatment: - Seen by neuro  - no further intervention for calcified post fossa meningioma.  chronic   - Outpatient follow up

## 2022-04-29 NOTE — PROGRESS NOTE ADULT - ASSESSMENT
89F w/ dementia, meningioma, CHF, DM2, afib off ac due to GI bleed p/w ams  CTH with meningioma R lateral spneoid plate  CTV neg for sinus thrombosis : calcified R post sara meningioma noted.   + UA   Imprssion: AMS likely toxic metabolic encephalopahty in setting of infection on top of her underlying dementia.  doubt embolic stroke off AC as exam is nonfocal   - no further intervention for calcified post fossa meningioma.  chronic   - ceftriaxone for infection changed to amoxicillin.    - getting  BID likely for mood as opposed to seizure  - for AF, AC if cleared by GI, h/o bleeds.    - f/u urine cx's   - b12, RPR TSH if not checked    - repeat CTH if no improvement with tratment of infection  - frequent re orientation to avoid sundowning  - seroquel or zyprexa PRN if QTC < 500  - telemetry  - PT/OT/SS/SLP, OOBC  - check FS, glucose control <180  - GI/DVT ppx]  - Thank you for allowing me to participate in the care of this patient. Call with questions.   - d/c planning KAI Lane MD  Vascular Neurology  Office: [phone]

## 2022-04-29 NOTE — PROGRESS NOTE ADULT - SUBJECTIVE AND OBJECTIVE BOX
Neurology Progress Note    S: Patient seen and examined. No new events overnight. patient denied CP, SOB, HA or pain.     Medication:    MEDICATIONS  (STANDING):  amoxicillin 500 milliGRAM(s) Oral three times a day  ascorbic acid 500 milliGRAM(s) Oral daily  chlorhexidine 2% Cloths 1 Application(s) Topical <User Schedule>  dextrose 5% 1000 milliLiter(s) (50 mL/Hr) IV Continuous <Continuous>  dextrose 5%. 1000 milliLiter(s) (100 mL/Hr) IV Continuous <Continuous>  dextrose 5%. 1000 milliLiter(s) (50 mL/Hr) IV Continuous <Continuous>  dextrose 5%. 1000 milliLiter(s) (50 mL/Hr) IV Continuous <Continuous>  dextrose 50% Injectable 25 Gram(s) IV Push once  dextrose 50% Injectable 12.5 Gram(s) IV Push once  dextrose 50% Injectable 25 Gram(s) IV Push once  folic acid 1 milliGRAM(s) Oral daily  furosemide    Tablet 80 milliGRAM(s) Oral daily  glucagon  Injectable 1 milliGRAM(s) IntraMuscular once  heparin   Injectable 5000 Unit(s) SubCutaneous every 12 hours  insulin lispro (ADMELOG) corrective regimen sliding scale   SubCutaneous every 6 hours  metoprolol succinate  milliGRAM(s) Oral daily  multivitamin/minerals 1 Tablet(s) Oral daily  pantoprazole    Tablet 40 milliGRAM(s) Oral before breakfast  valproate sodium IVPB 250 milliGRAM(s) IV Intermittent two times a day    MEDICATIONS  (PRN):  dextrose Oral Gel 15 Gram(s) Oral once PRN Blood Glucose LESS THAN 70 milliGRAM(s)/deciliter      Vitals:    Vital Signs Last 24 Hrs  T(C): 36.7 (04-29-22 @ 15:26), Max: 37.3 (04-29-22 @ 09:51)  T(F): 98.1 (04-29-22 @ 15:26), Max: 99.1 (04-29-22 @ 09:51)  HR: 98 (04-29-22 @ 15:26) (95 - 119)  BP: 127/81 (04-29-22 @ 15:26) (118/66 - 127/81)  BP(mean): --  RR: 20 (04-29-22 @ 15:26) (17 - 20)  SpO2: 94% (04-29-22 @ 15:26) (94% - 98%)      General Exam:   General Appearance: Appropriately dressed and in no acute distress       Head: Normocephalic, atraumatic and no dysmorphic features  Ear, Nose, and Throat: Moist mucous membranes  CVS: S1S2+  Resp: No SOB, no wheeze or rhonchi  Abd: soft NTND  Extremities: No edema, no cyanosis  Skin: No bruises, no rashes     Neurological Exam:  Mental Status: Awake, alert and oriented x 1.  follows some simple with mimicking. confused. + dysarthria   Cranial Nerves: PERRL, EOMI, VFFC, sensation V1-V3 intact,  ? R  facial asymmetry, equal elevation of palate, scm/trap 5/5, tongue is midline on protrusion. no obvious papilledema on fundoscopic exam. hearing is grossly intact.   Motor: DORADO uppers> lowers   Sensation: Intact to light touch and pinprick throughout. no right/left confusion. no extinction to tactile on DSS. Romberg was negative.   Reflexes: 1+ throughout at biceps, brachioradialis, triceps, patellars and ankles bilaterally and equal. No clonus. R toe and L toe were both downgoing.  Coordination: unable   Gait: unable     I personally reviewed the below data/images/labs:    CBC Full  -  ( 28 Apr 2022 07:06 )  WBC Count : 8.13 K/uL  RBC Count : 4.19 M/uL  Hemoglobin : 10.4 g/dL  Hematocrit : 36.0 %  Platelet Count - Automated : 271 K/uL  Mean Cell Volume : 85.9 fl  Mean Cell Hemoglobin : 24.8 pg  Mean Cell Hemoglobin Concentration : 28.9 gm/dL  Auto Neutrophil # : x  Auto Lymphocyte # : x  Auto Monocyte # : x  Auto Eosinophil # : x  Auto Basophil # : x  Auto Neutrophil % : x  Auto Lymphocyte % : x  Auto Monocyte % : x  Auto Eosinophil % : x  Auto Basophil % : x    04-29    144  |  106  |  10  ----------------------------<  96  3.6   |  21<L>  |  0.57    Ca    9.8      29 Apr 2022 12:20          < from: CT Head No Cont (04.25.22 @ 19:20) >    ACC: [id number] EXAM:  CT BRAIN                          PROCEDURE DATE:  04/25/2022          INTERPRETATION:  INDICATIONS:  AMS    TECHNIQUE:  Serial axial images were obtained from the skull base to the   vertex without intravenous contrast. Sagittal and Coronal reformats were   performed    COMPARISON EXAMINATION: None.    FINDINGS:  VENTRICLES AND SULCI:  Age-appropriate involutional change  INTRA-AXIAL: Microvascular ischemic changes involving the periventricular   and subcortical white matter age indeterminate  EXTRA-AXIAL:  At the level of the right lateral tentorium and right   sigmoid plate, a calcified soft tissue lesion is identified measuring 1.6   x 2.3 x 1.9 cm ( [id number]) consistent with a meningioma at this level.   Possible invasion at the level of the sigmoid sinus is a definite   consideration. Some subtle edema is suggested surrounding the lesion.  VISUALIZED SINUSES: Ethmoid mucosal thickening.  VISUALIZED MASTOIDS:  Clear.  CALVARIUM:  Normal.  MISCELLANEOUS:  None.    IMPRESSION:  Right lateral tentorium/sphenoid plate region partially   calcified lesion suspicious for a meningioma in this region with the   measurements given above. Given the location possibility of infiltration   of the sigmoid sinus at this level should be considered. Venogram either   MRV or CTV may be helpful for more complete evaluation as clinically   warranted. Age-appropriate involutional changes and microvascular   ischemic disease age    --- End of Report ---            SUZY NGUYEN MD; Attending Radiologist  This document has been electronically signed. Apr 25 2022  7:37PM    < end of copied text >  < from: CT Venogram Brain w/ IV Cont (04.25.22 @ 23:56) >    ACC: [id number] EXAM:  CT VENOGRAM BRAIN (W)AW IC                          PROCEDURE DATE:  04/25/2022          INTERPRETATION:  Clinical indication: Right posterior fossa meningioma   adjacent to sigmoid sinus          After the intravenous power injection of 70 cc of Omnipaque 300 using a   timing run for the venous system, serial thin sections were obtained   through the intracranial circulation centered at the [id number] on   a multi slice CT scanner reformatted with coronal and sagittal 2 D-MIP   projections, including 3 D reconstructions using a separate 3D [employer]   software workstation. A total of 70 cc of Omnipaque were intravenously   injected. 30 cc were discarded.      Although performed to evaluate the venous system, the intracranial   arterial system is opacified and appears normal.  The carotid and vertebral arteries enhance normally. The distal vertebral   arteries are well identified as are the posterior-inferior cerebellar   arteries bilaterally. The region of the vertebral basilar junction is   normal. The basilar artery is normal. The posterior cerebral and superior   cerebellar arteries are normal.    Evaluation of the carotid arteries demonstrate normal appearance to the   cervical, petrous cavernous and supraclinoid internal carotid arteries   are unremarkable. The anterior cerebral arteries and intercommunicating   artery and middle cerebral arteries are normal.    There is a calcified mass in the right posterior fossa adjacent to the   right sigmoid sinus without evidence of sinus invasion. The mass 1.9 cm   in AP diameter by 2 cm transversely by 2 cm in craniocaudal diameter  and   abuts the right sigmoid sinus.      The normal intracranial venous circulation is identified. The right   transversesinus is dominant. The superior sagittal sinus, internal   cerebral veins, vein of Miguel Angel, straight sinus, transverse sinuses,   sigmoid sinuses and internal jugular veins are normal cortical veins are   normal.        IMPRESSION: Calcified right posterior fossa meningioma adjacent to the   right sigmoid sinus without sinus invasion.      --- End of Report ---

## 2022-04-29 NOTE — CONSULT NOTE ADULT - SUBJECTIVE AND OBJECTIVE BOX
HPI:   Patient is a 89y female with    REVIEW OF SYSTEMS:  All other review of systems negative (Comprehensive ROS)    PAST MEDICAL & SURGICAL HISTORY:  Diabetes    Dementia    Chronic CHF    No significant past surgical history        Allergies    No Known Allergies    Intolerances        Antimicrobials Day #    amoxicillin 500 milliGRAM(s) Oral three times a day    Other Medications:  ascorbic acid 500 milliGRAM(s) Oral daily  chlorhexidine 2% Cloths 1 Application(s) Topical <User Schedule>  dextrose 5% 1000 milliLiter(s) IV Continuous <Continuous>  dextrose 5%. 1000 milliLiter(s) IV Continuous <Continuous>  dextrose 5%. 1000 milliLiter(s) IV Continuous <Continuous>  dextrose 5%. 1000 milliLiter(s) IV Continuous <Continuous>  dextrose 50% Injectable 25 Gram(s) IV Push once  dextrose 50% Injectable 12.5 Gram(s) IV Push once  dextrose 50% Injectable 25 Gram(s) IV Push once  dextrose Oral Gel 15 Gram(s) Oral once PRN  folic acid 1 milliGRAM(s) Oral daily  furosemide    Tablet 80 milliGRAM(s) Oral daily  glucagon  Injectable 1 milliGRAM(s) IntraMuscular once  heparin   Injectable 5000 Unit(s) SubCutaneous every 12 hours  insulin lispro (ADMELOG) corrective regimen sliding scale   SubCutaneous every 6 hours  metoprolol succinate  milliGRAM(s) Oral daily  multivitamin/minerals 1 Tablet(s) Oral daily  pantoprazole    Tablet 40 milliGRAM(s) Oral before breakfast  valproate sodium IVPB 250 milliGRAM(s) IV Intermittent two times a day      FAMILY HISTORY:  No pertinent family history in first degree relatives        SOCIAL HISTORY:  Smoking:     ETOH:     Drug Use:     Single     T(F): 98.6 (04-29-22 @ 05:07), Max: 98.6 (04-29-22 @ 05:07)  HR: 95 (04-29-22 @ 05:07)  BP: 124/70 (04-29-22 @ 05:07)  RR: 17 (04-29-22 @ 05:07)  SpO2: 98% (04-29-22 @ 05:07)  Wt(kg): --    PHYSICAL EXAM:  General: alert, no acute distress  Eyes:  anicteric, no conjunctival injection, no discharge  Oropharynx: no lesions or injection 	  Neck: supple, without adenopathy  Lungs: clear to auscultation  Heart: regular rate and rhythm; no murmur, rubs or gallops  Abdomen: soft, nondistended, nontender, without mass or organomegaly  Skin: no lesions  Extremities: no clubbing, cyanosis, or edema  Neurologic: alert, oriented, moves all extremities    LAB RESULTS:                        10.4   8.13  )-----------( 271      ( 28 Apr 2022 07:06 )             36.0     04-28    147<H>  |  107  |  15  ----------------------------<  90  3.2<L>   |  23  |  0.65    Ca    9.8      28 Apr 2022 07:01    Urinalysis + Microscopic Examination (04.25.22 @ 20:26)   Color: Yellow   Glucose Qualitative, Urine: Negative   Blood, Urine: Negative   Nitrite: Negative   Ketone - Urine: Negative   Bilirubin: Negative   pH Urine: 7.0   Leukocyte Esterase Concentration: Large   Protein, Urine: 30 mg/dL   Urine Appearance: Turbid   Urobilinogen: Negative   Specific Gravity: 1.017   Red Blood Cell - Urine: 10 /hpf   White Blood Cell - Urine: >50 /HPF   Epithelial Cells: 1 /hpf   Bacteria: Occasional         MICROBIOLOGY REVIEWED:  Culture - Urine (04.25.22 @ 23:16)   - Ampicillin: S 4 Predicts results to ampicillin/sulbactam, amoxacillin-clavulanate and piperacillin-tazobactam.   - Ciprofloxacin: R >2   - Levofloxacin: R >4   - Nitrofurantoin: S <=32 Should not be used to treat pyelonephritis.   - Tetra/Doxy: R >8   - Vancomycin: S 2   Specimen Source: Catheterized Catheterized   Culture Results:   >100,000 CFU/ml Enterococcus faecalis   Organism Identification: Enterococcus faecalis   RADIOLOGY REVIEWED:  < from: Xray Chest 1 View- PORTABLE-Urgent (Xray Chest 1 View- PORTABLE-Urgent .) (04.25.22 @ 19:23) >    Impression:  No focal consolidation.    < end of copied text >   HPI:   Patient is a 89y female with hx dementia, meningioma, CHF, DM2, afib, hx GI bleed. As per chart pt found to be slumped over in her wheelchair in a nursing facility with minimal interaction.   She was admitted to medicine service for further evaluation of acute encephalopathy which maybe from UTI. She had abnormal UA and was initially on CTX, now switched to IV Vanc since ucx with growth of enterococcus  Pt is unable to offer any specific c/o  REVIEW OF SYSTEMS:  All other review of systems negative (Comprehensive ROS)  PAST MEDICAL & SURGICAL HISTORY:  Diabetes    Dementia    Chronic CHF    No significant past surgical history        Allergies    No Known Allergies    Intolerances        Antimicrobials Day #    amoxicillin 500 milliGRAM(s) Oral three times a day    Other Medications:  ascorbic acid 500 milliGRAM(s) Oral daily  chlorhexidine 2% Cloths 1 Application(s) Topical <User Schedule>  dextrose 5% 1000 milliLiter(s) IV Continuous <Continuous>  dextrose 5%. 1000 milliLiter(s) IV Continuous <Continuous>  dextrose 5%. 1000 milliLiter(s) IV Continuous <Continuous>  dextrose 5%. 1000 milliLiter(s) IV Continuous <Continuous>  dextrose 50% Injectable 25 Gram(s) IV Push once  dextrose 50% Injectable 12.5 Gram(s) IV Push once  dextrose 50% Injectable 25 Gram(s) IV Push once  dextrose Oral Gel 15 Gram(s) Oral once PRN  folic acid 1 milliGRAM(s) Oral daily  furosemide    Tablet 80 milliGRAM(s) Oral daily  glucagon  Injectable 1 milliGRAM(s) IntraMuscular once  heparin   Injectable 5000 Unit(s) SubCutaneous every 12 hours  insulin lispro (ADMELOG) corrective regimen sliding scale   SubCutaneous every 6 hours  metoprolol succinate  milliGRAM(s) Oral daily  multivitamin/minerals 1 Tablet(s) Oral daily  pantoprazole    Tablet 40 milliGRAM(s) Oral before breakfast  valproate sodium IVPB 250 milliGRAM(s) IV Intermittent two times a day      FAMILY HISTORY:  No pertinent family history in first degree relatives        SOCIAL HISTORY:  Smoking:     ETOH:     Drug Use:     Single     T(F): 98.6 (04-29-22 @ 05:07), Max: 98.6 (04-29-22 @ 05:07)  HR: 95 (04-29-22 @ 05:07)  BP: 124/70 (04-29-22 @ 05:07)  RR: 17 (04-29-22 @ 05:07)  SpO2: 98% (04-29-22 @ 05:07)  Wt(kg): --    PHYSICAL EXAM:  General: alert, no acute distress  Eyes:  anicteric, no conjunctival injection, no discharge  Oropharynx: no lesions or injection 	  Neck: supple, without adenopathy  Lungs: clear to auscultation  Heart: regular rate and rhythm; no murmur, rubs or gallops  Abdomen: soft, nondistended, nontender, without mass or organomegaly  Skin: no lesions  Extremities: no clubbing, cyanosis, or edema  Neurologic: somnolent, not follows commands    LAB RESULTS:                        10.4   8.13  )-----------( 271      ( 28 Apr 2022 07:06 )             36.0     04-28    147<H>  |  107  |  15  ----------------------------<  90  3.2<L>   |  23  |  0.65    Ca    9.8      28 Apr 2022 07:01    Urinalysis + Microscopic Examination (04.25.22 @ 20:26)   Color: Yellow   Glucose Qualitative, Urine: Negative   Blood, Urine: Negative   Nitrite: Negative   Ketone - Urine: Negative   Bilirubin: Negative   pH Urine: 7.0   Leukocyte Esterase Concentration: Large   Protein, Urine: 30 mg/dL   Urine Appearance: Turbid   Urobilinogen: Negative   Specific Gravity: 1.017   Red Blood Cell - Urine: 10 /hpf   White Blood Cell - Urine: >50 /HPF   Epithelial Cells: 1 /hpf   Bacteria: Occasional         MICROBIOLOGY REVIEWED:  Culture - Urine (04.25.22 @ 23:16)   - Ampicillin: S 4 Predicts results to ampicillin/sulbactam, amoxacillin-clavulanate and piperacillin-tazobactam.   - Ciprofloxacin: R >2   - Levofloxacin: R >4   - Nitrofurantoin: S <=32 Should not be used to treat pyelonephritis.   - Tetra/Doxy: R >8   - Vancomycin: S 2   Specimen Source: Catheterized Catheterized   Culture Results:   >100,000 CFU/ml Enterococcus faecalis   Organism Identification: Enterococcus faecalis   RADIOLOGY REVIEWED:  < from: Xray Chest 1 View- PORTABLE-Urgent (Xray Chest 1 View- PORTABLE-Urgent .) (04.25.22 @ 19:23) >    Impression:  No focal consolidation.    < end of copied text >

## 2022-04-29 NOTE — PROGRESS NOTE ADULT - SUBJECTIVE AND OBJECTIVE BOX
JULIOCESAR BASURTO  89y Female  MRN:[id number]    Patient is a 89y old  Female who presents with a chief complaint of 89F p/w ams (26 Apr 2022 09:54)    HPI:  The patient is lethargic and unable to provide history. Therefore history is obtained from the patient's daughter ms. Larisa Knight via telephone    89F w/ dementia, meningioma, HF, DM2, afib off ac due to GI bleed p/w ams. On day of presentation, the patient's daughter went to visit the patient at her SNF and found the patient to be slumped over in her wheelchair with minimal interaction at approx 12:30pm. The patient's cognitive baseline has been reportedly declining over the course of months however she reportedly is able to have simple conversation with family. The patient has had dementia for some time but she has more rapidly declined since the end of Jan2022 which has been attributed to isolation during hospitalizations, rehab and while being in her SNF. Her daughter reports dosage adjustment of seroquel and divalproex reportedly improving her cognition over the weeks prior which is why there is surprise for current mental status. Additionally, the patient is known to have a meningioma however details of the state of disease are not known to the family.    Her daughter confirms that the patient is DNR/DNI and that the family would not want to pursue invasive surgery but is open to medical treatment and further diagnostic testing. (25 Apr 2022 22:40)      Patient seen and evaluated at bedside. No acute events overnight except as noted.    Interval HPI: no acute events o/n    PAST MEDICAL & SURGICAL HISTORY:  Diabetes    Dementia    Chronic CHF    No significant past surgical history        REVIEW OF SYSTEMS:  as per hpi     VITALS:   Vital Signs Last 24 Hrs  T(C): 37.3 (29 Apr 2022 09:51), Max: 37.3 (29 Apr 2022 09:51)  T(F): 99.1 (29 Apr 2022 09:51), Max: 99.1 (29 Apr 2022 09:51)  HR: 119 (29 Apr 2022 09:51) (95 - 119)  BP: 119/71 (29 Apr 2022 09:51) (118/66 - 124/70)  BP(mean): --  RR: 18 (29 Apr 2022 09:51) (17 - 18)  SpO2: 94% (29 Apr 2022 09:51) (94% - 98%)    PHYSICAL EXAM:  GENERAL: NAD, frail,    HEAD:  Atraumatic, Normocephalic  EYES: EOMI, PERRLA, conjunctiva and sclera clear  NECK: Supple, No JVD  CHEST/LUNG: Clear to auscultation bilaterally; No wheeze  HEART: S1, S2; No murmurs, rubs, or gallops  ABDOMEN: Soft, Nontender, Nondistended; Bowel sounds present  EXTREMITIES:  2+ Peripheral Pulses, No clubbing, cyanosis, or edema  PSYCH: Normal affect  NEUROLOGY: AAOX0  SKIN: No rashes or lesions    Consultant(s) Notes Reviewed:  [x ] YES  [ ] NO  Care Discussed with Consultants/Other Providers [ x] YES  [ ] NO    MEDS:   MEDICATIONS  (STANDING):  amoxicillin 500 milliGRAM(s) Oral three times a day  ascorbic acid 500 milliGRAM(s) Oral daily  chlorhexidine 2% Cloths 1 Application(s) Topical <User Schedule>  dextrose 5% 1000 milliLiter(s) (50 mL/Hr) IV Continuous <Continuous>  dextrose 5%. 1000 milliLiter(s) (100 mL/Hr) IV Continuous <Continuous>  dextrose 5%. 1000 milliLiter(s) (50 mL/Hr) IV Continuous <Continuous>  dextrose 5%. 1000 milliLiter(s) (50 mL/Hr) IV Continuous <Continuous>  dextrose 50% Injectable 25 Gram(s) IV Push once  dextrose 50% Injectable 12.5 Gram(s) IV Push once  dextrose 50% Injectable 25 Gram(s) IV Push once  folic acid 1 milliGRAM(s) Oral daily  furosemide    Tablet 80 milliGRAM(s) Oral daily  glucagon  Injectable 1 milliGRAM(s) IntraMuscular once  heparin   Injectable 5000 Unit(s) SubCutaneous every 12 hours  insulin lispro (ADMELOG) corrective regimen sliding scale   SubCutaneous every 6 hours  metoprolol succinate  milliGRAM(s) Oral daily  multivitamin/minerals 1 Tablet(s) Oral daily  pantoprazole    Tablet 40 milliGRAM(s) Oral before breakfast  valproate sodium IVPB 250 milliGRAM(s) IV Intermittent two times a day    MEDICATIONS  (PRN):  dextrose Oral Gel 15 Gram(s) Oral once PRN Blood Glucose LESS THAN 70 milliGRAM(s)/deciliter      ALLERGIES:  No Known Allergies      LABS:                                10.4   8.13  )-----------( 271      ( 28 Apr 2022 07:06 )             36.0   04-28    147<H>  |  107  |  15  ----------------------------<  90  3.2<L>   |  23  |  0.65    Ca    9.8      28 Apr 2022 07:01            cultures: Culture Results:   >100,000 CFU/ml Enterococcus species (04-25 @ 23:16)     < from: CT Venogram Brain w/ IV Cont (04.25.22 @ 23:56) >    IMPRESSION: Calcified right posterior fossa meningioma adjacent to the   right sigmoid sinus without sinus invasion.      --- End of Report ---        < end of copied text >  < from: CT Head No Cont (04.25.22 @ 19:20) >  IMPRESSION:  Right lateral tentorium/sphenoid plate region partially   calcified lesion suspicious for a meningioma in this region with the   measurements given above. Given the location possibility of infiltration   of the sigmoid sinus at this level should be considered. Venogram either   MRV or CTV may be helpful for more complete evaluation as clinically   warranted. Age-appropriate involutional changes and microvascular   ischemic disease age    --- End of Report ---      < end of copied text >

## 2022-04-29 NOTE — CONSULT NOTE ADULT - ASSESSMENT
change to PO amox  Full consult to follow 89y female with hx dementia, meningioma, CHF, DM2, afib, hx GI bleed. As per chart pt found to be slumped over in her wheelchair in a nursing facility with minimal interaction.   She was admitted to medicine service for further evaluation of acute encephalopathy which maybe from UTI. She had abnormal UA and was initially on CTX, now switched to IV Vanc since ucx with growth of enterococcus  Pt is unable to offer any specific c/o    PLAN:  Will switch IV Vanc to PO Amox to treat Enterococcal UTI  5 days course should be sufficient  d/w Dr Whatley

## 2022-04-29 NOTE — DISCHARGE NOTE PROVIDER - HOSPITAL COURSE
89F w/ dementia, meningioma, HF, DM2, afib off ac due to GI bleed p/w ams admit to medicine for further evaluation of acute encephalopathy which maybe from UTI     Problem/Plan - 1:  ·  Problem: Encephalopathy acute.   ·  Plan: - likely 2/2 uti  cult noted  id eval noted  oral amox x5 days   dysphagia diet as per swallow eval.     Problem/Plan - 2:  ·  Problem: Meningioma.   ·  Plan: neuro consult noted  no further w/u.     Problem/Plan - 3:  ·  Problem: Acute UTI.   ·  Plan: see above.     Problem/Plan - 4:  ·  Problem: Dementia, Alzheimer's, with behavior disturbance.   ·  Plan: cont home meds  supportive care.     Problem/Plan - 5:  ·  Problem: Chronic atrial fibrillation.   ·  Plan: cardiology f/u  off ac due to GI bleed  c/w metoprolol  obtain dig level prior to dosing.     Problem/Plan - 6:  ·  Problem: Chronic HF (heart failure).   ·  Plan: stable  cards f/u   c/w metoprolol and furosemide dosing.     Problem/Plan - 7:  ·  Problem: Type 2 diabetes mellitus.   ·  Plan: hold oral medications  insulin sliding scale while inpatient.     Problem/Plan - 8:  ·  Problem: Prophylactic measure.   ·  Plan: HSQ for dvt ppx.

## 2022-04-29 NOTE — PROGRESS NOTE ADULT - PROBLEM SELECTOR PLAN 1
- likely 2/2 uti  cult noted  id eval noted  oral amox x5 days     dysphagia diet as per swallow eval

## 2022-04-30 NOTE — PROGRESS NOTE ADULT - SUBJECTIVE AND OBJECTIVE BOX
CC: f/u for ? UTI    Patient is very lethargic, poorly interactive    REVIEW OF SYSTEMS: limited  All other review of systems negative (Comprehensive ROS)    Antimicrobials Day #    amoxicillin 500 milliGRAM(s) Oral three times a day    Other Medications Reviewed    T(F): 97.4 (04-29-22 @ 19:41), Max: 98.1 (04-29-22 @ 15:26)  HR: 98 (04-29-22 @ 19:41)  BP: 150/70 (04-29-22 @ 19:41)  RR: 20 (04-29-22 @ 19:41)  SpO2: 94% (04-29-22 @ 19:41)    PHYSICAL EXAM:  General: lethargic, no acute distress  Eyes:  anicteric, no conjunctival injection, no discharge  Oropharynx: no lesions or injection 	  Neck: supple, without adenopathy  Lungs: clear to auscultation  Heart: regular rate and rhythm; no murmur, rubs or gallops  Abdomen: soft, nondistended, nontender, without mass or organomegaly  Skin: no lesions  Extremities: no clubbing, cyanosis, or edema  Neurologic: lethargic    LAB RESULTS:                        10.9   16.64 )-----------( 291      ( 30 Apr 2022 07:38 )             37.6     04-30    144  |  105  |  16  ----------------------------<  146<H>  3.6   |  18<L>  |  0.64    Ca    10.5      30 Apr 2022 07:20  Phos  3.6     04-30  Mg     2.0     04-30            MICROBIOLOGY REVIEWED:  Culture - Urine (04.25.22 @ 23:16)   - Ampicillin: S 4 Predicts results to ampicillin/sulbactam, amoxacillin-clavulanate and piperacillin-tazobactam.   - Ciprofloxacin: R >2   - Levofloxacin: R >4   - Nitrofurantoin: S <=32 Should not be used to treat pyelonephritis.   - Tetra/Doxy: R >8   - Vancomycin: S 2   Specimen Source: Catheterized Catheterized   Culture Results:   >100,000 CFU/ml Enterococcus faecalis   Organism Identification: Enterococcus faecalis < from: Xray Chest 1 View- PORTABLE-Urgent (Xray Chest 1 View- PORTABLE-Urgent .) (04.25.22 @ 19:23) >      RADIOLOGY REVIEWED:  Impression:  No focal consolidation.    < end of copied text >

## 2022-04-30 NOTE — PROGRESS NOTE ADULT - SUBJECTIVE AND OBJECTIVE BOX
Subjective: Patient seen and examined. No new events except as noted.     REVIEW OF SYSTEMS:  Unable to obtain.     MEDICATIONS:  MEDICATIONS  (STANDING):  amoxicillin 500 milliGRAM(s) Oral three times a day  ascorbic acid 500 milliGRAM(s) Oral daily  chlorhexidine 2% Cloths 1 Application(s) Topical <User Schedule>  dextrose 5% 1000 milliLiter(s) (50 mL/Hr) IV Continuous <Continuous>  dextrose 5%. 1000 milliLiter(s) (100 mL/Hr) IV Continuous <Continuous>  dextrose 5%. 1000 milliLiter(s) (50 mL/Hr) IV Continuous <Continuous>  dextrose 5%. 1000 milliLiter(s) (50 mL/Hr) IV Continuous <Continuous>  dextrose 50% Injectable 25 Gram(s) IV Push once  dextrose 50% Injectable 12.5 Gram(s) IV Push once  dextrose 50% Injectable 25 Gram(s) IV Push once  folic acid 1 milliGRAM(s) Oral daily  furosemide    Tablet 80 milliGRAM(s) Oral daily  glucagon  Injectable 1 milliGRAM(s) IntraMuscular once  heparin   Injectable 5000 Unit(s) SubCutaneous every 12 hours  insulin lispro (ADMELOG) corrective regimen sliding scale   SubCutaneous every 6 hours  metoprolol succinate  milliGRAM(s) Oral daily  multivitamin/minerals 1 Tablet(s) Oral daily  pantoprazole    Tablet 40 milliGRAM(s) Oral before breakfast  valproate sodium IVPB 250 milliGRAM(s) IV Intermittent two times a day    PHYSICAL EXAM:  T(C): 36.3 (04-29-22 @ 19:41), Max: 37.3 (04-29-22 @ 09:51)  HR: 98 (04-29-22 @ 19:41) (98 - 119)  BP: 150/70 (04-29-22 @ 19:41) (119/71 - 150/70)  RR: 20 (04-29-22 @ 19:41) (18 - 20)  SpO2: 94% (04-29-22 @ 19:41) (94% - 94%)  Wt(kg): --  I&O's Summary    29 Apr 2022 07:01  -  30 Apr 2022 07:00  --------------------------------------------------------  IN: 240 mL / OUT: 0 mL / NET: 240 mL    Appearance: NAD	  HEENT: dry oral mucosa, PERRL, EOMI	  Lymphatic: No lymphadenopathy , no edema  Cardiovascular: Irregular S1 S2, No JVD, No murmurs , Peripheral pulses palpable 2+ bilaterally  Respiratory: Decreased BS  Gastrointestinal:  Soft, Non-tender, + BS	  Skin: No rashes, No ecchymoses, No cyanosis, warm to touch  Neurological Exam:  Mental Status: Awake, alert and oriented x 1.  follows some simple with mimicking. confused. + dysarthria   Cranial Nerves: PERRL, EOMI, VFFC, sensation V1-V3 intact,  ? R  facial asymmetry, equal elevation of palate, scm/trap 5/5, tongue is midline on protrusion. no obvious papilledema on fundoscopic exam. hearing is grossly intact.   Motor: DORADO uppers> lowers   Sensation: Intact to light touch and pinprick throughout. no right/left confusion. no extinction to tactile on DSS. Romberg was negative.   Reflexes: 1+ throughout at biceps, brachioradialis, triceps, patellars and ankles bilaterally and equal. No clonus. R toe and L toe were both downgoing.  Coordination: unable   Gait: unable     LABS:    CARDIAC MARKERS:                     10.9   16.64 )-----------( 291      ( 30 Apr 2022 07:38 )             37.6     04-30    144  |  105  |  16  ----------------------------<  146<H>  3.6   |  18<L>  |  0.64    Ca    10.5      30 Apr 2022 07:20  Phos  3.6     04-30  Mg     2.0     04-30      proBNP:   Lipid Profile:   HgA1c:   TSH:     TELEMETRY: 	    ECG:  	  RADIOLOGY:   DIAGNOSTIC TESTING:  [ ] Echocardiogram:  [ ]  Catheterization:  [ ] Stress Test:    OTHER:

## 2022-04-30 NOTE — CHART NOTE - NSCHARTNOTEFT_GEN_A_CORE
Notified by RN pt is tachy to 120s. Pt seen and evaluated at bedside. Pt is lethargic. Per RN, pt was fed by family member few minutes ago and developed tachy.  After suctioning - HR improved but still 120s. EKG shows afib 130s. Pt didn't get metoprolol for few days given poor PO intake. Received 5mg IV lopressor x1 and HR broke to 90-100s. Spoke to Dr. Godwin- no need for tele transfer, change metoprolol to IV route w/ lopressor 5mg Q6 ATC.  Given risk of aspiration with leukocytosis, CXR ordered. D/w Dr. Rebolledo (ID) and he recs change abx to zosyn and Bcx x2. Will continue to closely monitor signs and symptoms. Waiting a call back for Dr. Whatley to update. Notified by RN pt is tachy to 120s. Pt seen and evaluated at bedside. Pt is lethargic. Per RN, pt was fed by family member few minutes ago and developed tachy.  After suctioning - HR improved but still 120s. EKG shows afib 130s. Pt didn't get metoprolol for few days given poor PO intake. Received 5mg IV lopressor x1 and HR broke to 90-100s. Spoke to Dr. Godwin- no need for tele transfer, change metoprolol to IV route w/ lopressor 5mg Q6 ATC.  Given risk of aspiration with leukocytosis, CXR ordered. D/w Dr. Rebolledo (ID) and he recs change abx to zosyn and Bcx x2. Will continue to closely monitor signs and symptoms. D/w Dr. Whatley who agreed the plan. He will d/w family for further GOC.

## 2022-04-30 NOTE — PROGRESS NOTE ADULT - SUBJECTIVE AND OBJECTIVE BOX
Neurology Progress Note    S: Patient seen and examined. No new events overnight. patient denied CP, SOB, HA or pain. more lethargic today     Medication:  \MEDICATIONS  (STANDING):  ascorbic acid 500 milliGRAM(s) Oral daily  chlorhexidine 2% Cloths 1 Application(s) Topical <User Schedule>  dextrose 5% 1000 milliLiter(s) (50 mL/Hr) IV Continuous <Continuous>  dextrose 5%. 1000 milliLiter(s) (100 mL/Hr) IV Continuous <Continuous>  dextrose 5%. 1000 milliLiter(s) (50 mL/Hr) IV Continuous <Continuous>  dextrose 5%. 1000 milliLiter(s) (50 mL/Hr) IV Continuous <Continuous>  dextrose 50% Injectable 25 Gram(s) IV Push once  dextrose 50% Injectable 12.5 Gram(s) IV Push once  dextrose 50% Injectable 25 Gram(s) IV Push once  folic acid 1 milliGRAM(s) Oral daily  furosemide   Injectable 40 milliGRAM(s) IV Push daily  glucagon  Injectable 1 milliGRAM(s) IntraMuscular once  heparin   Injectable 5000 Unit(s) SubCutaneous every 12 hours  insulin lispro (ADMELOG) corrective regimen sliding scale   SubCutaneous every 6 hours  metoprolol tartrate Injectable 5 milliGRAM(s) IV Push every 6 hours  multivitamin/minerals 1 Tablet(s) Oral daily  pantoprazole    Tablet 40 milliGRAM(s) Oral before breakfast  piperacillin/tazobactam IVPB. 3.375 Gram(s) IV Intermittent once  piperacillin/tazobactam IVPB.. 3.375 Gram(s) IV Intermittent every 8 hours  potassium chloride  10 mEq/100 mL IVPB 10 milliEquivalent(s) IV Intermittent every 1 hour  valproate sodium IVPB 250 milliGRAM(s) IV Intermittent two times a day    MEDICATIONS  (PRN):  dextrose Oral Gel 15 Gram(s) Oral once PRN Blood Glucose LESS THAN 70 milliGRAM(s)/deciliter      Vitals:      Vital Signs Last 24 Hrs  T(C): 37.1 (30 Apr 2022 11:50), Max: 37.6 (30 Apr 2022 11:31)  T(F): 98.8 (30 Apr 2022 11:50), Max: 99.6 (30 Apr 2022 11:31)  HR: 98 (30 Apr 2022 11:50) (98 - 140)  BP: 123/78 (30 Apr 2022 11:50) (113/83 - 150/70)  BP(mean): --  RR: 28 (30 Apr 2022 11:50) (20 - 30)  SpO2: 97% (30 Apr 2022 11:50) (89% - 97%)    General Exam:   General Appearance: Appropriately dressed and in no acute distress       Head: Normocephalic, atraumatic and no dysmorphic features  Ear, Nose, and Throat: Moist mucous membranes  CVS: S1S2+  Resp: No SOB, no wheeze or rhonchi  Abd: soft NTND  Extremities: No edema, no cyanosis  Skin: No bruises, no rashes     Neurological Exam:  Mental Status: Awake, alert and oriented x 1.  follows some simple with mimicking. confused. + dysarthria   Cranial Nerves: PERRL, EOMI, VFFC, sensation V1-V3 intact,  ? R  facial asymmetry, equal elevation of palate, scm/trap 5/5, tongue is midline on protrusion. no obvious papilledema on fundoscopic exam. hearing is grossly intact.   Motor: DORADO uppers> lowers   Sensation: Intact to light touch and pinprick throughout. no right/left confusion. no extinction to tactile on DSS. Romberg was negative.   Reflexes: 1+ throughout at biceps, brachioradialis, triceps, patellars and ankles bilaterally and equal. No clonus. R toe and L toe were both downgoing.  Coordination: unable   Gait: unable     I personally reviewed the below data/images/labs:    CBC Full  -  ( 30 Apr 2022 07:38 )  WBC Count : 16.64 K/uL  RBC Count : 4.47 M/uL  Hemoglobin : 10.9 g/dL  Hematocrit : 37.6 %  Platelet Count - Automated : 291 K/uL  Mean Cell Volume : 84.1 fl  Mean Cell Hemoglobin : 24.4 pg  Mean Cell Hemoglobin Concentration : 29.0 gm/dL  Auto Neutrophil # : x  Auto Lymphocyte # : x  Auto Monocyte # : x  Auto Eosinophil # : x  Auto Basophil # : x  Auto Neutrophil % : x  Auto Lymphocyte % : x  Auto Monocyte % : x  Auto Eosinophil % : x  Auto Basophil % : x    04-30    144  |  105  |  16  ----------------------------<  146<H>  3.6   |  18<L>  |  0.64    Ca    10.5      30 Apr 2022 07:20  Phos  3.6     04-30  Mg     2.0     04-30          < from: CT Head No Cont (04.25.22 @ 19:20) >    ACC: [id number] EXAM:  CT BRAIN                          PROCEDURE DATE:  04/25/2022          INTERPRETATION:  INDICATIONS:  AMS    TECHNIQUE:  Serial axial images were obtained from the skull base to the   vertex without intravenous contrast. Sagittal and Coronal reformats were   performed    COMPARISON EXAMINATION: None.    FINDINGS:  VENTRICLES AND SULCI:  Age-appropriate involutional change  INTRA-AXIAL: Microvascular ischemic changes involving the periventricular   and subcortical white matter age indeterminate  EXTRA-AXIAL:  At the level of the right lateral tentorium and right   sigmoid plate, a calcified soft tissue lesion is identified measuring 1.6   x 2.3 x 1.9 cm ( [id number]) consistent with a meningioma at this level.   Possible invasion at the level of the sigmoid sinus is a definite   consideration. Some subtle edema is suggested surrounding the lesion.  VISUALIZED SINUSES: Ethmoid mucosal thickening.  VISUALIZED MASTOIDS:  Clear.  CALVARIUM:  Normal.  MISCELLANEOUS:  None.    IMPRESSION:  Right lateral tentorium/sphenoid plate region partially   calcified lesion suspicious for a meningioma in this region with the   measurements given above. Given the location possibility of infiltration   of the sigmoid sinus at this level should be considered. Venogram either   MRV or CTV may be helpful for more complete evaluation as clinically   warranted. Age-appropriate involutional changes and microvascular   ischemic disease age    --- End of Report ---            SUZY NGUYEN MD; Attending Radiologist  This document has been electronically signed. Apr 25 2022  7:37PM    < end of copied text >  < from: CT Venogram Brain w/ IV Cont (04.25.22 @ 23:56) >    ACC: [id number] EXAM:  CT VENOGRAM BRAIN (W)AW IC                          PROCEDURE DATE:  04/25/2022          INTERPRETATION:  Clinical indication: Right posterior fossa meningioma   adjacent to sigmoid sinus          After the intravenous power injection of 70 cc of Omnipaque 300 using a   timing run for the venous system, serial thin sections were obtained   through the intracranial circulation centered at the [id number] on   a multi slice CT scanner reformatted with coronal and sagittal 2 D-MIP   projections, including 3 D reconstructions using a separate 3D [employer]   software workstation. A total of 70 cc of Omnipaque were intravenously   injected. 30 cc were discarded.      Although performed to evaluate the venous system, the intracranial   arterial system is opacified and appears normal.  The carotid and vertebral arteries enhance normally. The distal vertebral   arteries are well identified as are the posterior-inferior cerebellar   arteries bilaterally. The region of the vertebral basilar junction is   normal. The basilar artery is normal. The posterior cerebral and superior   cerebellar arteries are normal.    Evaluation of the carotid arteries demonstrate normal appearance to the   cervical, petrous cavernous and supraclinoid internal carotid arteries   are unremarkable. The anterior cerebral arteries and intercommunicating   artery and middle cerebral arteries are normal.    There is a calcified mass in the right posterior fossa adjacent to the   right sigmoid sinus without evidence of sinus invasion. The mass 1.9 cm   in AP diameter by 2 cm transversely by 2 cm in craniocaudal diameter  and   abuts the right sigmoid sinus.      The normal intracranial venous circulation is identified. The right   transversesinus is dominant. The superior sagittal sinus, internal   cerebral veins, vein of Miguel Angel, straight sinus, transverse sinuses,   sigmoid sinuses and internal jugular veins are normal cortical veins are   normal.        IMPRESSION: Calcified right posterior fossa meningioma adjacent to the   right sigmoid sinus without sinus invasion.      --- End of Report ---         36 year old female  with h/o  on 2018, presents for repeat C- section on 21.  **** Preop covid test on 21 at [facility].

## 2022-04-30 NOTE — PROGRESS NOTE ADULT - ASSESSMENT
89F w/ dementia, meningioma, HF, DM2, afib off ac due to GI bleed p/w ams admit to medicine for further evaluation of acute encephalopathy which maybe from UTI  course complicated by likely aspiration    sepsis   2/2 uti and now with likely aspiration  id f/u  abx as per id  f/u cult and sens  keep npo given extreme lethargy  palliative f/u  pt dnr

## 2022-04-30 NOTE — PROGRESS NOTE ADULT - SUBJECTIVE AND OBJECTIVE BOX
JULIOCESAR BASURTO  89y Female  MRN:[id number]    Patient is a 89y old  Female who presents with a chief complaint of 89F p/w ams (26 Apr 2022 09:54)    HPI:  The patient is lethargic and unable to provide history. Therefore history is obtained from the patient's daughter ms. Larisa Knight via telephone    89F w/ dementia, meningioma, HF, DM2, afib off ac due to GI bleed p/w ams. On day of presentation, the patient's daughter went to visit the patient at her SNF and found the patient to be slumped over in her wheelchair with minimal interaction at approx 12:30pm. The patient's cognitive baseline has been reportedly declining over the course of months however she reportedly is able to have simple conversation with family. The patient has had dementia for some time but she has more rapidly declined since the end of Jan2022 which has been attributed to isolation during hospitalizations, rehab and while being in her SNF. Her daughter reports dosage adjustment of seroquel and divalproex reportedly improving her cognition over the weeks prior which is why there is surprise for current mental status. Additionally, the patient is known to have a meningioma however details of the state of disease are not known to the family.    Her daughter confirms that the patient is DNR/DNI and that the family would not want to pursue invasive surgery but is open to medical treatment and further diagnostic testing. (25 Apr 2022 22:40)      Patient seen and evaluated at bedside. No acute events overnight except as noted.    Interval HPI: earlier day events noted      PAST MEDICAL & SURGICAL HISTORY:  Diabetes    Dementia    Chronic CHF    No significant past surgical history        REVIEW OF SYSTEMS:  as per hpi     VITALS:   Vital Signs Last 24 Hrs  T(C): 36.8 (30 Apr 2022 19:35), Max: 37.6 (30 Apr 2022 11:31)  T(F): 98.2 (30 Apr 2022 19:35), Max: 99.6 (30 Apr 2022 11:31)  HR: 91 (30 Apr 2022 19:35) (91 - 140)  BP: 127/80 (30 Apr 2022 19:35) (113/83 - 128/90)  BP(mean): --  RR: 26 (30 Apr 2022 19:35) (26 - 30)  SpO2: 97% (30 Apr 2022 19:35) (89% - 97%)    PHYSICAL EXAM:  GENERAL: NAD, frail,  lethargic  HEAD:  Atraumatic, Normocephalic  EYES: EOMI, PERRLA, conjunctiva and sclera clear  NECK: Supple, No JVD  CHEST/LUNG: Clear to auscultation bilaterally; No wheeze  HEART: S1, S2; No murmurs, rubs, or gallops  ABDOMEN: Soft, Nontender, Nondistended; Bowel sounds present  EXTREMITIES:  2+ Peripheral Pulses, No clubbing, cyanosis, or edema  PSYCH: Normal affect  NEUROLOGY: AAOX0  SKIN: No rashes or lesions    Consultant(s) Notes Reviewed:  [x ] YES  [ ] NO  Care Discussed with Consultants/Other Providers [ x] YES  [ ] NO    MEDS:   MEDICATIONS  (STANDING):  ascorbic acid 500 milliGRAM(s) Oral daily  chlorhexidine 2% Cloths 1 Application(s) Topical <User Schedule>  dextrose 5% 1000 milliLiter(s) (50 mL/Hr) IV Continuous <Continuous>  dextrose 5%. 1000 milliLiter(s) (100 mL/Hr) IV Continuous <Continuous>  dextrose 5%. 1000 milliLiter(s) (50 mL/Hr) IV Continuous <Continuous>  dextrose 5%. 1000 milliLiter(s) (50 mL/Hr) IV Continuous <Continuous>  dextrose 50% Injectable 25 Gram(s) IV Push once  dextrose 50% Injectable 12.5 Gram(s) IV Push once  dextrose 50% Injectable 25 Gram(s) IV Push once  folic acid 1 milliGRAM(s) Oral daily  furosemide   Injectable 40 milliGRAM(s) IV Push daily  glucagon  Injectable 1 milliGRAM(s) IntraMuscular once  heparin   Injectable 5000 Unit(s) SubCutaneous every 12 hours  insulin lispro (ADMELOG) corrective regimen sliding scale   SubCutaneous every 6 hours  metoprolol tartrate Injectable 5 milliGRAM(s) IV Push every 6 hours  multivitamin/minerals 1 Tablet(s) Oral daily  pantoprazole    Tablet 40 milliGRAM(s) Oral before breakfast  piperacillin/tazobactam IVPB.. 3.375 Gram(s) IV Intermittent every 8 hours  valproate sodium IVPB 250 milliGRAM(s) IV Intermittent two times a day    MEDICATIONS  (PRN):  dextrose Oral Gel 15 Gram(s) Oral once PRN Blood Glucose LESS THAN 70 milliGRAM(s)/deciliter      ALLERGIES:  No Known Allergies      LABS:                           10.9   16.64 )-----------( 291      ( 30 Apr 2022 07:38 )             37.6   04-30    144  |  105  |  16  ----------------------------<  146<H>  3.6   |  18<L>  |  0.64    Ca    10.5      30 Apr 2022 07:20  Phos  3.6     04-30  Mg     2.0     04-30              cultures: Culture Results:   >100,000 CFU/ml Enterococcus species (04-25 @ 23:16)     < from: CT Venogram Brain w/ IV Cont (04.25.22 @ 23:56) >    IMPRESSION: Calcified right posterior fossa meningioma adjacent to the   right sigmoid sinus without sinus invasion.      --- End of Report ---        < end of copied text >  < from: CT Head No Cont (04.25.22 @ 19:20) >  IMPRESSION:  Right lateral tentorium/sphenoid plate region partially   calcified lesion suspicious for a meningioma in this region with the   measurements given above. Given the location possibility of infiltration   of the sigmoid sinus at this level should be considered. Venogram either   MRV or CTV may be helpful for more complete evaluation as clinically   warranted. Age-appropriate involutional changes and microvascular   ischemic disease age    --- End of Report ---      < end of copied text >

## 2022-04-30 NOTE — PROGRESS NOTE ADULT - ASSESSMENT
89F w/ dementia, meningioma, CHF, DM2, afib off ac due to GI bleed p/w ams  CTH with meningioma R lateral spneoid plate  CTV neg for sinus thrombosis : calcified R post sara meningioma noted.   + UA   more lethargic   Imprssion: AMS likely toxic metabolic encephalopahty in setting of infection on top of her underlying dementia.  doubt embolic stroke off AC as exam is nonfocal   - no further intervention for calcified post fossa meningioma.  chronic   - ceftriaxone for infection changed to amoxicillin.  now back to zosyn ; f/u ID   - getting  BID likely for mood as opposed to seizure  - for AF, AC if cleared by GI, h/o bleeds.    - f/u urine cx's   - b12, RPR TSH if not checked    - repeat CTH if no improvement with tratment of infection  - frequent re orientation to avoid sundowning  - seroquel or zyprexa PRN if QTC < 500  - telemetry  - PT/OT/SS/SLP, OOBC  - check FS, glucose control <180  - GI/DVT ppx]  - Thank you for allowing me to participate in the care of this patient. Call with questions.   - d/c planning KAI Lane MD  Vascular Neurology  Office: [phone]    89F w/ dementia, meningioma, CHF, DM2, afib off ac due to GI bleed p/w ams  CTH with meningioma R lateral spneoid plate  CTV neg for sinus thrombosis : calcified R post sara meningioma noted.   + UA   more lethargic rising WBC   Imprssion: AMS likely toxic metabolic encephalopahty in setting of infection on top of her underlying dementia.  doubt embolic stroke off AC as exam is nonfocal   - no further intervention for calcified post fossa meningioma.  chronic   - ceftriaxone for infection changed to amoxicillin.  now back to zosyn ; f/u ID   - getting  BID likely for mood as opposed to seizure  - for AF, AC if cleared by GI, h/o bleeds.    - f/u urine cx's   - b12, RPR TSH if not checked    - repeat CTH if no improvement with tratment of infection  - frequent re orientation to avoid sundowning  - seroquel or zyprexa PRN if QTC < 500  - telemetry  - PT/OT/SS/SLP, OOBC  - check FS, glucose control <180  - GI/DVT ppx]  - Thank you for allowing me to participate in the care of this patient. Call with questions.   - d/c planning KAI Lane MD  Vascular Neurology  Office: [phone]

## 2022-05-01 NOTE — PROGRESS NOTE ADULT - ASSESSMENT
89y female with hx dementia, meningioma, CHF, DM2, afib, hx GI bleed. As per chart pt found to be slumped over in her wheelchair in a nursing facility with minimal interaction.   She was admitted to medicine service for further evaluation of acute encephalopathy which maybe from UTI. She had abnormal UA and was initially on CTX, now switched to IV Vanc since ucx with growth of enterococcus  Pt is unable to offer any specific c/o  she developed fever, tachy , leukocytosis despite Rx of Enterococcal UTI with PO Amox    PLAN:  day 2 of Zosyn  to treat Enterococcal UTI and now possible asp PNA  Goals of care to be addressed  She had poor po intake, ? risk of aspiration, other nosocomial complications  rise in WBC, monitor closely, f/u surveillance cultures, CXR

## 2022-05-01 NOTE — PROGRESS NOTE ADULT - SUBJECTIVE AND OBJECTIVE BOX
Subjective: Patient seen and examined. No new events except as noted.   Afib RVR yesterday   s/p IV lopressor . Discussed with NP     REVIEW OF SYSTEMS:    Unable to obtain     MEDICATIONS:  MEDICATIONS  (STANDING):  ascorbic acid 500 milliGRAM(s) Oral daily  chlorhexidine 2% Cloths 1 Application(s) Topical <User Schedule>  dextrose 5% 1000 milliLiter(s) (50 mL/Hr) IV Continuous <Continuous>  dextrose 5%. 1000 milliLiter(s) (100 mL/Hr) IV Continuous <Continuous>  dextrose 5%. 1000 milliLiter(s) (50 mL/Hr) IV Continuous <Continuous>  dextrose 5%. 1000 milliLiter(s) (50 mL/Hr) IV Continuous <Continuous>  dextrose 50% Injectable 25 Gram(s) IV Push once  dextrose 50% Injectable 12.5 Gram(s) IV Push once  dextrose 50% Injectable 25 Gram(s) IV Push once  folic acid 1 milliGRAM(s) Oral daily  furosemide   Injectable 40 milliGRAM(s) IV Push daily  glucagon  Injectable 1 milliGRAM(s) IntraMuscular once  heparin   Injectable 5000 Unit(s) SubCutaneous every 12 hours  insulin lispro (ADMELOG) corrective regimen sliding scale   SubCutaneous every 6 hours  metoprolol tartrate Injectable 5 milliGRAM(s) IV Push every 6 hours  multivitamin/minerals 1 Tablet(s) Oral daily  pantoprazole    Tablet 40 milliGRAM(s) Oral before breakfast  piperacillin/tazobactam IVPB.. 3.375 Gram(s) IV Intermittent every 8 hours  sodium chloride 0.9%. 500 milliLiter(s) (50 mL/Hr) IV Continuous <Continuous>  valproate sodium IVPB 250 milliGRAM(s) IV Intermittent two times a day      PHYSICAL EXAM:  T(C): 36.6 (05-01-22 @ 04:56), Max: 37.6 (04-30-22 @ 11:31)  HR: 109 (05-01-22 @ 04:56) (91 - 140)  BP: 141/84 (05-01-22 @ 04:56) (113/83 - 150/86)  RR: 20 (05-01-22 @ 04:56) (20 - 30)  SpO2: 97% (05-01-22 @ 04:56) (89% - 97%)  Wt(kg): --  I&O's Summary    30 Apr 2022 07:01  -  01 May 2022 07:00  --------------------------------------------------------  IN: 100 mL / OUT: 850 mL / NET: -750 mL      Appearance: NAD	  HEENT: dry oral mucosa, PERRL, EOMI	  Lymphatic: No lymphadenopathy , no edema  Cardiovascular: Irregular S1 S2, No JVD, No murmurs , Peripheral pulses palpable 2+ bilaterally  Respiratory: Decreased BS  Gastrointestinal:  Soft, Non-tender, + BS	  Skin: No rashes, No ecchymoses, No cyanosis, warm to touch  Neurological Exam:  Mental Status: Awake, alert and oriented x 1.  follows some simple with mimicking. confused. + dysarthria   Cranial Nerves: PERRL, EOMI, VFFC, sensation V1-V3 intact,  ? R  facial asymmetry, equal elevation of palate, scm/trap 5/5, tongue is midline on protrusion. no obvious papilledema on fundoscopic exam. hearing is grossly intact.   Motor: DORADO uppers> lowers   Sensation: Intact to light touch and pinprick throughout. no right/left confusion. no extinction to tactile on DSS. Romberg was negative.   Reflexes: 1+ throughout at biceps, brachioradialis, triceps, patellars and ankles bilaterally and equal. No clonus. R toe and L toe were both downgoing.  Coordination: unable   Gait: unable       LABS:    CARDIAC MARKERS:                                10.1   14.63 )-----------( 274      ( 01 May 2022 07:46 )             34.0     04-30    144  |  105  |  16  ----------------------------<  146<H>  3.6   |  18<L>  |  0.64    Ca    10.5      30 Apr 2022 07:20  Phos  3.6     04-30  Mg     2.0     04-30      proBNP:   Lipid Profile:   HgA1c:   TSH:             TELEMETRY: 	    ECG:  	  RADIOLOGY:   DIAGNOSTIC TESTING:  [ ] Echocardiogram:  [ ]  Catheterization:  [ ] Stress Test:    OTHER:

## 2022-05-01 NOTE — PROGRESS NOTE ADULT - SUBJECTIVE AND OBJECTIVE BOX
JULIOCESAR BASURTO  89y Female  MRN:[id number]    Patient is a 89y old  Female who presents with a chief complaint of 89F p/w ams (26 Apr 2022 09:54)    HPI:  The patient is lethargic and unable to provide history. Therefore history is obtained from the patient's daughter ms. Larisa Knight via telephone    89F w/ dementia, meningioma, HF, DM2, afib off ac due to GI bleed p/w ams. On day of presentation, the patient's daughter went to visit the patient at her SNF and found the patient to be slumped over in her wheelchair with minimal interaction at approx 12:30pm. The patient's cognitive baseline has been reportedly declining over the course of months however she reportedly is able to have simple conversation with family. The patient has had dementia for some time but she has more rapidly declined since the end of Jan2022 which has been attributed to isolation during hospitalizations, rehab and while being in her SNF. Her daughter reports dosage adjustment of seroquel and divalproex reportedly improving her cognition over the weeks prior which is why there is surprise for current mental status. Additionally, the patient is known to have a meningioma however details of the state of disease are not known to the family.    Her daughter confirms that the patient is DNR/DNI and that the family would not want to pursue invasive surgery but is open to medical treatment and further diagnostic testing. (25 Apr 2022 22:40)      Patient seen and evaluated at bedside. No acute events overnight except as noted.    Interval HPI: o/n events noted      PAST MEDICAL & SURGICAL HISTORY:  Diabetes    Dementia    Chronic CHF    No significant past surgical history        REVIEW OF SYSTEMS:  as per hpi     VITALS:   Vital Signs Last 24 Hrs  T(C): 36.3 (01 May 2022 13:06), Max: 36.9 (01 May 2022 09:20)  T(F): 97.4 (01 May 2022 13:06), Max: 98.5 (01 May 2022 09:20)  HR: 99 (01 May 2022 13:06) (88 - 109)  BP: 107/72 (01 May 2022 13:06) (107/72 - 150/86)  BP(mean): --  RR: 20 (01 May 2022 13:06) (19 - 26)  SpO2: 97% (01 May 2022 13:06) (96% - 97%)    PHYSICAL EXAM:  GENERAL: NAD, frail,  lethargic  HEAD:  Atraumatic, Normocephalic  EYES: EOMI, PERRLA, conjunctiva and sclera clear  NECK: Supple, No JVD  CHEST/LUNG: Clear to auscultation bilaterally; No wheeze  HEART: S1, S2; No murmurs, rubs, or gallops  ABDOMEN: Soft, Nontender, Nondistended; Bowel sounds present  EXTREMITIES:  2+ Peripheral Pulses, No clubbing, cyanosis, or edema  PSYCH: Normal affect  NEUROLOGY: AAOX0  SKIN: No rashes or lesions    Consultant(s) Notes Reviewed:  [x ] YES  [ ] NO  Care Discussed with Consultants/Other Providers [ x] YES  [ ] NO    MEDS:   MEDICATIONS  (STANDING):  ascorbic acid 500 milliGRAM(s) Oral daily  chlorhexidine 2% Cloths 1 Application(s) Topical <User Schedule>  dextrose 5% 1000 milliLiter(s) (50 mL/Hr) IV Continuous <Continuous>  dextrose 5%. 1000 milliLiter(s) (100 mL/Hr) IV Continuous <Continuous>  dextrose 5%. 1000 milliLiter(s) (50 mL/Hr) IV Continuous <Continuous>  dextrose 5%. 1000 milliLiter(s) (50 mL/Hr) IV Continuous <Continuous>  dextrose 50% Injectable 25 Gram(s) IV Push once  dextrose 50% Injectable 12.5 Gram(s) IV Push once  dextrose 50% Injectable 25 Gram(s) IV Push once  folic acid 1 milliGRAM(s) Oral daily  furosemide   Injectable 40 milliGRAM(s) IV Push daily  glucagon  Injectable 1 milliGRAM(s) IntraMuscular once  heparin   Injectable 5000 Unit(s) SubCutaneous every 12 hours  insulin lispro (ADMELOG) corrective regimen sliding scale   SubCutaneous every 6 hours  metoprolol tartrate Injectable 5 milliGRAM(s) IV Push every 6 hours  multivitamin/minerals 1 Tablet(s) Oral daily  pantoprazole    Tablet 40 milliGRAM(s) Oral before breakfast  piperacillin/tazobactam IVPB.. 3.375 Gram(s) IV Intermittent every 8 hours  sodium chloride 0.9%. 500 milliLiter(s) (50 mL/Hr) IV Continuous <Continuous>  valproate sodium IVPB 250 milliGRAM(s) IV Intermittent two times a day    MEDICATIONS  (PRN):  dextrose Oral Gel 15 Gram(s) Oral once PRN Blood Glucose LESS THAN 70 milliGRAM(s)/deciliter      ALLERGIES:  No Known Allergies      LABS:                                                10.1   14.63 )-----------( 274      ( 01 May 2022 07:46 )             34.0   05-01    149<H>  |  109<H>  |  30<H>  ----------------------------<  166<H>  3.1<L>   |  23  |  0.79    Ca    10.0      01 May 2022 07:50  Phos  3.3     05-01  Mg     2.2     05-01                cultures: Culture Results:   >100,000 CFU/ml Enterococcus species (04-25 @ 23:16)     < from: CT Venogram Brain w/ IV Cont (04.25.22 @ 23:56) >    IMPRESSION: Calcified right posterior fossa meningioma adjacent to the   right sigmoid sinus without sinus invasion.      --- End of Report ---        < end of copied text >  < from: CT Head No Cont (04.25.22 @ 19:20) >  IMPRESSION:  Right lateral tentorium/sphenoid plate region partially   calcified lesion suspicious for a meningioma in this region with the   measurements given above. Given the location possibility of infiltration   of the sigmoid sinus at this level should be considered. Venogram either   MRV or CTV may be helpful for more complete evaluation as clinically   warranted. Age-appropriate involutional changes and microvascular   ischemic disease age    --- End of Report ---      < end of copied text >

## 2022-05-01 NOTE — PROGRESS NOTE ADULT - SUBJECTIVE AND OBJECTIVE BOX
CC: f/u for ? UTI    Patient is very lethargic, poorly interactive  low grade fevers, tachy  events noted, now changed to Zosyn    REVIEW OF SYSTEMS: limited  All other review of systems negative (Comprehensive ROS)    Antimicrobials Day #2    piperacillin/tazobactam IVPB.. 3.375 Gram(s) IV Intermittent every 8 hours      Other Medications Reviewed    Vital Signs Last 24 Hrs  T(C): 36.6 (01 May 2022 04:56), Max: 37.6 (30 Apr 2022 11:31)  T(F): 97.8 (01 May 2022 04:56), Max: 99.6 (30 Apr 2022 11:31)  HR: 109 (01 May 2022 04:56) (91 - 140)  BP: 141/84 (01 May 2022 04:56) (113/83 - 150/86)  BP(mean): --  RR: 20 (01 May 2022 04:56) (20 - 30)  SpO2: 97% (01 May 2022 04:56) (89% - 97%)  PHYSICAL EXAM:  General: lethargic, no acute distress  Eyes:  anicteric, no conjunctival injection, no discharge  Oropharynx: no lesions or injection 	  Neck: supple, without adenopathy  Lungs: clear to auscultation  Heart: regular rate and rhythm; no murmur, rubs or gallops  Abdomen: soft, nondistended, nontender, without mass or organomegaly  Skin: no lesions  Extremities: no clubbing, cyanosis, or edema  Neurologic: lethargic    LAB RESULTS:                          10.9   16.64 )-----------( 291      ( 30 Apr 2022 07:38 )             37.6   04-30    144  |  105  |  16  ----------------------------<  146<H>  3.6   |  18<L>  |  0.64    Ca    10.5      30 Apr 2022 07:20  Phos  3.6     04-30  Mg     2.0     04-30                MICROBIOLOGY REVIEWED:  Culture - Urine (04.25.22 @ 23:16)   - Ampicillin: S 4 Predicts results to ampicillin/sulbactam, amoxacillin-clavulanate and piperacillin-tazobactam.   - Ciprofloxacin: R >2   - Levofloxacin: R >4   - Nitrofurantoin: S <=32 Should not be used to treat pyelonephritis.   - Tetra/Doxy: R >8   - Vancomycin: S 2   Specimen Source: Catheterized Catheterized   Culture Results:   >100,000 CFU/ml Enterococcus faecalis   Organism Identification: Enterococcus faecalis < from: Xray Chest 1 View- PORTABLE-Urgent (Xray Chest 1 View- PORTABLE-Urgent .) (04.25.22 @ 19:23) >      RADIOLOGY REVIEWED:  Impression:  No focal consolidation.    < end of copied text >

## 2022-05-02 NOTE — PROGRESS NOTE ADULT - CONVERSATION DETAILS
pt has [facility]  DNR/i  no feeding tube d/w daughter Larisa on the phone  pt has [facility]  DNR/i  no feeding tube  they are interested in inpt hospice

## 2022-05-02 NOTE — PROGRESS NOTE ADULT - SUBJECTIVE AND OBJECTIVE BOX
JULIOCESAR BASURTO  89y Female  MRN:[id number]    Patient is a 89y old  Female who presents with a chief complaint of 89F p/w ams (26 Apr 2022 09:54)    HPI:  The patient is lethargic and unable to provide history. Therefore history is obtained from the patient's daughter ms. Larisa Knight via telephone    89F w/ dementia, meningioma, HF, DM2, afib off ac due to GI bleed p/w ams. On day of presentation, the patient's daughter went to visit the patient at her SNF and found the patient to be slumped over in her wheelchair with minimal interaction at approx 12:30pm. The patient's cognitive baseline has been reportedly declining over the course of months however she reportedly is able to have simple conversation with family. The patient has had dementia for some time but she has more rapidly declined since the end of Jan2022 which has been attributed to isolation during hospitalizations, rehab and while being in her SNF. Her daughter reports dosage adjustment of seroquel and divalproex reportedly improving her cognition over the weeks prior which is why there is surprise for current mental status. Additionally, the patient is known to have a meningioma however details of the state of disease are not known to the family.    Her daughter confirms that the patient is DNR/DNI and that the family would not want to pursue invasive surgery but is open to medical treatment and further diagnostic testing. (25 Apr 2022 22:40)      Patient seen and evaluated at bedside. No acute events overnight except as noted.    Interval HPI: o/n events noted.  pt very lethargic. minimally responsive       PAST MEDICAL & SURGICAL HISTORY:  Diabetes    Dementia    Chronic CHF    No significant past surgical history        REVIEW OF SYSTEMS:  as per hpi     VITALS:   Vital Signs Last 24 Hrs  T(C): 36.4 (02 May 2022 10:45), Max: 36.7 (02 May 2022 05:56)  T(F): 97.5 (02 May 2022 10:45), Max: 98 (02 May 2022 05:56)  HR: 88 (02 May 2022 10:45) (86 - 99)  BP: 136/80 (02 May 2022 10:45) (107/72 - 148/96)  BP(mean): --  RR: 20 (02 May 2022 10:45) (18 - 22)  SpO2: 94% (02 May 2022 10:45) (94% - 100%)    PHYSICAL EXAM:  GENERAL: NAD, frail,  lethargic  HEAD:  Atraumatic, Normocephalic  EYES: EOMI, PERRLA, conjunctiva and sclera clear  NECK: Supple, No JVD  CHEST/LUNG: Clear to auscultation bilaterally; No wheeze  HEART: S1, S2; No murmurs, rubs, or gallops  ABDOMEN: Soft, Nontender, Nondistended; Bowel sounds present  EXTREMITIES:  2+ Peripheral Pulses, No clubbing, cyanosis, or edema  PSYCH: Normal affect  NEUROLOGY: AAOX0  SKIN: No rashes or lesions    Consultant(s) Notes Reviewed:  [x ] YES  [ ] NO  Care Discussed with Consultants/Other Providers [ x] YES  [ ] NO    MEDS:   MEDICATIONS  (STANDING):  ascorbic acid 500 milliGRAM(s) Oral daily  chlorhexidine 2% Cloths 1 Application(s) Topical <User Schedule>  dextrose 5% 1000 milliLiter(s) (50 mL/Hr) IV Continuous <Continuous>  dextrose 5%. 1000 milliLiter(s) (100 mL/Hr) IV Continuous <Continuous>  dextrose 5%. 1000 milliLiter(s) (50 mL/Hr) IV Continuous <Continuous>  dextrose 5%. 1000 milliLiter(s) (50 mL/Hr) IV Continuous <Continuous>  dextrose 50% Injectable 25 Gram(s) IV Push once  dextrose 50% Injectable 12.5 Gram(s) IV Push once  dextrose 50% Injectable 25 Gram(s) IV Push once  folic acid 1 milliGRAM(s) Oral daily  furosemide   Injectable 40 milliGRAM(s) IV Push daily  glucagon  Injectable 1 milliGRAM(s) IntraMuscular once  heparin   Injectable 5000 Unit(s) SubCutaneous every 12 hours  insulin lispro (ADMELOG) corrective regimen sliding scale   SubCutaneous every 6 hours  metoprolol tartrate Injectable 5 milliGRAM(s) IV Push every 6 hours  multivitamin/minerals 1 Tablet(s) Oral daily  pantoprazole    Tablet 40 milliGRAM(s) Oral before breakfast  piperacillin/tazobactam IVPB.. 3.375 Gram(s) IV Intermittent every 8 hours  sodium chloride 0.9%. 500 milliLiter(s) (50 mL/Hr) IV Continuous <Continuous>  valproate sodium IVPB 250 milliGRAM(s) IV Intermittent two times a day    MEDICATIONS  (PRN):  dextrose Oral Gel 15 Gram(s) Oral once PRN Blood Glucose LESS THAN 70 milliGRAM(s)/deciliter        ALLERGIES:  No Known Allergies      LABS:                                                                     10.1   14.63 )-----------( 274      ( 01 May 2022 07:46 )             34.0   05-01    149<H>  |  109<H>  |  30<H>  ----------------------------<  166<H>  3.1<L>   |  23  |  0.79    Ca    10.0      01 May 2022 07:50  Phos  3.3     05-01  Mg     2.2     05-01              cultures: Culture Results:   >100,000 CFU/ml Enterococcus species (04-25 @ 23:16)     < from: CT Venogram Brain w/ IV Cont (04.25.22 @ 23:56) >    IMPRESSION: Calcified right posterior fossa meningioma adjacent to the   right sigmoid sinus without sinus invasion.      --- End of Report ---        < end of copied text >  < from: CT Head No Cont (04.25.22 @ 19:20) >  IMPRESSION:  Right lateral tentorium/sphenoid plate region partially   calcified lesion suspicious for a meningioma in this region with the   measurements given above. Given the location possibility of infiltration   of the sigmoid sinus at this level should be considered. Venogram either   MRV or CTV may be helpful for more complete evaluation as clinically   warranted. Age-appropriate involutional changes and microvascular   ischemic disease age    --- End of Report ---      < end of copied text >

## 2022-05-02 NOTE — PROVIDER CONTACT NOTE (CRITICAL VALUE NOTIFICATION) - ACTION/TREATMENT ORDERED:
AJAY Sanders aware of above, to order repletion.
additional potassium supplement to be given. vs wnl. continue plan of care

## 2022-05-02 NOTE — PROGRESS NOTE ADULT - SUBJECTIVE AND OBJECTIVE BOX
Subjective: Patient seen and examined. No new events except as noted.   Said goodmorning and then did not participate in further conversation.  Resting comfortably.     REVIEW OF SYSTEMS:  Unable to obtain.     MEDICATIONS:  MEDICATIONS  (STANDING):  ascorbic acid 500 milliGRAM(s) Oral daily  chlorhexidine 2% Cloths 1 Application(s) Topical <User Schedule>  dextrose 5% 1000 milliLiter(s) (50 mL/Hr) IV Continuous <Continuous>  dextrose 5%. 1000 milliLiter(s) (100 mL/Hr) IV Continuous <Continuous>  dextrose 5%. 1000 milliLiter(s) (50 mL/Hr) IV Continuous <Continuous>  dextrose 5%. 1000 milliLiter(s) (50 mL/Hr) IV Continuous <Continuous>  dextrose 50% Injectable 25 Gram(s) IV Push once  dextrose 50% Injectable 12.5 Gram(s) IV Push once  dextrose 50% Injectable 25 Gram(s) IV Push once  folic acid 1 milliGRAM(s) Oral daily  furosemide   Injectable 40 milliGRAM(s) IV Push daily  glucagon  Injectable 1 milliGRAM(s) IntraMuscular once  heparin   Injectable 5000 Unit(s) SubCutaneous every 12 hours  insulin lispro (ADMELOG) corrective regimen sliding scale   SubCutaneous every 6 hours  metoprolol tartrate Injectable 5 milliGRAM(s) IV Push every 6 hours  multivitamin/minerals 1 Tablet(s) Oral daily  pantoprazole    Tablet 40 milliGRAM(s) Oral before breakfast  piperacillin/tazobactam IVPB.. 3.375 Gram(s) IV Intermittent every 8 hours  sodium chloride 0.9%. 500 milliLiter(s) (50 mL/Hr) IV Continuous <Continuous>  valproate sodium IVPB 250 milliGRAM(s) IV Intermittent two times a day    PHYSICAL EXAM:  T(C): 36.7 (05-02-22 @ 05:56), Max: 36.7 (05-02-22 @ 05:56)  HR: 86 (05-02-22 @ 05:56) (86 - 99)  BP: 148/96 (05-02-22 @ 05:56) (107/72 - 148/96)  RR: 22 (05-02-22 @ 05:56) (18 - 22)  SpO2: 100% (05-02-22 @ 05:56) (96% - 100%)  Wt(kg): --  I&O's Summary    01 May 2022 07:01  -  02 May 2022 07:00  --------------------------------------------------------  IN: 1300 mL / OUT: 1250 mL / NET: 50 mL    02 May 2022 07:01  -  02 May 2022 09:35  --------------------------------------------------------  IN: 100 mL / OUT: 0 mL / NET: 100 mL    Appearance: NAD	  HEENT: dry oral mucosa, PERRL, EOMI	  Lymphatic: No lymphadenopathy , no edema  Cardiovascular: Irregular S1 S2, No JVD, No murmurs , Peripheral pulses palpable 2+ bilaterally  Respiratory: Decreased BS  Gastrointestinal:  Soft, Non-tender, + BS	  Skin: No rashes, No ecchymoses, No cyanosis, warm to touch  Neurological Exam:  Mental Status: Awake, alert and oriented x 1.  follows some simple with mimicking. confused. + dysarthria   Cranial Nerves: PERRL, EOMI, VFFC, sensation V1-V3 intact,  ? R  facial asymmetry, equal elevation of palate, scm/trap 5/5, tongue is midline on protrusion. no obvious papilledema on fundoscopic exam. hearing is grossly intact.   Motor: DORADO uppers> lowers   Sensation: Intact to light touch and pinprick throughout. no right/left confusion. no extinction to tactile on DSS. Romberg was negative.   Reflexes: 1+ throughout at biceps, brachioradialis, triceps, patellars and ankles bilaterally and equal. No clonus. R toe and L toe were both downgoing.  Coordination: unable   Gait: unable     LABS:    CARDIAC MARKERS:                        10.1   14.63 )-----------( 274      ( 01 May 2022 07:46 )             34.0     05-01    149<H>  |  109<H>  |  30<H>  ----------------------------<  166<H>  3.1<L>   |  23  |  0.79    Ca    10.0      01 May 2022 07:50  Phos  3.3     05-01  Mg     2.2     05-01    proBNP:   Lipid Profile:   HgA1c:   TSH:     TELEMETRY: 	    ECG:  	  RADIOLOGY:   DIAGNOSTIC TESTING:  [ ] Echocardiogram:  [ ]  Catheterization:  [ ] Stress Test:    OTHER:

## 2022-05-02 NOTE — CONSULT NOTE ADULT - CONVERSATION DETAILS
Met with daughter at bedside, introduced self and role of palliative care.  Discussed current clinical status and options for care including ongoing medical interventions vs. symptom directed approach.  Daughter states she was told patient had UTI and developed encephalopathy, and how they have been dealing with her dementia for some time, worsened over the last year, and acutely different after the 1 year anniversary of her sons passing.  Daughter states she was at [facility] and coming up on 90 days, and that patient wanted to go home, but at home would have periods of agitation, often times leading to a 911 call and rehospitalization.   We discussed current level of responsiveness, poor oral intake and ongoing artificial means to prolong life including electrolyte repletion and IVF- we discussed what quality of life mom would find acceptable.  I discussed option for care if goals are to transition focus, and deescalate non essential interventions, that the palliative care unit could be used as a stepping stone to further discussions about hospice, but given lack of oral route, may be suitable eventually for inpatient hospice depending on symptoms and IV medication needs.  Larisa requested I speak with another family member, Yana, who is an RN here.     Outreach made to Yana and the above discussed. Family to speak together and decide about ongoing interventions vs. transition to PCU as a bridge to hospice. all questions answered, emotional support provided.

## 2022-05-02 NOTE — CONSULT NOTE ADULT - ASSESSMENT
89F w/ dementia, meningioma, HF, DM2, afib off ac due to GI bleed p/w ams. On day of presentation, the patient's daughter went to visit the patient at her SNF and found the patient to be slumped over in her wheelchair with minimal interaction. Patient admitted with UTI and metabolic encephalopathy, now minimally responsive and unable to take by mouth. palliative consulted for C

## 2022-05-02 NOTE — CONSULT NOTE ADULT - PROBLEM SELECTOR RECOMMENDATION 3
on meds   chronic   Aspiration precautions
on depakote likely for mood, previous episodes of agitation per discussion with daughter  advanced prior to hospitalization, likely stage 6b,c

## 2022-05-02 NOTE — PROVIDER CONTACT NOTE (CRITICAL VALUE NOTIFICATION) - ASSESSMENT
pt a&oxo, nonverbal, lethargic. pt completed potassium 10meq iv x 3. labs were repeated. potassium was 2.9

## 2022-05-02 NOTE — PROGRESS NOTE ADULT - SUBJECTIVE AND OBJECTIVE BOX
CC: f/u for  fever, leukocytosis  Patient reports  nothing  REVIEW OF SYSTEMS:  All other review of systems negative (Comprehensive ROS)cannot get    Antimicrobials Day #  :3/5  piperacillin/tazobactam IVPB.. 3.375 Gram(s) IV Intermittent every 8 hours    Other Medications Reviewed    T(F): 97.6 (05-02-22 @ 17:26), Max: 98 (05-02-22 @ 05:56)  HR: 71 (05-02-22 @ 17:26)  BP: 146/89 (05-02-22 @ 17:26)  RR: 18 (05-02-22 @ 17:26)  SpO2: 94% (05-02-22 @ 17:26)  Wt(kg): --    PHYSICAL EXAM:  General: poorly interactive , no acute distress  Eyes:  anicteric, no conjunctival injection, no discharge  Oropharynx: no lesions or injection 	  Neck: supple, without adenopathy  Lungs:poor effort  to auscultation  Heart: regular rate and rhythm; no murmur, rubs or gallops  Abdomen: soft, nondistended, nontender, without mass or organomegaly  Skin: no lesions  Extremities: no clubbing, cyanosis, or edema  Neurologic: poorly responsive , moans when moved    LAB RESULTS:                        9.1    10.47 )-----------( 250      ( 02 May 2022 11:48 )             31.8     05-02    147<H>  |  108  |  25<H>  ----------------------------<  331<H>  2.2<LL>   |  22  |  0.49<L>    Ca    7.6<L>      02 May 2022 11:48  Phos  3.3     05-01  Mg     2.2     05-01          MICROBIOLOGY:  RECENT CULTURES:  04-30 @ 22:00 .Blood Blood-Peripheral     No growth to date.          RADIOLOGY REVIEWED:    < from: Xray Chest 1 View- PORTABLE-Urgent (Xray Chest 1 View- PORTABLE-Urgent .) (04.30.22 @ 12:29) >    ACC: [id number] EXAM:  XR CHEST PORTABLE URGENT 1V                          PROCEDURE DATE:  04/30/2022          INTERPRETATION:  INDICATION: Leukocytosis; tachycardia. R/O infection.    COMPARISON: 4/25/22    FINDINGS:  Heart/Vascular: Stable cardiomegaly.  Pulmonary: No florid central congestive changes. New, small bilateral   pleural effusions (right more than left). Bibasilar haziness is present   which may be due to atelectasis but underlying pneumonia not excluded in   the right clinical setting. No pneumothorax.  Bones: No acute bony finding.    Impression:  New small bilateral pleural effusions.  New bibasilar atelectasis and/or pneumonia.          < end of copied text >            Assessment:  Elderly woman admitted for encephalopathy, developed leukocytosis and tachy and fever despite tx for uti, now doing better on zosyn for aspiration pneumonia  Plan:  2 more days of zosyn  aspiration precautions  goc discussions ongoing

## 2022-05-02 NOTE — CONSULT NOTE ADULT - SUBJECTIVE AND OBJECTIVE BOX
HPI:  The patient is lethargic and unable to provide history. Therefore history is obtained from the patient's daughter ms. Larisa Knight via telephone    89F w/ dementia, meningioma, HF, DM2, afib off ac due to GI bleed p/w ams. On day of presentation, the patient's daughter went to visit the patient at her SNF and found the patient to be slumped over in her wheelchair with minimal interaction at approx 12:30pm. The patient's cognitive baseline has been reportedly declining over the course of months however she reportedly is able to have simple conversation with family. The patient has had dementia for some time but she has more rapidly declined since the end of Jan2022 which has been attributed to isolation during hospitalizations, rehab and while being in her SNF. Her daughter reports dosage adjustment of seroquel and divalproex reportedly improving her cognition over the weeks prior which is why there is surprise for current mental status. Additionally, the patient is known to have a meningioma however details of the state of disease are not known to the family.    Her daughter confirms that the patient is DNR/DNI and that the family would not want to pursue invasive surgery but is open to medical treatment and further diagnostic testing. (25 Apr 2022 22:40)    PERTINENT PM/SXH:   Diabetes    Dementia    Chronic CHF      No significant past surgical history      FAMILY HISTORY:  No pertinent family history in first degree relatives      ITEMS NOT CHECKED ARE NOT PRESENT    SOCIAL HISTORY:   Significant other/partner[ ]  Children[ ]  Gnosticism/Spirituality:  Substance hx:  [ ]   Tobacco hx:  [ ]   Alcohol hx: [ ]   Home Opioid hx:  [ ] I-Stop Reference No:  Living Situation: [ ]Home  [ ]Long term care  [ ]Rehab [ ]Other    ADVANCE DIRECTIVES:    DNR  MOLST  [ ]  Living Will  [ ]   DECISION MAKER(s):  [ ] Health Care Proxy(s)  [ ] Surrogate(s)  [ ] Guardian           Name(s): Phone Number(s):    BASELINE (I)ADL(s) (prior to admission):  [place]: [ ]Total  [ ] Moderate [ ]Dependent    Allergies    No Known Allergies    Intolerances    MEDICATIONS  (STANDING):  ascorbic acid 500 milliGRAM(s) Oral daily  chlorhexidine 2% Cloths 1 Application(s) Topical <User Schedule>  dextrose 5% 1000 milliLiter(s) (50 mL/Hr) IV Continuous <Continuous>  dextrose 5%. 1000 milliLiter(s) (100 mL/Hr) IV Continuous <Continuous>  dextrose 5%. 1000 milliLiter(s) (50 mL/Hr) IV Continuous <Continuous>  dextrose 5%. 1000 milliLiter(s) (50 mL/Hr) IV Continuous <Continuous>  dextrose 50% Injectable 25 Gram(s) IV Push once  dextrose 50% Injectable 12.5 Gram(s) IV Push once  dextrose 50% Injectable 25 Gram(s) IV Push once  folic acid 1 milliGRAM(s) Oral daily  furosemide   Injectable 40 milliGRAM(s) IV Push daily  glucagon  Injectable 1 milliGRAM(s) IntraMuscular once  heparin   Injectable 5000 Unit(s) SubCutaneous every 12 hours  insulin lispro (ADMELOG) corrective regimen sliding scale   SubCutaneous every 6 hours  metoprolol tartrate Injectable 5 milliGRAM(s) IV Push every 6 hours  multivitamin/minerals 1 Tablet(s) Oral daily  pantoprazole    Tablet 40 milliGRAM(s) Oral before breakfast  piperacillin/tazobactam IVPB.. 3.375 Gram(s) IV Intermittent every 8 hours  potassium chloride  10 mEq/100 mL IVPB 10 milliEquivalent(s) IV Intermittent every 1 hour  sodium chloride 0.9%. 500 milliLiter(s) (50 mL/Hr) IV Continuous <Continuous>  valproate sodium IVPB 250 milliGRAM(s) IV Intermittent two times a day    MEDICATIONS  (PRN):  dextrose Oral Gel 15 Gram(s) Oral once PRN Blood Glucose LESS THAN 70 milliGRAM(s)/deciliter    PRESENT SYMPTOMS: [ ]  Due to Covid 19 infection data obtained from nursing assessment.  Source if other than patient:  [ ]Family   [ ]Team     Pain: [ ]yes [ ]no  QOL impact -   Location -                    Aggravating factors -  Quality -  Radiation -  Timing-  Severity (0-10 scale):  Minimal acceptable level (0-10 scale):     CPOT:    https://www.sccm.org/getattachment/stz25u40-5u6j-7h5z-0g7l-4496k0299t2n/Critical-Care-Pain-Observation-Tool-(CPOT)      PAIN AD Score:     [email or web address] (press ctrl +  left click to view)    Dyspnea:                           [ ]Mild [ ]Moderate [ ]Severe  Anxiety:                             [ ]Mild [ ]Moderate [ ]Severe  Fatigue:                             [ ]Mild [ ]Moderate [ ]Severe  Nausea:                             [ ]Mild [ ]Moderate [ ]Severe  Loss of appetite:              [ ]Mild [ ]Moderate [ ]Severe  Constipation:                    [ ]Mild [ ]Moderate [ ]Severe    Other Symptoms:  [ ]All other review of systems negative     Palliative Performance Status Version 2:         %    [email or web address]_[email or web address]  PHYSICAL EXAM: DUE TO COVID-19 INFECTION  physical exam findings from the clinician caring for this patient directly are used.   Vital Signs Last 24 Hrs  T(C): 36.4 (02 May 2022 13:40), Max: 36.7 (02 May 2022 05:56)  T(F): 97.5 (02 May 2022 13:40), Max: 98 (02 May 2022 05:56)  HR: 84 (02 May 2022 13:40) (84 - 98)  BP: 120/76 (02 May 2022 13:40) (108/71 - 148/96)  BP(mean): --  RR: 18 (02 May 2022 13:40) (18 - 22)  SpO2: 98% (02 May 2022 13:40) (94% - 100%) I&O's Summary    01 May 2022 07:01  -  02 May 2022 07:00  --------------------------------------------------------  IN: 1300 mL / OUT: 1250 mL / NET: 50 mL    02 May 2022 07:01  -  02 May 2022 15:49  --------------------------------------------------------  IN: 450 mL / OUT: 1000 mL / NET: -550 mL      GENERAL:  [ ]Alert  [ ]Oriented x   [ ]Lethargic  [ ]Cachexia  [ ]Unarousable  [ ]Verbal  [ ]Non-Verbal  Behavioral:   [ ] Anxiety  [ ] Delirium [ ] Agitation [ ] Other  HEENT:  [ ]Normal   [ ]Dry mouth   [ ]ET Tube/Trach  [ ]Oral lesions  PULMONARY:   [ ]Clear [ ]Tachypnea  [ ]Audible excessive secretions   [ ]Rhonchi        [ ]Right [ ]Left [ ]Bilateral  [ ]Crackles        [ ]Right [ ]Left [ ]Bilateral  [ ]Wheezing     [ ]Right [ ]Left [ ]Bilateral  [ ]Diminished breath sounds [ ]right [ ]left [ ]bilateral  CARDIOVASCULAR:    [ ]Regular [ ]Irregular [ ]Tachy  [ ]Alexis [ ]Murmur [ ]Other  GASTROINTESTINAL:  [ ]Soft  [ ]Distended   [ ]+BS  [ ]Non tender [ ]Tender  [ ]PEG [ ]OGT/ NGT  Last BM:   GENITOURINARY:  [ ]Normal [ ] Incontinent   [ ]Oliguria/Anuria   [ ]Hernandez  MUSCULOSKELETAL:   [ ]Normal   [ ]Weakness  [ ]Bed/Wheelchair bound [ ]Edema  NEUROLOGIC:   [ ]No focal deficits  [ ]Cognitive impairment  [ ]Dysphagia [ ]Dysarthria [ ]Paresis [ ]Other   SKIN:   [ ]Normal    [ ]Rash  [ ]Pressure ulcer(s)       Present on admission [ ]y [ ]n    CRITICAL CARE:  [ ] Shock Present  [ ]Septic [ ]Cardiogenic [ ]Neurologic [ ]Hypovolemic  [ ]  Vasopressors [ ]  Inotropes   [ ]Respiratory failure present [ ]Mechanical ventilation [ ]Non-invasive ventilatory support [ ]High flow     [ ]Acute  [ ]Chronic [ ]Hypoxic  [ ]Hypercarbic [ ]Other  [ ]Other organ failure     LABS:                        9.1    10.47 )-----------( 250      ( 02 May 2022 11:48 )             31.8   05-02    147<H>  |  108  |  25<H>  ----------------------------<  331<H>  2.2<LL>   |  22  |  0.49<L>    Ca    7.6<L>      02 May 2022 11:48  Phos  3.3     05-01  Mg     2.2     05-01                  RADIOLOGY & ADDITIONAL STUDIES:    PROTEIN CALORIE MALNUTRITION PRESENT: [ ]mild [ ]moderate [ ]severe [ ]underweight [ ]morbid obesity  https://www.andeal.org/vault/4720/web/files/ONC/Table_Clinical%20Characteristics%20to%20Document%20Malnutrition-White%20JV%20et%20al%202012.pdf    Height (cm): 170.2 (04-25-22 @ 17:29)  Weight (kg): 61.2 (04-25-22 @ 17:29)  BMI (kg/m2): 21.1 (04-25-22 @ 17:29)    [ ]PPSV2 < or = to 30% [ ]significant weight loss  [ ]poor nutritional intake  [ ]anasarca      [ ]Artificial Nutrition      REFERRALS:   [ ]Chaplaincy  [ ]Hospice  [ ]Child Life  [ ]Social Work  [ ]Case management [ ]Holistic Therapy     Goals of Care Document:  HPI:  The patient is lethargic and unable to provide history. Therefore history is obtained from the patient's daughter ms. Larisa Knight via telephone    89F w/ dementia, meningioma, HF, DM2, afib off ac due to GI bleed p/w ams. On day of presentation, the patient's daughter went to visit the patient at her SNF and found the patient to be slumped over in her wheelchair with minimal interaction at approx 12:30pm. The patient's cognitive baseline has been reportedly declining over the course of months however she reportedly is able to have simple conversation with family. The patient has had dementia for some time but she has more rapidly declined since the end of Jan2022 which has been attributed to isolation during hospitalizations, rehab and while being in her SNF. Her daughter reports dosage adjustment of seroquel and divalproex reportedly improving her cognition over the weeks prior which is why there is surprise for current mental status. Additionally, the patient is known to have a meningioma however details of the state of disease are not known to the family.    Her daughter confirms that the patient is DNR/DNI and that the family would not want to pursue invasive surgery but is open to medical treatment and further diagnostic testing. (25 Apr 2022 22:40)    PERTINENT PM/SXH:   Diabetes    Dementia    Chronic CHF      No significant past surgical history      FAMILY HISTORY:  No pertinent family history in first degree relatives      ITEMS NOT CHECKED ARE NOT PRESENT    SOCIAL HISTORY:   Significant other/partner[ ]  Children[x ]  Bahai/Spirituality:  Substance hx:  [ ]   Tobacco hx:  [ ]   Alcohol hx: [ ]   Home Opioid hx:  [ ] I-Stop Reference No:  Living Situation: [ ]Home  [ ]Long term care  [x ]Rehab [ ]Other    ADVANCE DIRECTIVES:    DNR  MOLST  [ ]  Living Will  [ ]   DECISION MAKER(s):  [ ] Health Care Proxy(s)  [x ] Surrogate(s)  [ ] Guardian           Name(s): Phone Number(s): Patient with 3 living children, no HCP on chart  MOLST signed by daughter Mercedes Peres: [phone]    BASELINE (I)ADL(s) (prior to admission):  [place]: [ ]Total  [x ] Moderate [ ]Dependent    Allergies    No Known Allergies    Intolerances    MEDICATIONS  (STANDING):  ascorbic acid 500 milliGRAM(s) Oral daily  chlorhexidine 2% Cloths 1 Application(s) Topical <User Schedule>  dextrose 5% 1000 milliLiter(s) (50 mL/Hr) IV Continuous <Continuous>  dextrose 5%. 1000 milliLiter(s) (100 mL/Hr) IV Continuous <Continuous>  dextrose 5%. 1000 milliLiter(s) (50 mL/Hr) IV Continuous <Continuous>  dextrose 5%. 1000 milliLiter(s) (50 mL/Hr) IV Continuous <Continuous>  dextrose 50% Injectable 25 Gram(s) IV Push once  dextrose 50% Injectable 12.5 Gram(s) IV Push once  dextrose 50% Injectable 25 Gram(s) IV Push once  folic acid 1 milliGRAM(s) Oral daily  furosemide   Injectable 40 milliGRAM(s) IV Push daily  glucagon  Injectable 1 milliGRAM(s) IntraMuscular once  heparin   Injectable 5000 Unit(s) SubCutaneous every 12 hours  insulin lispro (ADMELOG) corrective regimen sliding scale   SubCutaneous every 6 hours  metoprolol tartrate Injectable 5 milliGRAM(s) IV Push every 6 hours  multivitamin/minerals 1 Tablet(s) Oral daily  pantoprazole    Tablet 40 milliGRAM(s) Oral before breakfast  piperacillin/tazobactam IVPB.. 3.375 Gram(s) IV Intermittent every 8 hours  potassium chloride  10 mEq/100 mL IVPB 10 milliEquivalent(s) IV Intermittent every 1 hour  sodium chloride 0.9%. 500 milliLiter(s) (50 mL/Hr) IV Continuous <Continuous>  valproate sodium IVPB 250 milliGRAM(s) IV Intermittent two times a day    MEDICATIONS  (PRN):  dextrose Oral Gel 15 Gram(s) Oral once PRN Blood Glucose LESS THAN 70 milliGRAM(s)/deciliter    PRESENT SYMPTOMS: [ ]  Due to Covid 19 infection data obtained from nursing assessment.  [x] unable to obtain 2/2 mental status  Source if other than patient:  [ ]Family   [ ]Team     Pain: [ ]yes [ ]no  QOL impact -   Location -                    Aggravating factors -  Quality -  Radiation -  Timing-  Severity (0-10 scale):  Minimal acceptable level (0-10 scale):     CPOT:    https://www.sccm.org/getattachment/hsf37t07-1q1r-8f2j-1z1f-0628g7216x7x/Critical-Care-Pain-Observation-Tool-(CPOT)      PAIN AD Score: 0    [email or web address].pdf (press ctrl +  left click to view)    Dyspnea:                           [ ]Mild [ ]Moderate [ ]Severe  Anxiety:                             [ ]Mild [ ]Moderate [ ]Severe  Fatigue:                             [ ]Mild [ ]Moderate [ ]Severe  Nausea:                             [ ]Mild [ ]Moderate [ ]Severe  Loss of appetite:              [ ]Mild [ ]Moderate [ ]Severe  Constipation:                    [ ]Mild [ ]Moderate [ ]Severe    Other Symptoms:  [ ]All other review of systems negative     Palliative Performance Status Version 2:     10-20    %    [email or web address]_performance_[email or web address]_[email or web address]  PHYSICAL EXAM: DUE TO COVID-19 INFECTION  physical exam findings from the clinician caring for this patient directly are used.   Vital Signs Last 24 Hrs  T(C): 36.4 (02 May 2022 13:40), Max: 36.7 (02 May 2022 05:56)  T(F): 97.5 (02 May 2022 13:40), Max: 98 (02 May 2022 05:56)  HR: 84 (02 May 2022 13:40) (84 - 98)  BP: 120/76 (02 May 2022 13:40) (108/71 - 148/96)  BP(mean): --  RR: 18 (02 May 2022 13:40) (18 - 22)  SpO2: 98% (02 May 2022 13:40) (94% - 100%) I&O's Summary    01 May 2022 07:01  -  02 May 2022 07:00  --------------------------------------------------------  IN: 1300 mL / OUT: 1250 mL / NET: 50 mL    02 May 2022 07:01  -  02 May 2022 15:49  --------------------------------------------------------  IN: 450 mL / OUT: 1000 mL / NET: -550 mL      GENERAL:  [ ]Alert  [ ]Oriented x   [ x]Lethargic  [ ]Cachexia  [ ]Unarousable  [ ]Verbal  [ ]Non-Verbal  Behavioral:   [ ] Anxiety  [ ] Delirium [ ] Agitation [ ] Other  HEENT:  [ ]Normal   [ x]Dry mouth   [ ]ET Tube/Trach  [ ]Oral lesions  PULMONARY:   [x ]Clear [ ]Tachypnea  [ ]Audible excessive secretions   [ ]Rhonchi        [ ]Right [ ]Left [ ]Bilateral  [ ]Crackles        [ ]Right [ ]Left [ ]Bilateral  [ ]Wheezing     [ ]Right [ ]Left [ ]Bilateral  [ ]Diminished breath sounds [ ]right [ ]left [ ]bilateral  CARDIOVASCULAR:    [x ]Regular [ ]Irregular [ ]Tachy  [ ]Alexis [ ]Murmur [ ]Other  GASTROINTESTINAL:  [x]Soft  [ ]Distended   [x ]+BS  [ ]Non tender [ ]Tender  [ ]PEG [ ]OGT/ NGT  Last BM:   GENITOURINARY:  [ ]Normal [x ] Incontinent   [ ]Oliguria/Anuria   [ ]Hernandez  MUSCULOSKELETAL:   [ ]Normal   [ ]Weakness  [x ]Bed/Wheelchair bound [ ]Edema  NEUROLOGIC:   [ ]No focal deficits  [x ]Cognitive impairment  [ ]Dysphagia [ ]Dysarthria [ ]Paresis [ ]Other   SKIN:   [ ]Normal    [ ]Rash  [ ]Pressure ulcer(s)       Present on admission [ ]y [ ]n    CRITICAL CARE:  [ ] Shock Present  [ ]Septic [ ]Cardiogenic [ ]Neurologic [ ]Hypovolemic  [ ]  Vasopressors [ ]  Inotropes   [ ]Respiratory failure present [ ]Mechanical ventilation [ ]Non-invasive ventilatory support [ ]High flow     [ ]Acute  [ ]Chronic [ ]Hypoxic  [ ]Hypercarbic [ ]Other  [ ]Other organ failure     LABS:                        9.1    10.47 )-----------( 250      ( 02 May 2022 11:48 )             31.8   05-02    147<H>  |  108  |  25<H>  ----------------------------<  331<H>  2.2<LL>   |  22  |  0.49<L>    Ca    7.6<L>      02 May 2022 11:48  Phos  3.3     05-01  Mg     2.2     05-01      RADIOLOGY & ADDITIONAL STUDIES:  < from: CT Venogram Brain w/ IV Cont (04.25.22 @ 23:56) >    ACC: [id number] EXAM:  CT VENOGRAM BRAIN (W)AW IC                          PROCEDURE DATE:  04/25/2022          INTERPRETATION:  Clinical indication: Right posterior fossa meningioma   adjacent to sigmoid sinus          After the intravenous power injection of 70 cc of Omnipaque 300 using a   timing run for the venous system, serial thin sections were obtained   through the intracranial circulation centered at the [id number] on   a multi slice CT scanner reformatted with coronal and sagittal 2 D-MIP   projections, including 3 D reconstructions using a separate 3D [employer]   software workstation. A total of 70 cc of Omnipaque were intravenously   injected. 30 cc were discarded.      Although performed to evaluate the venous system, the intracranial   arterial system is opacified and appears normal.  The carotid and vertebral arteries enhance normally. The distal vertebral   arteries are well identified as are the posterior-inferior cerebellar   arteries bilaterally. The region of the vertebral basilar junction is   normal. The basilar artery is normal. The posterior cerebral and superior   cerebellar arteries are normal.    Evaluation of the carotid arteries demonstrate normal appearance to the   cervical, petrous cavernous and supraclinoid internal carotid arteries   are unremarkable. The anterior cerebral arteries and intercommunicating   artery and middle cerebral arteries are normal.    There is a calcified mass in the right posterior fossa adjacent to the   right sigmoid sinus without evidence of sinus invasion. The mass 1.9 cm   in AP diameter by 2 cm transversely by 2 cm in craniocaudal diameter  and   abuts the right sigmoid sinus.      The normal intracranial venous circulation is identified. The right   transversesinus is dominant. The superior sagittal sinus, internal   cerebral veins, vein of Miguel Angel, straight sinus, transverse sinuses,   sigmoid sinuses and internal jugular veins are normal cortical veins are   normal.        IMPRESSION: Calcified right posterior fossa meningioma adjacent to the   right sigmoid sinus without sinus invasion.      --- End of Report ---            JEWEL HAYDEN MD; Attending Radiologist  This document has been electronically signed. Apr 26 2022  8:53AM    < end of copied text >      PROTEIN CALORIE MALNUTRITION PRESENT: [ ]mild [ ]moderate [ ]severe [ ]underweight [ ]morbid obesity  https://www.andeal.org/vault/2440/web/files/ONC/Table_Clinical%20Characteristics%20to%20Document%20Malnutrition-White%20JV%20et%20al%202012.pdf    Height (cm): 170.2 (04-25-22 @ 17:29)  Weight (kg): 61.2 (04-25-22 @ 17:29)  BMI (kg/m2): 21.1 (04-25-22 @ 17:29)    [x ]PPSV2 < or = to 30% [ ]significant weight loss  [ x]poor nutritional intake  [ ]anasarca      [ ]Artificial Nutrition      REFERRALS:   [ ]Chaplaincy  [ ]Hospice  [ ]Child Life  [ ]Social Work  [x ]Case management [ ]Holistic Therapy     Goals of Care Document:

## 2022-05-02 NOTE — CONSULT NOTE ADULT - PROBLEM SELECTOR RECOMMENDATION 4
Rate controlled   Keep off AC due to anemia and recurrent GI bleeds
DNR/DNI/no artificial feeding- per daughter this has been patients long standing wish  has prior MOLST in chart noting the above  GOC note as embedded above, PCU candidate depending on family goals and wishes.  PCU would be bridge to hospice either at home vs. inpatient, but likely inpatient if no oral route and requiring IV meds for agitation/symptoms.  >16 minutes spent on ACP

## 2022-05-02 NOTE — CONSULT NOTE ADULT - PROBLEM SELECTOR RECOMMENDATION 9
Likely infectious in etiology   aspiration precautions   Psych and neuro eval
PPS 10-20% currently  previously was at rehab, used wheelchair  advanced dementia with agitation

## 2022-05-03 NOTE — PROGRESS NOTE ADULT - PROBLEM SELECTOR PLAN 4
DNR/DNI, no feeding tube.  will continue IVF and abx per family request but ongoing goc discussion once family visits and if adverse effects noted.  PRN medications for symptoms to be ordered- pain, dyspnea, agitation  >16 minutes spent on ACP, GOC narrative above

## 2022-05-03 NOTE — PROGRESS NOTE ADULT - SUBJECTIVE AND OBJECTIVE BOX
JULIOCESAR BASURTO  89y Female  MRN:[id number]    Patient is a 89y old  Female who presents with a chief complaint of 89F p/w ams (26 Apr 2022 09:54)    HPI:  The patient is lethargic and unable to provide history. Therefore history is obtained from the patient's daughter ms. Larisa Knight via telephone    89F w/ dementia, meningioma, HF, DM2, afib off ac due to GI bleed p/w ams. On day of presentation, the patient's daughter went to visit the patient at her SNF and found the patient to be slumped over in her wheelchair with minimal interaction at approx 12:30pm. The patient's cognitive baseline has been reportedly declining over the course of months however she reportedly is able to have simple conversation with family. The patient has had dementia for some time but she has more rapidly declined since the end of Jan2022 which has been attributed to isolation during hospitalizations, rehab and while being in her SNF. Her daughter reports dosage adjustment of seroquel and divalproex reportedly improving her cognition over the weeks prior which is why there is surprise for current mental status. Additionally, the patient is known to have a meningioma however details of the state of disease are not known to the family.    Her daughter confirms that the patient is DNR/DNI and that the family would not want to pursue invasive surgery but is open to medical treatment and further diagnostic testing. (25 Apr 2022 22:40)      Patient seen and evaluated at bedside. No acute events overnight except as noted.    Interval HPI: o/n events noted.  pt very lethargic. minimally responsive       PAST MEDICAL & SURGICAL HISTORY:  Diabetes    Dementia    Chronic CHF    No significant past surgical history        REVIEW OF SYSTEMS:  as per hpi     VITALS:   Vital Signs Last 24 Hrs  T(C): 36.4 (03 May 2022 11:25), Max: 36.6 (03 May 2022 05:09)  T(F): 97.5 (03 May 2022 11:25), Max: 97.8 (03 May 2022 05:09)  HR: 97 (03 May 2022 11:25) (71 - 98)  BP: 155/77 (03 May 2022 11:25) (146/89 - 155/77)  BP(mean): --  RR: 18 (03 May 2022 11:25) (18 - 18)  SpO2: 100% (03 May 2022 11:25) (94% - 100%)      PHYSICAL EXAM:  GENERAL: NAD, frail,  lethargic  HEAD:  Atraumatic, Normocephalic  EYES: EOMI, PERRLA, conjunctiva and sclera clear  NECK: Supple, No JVD  CHEST/LUNG: Clear to auscultation bilaterally; No wheeze  HEART: S1, S2; No murmurs, rubs, or gallops  ABDOMEN: Soft, Nontender, Nondistended; Bowel sounds present  EXTREMITIES:  2+ Peripheral Pulses, No clubbing, cyanosis, or edema  PSYCH: Normal affect  NEUROLOGY: AAOX0  SKIN: No rashes or lesions    Consultant(s) Notes Reviewed:  [x ] YES  [ ] NO  Care Discussed with Consultants/Other Providers [ x] YES  [ ] NO    MEDS:   MEDICATIONS  (STANDING):  chlorhexidine 2% Cloths 1 Application(s) Topical <User Schedule>  dextrose 5% 1000 milliLiter(s) (60 mL/Hr) IV Continuous <Continuous>  furosemide   Injectable 40 milliGRAM(s) IV Push daily  metoprolol tartrate Injectable 5 milliGRAM(s) IV Push every 6 hours  pantoprazole  Injectable 40 milliGRAM(s) IV Push daily  piperacillin/tazobactam IVPB.. 3.375 Gram(s) IV Intermittent every 8 hours  valproate sodium IVPB 250 milliGRAM(s) IV Intermittent two times a day    MEDICATIONS  (PRN):  bisacodyl Suppository 10 milliGRAM(s) Rectal daily PRN Constipation  glycopyrrolate Injectable 0.4 milliGRAM(s) IV Push every 6 hours PRN secretions  LORazepam   Injectable 0.2 milliGRAM(s) IV Push every 4 hours PRN anxiety/agitation/refractory dyspnea  morphine  - Injectable 2 milliGRAM(s) IV Push every 4 hours PRN Severe Pain (7 - 10)  morphine  - Injectable 2 milliGRAM(s) IV Push every 4 hours PRN dyspnea  morphine  - Injectable 1 milliGRAM(s) IV Push every 4 hours PRN Moderate Pain (4 - 6)        ALLERGIES:  No Known Allergies      LABS:                                                        11.4   9.29  )-----------( 303      ( 03 May 2022 07:13 )             39.9   05-03    152<H>  |  109<H>  |  31<H>  ----------------------------<  111<H>  3.2<L>   |  28  |  0.66    Ca    10.0      03 May 2022 07:11  Phos  2.3     05-02  Mg     1.8     05-03              cultures: Culture Results:   >100,000 CFU/ml Enterococcus species (04-25 @ 23:16)     < from: CT Venogram Brain w/ IV Cont (04.25.22 @ 23:56) >    IMPRESSION: Calcified right posterior fossa meningioma adjacent to the   right sigmoid sinus without sinus invasion.      --- End of Report ---        < end of copied text >  < from: CT Head No Cont (04.25.22 @ 19:20) >  IMPRESSION:  Right lateral tentorium/sphenoid plate region partially   calcified lesion suspicious for a meningioma in this region with the   measurements given above. Given the location possibility of infiltration   of the sigmoid sinus at this level should be considered. Venogram either   MRV or CTV may be helpful for more complete evaluation as clinically   warranted. Age-appropriate involutional changes and microvascular   ischemic disease age    --- End of Report ---      < end of copied text >

## 2022-05-03 NOTE — PROGRESS NOTE ADULT - PROBLEM SELECTOR PLAN 5
will transfer to PCU when bed available. currently unit full and family aware.   chaplaincy referral.  will continue to follow. [phone]

## 2022-05-03 NOTE — PROGRESS NOTE ADULT - CONVERSATION DETAILS
Met with family at bedside, reviewed options for care including deescalation of medical interventions, transition to symptom focused care, PCU transfer and potential for ongoing discussion regarding hospice, possibly inpatient.  reviewed plan of care with family who are amenable to PCU transfer and having medications available for symptoms including pain, agitation, secretions, shortness of breath.  Son Lior to arrive from [place] on Saturday, family requesting to stop blood draws/finger sticks, but to continue low dose IVF and antibiotics with ongoing conversation if adverse effects noted or once family has had opportunity to visit. discussed potential for symptoms in setting of IVF and if noted, we would discuss discontinuation. contact information provided and family in agreement with plan.

## 2022-05-03 NOTE — PROGRESS NOTE ADULT - SUBJECTIVE AND OBJECTIVE BOX
Subjective: Patient seen and examined. No new events except as noted.     REVIEW OF SYSTEMS:  Unable to obtain       MEDICATIONS:  MEDICATIONS  (STANDING):  ascorbic acid 500 milliGRAM(s) Oral daily  chlorhexidine 2% Cloths 1 Application(s) Topical <User Schedule>  dextrose 5% 1000 milliLiter(s) (50 mL/Hr) IV Continuous <Continuous>  dextrose 5%. 1000 milliLiter(s) (100 mL/Hr) IV Continuous <Continuous>  dextrose 5%. 1000 milliLiter(s) (50 mL/Hr) IV Continuous <Continuous>  dextrose 5%. 1000 milliLiter(s) (50 mL/Hr) IV Continuous <Continuous>  dextrose 50% Injectable 25 Gram(s) IV Push once  dextrose 50% Injectable 12.5 Gram(s) IV Push once  dextrose 50% Injectable 25 Gram(s) IV Push once  folic acid 1 milliGRAM(s) Oral daily  furosemide   Injectable 40 milliGRAM(s) IV Push daily  glucagon  Injectable 1 milliGRAM(s) IntraMuscular once  heparin   Injectable 5000 Unit(s) SubCutaneous every 12 hours  insulin lispro (ADMELOG) corrective regimen sliding scale   SubCutaneous every 6 hours  metoprolol tartrate Injectable 5 milliGRAM(s) IV Push every 6 hours  multivitamin/minerals 1 Tablet(s) Oral daily  pantoprazole  Injectable 40 milliGRAM(s) IV Push daily  piperacillin/tazobactam IVPB.. 3.375 Gram(s) IV Intermittent every 8 hours  sodium chloride 0.9%. 500 milliLiter(s) (50 mL/Hr) IV Continuous <Continuous>  valproate sodium IVPB 250 milliGRAM(s) IV Intermittent two times a day      PHYSICAL EXAM:  T(C): 36.6 (05-03-22 @ 05:09), Max: 36.6 (05-03-22 @ 05:09)  HR: 98 (05-03-22 @ 05:09) (71 - 98)  BP: 149/90 (05-03-22 @ 05:09) (120/76 - 149/90)  RR: 18 (05-03-22 @ 05:09) (18 - 20)  SpO2: 98% (05-03-22 @ 05:09) (94% - 98%)  Wt(kg): --  I&O's Summary    02 May 2022 07:01  -  03 May 2022 07:00  --------------------------------------------------------  IN: 2000 mL / OUT: 1300 mL / NET: 700 mL          Appearance: NAD	  HEENT: dry oral mucosa, PERRL, EOMI	  Lymphatic: No lymphadenopathy , no edema  Cardiovascular: Irregular S1 S2, No JVD, No murmurs , Peripheral pulses palpable 2+ bilaterally  Respiratory: Decreased BS  Gastrointestinal:  Soft, Non-tender, + BS	  Skin: No rashes, No ecchymoses, No cyanosis, warm to touch  Neurological Exam:  Mental Status: Awake, alert and oriented x 1.  follows some simple with mimicking. confused. + dysarthria   Cranial Nerves: PERRL, EOMI, VFFC, sensation V1-V3 intact,  ? R  facial asymmetry, equal elevation of palate, scm/trap 5/5, tongue is midline on protrusion. no obvious papilledema on fundoscopic exam. hearing is grossly intact.   Motor: DORADO uppers> lowers   Sensation: Intact to light touch and pinprick throughout. no right/left confusion. no extinction to tactile on DSS. Romberg was negative.   Reflexes: 1+ throughout at biceps, brachioradialis, triceps, patellars and ankles bilaterally and equal. No clonus. R toe and L toe were both downgoing.  Coordination: unable   Gait: unable         LABS:    CARDIAC MARKERS:                                11.4   9.29  )-----------( 303      ( 03 May 2022 07:13 )             39.9     05-03    152<H>  |  109<H>  |  31<H>  ----------------------------<  111<H>  3.2<L>   |  28  |  0.66    Ca    10.0      03 May 2022 07:11  Phos  2.3     05-02  Mg     1.8     05-03      proBNP:   Lipid Profile:   HgA1c:   TSH:             TELEMETRY: 	    ECG:  	  RADIOLOGY:   DIAGNOSTIC TESTING:  [ ] Echocardiogram:  [ ]  Catheterization:  [ ] Stress Test:    OTHER:

## 2022-05-03 NOTE — CONSULT NOTE ADULT - SUBJECTIVE AND OBJECTIVE BOX
HPI: Ms. Killian is a 89 year-old woman with history of multiple medical issues including diabetes mellitus, atrial fibrillation, congestive heart failure, and dementia. She presented on 4/25/22 to the [facility] after being noted by family at her subacute nursing facility to be slumped over in her wheelchair and unable to interact with family. Since admission, her serum sodium has trended up as high as 152meq/L, and her serum potassium has dropped as low as 2.2meq/L. In light of her electrolyte derangements, a renal consultation was requested. She had a low-grade fever, and her urine studies from admission were consistent with UTI; she was initially placed on IV Rocephin, and later switched to IV Vanco/Zosyn when cultures returned positive for Enterococcus. There is also suspicion for pneumonia.    I see that as of today she is ordered for D5W+30meq/L KCL, @60cc/h. She is also ordered for Lasix 40mg IV daily. All of her medications at present are ordered via IV vs WV route, as she is NPO.      PAST MEDICAL & SURGICAL HISTORY:  Diabetes  Dementia  AFib  Chronic CHF  GIB  No significant past surgical history    Allergies  No Known Allergies    SOCIAL HISTORY:  (+)DNR/DNI    FAMILY HISTORY:  No pertinent family history in first degree relatives    REVIEW OF SYSTEMS:  CONSTITUTIONAL: No weakness, fevers or chills  EYES/ENT: No visual changes;  No vertigo or throat pain   NECK: No pain or stiffness  RESPIRATORY: No cough, wheezing, hemoptysis; No shortness of breath  CARDIOVASCULAR: No chest pain or palpitations  GASTROINTESTINAL: No abdominal or epigastric pain. No nausea, vomiting, or hematemesis; No diarrhea or constipation. No melena or hematochezia.  GENITOURINARY: No dysuria, frequency or hematuria  NEUROLOGICAL: No numbness or weakness  SKIN: No itching, burning, rashes, or lesions   All other review of systems is negative unless indicated above.    VITAL:  T(C): , Max: 36.6 (05-03-22 @ 05:09)  T(F): , Max: 97.8 (05-03-22 @ 05:09)  HR: 97 (05-03-22 @ 11:25)  BP: 155/77 (05-03-22 @ 11:25)  RR: 18 (05-03-22 @ 11:25)  SpO2: 100% (05-03-22 @ 11:25)  Wt(kg): --    PHYSICAL EXAM:  Constitutional: NAD, Alert  HEENT: NCAT, MMM  Neck: Supple, No JVD  Respiratory: CTA-b/l  Cardiovascular: RRR s1s2, no m/r/g  Gastrointestinal: BS+, soft, NT/ND  Extremities: No peripheral edema b/l  Neurological: no focal deficits; strength grossly intact  Back: no CVAT b/l  Skin: No rashes, no nevi    LABS:                        11.4   9.29  )-----------( 303      ( 03 May 2022 07:13 )             39.9     Na(152)/K(3.2)/Cl(109)/HCO3(28)/BUN(31)/Cr(0.66)Glu(111)/Ca(10.0)/Mg(1.8)/PO4(--)    05-03 @ 07:11  Na(152)/K(2.9)/Cl(108)/HCO3(30)/BUN(31)/Cr(0.65)Glu(125)/Ca(9.9)/Mg(1.8)/PO4(2.3)    05-02 @ 22:03  Na(147)/K(2.2)/Cl(108)/HCO3(22)/BUN(25)/Cr(0.49)Glu(331)/Ca(7.6)/Mg(--)/PO4(--)    05-02 @ 11:48  Na(149)/K(3.1)/Cl(109)/HCO3(23)/BUN(30)/Cr(0.79)Glu(166)/Ca(10.0)/Mg(2.2)/PO4(3.3)    05-01 @ 07:50      IMAGING:  < from: Xray Chest 1 View- PORTABLE-Urgent (Xray Chest 1 View- PORTABLE-Urgent .) (04.30.22 @ 12:29) >  New small bilateral pleural effusions.  New bibasilar atelectasis and/or pneumonia.      ASSESSMENT:  (1)Renal - intact function  (2)Hypernatremia - in setting of poor free water access; exacerbated by administration of IV Lasix; ~2.5L free water deficit  (3)Hypokalemia - whole body deficit, in setting of limited intake + Lasix.  (4)CV -     RECOMMEND:  (1)D5W+KCl 30meq/L as ordered for now  (2)BMP daily  (3)Can we d/c the Lasix at this point?    Thank you for involving [facility] in this patient's care.    With warm regards,    Pedro Ambrosio MD   [facility]  Office: [phone]  Cell: [phone]               HPI: Ms. Killian is a 89 year-old woman with history of multiple medical issues including diabetes mellitus, atrial fibrillation, congestive heart failure, and dementia. She presented on 4/25/22 to the [facility] after being noted by family at her subacute nursing facility to be slumped over in her wheelchair and unable to interact with family. Since admission, her serum sodium has trended up as high as 152meq/L, and her serum potassium has dropped as low as 2.2meq/L. In light of her electrolyte derangements, a renal consultation was requested. She had a low-grade fever, and her urine studies from admission were consistent with UTI; she was initially placed on IV Rocephin, and later switched to IV Vanco/Zosyn when cultures returned positive for Enterococcus. There is also suspicion for pneumonia.    I see that as of today she is ordered for D5W+30meq/L KCL, @60cc/h. She is also ordered for Lasix 40mg IV daily. All of her medications at present are ordered via IV vs MI route, as she is NPO.      PAST MEDICAL & SURGICAL HISTORY:  Diabetes  Dementia  AFib  Chronic CHF  GIB  No significant past surgical history    Allergies  No Known Allergies    SOCIAL HISTORY:  (+)DNR/DNI    FAMILY HISTORY:  No pertinent family history in first degree relatives    REVIEW OF SYSTEMS:  unable to obtain from patient - not answering simple questions appropriately    VITAL:  T(C): , Max: 36.6 (05-03-22 @ 05:09)  T(F): , Max: 97.8 (05-03-22 @ 05:09)  HR: 97 (05-03-22 @ 11:25)  BP: 155/77 (05-03-22 @ 11:25)  RR: 18 (05-03-22 @ 11:25)  SpO2: 100% (05-03-22 @ 11:25)  Wt(kg): --    PHYSICAL EXAM:  Constitutional: frail; lethargic; confused  HEENT: NCAT, DMM  Neck: Supple, No JVD  Respiratory: coarse BS b/l  Cardiovascular: RRR s1s2, no m/r/g  Gastrointestinal: BS+, soft, NT/ND  Extremities: No peripheral edema b/l  Neurological: increased generalized tone  Back: no CVAT b/l  Skin: No rashes, no nevi    LABS:                        11.4   9.29  )-----------( 303      ( 03 May 2022 07:13 )             39.9     Na(152)/K(3.2)/Cl(109)/HCO3(28)/BUN(31)/Cr(0.66)Glu(111)/Ca(10.0)/Mg(1.8)/PO4(--)    05-03 @ 07:11  Na(152)/K(2.9)/Cl(108)/HCO3(30)/BUN(31)/Cr(0.65)Glu(125)/Ca(9.9)/Mg(1.8)/PO4(2.3)    05-02 @ 22:03  Na(147)/K(2.2)/Cl(108)/HCO3(22)/BUN(25)/Cr(0.49)Glu(331)/Ca(7.6)/Mg(--)/PO4(--)    05-02 @ 11:48  Na(149)/K(3.1)/Cl(109)/HCO3(23)/BUN(30)/Cr(0.79)Glu(166)/Ca(10.0)/Mg(2.2)/PO4(3.3)    05-01 @ 07:50      IMAGING:  < from: Xray Chest 1 View- PORTABLE-Urgent (Xray Chest 1 View- PORTABLE-Urgent .) (04.30.22 @ 12:29) >  New small bilateral pleural effusions.  New bibasilar atelectasis and/or pneumonia.      ASSESSMENT:  (1)Renal - intact function  (2)Hypernatremia - in setting of poor free water access; exacerbated by administration of IV Lasix; ~2.5L free water deficit  (3)Hypokalemia - whole body deficit, in setting of limited intake + Lasix.  (4)CV - not hypervolemic - I see no need for Lasix, especially given that it is exacerbating the electrolyte disturbances    RECOMMEND:  (1)D5W+KCl 30meq/L as ordered for now  (2)BMP daily  (3)D/C Lasix    Thank you for involving [facility] in this patient's care.    With warm regards,    Pedro Ambrosio MD   [facility] [place]  Office: [phone]  Cell: [phone]

## 2022-05-03 NOTE — PROGRESS NOTE ADULT - PROBLEM SELECTOR PLAN 1
- likely 2/2 uti and now with likely aspiration   abx as per id    hyperNa  ivf changed to D5  renal consulted

## 2022-05-03 NOTE — PROGRESS NOTE ADULT - SUBJECTIVE AND OBJECTIVE BOX
SUBJECTIVE AND OBJECTIVE: patient seen and examined, clinically unchanged. met with family at bedside, [facility] narrative below.    INTERVAL HPI/OVERNIGHT EVENTS: no acute overnight events.     DNR on chart:   Allergies    No Known Allergies    Intolerances    MEDICATIONS  (STANDING):  ascorbic acid 500 milliGRAM(s) Oral daily  chlorhexidine 2% Cloths 1 Application(s) Topical <User Schedule>  dextrose 5% 1000 milliLiter(s) (50 mL/Hr) IV Continuous <Continuous>  dextrose 5%. 1000 milliLiter(s) (100 mL/Hr) IV Continuous <Continuous>  dextrose 5%. 1000 milliLiter(s) (50 mL/Hr) IV Continuous <Continuous>  dextrose 5%. 1000 milliLiter(s) (50 mL/Hr) IV Continuous <Continuous>  dextrose 50% Injectable 25 Gram(s) IV Push once  dextrose 50% Injectable 12.5 Gram(s) IV Push once  dextrose 50% Injectable 25 Gram(s) IV Push once  folic acid 1 milliGRAM(s) Oral daily  furosemide   Injectable 40 milliGRAM(s) IV Push daily  glucagon  Injectable 1 milliGRAM(s) IntraMuscular once  heparin   Injectable 5000 Unit(s) SubCutaneous every 12 hours  insulin lispro (ADMELOG) corrective regimen sliding scale   SubCutaneous every 6 hours  metoprolol tartrate Injectable 5 milliGRAM(s) IV Push every 6 hours  multivitamin/minerals 1 Tablet(s) Oral daily  pantoprazole  Injectable 40 milliGRAM(s) IV Push daily  piperacillin/tazobactam IVPB.. 3.375 Gram(s) IV Intermittent every 8 hours  sodium chloride 0.9%. 500 milliLiter(s) (50 mL/Hr) IV Continuous <Continuous>  valproate sodium IVPB 250 milliGRAM(s) IV Intermittent two times a day    MEDICATIONS  (PRN):  dextrose Oral Gel 15 Gram(s) Oral once PRN Blood Glucose LESS THAN 70 milliGRAM(s)/deciliter      ITEMS UNCHECKED ARE NOT PRESENT    PRESENT SYMPTOMS: [x ]Unable to obtain due to poor mentation   Source if other than patient:  [ ]Family   [ ]Team     Pain:  [ ]yes [ ]no  QOL impact -   Location -                    Aggravating factors -  Quality -  Radiation -  Timing-  Severity (0-10 scale):  Minimal acceptable level (0-10 scale):     Dyspnea:                           [ ]Mild [ ]Moderate [ ]Severe  Anxiety:                             [ ]Mild [ ]Moderate [ ]Severe  Fatigue:                             [ ]Mild [ ]Moderate [ ]Severe  Nausea:                             [ ]Mild [ ]Moderate [ ]Severe  Loss of appetite:              [ ]Mild [ ]Moderate [ ]Severe  Constipation:                    [ ]Mild [ ]Moderate [ ]Severe    CPOT:    https://www.[facility].org/getattachment/[email or web address]-4463y1489m7m/Critical-Care-Pain-Observation-Tool-(CPOT)    PAIN AD Score:	2 at time of evaluation  [email or web address]/painad.pdf (Ctrl + left click to view)    Other Symptoms:  [ ]All other review of systems negative     Palliative Performance Status Version 2:     10    %      [email or web address]/files/[email or web address]_performance_scale_[email or web address].pdf  PHYSICAL EXAM:  Vital Signs Last 24 Hrs  T(C): 36.4 (03 May 2022 11:25), Max: 36.6 (03 May 2022 05:09)  T(F): 97.5 (03 May 2022 11:25), Max: 97.8 (03 May 2022 05:09)  HR: 97 (03 May 2022 11:25) (71 - 98)  BP: 155/77 (03 May 2022 11:25) (120/76 - 155/77)  BP(mean): --  RR: 18 (03 May 2022 11:25) (18 - 18)  SpO2: 100% (03 May 2022 11:25) (94% - 100%) I&O's Summary    02 May 2022 07:01  -  03 May 2022 07:00  --------------------------------------------------------  IN: 2000 mL / OUT: 1300 mL / NET: 700 mL    03 May 2022 07:01  -  03 May 2022 13:02  --------------------------------------------------------  IN: 0 mL / OUT: 1100 mL / NET: -1100 mL       GENERAL:  [ ]Alert  [ ]Oriented x   [x ]Lethargic  [ ]Cachexia  [ ]Unarousable  [ ]Verbal  [ ]Non-Verbal  Behavioral:   [ ]Anxiety  [ ]Delirium [ ]Agitation [ ]Other  HEENT:  [ ]Normal   [ x]Dry mouth   [ ]ET Tube/Trach  [ ]Oral lesions  PULMONARY:   [x ]Clear [ ]Tachypnea  [ ]Audible excessive secretions   [ ]Rhonchi        [ ]Right [ ]Left [ ]Bilateral  [ ]Crackles        [ ]Right [ ]Left [ ]Bilateral  [ ]Wheezing     [ ]Right [ ]Left [ ]Bilateral  [ ]Diminished BS [ ] Right [ ]Left [ ]Bilateral  CARDIOVASCULAR:    [ ]Regular [ ]Irregular [x ]Tachy  [ ]Alexis [ ]Murmur [ ]Other  GASTROINTESTINAL: scaphoid  [ x]Soft  [ ]Distended   [ ]+BS  [ x]Non tender [ ]Tender  [ ]PEG [ ]OGT/ NGT   Last BM:    GENITOURINARY:  [ ]Normal [x ]Incontinent   [ ]Oliguria/Anuria   [ ]Hernandez  MUSCULOSKELETAL:   [ ]Normal   [ ]Weakness  [x ]Bed/Wheelchair bound [ ]Edema  NEUROLOGIC:   [ ]No focal deficits  [ x] Cognitive impairment  [ ] Dysphagia [ ]Dysarthria [ ] Paresis [ ]Other   SKIN:   [ ]Normal  [ ]Rash   [ ]Pressure ulcer(s) [ ]y [ ]n present on admission    CRITICAL CARE:  [ ]Shock Present  [ ]Septic [ ]Cardiogenic [ ]Neurologic [ ]Hypovolemic  [ ]Vasopressors [ ]Inotropes  [ ]Respiratory failure present [ ]Mechanical Ventilation [ ]Non-invasive ventilatory support [ ]High-Flow   [ ]Acute  [ ]Chronic [ ]Hypoxic  [ ]Hypercarbic [ ]Other  [ ]Other organ failure     LABS:                        11.4   9.29  )-----------( 303      ( 03 May 2022 07:13 )             39.9   05-03    152<H>  |  109<H>  |  31<H>  ----------------------------<  111<H>  3.2<L>   |  28  |  0.66    Ca    10.0      03 May 2022 07:11  Phos  2.3     05-02  Mg     1.8     05-03    RADIOLOGY & ADDITIONAL STUDIES: all recent imaging reviewed    Protein Calorie Malnutrition Present: [ ]mild [ ]moderate [ ]severe [ ]underweight [ ]morbid obesity  https://www.andeal.org/vault/2440/web/files/ONC/Table_Clinical%20Characteristics%20to%20Document%20Malnutrition-White%20JV%20et%20al%202012.pdf    Height (cm): 170.2 (04-25-22 @ 17:29)  Weight (kg): 61.2 (04-25-22 @ 17:29)  BMI (kg/m2): 21.1 (04-25-22 @ 17:29)    [x ]PPSV2 < or = 30%  [ ]significant weight loss [x ]poor nutritional intake [ ]anasarca    [ ]Artificial Nutrition    REFERRALS:   [x ]Chaplaincy  [ ]Hospice  [ ]Child Life  [ ]Social Work  [x ]Case management [ ]Holistic Therapy     Goals of Care Document:

## 2022-05-04 NOTE — PROGRESS NOTE ADULT - PROBLEM SELECTOR PLAN 4
Intermittently tachy   c/w IV lopressor  - can d/c if family wishes.  keep off AC due to anemia and recurrent GI bleeds

## 2022-05-04 NOTE — PROGRESS NOTE ADULT - PROBLEM SELECTOR PLAN 4
DNR/DNI, no feeding tube.  will continue IVF and abx per family request but ongoing goc discussion once family visits on Saturday 5/7 and if adverse effects noted.  See 5/3 GOC advanced at baseline, on depakote for agitation  Continue Ativan 0.2mg q1h PRN (used none)

## 2022-05-04 NOTE — PROGRESS NOTE ADULT - ASSESSMENT
89F w/ dementia, meningioma, HF, DM2, afib off ac due to GI bleed p/w ams. On day of presentation, the patient's daughter went to visit the patient at her SNF and found the patient to be slumped over in her wheelchair with minimal interaction. Patient admitted with UTI and metabolic encephalopathy, now minimally responsive and unable to take by mouth. palliative consulted for GOC.

## 2022-05-04 NOTE — PROGRESS NOTE ADULT - PROBLEM SELECTOR PLAN 1
Likely infectious in etiology     - UTI  pt family agreeable to comfort measures, but to continue IVF and antibiotics while family visits.  moved to PCU.  palliative following

## 2022-05-04 NOTE — PROGRESS NOTE ADULT - SUBJECTIVE AND OBJECTIVE BOX
Overnight events noted      VITAL:  T(C): , Max: 36.4 (05-03-22 @ 11:25)  T(F): , Max: 97.5 (05-03-22 @ 11:25)  HR: 87 (05-04-22 @ 05:38)  BP: 122/78 (05-04-22 @ 05:38)  BP(mean): --  RR: 18 (05-04-22 @ 05:38)  SpO2: 95% (05-04-22 @ 05:38)      PHYSICAL EXAM:  Constitutional: frail; lethargic; confused  HEENT: NCAT, DMM  Neck: Supple, No JVD  Respiratory: coarse BS b/l  Cardiovascular: RRR s1s2, no m/r/g  Gastrointestinal: BS+, soft, NT/ND  Extremities: No peripheral edema b/l  Neurological: increased generalized tone  Back: no CVAT b/l  Skin: No rashes, no nevi    LABS:                        11.4   9.03  )-----------( 303      ( 04 May 2022 07:27 )             40.5     Na(151)/K(3.1)/Cl(106)/HCO3(30)/BUN(29)/Cr(0.70)Glu(155)/Ca(9.9)/Mg(--)/PO4(--)    05-04 @ 07:26  Na(152)/K(3.2)/Cl(109)/HCO3(28)/BUN(31)/Cr(0.66)Glu(111)/Ca(10.0)/Mg(1.8)/PO4(--)    05-03 @ 07:11  Na(152)/K(2.9)/Cl(108)/HCO3(30)/BUN(31)/Cr(0.65)Glu(125)/Ca(9.9)/Mg(1.8)/PO4(2.3)    05-02 @ 22:03  Na(147)/K(2.2)/Cl(108)/HCO3(22)/BUN(25)/Cr(0.49)Glu(331)/Ca(7.6)/Mg(--)/PO4(--)    05-02 @ 11:48      IMPRESSION: 89F w/ dementia, DM2, AFib, and CHF, /25/22 p/w AMS/sepsis; c/b electrolyte derangements    (1)Hypernatremia - in setting of poor free water access; exacerbated by administration of IV Lasix  (2)Hypokalemia - whole body deficit, in setting of limited intake + Lasix.  (3)CV - not hypervolemic - I see no need for Lasix, especially given that it is exacerbating the electrolyte disturbances  (4)GOC - now in PCU    RECOMMEND:  (1)Can continue D5W+KCl 30meq/L as ordered for now  (2)D/C Lasix  (3)Comfort measures per PCU      Pedro Ambrosio MD  [facility]  Office: [phone]  Cell: [phone]       minimally communicative  now in PCU      VITAL:  T(C): , Max: 36.4 (05-03-22 @ 11:25)  T(F): , Max: 97.5 (05-03-22 @ 11:25)  HR: 87 (05-04-22 @ 05:38)  BP: 122/78 (05-04-22 @ 05:38)  BP(mean): --  RR: 18 (05-04-22 @ 05:38)  SpO2: 95% (05-04-22 @ 05:38)      PHYSICAL EXAM:  Constitutional: frail; lethargic; confused  HEENT: NCAT, DMM  Neck: Supple, No JVD  Respiratory: coarse BS b/l  Cardiovascular: RRR s1s2, no m/r/g  Gastrointestinal: BS+, soft, NT/ND  Extremities: No peripheral edema b/l  Neurological: increased generalized tone  Back: no CVAT b/l  Skin: No rashes, no nevi    LABS:                        11.4   9.03  )-----------( 303      ( 04 May 2022 07:27 )             40.5     Na(151)/K(3.1)/Cl(106)/HCO3(30)/BUN(29)/Cr(0.70)Glu(155)/Ca(9.9)/Mg(--)/PO4(--)    05-04 @ 07:26  Na(152)/K(3.2)/Cl(109)/HCO3(28)/BUN(31)/Cr(0.66)Glu(111)/Ca(10.0)/Mg(1.8)/PO4(--)    05-03 @ 07:11  Na(152)/K(2.9)/Cl(108)/HCO3(30)/BUN(31)/Cr(0.65)Glu(125)/Ca(9.9)/Mg(1.8)/PO4(2.3)    05-02 @ 22:03  Na(147)/K(2.2)/Cl(108)/HCO3(22)/BUN(25)/Cr(0.49)Glu(331)/Ca(7.6)/Mg(--)/PO4(--)    05-02 @ 11:48      IMPRESSION: 89F w/ dementia, DM2, AFib, and CHF, /25/22 p/w AMS/sepsis; c/b electrolyte derangements    (1)Hypernatremia - in setting of poor free water access; exacerbated by administration of IV Lasix  (2)Hypokalemia - whole body deficit, in setting of limited intake + Lasix.  (3)CV - not hypervolemic   (4)GOC - now in PCU - comfort measures    RECOMMEND:  (1)D/C Lasix  (2)Can d/c IVF  (3)No further bloodwork  (4)Reconsult as needed      Pedro Ambrosio MD  [facility]  Office: [phone]  Cell: [phone]

## 2022-05-04 NOTE — PROGRESS NOTE ADULT - PROBLEM SELECTOR PLAN 2
Continuing IV Zosyn 7d (5/1 - 5/7) with low dose IVF, decreased from 60cc/he to 30cc/hr. To deescalate after son Lior visits on Saturday Multifactorial (UTI, Hypernatremia, Dementia/ Delirium, EOL)  Continuing IV Zosyn 7d (5/1 - 5/7) with low dose IVF, decreased from 60cc/he to 30cc/hr. To deescalate after son Lior visits on Saturday

## 2022-05-04 NOTE — PROGRESS NOTE ADULT - PROBLEM SELECTOR PLAN 8
HSQ for dvt ppx

## 2022-05-04 NOTE — PROGRESS NOTE ADULT - SUBJECTIVE AND OBJECTIVE BOX
JULIOCESAR BASURTO  89y Female  MRN:[id number]    Patient is a 89y old  Female who presents with a chief complaint of 89F p/w ams (26 Apr 2022 09:54)    HPI:  The patient is lethargic and unable to provide history. Therefore history is obtained from the patient's daughter ms. Larisa Knight via telephone    89F w/ dementia, meningioma, HF, DM2, afib off ac due to GI bleed p/w ams. On day of presentation, the patient's daughter went to visit the patient at her SNF and found the patient to be slumped over in her wheelchair with minimal interaction at approx 12:30pm. The patient's cognitive baseline has been reportedly declining over the course of months however she reportedly is able to have simple conversation with family. The patient has had dementia for some time but she has more rapidly declined since the end of Jan2022 which has been attributed to isolation during hospitalizations, rehab and while being in her SNF. Her daughter reports dosage adjustment of seroquel and divalproex reportedly improving her cognition over the weeks prior which is why there is surprise for current mental status. Additionally, the patient is known to have a meningioma however details of the state of disease are not known to the family.    Her daughter confirms that the patient is DNR/DNI and that the family would not want to pursue invasive surgery but is open to medical treatment and further diagnostic testing. (25 Apr 2022 22:40)      Patient seen and evaluated at bedside. No acute events overnight except as noted.    Interval HPI: pt tx to pcu      PAST MEDICAL & SURGICAL HISTORY:  Diabetes    Dementia    Chronic CHF    No significant past surgical history        REVIEW OF SYSTEMS:  as per hpi     VITALS:   Vital Signs Last 24 Hrs  T(C): 36.6 (04 May 2022 09:10), Max: 36.6 (04 May 2022 09:10)  T(F): 97.9 (04 May 2022 09:10), Max: 97.9 (04 May 2022 09:10)  HR: 96 (04 May 2022 09:10) (87 - 102)  BP: 138/94 (04 May 2022 09:10) (122/78 - 138/94)  BP(mean): --  RR: 18 (04 May 2022 09:10) (18 - 18)  SpO2: 96% (04 May 2022 09:10) (95% - 100%)      PHYSICAL EXAM:  GENERAL: NAD, frail,  lethargic  HEAD:  Atraumatic, Normocephalic  EYES: EOMI, PERRLA, conjunctiva and sclera clear  NECK: Supple, No JVD  CHEST/LUNG: Clear to auscultation bilaterally; No wheeze  HEART: S1, S2; No murmurs, rubs, or gallops  ABDOMEN: Soft, Nontender, Nondistended; Bowel sounds present  EXTREMITIES:  2+ Peripheral Pulses, No clubbing, cyanosis, or edema  PSYCH: Normal affect  NEUROLOGY: AAOX0  SKIN: No rashes or lesions    Consultant(s) Notes Reviewed:  [x ] YES  [ ] NO  Care Discussed with Consultants/Other Providers [ x] YES  [ ] NO    MEDS:   MEDICATIONS  (STANDING):  chlorhexidine 2% Cloths 1 Application(s) Topical <User Schedule>  dextrose 5% 1000 milliLiter(s) (30 mL/Hr) IV Continuous <Continuous>  metoprolol tartrate Injectable 5 milliGRAM(s) IV Push every 6 hours  pantoprazole  Injectable 40 milliGRAM(s) IV Push daily  piperacillin/tazobactam IVPB.. 3.375 Gram(s) IV Intermittent every 8 hours  valproate sodium IVPB 250 milliGRAM(s) IV Intermittent two times a day    MEDICATIONS  (PRN):  bisacodyl Suppository 10 milliGRAM(s) Rectal daily PRN Constipation  glycopyrrolate Injectable 0.4 milliGRAM(s) IV Push every 6 hours PRN secretions  LORazepam   Injectable 0.2 milliGRAM(s) IV Push every 4 hours PRN anxiety/agitation/refractory dyspnea  morphine  - Injectable 1 milliGRAM(s) IV Push every 4 hours PRN Moderate Pain (4 - 6)  morphine  - Injectable 2 milliGRAM(s) IV Push every 4 hours PRN Severe Pain (7 - 10)  morphine  - Injectable 2 milliGRAM(s) IV Push every 4 hours PRN dyspnea      ALLERGIES:  No Known Allergies      LABS:                                                 11.4   9.03  )-----------( 303      ( 04 May 2022 07:27 )             40.5   05-04    151<H>  |  106  |  29<H>  ----------------------------<  155<H>  3.1<L>   |  30  |  0.70    Ca    9.9      04 May 2022 07:26  Phos  2.3     05-02  Mg     1.8     05-03                cultures: Culture Results:   >100,000 CFU/ml Enterococcus species (04-25 @ 23:16)     < from: CT Venogram Brain w/ IV Cont (04.25.22 @ 23:56) >    IMPRESSION: Calcified right posterior fossa meningioma adjacent to the   right sigmoid sinus without sinus invasion.      --- End of Report ---        < end of copied text >  < from: CT Head No Cont (04.25.22 @ 19:20) >  IMPRESSION:  Right lateral tentorium/sphenoid plate region partially   calcified lesion suspicious for a meningioma in this region with the   measurements given above. Given the location possibility of infiltration   of the sigmoid sinus at this level should be considered. Venogram either   MRV or CTV may be helpful for more complete evaluation as clinically   warranted. Age-appropriate involutional changes and microvascular   ischemic disease age    --- End of Report ---      < end of copied text >

## 2022-05-04 NOTE — PROGRESS NOTE ADULT - PROBLEM SELECTOR PLAN 5
PCU level of care, potential inpatient hospice disposition after family form [place] visits on Satuday 5/7  [facility] chaplaincy referral. DNR/DNI, no feeding tube.  will continue IVF and abx per family request but ongoing goc discussion once family visits on Saturday 5/7 and if adverse effects noted.  See 5/3 GOC

## 2022-05-04 NOTE — PROGRESS NOTE ADULT - ASSESSMENT
89F w/ dementia, meningioma, HF, DM2, afib off ac due to GI bleed p/w ams admit to medicine for further evaluation of acute encephalopathy which maybe from UTI  course complicated by likely aspiration    sepsis   2/2 uti and now with likely aspiration  id f/u  abx as per id  f/u cult and sens  keep npo given extreme lethargy  palliative f/u  pt dnr    pt now tx to PCU  dnr/i  comfort care

## 2022-05-04 NOTE — PROGRESS NOTE ADULT - PROBLEM SELECTOR PLAN 1
- likely 2/2 uti and now with likely aspiration   abx as per id    hyperNa  ivf as per renal  renal consulted

## 2022-05-04 NOTE — PROGRESS NOTE ADULT - SUBJECTIVE AND OBJECTIVE BOX
Subjective: Patient seen and examined. No new events except as noted.   Family agreeable to comfort measures.  Now in PCU.    REVIEW OF SYSTEMS:  Unable to obtain.    MEDICATIONS:  MEDICATIONS  (STANDING):  chlorhexidine 2% Cloths 1 Application(s) Topical <User Schedule>  dextrose 5% 1000 milliLiter(s) (60 mL/Hr) IV Continuous <Continuous>  metoprolol tartrate Injectable 5 milliGRAM(s) IV Push every 6 hours  pantoprazole  Injectable 40 milliGRAM(s) IV Push daily  valproate sodium IVPB 250 milliGRAM(s) IV Intermittent two times a day    PHYSICAL EXAM:  T(C): 36.6 (05-04-22 @ 09:10), Max: 36.6 (05-04-22 @ 09:10)  HR: 96 (05-04-22 @ 09:10) (87 - 102)  BP: 138/94 (05-04-22 @ 09:10) (122/78 - 155/77)  RR: 18 (05-04-22 @ 09:10) (18 - 18)  SpO2: 96% (05-04-22 @ 09:10) (95% - 100%)  Wt(kg): --  I&O's Summary    03 May 2022 07:01  -  04 May 2022 07:00  --------------------------------------------------------  IN: 1280 mL / OUT: 2000 mL / NET: -720 mL    Appearance: Lethargic  HEENT: dry oral mucosa, PERRL, EOMI	  Lymphatic: No lymphadenopathy , no edema  Cardiovascular: Normal S1 S2, No JVD, No murmurs , Peripheral pulses palpable 2+ bilaterally  Respiratory: Decreased BS	  Gastrointestinal:  Soft, Non-tender, + BS	  Skin: No rashes, No ecchymoses, No cyanosis, warm to touch  Musculoskeletal: Decreased ROM/Strength  Psychiatry:  Lethargic  Ext: No edema    LABS:    CARDIAC MARKERS:                      11.4   9.03  )-----------( 303      ( 04 May 2022 07:27 )             40.5     05-04    151<H>  |  106  |  29<H>  ----------------------------<  155<H>  3.1<L>   |  30  |  0.70    Ca    9.9      04 May 2022 07:26  Phos  2.3     05-02  Mg     1.8     05-03      proBNP:   Lipid Profile:   HgA1c:   TSH:     TELEMETRY: 	    ECG:  	  RADIOLOGY:   DIAGNOSTIC TESTING:  [ ] Echocardiogram:  [ ]  Catheterization:  [ ] Stress Test:    OTHER:

## 2022-05-04 NOTE — PROGRESS NOTE ADULT - PROBLEM SELECTOR PLAN 3
advanced at baseline, on depakote for agitation  Continue Ativan 0.2mg q1h PRN (used none) PPS 10%, requires total care

## 2022-05-04 NOTE — PROGRESS NOTE ADULT - SUBJECTIVE AND OBJECTIVE BOX
GAP TEAM PALLIATIVE CARE UNIT PROGRESS NOTE:      [  ] Patient on hospice program.    INDICATION FOR PALLIATIVE CARE UNIT SERVICES/Interval HPI:  Symptom Management, Safe disposition    OVERNIGHT EVENTS: Transferred to PCU overnight. Minimally responsive, no oral route, VSS today. LBM 4/20. Continuing on IV Zosyn 7d (5/1-7) and IVF, weaned from 60cc to 30cc/hr. Used PRN IV Morphine 1mg x1 and 2mg x1 this morning. Planned for inpatient hospice disposition after christen Tinajero visits on Saturday from [place].    DNR on chart: Yes    Allergies  No Known Allergies  Intolerances    MEDICATIONS  (STANDING):  chlorhexidine 2% Cloths 1 Application(s) Topical <User Schedule>  dextrose 5% 1000 milliLiter(s) (30 mL/Hr) IV Continuous <Continuous>  metoprolol tartrate Injectable 5 milliGRAM(s) IV Push every 6 hours  pantoprazole  Injectable 40 milliGRAM(s) IV Push daily  piperacillin/tazobactam IVPB.. 3.375 Gram(s) IV Intermittent every 8 hours  valproate sodium IVPB 250 milliGRAM(s) IV Intermittent two times a day    MEDICATIONS  (PRN):  bisacodyl Suppository 10 milliGRAM(s) Rectal daily PRN Constipation  glycopyrrolate Injectable 0.4 milliGRAM(s) IV Push every 6 hours PRN secretions  LORazepam   Injectable 0.2 milliGRAM(s) IV Push every 4 hours PRN anxiety/agitation/refractory dyspnea  morphine  - Injectable 2 milliGRAM(s) IV Push every 4 hours PRN Severe Pain (7 - 10)  morphine  - Injectable 2 milliGRAM(s) IV Push every 4 hours PRN dyspnea  morphine  - Injectable 1 milliGRAM(s) IV Push every 4 hours PRN Moderate Pain (4 - 6)    ITEMS UNCHECKED ARE NOT PRESENT    PRESENT SYMPTOMS: [x]Unable to self-report  [ ]PAINADs [ ]RDOS  Source if other than patient:  [ ]Family   [ ]Team     Pain: [x] yes [ ] no - see PAINAD  QOL impact -   Location -                    Aggravating factors -  Quality -  Radiation -  Timing-  Severity (0-10 scale):  Minimal acceptable level (0-10 scale):     PAINAD Score: 2 based on my assessment   [email or web address] (Ctrl +  left click to view)    Dyspnea:                           [ ]Mild [ ]Moderate [ ]Severe    RDOS: 0  0 to 2  minimal or no respiratory distress   3  mild distress  4 to 6 moderate distress  >7 severe distress  [email or web address]/[email or web address]/[email or web address]_Distress_Observation_[email or web address] (Ctrl +  left click to view)     Anxiety:                             [ ]Mild [ ]Moderate [ ]Severe  Fatigue:                             [ ]Mild [ ]Moderate [ ]Severe  Nausea:                             [ ]Mild [ ]Moderate [ ]Severe  Loss of appetite:              [ ]Mild [ ]Moderate [ ]Severe  Constipation:                    [ ]Mild [ ]Moderate [ ]Severe  		  Other Symptoms:  [x]All other review of systems negative     Palliative Performance Status Version 2:     10%         [email or web address]    PHYSICAL EXAM:   Vital Signs Last 24 Hrs  T(C): 36.6 (04 May 2022 09:10), Max: 36.6 (04 May 2022 09:10)  T(F): 97.9 (04 May 2022 09:10), Max: 97.9 (04 May 2022 09:10)  HR: 96 (04 May 2022 09:10) (87 - 102)  BP: 138/94 (04 May 2022 09:10) (122/78 - 138/94)  BP(mean): --  RR: 18 (04 May 2022 09:10) (18 - 18)  SpO2: 96% (04 May 2022 09:10) (95% - 100%) I&O's Summary    03 May 2022 07:01  -  04 May 2022 07:00  --------------------------------------------------------  IN: 1280 mL / OUT: 2000 mL / NET: -720 mL      GENERAL:  [ ]Alert  [ ]Oriented x   [x ]Lethargic  [ ]Cachexia  [x]Unarousable  [ ]Verbal  [ ]Non-Verbal  Behavioral:   [ ]Anxiety  [ ]Delirium [ ]Agitation [ ]Other  HEENT:  [ ]Normal   [ x]Dry mouth   [ ]ET Tube/Trach  [ ]Oral lesions  PULMONARY:   [x ]Clear [ ]Tachypnea  [ ]Audible excessive secretions   [ ]Rhonchi        [ ]Right [ ]Left [ ]Bilateral  [ ]Crackles        [ ]Right [ ]Left [ ]Bilateral  [ ]Wheezing     [ ]Right [ ]Left [ ]Bilateral  [ ]Diminished BS [ ] Right [ ]Left [ ]Bilateral  CARDIOVASCULAR:    [ ]Regular [ ]Irregular [x ]Tachy  [ ]Alexis [ ]Murmur [ ]Other  GASTROINTESTINAL: scaphoid  [ x]Soft  [ ]Distended   [ ]+BS  [ x]Non tender [ ]Tender  [ ]PEG [ ]OGT/ NGT   Last BM:  4/30  GENITOURINARY:  [ ]Normal [x ]Incontinent   [ ]Oliguria/Anuria   [ ]Hernandez  MUSCULOSKELETAL:   [ ]Normal   [ ]Weakness  [x ]Bed/Wheelchair bound [ ]Edema  NEUROLOGIC:   [ ]No focal deficits  [ x] Cognitive impairment  [ ] Dysphagia [ ]Dysarthria [ ] Paresis [ ]Other   SKIN:   [ ]Normal  [ ]Rash   [ ]Pressure ulcer(s) [ ]y [ ]n present on admission    CRITICAL CARE:  [ ]Shock Present  [ ]Septic [ ]Cardiogenic [ ]Neurologic [ ]Hypovolemic  [ ]Vasopressors [ ]Inotropes  [ ]Respiratory failure present [ ]Mechanical Ventilation [ ]Non-invasive ventilatory support [ ]High-Flow   [ ]Acute  [ ]Chronic [ ]Hypoxic  [ ]Hypercarbic [ ]Other  [ ]Other organ failure     LABS:                         11.4   9.03  )-----------( 303      ( 04 May 2022 07:27 )             40.5   05-04    151<H>  |  106  |  29<H>  ----------------------------<  155<H>  3.1<L>   |  30  |  0.70    Ca    9.9      04 May 2022 07:26  Phos  2.3     05-02  Mg     1.8     05-03      RADIOLOGY & ADDITIONAL STUDIES: Prior pertinent imaging reviewed    PROTEIN CALORIE MALNUTRITION: [ ] mild [ ] moderate [ ] severe  [ ] underweight [ ] morbid obesity    https://www.andeal.org/vault/2440/web/files/ONC/Table_Clinical%20Characteristics%20to%20Document%20Malnutrition-White%20JV%20et%20al%202012.pdf    Height (cm): 170.2 (04-25-22 @ 17:29)  Weight (kg): 61.2 (04-25-22 @ 17:29)  BMI (kg/m2): 21.1 (04-25-22 @ 17:29)    [x] PPSV2 < or = 30% [ ] significant weight loss [ ] poor nutritional intake [ ] anasarca   Artificial Nutrition [ ]     REFERRALS:   [ ]Chaplaincy  [ ] Hospice  [ ]Child Life  [ ]Social Work  [ ]Case management [ ]Holistic Therapy [ ] Physical Therapy [ ] Dietary    GAP TEAM PALLIATIVE CARE UNIT PROGRESS NOTE:      [  ] Patient on hospice program.    INDICATION FOR PALLIATIVE CARE UNIT SERVICES/Interval HPI:  Symptom Management, Safe disposition    OVERNIGHT EVENTS: Transferred to PCU overnight. Minimally responsive, no oral route, VSS today. LBM 4/20. Continuing on IV Zosyn 7d (5/1-7) and IVF, weaned from 60cc to 30cc/hr. Used PRN IV Morphine 1mg x1 and 2mg x1 this morning. Planned for inpatient hospice disposition after christen Tinajero visits on Saturday from [place].    DNR on chart: Yes    Allergies  No Known Allergies  Intolerances    MEDICATIONS  (STANDING):  chlorhexidine 2% Cloths 1 Application(s) Topical <User Schedule>  dextrose 5% 1000 milliLiter(s) (30 mL/Hr) IV Continuous <Continuous>  metoprolol tartrate Injectable 5 milliGRAM(s) IV Push every 6 hours  pantoprazole  Injectable 40 milliGRAM(s) IV Push daily  piperacillin/tazobactam IVPB.. 3.375 Gram(s) IV Intermittent every 8 hours  valproate sodium IVPB 250 milliGRAM(s) IV Intermittent two times a day    MEDICATIONS  (PRN):  bisacodyl Suppository 10 milliGRAM(s) Rectal daily PRN Constipation  glycopyrrolate Injectable 0.4 milliGRAM(s) IV Push every 6 hours PRN secretions  LORazepam   Injectable 0.2 milliGRAM(s) IV Push every 4 hours PRN anxiety/agitation/refractory dyspnea  morphine  - Injectable 2 milliGRAM(s) IV Push every 4 hours PRN Severe Pain (7 - 10)  morphine  - Injectable 2 milliGRAM(s) IV Push every 4 hours PRN dyspnea  morphine  - Injectable 1 milliGRAM(s) IV Push every 4 hours PRN Moderate Pain (4 - 6)    ITEMS UNCHECKED ARE NOT PRESENT    PRESENT SYMPTOMS: [x]Unable to self-report  [ ]PAINADs [ ]RDOS  Source if other than patient:  [ ]Family   [ ]Team     Pain: [x] yes [ ] no - see PAINAD  QOL impact -   Location -                    Aggravating factors -  Quality -  Radiation -  Timing-  Severity (0-10 scale):  Minimal acceptable level (0-10 scale):     PAINAD Score: 2 based on my assessment   [email or web address] (Ctrl +  left click to view)    Dyspnea:                           [ ]Mild [ ]Moderate [ ]Severe    RDOS: 0  0 to 2  minimal or no respiratory distress   3  mild distress  4 to 6 moderate distress  >7 severe distress  [email or web address]/handouts/hen/Respiratory_Distress_Observation_Scale.pdf (Ctrl +  left click to view)     Anxiety:                             [ ]Mild [ ]Moderate [ ]Severe  Fatigue:                             [ ]Mild [ ]Moderate [ ]Severe  Nausea:                             [ ]Mild [ ]Moderate [ ]Severe  Loss of appetite:              [ ]Mild [ ]Moderate [ ]Severe  Constipation:                    [ ]Mild [ ]Moderate [ ]Severe  		  Other Symptoms:  [x]All other review of systems negative     Palliative Performance Status Version 2:     10%         [email or web address]    PHYSICAL EXAM:   Vital Signs Last 24 Hrs  T(C): 36.6 (04 May 2022 09:10), Max: 36.6 (04 May 2022 09:10)  T(F): 97.9 (04 May 2022 09:10), Max: 97.9 (04 May 2022 09:10)  HR: 96 (04 May 2022 09:10) (87 - 102)  BP: 138/94 (04 May 2022 09:10) (122/78 - 138/94)  BP(mean): --  RR: 18 (04 May 2022 09:10) (18 - 18)  SpO2: 96% (04 May 2022 09:10) (95% - 100%) I&O's Summary    03 May 2022 07:01  -  04 May 2022 07:00  --------------------------------------------------------  IN: 1280 mL / OUT: 2000 mL / NET: -720 mL      GENERAL:  [ ]Alert  [ ]Oriented x   [x ]Lethargic  [ ]Cachexia  [x]Unarousable  [ ]Verbal  [ ]Non-Verbal  Behavioral:   [ ]Anxiety  [ ]Delirium [ ]Agitation [ ]Other  HEENT:  [ ]Normal   [ x]Dry mouth   [ ]ET Tube/Trach  [ ]Oral lesions  PULMONARY:   [x ]Clear [ ]Tachypnea  [ ]Audible excessive secretions   [ ]Rhonchi        [ ]Right [ ]Left [ ]Bilateral  [ ]Crackles        [ ]Right [ ]Left [ ]Bilateral  [ ]Wheezing     [ ]Right [ ]Left [ ]Bilateral  [ ]Diminished BS [ ] Right [ ]Left [ ]Bilateral  CARDIOVASCULAR:    [ ]Regular [ ]Irregular [x ]Tachy  [ ]Alexis [ ]Murmur [ ]Other  GASTROINTESTINAL: scaphoid  [ x]Soft  [ ]Distended   [ ]+BS  [ x]Non tender [ ]Tender  [ ]PEG [ ]OGT/ NGT   Last BM:  4/30  GENITOURINARY:  [ ]Normal [x ]Incontinent   [ ]Oliguria/Anuria   [ ]Hernandez  MUSCULOSKELETAL:   [ ]Normal   [ ]Weakness  [x ]Bed/Wheelchair bound [ ]Edema  NEUROLOGIC:   [ ]No focal deficits  [ x] Cognitive impairment  [ ] Dysphagia [ ]Dysarthria [ ] Paresis [ ]Other   SKIN:   [ ]Normal  [ ]Rash   [ x]Pressure ulcer(s) [x ]y [ ]n present on admission. Sacrum DTI     CRITICAL CARE:  [ ]Shock Present  [ ]Septic [ ]Cardiogenic [ ]Neurologic [ ]Hypovolemic  [ ]Vasopressors [ ]Inotropes  [ ]Respiratory failure present [ ]Mechanical Ventilation [ ]Non-invasive ventilatory support [ ]High-Flow   [ ]Acute  [ ]Chronic [ ]Hypoxic  [ ]Hypercarbic [ ]Other  [ ]Other organ failure     LABS:                         11.4   9.03  )-----------( 303      ( 04 May 2022 07:27 )             40.5   05-04    151<H>  |  106  |  29<H>  ----------------------------<  155<H>  3.1<L>   |  30  |  0.70    Ca    9.9      04 May 2022 07:26  Phos  2.3     05-02  Mg     1.8     05-03      RADIOLOGY & ADDITIONAL STUDIES: Prior pertinent imaging reviewed    PROTEIN CALORIE MALNUTRITION: [ ] mild [ ] moderate [ ] severe  [ ] underweight [ ] morbid obesity    https://www.andeal.org/vault/4430/web/[email or web address]    Height (cm): 170.2 (04-25-22 @ 17:29)  Weight (kg): 61.2 (04-25-22 @ 17:29)  BMI (kg/m2): 21.1 (04-25-22 @ 17:29)    [x] PPSV2 < or = 30% [ ] significant weight loss [ ] poor nutritional intake [ ] anasarca   Artificial Nutrition [ ]     REFERRALS:   [ ]Chaplaincy  [ ] Hospice  [ ]Child Life  [ ]Social Work  [ ]Case management [ ]Holistic Therapy [ ] Physical Therapy [ ] Dietary

## 2022-05-05 NOTE — PROGRESS NOTE ADULT - PROBLEM SELECTOR PLAN 4
fast score 7b-c  on depakote for agitation  Continue Ativan 0.2mg q1h PRN (used none in last 24hrs).

## 2022-05-05 NOTE — PROGRESS NOTE ADULT - SUBJECTIVE AND OBJECTIVE BOX
GAP TEAM PALLIATIVE CARE UNIT PROGRESS NOTE:      [  ] Patient on hospice program.    INDICATION FOR PALLIATIVE CARE UNIT SERVICES/Interval HPI: Symptom Management, Safe disposition    OVERNIGHT EVENTS:   Chart Reviewed. Pt required Morphine x 1 for mod pain.  Minimally responsive, no oral route, VS stable. Last bowel movement 4/30.  Pt examined at bedside.   Continuing on IV Zosyn 7d (5/1-7) and IVF.  Planned for inpatient hospice disposition after son Lior visits on Saturday from [place].      DNR on chart: Yes  Yes      Allergies    No Known Allergies    Intolerances    MEDICATIONS  (STANDING):  chlorhexidine 2% Cloths 1 Application(s) Topical <User Schedule>  dextrose 5% 1000 milliLiter(s) (30 mL/Hr) IV Continuous <Continuous>  metoprolol tartrate Injectable 5 milliGRAM(s) IV Push every 6 hours  pantoprazole  Injectable 40 milliGRAM(s) IV Push daily  piperacillin/tazobactam IVPB.. 3.375 Gram(s) IV Intermittent every 8 hours  valproate sodium IVPB 250 milliGRAM(s) IV Intermittent two times a day    MEDICATIONS  (PRN):  bisacodyl Suppository 10 milliGRAM(s) Rectal daily PRN Constipation  glycopyrrolate Injectable 0.4 milliGRAM(s) IV Push every 6 hours PRN secretions  LORazepam   Injectable 0.2 milliGRAM(s) IV Push every 1 hour PRN anxiety/agitation/refractory dyspnea  morphine  - Injectable 2 milliGRAM(s) IV Push every 1 hour PRN Severe Pain (7 - 10)  morphine  - Injectable 2 milliGRAM(s) IV Push every 1 hour PRN dyspnea  morphine  - Injectable 1 milliGRAM(s) IV Push every 1 hour PRN Moderate Pain (4 - 6)    ITEMS UNCHECKED ARE NOT PRESENT    PRESENT SYMPTOMS: [ ]Unable to self-report  [x ]PAINADs [ ]RDOS  Source if other than patient:  [ ]Family   [ ]Team     Pain: [ x] yes [ ] no See PAINAD  QOL impact -   Location -                    Aggravating factors -  Quality -  Radiation -  Timing-  Severity (0-10 scale):  Minimal acceptable level (0-10 scale):     PAINAD Score: 1  [email or web address].pdf (Ctrl +  left click to view)    Dyspnea:                           [ ]Mild [ ]Moderate [ ]Severe    RDOS: 0  0 to 2  minimal or no respiratory distress   3  mild distress  4 to 6 moderate distress  >7 severe distress  [email or web address]_Distress_Observation_Scale.pdf (Ctrl +  left click to view)     Anxiety:                             [ ]Mild [ ]Moderate [ ]Severe  Fatigue:                             [ ]Mild [ ]Moderate [ ]Severe  Nausea:                             [ ]Mild [ ]Moderate [ ]Severe  Loss of appetite:              [ ]Mild [ ]Moderate [ ]Severe  Constipation:                    [ ]Mild [ ]Moderate [ ]Severe  		  Other Symptoms:  [x ]All other review of systems negative     Palliative Performance Status Version 2:    10  %         [email or web address]  PHYSICAL EXAM:   Vital Signs Last 24 Hrs  T(C): 36.6 (05 May 2022 07:50), Max: 36.6 (05 May 2022 07:50)  T(F): 97.9 (05 May 2022 07:50), Max: 97.9 (05 May 2022 07:50)  HR: 102 (05 May 2022 07:50) (86 - 102)  BP: 133/71 (05 May 2022 07:50) (133/71 - 145/89)  BP(mean): --  RR: 18 (05 May 2022 07:50) (18 - 18)  SpO2: 98% (05 May 2022 07:50) (98% - 98%) I&O's Summary    04 May 2022 07:01  -  05 May 2022 07:00  --------------------------------------------------------  IN: 0 mL / OUT: 300 mL / NET: -300 mL      GENERAL: [ ] Cachexia  [ ]Alert  [ ]Oriented x   [x ]Lethargic  [x ]Unarousable  [ ]Verbal  [ x]Non-Verbal  Behavioral:   [ ] Anxiety  [ ] Delirium [ ] Agitation [ ] Other  HEENT:  [ ]Normal   [x ]Dry mouth   [ ]ET Tube/Trach  [ ]Oral lesions  PULMONARY:   [ ]Clear [ ]Tachypnea  [ ]Audible excessive secretions   [ ]Rhonchi        [ ]Right [ ]Left [ ]Bilateral  [ ]Crackles        [ ]Right [ ]Left [ ]Bilateral  [ ]Wheezing     [ ]Right [ ]Left [ ]Bilateral  [ ]Diminished BS [ ]Right [ ]Left [ ]Bilateral    CARDIOVASCULAR:    [ ]Regular [ ]Irregular [ ]Tachy  [ ]Alexis [ ]Murmur [ ]Other  GASTROINTESTINAL:  [x ]Soft  [ ]Distended   [ ]+BS  [ x]Non tender [ ]Tender  [ ]Other [ ]PEG [ ]OGT/ NGT   Last BM:  4/30  GENITOURINARY:  [ ]Normal [x ] Incontinent   [ ]Oliguria/Anuria   [x]Hernandez  MUSCULOSKELETAL:   [ ]Normal   [ ]Weakness  [x]Bed/Wheelchair bound [ ]Edema  NEUROLOGIC:   [ ]No focal deficits  [x ] Cognitive impairment  [ ] Dysphagia [ ]Dysarthria [ ] Paresis [ ]Other   SKIN:   [ ]Normal  [ ]Rash  [ ]Other  [ ]Pressure ulcer(s)  [ ]y [ ]n  Present on admission      CRITICAL CARE:  [ ] Shock Present  [ ]Septic [ ]Cardiogenic [ ]Neurologic [ ]Hypovolemic  [ ]  Vasopressors [ ]  Inotropes   [ ] Respiratory failure present [ ] Mechanical Ventilation [ ] Non-invasive ventilatory support [ ] High-Flow  [ ] Acute  [ ] Chronic [ ] Hypoxic  [ ] Hypercarbic [ ] Other  [ ] Other organ failure     LABS:                        11.4   9.03  )-----------( 303      ( 04 May 2022 07:27 )             40.5   05-04    151<H>  |  106  |  29<H>  ----------------------------<  155<H>  3.1<L>   |  30  |  0.70    Ca    9.9      04 May 2022 07:26      RADIOLOGY & ADDITIONAL STUDIES: none new    PROTEIN CALORIE MALNUTRITION: [ ] mild [ ] moderate [ ] severe  [ ] underweight [ ] morbid obesity    https://www.andeal.org/vault/5530/web/files/ONC/Table_Clinical%20Characteristics%20to%20Document%20Malnutrition-White%20JV%20et%20al%202012.pdf    Height (cm): 170.2 (04-25-22 @ 17:29)  Weight (kg): 61.2 (04-25-22 @ 17:29)  BMI (kg/m2): 21.1 (04-25-22 @ 17:29)    [x] PPSV2 < or = 30% [ ] significant weight loss [ ] poor nutritional intake [ ] anasarca   Artificial Nutrition [ ]     REFERRALS:   [ x]Chaplaincy  [ ] Hospice  [ ]Child Life  [ ]Social Work  [ ]Case management [ ]Holistic Therapy [ ] Physical Therapy [ ] Dietary   Goals of Care Document:

## 2022-05-05 NOTE — PROGRESS NOTE ADULT - PROBLEM SELECTOR PLAN 2
Pt required Morphine 1mg x 1 PRN for mod pain.   Will continue w/ pain regimen seen below  Morphine 1mg q1hr PRn for mod pain   Morphine 2mg q1hr PRn for dyspnea   Morphine 2mg q1hr PRn for severe pain

## 2022-05-05 NOTE — PROGRESS NOTE ADULT - SUBJECTIVE AND OBJECTIVE BOX
JULIOCESAR BASURTO  89y Female  MRN:[id number]    Patient is a 89y old  Female who presents with a chief complaint of 89F p/w ams (26 Apr 2022 09:54)    HPI:  The patient is lethargic and unable to provide history. Therefore history is obtained from the patient's daughter ms. Larisa Knight via telephone    89F w/ dementia, meningioma, HF, DM2, afib off ac due to GI bleed p/w ams. On day of presentation, the patient's daughter went to visit the patient at her SNF and found the patient to be slumped over in her wheelchair with minimal interaction at approx 12:30pm. The patient's cognitive baseline has been reportedly declining over the course of months however she reportedly is able to have simple conversation with family. The patient has had dementia for some time but she has more rapidly declined since the end of Jan2022 which has been attributed to isolation during hospitalizations, rehab and while being in her SNF. Her daughter reports dosage adjustment of seroquel and divalproex reportedly improving her cognition over the weeks prior which is why there is surprise for current mental status. Additionally, the patient is known to have a meningioma however details of the state of disease are not known to the family.    Her daughter confirms that the patient is DNR/DNI and that the family would not want to pursue invasive surgery but is open to medical treatment and further diagnostic testing. (25 Apr 2022 22:40)      Patient seen and evaluated at bedside in PCU. No acute events overnight except as noted.    Interval HPI: no acute events o/n     PAST MEDICAL & SURGICAL HISTORY:  Diabetes    Dementia    Chronic CHF    No significant past surgical history        REVIEW OF SYSTEMS:  as per hpi     VITALS:   Vital Signs Last 24 Hrs  T(C): 36.6 (05 May 2022 07:50), Max: 36.6 (05 May 2022 07:50)  T(F): 97.9 (05 May 2022 07:50), Max: 97.9 (05 May 2022 07:50)  HR: 102 (05 May 2022 07:50) (86 - 102)  BP: 133/71 (05 May 2022 07:50) (133/71 - 145/89)  BP(mean): --  RR: 18 (05 May 2022 07:50) (18 - 18)  SpO2: 98% (05 May 2022 07:50) (98% - 98%)    PHYSICAL EXAM:  GENERAL: NAD, frail,  lethargic  HEAD:  Atraumatic, Normocephalic  EYES: EOMI, PERRLA, conjunctiva and sclera clear  NECK: Supple, No JVD  CHEST/LUNG: Clear to auscultation bilaterally; No wheeze  HEART: S1, S2; No murmurs, rubs, or gallops  ABDOMEN: Soft, Nontender, Nondistended; Bowel sounds present  EXTREMITIES:  2+ Peripheral Pulses, No clubbing, cyanosis, or edema  PSYCH: Normal affect  NEUROLOGY: AAOX0  SKIN: No rashes or lesions    Consultant(s) Notes Reviewed:  [x ] YES  [ ] NO  Care Discussed with Consultants/Other Providers [ x] YES  [ ] NO    MEDS:   MEDICATIONS  (STANDING):  chlorhexidine 2% Cloths 1 Application(s) Topical <User Schedule>  dextrose 5% 1000 milliLiter(s) (30 mL/Hr) IV Continuous <Continuous>  metoprolol tartrate Injectable 5 milliGRAM(s) IV Push every 6 hours  pantoprazole  Injectable 40 milliGRAM(s) IV Push daily  piperacillin/tazobactam IVPB.. 3.375 Gram(s) IV Intermittent every 8 hours  valproate sodium IVPB 250 milliGRAM(s) IV Intermittent two times a day    MEDICATIONS  (PRN):  bisacodyl Suppository 10 milliGRAM(s) Rectal daily PRN Constipation  glycopyrrolate Injectable 0.4 milliGRAM(s) IV Push every 6 hours PRN secretions  LORazepam   Injectable 0.2 milliGRAM(s) IV Push every 1 hour PRN anxiety/agitation/refractory dyspnea  morphine  - Injectable 2 milliGRAM(s) IV Push every 1 hour PRN Severe Pain (7 - 10)  morphine  - Injectable 2 milliGRAM(s) IV Push every 1 hour PRN dyspnea  morphine  - Injectable 1 milliGRAM(s) IV Push every 1 hour PRN Moderate Pain (4 - 6)        ALLERGIES:  No Known Allergies      LABS:                                              11.4   9.03  )-----------( 303      ( 04 May 2022 07:27 )             40.5   05-04    151<H>  |  106  |  29<H>  ----------------------------<  155<H>  3.1<L>   |  30  |  0.70    Ca    9.9      04 May 2022 07:26            cultures: Culture Results:   >100,000 CFU/ml Enterococcus species (04-25 @ 23:16)     < from: CT Venogram Brain w/ IV Cont (04.25.22 @ 23:56) >    IMPRESSION: Calcified right posterior fossa meningioma adjacent to the   right sigmoid sinus without sinus invasion.      --- End of Report ---        < end of copied text >  < from: CT Head No Cont (04.25.22 @ 19:20) >  IMPRESSION:  Right lateral tentorium/sphenoid plate region partially   calcified lesion suspicious for a meningioma in this region with the   measurements given above. Given the location possibility of infiltration   of the sigmoid sinus at this level should be considered. Venogram either   MRV or CTV may be helpful for more complete evaluation as clinically   warranted. Age-appropriate involutional changes and microvascular   ischemic disease age    --- End of Report ---      < end of copied text >

## 2022-05-05 NOTE — PROGRESS NOTE ADULT - PROBLEM SELECTOR PLAN 1
Multifactorial (UTI, Hypernatremia, Dementia/ Delirium, EOL)  Continuing IV Zosyn 7d (5/1 - 5/7) with low dose IVF. To deescalate after son Lior visits on Saturday.

## 2022-05-05 NOTE — CHART NOTE - NSCHARTNOTEFT_GEN_A_CORE
Interim Note    Pt on comfort measures only and NPO, pt inappropriate for nutrition follow up at this time.    RD remains available.   Maribell Dolan MS, RD, CDN, [facility] Pager #[phone]

## 2022-05-05 NOTE — PROGRESS NOTE ADULT - PROBLEM SELECTOR PLAN 5
PCU level of care, potential inpatient hospice disposition after family form [place] visits on Satuday 5/7  [employer] cha[facility] scheduled for 5/6

## 2022-05-06 NOTE — PROGRESS NOTE ADULT - SUBJECTIVE AND OBJECTIVE BOX
GAP TEAM PALLIATIVE CARE UNIT PROGRESS NOTE:      [  ] Patient on hospice program.    INDICATION FOR PALLIATIVE CARE UNIT SERVICES/Interval HPI:  Symptom Management, End of Life Care    OVERNIGHT EVENTS:  VSS. PainAD 5-6, relieved with IV Morphine 1mg x2 over 24hr. Remains minimally responsive, mottled skin on BLE today with decreasing UOP. Daughter Mercedes and Larisa, and DIL Yana at bedside updated. LBM 4/30, planned for Dulcolax suppository today. Son Lior traveling from [place] on 5/7 to visit.        DNR on chart: Yes  Yes    Allergies  No Known Allergies  Intolerances    MEDICATIONS  (STANDING):  chlorhexidine 2% Cloths 1 Application(s) Topical <User Schedule>  dextrose 5% 1000 milliLiter(s) (30 mL/Hr) IV Continuous <Continuous>  metoprolol tartrate Injectable 5 milliGRAM(s) IV Push every 6 hours  pantoprazole  Injectable 40 milliGRAM(s) IV Push daily  piperacillin/tazobactam IVPB.. 3.375 Gram(s) IV Intermittent every 8 hours  valproate sodium IVPB 250 milliGRAM(s) IV Intermittent two times a day    MEDICATIONS  (PRN):  acetaminophen  Suppository .. 650 milliGRAM(s) Rectal every 6 hours PRN Temp greater or equal to 38C (100.4F), Mild Pain (1 - 3)  bisacodyl Suppository 10 milliGRAM(s) Rectal daily PRN Constipation  glycopyrrolate Injectable 0.4 milliGRAM(s) IV Push every 6 hours PRN secretions  LORazepam   Injectable 0.2 milliGRAM(s) IV Push every 1 hour PRN anxiety/agitation/refractory dyspnea  morphine  - Injectable 2 milliGRAM(s) IV Push every 1 hour PRN Severe Pain (7 - 10)  morphine  - Injectable 2 milliGRAM(s) IV Push every 1 hour PRN dyspnea  morphine  - Injectable 1 milliGRAM(s) IV Push every 1 hour PRN Moderate Pain (4 - 6)    ITEMS UNCHECKED ARE NOT PRESENT    PRESENT SYMPTOMS: [x]Unable to self-report  [ ]PAINADs [ ]RDOS  Source if other than patient:  [x ]Family   [x ]Team     Pain: [x ] yes [ ] no  QOL impact -   Location -                    Aggravating factors -  Quality -  Radiation -  Timing-  Severity (0-10 scale):  Minimal acceptable level (0-10 scale):     PAINAD Score: 5-6  [email or web address]/[email or web address].pdf (Ctrl +  left click to view)    Dyspnea:                           [ ]Mild [ ]Moderate [ ]Severe    RDOS: 1  0 to 2  minimal or no respiratory distress   3  mild distress  4 to 6 moderate distress  >7 severe distress  [email or web address] (Ctrl +  left click to view)     Anxiety:                             [ ]Mild [ ]Moderate [ ]Severe  Fatigue:                             [ ]Mild [ ]Moderate [ ]Severe  Nausea:                             [ ]Mild [ ]Moderate [ ]Severe  Loss of appetite:              [ ]Mild [ ]Moderate [ ]Severe  Constipation:                    [ ]Mild [ ]Moderate [ ]Severe  		  Other Symptoms:  [x]All other review of systems negative     Palliative Performance Status Version 2:    10 %         [email or web address]_[email or web address]    PHYSICAL EXAM:   Vital Signs Last 24 Hrs  T(C): 36.8 (06 May 2022 08:08), Max: 36.8 (06 May 2022 08:08)  T(F): 98.3 (06 May 2022 08:08), Max: 98.3 (06 May 2022 08:08)  HR: 103 (06 May 2022 08:08) (96 - 109)  BP: 132/85 (06 May 2022 08:08) (112/74 - 158/84)  BP(mean): --  RR: 18 (06 May 2022 08:08) (18 - 18)  SpO2: 97% (06 May 2022 08:08) (97% - 97%) I&O's Summary    05 May 2022 07:01  -  06 May 2022 07:00  --------------------------------------------------------  IN: 0 mL / OUT: 650 mL / NET: -650 mL      GENERAL: [ ] Cachexia  [ ]Alert  [ ]Oriented x   [x ]Lethargic  [x ]Unarousable  [ ]Verbal  [ x]Non-Verbal  Behavioral:   [ ] Anxiety  [ ] Delirium [ ] Agitation [ ] Other  HEENT:  [ ]Normal   [x ]Dry mouth   [ ]ET Tube/Trach  [ ]Oral lesions  PULMONARY:   [ ]Clear [ ]Tachypnea  [ ]Audible excessive secretions   [ ]Rhonchi        [ ]Right [ ]Left [ ]Bilateral  [ ]Crackles        [ ]Right [ ]Left [ ]Bilateral  [ ]Wheezing     [ ]Right [ ]Left [ ]Bilateral  [x]Diminished BS [ ]Right [ ]Left [x]Bilateral    CARDIOVASCULAR:    [x]Regular [ ]Irregular [ ]Tachy  [ ]Alexis [ ]Murmur [ ]Other  GASTROINTESTINAL:  [x ]Soft  [ ]Distended   [ ]+BS  [ x]Non tender [ ]Tender  [ ]Other [ ]PEG [ ]OGT/ NGT   Last BM:  4/30  GENITOURINARY:  [ ]Normal [x ] Incontinent   [ ]Oliguria/Anuria   [x]Hernandez  MUSCULOSKELETAL:   [ ]Normal   [ ]Weakness  [x]Bed/Wheelchair bound [ ]Edema  NEUROLOGIC:   [ ]No focal deficits  [x ] Cognitive impairment  [ ] Dysphagia [ ]Dysarthria [ ] Paresis [ ]Other   SKIN:   [ ]Normal  [ ]Rash    [x]Other - mottled skin bilateral lower extremities  [ ]Pressure ulcer(s)  [ ]y [ ]n  Present on admission  - See RN assessments    CRITICAL CARE:  [ ] Shock Present  [ ]Septic [ ]Cardiogenic [ ]Neurologic [ ]Hypovolemic  [ ]  Vasopressors [ ]  Inotropes   [ ] Respiratory failure present [ ] Mechanical Ventilation [ ] Non-invasive ventilatory support [ ] High-Flow  [ ] Acute  [ ] Chronic [ ] Hypoxic  [ ] Hypercarbic [ ] Other  [X] Other organ failure: Brain         LABS: None New    RADIOLOGY & ADDITIONAL STUDIES: NoneNew    PROTEIN CALORIE MALNUTRITION: [ ] mild [ ] moderate [ ] severe  [ ] underweight [ ] morbid obesity    https://www.andeal.org/vault/2440/web/files/ONC/Table_Clinical%20Characteristics%20to%20Document%20Malnutrition-White%20JV%20et%20al%202012.pdf    Height (cm): 170.2 (04-25-22 @ 17:29)  Weight (kg): 61.2 (04-25-22 @ 17:29)  BMI (kg/m2): 21.1 (04-25-22 @ 17:29)    [x] PPSV2 < or = 30% [ ] significant weight loss [x] poor nutritional intake [ ] anasarca   Artificial Nutrition [ ]     REFERRALS:   [ ]Chaplaincy  [x] Hospice  [ ]Child Life  [ ]Social Work  [ ]Case management [ ]Holistic Therapy [ ] Physical Therapy [ ] Dietary   Goals of Care Document:

## 2022-05-06 NOTE — PROGRESS NOTE ADULT - PROBLEM SELECTOR PLAN 2
Pt required Morphine 1mg x 2 PRN for mod pain.   Will continue w/ pain regimen seen below  Morphine 1mg q1hr PRn for mod pain   Morphine 2mg q1hr PRn for dyspnea   Morphine 2mg q1hr PRn for severe pain

## 2022-05-06 NOTE — PROGRESS NOTE ADULT - NS ATTEST RISK PROBLEM GEN_ALL_CORE FT
Parenteral opioids
Parenteral opioids    MEDICATIONS  (STANDING):  chlorhexidine 2% Cloths 1 Application(s) Topical <User Schedule>  dextrose 5% 1000 milliLiter(s) (30 mL/Hr) IV Continuous <Continuous>  metoprolol tartrate Injectable 5 milliGRAM(s) IV Push every 6 hours  pantoprazole  Injectable 40 milliGRAM(s) IV Push daily  piperacillin/tazobactam IVPB.. 3.375 Gram(s) IV Intermittent every 8 hours  valproate sodium IVPB 250 milliGRAM(s) IV Intermittent two times a day    MEDICATIONS  (PRN):  bisacodyl Suppository 10 milliGRAM(s) Rectal daily PRN Constipation  glycopyrrolate Injectable 0.4 milliGRAM(s) IV Push every 6 hours PRN secretions  LORazepam   Injectable 0.2 milliGRAM(s) IV Push every 1 hour PRN anxiety/agitation/refractory dyspnea  morphine  - Injectable 2 milliGRAM(s) IV Push every 1 hour PRN Severe Pain (7 - 10)  morphine  - Injectable 2 milliGRAM(s) IV Push every 1 hour PRN dyspnea  morphine  - Injectable 1 milliGRAM(s) IV Push every 1 hour PRN Moderate Pain (4 - 6)

## 2022-05-06 NOTE — PROGRESS NOTE ADULT - PROBLEM SELECTOR PLAN 5
PCU level of care, potential inpatient hospice disposition after son Lior from [place] visits on Saturday 5/7, if stable for transfer after the weekend.  Guarded Prognosis: hours-days. Family updated.  [employer]  visited with family at bedside on 5/6.

## 2022-05-06 NOTE — PROGRESS NOTE ADULT - SUBJECTIVE AND OBJECTIVE BOX
JULIOCESAR BASURTO  89y Female  MRN:[id number]    Patient is a 89y old  Female who presents with a chief complaint of 89F p/w ams (26 Apr 2022 09:54)    HPI:  The patient is lethargic and unable to provide history. Therefore history is obtained from the patient's daughter ms. Larisa Knight via telephone    89F w/ dementia, meningioma, HF, DM2, afib off ac due to GI bleed p/w ams. On day of presentation, the patient's daughter went to visit the patient at her SNF and found the patient to be slumped over in her wheelchair with minimal interaction at approx 12:30pm. The patient's cognitive baseline has been reportedly declining over the course of months however she reportedly is able to have simple conversation with family. The patient has had dementia for some time but she has more rapidly declined since the end of Jan2022 which has been attributed to isolation during hospitalizations, rehab and while being in her SNF. Her daughter reports dosage adjustment of seroquel and divalproex reportedly improving her cognition over the weeks prior which is why there is surprise for current mental status. Additionally, the patient is known to have a meningioma however details of the state of disease are not known to the family.    Her daughter confirms that the patient is DNR/DNI and that the family would not want to pursue invasive surgery but is open to medical treatment and further diagnostic testing. (25 Apr 2022 22:40)      Patient seen and evaluated at bedside in PCU. No acute events overnight except as noted.    Interval HPI: no acute events o/n     PAST MEDICAL & SURGICAL HISTORY:  Diabetes    Dementia    Chronic CHF    No significant past surgical history        REVIEW OF SYSTEMS:  as per hpi     VITALS:   Vital Signs Last 24 Hrs  T(C): 36.8 (06 May 2022 08:08), Max: 36.8 (06 May 2022 08:08)  T(F): 98.3 (06 May 2022 08:08), Max: 98.3 (06 May 2022 08:08)  HR: 103 (06 May 2022 08:08) (96 - 109)  BP: 132/85 (06 May 2022 08:08) (112/74 - 158/84)  BP(mean): --  RR: 18 (06 May 2022 08:08) (18 - 18)  SpO2: 97% (06 May 2022 08:08) (97% - 97%)    PHYSICAL EXAM:  GENERAL: NAD, frail,  lethargic  HEAD:  Atraumatic, Normocephalic  EYES: EOMI, PERRLA, conjunctiva and sclera clear  NECK: Supple, No JVD  CHEST/LUNG: Clear to auscultation bilaterally; No wheeze  HEART: S1, S2; No murmurs, rubs, or gallops  ABDOMEN: Soft, Nontender, Nondistended; Bowel sounds present  EXTREMITIES:  2+ Peripheral Pulses, No clubbing, cyanosis, or edema  NEUROLOGY: AAOX0  SKIN: No rashes or lesions    Consultant(s) Notes Reviewed:  [x ] YES  [ ] NO  Care Discussed with Consultants/Other Providers [ x] YES  [ ] NO    MEDS:   MEDICATIONS  (STANDING):  chlorhexidine 2% Cloths 1 Application(s) Topical <User Schedule>  dextrose 5% 1000 milliLiter(s) (30 mL/Hr) IV Continuous <Continuous>  metoprolol tartrate Injectable 5 milliGRAM(s) IV Push every 6 hours  pantoprazole  Injectable 40 milliGRAM(s) IV Push daily  piperacillin/tazobactam IVPB.. 3.375 Gram(s) IV Intermittent every 8 hours  valproate sodium IVPB 250 milliGRAM(s) IV Intermittent two times a day    MEDICATIONS  (PRN):  bisacodyl Suppository 10 milliGRAM(s) Rectal daily PRN Constipation  glycopyrrolate Injectable 0.4 milliGRAM(s) IV Push every 6 hours PRN secretions  LORazepam   Injectable 0.2 milliGRAM(s) IV Push every 1 hour PRN anxiety/agitation/refractory dyspnea  morphine  - Injectable 2 milliGRAM(s) IV Push every 1 hour PRN Severe Pain (7 - 10)  morphine  - Injectable 2 milliGRAM(s) IV Push every 1 hour PRN dyspnea  morphine  - Injectable 1 milliGRAM(s) IV Push every 1 hour PRN Moderate Pain (4 - 6)        ALLERGIES:  No Known Allergies      LABS:                                                cultures: Culture Results:   >100,000 CFU/ml Enterococcus species (04-25 @ 23:16)     < from: CT Venogram Brain w/ IV Cont (04.25.22 @ 23:56) >    IMPRESSION: Calcified right posterior fossa meningioma adjacent to the   right sigmoid sinus without sinus invasion.      --- End of Report ---        < end of copied text >  < from: CT Head No Cont (04.25.22 @ 19:20) >  IMPRESSION:  Right lateral tentorium/sphenoid plate region partially   calcified lesion suspicious for a meningioma in this region with the   measurements given above. Given the location possibility of infiltration   of the sigmoid sinus at this level should be considered. Venogram either   MRV or CTV may be helpful for more complete evaluation as clinically   warranted. Age-appropriate involutional changes and microvascular   ischemic disease age    --- End of Report ---      < end of copied text >

## 2022-05-06 NOTE — PROGRESS NOTE ADULT - PROBLEM SELECTOR PLAN 1
Multifactorial (UTI, Hypernatremia, Dementia/ Delirium, EOL)  Continuing IV Zosyn 7d (5/1 - 5/7) with low dose IVF. To deescalate after son Lior visits on Saturday 5/7.

## 2022-05-07 NOTE — PROGRESS NOTE ADULT - PROBLEM SELECTOR PROBLEM 3
Attempted to contact mother using [employer]. No answer and unable to leave a voice mail on two calls. Will attempt to contact later this week to check on patient and schedule follow up with Dr. Hummel in 1-2 weeks due to surgery being postponed.    Pain

## 2022-05-07 NOTE — PROGRESS NOTE ADULT - SUBJECTIVE AND OBJECTIVE BOX
GAP TEAM PALLIATIVE CARE UNIT PROGRESS NOTE:      [  ] Patient on hospice program.    INDICATION FOR PALLIATIVE CARE UNIT SERVICES/Interval HPI: In PCU for eol care and symptom management. Patient not interactive with me this AM. 24hr PRN use: morphine 2mg IV x3 for sob, morphine 1mg IV x1 for moderate pain, ativan 0.2mg IV x1 for sob. Started on morphine 2mg IV q6 ATC.      DNR on chart: Yes  Yes      Allergies    No Known Allergies    Intolerances    MEDICATIONS  (STANDING):  chlorhexidine 2% Cloths 1 Application(s) Topical <User Schedule>  dextrose 5% 1000 milliLiter(s) (30 mL/Hr) IV Continuous <Continuous>  metoprolol tartrate Injectable 5 milliGRAM(s) IV Push every 6 hours  morphine  - Injectable 2 milliGRAM(s) IV Push every 6 hours  pantoprazole  Injectable 40 milliGRAM(s) IV Push daily  piperacillin/tazobactam IVPB.. 3.375 Gram(s) IV Intermittent every 8 hours  valproate sodium IVPB 250 milliGRAM(s) IV Intermittent two times a day    MEDICATIONS  (PRN):  acetaminophen  Suppository .. 650 milliGRAM(s) Rectal every 6 hours PRN Temp greater or equal to 38C (100.4F), Mild Pain (1 - 3)  bisacodyl Suppository 10 milliGRAM(s) Rectal daily PRN Constipation  glycopyrrolate Injectable 0.4 milliGRAM(s) IV Push every 6 hours PRN secretions  LORazepam   Injectable 0.2 milliGRAM(s) IV Push every 1 hour PRN anxiety/agitation/refractory dyspnea  morphine  - Injectable 2 milliGRAM(s) IV Push every 1 hour PRN Severe Pain (7 - 10)  morphine  - Injectable 2 milliGRAM(s) IV Push every 1 hour PRN dyspnea  morphine  - Injectable 1 milliGRAM(s) IV Push every 1 hour PRN Moderate Pain (4 - 6)    ITEMS UNCHECKED ARE NOT PRESENT    PRESENT SYMPTOMS: [ x]Unable to self-report  [x ]PAINADs [ x]RDOS  Source if other than patient:  [ ]Family   [ ]Team     Pain: [ ] yes [ x] no - see painad  QOL impact -   Location -                    Aggravating factors -  Quality -  Radiation -  Timing-  Severity (0-10 scale):  Minimal acceptable level (0-10 scale):     PAINAD Score:  0  [email or web address] (Ctrl +  left click to view)    Dyspnea:                           [ ]Mild [ ]Moderate [ ]Severe    RDOS:  0  0 to 2  minimal or no respiratory distress   3  mild distress  4 to 6 moderate distress  >7 severe distress  [email or web address] (Ctrl +  left click to view)     Anxiety:                             [ ]Mild [ ]Moderate [ ]Severe  Fatigue:                             [ ]Mild [ ]Moderate [ ]Severe  Nausea:                             [ ]Mild [ ]Moderate [ ]Severe  Loss of appetite:              [ ]Mild [ ]Moderate [ ]Severe  Constipation:                    [ ]Mild [ ]Moderate [ ]Severe  		  Other Symptoms:  [x ]All other review of systems negative     Palliative Performance Status Version 2:     10    %         [email or web address]  PHYSICAL EXAM:   Vital Signs Last 24 Hrs  T(C): --  T(F): --  HR: 103 (07 May 2022 05:28) (102 - 103)  BP: 110/78 (07 May 2022 05:28) (106/72 - 110/78)  BP(mean): --  RR: --  SpO2: -- I&O's Summary    06 May 2022 07:01  -  07 May 2022 07:00  --------------------------------------------------------  IN: 0 mL / OUT: 350 mL / NET: -350 mL      GENERAL: [ ] Cachexia  [ ]Alert  [ ]Oriented x   [ ]Lethargic  [ x]Unarousable  [ ]Verbal  [ x]Non-Verbal  Behavioral:   [ ] Anxiety  [ ] Delirium [ ] Agitation [x ] Other - calm  HEENT:  [ ]Normal   [x ]Dry mouth   [ ]ET Tube/Trach  [ ]Oral lesions  PULMONARY:   [x ]Clear [ ]Tachypnea  [ ]Audible excessive secretions   [ ]Rhonchi        [ ]Right [ ]Left [ ]Bilateral  [ ]Crackles        [ ]Right [ ]Left [ ]Bilateral  [ ]Wheezing     [ ]Right [ ]Left [ ]Bilateral  [ ]Diminished BS [ ]Right [ ]Left [ ]Bilateral    CARDIOVASCULAR:    [x ]Regular [ ]Irregular [ ]Tachy  [ ]Alexis [ ]Murmur [ ]Other  GASTROINTESTINAL:  [x ]Soft  [ ]Distended   [x ]+BS  [x ]Non tender [ ]Tender  [ ]Other [ ]PEG [ ]OGT/ NGT   Last BM:    GENITOURINARY:  [ ]Normal [x ] Incontinent   [ ]Oliguria/Anuria   [x ]Hernandez  MUSCULOSKELETAL:   [ ]Normal   [x ]Weakness  [ ]Bed/Wheelchair bound [ ]Edema  NEUROLOGIC:   [ ]No focal deficits  [x ] Cognitive impairment  [ ] Dysphagia [ ]Dysarthria [ ] Paresis [ ]Other   SKIN:   [ ]Normal  [x ]Rash - IAD, sacral DTI  [ ]Other  [ ]Pressure ulcer(s)  [ ]y [ ]n  Present on admission      CRITICAL CARE:  [ ] Shock Present  [ ]Septic [ ]Cardiogenic [ ]Neurologic [ ]Hypovolemic  [ ]  Vasopressors [ ]  Inotropes   [ ] Respiratory failure present [ ] Mechanical Ventilation [ ] Non-invasive ventilatory support [ ] High-Flow  [ ] Acute  [ ] Chronic [ ] Hypoxic  [ ] Hypercarbic [ ] Other  [ ] Other organ failure     LABS: none new       RADIOLOGY & ADDITIONAL STUDIES: none new     PROTEIN CALORIE MALNUTRITION: [ ] mild [ ] moderate [ ] severe  [ ] underweight [ ] morbid obesity    https://www.andeal.org/vault/2440/web/files/ONC/Table_Clinical%20Characteristics%20to%20Document%20Malnutrition-White%20JV%20et%20al%202012.pdf    Height (cm): 170.2 (04-25-22 @ 17:29)  Weight (kg): 61.2 (04-25-22 @ 17:29)  BMI (kg/m2): 21.1 (04-25-22 @ 17:29)    [ x] PPSV2 < or = 30% [ ] significant weight loss [ x] poor nutritional intake [ ] anasarca   Artificial Nutrition [ ]     REFERRALS:   [ ]Chaplaincy  [ ] Hospice  [ ]Child Life  [ x]Social Work  [ ]Case management [ ]Holistic Therapy [ ] Physical Therapy [ ] Dietary   Goals of Care Document:  GAP TEAM PALLIATIVE CARE UNIT PROGRESS NOTE:      [  ] Patient on hospice program.    INDICATION FOR PALLIATIVE CARE UNIT SERVICES/Interval HPI: In PCU for eol care and symptom management. Patient not interactive with me this AM. 24hr PRN use: morphine 2mg IV x3 for sob, morphine 1mg IV x1 for moderate pain, ativan 0.2mg IV x1 for sob. Started on morphine 2mg IV q6 ATC.      DNR on chart: Yes  Yes      Allergies    No Known Allergies    Intolerances    MEDICATIONS  (STANDING):  chlorhexidine 2% Cloths 1 Application(s) Topical <User Schedule>  dextrose 5% 1000 milliLiter(s) (30 mL/Hr) IV Continuous <Continuous>  metoprolol tartrate Injectable 5 milliGRAM(s) IV Push every 6 hours  morphine  - Injectable 2 milliGRAM(s) IV Push every 6 hours  pantoprazole  Injectable 40 milliGRAM(s) IV Push daily  piperacillin/tazobactam IVPB.. 3.375 Gram(s) IV Intermittent every 8 hours  valproate sodium IVPB 250 milliGRAM(s) IV Intermittent two times a day    MEDICATIONS  (PRN):  acetaminophen  Suppository .. 650 milliGRAM(s) Rectal every 6 hours PRN Temp greater or equal to 38C (100.4F), Mild Pain (1 - 3)  bisacodyl Suppository 10 milliGRAM(s) Rectal daily PRN Constipation  glycopyrrolate Injectable 0.4 milliGRAM(s) IV Push every 6 hours PRN secretions  LORazepam   Injectable 0.2 milliGRAM(s) IV Push every 1 hour PRN anxiety/agitation/refractory dyspnea  morphine  - Injectable 2 milliGRAM(s) IV Push every 1 hour PRN Severe Pain (7 - 10)  morphine  - Injectable 2 milliGRAM(s) IV Push every 1 hour PRN dyspnea  morphine  - Injectable 1 milliGRAM(s) IV Push every 1 hour PRN Moderate Pain (4 - 6)    ITEMS UNCHECKED ARE NOT PRESENT    PRESENT SYMPTOMS: [ x]Unable to self-report  [x ]PAINADs [ x]RDOS  Source if other than patient:  [ ]Family   [ ]Team     Pain: [ ] yes [ x] no - see painad  QOL impact -   Location -                    Aggravating factors -  Quality -  Radiation -  Timing-  Severity (0-10 scale):  Minimal acceptable level (0-10 scale):     PAINAD Score:  0  [email or web address] (Ctrl +  left click to view)    Dyspnea:                           [ ]Mild [ ]Moderate [ ]Severe    RDOS:  0  0 to 2  minimal or no respiratory distress   3  mild distress  4 to 6 moderate distress  >7 severe distress  [email or web address] (Ctrl +  left click to view)     Anxiety:                             [ ]Mild [ ]Moderate [ ]Severe  Fatigue:                             [ ]Mild [ ]Moderate [ ]Severe  Nausea:                             [ ]Mild [ ]Moderate [ ]Severe  Loss of appetite:              [ ]Mild [ ]Moderate [ ]Severe  Constipation:                    [ ]Mild [ ]Moderate [ ]Severe  		  Other Symptoms:  [x ]All other review of systems negative     Palliative Performance Status Version 2:     10    %         [email or web address]_[email or web address]  PHYSICAL EXAM:   Vital Signs Last 24 Hrs  T(C): --  T(F): --  HR: 103 (07 May 2022 05:28) (102 - 103)  BP: 110/78 (07 May 2022 05:28) (106/72 - 110/78)  BP(mean): --  RR: --  SpO2: -- I&O's Summary    06 May 2022 07:01  -  07 May 2022 07:00  --------------------------------------------------------  IN: 0 mL / OUT: 350 mL / NET: -350 mL      GENERAL: [ ] Cachexia  [ ]Alert  [ ]Oriented x   [ ]Lethargic  [ x]Unarousable  [ ]Verbal  [ x]Non-Verbal  Behavioral:   [ ] Anxiety  [ ] Delirium [ ] Agitation [x ] Other - calm  HEENT:  [ ]Normal   [x ]Dry mouth   [ ]ET Tube/Trach  [ ]Oral lesions  PULMONARY:   [x ]Clear [ ]Tachypnea  [ ]Audible excessive secretions   [ ]Rhonchi        [ ]Right [ ]Left [ ]Bilateral  [ ]Crackles        [ ]Right [ ]Left [ ]Bilateral  [ ]Wheezing     [ ]Right [ ]Left [ ]Bilateral  [ ]Diminished BS [ ]Right [ ]Left [ ]Bilateral    CARDIOVASCULAR:    [x ]Regular [ ]Irregular [ ]Tachy  [ ]Alexis [ ]Murmur [ ]Other  GASTROINTESTINAL:  [x ]Soft  [ ]Distended   [x ]+BS  [x ]Non tender [ ]Tender  [ ]Other [ ]PEG [ ]OGT/ NGT   Last BM:  4/28  GENITOURINARY:  [ ]Normal [x ] Incontinent   [ ]Oliguria/Anuria   [x ]Hernandez  MUSCULOSKELETAL:   [ ]Normal   [x ]Weakness  [ ]Bed/Wheelchair bound [ ]Edema  NEUROLOGIC:   [ ]No focal deficits  [x ] Cognitive impairment  [ ] Dysphagia [ ]Dysarthria [ ] Paresis [ ]Other   SKIN:   [ ]Normal  [x ]Rash - IAD, sacral DTI  [ ]Other  [ ]Pressure ulcer(s)  [ ]y [ ]n  Present on admission      CRITICAL CARE:  [ ] Shock Present  [ ]Septic [ ]Cardiogenic [ ]Neurologic [ ]Hypovolemic  [ ]  Vasopressors [ ]  Inotropes   [ ] Respiratory failure present [ ] Mechanical Ventilation [ ] Non-invasive ventilatory support [ ] High-Flow  [ ] Acute  [ ] Chronic [ ] Hypoxic  [ ] Hypercarbic [ ] Other  [ ] Other organ failure     LABS: none new       RADIOLOGY & ADDITIONAL STUDIES: none new     PROTEIN CALORIE MALNUTRITION: [ ] mild [ ] moderate [ ] severe  [ ] underweight [ ] morbid obesity    https://www.andeal.org/vault/2440/web/files/ONC/Table_Clinical%20Characteristics%20to%20Document%20Malnutrition-White%20JV%20et%20al%202012.pdf    Height (cm): 170.2 (04-25-22 @ 17:29)  Weight (kg): 61.2 (04-25-22 @ 17:29)  BMI (kg/m2): 21.1 (04-25-22 @ 17:29)    [ x] PPSV2 < or = 30% [ ] significant weight loss [ x] poor nutritional intake [ ] anasarca   Artificial Nutrition [ ]     REFERRALS:   [ ]Chaplaincy  [ ] Hospice  [ ]Child Life  [ x]Social Work  [ ]Case management [ ]Holistic Therapy [ ] Physical Therapy [ ] Dietary   Goals of Care Document:

## 2022-05-08 NOTE — PROGRESS NOTE ADULT - SUBJECTIVE AND OBJECTIVE BOX
JLUIOCESAR BASURTO  89y Female  MRN:[id number]    Patient is a 89y old  Female who presents with a chief complaint of 89F p/w ams (26 Apr 2022 09:54)    HPI:  The patient is lethargic and unable to provide history. Therefore history is obtained from the patient's daughter ms. Larisa Knight via telephone    89F w/ dementia, meningioma, HF, DM2, afib off ac due to GI bleed p/w ams. On day of presentation, the patient's daughter went to visit the patient at her SNF and found the patient to be slumped over in her wheelchair with minimal interaction at approx 12:30pm. The patient's cognitive baseline has been reportedly declining over the course of months however she reportedly is able to have simple conversation with family. The patient has had dementia for some time but she has more rapidly declined since the end of Jan2022 which has been attributed to isolation during hospitalizations, rehab and while being in her SNF. Her daughter reports dosage adjustment of seroquel and divalproex reportedly improving her cognition over the weeks prior which is why there is surprise for current mental status. Additionally, the patient is known to have a meningioma however details of the state of disease are not known to the family.    Her daughter confirms that the patient is DNR/DNI and that the family would not want to pursue invasive surgery but is open to medical treatment and further diagnostic testing. (25 Apr 2022 22:40)      Patient seen and evaluated at bedside in PCU. No acute events overnight except as noted.    Interval HPI: no acute events o/n     PAST MEDICAL & SURGICAL HISTORY:  Diabetes    Dementia    Chronic CHF    No significant past surgical history        REVIEW OF SYSTEMS:  as per hpi     VITALS:   Vital Signs Last 24 Hrs  T(C): 36.5 (08 May 2022 07:48), Max: 36.5 (08 May 2022 07:48)  T(F): 97.7 (08 May 2022 07:48), Max: 97.7 (08 May 2022 07:48)  HR: 108 (08 May 2022 07:48) (89 - 108)  BP: 114/63 (08 May 2022 07:48) (114/63 - 137/83)  BP(mean): --  RR: --  SpO2: 96% (08 May 2022 07:48) (96% - 96%)    PHYSICAL EXAM:  GENERAL: NAD, frail,  lethargic  HEAD:  Atraumatic, Normocephalic  EYES: EOMI, PERRLA, conjunctiva and sclera clear  NECK: Supple, No JVD  CHEST/LUNG: Clear to auscultation bilaterally; No wheeze  HEART: S1, S2; No murmurs, rubs, or gallops  ABDOMEN: Soft, Nontender, Nondistended; Bowel sounds present  EXTREMITIES:  2+ Peripheral Pulses, No clubbing, cyanosis, or edema  NEUROLOGY: AAOX0  SKIN: No rashes or lesions    Consultant(s) Notes Reviewed:  [x ] YES  [ ] NO  Care Discussed with Consultants/Other Providers [ x] YES  [ ] NO    MEDS:   MEDICATIONS  (STANDING):  chlorhexidine 2% Cloths 1 Application(s) Topical <User Schedule>  metoprolol tartrate Injectable 5 milliGRAM(s) IV Push every 6 hours  morphine  - Injectable 2 milliGRAM(s) IV Push every 6 hours  pantoprazole  Injectable 40 milliGRAM(s) IV Push daily  valproate sodium IVPB 250 milliGRAM(s) IV Intermittent two times a day    MEDICATIONS  (PRN):  acetaminophen  Suppository .. 650 milliGRAM(s) Rectal every 6 hours PRN Temp greater or equal to 38C (100.4F), Mild Pain (1 - 3)  bisacodyl Suppository 10 milliGRAM(s) Rectal daily PRN Constipation  glycopyrrolate Injectable 0.4 milliGRAM(s) IV Push every 6 hours PRN secretions  LORazepam   Injectable 0.2 milliGRAM(s) IV Push every 1 hour PRN anxiety/agitation/refractory dyspnea  morphine  - Injectable 2 milliGRAM(s) IV Push every 1 hour PRN Severe Pain (7 - 10)  morphine  - Injectable 1 milliGRAM(s) IV Push every 1 hour PRN Moderate Pain (4 - 6)  morphine  - Injectable 2 milliGRAM(s) IV Push every 1 hour PRN dyspnea      ALLERGIES:  No Known Allergies      LABS:                                                cultures: Culture Results:   >100,000 CFU/ml Enterococcus species (04-25 @ 23:16)     < from: CT Venogram Brain w/ IV Cont (04.25.22 @ 23:56) >    IMPRESSION: Calcified right posterior fossa meningioma adjacent to the   right sigmoid sinus without sinus invasion.      --- End of Report ---        < end of copied text >  < from: CT Head No Cont (04.25.22 @ 19:20) >  IMPRESSION:  Right lateral tentorium/sphenoid plate region partially   calcified lesion suspicious for a meningioma in this region with the   measurements given above. Given the location possibility of infiltration   of the sigmoid sinus at this level should be considered. Venogram either   MRV or CTV may be helpful for more complete evaluation as clinically   warranted. Age-appropriate involutional changes and microvascular   ischemic disease age    --- End of Report ---      < end of copied text >

## 2022-05-08 NOTE — PROGRESS NOTE ADULT - SUBJECTIVE AND OBJECTIVE BOX
GAP TEAM PALLIATIVE CARE UNIT PROGRESS NOTE:      [  ] Patient on hospice program.    INDICATION FOR PALLIATIVE CARE UNIT SERVICES: Symptom Management, End of Life Care    INTERVAL HPI/OVERNIGHT EVENTS:   Over the last 24 hour period, patient required PRNs of 2mg IV morphine x1 for sob. She was seen this morning, unarouseable but did not appear in acute distress.     DNR on chart:   Allergies    No Known Allergies    Intolerances    MEDICATIONS  (STANDING):  chlorhexidine 2% Cloths 1 Application(s) Topical <User Schedule>  metoprolol tartrate Injectable 5 milliGRAM(s) IV Push every 6 hours  morphine  - Injectable 2 milliGRAM(s) IV Push every 6 hours  pantoprazole  Injectable 40 milliGRAM(s) IV Push daily  valproate sodium IVPB 250 milliGRAM(s) IV Intermittent two times a day    MEDICATIONS  (PRN):  acetaminophen  Suppository .. 650 milliGRAM(s) Rectal every 6 hours PRN Temp greater or equal to 38C (100.4F), Mild Pain (1 - 3)  bisacodyl Suppository 10 milliGRAM(s) Rectal daily PRN Constipation  glycopyrrolate Injectable 0.4 milliGRAM(s) IV Push every 6 hours PRN secretions  LORazepam   Injectable 0.2 milliGRAM(s) IV Push every 1 hour PRN anxiety/agitation/refractory dyspnea  morphine  - Injectable 2 milliGRAM(s) IV Push every 1 hour PRN Severe Pain (7 - 10)  morphine  - Injectable 1 milliGRAM(s) IV Push every 1 hour PRN Moderate Pain (4 - 6)  morphine  - Injectable 2 milliGRAM(s) IV Push every 1 hour PRN dyspnea    ITEMS UNCHECKED ARE NOT PRESENT    PRESENT SYMPTOMS: [x ]Unable to obtain due to poor mentation   Source if other than patient:  [ ]Family   [ ]Team     Pain: [ ] yes [ ] no- See PAIN AD score   QOL impact -   Location -                    Aggravating factors -  Quality -  Radiation -  Timing-  Severity (0-10 scale):  Minimal acceptable level (0-10 scale):     Dyspnea:                           [ ]Mild [ ]Moderate [ ]Severe  Anxiety:                             [ ]Mild [ ]Moderate [ ]Severe  Fatigue:                             [ ]Mild [ ]Moderate [ ]Severe  Nausea:                             [ ]Mild [ ]Moderate [ ]Severe  Loss of appetite:              [ ]Mild [ ]Moderate [ ]Severe  Constipation:                    [ ]Mild [ ]Moderate [ ]Severe    PAINAD Score: 0    [email or web address] (Ctrl +  left click to view)  		  Other Symptoms:  [ ]All other review of systems negative     Palliative Performance Status Version 2:     10    %         [email or web address]/[email or web address]_performance_[email or web address]_[email or web address].pdf  PHYSICAL EXAM:  Vital Signs Last 24 Hrs  T(C): 36.5 (08 May 2022 07:48), Max: 36.5 (08 May 2022 07:48)  T(F): 97.7 (08 May 2022 07:48), Max: 97.7 (08 May 2022 07:48)  HR: 108 (08 May 2022 07:48) (89 - 108)  BP: 114/63 (08 May 2022 07:48) (114/63 - 137/83)  BP(mean): --  RR: --  SpO2: 96% (08 May 2022 07:48) (96% - 96%) I&O's Summary    07 May 2022 07:01  -  08 May 2022 07:00  --------------------------------------------------------  IN: 0 mL / OUT: 400 mL / NET: -400 mL    GENERAL: [ ] Cachexia  [ ]Alert  [ ]Oriented x   [ ]Lethargic  [ x]Unarousable  [ ]Verbal  [ x]Non-Verbal  Behavioral:   [ ] Anxiety  [ ] Delirium [ ] Agitation [x ] Other - calm  HEENT:  [ ]Normal   [x ]Dry mouth   [ ]ET Tube/Trach  [ ]Oral lesions  PULMONARY:   [x ]Clear [ ]Tachypnea  [ ]Audible excessive secretions   [ ]Rhonchi        [ ]Right [ ]Left [ ]Bilateral  [ ]Crackles        [ ]Right [ ]Left [ ]Bilateral  [ ]Wheezing     [ ]Right [ ]Left [ ]Bilateral  [ ]Diminished BS [ ]Right [ ]Left [ ]Bilateral    CARDIOVASCULAR:    [x ]Regular [ ]Irregular [ ]Tachy  [ ]Alexis [ ]Murmur [ ]Other  GASTROINTESTINAL:  [x ]Soft  [ ]Distended   [x ]+BS  [x ]Non tender [ ]Tender  [ ]Other [ ]PEG [ ]OGT/ NGT   Last BM:  4/30  GENITOURINARY:  [ ]Normal [x ] Incontinent   [ ]Oliguria/Anuria   [x ]Hernandez  MUSCULOSKELETAL:   [ ]Normal   [x ]Weakness  [ ]Bed/Wheelchair bound [ ]Edema  NEUROLOGIC:   [ ]No focal deficits  [x ] Cognitive impairment  [ ] Dysphagia [ ]Dysarthria [ ] Paresis [ ]Other   SKIN:   [ ]Normal  [x ]Rash - IAD, sacral DTI  [ ]Other  [ ]Pressure ulcer(s)  [ ]y [ ]n  Present on admission      CRITICAL CARE:  [ ] Shock Present  [ ]Septic [ ]Cardiogenic [ ]Neurologic [ ]Hypovolemic  [ ]  Vasopressors [ ]  Inotropes   [ ] Respiratory failure present [ ] Mechanical Ventilation [ ] Non-invasive ventilatory support [ ] High-Flow  [ ] Acute  [ ] Chronic [ ] Hypoxic  [ ] Hypercarbic [ ] Other  [ ] Other organ failure   LABS: no new     RADIOLOGY & ADDITIONAL STUDIES: no new     PROTEIN CALORIE MALNUTRITION: [ ] mild [ ] moderate [ ] severe  [ ] underweight [ ] morbid obesity    https://www.andeal.org/vault/2440/web/files/ONC/Table_Clinical%20Characteristics%20to%20Document%20Malnutrition-White%20JV%20et%20al%806164.pdf    Height (cm): 170.2 (04-25-22 @ 17:29)  Weight (kg): 61.2 (04-25-22 @ 17:29)  BMI (kg/m2): 21.1 (04-25-22 @ 17:29)    [x ] PPSV2 < or = 30% [ ] significant weight loss [ ] poor nutritional intake [ ] anasarca   Artificial Nutrition [ ]     REFERRALS:   [ ]Chaplaincy  [ ] Hospice  [ ]Child Life  [x ]Social Work  [ ]Case management [ ]Holistic Therapy [ ] Physical Therapy [ ] Dietary

## 2022-05-08 NOTE — PROGRESS NOTE ADULT - PROBLEM SELECTOR PLAN 1
Multifactorial (UTI, Hypernatremia, Dementia/ Delirium, EOL)  Family in agreement to discontinue IV zosyn and IVF after son Lior visited on 5/7.

## 2022-05-08 NOTE — PROGRESS NOTE ADULT - PROBLEM SELECTOR PLAN 3
C/w Morphine 1mg q1hr PRN for mod pain   C/w Morphine 2mg q1hr PRN for severe pain  C/w Morphine 2mg q6hrs ATC

## 2022-05-09 NOTE — PROGRESS NOTE ADULT - PROBLEM SELECTOR PLAN 3
- C/w Morphine 1mg q1hr PRN for mod pain   - C/w Morphine 2mg q1hr PRN for severe pain  - C/w Morphine 2mg q6h ATC

## 2022-05-09 NOTE — PROGRESS NOTE ADULT - ASSESSMENT
89F w/ dementia, meningioma, HF, DM2, afib off ac due to GI bleed p/w ams. On day of presentation, the patient's daughter went to visit the patient at her SNF and found the patient to be slumped over in her wheelchair with minimal interaction. Patient admitted with UTI and metabolic encephalopathy, now minimally responsive and unable to take by mouth. Palliative consulted for GOC, family opting for symptom directed care.

## 2022-05-09 NOTE — PROGRESS NOTE ADULT - ASSESSMENT
89F w/ dementia, meningioma, HF, DM2, afib off ac due to GI bleed p/w ams admit to medicine for further evaluation of acute encephalopathy which maybe from UTI  course complicated by likely aspiration    sepsis   2/2 uti and now with likely aspiration     pt now tx to PCU  dnr/i  full comfort care   possible tx to inpt hospice

## 2022-05-09 NOTE — PROGRESS NOTE ADULT - PROBLEM SELECTOR PLAN 5
Fast score 7c. Used x1 PRN ativan 0.2mg IV in past 24 hours.  - C/w depakote for agitation  - Continue Ativan 0.2mg q1h PRN

## 2022-05-09 NOTE — PROGRESS NOTE ADULT - SUBJECTIVE AND OBJECTIVE BOX
JULIOCESAR BASURTO  89y Female  MRN:[id number]    Patient is a 89y old  Female who presents with a chief complaint of 89F p/w ams (26 Apr 2022 09:54)    HPI:  The patient is lethargic and unable to provide history. Therefore history is obtained from the patient's daughter ms. Larisa Knight via telephone    89F w/ dementia, meningioma, HF, DM2, afib off ac due to GI bleed p/w ams. On day of presentation, the patient's daughter went to visit the patient at her SNF and found the patient to be slumped over in her wheelchair with minimal interaction at approx 12:30pm. The patient's cognitive baseline has been reportedly declining over the course of months however she reportedly is able to have simple conversation with family. The patient has had dementia for some time but she has more rapidly declined since the end of Jan2022 which has been attributed to isolation during hospitalizations, rehab and while being in her SNF. Her daughter reports dosage adjustment of seroquel and divalproex reportedly improving her cognition over the weeks prior which is why there is surprise for current mental status. Additionally, the patient is known to have a meningioma however details of the state of disease are not known to the family.    Her daughter confirms that the patient is DNR/DNI and that the family would not want to pursue invasive surgery but is open to medical treatment and further diagnostic testing. (25 Apr 2022 22:40)      Patient seen and evaluated at bedside in PCU. No acute events overnight except as noted.    Interval HPI: no acute events o/n     PAST MEDICAL & SURGICAL HISTORY:  Diabetes    Dementia    Chronic CHF    No significant past surgical history        REVIEW OF SYSTEMS:  as per hpi     VITALS:   Vital Signs Last 24 Hrs  T(C): 36.7 (09 May 2022 08:12), Max: 36.7 (09 May 2022 08:12)  T(F): 98 (09 May 2022 08:12), Max: 98 (09 May 2022 08:12)  HR: 103 (09 May 2022 08:12) (77 - 103)  BP: 111/68 (09 May 2022 08:12) (111/68 - 120/81)  BP(mean): --  RR: 18 (09 May 2022 08:12) (18 - 18)  SpO2: 95% (09 May 2022 08:12) (95% - 95%)    PHYSICAL EXAM:  GENERAL: NAD, frail,  lethargic  HEAD:  Atraumatic, Normocephalic  EYES: conjunctiva and sclera clear  NECK: Supple, No JVD  CHEST/LUNG: Clear to auscultation bilaterally; No wheeze  HEART: S1, S2; No murmurs, rubs, or gallops  ABDOMEN: Soft, Nontender, Nondistended; Bowel sounds present  EXTREMITIES:  2+ Peripheral Pulses, No clubbing, cyanosis, or edema  NEUROLOGY: [id number]       Consultant(s) Notes Reviewed:  [x ] YES  [ ] NO  Care Discussed with Consultants/Other Providers [ x] YES  [ ] NO    MEDS:   MEDICATIONS  (STANDING):  chlorhexidine 2% Cloths 1 Application(s) Topical <User Schedule>  metoprolol tartrate Injectable 5 milliGRAM(s) IV Push every 6 hours  morphine  - Injectable 2 milliGRAM(s) IV Push every 6 hours  pantoprazole  Injectable 40 milliGRAM(s) IV Push daily  valproate sodium IVPB 250 milliGRAM(s) IV Intermittent two times a day    MEDICATIONS  (PRN):  acetaminophen  Suppository .. 650 milliGRAM(s) Rectal every 6 hours PRN Temp greater or equal to 38C (100.4F), Mild Pain (1 - 3)  bisacodyl Suppository 10 milliGRAM(s) Rectal daily PRN Constipation  glycopyrrolate Injectable 0.4 milliGRAM(s) IV Push every 6 hours PRN secretions  LORazepam   Injectable 0.2 milliGRAM(s) IV Push every 1 hour PRN anxiety/agitation/refractory dyspnea  morphine  - Injectable 2 milliGRAM(s) IV Push every 1 hour PRN Severe Pain (7 - 10)  morphine  - Injectable 2 milliGRAM(s) IV Push every 1 hour PRN dyspnea  morphine  - Injectable 1 milliGRAM(s) IV Push every 1 hour PRN Moderate Pain (4 - 6)      ALLERGIES:  No Known Allergies      LABS:                                                cultures: Culture Results:   >100,000 CFU/ml Enterococcus species (04-25 @ 23:16)     < from: CT Venogram Brain w/ IV Cont (04.25.22 @ 23:56) >    IMPRESSION: Calcified right posterior fossa meningioma adjacent to the   right sigmoid sinus without sinus invasion.      --- End of Report ---        < end of copied text >  < from: CT Head No Cont (04.25.22 @ 19:20) >  IMPRESSION:  Right lateral tentorium/sphenoid plate region partially   calcified lesion suspicious for a meningioma in this region with the   measurements given above. Given the location possibility of infiltration   of the sigmoid sinus at this level should be considered. Venogram either   MRV or CTV may be helpful for more complete evaluation as clinically   warranted. Age-appropriate involutional changes and microvascular   ischemic disease age    --- End of Report ---      < end of copied text >

## 2022-05-09 NOTE — PROGRESS NOTE ADULT - SUBJECTIVE AND OBJECTIVE BOX
GAP TEAM PALLIATIVE CARE UNIT PROGRESS NOTE:        INDICATION FOR PALLIATIVE CARE UNIT SERVICES: Symptom Control, Comfort Measures, GOC    INTERVAL HPI/OVERNIGHT EVENTS: No acute events overnight. Required x2 PRNs of morphine 2mg IV for dyspnea and x1 PRN ativan 0.2mg IV in 24hrs.    DNR on chart:   Allergies    No Known Allergies    Intolerances    MEDICATIONS  (STANDING):  chlorhexidine 2% Cloths 1 Application(s) Topical <User Schedule>  metoprolol tartrate Injectable 5 milliGRAM(s) IV Push every 6 hours  morphine  - Injectable 2 milliGRAM(s) IV Push every 6 hours  pantoprazole  Injectable 40 milliGRAM(s) IV Push daily  valproate sodium IVPB 250 milliGRAM(s) IV Intermittent two times a day    MEDICATIONS  (PRN):  acetaminophen  Suppository .. 650 milliGRAM(s) Rectal every 6 hours PRN Temp greater or equal to 38C (100.4F), Mild Pain (1 - 3)  bisacodyl Suppository 10 milliGRAM(s) Rectal daily PRN Constipation  glycopyrrolate Injectable 0.4 milliGRAM(s) IV Push every 6 hours PRN secretions  LORazepam   Injectable 0.2 milliGRAM(s) IV Push every 1 hour PRN anxiety/agitation/refractory dyspnea  morphine  - Injectable 2 milliGRAM(s) IV Push every 1 hour PRN Severe Pain (7 - 10)  morphine  - Injectable 2 milliGRAM(s) IV Push every 1 hour PRN dyspnea  morphine  - Injectable 1 milliGRAM(s) IV Push every 1 hour PRN Moderate Pain (4 - 6)    ITEMS UNCHECKED ARE NOT PRESENT    PRESENT SYMPTOMS: [x]Unable to obtain due to poor mentation   Source if other than patient:  [x]Family   [x]Team     Pain: [] yes [X] no    See PAINAD  QOL impact -   Location -                    Aggravating factors -  Quality -  Radiation -  Timing -  Severity (0-10 scale):  Minimal acceptable level (0-10 scale):     Dyspnea:                           []Mild []Moderate []Severe  Anxiety:                             []Mild []Moderate []Severe  Fatigue:                             []Mild []Moderate []Severe  Nausea:                             []Mild []Moderate []Severe  Loss of appetite:              []Mild []Moderate []Severe  Constipation:                    []Mild []Moderate []Severe    PAINAD Score: 0  http://geriatrictoolkit.[place].[facility]/cog/painad.pdf  		  Other Symptoms:  [x]All other review of systems negative     Karnofsky Performance Score/Palliative Performance Status Version 2:         10%  http://[facility].org/files/news/palliative_performance_scale_[email or web address].pdf    PHYSICAL EXAM:  Vital Signs Last 24 Hrs  T(C): 36.7 (09 May 2022 08:12), Max: 36.7 (09 May 2022 08:12)  T(F): 98 (09 May 2022 08:12), Max: 98 (09 May 2022 08:12)  HR: 103 (09 May 2022 08:12) (77 - 103)  BP: 111/68 (09 May 2022 08:12) (111/68 - 120/81)  BP(mean): --  RR: 18 (09 May 2022 08:12) (18 - 18)  SpO2: 95% (09 May 2022 08:12) (95% - 95%) I&O's Summary    08 May 2022 07:01  -  09 May 2022 07:00  --------------------------------------------------------  IN: 0 mL / OUT: 350 mL / NET: -350 mL      GENERAL:  []Alert  []Oriented x   []Lethargic  []Cachexia  [X]Unarousable  []Verbal  [X]Non-Verbal  Behavioral:   [] Anxiety  [] Delirium [] Agitation [] Other  HEENT:  []Normal   [X]Dry mouth   []ET Tube/Trach  []Oral lesions  PULMONARY:   [x]Clear []Tachypnea  []Audible excessive secretions   []Rhonchi        []Right []Left []Bilateral  []Crackles        []Right []Left []Bilateral  []Wheezing     []Right []Left []Bilateral  CARDIOVASCULAR:    [x]Regular []Irregular []Tachy  []Alexis []Murmur []Other  GASTROINTESTINAL:  [x]Soft []Distended [x]+BS [x]Non tender []Tender []PEG []OGT/ NGT   Last BM:   4/30  GENITOURINARY:  []Normal [ ] Incontinent   []Oliguria/Anuria   [X]Hernandez  MUSCULOSKELETAL:   []Normal  []Weakness  [x]Bed/Wheelchair bound []Edema  NEUROLOGIC:   []No focal deficits  [x]Cognitive impairment  []Dysphagia []Dysarthria []Paresis []Other   SKIN:   []Normal   []Pressure ulcer(s)  [x]Rash: Please refer to nursing notes for further documentation    CRITICAL CARE:  [ ] Shock Present  [ ]Septic [ ]Cardiogenic [ ]Neurologic [ ]Hypovolemic  [ ]  Vasopressors [ ]  Inotropes   [ ] Respiratory failure present [ ] Mechanical Ventilation [ ] Non-invasive ventilatory support [ ] High-Flow  [ ] Acute  [ ] Chronic [ ] Hypoxic  [ ] Hypercarbic [ ] Other  [ ] Other organ failure     LABS: None new          RADIOLOGY & ADDITIONAL STUDIES: None new    PROTEIN CALORIE MALNUTRITION:   [x] PPSV2 < or = 30% [] significant weight loss [X] poor nutritional intake [] anasarca [] catabolic state    Artificial Nutrition []     REFERRALS:   []Chaplaincy  []Hospice  []Child Life  [x]Social Work  []Case management []Holistic Therapy []Physical Therapy []Dietary   Goals of Care Document:   Care Coordination Document:  GAP TEAM PALLIATIVE CARE UNIT PROGRESS NOTE:        INDICATION FOR PALLIATIVE CARE UNIT SERVICES: Symptom Control, Comfort Measures, GOC in the setting of dementia, GIB, encephalopathy    INTERVAL HPI/OVERNIGHT EVENTS: No acute events overnight. Required x2 PRNs of morphine 2mg IV for dyspnea and x1 PRN ativan 0.2mg IV in 24hrs.    DNR on chart:   Allergies    No Known Allergies    Intolerances    MEDICATIONS  (STANDING):  chlorhexidine 2% Cloths 1 Application(s) Topical <User Schedule>  metoprolol tartrate Injectable 5 milliGRAM(s) IV Push every 6 hours  morphine  - Injectable 2 milliGRAM(s) IV Push every 6 hours  pantoprazole  Injectable 40 milliGRAM(s) IV Push daily  valproate sodium IVPB 250 milliGRAM(s) IV Intermittent two times a day    MEDICATIONS  (PRN):  acetaminophen  Suppository .. 650 milliGRAM(s) Rectal every 6 hours PRN Temp greater or equal to 38C (100.4F), Mild Pain (1 - 3)  bisacodyl Suppository 10 milliGRAM(s) Rectal daily PRN Constipation  glycopyrrolate Injectable 0.4 milliGRAM(s) IV Push every 6 hours PRN secretions  LORazepam   Injectable 0.2 milliGRAM(s) IV Push every 1 hour PRN anxiety/agitation/refractory dyspnea  morphine  - Injectable 2 milliGRAM(s) IV Push every 1 hour PRN Severe Pain (7 - 10)  morphine  - Injectable 2 milliGRAM(s) IV Push every 1 hour PRN dyspnea  morphine  - Injectable 1 milliGRAM(s) IV Push every 1 hour PRN Moderate Pain (4 - 6)    ITEMS UNCHECKED ARE NOT PRESENT    PRESENT SYMPTOMS: [x]Unable to obtain due to poor mentation   Source if other than patient:  [x]Family   [x]Team     Pain: [] yes [X] no    See PAINAD  QOL impact -   Location -                    Aggravating factors -  Quality -  Radiation -  Timing -  Severity (0-10 scale):  Minimal acceptable level (0-10 scale):     Dyspnea:                           []Mild []Moderate []Severe  Anxiety:                             []Mild []Moderate []Severe  Fatigue:                             []Mild []Moderate []Severe  Nausea:                             []Mild []Moderate []Severe  Loss of appetite:              []Mild []Moderate []Severe  Constipation:                    []Mild []Moderate []Severe    PAINAD Score: 0  http://geriatrictoolkit.[facility]/cog/painad.pdf  		  Other Symptoms:  [x]All other review of systems negative     Karnofsky Performance Score/Palliative Performance Status Version 2:         10%  http://[email or web address]/files/news/palliative_performance_scale_[email or web address].pdf    PHYSICAL EXAM:  Vital Signs Last 24 Hrs  T(C): 36.7 (09 May 2022 08:12), Max: 36.7 (09 May 2022 08:12)  T(F): 98 (09 May 2022 08:12), Max: 98 (09 May 2022 08:12)  HR: 103 (09 May 2022 08:12) (77 - 103)  BP: 111/68 (09 May 2022 08:12) (111/68 - 120/81)  BP(mean): --  RR: 18 (09 May 2022 08:12) (18 - 18)  SpO2: 95% (09 May 2022 08:12) (95% - 95%) I&O's Summary    08 May 2022 07:01  -  09 May 2022 07:00  --------------------------------------------------------  IN: 0 mL / OUT: 350 mL / NET: -350 mL      GENERAL:  []Alert  []Oriented x   []Lethargic  []Cachexia  [X]Unarousable  []Verbal  [X]Non-Verbal  Behavioral:   [] Anxiety  [] Delirium [] Agitation [] Other  HEENT:  []Normal   [X]Dry mouth   []ET Tube/Trach  []Oral lesions  PULMONARY:   [x]Clear []Tachypnea  []Audible excessive secretions   []Rhonchi        []Right []Left []Bilateral  []Crackles        []Right []Left []Bilateral  []Wheezing     []Right []Left []Bilateral  CARDIOVASCULAR:    [x]Regular []Irregular [x]Tachy  []Alexis []Murmur []Other  GASTROINTESTINAL:  [x]Soft []Distended [x]+BS [x]Non tender []Tender []PEG []OGT/ NGT   Last BM:   4/30  GENITOURINARY:  []Normal [ ] Incontinent   []Oliguria/Anuria   [X]Hernandez  MUSCULOSKELETAL:   []Normal  []Weakness  [x]Bed/Wheelchair bound []Edema  NEUROLOGIC:   []No focal deficits  [x]Cognitive impairment  []Dysphagia []Dysarthria []Paresis []Other   SKIN:   []Normal   []Pressure ulcer(s)  [x]Rash: Please refer to nursing notes for further documentation    CRITICAL CARE:  [ ] Shock Present  [ ]Septic [ ]Cardiogenic [ ]Neurologic [ ]Hypovolemic  [ ]  Vasopressors [ ]  Inotropes   [ ] Respiratory failure present [ ] Mechanical Ventilation [ ] Non-invasive ventilatory support [ ] High-Flow  [ ] Acute  [ ] Chronic [ ] Hypoxic  [ ] Hypercarbic [ ] Other  [x ] Other organ failure brain    LABS: I have reviewed all pertinent imaging, laboratory and microbiology studies at this visit.           RADIOLOGY & ADDITIONAL STUDIES: I have reviewed all pertinent imaging, laboratory and microbiology studies at this visit.     PROTEIN CALORIE MALNUTRITION:   [x] PPSV2 < or = 30% [] significant weight loss [X] poor nutritional intake [] anasarca [] catabolic state    Artificial Nutrition []     REFERRALS:   [x]Chaplaincy  []Hospice  []Child Life  [x]Social Work  []Case management []Holistic Therapy []Physical Therapy []Dietary   Goals of Care Document:   Care Coordination Document:                               Care Coordination Assessment 201    COGNITIVE/LEARNING 201  Mental Status    Answers: Unable to assess    ADMISSION HISTORY 201  Admitted From    Answers: Home    Functional Status Prior to Admission    Answers: Completely dependent    Notes: Services on Admission, Contacts    Notes: SNF    FINANCIAL 201  Does patient have financial concerns for discharge?    Answers: None    LIVING ARRANGEMENTS/SUPPORT 201  Housing Environment    Answers: Skilled Nursing Facility    Living Arrangements    Answers: Lives alone    Sources of support/caregivers    Answers: Children    CAREGIVER CONTACT 201  Does the patient wish to identify a Caregiver?    Answers: Unable to assess    EMERGENCY CONTACTS OUTSIDE HOME 201  Emergency Contact    Answers: Emergency Contact Name Larisa Lang,  Answers: Emergency Contact Phone # [phone],  Answers: Emergency Contact Relationship daughter    DISCHARGE PLANNING 201  Potential Discharge Plan and Services    Answers: Skilled Nursing Facility-Short Term    Anticipated Transportation Needs for Discharge    Answers: Ambulance    Who will accompany patient at discharge?    Answers: Self    SCREENING 201  Social Work Screen and Referral    Answers: None    SUMMARY 201  Initial Clinical Summary    Notes: Case management referral received. Chart reviewed. Pt remains acute. Pt  is a 89 yr old female w/ a pmh of Dementia, Meningioma, HF, DM Type2, afib (no  ac due hx of GIB), & AMS. Admitted via the ER from [facility]  (admission 1/2022) w/ Acute encephelopathy meningoma & UTI. Spoke w/ daughters,  Larisa Lang ([phone]) & Mercedes Killian ([phone]). Discussed the role  of the , medical plan & discharge needs. Daughters verbalized  understanding.As per daughters: Pt has 24 hr care @ home under the [facility]  Program. Service Co-ordinator is Ricardo Umaña ([phone]). Pt is completely  dependent w/ ADLs & personal care needs. Home medical equipment : Hospital bed,  w/c, walker & commode. Family would like the pt to return to [facility] @ discharge. Spoke w/ admission @ [facility] who stated the pt was a long  term care pt. Receiving IVPB antibiotics. 's contact information  given. Will continue to collaborate w/ interdisciplinary team to assess  discharge needs.    Anticipated Discharge Plan and Services    Notes: Discharge plan is to return to [facility].  PCP: Jam Holder [phone]  Pfizer X 2, last dose 4/12/21       Electronically signed by:  Teresa Sheridan  Electronically signed on:  2022-04-28  15:06   GAP TEAM PALLIATIVE CARE UNIT PROGRESS NOTE:        INDICATION FOR PALLIATIVE CARE UNIT SERVICES: Symptom Control, Comfort Measures, GOC in the setting of dementia and encephalopathy either from progression of meningioma/uti/aftt    INTERVAL HPI/OVERNIGHT EVENTS: No acute events overnight. Required x2 PRNs of morphine 2mg IV for dyspnea and x1 PRN ativan 0.2mg IV in 24hrs.    DNR on chart:   Allergies    No Known Allergies    Intolerances    MEDICATIONS  (STANDING):  chlorhexidine 2% Cloths 1 Application(s) Topical <User Schedule>  metoprolol tartrate Injectable 5 milliGRAM(s) IV Push every 6 hours  morphine  - Injectable 2 milliGRAM(s) IV Push every 6 hours  pantoprazole  Injectable 40 milliGRAM(s) IV Push daily  valproate sodium IVPB 250 milliGRAM(s) IV Intermittent two times a day    MEDICATIONS  (PRN):  acetaminophen  Suppository .. 650 milliGRAM(s) Rectal every 6 hours PRN Temp greater or equal to 38C (100.4F), Mild Pain (1 - 3)  bisacodyl Suppository 10 milliGRAM(s) Rectal daily PRN Constipation  glycopyrrolate Injectable 0.4 milliGRAM(s) IV Push every 6 hours PRN secretions  LORazepam   Injectable 0.2 milliGRAM(s) IV Push every 1 hour PRN anxiety/agitation/refractory dyspnea  morphine  - Injectable 2 milliGRAM(s) IV Push every 1 hour PRN Severe Pain (7 - 10)  morphine  - Injectable 2 milliGRAM(s) IV Push every 1 hour PRN dyspnea  morphine  - Injectable 1 milliGRAM(s) IV Push every 1 hour PRN Moderate Pain (4 - 6)    ITEMS UNCHECKED ARE NOT PRESENT    PRESENT SYMPTOMS: [x]Unable to obtain due to poor mentation   Source if other than patient:  [x]Family   [x]Team     Pain: [] yes [X] no    See PAINAD  QOL impact -   Location -                    Aggravating factors -  Quality -  Radiation -  Timing -  Severity (0-10 scale):  Minimal acceptable level (0-10 scale):     Dyspnea:                           []Mild []Moderate []Severe  Anxiety:                             []Mild []Moderate []Severe  Fatigue:                             []Mild []Moderate []Severe  Nausea:                             []Mild []Moderate []Severe  Loss of appetite:              []Mild []Moderate []Severe  Constipation:                    []Mild []Moderate []Severe    PAINAD Score: 0  http://geriatrictoolkit.[facility]/cog/painad.pdf  		  Other Symptoms:  [x]All other review of systems negative     Karnofsky Performance Score/Palliative Performance Status Version 2:         10%  http://[email or web address]/files/news/palliative_performance_scale_[email or web address].pdf    PHYSICAL EXAM:  Vital Signs Last 24 Hrs  T(C): 36.7 (09 May 2022 08:12), Max: 36.7 (09 May 2022 08:12)  T(F): 98 (09 May 2022 08:12), Max: 98 (09 May 2022 08:12)  HR: 103 (09 May 2022 08:12) (77 - 103)  BP: 111/68 (09 May 2022 08:12) (111/68 - 120/81)  BP(mean): --  RR: 18 (09 May 2022 08:12) (18 - 18)  SpO2: 95% (09 May 2022 08:12) (95% - 95%) I&O's Summary    08 May 2022 07:01  -  09 May 2022 07:00  --------------------------------------------------------  IN: 0 mL / OUT: 350 mL / NET: -350 mL      GENERAL:  []Alert  []Oriented x   []Lethargic  []Cachexia  [X]Unarousable  []Verbal  [X]Non-Verbal  Behavioral:   [] Anxiety  [] Delirium [] Agitation [] Other  HEENT:  []Normal   [X]Dry mouth   []ET Tube/Trach  []Oral lesions  PULMONARY:   [x]Clear []Tachypnea  []Audible excessive secretions   []Rhonchi        []Right []Left []Bilateral  []Crackles        []Right []Left []Bilateral  []Wheezing     []Right []Left []Bilateral  CARDIOVASCULAR:    [x]Regular []Irregular [x]Tachy  []Alexis []Murmur []Other  GASTROINTESTINAL:  [x]Soft []Distended [x]+BS [x]Non tender []Tender []PEG []OGT/ NGT   Last BM:   4/30  GENITOURINARY:  []Normal [ ] Incontinent   []Oliguria/Anuria   [X]Hernandez  MUSCULOSKELETAL:   []Normal  []Weakness  [x]Bed/Wheelchair bound []Edema  NEUROLOGIC:   []No focal deficits  [x]Cognitive impairment  []Dysphagia []Dysarthria []Paresis []Other   SKIN:   []Normal   []Pressure ulcer(s)  [x]Rash: Please refer to nursing notes for further documentation    CRITICAL CARE:  [ ] Shock Present  [ ]Septic [ ]Cardiogenic [ ]Neurologic [ ]Hypovolemic  [ ]  Vasopressors [ ]  Inotropes   [ ] Respiratory failure present [ ] Mechanical Ventilation [ ] Non-invasive ventilatory support [ ] High-Flow  [ ] Acute  [ ] Chronic [ ] Hypoxic  [ ] Hypercarbic [ ] Other  [x ] Other organ failure brain    LABS: I have reviewed all pertinent imaging, laboratory and microbiology studies at this visit.           RADIOLOGY & ADDITIONAL STUDIES: I have reviewed all pertinent imaging, laboratory and microbiology studies at this visit.     PROTEIN CALORIE MALNUTRITION:   [x] PPSV2 < or = 30% [] significant weight loss [X] poor nutritional intake [] anasarca [] catabolic state    Artificial Nutrition []     REFERRALS:   [x]Chaplaincy  []Hospice  []Child Life  [x]Social Work  []Case management []Holistic Therapy []Physical Therapy []Dietary   Goals of Care Document:   Care Coordination Document:                               Care Coordination Assessment 201    COGNITIVE/LEARNING 201  Mental Status    Answers: Unable to assess    ADMISSION HISTORY 201  Admitted From    Answers: Home    Functional Status Prior to Admission    Answers: Completely dependent    Notes: Services on Admission, Contacts    Notes: SNF    FINANCIAL 201  Does patient have financial concerns for discharge?    Answers: None    LIVING ARRANGEMENTS/SUPPORT 201  Housing Environment    Answers: Skilled Nursing Facility    Living Arrangements    Answers: Lives alone    Sources of support/caregivers    Answers: Children    CAREGIVER CONTACT 201  Does the patient wish to identify a Caregiver?    Answers: Unable to assess    EMERGENCY CONTACTS OUTSIDE HOME 201  Emergency Contact    Answers: Emergency Contact Name Larisa Lang,  Answers: Emergency Contact Phone # [phone],  Answers: Emergency Contact Relationship daughter    DISCHARGE PLANNING 201  Potential Discharge Plan and Services    Answers: Skilled Nursing Facility-Short Term    Anticipated Transportation Needs for Discharge    Answers: Ambulance    Who will accompany patient at discharge?    Answers: Self    SCREENING 201  Social Work Screen and Referral    Answers: None    SUMMARY 201  Initial Clinical Summary    Notes: Case management referral received. Chart reviewed. Pt remains acute. Pt  is a 89 yr old female w/ a pmh of Dementia, Meningioma, HF, DM Type2, afib (no  ac due hx of GIB), & AMS. Admitted via the ER from [facility]  (admission 1/2022) w/ Acute encephelopathy meningoma & UTI. Spoke w/ daughters,  Larisa Lang ([phone]) & Mercedes Killian ([phone]). Discussed the role  of the , medical plan & discharge needs. Daughters verbalized  understanding.As per daughters: Pt has 24 hr care @ home under the [facility]  Program. Service Co-ordinator is Ricardo Umaña ([phone]). Pt is completely  dependent w/ ADLs & personal care needs. Home medical equipment : Hospital bed,  w/c, walker & commode. Family would like the pt to return to [facility] @ discharge. Spoke w/ admission @ [facility] who stated the pt was a long  term care pt. Receiving IVPB antibiotics. 's contact information  given. Will continue to collaborate w/ interdisciplinary team to assess  discharge needs.    Anticipated Discharge Plan and Services    Notes: Discharge plan is to return to [facility].  PCP: Jam Holder [phone]  Pfizer X 2, last dose 4/12/21       Electronically signed by:  Teresa Sheridan  Electronically signed on:  2022-04-28  15:06

## 2022-05-09 NOTE — PROGRESS NOTE ADULT - PROBLEM SELECTOR PLAN 2
Required x2 PRNs of morphine 2mg IV for dyspnea  - C/w Morphine 2mg q1hr PRN for dyspnea  - C/w Morphine 2mg IV q6hrs ATC   - Ordered for bowel regimen. Last BM 4/30. Dulcolax suppository given.

## 2022-05-10 NOTE — PROGRESS NOTE ADULT - ATTENDING COMMENTS
HPI:  The patient is lethargic and unable to provide history. Therefore history is obtained from the patient's daughter ms. Larisa Knight via telephone    89F w/ dementia, meningioma, HF, DM2, afib off ac due to GI bleed p/w ams. On day of presentation, the patient's daughter went to visit the patient at her SNF and found the patient to be slumped over in her wheelchair with minimal interaction at approx 12:30pm. The patient's cognitive baseline has been reportedly declining over the course of months however she reportedly is able to have simple conversation with family. The patient has had dementia for some time but she has more rapidly declined since the end of Jan2022 which has been attributed to isolation during hospitalizations, rehab and while being in her SNF. Her daughter reports dosage adjustment of seroquel and divalproex reportedly improving her cognition over the weeks prior which is why there is surprise for current mental status. Additionally, the patient is known to have a meningioma however details of the state of disease are not known to the family.    Her daughter confirms that the patient is DNR/DNI and that the family would not want to pursue invasive surgery but is open to medical treatment and further diagnostic testing. (25 Apr 2022 22:40)    The patient was seen and examined  Annotations are embedded above  Predominant symptom(s):    #encephalopathy-inability to tolerate po  On Valproic acid due to agitation  No hyperactive or mixed delirium  #pain  PAINAD 0  One IV morphine PRN used in 24 hours  Current functional PPSv2: 30  Nursing care required: Total assistance with ADLs  Code status documented: DNR  A review of the paper chart has been conducted  HCP form present:               Yes and valid []               Yes but invalid []                No [x]   MOLST present:                   Yes and valid [x]               Yes but invalid []                No []  Incapacity form present:   Yes and valid []                Yes but invalid []                No [x]                 N/A []  Living Will:                            Yes and valid []                Yes but invalid []                No [x]      Palliative Care Encounter  Actions:  [] Rapport building     [x] Symptom assessment    [] Eliciting preferences of goals   [] Prognostic understanding    [] Emotional Support  [] Coping skill development  []  Other  Interdisciplinary Referrals: SW  Communication: d/w PCU team  Documentation Review: [x] Primary Team [] Consultants [] Interdisciplinary team    Family Health Care Decision Act (FHCDA) Surrogate Decision Maker Hierarchy in the absence of a health care agent  L Article 81 Guardian-->Spouse or domestic partner--> Adult child-->Parent-->Sibling--> Close friend      Interdisciplinary Team Involvement: PCU team, SW and   Action Items: Continue to monitor          In the event of newly developing, evolving, or worsening symptoms, please contact the Palliative Medicine team via pager (if the patient is at [facility] #[phone] or if the patient is at [facility] #[phone]) The Geriatric and Palliative Medicine service has coverage 24 hours a day/ 7 days a week to provide medical recommendations regarding symptom management needs via telephone      Benson Smith MD   of Geriatric and Palliative Medicine  [facility]    Between the hours of 9 am and 5 pm from Monday through Friday, please contact me on [employer] Teams    After 5pm and on weekends, please see the contact information below:    In the event of newly developing, evolving, or worsening symptoms, please contact the Palliative Medicine team via pager (if the patient is at [facility] #[phone] or if the patient is at [facility] #[phone]) The Geriatric and Palliative Medicine service has coverage 24 hours a day/ 7 days a week to provide medical recommendations regarding symptom management needs via telephone
HPI:  The patient is lethargic and unable to provide history. Therefore history is obtained from the patient's daughter ms. Larisa Knight via telephone    89F w/ dementia, meningioma, HF, DM2, afib off ac due to GI bleed p/w ams. On day of presentation, the patient's daughter went to visit the patient at her SNF and found the patient to be slumped over in her wheelchair with minimal interaction at approx 12:30pm. The patient's cognitive baseline has been reportedly declining over the course of months however she reportedly is able to have simple conversation with family. The patient has had dementia for some time but she has more rapidly declined since the end of Jan2022 which has been attributed to isolation during hospitalizations, rehab and while being in her SNF. Her daughter reports dosage adjustment of seroquel and divalproex reportedly improving her cognition over the weeks prior which is why there is surprise for current mental status. Additionally, the patient is known to have a meningioma however details of the state of disease are not known to the family.    Her daughter confirms that the patient is DNR/DNI and that the family would not want to pursue invasive surgery but is open to medical treatment and further diagnostic testing. (25 Apr 2022 22:40)    The patient was seen and examined  Annotations are embedded above  RR 16, 97% NC  PAINAD 5-6  UO+  Radial pulse +  Predominant symptom(s):    #encephalopathy-persistent  Not communicative without hyperactive or mixed delirium  On Valproic acid due to agitation    #pain  PAINAD 0  MS 1mg X 2  Current functional PPSv2: 30  Nursing care required: Total assistance with ADLs  Code status documented: DNR  A review of the paper chart has been conducted  HCP form present:               Yes and valid []               Yes but invalid []                No [x]   MOLST present:                   Yes and valid [x]               Yes but invalid []                No []  Incapacity form present:   Yes and valid []                Yes but invalid []                No [x]                 N/A []  Living Will:                            Yes and valid []                Yes but invalid []                No [x]      Palliative Care Encounter  Actions:  [] Rapport building     [x] Symptom assessment    [] Eliciting preferences of goals   [] Prognostic understanding    [] Emotional Support  [] Coping skill development  []  Other  Interdisciplinary Referrals: SW  Communication: d/w PCU team  Documentation Review: [ ] Primary Team [] Consultants [] Interdisciplinary team    Family Health Care Decision Act (FHCDA) Surrogate Decision Maker Hierarchy in the absence of a health care agent  [facility] Article 81 Guardian-->Spouse or domestic partner--> Adult child-->Parent-->Sibling--> Close friend      Interdisciplinary Team Involvement: PCU team, SW and   Action Items: Continue to monitor          In the event of newly developing, evolving, or worsening symptoms, please contact the Palliative Medicine team via pager (if the patient is at [facility] #[phone] or if the patient is at [facility] #[phone]) The Geriatric and Palliative Medicine service has coverage 24 hours a day/ 7 days a week to provide medical recommendations regarding symptom management needs via telephone      Benson Smith MD   of Geriatric and Palliative Medicine  [facility]    Between the hours of 9 am and 5 pm from Monday through Friday, please contact me on Microsoft Teams    After 5pm and on weekends, please see the contact information below:    In the event of newly developing, evolving, or worsening symptoms, please contact the Palliative Medicine team via pager (if the patient is at [facility] #[phone] or if the patient is at [facility] #[phone]) The Geriatric and Palliative Medicine service has coverage 24 hours a day/ 7 days a week to provide medical recommendations regarding symptom management needs via telephone
89F w/ dementia, meningioma, HF, DM2, afib off ac due to GI bleed p/w ams. On day of presentation, the patient's daughter went to visit the patient at her SNF and found the patient to be slumped over in her wheelchair with minimal interaction. Patient admitted with UTI and metabolic encephalopathy, now minimally responsive and unable to take by mouth. Given her poor functional status, and advanced dementia, family has opted for sx directed care at the end of life.  Antibiotics dc'd.  Hospice transition to be addressed if clinical condition permits.   In the setting of parenteral controlled substance administration, clinical monitoring required for side effects such as respiratory depression, constipation and opioid induced neurotoxicity.  This patient is at high risk due to frailty and metabolic encephalopathy. Patient assessment and plan discussed on interdisciplinary team rounds today.
89F w/ dementia, meningioma, HF, DM2, afib off ac due to GI bleed p/w ams. On day of presentation, the patient's daughter went to visit the patient at her SNF and found the patient to be slumped over in her wheelchair with minimal interaction. Patient admitted with UTI and metabolic encephalopathy, now minimally responsive and unable to take by mouth. palliative consulted for GOC. Pt transitioned to PCU for symptom directed care.   > Agree with symptom management as above. Started on IV morphine ATC. Continue care in PCU .
89F w/ dementia, meningioma, HF, DM2, afib off ac due to GI bleed p/w ams. On day of presentation, the patient's daughter went to visit the patient at her SNF and found the patient to be slumped over in her wheelchair with minimal interaction. Patient admitted with UTI and metabolic encephalopathy, now minimally responsive and unable to take by mouth. Given her poor functional status, and advanced dementia, family has opted for sx directed care at the end of life.  Antibiotics dc'd.  Hospice transition to be addressed if clinical condition permits.   In the setting of parenteral controlled substance administration, clinical monitoring required for side effects such as respiratory depression, constipation and opioid induced neurotoxicity.  This patient is at high risk due to frailty and metabolic encephalopathy.
89F w/ dementia, meningioma, HF, DM2, afib off ac due to GI bleed p/w ams. On day of presentation, the patient's daughter went to visit the patient at her SNF and found the patient to be slumped over in her wheelchair with minimal interaction. Patient admitted with UTI and metabolic encephalopathy, now minimally responsive and unable to take by mouth. palliative consulted for GOC.    Pain:   -Morphine 1-2mg IV q 1 PRN moderate to severe pain  Encephalopathy acute:   -On Zosyn and IV that will likely be descalated once the patient's son comes to visit him.   Dementia with behavioral issues:   -On Depakene ATC and Ativan PRN.   Will continue PCU care for advanced symptoms monitoring and Rx.         Frederic Ackerman MD   Geriatrics and Palliative Care (GAP) Consult Service    of Geriatric and Palliative Medicine  [facility] [place]

## 2022-05-10 NOTE — PROGRESS NOTE ADULT - PROBLEM SELECTOR PLAN 2
Required x2 PRNs of morphine 2mg IV for dyspnea.  - C/w Morphine 2mg q1h PRN for dyspnea  - C/w Morphine 2mg IV q6h ATC   - Ordered for bowel regimen. Last BM 4/30. Dulcolax suppository to be given today.

## 2022-05-10 NOTE — PROGRESS NOTE ADULT - PROBLEM SELECTOR PLAN 5
Fast score 7c. Used 0 PRN ativan in past 24 hours.  - C/w depakote for agitation  - Continue Ativan 0.2mg q1h PRN

## 2022-05-10 NOTE — PROGRESS NOTE ADULT - PROBLEM SELECTOR PLAN 1
Multifactorial (UTI, Hypernatremia, Dementia/ Delirium, EOL)  - Family in agreement to discontinue IV zosyn and IVF after son Lior visited on 5/7.

## 2022-05-10 NOTE — PROGRESS NOTE ADULT - PROBLEM SELECTOR PROBLEM 6
Chronic HF (heart failure)
Palliative care encounter
Palliative care encounter
Chronic HF (heart failure)
Chronic HF (heart failure)
Palliative care encounter
Chronic HF (heart failure)
Palliative care encounter
Chronic HF (heart failure)
Palliative care encounter

## 2022-05-10 NOTE — PROGRESS NOTE ADULT - PROBLEM SELECTOR PLAN 6
stable  cards f/u   c/w metoprolol and furosemide dosing
Continue eol care in PCU. Possible inpatient hospice pending clinical course.  Albanian Islam  visited with family at bedside on 5/6.
stable  cards f/u   c/w metoprolol and furosemide dosing
Continue eol care in PCU. Possible inpatient hospice pending clinical course.  Wolof Jew  visited with family at bedside on 5/6.
stable  cards f/u   c/w metoprolol and furosemide dosing
Continue eol care in PCU. Possible inpatient hospice pending clinical course.  French Anabaptism  visited with family at bedside on 5/6.
stable  cards f/u   c/w metoprolol and furosemide dosing
Continue eol care in PCU. Possible inpatient hospice pending clinical course.  Urdu Jain  visited with family at bedside on 5/6.
stable  cards f/u   c/w metoprolol and furosemide dosing
PCU level of care, potential inpatient hospice disposition after family form [place] visits on Satuday 5/7  [employer] chaplaincy referral.

## 2022-05-10 NOTE — PROGRESS NOTE ADULT - SUBJECTIVE AND OBJECTIVE BOX
GAP TEAM PALLIATIVE CARE UNIT PROGRESS NOTE:      [  ] Patient on hospice program.    INDICATION FOR PALLIATIVE CARE UNIT SERVICES/Interval HPI: End of life care.  No PRNs used in past 24 hours.    OVERNIGHT EVENTS:    DNR on chart: Yes  Yes      Allergies    No Known Allergies    Intolerances    MEDICATIONS  (STANDING):  chlorhexidine 2% Cloths 1 Application(s) Topical <User Schedule>  morphine  - Injectable 2 milliGRAM(s) IV Push every 6 hours  pantoprazole  Injectable 40 milliGRAM(s) IV Push daily  valproate sodium IVPB 250 milliGRAM(s) IV Intermittent two times a day    MEDICATIONS  (PRN):  acetaminophen  Suppository .. 650 milliGRAM(s) Rectal every 6 hours PRN Temp greater or equal to 38C (100.4F), Mild Pain (1 - 3)  bisacodyl Suppository 10 milliGRAM(s) Rectal daily PRN Constipation  glycopyrrolate Injectable 0.4 milliGRAM(s) IV Push every 6 hours PRN secretions  LORazepam   Injectable 0.2 milliGRAM(s) IV Push every 1 hour PRN anxiety/agitation/refractory dyspnea  morphine  - Injectable 2 milliGRAM(s) IV Push every 1 hour PRN Severe Pain (7 - 10)  morphine  - Injectable 2 milliGRAM(s) IV Push every 1 hour PRN dyspnea  morphine  - Injectable 1 milliGRAM(s) IV Push every 1 hour PRN Moderate Pain (4 - 6)    ITEMS UNCHECKED ARE NOT PRESENT    PRESENT SYMPTOMS: [X]Unable to self-report  [X]PAINADs [X]RDOS  Source if other than patient:  [ ]Family   [ ]Team     Pain: [ ] yes [ ] no  QOL impact -   Location -                    Aggravating factors -  Quality -  Radiation -  Timing-  Severity (0-10 scale):  Minimal acceptable level (0-10 scale):     PAINAD Score: 0  [email or web address]/[email or web address].pdf (Ctrl +  left click to view)    Dyspnea:                           [ ]Mild [ ]Moderate [ ]Severe    RDOS: 1  0 to 2  minimal or no respiratory distress   3  mild distress  4 to 6 moderate distress  >7 severe distress  [email or web address]://[email or web address]/handouts/hen/Respiratory_Distress_Observation_Scale.pdf (Ctrl +  left click to view)     Anxiety:                             [ ]Mild [ ]Moderate [ ]Severe  Fatigue:                             [ ]Mild [ ]Moderate [ ]Severe  Nausea:                             [ ]Mild [ ]Moderate [ ]Severe  Loss of appetite:              [ ]Mild [ ]Moderate [ ]Severe  Constipation:                    [ ]Mild [ ]Moderate [ ]Severe  		  Other Symptoms:  [X]All other review of systems negative     Palliative Performance Status Version 2:         10%         [email or web address]://[email or web address]/files/[email or web address]_performance_[email or web address]_[email or web address]    PHYSICAL EXAM:   Vital Signs Last 24 Hrs  T(C): 36.9 (10 May 2022 08:02), Max: 36.9 (10 May 2022 08:02)  T(F): 98.4 (10 May 2022 08:02), Max: 98.4 (10 May 2022 08:02)  HR: 100 (10 May 2022 08:02) (94 - 101)  BP: 95/48 (10 May 2022 08:02) (92/64 - 100/72)  BP(mean): --  RR: 20 (10 May 2022 08:02) (20 - 20)  SpO2: -- I&O's Summary    09 May 2022 07:01  -  10 May 2022 07:00  --------------------------------------------------------  IN: 0 mL / OUT: 100 mL / NET: -100 mL      GENERAL: [ ] Cachexia  [ ]Alert  [ ]Oriented x   [ ]Lethargic  [X]Unarousable  [ ]Verbal  [ ]Non-Verbal  Behavioral:   [ ] Anxiety  [ ] Delirium [ ] Agitation [ ] Other  HEENT:  [ ]Normal   [X]Dry mouth   [ ]ET Tube/Trach  [ ]Oral lesions  PULMONARY:   [X]Clear [ ]Tachypnea  [ ]Audible excessive secretions   [ ]Rhonchi        [ ]Right [ ]Left [ ]Bilateral  [ ]Crackles        [ ]Right [ ]Left [ ]Bilateral  [ ]Wheezing     [ ]Right [ ]Left [ ]Bilateral  [ ]Diminished BS [ ]Right [ ]Left [ ]Bilateral    CARDIOVASCULAR:    [X]Regular [ ]Irregular [X]Tachy  [ ]Alexis [ ]Murmur [ ]Other  GASTROINTESTINAL:  [ ]Soft  [ ]Distended   [ ]+BS  [ ]Non tender [ ]Tender  [ ]Other [ ]PEG [ ]OGT/ NGT   Last BM:    GENITOURINARY:  [ ]Normal [ ] Incontinent   [ ]Oliguria/Anuria   [X]Hernandez  MUSCULOSKELETAL:  [ ]Normal   [ ]Weakness  [X]Bed/Wheelchair bound [X]Edema  NEUROLOGIC:   [ ]No focal deficits  [X] Cognitive impairment  [ ] Dysphagia [ ]Dysarthria [ ] Paresis [ ]Other   SKIN:   [ ]Normal  [ ]Rash  [ ]Other  [X]Pressure ulcer(s)  [X]y [ ]n  Present on admission      CRITICAL CARE:  [ ] Shock Present  [ ]Septic [ ]Cardiogenic [ ]Neurologic [ ]Hypovolemic  [ ]  Vasopressors [ ]  Inotropes   [ ] Respiratory failure present [ ] Mechanical Ventilation [ ] Non-invasive ventilatory support [ ] High-Flow  [ ] Acute  [ ] Chronic [ ] Hypoxic  [ ] Hypercarbic [ ] Other  [ ] Other organ failure     LABS:            RADIOLOGY & ADDITIONAL STUDIES:    PROTEIN CALORIE MALNUTRITION: [ ] mild [ ] moderate [ ] severe  [ ] underweight [ ] morbid obesity    https://www.andeal.org/vault/2440/web/files/ONC/Table_Clinical%20Characteristics%20to%20Document%20Malnutrition-White%20JV%20et%20al%789656.pdf    Height (cm): 170.2 (04-25-22 @ 17:29)  Weight (kg): 61.2 (04-25-22 @ 17:29)  BMI (kg/m2): 21.1 (04-25-22 @ 17:29)    [X] PPSV2 < or = 30% [ ] significant weight loss [X] poor nutritional intake [ ] anasarca   Artificial Nutrition [ ]     REFERRALS:   [X]Chaplaincy  [ ] Hospice  [ ]Child Life  [X]Social Work  [ ]Case management [ ]Holistic Therapy [ ] Physical Therapy [ ] Dietary   Goals of Care Document:  GAP TEAM PALLIATIVE CARE UNIT PROGRESS NOTE:      [  ] Patient on hospice program.    INDICATION FOR PALLIATIVE CARE UNIT SERVICES/Interval HPI: End of life care in the setting of metabolic encephalopathy from possible UTI/progression of meningioma    No PRNs used in past 24 hours.    OVERNIGHT EVENTS:    DNR on chart: Yes  Yes    Allergies    No Known Allergies    Intolerances    MEDICATIONS  (STANDING):  chlorhexidine 2% Cloths 1 Application(s) Topical <User Schedule>  morphine  - Injectable 2 milliGRAM(s) IV Push every 6 hours  pantoprazole  Injectable 40 milliGRAM(s) IV Push daily  valproate sodium IVPB 250 milliGRAM(s) IV Intermittent two times a day    MEDICATIONS  (PRN):  acetaminophen  Suppository .. 650 milliGRAM(s) Rectal every 6 hours PRN Temp greater or equal to 38C (100.4F), Mild Pain (1 - 3)  bisacodyl Suppository 10 milliGRAM(s) Rectal daily PRN Constipation  glycopyrrolate Injectable 0.4 milliGRAM(s) IV Push every 6 hours PRN secretions  LORazepam   Injectable 0.2 milliGRAM(s) IV Push every 1 hour PRN anxiety/agitation/refractory dyspnea  morphine  - Injectable 2 milliGRAM(s) IV Push every 1 hour PRN Severe Pain (7 - 10)  morphine  - Injectable 2 milliGRAM(s) IV Push every 1 hour PRN dyspnea  morphine  - Injectable 1 milliGRAM(s) IV Push every 1 hour PRN Moderate Pain (4 - 6)    ITEMS UNCHECKED ARE NOT PRESENT    PRESENT SYMPTOMS: [X]Unable to self-report  [X] PAINADs [X] RDOS  Source if other than patient:  [ ]Family   [ ]Team     Pain: [ ] yes [ x] no  QOL impact -   Location -                    Aggravating factors -  Quality -  Radiation -  Timing-  Severity (0-10 scale):  Minimal acceptable level (0-10 scale):     PAINAD Score: 0  http://geriatrictoolkit.[place].edu/cog/painad.pdf (Ctrl +  left click to view)    Dyspnea:                           [ ]Mild [ ]Moderate [ ]Severe    RDOS: 1  0 to 2  minimal or no respiratory distress   3  mild distress  4 to 6 moderate distress  >7 severe distress  https://[email or web address]/handouts/hen/Respiratory_Distress_Observation_Scale.pdf (Ctrl +  left click to view)     Anxiety:                             [ ]Mild [ ]Moderate [ ]Severe  Fatigue:                             [ ]Mild [ ]Moderate [ ]Severe  Nausea:                             [ ]Mild [ ]Moderate [ ]Severe  Loss of appetite:              [ ]Mild [ ]Moderate [ ]Severe  Constipation:                    [ ]Mild [ ]Moderate [ ]Severe  		  Other Symptoms:  [X]All other review of systems negative     Palliative Performance Status Version 2:         10%         http://[facility].org/files/news/palliative_performance_scale_[email or web address].pdf    PHYSICAL EXAM:   Vital Signs Last 24 Hrs  T(C): 36.9 (10 May 2022 08:02), Max: 36.9 (10 May 2022 08:02)  T(F): 98.4 (10 May 2022 08:02), Max: 98.4 (10 May 2022 08:02)  HR: 100 (10 May 2022 08:02) (94 - 101)  BP: 95/48 (10 May 2022 08:02) (92/64 - 100/72)  BP(mean): --  RR: 20 (10 May 2022 08:02) (20 - 20)  SpO2: -- I&O's Summary    09 May 2022 07:01  -  10 May 2022 07:00  --------------------------------------------------------  IN: 0 mL / OUT: 100 mL / NET: -100 mL      GENERAL: [ ] Cachexia  [ ]Alert  [ ]Oriented x   [ ]Lethargic  [X]Unarousable  [ ]Verbal  [x ]Non-Verbal  Behavioral:   [ ] Anxiety  [ ] Delirium [ ] Agitation [ ] Other  HEENT:  [ ]Normal   [X]Dry mouth   [ ]ET Tube/Trach  [ ]Oral lesions  PULMONARY:   [X]Clear [x ]Tachypnea  [ ]Audible excessive secretions   [ ]Rhonchi        [ ]Right [ ]Left [ ]Bilateral  [ ]Crackles        [ ]Right [ ]Left [ ]Bilateral  [ ]Wheezing     [ ]Right [ ]Left [ ]Bilateral  [ ]Diminished BS [ ]Right [ ]Left [ ]Bilateral    CARDIOVASCULAR:    [X]Regular [ ]Irregular [X]Tachy  [ ]Alexis [ ]Murmur [ ]Other  GASTROINTESTINAL:  [x ]Soft  [ ]Distended   [ x]+BS  [ x]Non tender [ ]Tender  [ ]Other [ ]PEG [ ]OGT/ NGT   Last BM:  4/30/2022  GENITOURINARY:  [ ]Normal [x ] Incontinent   [ ]Oliguria/Anuria   [X]Hernandez  MUSCULOSKELETAL:  [ ]Normal   [ ]Weakness  [X]Bed/Wheelchair bound [X]Edema  NEUROLOGIC:   [ ]No focal deficits  [X] Cognitive impairment  [ ] Dysphagia [ ]Dysarthria [ ] Paresis [ ]Other   SKIN:   [ ]Normal  [ ]Rash  [ ]Other  [X]Pressure ulcer(s)  [X]y [ ]n  Present on admission  sacrum    CRITICAL CARE:  [ ] Shock Present  [ ]Septic [ ]Cardiogenic [ ]Neurologic [ ]Hypovolemic  [ ]  Vasopressors [ ]  Inotropes   [ ] Respiratory failure present [ ] Mechanical Ventilation [ ] Non-invasive ventilatory support [ ] High-Flow  [ ] Acute  [ ] Chronic [ ] Hypoxic  [ ] Hypercarbic [ ] Other  [ ] Other organ failure     LABS: None new.             RADIOLOGY & ADDITIONAL STUDIES: None new.     PROTEIN CALORIE MALNUTRITION: [ ] mild [x ] moderate [ ] severe  [ ] underweight [ ] morbid obesity    https://www.andeal.org/vault/2440/web/files/ONC/Table_Clinical%20Characteristics%20to%20Document%20Malnutrition-White%20JV%20et%20al%202012.pdf    Height (cm): 170.2 (04-25-22 @ 17:29)  Weight (kg): 61.2 (04-25-22 @ 17:29)  BMI (kg/m2): 21.1 (04-25-22 @ 17:29)    [X] PPSV2 < or = 30% [ ] significant weight loss [X] poor nutritional intake [ ] anasarca   Artificial Nutrition [ ]     REFERRALS:   [X]Chaplaincy  [ ] Hospice  [ ]Child Life  [X]Social Work  [ ]Case management [ ]Holistic Therapy [ ] Physical Therapy [ ] Dietary   Goals of Care Document:

## 2022-05-10 NOTE — PROGRESS NOTE ADULT - NSPROGADDITIONALINFOA_GEN_ALL_CORE
d/w family at bedside  dnr/i  comfort care  care as per PCU      Advanced care planning was discussed with patient and family.  Advanced care planning forms were reviewed and discussed as appropriate.  Differential diagnosis and plan of care discussed with patient after the evaluation.   Pain assessed and judicious use of narcotics when appropriate was discussed.  Importance of Fall prevention discussed.  Counseling on Smoking and Alcohol cessation was offered when appropriate.  Counseling on Diet, exercise, and medication compliance was done.     Approx 40 minutes spent.
- Counseling on diet, exercise, and medication adherence was done  - Counseling on smoking cessation and alcohol consumption offered when appropriate.  - Pain assessed and judicious use of narcotics when appropriate was discussed.    - Stroke education given when appropriate.  - Importance of fall prevention discussed.   - Differential diagnosis and plan of care discussed with patient and/or family and primary team
dc planning    Advanced care planning was discussed with patient and family.  Advanced care planning forms were reviewed and discussed as appropriate.  Differential diagnosis and plan of care discussed with patient after the evaluation.   Pain assessed and judicious use of narcotics when appropriate was discussed.  Importance of Fall prevention discussed.  Counseling on Smoking and Alcohol cessation was offered when appropriate.  Counseling on Diet, exercise, and medication compliance was done.     Approx 60 minutes spent.
Advanced care planning was discussed with patient and family.  Advanced care planning forms were reviewed and discussed as appropriate.  Differential diagnosis and plan of care discussed with patient after the evaluation.   Pain assessed and judicious use of narcotics when appropriate was discussed.  Importance of Fall prevention discussed.  Counseling on Smoking and Alcohol cessation was offered when appropriate.  Counseling on Diet, exercise, and medication compliance was done.     Approx 60 minutes spent.
d/w family at bedside  dnr/i  focus on comfort care  palliative f/u noted    tx to PCU  care as per PCU      Advanced care planning was discussed with patient and family.  Advanced care planning forms were reviewed and discussed as appropriate.  Differential diagnosis and plan of care discussed with patient after the evaluation.   Pain assessed and judicious use of narcotics when appropriate was discussed.  Importance of Fall prevention discussed.  Counseling on Smoking and Alcohol cessation was offered when appropriate.  Counseling on Diet, exercise, and medication compliance was done.     Approx 60 minutes spent.
d/w daughter on phone  dnr/i  focus on comfort care  palliative f/u noted    tx to PCU  care as per PCU      Advanced care planning was discussed with patient and family.  Advanced care planning forms were reviewed and discussed as appropriate.  Differential diagnosis and plan of care discussed with patient after the evaluation.   Pain assessed and judicious use of narcotics when appropriate was discussed.  Importance of Fall prevention discussed.  Counseling on Smoking and Alcohol cessation was offered when appropriate.  Counseling on Diet, exercise, and medication compliance was done.     Approx 60 minutes spent.
d/w family at bedside  dnr/i  comfort care  care as per PCU      Advanced care planning was discussed with patient and family.  Advanced care planning forms were reviewed and discussed as appropriate.  Differential diagnosis and plan of care discussed with patient after the evaluation.   Pain assessed and judicious use of narcotics when appropriate was discussed.  Importance of Fall prevention discussed.  Counseling on Smoking and Alcohol cessation was offered when appropriate.  Counseling on Diet, exercise, and medication compliance was done.     Approx 40 minutes spent.
d/w daughter on phone  dnr/i  focus on comfort care  palliative f/u noted  plan for tx to PCU    Advanced care planning was discussed with patient and family.  Advanced care planning forms were reviewed and discussed as appropriate.  Differential diagnosis and plan of care discussed with patient after the evaluation.   Pain assessed and judicious use of narcotics when appropriate was discussed.  Importance of Fall prevention discussed.  Counseling on Smoking and Alcohol cessation was offered when appropriate.  Counseling on Diet, exercise, and medication compliance was done.     Approx 60 minutes spent.
Advanced care planning was discussed with patient and family.  Advanced care planning forms were reviewed and discussed as appropriate.  Differential diagnosis and plan of care discussed with patient after the evaluation.   Pain assessed and judicious use of narcotics when appropriate was discussed.  Importance of Fall prevention discussed.  Counseling on Smoking and Alcohol cessation was offered when appropriate.  Counseling on Diet, exercise, and medication compliance was done.     Approx 60 minutes spent.
d/w daughter on phone  dnr/i  focus on comfort care  palliative f/u noted    tx to PCU  care as per PCU      Advanced care planning was discussed with patient and family.  Advanced care planning forms were reviewed and discussed as appropriate.  Differential diagnosis and plan of care discussed with patient after the evaluation.   Pain assessed and judicious use of narcotics when appropriate was discussed.  Importance of Fall prevention discussed.  Counseling on Smoking and Alcohol cessation was offered when appropriate.  Counseling on Diet, exercise, and medication compliance was done.     Approx 60 minutes spent.
Advanced care planning was discussed with patient and family.  Advanced care planning forms were reviewed and discussed as appropriate.  Differential diagnosis and plan of care discussed with patient after the evaluation.   Pain assessed and judicious use of narcotics when appropriate was discussed.  Importance of Fall prevention discussed.  Counseling on Smoking and Alcohol cessation was offered when appropriate.  Counseling on Diet, exercise, and medication compliance was done.     Approx 60 minutes spent.
d/w daughter on phone  dnr/i  focus on comfort care  palliative f/u noted    tx to PCU  care as per PCU      Advanced care planning was discussed with patient and family.  Advanced care planning forms were reviewed and discussed as appropriate.  Differential diagnosis and plan of care discussed with patient after the evaluation.   Pain assessed and judicious use of narcotics when appropriate was discussed.  Importance of Fall prevention discussed.  Counseling on Smoking and Alcohol cessation was offered when appropriate.  Counseling on Diet, exercise, and medication compliance was done.     Approx 60 minutes spent.
Advanced care planning was discussed with patient and family.  Advanced care planning forms were reviewed and discussed as appropriate.  Differential diagnosis and plan of care discussed with patient after the evaluation.   Pain assessed and judicious use of narcotics when appropriate was discussed.  Importance of Fall prevention discussed.  Counseling on Smoking and Alcohol cessation was offered when appropriate.  Counseling on Diet, exercise, and medication compliance was done.     Approx 60 minutes spent.

## 2022-05-10 NOTE — CHART NOTE - NSCHARTNOTEFT_GEN_A_CORE
Interim Note    Pt on comfort measures only and NPO, pt inappropriate for nutrition follow up at this time per staff.    RD remains available.   Maribell Dolan MS, RD, CDN, [facility] Pager #[phone]

## 2022-05-10 NOTE — PROGRESS NOTE ADULT - PROBLEM SELECTOR PLAN 3
- C/w Morphine 1mg q1h PRN for mod pain   - C/w Morphine 2mg q1h PRN for severe pain  - C/w Morphine 2mg q6h ATC

## 2022-05-10 NOTE — PROGRESS NOTE ADULT - PROBLEM SELECTOR PROBLEM 7
Type 2 diabetes mellitus

## 2022-05-10 NOTE — PROGRESS NOTE ADULT - REASON FOR ADMISSION
89F p/w ams

## 2022-05-10 NOTE — PROGRESS NOTE ADULT - SUBJECTIVE AND OBJECTIVE BOX
JULIOCESAR BASURTO  89y Female  MRN:[id number]    Patient is a 89y old  Female who presents with a chief complaint of 89F p/w ams (26 Apr 2022 09:54)    HPI:  The patient is lethargic and unable to provide history. Therefore history is obtained from the patient's daughter ms. Larisa Knight via telephone    89F w/ dementia, meningioma, HF, DM2, afib off ac due to GI bleed p/w ams. On day of presentation, the patient's daughter went to visit the patient at her SNF and found the patient to be slumped over in her wheelchair with minimal interaction at approx 12:30pm. The patient's cognitive baseline has been reportedly declining over the course of months however she reportedly is able to have simple conversation with family. The patient has had dementia for some time but she has more rapidly declined since the end of Jan2022 which has been attributed to isolation during hospitalizations, rehab and while being in her SNF. Her daughter reports dosage adjustment of seroquel and divalproex reportedly improving her cognition over the weeks prior which is why there is surprise for current mental status. Additionally, the patient is known to have a meningioma however details of the state of disease are not known to the family.    Her daughter confirms that the patient is DNR/DNI and that the family would not want to pursue invasive surgery but is open to medical treatment and further diagnostic testing. (25 Apr 2022 22:40)      Patient seen and evaluated at bedside in PCU. No acute events overnight except as noted.    Interval HPI: no acute events o/n     PAST MEDICAL & SURGICAL HISTORY:  Diabetes    Dementia    Chronic CHF    No significant past surgical history        REVIEW OF SYSTEMS:  as per hpi     VITALS:   Vital Signs Last 24 Hrs  T(C): 36.9 (10 May 2022 08:02), Max: 36.9 (10 May 2022 08:02)  T(F): 98.4 (10 May 2022 08:02), Max: 98.4 (10 May 2022 08:02)  HR: 100 (10 May 2022 08:02) (94 - 101)  BP: 95/48 (10 May 2022 08:02) (92/64 - 100/72)  BP(mean): --  RR: 20 (10 May 2022 08:02) (20 - 20)  SpO2: --    PHYSICAL EXAM:  GENERAL: NAD, frail,  lethargic  HEAD:  Atraumatic, Normocephalic  EYES: conjunctiva and sclera clear  NECK: Supple, No JVD  CHEST/LUNG: Clear to auscultation bilaterally; No wheeze  HEART: S1, S2; No murmurs, rubs, or gallops  ABDOMEN: Soft, Nontender, Nondistended; Bowel sounds present  EXTREMITIES:  2+ Peripheral Pulses, No clubbing, cyanosis, or edema  NEUROLOGY: AAOX0       Consultant(s) Notes Reviewed:  [x ] YES  [ ] NO  Care Discussed with Consultants/Other Providers [ x] YES  [ ] NO    MEDS:   MEDICATIONS  (STANDING):  chlorhexidine 2% Cloths 1 Application(s) Topical <User Schedule>  morphine  - Injectable 2 milliGRAM(s) IV Push every 6 hours  pantoprazole  Injectable 40 milliGRAM(s) IV Push daily  valproate sodium IVPB 250 milliGRAM(s) IV Intermittent two times a day    MEDICATIONS  (PRN):  acetaminophen  Suppository .. 650 milliGRAM(s) Rectal every 6 hours PRN Temp greater or equal to 38C (100.4F), Mild Pain (1 - 3)  bisacodyl Suppository 10 milliGRAM(s) Rectal daily PRN Constipation  glycopyrrolate Injectable 0.4 milliGRAM(s) IV Push every 6 hours PRN secretions  LORazepam   Injectable 0.2 milliGRAM(s) IV Push every 1 hour PRN anxiety/agitation/refractory dyspnea  morphine  - Injectable 2 milliGRAM(s) IV Push every 1 hour PRN Severe Pain (7 - 10)  morphine  - Injectable 2 milliGRAM(s) IV Push every 1 hour PRN dyspnea  morphine  - Injectable 1 milliGRAM(s) IV Push every 1 hour PRN Moderate Pain (4 - 6)      ALLERGIES:  No Known Allergies      LABS:                                                cultures: Culture Results:   >100,000 CFU/ml Enterococcus species (04-25 @ 23:16)     < from: CT Venogram Brain w/ IV Cont (04.25.22 @ 23:56) >    IMPRESSION: Calcified right posterior fossa meningioma adjacent to the   right sigmoid sinus without sinus invasion.      --- End of Report ---        < end of copied text >  < from: CT Head No Cont (04.25.22 @ 19:20) >  IMPRESSION:  Right lateral tentorium/sphenoid plate region partially   calcified lesion suspicious for a meningioma in this region with the   measurements given above. Given the location possibility of infiltration   of the sigmoid sinus at this level should be considered. Venogram either   MRV or CTV may be helpful for more complete evaluation as clinically   warranted. Age-appropriate involutional changes and microvascular   ischemic disease age    --- End of Report ---      < end of copied text >

## 2022-05-10 NOTE — PROGRESS NOTE ADULT - PROVIDER SPECIALTY LIST ADULT
Infectious Disease
Internal Medicine
Cardiology
Infectious Disease
Infectious Disease
Nephrology
Palliative Care
Cardiology
Internal Medicine
Neurology
Internal Medicine
Palliative Care
Internal Medicine
Palliative Care
Internal Medicine
Internal Medicine
Cardiology
Internal Medicine
Palliative Care
Cardiology
Internal Medicine
Palliative Care
Cardiology
Cardiology
Palliative Care
Internal Medicine
Internal Medicine
Cardiology
Cardiology
Internal Medicine

## 2022-05-10 NOTE — PROGRESS NOTE ADULT - NUTRITIONAL ASSESSMENT
This patient has been assessed with a concern for Malnutrition and has been determined to have a diagnosis/diagnoses of Severe protein-calorie malnutrition.    This patient is being managed with:   Diet NPO-  Entered: Apr 30 2022  1:19PM    
This patient has been assessed with a concern for Malnutrition and has been determined to have a diagnosis/diagnoses of Severe protein-calorie malnutrition.    This patient is being managed with:   Diet Pureed-  Moderately Thick Liquids (MODTHICKLIQS)  Supplement Feeding Modality:  Oral  Glucerna Shake Cans or Servings Per Day:  2       Frequency:  Daily  Entered: Apr 28 2022  3:02PM    
This patient has been assessed with a concern for Malnutrition and has been determined to have a diagnosis/diagnoses of Severe protein-calorie malnutrition.    This patient is being managed with:   Diet NPO-  Entered: Apr 30 2022  1:19PM    

## 2022-05-11 NOTE — DISCHARGE NOTE FOR THE EXPIRED PATIENT - HOSPITAL COURSE
89 yo F w/ dementia, meningioma, HF, DM2, afib off ac due to GI bleed p/w ams. On day of presentation, the patient's daughter went to visit the patient at her SNF and found the patient to be slumped over in her wheelchair with minimal interaction. Patient admitted with UTI and metabolic encephalopathy, becoming minimally responsive and unable to take by mouth. Palliative consulted for GOC, family opting for symptom directed care and patient transferred to PCU. Patient  on 22 at 7:55am.

## 2022-06-27 NOTE — ED ADULT NURSE NOTE - NSFALLRSKASSESSDT_ED_ALL_ED
Please review 2 repeated cytologies.     Urine, voided:   -Atypical degenerated urothelial cells, cannot exclude high-grade urothelial neoplasia (see microscopic description)    Cytologic Interpretation   Voided urine:   -Atypical, degenerated urothelial cells present.        20-Jan-2022 14:17

## 2022-09-21 NOTE — DIETITIAN INITIAL EVALUATION ADULT - FUNCTIONAL SCREEN CURRENT LEVEL: SWALLOWING (IF SCORE 2 OR MORE FOR ANY ITEM, CONSULT REHAB SERVICES), MLM)
2 = difficulty swallowing liquids Niacinamide Counseling: I recommended taking niacin or niacinamide, also know as vitamin B3, twice daily. Recent evidence suggests that taking vitamin B3 (500 mg twice daily) can reduce the risk of actinic keratoses and non-melanoma skin cancers. Side effects of vitamin B3 include flushing and headache.

## 2023-01-06 NOTE — DISCHARGE NOTE ADULT - MEDICATION SUMMARY - MEDICATIONS TO CHANGE
Dr. Jones I will SWITCH the dose or number of times a day I take the medications listed below when I get home from the hospital:  None

## 2023-02-01 NOTE — ED PROVIDER NOTE - CONSTITUTIONAL, MLM
The tests looking at your heart today are normal.  Your CT scan shows no dangerous bleeding.  As we discussed it is important for you to follow-up with your primary care as we did advise additional testing in the hospital.  Please seek more immediate medical attention should you develop any new lightheadedness, chest pain, or additional episodes of passing out.  Additionally you should avoid activities such as skiing or you could hit your head again over the next 48 hours and even longer if you develop any symptoms.  You can gradually return to your daily activities  
- - -

## 2023-02-08 NOTE — PATIENT PROFILE ADULT - BRAND OF COVID-19 VACCINATION
Chonodrocutaneous Helical Advancement Flap Text: The defect edges were debeveled with a #15 scalpel blade.  Given the location of the defect and the proximity to free margins a chondrocutaneous helical advancement flap was deemed most appropriate.  Using a sterile surgical marker, the appropriate advancement flap was drawn incorporating the defect and placing the expected incisions within the relaxed skin tension lines where possible.    The area thus outlined was incised deep to adipose tissue with a #15 scalpel blade.  The skin margins were undermined to an appropriate distance in all directions utilizing iris scissors. Pfizer dose 1 and 2

## 2023-02-16 NOTE — PHYSICAL THERAPY INITIAL EVALUATION ADULT - FUNCTIONAL LIMITATIONS, PT EVAL
Patient called looking for test results. Patient was informed of test results. Verbalized understanding.     Patient informed that their COVID testing was positive.  They should expect a phone call from the Public Health Department.    Patient should remain on strict home self isolation. It would be appropriate to return to work/school per CDC guidelines below.   · At least 24 hours have passed since fever resolution without use of fever reducing medication and   · Symptoms have improved and  · At least 5 days have passed since symptoms first appeared  · A mask should be worn when around others for the 5 days after return to work    **The loss of taste and smell may persist for weeks or months after recovery and do not need to delay the end of isolation.       For any worsening symptoms, they need to be evaluated in the Emergency Department, not the Urgent Care    
home management

## 2023-03-08 NOTE — PROGRESS NOTE ADULT - PROBLEM SELECTOR PLAN 2
Geriatric psych consulted   aspiration and fall precautions  [facility]

## 2023-07-28 NOTE — ED ADULT NURSE NOTE - NSFALLRSKINDICTYPE_ED_ALL_ED
Detail Level: Zone
Initiate Treatment: alclometasone 0.05 % topical cream BID
Initiate Treatment: fexofenadine 180 mg tablet QHS\\ncetirizine 10 mg capsule QAM
Confusion
Initiate Treatment: triamcinolone acetonide 0.1 % topical cream BID

## 2023-08-24 NOTE — ED ADULT NURSE NOTE - NS ED NURSE RECORD ANOTHER VITAL SIGN
Pt on financial schedule for appt tomorrow.  Called pt to remind pt of the telephone call and ensure all blocks were removed to accept scheduled phone call to review benefits.   Provided clinic callback number if the patient needed further assistance  
Yes

## 2023-09-03 NOTE — PHYSICAL THERAPY INITIAL EVALUATION ADULT - PRECAUTIONS/LIMITATIONS, REHAB EVAL
The wire was inserted into the aorta. CTH:No CT evidence of acute intracranial pathology.Approximately 2.1 cm, partially calcified mass in the posterior fossa, arising from the posterior lateral aspect of the right petrous temporal bone, most likely represents a meningioma.  Contrast-enhanced brainMRI is recommended.  For further characterization./fall precautions

## 2023-09-11 NOTE — ED ADULT TRIAGE NOTE - PATIENT ON (OXYGEN DELIVERY METHOD)
Nonselective enzymatic debridement performed with Santyl per physician order to wound left foot. Patient tolerated the procedure well.       .Electronically signed by Poppy Tyler LPN on 8/51/0395 at 20:92 AM room air

## 2023-10-24 NOTE — PROGRESS NOTE ADULT - PROBLEM SELECTOR PROBLEM 1
Drop in hemoglobin
18
Drop in hemoglobin

## 2023-11-20 NOTE — PROVIDER CONTACT NOTE (MEDICATION) - REASON
Patient Education   Celulitis en niños: Instrucciones de cuidado  Cellulitis in Children: Care Instructions  Instrucciones de cuidado     La celulitis es jerry infección cutánea causada por bacterias, con mayor frecuencia estreptococos o estafilococos. Suele producirse tras jerry ruptura en la piel por jerry raspadura, maame, mordedura o punción. O puede ocurrir tras un salpullido.  La celulitis puede tratarse sin hacer pruebas para detectar dougherty causa. Ronald dougherty médico podría hacer pruebas, si es necesario, para detectar bacterias específicas, reg Staphylococcus aureus resistente a la meticilina (MRSA, por ratna siglas en inglés).  El médico sandoval examinado minuciosamente a dougherty hijo. Ronald pueden producirse problemas más tarde. Si nota algún problema o nuevos síntomas, busque tratamiento médico de inmediato.  La atención de seguimiento es jerry parte clave del tratamiento y la seguridad de dougherty hijo. Asegúrese de hacer y acudir a todas las citas, y llame a dougherty médico si dougherty hijo está teniendo problemas. También es jerry buena idea saber los resultados de los exámenes de dougherty hijo y mantener jerry lista de los medicamentos que jose r.   Cómo puede cuidar a dougherty hijo en el hogar?  Brock a dougherty hijo los antibióticos según las indicaciones. No deje de dárselos solo porque dougherty hijo se sienta mejor. Dougherty hijo debe annette todos los antibióticos hasta terminarlos.  Eleve la brandy infectada sobre jerry almohada para reducir el dolor y la hinchazón. Trate de mantener la brandy por encima del nivel del corazón de dougherty hijo tan a menudo reg sea posible.  Si dougherty médico le dijo cómo cuidarle la infección a dougherty hijo, siga las instrucciones de dougherty médico. Si no le shawn instrucciones, siga estos consejos generales:  Lávele la brandy con agua limpia 2 veces al día. No use peróxido de hidrógeno (agua oxigenada) ni alcohol, los cuales pueden retrasar la sanación.  Puede cubrir la brandy con jerry capa mehrdad de vaselina y jerry venda no adherente.  Aplíquele más vaselina y reemplace la venda 
"según sea necesario.  Brock a dougherty hijo acetaminofén (Tylenol) o ibuprofeno (Advil, Motrin) para reducir el dolor y la hinchazón. Shira y siga todas las instrucciones de la etiqueta.  No le dé a dougherty hijo dos o más analgésicos (medicamentos para el dolor) al mismo tiempo a menos que el médico se lo haya indicado. Muchos analgésicos contienen acetaminofén, lo cual es Tylenol. El exceso de acetaminofén (Tylenol) puede ser dañino.  Para prevenir la celulitis en el futuro  Si dougherty hijo tiene jerry raspadura, maame, quemadura leve o mordedura, lávele la herida con agua limpia lo antes que pueda. [place] ayuda a evitar que se infecte. No use peróxido de hidrógeno (agua oxigenada) ni alcohol, los cuales pueden retrasar la sanación.  Cuídele los pies a dougherty hijo, sobre todo si tiene diabetes u otras afecciones que aumenten el riesgo de infección. Asegúrese de que dougherty hijo use zapatos y calcetines. No permita que dougherty hijo camine descalzo. Si dougherty hijo tiene pie de atleta u otros problemas cutáneos en los pies, hable con el médico sobre cómo tratarlos.   Cuándo debe pedir ayuda?   Llame a dougherty médico ahora mismo o busque atención médica inmediata si:    Hay señales de que la infección de dougherty hijo está empeorando, tales reg:  Aumento del dolor, la hinchazón, la temperatura o el enrojecimiento.  Vetas rojizas que salen de la brandy.  Pus que sale de la brandy.  Fiebre.     Dougherty hijo tiene un salpullido.   Preste especial atención a los cambios en la karina de dougherty hijo y asegúrese de comunicarse con dougherty médico si:    Dougherty hijo no mejora reg se esperaba.    Dónde puede encontrar más información en inglés?  Vaya a [email or web address]  Escriba C158 en la búsqueda para aprender más acerca de \"Celulitis en niños: Instrucciones de cuidado.\"  Revisado: 21 marzo, 2023               Versión del contenido: 13.8    [id number] [employer]   Las instrucciones de cuidado fueron adaptadas bajo licencia por dougherty profesional de atención "
Patient refused 6 pm meds and fingerstick.
médica. Si usted tiene preguntas sobre jerry afección médica o sobre estas instrucciones, siempre pregunte a dougherty profesional de karina. [facility] niega toda garantía o responsabilidad por dougherty uso de esta información.         
pt refused po meds, tried giving seroquel

## 2024-02-09 NOTE — SWALLOW BEDSIDE ASSESSMENT ADULT - DATE
[Today's Date] : [unfilled] [Name] : Name: [unfilled] [] : : ~~ [Today's Date:] : [unfilled] [Dear  ___:] : Dear Dr. [unfilled]: [Consult] : I had the pleasure of evaluating your patient, [unfilled]. My full evaluation follows. [Consult - Single Provider] : Thank you very much for allowing me to participate in the care of this patient. If you have any questions, please do not hesitate to contact me. [Sincerely,] : Sincerely, [FreeTextEntry4] : Dr. VERONICA MONTAÑO MD [de-identified] : Balwinder Ibarra MD, FAAP, FACC\par  \par  Pediatric Cardiologist\par   of Pediatrics\par  [facility]  27-Apr-2022

## 2024-05-16 NOTE — ED ADULT NURSE NOTE - NS PRO PASSIVE SMOKE EXP
sleep apnea    Unknown

## 2024-08-23 NOTE — H&P PST ADULT - PSY GEN HX ROS MEA POS PC
----- Message from Agata Canales CNP sent at 8/23/2024  3:27 PM CDT -----  Cholesterol and triglycerides are elevated.  Avoid fried foods and limit sweets and starches.  Recommend low fat and low cholesterol diet. Increase fiber, fruits and vegetables.  Repeat lipid panel in 1 year.    depression

## 2025-02-19 NOTE — DIETITIAN INITIAL EVALUATION ADULT - NS FNS CHANGE IN WEIGHT
The patient's goals for the shift include  rest    The clinical goals for the shift include stable hemodynamically, BG control    Problem: Pain - Adult  Goal: Verbalizes/displays adequate comfort level or baseline comfort level  Outcome: Progressing     Problem: Safety - Adult  Goal: Free from fall injury  Outcome: Progressing     Problem: Discharge Planning  Goal: Discharge to home or other facility with appropriate resources  Outcome: Progressing     Problem: Chronic Conditions and Co-morbidities  Goal: Patient's chronic conditions and co-morbidity symptoms are monitored and maintained or improved  Outcome: Progressing     Problem: Nutrition  Goal: Nutrient intake appropriate for maintaining nutritional needs  Outcome: Progressing     Problem: Diabetes  Goal: Achieve decreasing blood glucose levels by end of shift  Outcome: Progressing  Goal: Increase stability of blood glucose readings by end of shift  Outcome: Progressing  Goal: Decrease in ketones present in urine by end of shift  Outcome: Progressing  Goal: Maintain electrolyte levels within acceptable range throughout shift  Outcome: Progressing  Goal: Maintain glucose levels >70mg/dl to <250mg/dl throughout shift  Outcome: Progressing  Goal: No changes in neurological exam by end of shift  Outcome: Progressing  Goal: Learn about and adhere to nutrition recommendations by end of shift  Outcome: Progressing  Goal: Vital signs within normal range for age by end of shift  Outcome: Progressing  Goal: Increase self care and/or family involovement by end of shift  Outcome: Progressing  Goal: Receive DSME education by end of shift  Outcome: Progressing     Problem: Skin  Goal: Participates in plan/prevention/treatment measures  Outcome: Progressing  Flowsheets (Taken 2/19/2025 1156)  Participates in plan/prevention/treatment measures:   Elevate heels   Increase activity/out of bed for meals  Goal: Prevent/manage excess moisture  Outcome: Progressing  Flowsheets  (Taken 2/19/2025 1742)  Prevent/manage excess moisture:   Cleanse incontinence/protect with barrier cream   Moisturize dry skin  Goal: Prevent/minimize sheer/friction injuries  Outcome: Progressing  Flowsheets (Taken 2/19/2025 1742)  Prevent/minimize sheer/friction injuries:   Use pull sheet   Utilize specialty bed per algorithm  Goal: Promote/optimize nutrition  Outcome: Progressing  Flowsheets (Taken 2/19/2025 1742)  Promote/optimize nutrition: Monitor/record intake including meals     Problem: Fall/Injury  Goal: Not fall by end of shift  Outcome: Progressing  Goal: Be free from injury by end of the shift  Outcome: Progressing  Goal: Verbalize understanding of personal risk factors for fall in the hospital  Outcome: Progressing  Goal: Verbalize understanding of risk factor reduction measures to prevent injury from fall in the home  Outcome: Progressing      Loss

## 2025-03-14 NOTE — PROGRESS NOTE ADULT - PROBLEM SELECTOR PLAN 7
hold oral medications  insulin sliding scale while inpatient
Tenosynovium and retinaculum left wrist 
hold oral medications  insulin sliding scale while inpatient

## 2025-05-20 NOTE — PROGRESS NOTE ADULT - SUBJECTIVE AND OBJECTIVE BOX
Pt is alert & or x 3, following cammands, enc pt to eat more today. Dietary in to speak to pt. Enc pt to do ankle circles, make fists with hands for strength exercises. Pt is not able to move her legs by herself. She has extensive fall hx. Prn tylenol.    LAB CK 12,214 at added on trending downward.  Much emotional support. A lot of encouragement to eat more. Sinus r. Stool softner started. 3 liters n/c dialysis orders   Dialysis updated of treatment needed for 05/21/25.  1.5 liters removed today. Reviewed call light usage reviewed. Freq repositoning of her legs.  Problem: Patient Centered Care  Goal: Patient preferences are identified and integrated in the patient's plan of care  Description: Interventions:  - What would you like us to know as we care for you?  - Provide timely, complete, and accurate information to patient/family  - Incorporate patient and family knowledge, values, beliefs, and cultural backgrounds into the planning and delivery of care  - Encourage patient/family to participate in care and decision-making at the level they choose  - Honor patient and family perspectives and choices  Outcome: Progressing     Problem: PAIN - ADULT  Goal: Verbalizes/displays adequate comfort level or patient's stated pain goal  Description: INTERVENTIONS:  - Encourage pt to monitor pain and request assistance  - Assess pain using appropriate pain scale  - Administer analgesics based on type and severity of pain and evaluate response  - Implement non-pharmacological measures as appropriate and evaluate response  - Consider cultural and social influences on pain and pain management  - Manage/alleviate anxiety  - Utilize distraction and/or relaxation techniques  - Monitor for opioid side effects  - Notify MD/LIP if interventions unsuccessful or patient reports new pain  - Anticipate increased pain with activity and pre-medicate as appropriate  Outcome: Progressing     Problem: SAFETY ADULT - FALL  Goal: Free from fall  Subjective: Patient seen and examined. No new events except as noted.   HR improved     REVIEW OF SYSTEMS:    CONSTITUTIONAL: +weakness, fevers or chills  EYES/ENT: No visual changes;  No vertigo or throat pain   NECK: No pain or stiffness  RESPIRATORY: No cough, wheezing, hemoptysis; No shortness of breath  CARDIOVASCULAR: No chest pain or palpitations  GASTROINTESTINAL: No abdominal or epigastric pain. No nausea, vomiting, or hematemesis; No diarrhea or constipation. No melena or hematochezia.  GENITOURINARY: No dysuria, frequency or hematuria  NEUROLOGICAL: No numbness or weakness  SKIN: No itching, burning, rashes, or lesions   All other review of systems is negative unless indicated above.    MEDICATIONS:  MEDICATIONS  (STANDING):  digoxin     Tablet 125 MICROGram(s) Oral daily  furosemide    Tablet 40 milliGRAM(s) Oral daily  influenza   Vaccine 0.5 milliLiter(s) IntraMuscular once  metoprolol succinate  milliGRAM(s) Oral daily  mirtazapine 15 milliGRAM(s) Oral at bedtime  pantoprazole  Injectable 40 milliGRAM(s) IV Push two times a day  polyethylene glycol 3350 17 Gram(s) Oral daily  QUEtiapine 25 milliGRAM(s) Oral <User Schedule>  QUEtiapine 50 milliGRAM(s) Oral <User Schedule>  rivaroxaban 15 milliGRAM(s) Oral daily  senna 2 Tablet(s) Oral at bedtime      PHYSICAL EXAM:  T(C): 36.4 (10-08-21 @ 04:34), Max: 36.9 (10-07-21 @ 12:03)  HR: 88 (10-08-21 @ 04:34) (76 - 92)  BP: 130/87 (10-08-21 @ 04:34) (108/67 - 130/87)  RR: 18 (10-08-21 @ 04:34) (18 - 19)  SpO2: 97% (10-08-21 @ 04:34) (95% - 97%)  Wt(kg): --  I&O's Summary    07 Oct 2021 07:01  -  08 Oct 2021 07:00  --------------------------------------------------------  IN: 360 mL / OUT: 600 mL / NET: -240 mL            Appearance: NAD	  HEENT:   Dry  oral mucosa, PERRL, EOMI	  Lymphatic: No lymphadenopathy  Cardiovascular: Irregular S1 S2, No JVD,+ murmurs, No edema  Respiratory: Lungs clear to auscultation	  Psychiatry: A & O x 2 dementia   Gastrointestinal:  Soft, Non-tender, + BS	  Skin: No rashes, No ecchymoses, No cyanosis	  Neurologic: Non-focal  Extremities: Normal range of motion, No clubbing, cyanosis or edema  Vascular: Peripheral pulses palpable 2+ bilaterally        LABS:    CARDIAC MARKERS:                                8.0    12.34 )-----------( 268      ( 08 Oct 2021 07:13 )             27.0     10-08    142  |  104  |  26<H>  ----------------------------<  150<H>  3.7   |  21<L>  |  1.16    Ca    9.2      08 Oct 2021 07:13      proBNP:   Lipid Profile:   HgA1c:   TSH:             TELEMETRY: 	SAF    ECG:  	  RADIOLOGY: < from: CT Abdomen and Pelvis w/ IV Cont (10.04.21 @ 20:59) >    EXAM:  CT ABDOMEN AND PELVIS IC                            PROCEDURE DATE:  10/04/2021            INTERPRETATION:  CLINICAL INFORMATION: Bright red blood per rectum, fatigue, shortness of breath, chest pain. Evaluate for mass or active bleed.    COMPARISON: None.    CONTRAST/COMPLICATIONS:  IV Contrast: Omnipaque 350  111 cc administered 39 cc discarded  Oral Contrast: None  Complications: None reported at time of study completion    PROCEDURE:  CT of the Abdomen and Pelvis was performed.  Precontrast, Arterial and Delayed phases were performed.  Sagittal and coronal reformats were performed.    FINDINGS:  LOWER CHEST: Cardiomegaly. Aortic valve and mitral annular calcification.    LIVER: Multiple hepatic cysts and additional subcentimeter hypodensities that are too small to characterize.  BILE DUCTS: Normal caliber.  GALLBLADDER: Within normal limits.  SPLEEN: Normal size. A 7 mm focus of hypoattenuation (series 4 image 21) is too small to characterize.  PANCREAS: Within normal limits.  ADRENALS: Within normal limits.  KIDNEYS/URETERS: Bilateral renal cysts and additional subcentimeter hypodensities too small to characterize. There is an exophytic 1.5 cm hyperattenuating lesion in the upper pole of the right kidney (series 2 image 18) without definite enhancement and precontrast density of 77 HU,, likely representing a hemorrhagic cyst. Indeterminate exophytic right lower pole lesion measuring 2.1 cm (series 2 image 33) with questionable mild enhancement versus pseudoenhancement.    BLADDER: Evaluation is limited by streak artifact. Layering hyperdensity likely represents contrast.  REPRODUCTIVE ORGANS: Evaluation is limited by streak artifact. Hysterectomy.    BOWEL: No bowel obstruction. Evaluation of the distal sigmoid colon and rectum is limited secondary to streak artifact; otherwise no evidence of GI bleed. Focal apparent soft tissue prominence along the right aspect of the low rectum (series 4 image 95), which may be artifactual due to underdistention and partialobscuration by streak artifact. Colonic diverticula.  PERITONEUM: No ascites.  VESSELS: Atherosclerotic changes.  RETROPERITONEUM/LYMPH NODES: No lymphadenopathy.  ABDOMINAL WALL: Tiny fat-containing umbilical hernia.  BONES: Right hip total arthroplasty with associated streak artifact obscuring adjacent tissue. Right iliopsoas atrophy. Degenerative changes.    IMPRESSION:  Evaluation of the distal sigmoid colon and rectum is limited secondary to streak artifact related to hip prosthesis; otherwise no evidence of active gastrointestinal bleed.    Questionable soft tissue prominence along the low rectum, which may be artifactual due to underdistention and streak artifact. Consider correlation with endoscopy.    Right renal lesions favored to represent hemorrhagic cysts. Questionable enhancement of a right lower pole lesion versus pseudoenhancement. Consider further evaluation with nonemergent renal ultrasound or contrast enhanced abdominal MRI as clinically indicated.      ARLENE LOVING MD; Resident Radiology  This document has been electronically signed.  AILYN KELLEY MD; Attending Radiologist  This document has been electronically signed. Oct  5 2021 11:01AM    < end of copied text >    DIAGNOSTIC TESTING:  [ ] Echocardiogram:  [ ]  Catheterization:  [ ] Stress Test:    OTHER: 	           injury  Description: INTERVENTIONS:  - Assess pt frequently for physical needs  - Identify cognitive and physical deficits and behaviors that affect risk of falls.  - Mobile fall precautions as indicated by assessment.  - Educate pt/family on patient safety including physical limitations  - Instruct pt to call for assistance with activity based on assessment  - Modify environment to reduce risk of injury  - Provide assistive devices as appropriate  - Consider OT/PT consult to assist with strengthening/mobility  - Encourage toileting schedule  Outcome: Progressing     Problem: DISCHARGE PLANNING  Goal: Discharge to home or other facility with appropriate resources  Description: INTERVENTIONS:  - Identify barriers to discharge w/pt and caregiver  - Include patient/family/discharge partner in discharge planning  - Arrange for needed discharge resources and transportation as appropriate  - Identify discharge learning needs (meds, wound care, etc)  - Arrange for interpreters to assist at discharge as needed  - Consider post-discharge preferences of patient/family/discharge partner  - Complete POLST form as appropriate  - Assess patient's ability to be responsible for managing their own health  - Refer to Case Management Department for coordinating discharge planning if the patient needs post-hospital services based on physician/LIP order or complex needs related to functional status, cognitive ability or social support system  Outcome: Progressing     Problem: SKIN/TISSUE INTEGRITY - ADULT  Goal: Skin integrity remains intact  Description: INTERVENTIONS  - Assess and document risk factors for pressure ulcer development  - Assess and document skin integrity  - Monitor for areas of redness and/or skin breakdown  - Initiate interventions, skin care algorithm/standards of care as needed  Outcome: Progressing     Problem: RESPIRATORY - ADULT  Goal: Achieves optimal ventilation and oxygenation  Description:  INTERVENTIONS:  - Assess for changes in respiratory status  - Assess for changes in mentation and behavior  - Position to facilitate oxygenation and minimize respiratory effort  - Oxygen supplementation based on oxygen saturation or ABGs  - Provide Smoking Cessation handout, if applicable  - Encourage broncho-pulmonary hygiene including cough, deep breathe, Incentive Spirometry  - Assess the need for suctioning and perform as needed  - Assess and instruct to report SOB or any respiratory difficulty  - Respiratory Therapy support as indicated  - Manage/alleviate anxiety  - Monitor for signs/symptoms of CO2 retention  Outcome: Progressing     Problem: GENITOURINARY - ADULT  Goal: Absence of urinary retention  Description: INTERVENTIONS:  - Assess patient’s ability to void and empty bladder  - Monitor intake/output and perform bladder scan as needed  - Follow urinary retention protocol/standard of care  - Consider collaborating with pharmacy to review patient's medication profile  - Implement strategies to promote bladder emptying  Outcome: Progressing     Problem: METABOLIC/FLUID AND ELECTROLYTES - ADULT  Goal: Glucose maintained within prescribed range  Description: INTERVENTIONS:  - Monitor Blood Glucose as ordered  - Assess for signs and symptoms of hyperglycemia and hypoglycemia  - Administer ordered medications to maintain glucose within target range  - Assess barriers to adequate nutritional intake and initiate nutrition consult as needed  - Instruct patient on self management of diabetes  Outcome: Progressing  Goal: Electrolytes maintained within normal limits  Description: INTERVENTIONS:  - Monitor labs and rhythm and assess patient for signs and symptoms of electrolyte imbalances  - Administer electrolyte replacement as ordered  - Monitor response to electrolyte replacements, including rhythm and repeat lab results as appropriate  - Fluid restriction as ordered  - Instruct patient on fluid and nutrition  restrictions as appropriate  Outcome: Progressing  Goal: Hemodynamic stability and optimal renal function maintained  Description: INTERVENTIONS:  - Monitor labs and assess for signs and symptoms of volume excess or deficit  - Monitor intake, output and patient weight  - Monitor urine specific gravity, serum osmolarity and serum sodium as indicated or ordered  - Monitor response to interventions for patient's volume status, including labs, urine output, blood pressure (other measures as available)  - Encourage oral intake as appropriate  - Instruct patient on fluid and nutrition restrictions as appropriate  Outcome: Progressing

## 2025-06-05 NOTE — ED ADULT NURSE NOTE - NS PRO AD PATIENT TYPE
Products Recommended: SPF 50 tinted mineral sunscreen Q2 hours Detail Level: Detailed General Sunscreen Counseling: I recommended a broad spectrum sunscreen with a SPF of 30 or higher.  I explained that SPF 30-50 sunscreens block approximately 97 percent of the sun's harmful rays.  Sunscreens should be applied at least 15 minutes prior to expected sun exposure and then every 2 hours after that as long as sun exposure continues. If swimming or exercising sunscreen should be reapplied every 45 minutes to an hour after getting wet or sweating.  One ounce, or the equivalent of a shot glass full of sunscreen, is adequate to protect the skin not covered by a bathing suit. I also recommended a lip balm with a sunscreen as well. Sun protective clothing can be used in lieu of sunscreen but must be worn the entire time you are exposed to the sun's rays. Medical Orders for Life-Sustaining Treatment (MOLST)

## 2025-07-07 NOTE — ED ADULT NURSE NOTE - NS ED NURSE TRANSPORT WITH
